# Patient Record
Sex: MALE | Race: WHITE | NOT HISPANIC OR LATINO | Employment: OTHER | URBAN - METROPOLITAN AREA
[De-identification: names, ages, dates, MRNs, and addresses within clinical notes are randomized per-mention and may not be internally consistent; named-entity substitution may affect disease eponyms.]

---

## 2019-12-17 ENCOUNTER — HISTORICAL (OUTPATIENT)
Dept: LAB | Facility: HOSPITAL | Age: 79
End: 2019-12-17

## 2019-12-17 LAB
FLUAV AG UPPER RESP QL IA.RAPID: NEGATIVE
FLUBV AG UPPER RESP QL IA.RAPID: NEGATIVE

## 2020-11-16 ENCOUNTER — HISTORICAL (OUTPATIENT)
Dept: ADMINISTRATIVE | Facility: HOSPITAL | Age: 80
End: 2020-11-16

## 2020-11-16 LAB
ABS NEUT (OLG): 2.53 X10(3)/MCL (ref 2.1–9.2)
ALBUMIN SERPL-MCNC: 3.7 GM/DL (ref 3.4–4.8)
ALBUMIN/GLOB SERPL: 1.2 RATIO (ref 1.1–2)
ALP SERPL-CCNC: 53 UNIT/L (ref 40–150)
ALT SERPL-CCNC: 28 UNIT/L (ref 0–55)
AST SERPL-CCNC: 30 UNIT/L (ref 5–34)
BASOPHILS # BLD AUTO: 0 X10(3)/MCL (ref 0–0.2)
BASOPHILS NFR BLD AUTO: 1 %
BILIRUB SERPL-MCNC: 0.8 MG/DL
BILIRUBIN DIRECT+TOT PNL SERPL-MCNC: 0.3 MG/DL (ref 0–0.5)
BILIRUBIN DIRECT+TOT PNL SERPL-MCNC: 0.5 MG/DL (ref 0–0.8)
BUN SERPL-MCNC: 26.9 MG/DL (ref 8.4–25.7)
CALCIUM SERPL-MCNC: 9.4 MG/DL (ref 8.8–10)
CHLORIDE SERPL-SCNC: 111 MMOL/L (ref 98–107)
CHOLEST SERPL-MCNC: 126 MG/DL
CHOLEST/HDLC SERPL: 2 {RATIO} (ref 0–5)
CO2 SERPL-SCNC: 19 MMOL/L (ref 23–31)
CREAT SERPL-MCNC: 1.05 MG/DL (ref 0.73–1.18)
EOSINOPHIL # BLD AUTO: 0.1 X10(3)/MCL (ref 0–0.9)
EOSINOPHIL NFR BLD AUTO: 2 %
ERYTHROCYTE [DISTWIDTH] IN BLOOD BY AUTOMATED COUNT: 13.3 % (ref 11.5–17)
GLOBULIN SER-MCNC: 3.1 GM/DL (ref 2.4–3.5)
GLUCOSE SERPL-MCNC: 98 MG/DL (ref 82–115)
HCT VFR BLD AUTO: 43.6 % (ref 42–52)
HDLC SERPL-MCNC: 56 MG/DL (ref 35–60)
HGB BLD-MCNC: 12.9 GM/DL (ref 14–18)
LDLC SERPL CALC-MCNC: 45 MG/DL (ref 50–140)
LYMPHOCYTES # BLD AUTO: 1.7 X10(3)/MCL (ref 0.6–4.6)
LYMPHOCYTES NFR BLD AUTO: 36 %
MCH RBC QN AUTO: 30 PG (ref 27–31)
MCHC RBC AUTO-ENTMCNC: 29.6 GM/DL (ref 33–36)
MCV RBC AUTO: 101.4 FL (ref 80–94)
MONOCYTES # BLD AUTO: 0.3 X10(3)/MCL (ref 0.1–1.3)
MONOCYTES NFR BLD AUTO: 7 %
NEUTROPHILS # BLD AUTO: 2.53 X10(3)/MCL (ref 2.1–9.2)
NEUTROPHILS NFR BLD AUTO: 54 %
PLATELET # BLD AUTO: 137 X10(3)/MCL (ref 130–400)
PLATELET # BLD EST: NORMAL 10*3/UL
PMV BLD AUTO: 10.4 FL (ref 7.4–10.4)
POTASSIUM SERPL-SCNC: 5.1 MMOL/L (ref 3.5–5.1)
PROT SERPL-MCNC: 6.8 GM/DL (ref 5.8–7.6)
PSA SERPL-MCNC: 2.5 NG/ML
RBC # BLD AUTO: 4.3 X10(6)/MCL (ref 4.7–6.1)
SODIUM SERPL-SCNC: 137 MMOL/L (ref 136–145)
TRIGL SERPL-MCNC: 124 MG/DL (ref 34–140)
VLDLC SERPL CALC-MCNC: 25 MG/DL
WBC # SPEC AUTO: 4.7 X10(3)/MCL (ref 4.5–11.5)

## 2021-05-17 ENCOUNTER — HISTORICAL (OUTPATIENT)
Dept: ADMINISTRATIVE | Facility: HOSPITAL | Age: 81
End: 2021-05-17

## 2021-05-17 LAB
ALBUMIN SERPL-MCNC: 4.3 GM/DL (ref 3.4–4.8)
ALBUMIN/GLOB SERPL: 1.6 RATIO (ref 1.1–2)
ALP SERPL-CCNC: 72 UNIT/L (ref 40–150)
ALT SERPL-CCNC: 32 UNIT/L (ref 0–55)
AST SERPL-CCNC: 29 UNIT/L (ref 5–34)
BILIRUB SERPL-MCNC: 0.6 MG/DL
BILIRUBIN DIRECT+TOT PNL SERPL-MCNC: 0.2 MG/DL (ref 0–0.5)
BILIRUBIN DIRECT+TOT PNL SERPL-MCNC: 0.4 MG/DL (ref 0–0.8)
BUN SERPL-MCNC: 30.3 MG/DL (ref 8.4–25.7)
CALCIUM SERPL-MCNC: 10.2 MG/DL (ref 8.8–10)
CHLORIDE SERPL-SCNC: 108 MMOL/L (ref 98–107)
CHOLEST SERPL-MCNC: 140 MG/DL
CHOLEST/HDLC SERPL: 3 {RATIO} (ref 0–5)
CO2 SERPL-SCNC: 23 MMOL/L (ref 23–31)
CREAT SERPL-MCNC: 0.95 MG/DL (ref 0.73–1.18)
GLOBULIN SER-MCNC: 2.7 GM/DL (ref 2.4–3.5)
GLUCOSE SERPL-MCNC: 107 MG/DL (ref 82–115)
HDLC SERPL-MCNC: 49 MG/DL (ref 35–60)
LDLC SERPL CALC-MCNC: 71 MG/DL (ref 50–140)
POTASSIUM SERPL-SCNC: 5.4 MMOL/L (ref 3.5–5.1)
PROT SERPL-MCNC: 7 GM/DL (ref 5.8–7.6)
SODIUM SERPL-SCNC: 140 MMOL/L (ref 136–145)
TRIGL SERPL-MCNC: 101 MG/DL (ref 34–140)
VLDLC SERPL CALC-MCNC: 20 MG/DL

## 2021-06-10 ENCOUNTER — HISTORICAL (OUTPATIENT)
Dept: ADMINISTRATIVE | Facility: HOSPITAL | Age: 81
End: 2021-06-10

## 2021-06-10 LAB
BUN SERPL-MCNC: 22.7 MG/DL (ref 8.4–25.7)
CALCIUM SERPL-MCNC: 10.1 MG/DL (ref 8.8–10)
CHLORIDE SERPL-SCNC: 104 MMOL/L (ref 98–107)
CO2 SERPL-SCNC: 27 MMOL/L (ref 23–31)
CREAT SERPL-MCNC: 0.96 MG/DL (ref 0.73–1.18)
CREAT/UREA NIT SERPL: 24
GLUCOSE SERPL-MCNC: 110 MG/DL (ref 82–115)
POTASSIUM SERPL-SCNC: 4.6 MMOL/L (ref 3.5–5.1)
SODIUM SERPL-SCNC: 138 MMOL/L (ref 136–145)

## 2021-11-22 ENCOUNTER — HISTORICAL (OUTPATIENT)
Dept: ADMINISTRATIVE | Facility: HOSPITAL | Age: 81
End: 2021-11-22

## 2021-11-22 LAB
ABS NEUT (OLG): 3.2 X10(3)/MCL (ref 2.1–9.2)
ALBUMIN SERPL-MCNC: 4 GM/DL (ref 3.4–4.8)
ALBUMIN/GLOB SERPL: 1.5 RATIO (ref 1.1–2)
ALP SERPL-CCNC: 65 UNIT/L (ref 40–150)
ALT SERPL-CCNC: 28 UNIT/L (ref 0–55)
APPEARANCE, UA: CLEAR
AST SERPL-CCNC: 26 UNIT/L (ref 5–34)
BACTERIA SPEC CULT: NORMAL /HPF
BASOPHILS # BLD AUTO: 0.1 X10(3)/MCL (ref 0–0.2)
BASOPHILS NFR BLD AUTO: 1 %
BILIRUB SERPL-MCNC: 1 MG/DL
BILIRUB UR QL STRIP: NEGATIVE
BILIRUBIN DIRECT+TOT PNL SERPL-MCNC: 0.4 MG/DL (ref 0–0.5)
BILIRUBIN DIRECT+TOT PNL SERPL-MCNC: 0.6 MG/DL (ref 0–0.8)
BUN SERPL-MCNC: 27.4 MG/DL (ref 8.4–25.7)
CALCIUM SERPL-MCNC: 10.3 MG/DL (ref 8.7–10.5)
CHLORIDE SERPL-SCNC: 108 MMOL/L (ref 98–107)
CHOLEST SERPL-MCNC: 137 MG/DL
CHOLEST/HDLC SERPL: 3 {RATIO} (ref 0–5)
CO2 SERPL-SCNC: 24 MMOL/L (ref 23–31)
COLOR UR: YELLOW
CREAT SERPL-MCNC: 0.94 MG/DL (ref 0.73–1.18)
EOSINOPHIL # BLD AUTO: 0.2 X10(3)/MCL (ref 0–0.9)
EOSINOPHIL NFR BLD AUTO: 3 %
ERYTHROCYTE [DISTWIDTH] IN BLOOD BY AUTOMATED COUNT: 14 % (ref 11.5–17)
GLOBULIN SER-MCNC: 2.7 GM/DL (ref 2.4–3.5)
GLUCOSE (UA): NEGATIVE
GLUCOSE SERPL-MCNC: 100 MG/DL (ref 82–115)
HCT VFR BLD AUTO: 41.3 % (ref 42–52)
HDLC SERPL-MCNC: 47 MG/DL (ref 35–60)
HGB BLD-MCNC: 13.4 GM/DL (ref 14–18)
HGB UR QL STRIP: NEGATIVE
KETONES UR QL STRIP: NEGATIVE
LDLC SERPL CALC-MCNC: 64 MG/DL (ref 50–140)
LEUKOCYTE ESTERASE UR QL STRIP: NEGATIVE
LYMPHOCYTES # BLD AUTO: 2.2 X10(3)/MCL (ref 0.6–4.6)
LYMPHOCYTES NFR BLD AUTO: 36 %
MCH RBC QN AUTO: 29.5 PG (ref 27–31)
MCHC RBC AUTO-ENTMCNC: 32.4 GM/DL (ref 33–36)
MCV RBC AUTO: 91 FL (ref 80–94)
MONOCYTES # BLD AUTO: 0.4 X10(3)/MCL (ref 0.1–1.3)
MONOCYTES NFR BLD AUTO: 7 %
NEUTROPHILS # BLD AUTO: 3.2 X10(3)/MCL (ref 2.1–9.2)
NEUTROPHILS NFR BLD AUTO: 53 %
NITRITE UR QL STRIP: NEGATIVE
PH UR STRIP: 7 [PH] (ref 5–9)
PLATELET # BLD AUTO: 158 X10(3)/MCL (ref 130–400)
PMV BLD AUTO: 10.4 FL (ref 9.4–12.4)
POTASSIUM SERPL-SCNC: 4.8 MMOL/L (ref 3.5–5.1)
PROT SERPL-MCNC: 6.7 GM/DL (ref 5.8–7.6)
PROT UR QL STRIP: NEGATIVE
PSA SERPL-MCNC: 2.15 NG/ML
RBC # BLD AUTO: 4.54 X10(6)/MCL (ref 4.7–6.1)
RBC #/AREA URNS HPF: NORMAL /[HPF]
SODIUM SERPL-SCNC: 141 MMOL/L (ref 136–145)
SP GR UR STRIP: 1.02 (ref 1–1.03)
SQUAMOUS EPITHELIAL, UA: NORMAL /HPF (ref 0–4)
TRIGL SERPL-MCNC: 128 MG/DL (ref 34–140)
UROBILINOGEN UR STRIP-ACNC: 0.2
VLDLC SERPL CALC-MCNC: 26 MG/DL
WBC # SPEC AUTO: 6.1 X10(3)/MCL (ref 4.5–11.5)
WBC #/AREA URNS HPF: NORMAL /HPF

## 2022-05-23 ENCOUNTER — LAB REQUISITION (OUTPATIENT)
Dept: LAB | Facility: HOSPITAL | Age: 82
End: 2022-05-23
Payer: MEDICARE

## 2022-05-23 DIAGNOSIS — E78.2 MIXED HYPERLIPIDEMIA: ICD-10-CM

## 2022-05-23 LAB
ALBUMIN SERPL-MCNC: 3.9 GM/DL (ref 3.4–4.8)
ALBUMIN/GLOB SERPL: 1.4 RATIO (ref 1.1–2)
ALP SERPL-CCNC: 75 UNIT/L (ref 40–150)
ALT SERPL-CCNC: 33 UNIT/L (ref 0–55)
AST SERPL-CCNC: 22 UNIT/L (ref 5–34)
BILIRUBIN DIRECT+TOT PNL SERPL-MCNC: 0.5 MG/DL
BUN SERPL-MCNC: 31.7 MG/DL (ref 8.4–25.7)
CALCIUM SERPL-MCNC: 10.2 MG/DL (ref 8.8–10)
CHLORIDE SERPL-SCNC: 106 MMOL/L (ref 98–107)
CHOLEST SERPL-MCNC: 133 MG/DL
CHOLEST/HDLC SERPL: 2 {RATIO} (ref 0–5)
CO2 SERPL-SCNC: 23 MMOL/L (ref 23–31)
CREAT SERPL-MCNC: 1.08 MG/DL (ref 0.73–1.18)
GLOBULIN SER-MCNC: 2.7 GM/DL (ref 2.4–3.5)
GLUCOSE SERPL-MCNC: 93 MG/DL (ref 82–115)
HDLC SERPL-MCNC: 59 MG/DL (ref 35–60)
LDLC SERPL CALC-MCNC: 55 MG/DL (ref 50–140)
POTASSIUM SERPL-SCNC: 5 MMOL/L (ref 3.5–5.1)
PROT SERPL-MCNC: 6.6 GM/DL (ref 5.8–7.6)
SODIUM SERPL-SCNC: 138 MMOL/L (ref 136–145)
TRIGL SERPL-MCNC: 96 MG/DL (ref 34–140)
VLDLC SERPL CALC-MCNC: 19 MG/DL

## 2022-05-23 PROCEDURE — 80053 COMPREHEN METABOLIC PANEL: CPT | Performed by: INTERNAL MEDICINE

## 2022-05-23 PROCEDURE — 80061 LIPID PANEL: CPT | Performed by: INTERNAL MEDICINE

## 2022-11-18 ENCOUNTER — LAB VISIT (OUTPATIENT)
Dept: LAB | Facility: HOSPITAL | Age: 82
End: 2022-11-18
Attending: INTERNAL MEDICINE
Payer: MEDICARE

## 2022-11-18 DIAGNOSIS — R19.5 ABNORMAL FECES: Primary | ICD-10-CM

## 2022-11-18 LAB
BASOPHILS # BLD AUTO: 0.04 X10(3)/MCL (ref 0–0.2)
BASOPHILS NFR BLD AUTO: 0.5 %
EOSINOPHIL # BLD AUTO: 0.11 X10(3)/MCL (ref 0–0.9)
EOSINOPHIL NFR BLD AUTO: 1.5 %
ERYTHROCYTE [DISTWIDTH] IN BLOOD BY AUTOMATED COUNT: 14 % (ref 11.5–17)
HCT VFR BLD AUTO: 34.4 % (ref 42–52)
HGB BLD-MCNC: 10.7 GM/DL (ref 14–18)
IMM GRANULOCYTES # BLD AUTO: 0.03 X10(3)/MCL (ref 0–0.04)
IMM GRANULOCYTES NFR BLD AUTO: 0.4 %
LYMPHOCYTES # BLD AUTO: 1.48 X10(3)/MCL (ref 0.6–4.6)
LYMPHOCYTES NFR BLD AUTO: 20 %
MCH RBC QN AUTO: 27.7 PG (ref 27–31)
MCHC RBC AUTO-ENTMCNC: 31.1 MG/DL (ref 33–36)
MCV RBC AUTO: 89.1 FL (ref 80–94)
MONOCYTES # BLD AUTO: 0.91 X10(3)/MCL (ref 0.1–1.3)
MONOCYTES NFR BLD AUTO: 12.3 %
NEUTROPHILS # BLD AUTO: 4.8 X10(3)/MCL (ref 2.1–9.2)
NEUTROPHILS NFR BLD AUTO: 65.3 %
NRBC BLD AUTO-RTO: 0 %
PLATELET # BLD AUTO: 245 X10(3)/MCL (ref 130–400)
PMV BLD AUTO: 9.3 FL (ref 7.4–10.4)
RBC # BLD AUTO: 3.86 X10(6)/MCL (ref 4.7–6.1)
WBC # SPEC AUTO: 7.4 X10(3)/MCL (ref 4.5–11.5)

## 2022-11-18 PROCEDURE — 85025 COMPLETE CBC W/AUTO DIFF WBC: CPT

## 2022-11-18 PROCEDURE — 36415 COLL VENOUS BLD VENIPUNCTURE: CPT

## 2022-11-21 ENCOUNTER — LAB REQUISITION (OUTPATIENT)
Dept: LAB | Facility: HOSPITAL | Age: 82
End: 2022-11-21
Payer: MEDICARE

## 2022-11-21 DIAGNOSIS — R19.5 OTHER FECAL ABNORMALITIES: ICD-10-CM

## 2022-11-21 LAB
COLOR STL: NORMAL
CONSISTENCY STL: NORMAL
HEMOCCULT SP1 STL QL: NEGATIVE

## 2022-11-21 PROCEDURE — 82270 OCCULT BLOOD FECES: CPT | Performed by: INTERNAL MEDICINE

## 2022-11-28 ENCOUNTER — LAB REQUISITION (OUTPATIENT)
Dept: LAB | Facility: HOSPITAL | Age: 82
End: 2022-11-28
Payer: MEDICARE

## 2022-11-28 DIAGNOSIS — D64.89 OTHER SPECIFIED ANEMIAS: ICD-10-CM

## 2022-11-28 DIAGNOSIS — I10 ESSENTIAL (PRIMARY) HYPERTENSION: ICD-10-CM

## 2022-11-28 DIAGNOSIS — E78.2 MIXED HYPERLIPIDEMIA: ICD-10-CM

## 2022-11-28 LAB
ALBUMIN SERPL-MCNC: 3 GM/DL (ref 3.4–4.8)
ALBUMIN/GLOB SERPL: 1.1 RATIO (ref 1.1–2)
ALP SERPL-CCNC: 802 UNIT/L (ref 40–150)
ALT SERPL-CCNC: 178 UNIT/L (ref 0–55)
AST SERPL-CCNC: 74 UNIT/L (ref 5–34)
BASOPHILS # BLD AUTO: 0.06 X10(3)/MCL (ref 0–0.2)
BASOPHILS NFR BLD AUTO: 0.6 %
BILIRUBIN DIRECT+TOT PNL SERPL-MCNC: 0.9 MG/DL
BUN SERPL-MCNC: 21.9 MG/DL (ref 8.4–25.7)
CALCIUM SERPL-MCNC: 9 MG/DL (ref 8.8–10)
CHLORIDE SERPL-SCNC: 104 MMOL/L (ref 98–107)
CHOLEST SERPL-MCNC: 84 MG/DL
CHOLEST/HDLC SERPL: 3 {RATIO} (ref 0–5)
CO2 SERPL-SCNC: 21 MMOL/L (ref 23–31)
CREAT SERPL-MCNC: 1.47 MG/DL (ref 0.73–1.18)
EOSINOPHIL # BLD AUTO: 0.52 X10(3)/MCL (ref 0–0.9)
EOSINOPHIL NFR BLD AUTO: 5.4 %
ERYTHROCYTE [DISTWIDTH] IN BLOOD BY AUTOMATED COUNT: 14.5 % (ref 11.5–17)
FOLATE SERPL-MCNC: 14.1 NG/ML (ref 7–31.4)
GFR SERPLBLD CREATININE-BSD FMLA CKD-EPI: 48 MLS/MIN/1.73/M2
GLOBULIN SER-MCNC: 2.8 GM/DL (ref 2.4–3.5)
GLUCOSE SERPL-MCNC: 123 MG/DL (ref 82–115)
HCT VFR BLD AUTO: 34.6 % (ref 42–52)
HDLC SERPL-MCNC: 30 MG/DL (ref 35–60)
HGB BLD-MCNC: 11.1 GM/DL (ref 14–18)
IMM GRANULOCYTES # BLD AUTO: 0.05 X10(3)/MCL (ref 0–0.04)
IMM GRANULOCYTES NFR BLD AUTO: 0.5 %
IRON SERPL-MCNC: 24 UG/DL (ref 65–175)
LDLC SERPL CALC-MCNC: 33 MG/DL (ref 50–140)
LYMPHOCYTES # BLD AUTO: 1.28 X10(3)/MCL (ref 0.6–4.6)
LYMPHOCYTES NFR BLD AUTO: 13.3 %
MCH RBC QN AUTO: 27.3 PG (ref 27–31)
MCHC RBC AUTO-ENTMCNC: 32.1 MG/DL (ref 33–36)
MCV RBC AUTO: 85.2 FL (ref 80–94)
MONOCYTES # BLD AUTO: 0.99 X10(3)/MCL (ref 0.1–1.3)
MONOCYTES NFR BLD AUTO: 10.3 %
NEUTROPHILS # BLD AUTO: 6.8 X10(3)/MCL (ref 2.1–9.2)
NEUTROPHILS NFR BLD AUTO: 69.9 %
NRBC BLD AUTO-RTO: 0 %
PLATELET # BLD AUTO: 300 X10(3)/MCL (ref 130–400)
PMV BLD AUTO: 10 FL (ref 7.4–10.4)
POTASSIUM SERPL-SCNC: 4.6 MMOL/L (ref 3.5–5.1)
PROT SERPL-MCNC: 5.8 GM/DL (ref 5.8–7.6)
RBC # BLD AUTO: 4.06 X10(6)/MCL (ref 4.7–6.1)
SODIUM SERPL-SCNC: 134 MMOL/L (ref 136–145)
TRIGL SERPL-MCNC: 104 MG/DL (ref 34–140)
VIT B12 SERPL-MCNC: 1283 PG/ML (ref 213–816)
VLDLC SERPL CALC-MCNC: 21 MG/DL
WBC # SPEC AUTO: 9.7 X10(3)/MCL (ref 4.5–11.5)

## 2022-11-28 PROCEDURE — 82746 ASSAY OF FOLIC ACID SERUM: CPT | Performed by: NURSE PRACTITIONER

## 2022-11-28 PROCEDURE — 85025 COMPLETE CBC W/AUTO DIFF WBC: CPT | Performed by: NURSE PRACTITIONER

## 2022-11-28 PROCEDURE — 82607 VITAMIN B-12: CPT | Performed by: NURSE PRACTITIONER

## 2022-11-28 PROCEDURE — 80061 LIPID PANEL: CPT | Performed by: NURSE PRACTITIONER

## 2022-11-28 PROCEDURE — 81001 URINALYSIS AUTO W/SCOPE: CPT | Performed by: NURSE PRACTITIONER

## 2022-11-28 PROCEDURE — 80053 COMPREHEN METABOLIC PANEL: CPT | Performed by: NURSE PRACTITIONER

## 2022-11-28 PROCEDURE — 83540 ASSAY OF IRON: CPT | Performed by: NURSE PRACTITIONER

## 2022-11-28 PROCEDURE — 81003 URINALYSIS AUTO W/O SCOPE: CPT | Performed by: NURSE PRACTITIONER

## 2022-11-28 PROCEDURE — 87077 CULTURE AEROBIC IDENTIFY: CPT | Performed by: NURSE PRACTITIONER

## 2022-11-29 LAB
APPEARANCE UR: CLEAR
BACTERIA #/AREA URNS AUTO: NORMAL /HPF
BILIRUB UR QL STRIP.AUTO: NEGATIVE MG/DL
COLOR UR AUTO: YELLOW
GLUCOSE UR QL STRIP.AUTO: NEGATIVE MG/DL
KETONES UR QL STRIP.AUTO: NEGATIVE MG/DL
LEUKOCYTE ESTERASE UR QL STRIP.AUTO: NEGATIVE UNIT/L
NITRITE UR QL STRIP.AUTO: NEGATIVE
PH UR STRIP.AUTO: 6.5 [PH]
PROT UR QL STRIP.AUTO: NEGATIVE MG/DL
RBC #/AREA URNS AUTO: <5 /HPF
RBC UR QL AUTO: NEGATIVE UNIT/L
SP GR UR STRIP.AUTO: 1.01 (ref 1–1.03)
SQUAMOUS #/AREA URNS AUTO: <5 /HPF
UROBILINOGEN UR STRIP-ACNC: 0.2 MG/DL
WBC #/AREA URNS AUTO: <5 /HPF

## 2022-11-30 LAB — BACTERIA UR CULT: ABNORMAL

## 2022-12-02 PROCEDURE — 83540 ASSAY OF IRON: CPT | Performed by: INTERNAL MEDICINE

## 2022-12-02 PROCEDURE — 82728 ASSAY OF FERRITIN: CPT | Performed by: INTERNAL MEDICINE

## 2022-12-02 PROCEDURE — 80074 ACUTE HEPATITIS PANEL: CPT | Performed by: INTERNAL MEDICINE

## 2022-12-05 ENCOUNTER — LAB VISIT (OUTPATIENT)
Dept: LAB | Facility: HOSPITAL | Age: 82
End: 2022-12-05
Attending: INTERNAL MEDICINE
Payer: MEDICARE

## 2022-12-05 DIAGNOSIS — R74.8 ACID PHOSPHATASE ELEVATED: Primary | ICD-10-CM

## 2022-12-05 LAB
FERRITIN SERPL-MCNC: 528.8 NG/ML (ref 21.81–274.66)
HAV IGM SERPL QL IA: NONREACTIVE
HBV CORE IGM SERPL QL IA: NONREACTIVE
HBV SURFACE AG SERPL QL IA: NONREACTIVE
HCV AB SERPL QL IA: NONREACTIVE
IRON SATN MFR SERPL: 22 % (ref 20–50)
IRON SERPL-MCNC: 40 UG/DL (ref 65–175)
RET# (OHS): 0.04 (ref 0.03–0.1)
RETICULOCYTE COUNT AUTOMATED (OLG): 1.14 % (ref 1.1–2.1)
TIBC SERPL-MCNC: 144 UG/DL (ref 69–240)
TIBC SERPL-MCNC: 184 UG/DL (ref 250–450)
TRANSFERRIN SERPL-MCNC: 156 MG/DL

## 2022-12-05 PROCEDURE — 80074 ACUTE HEPATITIS PANEL: CPT

## 2022-12-05 PROCEDURE — 82728 ASSAY OF FERRITIN: CPT

## 2022-12-05 PROCEDURE — 36415 COLL VENOUS BLD VENIPUNCTURE: CPT

## 2022-12-05 PROCEDURE — 83540 ASSAY OF IRON: CPT

## 2022-12-05 PROCEDURE — 85045 AUTOMATED RETICULOCYTE COUNT: CPT

## 2022-12-10 LAB — PATH REV: NORMAL

## 2022-12-13 ENCOUNTER — LAB REQUISITION (OUTPATIENT)
Dept: LAB | Facility: HOSPITAL | Age: 82
End: 2022-12-13
Payer: MEDICARE

## 2022-12-13 DIAGNOSIS — R74.8 ABNORMAL LEVELS OF OTHER SERUM ENZYMES: ICD-10-CM

## 2022-12-13 LAB
FERRITIN SERPL-MCNC: 499.66 NG/ML (ref 21.81–274.66)
IRON SERPL-MCNC: 68 UG/DL (ref 65–175)
RET# (OHS): 0.06 (ref 0.03–0.1)
RETICULOCYTE COUNT AUTOMATED (OLG): 1.57 % (ref 1.1–2.1)

## 2022-12-13 PROCEDURE — 85045 AUTOMATED RETICULOCYTE COUNT: CPT | Performed by: INTERNAL MEDICINE

## 2022-12-14 LAB
HAV IGM SERPL QL IA: NONREACTIVE
HBV CORE IGM SERPL QL IA: NONREACTIVE
HBV SURFACE AG SERPL QL IA: NONREACTIVE
HCV AB SERPL QL IA: NONREACTIVE

## 2022-12-15 LAB — PATH REV: NORMAL

## 2022-12-19 ENCOUNTER — LAB VISIT (OUTPATIENT)
Dept: LAB | Facility: HOSPITAL | Age: 82
End: 2022-12-19
Attending: INTERNAL MEDICINE
Payer: MEDICARE

## 2022-12-19 DIAGNOSIS — R17 JAUNDICE: Primary | ICD-10-CM

## 2022-12-19 DIAGNOSIS — E61.1 IRON DEFICIENCY: ICD-10-CM

## 2022-12-19 LAB
ALBUMIN SERPL-MCNC: 2.9 G/DL (ref 3.4–4.8)
ALBUMIN/GLOB SERPL: 0.9 RATIO (ref 1.1–2)
ALP SERPL-CCNC: 992 UNIT/L (ref 40–150)
ALT SERPL-CCNC: 265 UNIT/L (ref 0–55)
AST SERPL-CCNC: 161 UNIT/L (ref 5–34)
BASOPHILS # BLD AUTO: 0.07 X10(3)/MCL (ref 0–0.2)
BASOPHILS NFR BLD AUTO: 0.9 %
BILIRUBIN DIRECT+TOT PNL SERPL-MCNC: 8.1 MG/DL
BUN SERPL-MCNC: 19 MG/DL (ref 8.4–25.7)
CALCIUM SERPL-MCNC: 9.5 MG/DL (ref 8.8–10)
CHLORIDE SERPL-SCNC: 104 MMOL/L (ref 98–107)
CO2 SERPL-SCNC: 24 MMOL/L (ref 23–31)
CREAT SERPL-MCNC: 0.79 MG/DL (ref 0.73–1.18)
EOSINOPHIL # BLD AUTO: 0.6 X10(3)/MCL (ref 0–0.9)
EOSINOPHIL NFR BLD AUTO: 8 %
ERYTHROCYTE [DISTWIDTH] IN BLOOD BY AUTOMATED COUNT: 18 % (ref 11.6–14.4)
GFR SERPLBLD CREATININE-BSD FMLA CKD-EPI: >60 MLS/MIN/1.73/M2
GLOBULIN SER-MCNC: 3.3 GM/DL (ref 2.4–3.5)
GLUCOSE SERPL-MCNC: 94 MG/DL (ref 82–115)
HCT VFR BLD AUTO: 31.6 % (ref 42–52)
HGB BLD-MCNC: 10 GM/DL (ref 14–18)
IMM GRANULOCYTES # BLD AUTO: 0.04 X10(3)/MCL (ref 0–0.04)
IMM GRANULOCYTES NFR BLD AUTO: 0.5 %
LYMPHOCYTES # BLD AUTO: 1.83 X10(3)/MCL (ref 0.6–4.6)
LYMPHOCYTES NFR BLD AUTO: 24.5 %
MCH RBC QN AUTO: 27.4 PG
MCHC RBC AUTO-ENTMCNC: 31.6 MG/DL (ref 33–36)
MCV RBC AUTO: 86.6 FL (ref 80–94)
MONOCYTES # BLD AUTO: 0.72 X10(3)/MCL (ref 0.1–1.3)
MONOCYTES NFR BLD AUTO: 9.6 %
NEUTROPHILS # BLD AUTO: 4.21 X10(3)/MCL (ref 2.1–9.2)
NEUTROPHILS NFR BLD AUTO: 56.5 %
NRBC BLD AUTO-RTO: 0 % (ref 0–1)
PLATELET # BLD AUTO: 361 X10(3)/MCL (ref 140–371)
PMV BLD AUTO: 10.5 FL (ref 9.4–12.4)
POTASSIUM SERPL-SCNC: 4.8 MMOL/L (ref 3.5–5.1)
PROT SERPL-MCNC: 6.2 GM/DL (ref 5.8–7.6)
RBC # BLD AUTO: 3.65 X10(6)/MCL (ref 4.7–6.1)
SODIUM SERPL-SCNC: 135 MMOL/L (ref 136–145)
WBC # SPEC AUTO: 7.5 X10(3)/MCL (ref 4.5–11.5)

## 2022-12-19 PROCEDURE — 85025 COMPLETE CBC W/AUTO DIFF WBC: CPT

## 2022-12-19 PROCEDURE — 80053 COMPREHEN METABOLIC PANEL: CPT

## 2022-12-19 PROCEDURE — 36415 COLL VENOUS BLD VENIPUNCTURE: CPT

## 2022-12-21 ENCOUNTER — HOSPITAL ENCOUNTER (OUTPATIENT)
Facility: HOSPITAL | Age: 82
Discharge: HOME | End: 2022-12-21
Attending: INTERNAL MEDICINE | Admitting: INTERNAL MEDICINE
Payer: MEDICARE

## 2022-12-21 ENCOUNTER — ANESTHESIA (OUTPATIENT)
Dept: ENDOSCOPY | Facility: HOSPITAL | Age: 82
End: 2022-12-21
Payer: MEDICARE

## 2022-12-21 ENCOUNTER — ANESTHESIA EVENT (OUTPATIENT)
Dept: ENDOSCOPY | Facility: HOSPITAL | Age: 82
End: 2022-12-21
Payer: MEDICARE

## 2022-12-21 VITALS
RESPIRATION RATE: 19 BRPM | SYSTOLIC BLOOD PRESSURE: 167 MMHG | DIASTOLIC BLOOD PRESSURE: 88 MMHG | OXYGEN SATURATION: 99 % | HEART RATE: 68 BPM | TEMPERATURE: 97 F

## 2022-12-21 DIAGNOSIS — R17 JAUNDICE: ICD-10-CM

## 2022-12-21 PROCEDURE — 93010 ELECTROCARDIOGRAM REPORT: CPT | Mod: ,,, | Performed by: STUDENT IN AN ORGANIZED HEALTH CARE EDUCATION/TRAINING PROGRAM

## 2022-12-21 PROCEDURE — 93010 EKG 12-LEAD: ICD-10-PCS | Mod: ,,, | Performed by: STUDENT IN AN ORGANIZED HEALTH CARE EDUCATION/TRAINING PROGRAM

## 2022-12-21 PROCEDURE — 25000003 PHARM REV CODE 250: Performed by: INTERNAL MEDICINE

## 2022-12-21 PROCEDURE — 88305 TISSUE EXAM BY PATHOLOGIST: CPT | Performed by: INTERNAL MEDICINE

## 2022-12-21 PROCEDURE — 63600175 PHARM REV CODE 636 W HCPCS: Performed by: NURSE ANESTHETIST, CERTIFIED REGISTERED

## 2022-12-21 PROCEDURE — 27201423 OPTIME MED/SURG SUP & DEVICES STERILE SUPPLY: Performed by: INTERNAL MEDICINE

## 2022-12-21 PROCEDURE — 63600175 PHARM REV CODE 636 W HCPCS

## 2022-12-21 PROCEDURE — 37000008 HC ANESTHESIA 1ST 15 MINUTES: Performed by: INTERNAL MEDICINE

## 2022-12-21 PROCEDURE — 93005 ELECTROCARDIOGRAM TRACING: CPT | Mod: 59

## 2022-12-21 PROCEDURE — 37000009 HC ANESTHESIA EA ADD 15 MINS: Performed by: INTERNAL MEDICINE

## 2022-12-21 PROCEDURE — 43261 ENDO CHOLANGIOPANCREATOGRAPH: CPT | Performed by: INTERNAL MEDICINE

## 2022-12-21 PROCEDURE — 25000003 PHARM REV CODE 250: Performed by: NURSE ANESTHETIST, CERTIFIED REGISTERED

## 2022-12-21 RX ORDER — LIDOCAINE HYDROCHLORIDE 20 MG/ML
INJECTION, SOLUTION EPIDURAL; INFILTRATION; INTRACAUDAL; PERINEURAL
Status: DISCONTINUED | OUTPATIENT
Start: 2022-12-21 | End: 2022-12-21

## 2022-12-21 RX ORDER — MEPERIDINE HYDROCHLORIDE 25 MG/ML
12.5 INJECTION INTRAMUSCULAR; INTRAVENOUS; SUBCUTANEOUS EVERY 10 MIN PRN
Status: CANCELLED | OUTPATIENT
Start: 2022-12-21 | End: 2022-12-22

## 2022-12-21 RX ORDER — SODIUM CHLORIDE, SODIUM GLUCONATE, SODIUM ACETATE, POTASSIUM CHLORIDE AND MAGNESIUM CHLORIDE 30; 37; 368; 526; 502 MG/100ML; MG/100ML; MG/100ML; MG/100ML; MG/100ML
INJECTION, SOLUTION INTRAVENOUS CONTINUOUS
Status: CANCELLED | OUTPATIENT
Start: 2022-12-21 | End: 2023-01-20

## 2022-12-21 RX ORDER — PANTOPRAZOLE SODIUM 40 MG/1
40 TABLET, DELAYED RELEASE ORAL DAILY
Status: ON HOLD | COMMUNITY
End: 2023-03-10 | Stop reason: SDUPTHER

## 2022-12-21 RX ORDER — METRONIDAZOLE 7.5 MG/G
GEL TOPICAL 2 TIMES DAILY
Status: ON HOLD | COMMUNITY
End: 2023-01-25 | Stop reason: HOSPADM

## 2022-12-21 RX ORDER — GLYCOPYRROLATE 0.2 MG/ML
INJECTION INTRAMUSCULAR; INTRAVENOUS
Status: DISCONTINUED | OUTPATIENT
Start: 2022-12-21 | End: 2022-12-21

## 2022-12-21 RX ORDER — INDOMETHACIN 50 MG/1
100 SUPPOSITORY RECTAL ONCE
Status: COMPLETED | OUTPATIENT
Start: 2022-12-21 | End: 2022-12-21

## 2022-12-21 RX ORDER — IPRATROPIUM BROMIDE AND ALBUTEROL SULFATE 2.5; .5 MG/3ML; MG/3ML
3 SOLUTION RESPIRATORY (INHALATION)
Status: CANCELLED | OUTPATIENT
Start: 2022-12-21

## 2022-12-21 RX ORDER — LEVOFLOXACIN 5 MG/ML
INJECTION, SOLUTION INTRAVENOUS
Status: COMPLETED
Start: 2022-12-21 | End: 2022-12-21

## 2022-12-21 RX ORDER — MECLIZINE HCL 12.5 MG 12.5 MG/1
12.5 TABLET ORAL 3 TIMES DAILY PRN
Status: ON HOLD | COMMUNITY
End: 2023-03-17

## 2022-12-21 RX ORDER — PROPOFOL 10 MG/ML
VIAL (ML) INTRAVENOUS
Status: DISCONTINUED | OUTPATIENT
Start: 2022-12-21 | End: 2022-12-21

## 2022-12-21 RX ORDER — ONDANSETRON 2 MG/ML
4 INJECTION INTRAMUSCULAR; INTRAVENOUS DAILY PRN
Status: CANCELLED | OUTPATIENT
Start: 2022-12-21

## 2022-12-21 RX ORDER — LIDOCAINE HYDROCHLORIDE 10 MG/ML
1 INJECTION, SOLUTION EPIDURAL; INFILTRATION; INTRACAUDAL; PERINEURAL ONCE
Status: CANCELLED | OUTPATIENT
Start: 2022-12-21 | End: 2022-12-21

## 2022-12-21 RX ORDER — HYDRALAZINE HYDROCHLORIDE 20 MG/ML
10 INJECTION INTRAMUSCULAR; INTRAVENOUS ONCE
Status: DISCONTINUED | OUTPATIENT
Start: 2022-12-21 | End: 2022-12-21 | Stop reason: HOSPADM

## 2022-12-21 RX ORDER — ROCURONIUM BROMIDE 10 MG/ML
INJECTION, SOLUTION INTRAVENOUS
Status: DISCONTINUED | OUTPATIENT
Start: 2022-12-21 | End: 2022-12-21

## 2022-12-21 RX ORDER — HYDROMORPHONE HYDROCHLORIDE 2 MG/ML
0.2 INJECTION, SOLUTION INTRAMUSCULAR; INTRAVENOUS; SUBCUTANEOUS EVERY 5 MIN PRN
Status: CANCELLED | OUTPATIENT
Start: 2022-12-21

## 2022-12-21 RX ORDER — PHENYLEPHRINE HCL IN 0.9% NACL 1 MG/10 ML
SYRINGE (ML) INTRAVENOUS
Status: DISCONTINUED | OUTPATIENT
Start: 2022-12-21 | End: 2022-12-21

## 2022-12-21 RX ORDER — TAMSULOSIN HYDROCHLORIDE 0.4 MG/1
CAPSULE ORAL DAILY
Status: ON HOLD | COMMUNITY
End: 2023-04-28 | Stop reason: HOSPADM

## 2022-12-21 RX ORDER — ONDANSETRON HYDROCHLORIDE 2 MG/ML
INJECTION, SOLUTION INTRAMUSCULAR; INTRAVENOUS
Status: DISCONTINUED | OUTPATIENT
Start: 2022-12-21 | End: 2022-12-21

## 2022-12-21 RX ORDER — HYDROMORPHONE HYDROCHLORIDE 2 MG/ML
0.4 INJECTION, SOLUTION INTRAMUSCULAR; INTRAVENOUS; SUBCUTANEOUS EVERY 5 MIN PRN
Status: CANCELLED | OUTPATIENT
Start: 2022-12-21

## 2022-12-21 RX ORDER — DEXAMETHASONE SODIUM PHOSPHATE 4 MG/ML
INJECTION, SOLUTION INTRA-ARTICULAR; INTRALESIONAL; INTRAMUSCULAR; INTRAVENOUS; SOFT TISSUE
Status: DISCONTINUED | OUTPATIENT
Start: 2022-12-21 | End: 2022-12-21

## 2022-12-21 RX ORDER — HYDRALAZINE HYDROCHLORIDE 20 MG/ML
INJECTION INTRAMUSCULAR; INTRAVENOUS
Status: COMPLETED
Start: 2022-12-21 | End: 2022-12-21

## 2022-12-21 RX ORDER — INDOMETHACIN 50 MG/1
SUPPOSITORY RECTAL
Status: DISCONTINUED
Start: 2022-12-21 | End: 2022-12-21 | Stop reason: HOSPADM

## 2022-12-21 RX ORDER — LEVOFLOXACIN 5 MG/ML
INJECTION, SOLUTION INTRAVENOUS
Status: DISCONTINUED | OUTPATIENT
Start: 2022-12-21 | End: 2022-12-21

## 2022-12-21 RX ORDER — PROCHLORPERAZINE EDISYLATE 5 MG/ML
5 INJECTION INTRAMUSCULAR; INTRAVENOUS EVERY 30 MIN PRN
Status: CANCELLED | OUTPATIENT
Start: 2022-12-21

## 2022-12-21 RX ORDER — ONDANSETRON 4 MG/1
8 TABLET, ORALLY DISINTEGRATING ORAL EVERY 6 HOURS PRN
Status: CANCELLED | OUTPATIENT
Start: 2022-12-21

## 2022-12-21 RX ADMIN — LEVOFLOXACIN 500 MG: 500 INJECTION, SOLUTION INTRAVENOUS at 11:12

## 2022-12-21 RX ADMIN — INDOMETHACIN 100 MG: 50 SUPPOSITORY RECTAL at 12:12

## 2022-12-21 RX ADMIN — Medication 100 MCG: at 11:12

## 2022-12-21 RX ADMIN — ONDANSETRON 4 MG: 2 INJECTION INTRAMUSCULAR; INTRAVENOUS at 11:12

## 2022-12-21 RX ADMIN — SODIUM CHLORIDE, POTASSIUM CHLORIDE, SODIUM LACTATE AND CALCIUM CHLORIDE: 600; 310; 30; 20 INJECTION, SOLUTION INTRAVENOUS at 11:12

## 2022-12-21 RX ADMIN — LIDOCAINE HYDROCHLORIDE 80 MG: 20 INJECTION, SOLUTION EPIDURAL; INFILTRATION; INTRACAUDAL; PERINEURAL at 11:12

## 2022-12-21 RX ADMIN — HYDRALAZINE HYDROCHLORIDE 10 MG: 20 INJECTION INTRAMUSCULAR; INTRAVENOUS at 01:12

## 2022-12-21 RX ADMIN — DEXAMETHASONE SODIUM PHOSPHATE 4 MG: 4 INJECTION, SOLUTION INTRA-ARTICULAR; INTRALESIONAL; INTRAMUSCULAR; INTRAVENOUS; SOFT TISSUE at 11:12

## 2022-12-21 RX ADMIN — GLYCOPYRROLATE 0.2 MG: 0.2 INJECTION INTRAMUSCULAR; INTRAVENOUS at 11:12

## 2022-12-21 RX ADMIN — ROCURONIUM BROMIDE 50 MG: 50 INJECTION INTRAVENOUS at 11:12

## 2022-12-21 RX ADMIN — PROPOFOL 100 MG: 10 INJECTION, EMULSION INTRAVENOUS at 11:12

## 2022-12-21 NOTE — ANESTHESIA POSTPROCEDURE EVALUATION
Anesthesia Post Evaluation    Patient: Quincy Triplett    Procedure(s) Performed: Procedure(s) (LRB):  ERCP (N/A)  ERCP, WITH BIOPSY    Final Anesthesia Type: general      Patient location during evaluation: GI PACU  Patient participation: Yes- Able to Participate  Level of consciousness: awake and alert  Post-procedure vital signs: reviewed and stable  Pain management: adequate  Airway patency: patent    PONV status at discharge: No PONV  Anesthetic complications: no      Cardiovascular status: blood pressure returned to baseline  Respiratory status: spontaneous ventilation and room air  Hydration status: euvolemic  Follow-up not needed.          Vitals Value Taken Time   /88 12/21/22 1356   Temp 36 °C (96.8 °F) 12/21/22 1302   Pulse 68 12/21/22 1356   Resp 19 12/21/22 1356   SpO2 99 % 12/21/22 1356         Event Time   Out of Recovery 12:41:00         Pain/Mariusz Score: Mariusz Score: 10 (12/21/2022  1:56 PM)

## 2022-12-21 NOTE — OP NOTE
[facility]                       [address] [id number]    PATIENT NAME:      IVONNE PUGH  YOB: 1940  CSN:               [id number]  MRN:               [id number]  ADMIT DATE:        12/21/2022 09:37:00  PHYSICIAN:         Sushil Anderson MD                          OPERATIVE REPORT      DATE OF SURGERY:        SURGEON:  Sushil Anderson MD    PROCEDURE:  Endoscopic retrograde cholangiopancreatography with biopsy.    INDICATIONS:  Jaundice.  Abnormal CT scan.    PREOPERATIVE DIAGNOSES:    1. Possible choledocholithiasis.  2. 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    POSTOPERATIVE DIAGNOSES:    1. Failed attempt at pancreatogram or cholangiogram.  2. Friable mass in the second portion of the ampulla adjacent to the area of the   ampulla, but ampulla not visualized.  Firm mass with obstruction in this area   consistent with malignancy.  Multiple biopsies taken.    OPERATIVE FINDINGS AND REPORT:  After informed consent was obtained, patient was   intubated and sedated as per Anesthesia.  The adult side-viewing duodenoscope was   inserted in usual fashion, advanced through the esophagus and stomach, into the   duodenum.  Patient was noted to have a benign stricture in the area just past   the bulb.  The scope was then positioned in the second portion of the duodenum   in the area of what would usually be the ampullary orifice.  Patient was noted   to have a stricturing, firm, friable mass in this area.  A significant amount of   food debris obstructed visualization in this area.  Attempts to cannulate the   area thought to possibly be the ampulla were unsuccessful.  I then elected to   take multiple biopsies of this area which was firm and friable consistent with   malignancy.  The scope was withdrawn.    The patient tolerated the procedure well.    IMPRESSION:    1. Failed  attempt at pancreatogram or cholangiogram.  2. Friable mass in the second portion of the ampulla adjacent to the area of the   ampulla, but ampulla not visualized. Firm mass with obstruction in this area   consistent with malignancy.  Multiple biopsies taken.    DISCHARGE PLAN AND RECOMMENDATIONS:  Patient was allowed to recover and stable   at the time of finishing the procedure.  I have discussed our findings with the   patient and his family.  Will await our path results.  Patient will need   palliative surgery if this is a malignancy and Surgical Oncology will be   consulted.    Thank you for this kind referral of this unfortunate man.        ______________________________  MD LEANDRA Gambino/AQS  DD:  12/21/2022  Time:  12:33PM  DT:  12/21/2022  Time:  01:22PM  Job #:  [id number]    cc:   Reji Waters Jr, MD        OPERATIVE REPORT

## 2022-12-21 NOTE — ANESTHESIA PREPROCEDURE EVALUATION
12/21/2022  Quincy Triplett is a 82 y.o., male presents as an outpatient for ERCP (imaging abnormality and biliary obstructive pattern).  Patient attributes jaundice to eating bad oysters 2 months ago.  Patient notes recent outside cardiology evaluation with echocardiogram and stress test which he describes as uneventful requiring no further intervention (no records.    Last 3 sets of Vitals    Vitals - 1 value per visit 12/21/2022   SYSTOLIC 136   DIASTOLIC 75   Pulse 66   Temp 96.8   Resp 20   SPO2 99         Lab Results   Component Value Date    WBC 7.5 12/19/2022    HGB 10.0 (L) 12/19/2022    HCT 31.6 (L) 12/19/2022    MCV 86.6 12/19/2022     12/19/2022       CMP  Sodium Level   Date Value Ref Range Status   12/19/2022 135 (L) 136 - 145 mmol/L Final     Potassium Level   Date Value Ref Range Status   12/19/2022 4.8 3.5 - 5.1 mmol/L Final     Carbon Dioxide   Date Value Ref Range Status   12/19/2022 24 23 - 31 mmol/L Final     Blood Urea Nitrogen   Date Value Ref Range Status   12/19/2022 19.0 8.4 - 25.7 mg/dL Final     Creatinine   Date Value Ref Range Status   12/19/2022 0.79 0.73 - 1.18 mg/dL Final     Calcium Level Total   Date Value Ref Range Status   12/19/2022 9.5 8.8 - 10.0 mg/dL Final     Albumin Level   Date Value Ref Range Status   12/19/2022 2.9 (L) 3.4 - 4.8 g/dL Final     Bilirubin Total   Date Value Ref Range Status   12/19/2022 8.1 (H) <=1.5 mg/dL Final     Alkaline Phosphatase   Date Value Ref Range Status   12/19/2022 992 (H) 40 - 150 unit/L Final     Aspartate Aminotransferase   Date Value Ref Range Status   12/19/2022 161 (H) 5 - 34 unit/L Final     Alanine Aminotransferase   Date Value Ref Range Status   12/19/2022 265 (H) 0 - 55 unit/L Final     Estimated GFR-Non    Date Value Ref Range Status   05/23/2022 >60 mls/min/1.73/m2 Final    Pre-op Assessment    I  have reviewed the Patient Summary Reports.    I have reviewed the NPO Status.   I have reviewed the Medications.     Review of Systems  Anesthesia Hx:   Denies Personal Hx of Anesthesia complications.   Social:  Non-Smoker    Cardiovascular:   Hypertension  Functional Capacity good / => 4 METS  Coronary Artery Disease:  Carotoid Artery Disease, s/p percutaneous intervention        Physical Exam  General: Well nourished, Cooperative, Alert, Oriented and Jaundice    Airway:  Mallampati: II   Mouth Opening: Normal  TM Distance: Normal  Tongue: Normal  Neck ROM: Normal ROM    Dental:  Partial Dentures    Chest/Lungs:  Clear to auscultation, Normal Respiratory Rate    Heart:  Rate: Normal  Rhythm: Regular Rhythm        Anesthesia Plan  Type of Anesthesia, risks & benefits discussed:    Anesthesia Type: Gen ETT  Intra-op Monitoring Plan: Standard ASA Monitors  Post Op Pain Control Plan: multimodal analgesia and IV/PO Opioids PRN  Induction:  IV  Airway Plan: Direct  Informed Consent: Informed consent signed with the Patient and all parties understand the risks and agree with anesthesia plan.  All questions answered.   ASA Score: 3  Day of Surgery Review of History & Physical: H&P Update referred to the surgeon/provider.    Ready For Surgery From Anesthesia Perspective.     .

## 2022-12-21 NOTE — TRANSFER OF CARE
Anesthesia Transfer of Care Note    Patient: Quincy Triplett    Procedure(s) Performed: Procedure(s) (LRB):  ERCP (N/A)  ERCP, WITH BIOPSY    Patient location: GI    Anesthesia Type: general    Transport from OR: Transported from OR on room air with adequate spontaneous ventilation    Post pain: adequate analgesia    Post assessment: no apparent anesthetic complications and tolerated procedure well    Post vital signs: stable    Level of consciousness: awake and alert    Nausea/Vomiting: no nausea/vomiting    Complications: none    Transfer of care protocol was followed      Last vitals:   Visit Vitals  /75   Pulse 66   Temp 36 °C (96.8 °F)   Resp 20   SpO2 99%

## 2022-12-21 NOTE — H&P
[facility]                       [address] [id number]    PATIENT NAME:       IVONNE PUGH  YOB: 1940  CSN:                [id number]   MRN:                [id number]  ADMIT DATE:         12/21/2022 09:37:00  PHYSICIAN:          Sushil Anderson MD                        HISTORY AND PHYSICAL      HISTORY OF PRESENT ILLNESS:  An 82-year-old white man referred for evaluation of   jaundice and elevated liver function studies.  The patient reports that 2   months ago, he became sick after eating raw oysters.  He states he had nausea,   vomiting, and some diarrhea.  Since that time, he has lost 25 pounds.  He has   had no further symptoms.  He denies any abdominal pain.  He noticed he was   jaundiced and sought medical attention with his primary care physician, Dr. Waters.  He was found to have a total bilirubin of 8.1, alkaline phosphatase   992, AST of 161, ALT of 265, albumin of 2.9, hemoglobin of 10, hematocrit of   31.6.  The patient then underwent an ultrasound and a CT scan.  His ultrasound   did not reveal any evidence of gallstones.  He was found to have a dilated   common bile duct of 16 mm.  The CT scan performed yesterday revealed   intrahepatic and extrahepatic biliary dilatation, possible choledocholithiasis   with possible noncalcified stone involving the distal common bile duct.    Circumferential thickening involving the 2nd portion of the duodenum at the   ampulla with trace adjacent free fluid.  Inflammatory thickening versus   neoplastic thickening considered.  No lymphadenopathy.  The patient presents now   for ERCP evaluation.    ALLERGIES:   NKDA.     PAST MEDICAL HISTORY:  Coronary artery disease, hypertension, hyperlipidemia,   status post right arm amputation.    PAST SURGICAL HISTORY:  Tonsillectomy, right arm amputation.    SOCIAL HISTORY:  Quit smoking in 1992.  No  significant alcohol.    PHYSICAL EXAMINATION:  VITAL SIGNS:  Stable, afebrile.   GENERAL:  He is a large man, jaundiced, in no acute distress.   HEENT:  Sclerae icteric.  Conjunctivae pink.  No adenopathy or thyromegaly.   CARDIOVASCULAR:  Regular rate and rhythm.    LUNGS:  Clear.   ABDOMEN:  Soft, nontender.  Bowel sounds normal.  No masses or organomegaly   appreciated.   EXTREMITIES:  No clubbing, cyanosis, or edema.  Right arm amputation.   NEUROLOGIC:  Alert and oriented.  No focal deficits.    IMPRESSION:  Jaundice with evidence of biliary obstruction on CT scan, possibly   suggesting choledocholithiasis versus ampullary tumor.    RECOMMENDATIONS:  We will proceed with ERCP.  Benefits and risks discussed with   the patient and his family.     Thank you for this referral.        ______________________________  MD LEANDRA Gambino/AQS  DD:  12/21/2022  Time:  11:49AM  DT:  12/21/2022  Time:  01:25PM  Job #:  [id number]    cc:   Reji Waters Jr, MD        HISTORY AND PHYSICAL

## 2022-12-22 DIAGNOSIS — K31.89 DUODENAL MASS: Primary | ICD-10-CM

## 2022-12-22 LAB
DHEA SERPL-MCNC: NORMAL
ESTROGEN SERPL-MCNC: NORMAL PG/ML
INSULIN SERPL-ACNC: NORMAL U[IU]/ML
LAB AP CLINICAL INFORMATION: NORMAL
LAB AP GROSS DESCRIPTION: NORMAL
LAB AP REPORT FOOTNOTES: NORMAL
T3RU NFR SERPL: NORMAL %

## 2022-12-27 ENCOUNTER — OFFICE VISIT (OUTPATIENT)
Dept: SURGICAL ONCOLOGY | Facility: CLINIC | Age: 82
End: 2022-12-27
Payer: MEDICARE

## 2022-12-27 VITALS
WEIGHT: 176 LBS | HEART RATE: 76 BPM | BODY MASS INDEX: 26.07 KG/M2 | DIASTOLIC BLOOD PRESSURE: 76 MMHG | SYSTOLIC BLOOD PRESSURE: 143 MMHG | HEIGHT: 69 IN

## 2022-12-27 DIAGNOSIS — K31.89 DUODENAL MASS: ICD-10-CM

## 2022-12-27 DIAGNOSIS — C24.1 MALIGNANT NEOPLASM OF AMPULLA OF VATER: Primary | ICD-10-CM

## 2022-12-27 DIAGNOSIS — Z01.818 PRE-OP TESTING: ICD-10-CM

## 2022-12-27 DIAGNOSIS — C17.0 MALIGNANT NEOPLASM OF DUODENUM: ICD-10-CM

## 2022-12-27 PROCEDURE — 99205 OFFICE O/P NEW HI 60 MIN: CPT | Mod: S$GLB,,, | Performed by: SURGERY

## 2022-12-27 PROCEDURE — 1159F PR MEDICATION LIST DOCUMENTED IN MEDICAL RECORD: ICD-10-PCS | Mod: CPTII,S$GLB,, | Performed by: SURGERY

## 2022-12-27 PROCEDURE — 3077F SYST BP >= 140 MM HG: CPT | Mod: CPTII,S$GLB,, | Performed by: SURGERY

## 2022-12-27 PROCEDURE — 3077F PR MOST RECENT SYSTOLIC BLOOD PRESSURE >= 140 MM HG: ICD-10-PCS | Mod: CPTII,S$GLB,, | Performed by: SURGERY

## 2022-12-27 PROCEDURE — 3078F PR MOST RECENT DIASTOLIC BLOOD PRESSURE < 80 MM HG: ICD-10-PCS | Mod: CPTII,S$GLB,, | Performed by: SURGERY

## 2022-12-27 PROCEDURE — 1159F MED LIST DOCD IN RCRD: CPT | Mod: CPTII,S$GLB,, | Performed by: SURGERY

## 2022-12-27 PROCEDURE — 99999 PR PBB SHADOW E&M-EST. PATIENT-LVL III: ICD-10-PCS | Mod: PBBFAC,,, | Performed by: SURGERY

## 2022-12-27 PROCEDURE — 3078F DIAST BP <80 MM HG: CPT | Mod: CPTII,S$GLB,, | Performed by: SURGERY

## 2022-12-27 PROCEDURE — 99205 PR OFFICE/OUTPT VISIT, NEW, LEVL V, 60-74 MIN: ICD-10-PCS | Mod: S$GLB,,, | Performed by: SURGERY

## 2022-12-27 PROCEDURE — 99999 PR PBB SHADOW E&M-EST. PATIENT-LVL III: CPT | Mod: PBBFAC,,, | Performed by: SURGERY

## 2022-12-27 NOTE — PROGRESS NOTES
Chief complaint:  Duodenal adenocarcinoma     HPI:  82-year-old male presented with mild symptoms of jaundice, as well as significant weight loss.  Ultrasound exam suggested biliary dilation.  CT scan showed circumferential thickening of the 2nd portion of the duodenum and intra and extrahepatic biliary dilation.  There was also pancreatic ductal dilation.  There is no evidence of metastatic disease.  I personally reviewed and interpreted the images.  Patient underwent upper endoscopy and attempted ERCP with the finding of a malignant appearing mass at the site of the ampulla.  Biopsies proved poorly differentiated adenocarcinoma.  The bile duct could not be cannulated.  The patient denies fevers, chills or cholangitic symptoms.  He maintains an excellent functional status for his age, walks over 30 minutes a day, no history of alcohol or tobacco use.    Greater than 60 minutes was required for complete chart review, imaging review including interpretation of imaging, coordination with referring/other physicians, patient counseling regarding diagnosis/treatment plan, answering questions, medical decision making, and documentation.        Past Medical and Surgical History  Allergies :   Patient has no known allergies.    @[facility]@  Medical :   He has a past medical history of Atherosclerosis of native arteries of extremities with intermittent claudication, unspecified extremity, Coronary artery disease, Dyslipidemia, and Hypertension.    Surgical :   He has a past surgical history that includes Tonsillectomy; Amputation (Right); Left heart catheterization; Carotid stent; ERCP (N/A, 12/21/2022); and ercp, with biopsy (12/21/2022).     Family History  His family history is not on file.    Social History  He reports that he has never smoked. He has never used smokeless tobacco. He reports that he does not drink alcohol and does not use drugs.     Review of Systems   Constitutional:  Negative for activity change,  appetite change, chills, diaphoresis, fatigue and fever.   HENT:  Negative for congestion, ear pain, tinnitus and trouble swallowing.    Eyes:  Negative for photophobia and pain.   Respiratory:  Negative for apnea, cough, choking, chest tightness, shortness of breath and stridor.    Cardiovascular:  Negative for chest pain, palpitations and leg swelling.   Endocrine: Negative for cold intolerance and heat intolerance.   Genitourinary:  Negative for difficulty urinating, dysuria, enuresis, flank pain, frequency and hematuria.   Musculoskeletal:  Negative for arthralgias, back pain and gait problem.   Neurological:  Negative for dizziness, seizures, syncope, facial asymmetry, speech difficulty, weakness, light-headedness, numbness and headaches.   Psychiatric/Behavioral:  Negative for agitation, behavioral problems, confusion and decreased concentration.    All other systems reviewed and are negative.     Objective   Physical Exam  Constitutional:       General: He is not in acute distress.     Appearance: Normal appearance. He is not ill-appearing, toxic-appearing or diaphoretic.   HENT:      Head: Normocephalic and atraumatic.      Nose: No congestion or rhinorrhea.      Mouth/Throat:      Mouth: Mucous membranes are moist.      Pharynx: Oropharynx is clear.   Eyes:      General: No scleral icterus.     Extraocular Movements: Extraocular movements intact.      Pupils: Pupils are equal, round, and reactive to light.   Cardiovascular:      Rate and Rhythm: Normal rate and regular rhythm.      Pulses: Normal pulses.      Heart sounds: Normal heart sounds. No murmur heard.    No friction rub. No gallop.   Pulmonary:      Effort: Pulmonary effort is normal. No respiratory distress.      Breath sounds: Normal breath sounds. No stridor. No wheezing or rhonchi.   Chest:      Chest wall: No tenderness.   Abdominal:      General: Abdomen is flat. There is no distension.      Palpations: Abdomen is soft. There is no mass.       "Tenderness: There is no abdominal tenderness. There is no guarding or rebound.      Hernia: No hernia is present.   Musculoskeletal:         General: No swelling, tenderness, deformity or signs of injury.      Cervical back: Normal range of motion and neck supple. No rigidity or tenderness.      Right lower leg: No edema.      Left lower leg: No edema.   Skin:     General: Skin is warm.      Capillary Refill: Capillary refill takes less than 2 seconds.      Coloration: Skin is not jaundiced or pale.      Findings: No bruising, erythema, lesion or rash.   Neurological:      General: No focal deficit present.      Mental Status: He is alert and oriented to person, place, and time.   Psychiatric:         Mood and Affect: Mood normal.         Behavior: Behavior normal.         Thought Content: Thought content normal.         Judgment: Judgment normal.     VITAL SIGNS: 24 HR MIN & MAX LAST    @FLOWSTAT(6:24::1)@           @FLOWSTAT(5:24::1)@  (!) 143/76     @FLOWSTAT(8:24::1)@  76     @FLOWSTAT(9:24::1)@       @FLOWSTAT(10:24::1)@         HT: 5' 9" (175.3 cm)  WT: 79.8 kg (176 lb)  BMI: 26       Assessment & Plan     Adenocarcinoma of the ampulla Vater/duodenum.  No obvious evidence of metastatic disease on CT of the abdomen.    Diagnosis, staging, prognosis and treatment guidelines for ampullary cancer were discussed with the patient in detail, all questions were addressed.Using visual aids and drawings, I described the anatomy and potential surgical procedures.    CT scan of the chest to complete staging   CEA, CA 19 9 levels    The patient's functional status suggest he is a candidate for operative intervention.  We will obtain preoperative cardiac evaluation    Scheduled for pancreaticoduodenectomy/Whipple procedure  The risks and benefits of the procedure were explained in detail, questions were addressed, the patient gives consent to proceed    Abdirahman Brown MD  Surgical Oncology  Complex General, " Gastrointestinal and Hepatobiliary Surgery

## 2022-12-30 DIAGNOSIS — C17.0 MALIGNANT NEOPLASM OF DUODENUM: Primary | ICD-10-CM

## 2022-12-30 DIAGNOSIS — C24.1 MALIGNANT NEOPLASM OF AMPULLA OF VATER: ICD-10-CM

## 2022-12-30 DIAGNOSIS — K31.89 DUODENAL MASS: ICD-10-CM

## 2022-12-30 RX ORDER — HYDROCODONE BITARTRATE AND ACETAMINOPHEN 500; 5 MG/1; MG/1
TABLET ORAL
Status: CANCELLED | OUTPATIENT
Start: 2023-01-16

## 2022-12-30 RX ORDER — ENOXAPARIN SODIUM 100 MG/ML
30 INJECTION SUBCUTANEOUS EVERY 24 HOURS
Status: CANCELLED | OUTPATIENT
Start: 2022-12-30

## 2022-12-30 RX ORDER — ALVIMOPAN 12 MG/1
12 CAPSULE ORAL ONCE
Status: CANCELLED | OUTPATIENT
Start: 2023-01-16 | End: 2023-01-22

## 2022-12-31 ENCOUNTER — HOSPITAL ENCOUNTER (INPATIENT)
Facility: HOSPITAL | Age: 82
LOS: 7 days | Discharge: HOME HEALTH | DRG: 380 | End: 2023-01-07
Attending: STUDENT IN AN ORGANIZED HEALTH CARE EDUCATION/TRAINING PROGRAM | Admitting: INTERNAL MEDICINE
Payer: MEDICARE

## 2022-12-31 DIAGNOSIS — R53.1 WEAKNESS: Primary | ICD-10-CM

## 2022-12-31 DIAGNOSIS — R53.1 GENERALIZED WEAKNESS: ICD-10-CM

## 2022-12-31 DIAGNOSIS — C17.0 DUODENAL ADENOCARCINOMA: ICD-10-CM

## 2022-12-31 DIAGNOSIS — R11.2 NAUSEA AND VOMITING, UNSPECIFIED VOMITING TYPE: ICD-10-CM

## 2022-12-31 DIAGNOSIS — R17 JAUNDICE: ICD-10-CM

## 2022-12-31 LAB
ALBUMIN SERPL-MCNC: 2.8 G/DL (ref 3.4–4.8)
ALBUMIN/GLOB SERPL: 0.9 RATIO (ref 1.1–2)
ALP SERPL-CCNC: 526 UNIT/L (ref 40–150)
ALT SERPL-CCNC: 136 UNIT/L (ref 0–55)
AMORPH PHOS CRY URNS QL MICRO: ABNORMAL /HPF
APPEARANCE UR: ABNORMAL
AST SERPL-CCNC: 84 UNIT/L (ref 5–34)
BACTERIA #/AREA URNS AUTO: ABNORMAL /HPF
BASOPHILS # BLD AUTO: 0.04 X10(3)/MCL (ref 0–0.2)
BASOPHILS NFR BLD AUTO: 0.5 %
BILIRUB UR QL STRIP.AUTO: ABNORMAL MG/DL
BILIRUBIN DIRECT+TOT PNL SERPL-MCNC: 10.8 MG/DL (ref 0–0.5)
BILIRUBIN DIRECT+TOT PNL SERPL-MCNC: 16.2 MG/DL
BNP BLD-MCNC: 35 PG/ML
BUN SERPL-MCNC: 22 MG/DL (ref 8.4–25.7)
CALCIUM SERPL-MCNC: 9.9 MG/DL (ref 8.8–10)
CAOX CRY URNS QL MICRO: ABNORMAL /HPF
CHLORIDE SERPL-SCNC: 103 MMOL/L (ref 98–107)
CO2 SERPL-SCNC: 24 MMOL/L (ref 23–31)
COLOR UR AUTO: ABNORMAL
CREAT SERPL-MCNC: 1.16 MG/DL (ref 0.73–1.18)
EOSINOPHIL # BLD AUTO: 0.01 X10(3)/MCL (ref 0–0.9)
EOSINOPHIL NFR BLD AUTO: 0.1 %
ERYTHROCYTE [DISTWIDTH] IN BLOOD BY AUTOMATED COUNT: 21.8 % (ref 11.6–14.4)
FLUAV AG UPPER RESP QL IA.RAPID: NOT DETECTED
FLUBV AG UPPER RESP QL IA.RAPID: NOT DETECTED
GFR SERPLBLD CREATININE-BSD FMLA CKD-EPI: >60 MLS/MIN/1.73/M2
GLOBULIN SER-MCNC: 3 GM/DL (ref 2.4–3.5)
GLUCOSE SERPL-MCNC: 162 MG/DL (ref 82–115)
GLUCOSE UR QL STRIP.AUTO: NEGATIVE MG/DL
HCT VFR BLD AUTO: 32.6 % (ref 42–52)
HGB BLD-MCNC: 10.6 GM/DL (ref 14–18)
HYALINE CASTS URNS QL MICRO: ABNORMAL /LPF
IMM GRANULOCYTES # BLD AUTO: 0.05 X10(3)/MCL (ref 0–0.04)
IMM GRANULOCYTES NFR BLD AUTO: 0.6 %
KETONES UR QL STRIP.AUTO: NEGATIVE MG/DL
LACTATE SERPL-SCNC: 1.2 MMOL/L (ref 0.5–2.2)
LEUKOCYTE ESTERASE UR QL STRIP.AUTO: ABNORMAL UNIT/L
LYMPHOCYTES # BLD AUTO: 1.07 X10(3)/MCL (ref 0.6–4.6)
LYMPHOCYTES NFR BLD AUTO: 13.5 %
MAGNESIUM SERPL-MCNC: 2.3 MG/DL (ref 1.6–2.6)
MCH RBC QN AUTO: 28 PG
MCHC RBC AUTO-ENTMCNC: 32.5 MG/DL (ref 33–36)
MCV RBC AUTO: 86 FL (ref 80–94)
MONOCYTES # BLD AUTO: 0.56 X10(3)/MCL (ref 0.1–1.3)
MONOCYTES NFR BLD AUTO: 7 %
MUCOUS THREADS URNS QL MICRO: ABNORMAL /LPF
NEUTROPHILS # BLD AUTO: 6.22 X10(3)/MCL (ref 2.1–9.2)
NEUTROPHILS NFR BLD AUTO: 78.3 %
NITRITE UR QL STRIP.AUTO: POSITIVE
NRBC BLD AUTO-RTO: 0 % (ref 0–1)
PH UR STRIP.AUTO: 8 [PH]
PLATELET # BLD AUTO: 298 X10(3)/MCL (ref 140–371)
PMV BLD AUTO: 10.7 FL (ref 9.4–12.4)
POTASSIUM SERPL-SCNC: 4.2 MMOL/L (ref 3.5–5.1)
PROT SERPL-MCNC: 5.8 GM/DL (ref 5.8–7.6)
PROT UR QL STRIP.AUTO: ABNORMAL MG/DL
RBC # BLD AUTO: 3.79 X10(6)/MCL (ref 4.7–6.1)
RBC #/AREA URNS AUTO: <5 /HPF
RBC UR QL AUTO: NEGATIVE UNIT/L
SARS-COV-2 RNA RESP QL NAA+PROBE: NOT DETECTED
SODIUM SERPL-SCNC: 138 MMOL/L (ref 136–145)
SP GR UR STRIP.AUTO: 1.02 (ref 1–1.03)
SQUAMOUS #/AREA URNS AUTO: <5 /HPF
TROPONIN I SERPL-MCNC: <0.01 NG/ML (ref 0–0.04)
TSH SERPL-ACNC: 0.79 UIU/ML (ref 0.35–4.94)
UROBILINOGEN UR STRIP-ACNC: 1 MG/DL
WBC # SPEC AUTO: 8 X10(3)/MCL (ref 4.5–11.5)
WBC #/AREA URNS AUTO: <5 /HPF

## 2022-12-31 PROCEDURE — 85025 COMPLETE CBC W/AUTO DIFF WBC: CPT | Performed by: STUDENT IN AN ORGANIZED HEALTH CARE EDUCATION/TRAINING PROGRAM

## 2022-12-31 PROCEDURE — 84484 ASSAY OF TROPONIN QUANT: CPT | Performed by: STUDENT IN AN ORGANIZED HEALTH CARE EDUCATION/TRAINING PROGRAM

## 2022-12-31 PROCEDURE — 96361 HYDRATE IV INFUSION ADD-ON: CPT

## 2022-12-31 PROCEDURE — 83880 ASSAY OF NATRIURETIC PEPTIDE: CPT | Performed by: STUDENT IN AN ORGANIZED HEALTH CARE EDUCATION/TRAINING PROGRAM

## 2022-12-31 PROCEDURE — 11000001 HC ACUTE MED/SURG PRIVATE ROOM

## 2022-12-31 PROCEDURE — 25000003 PHARM REV CODE 250: Performed by: NURSE PRACTITIONER

## 2022-12-31 PROCEDURE — 83735 ASSAY OF MAGNESIUM: CPT | Performed by: STUDENT IN AN ORGANIZED HEALTH CARE EDUCATION/TRAINING PROGRAM

## 2022-12-31 PROCEDURE — 81001 URINALYSIS AUTO W/SCOPE: CPT | Performed by: STUDENT IN AN ORGANIZED HEALTH CARE EDUCATION/TRAINING PROGRAM

## 2022-12-31 PROCEDURE — 82248 BILIRUBIN DIRECT: CPT | Performed by: STUDENT IN AN ORGANIZED HEALTH CARE EDUCATION/TRAINING PROGRAM

## 2022-12-31 PROCEDURE — 80053 COMPREHEN METABOLIC PANEL: CPT | Performed by: STUDENT IN AN ORGANIZED HEALTH CARE EDUCATION/TRAINING PROGRAM

## 2022-12-31 PROCEDURE — 83605 ASSAY OF LACTIC ACID: CPT | Performed by: STUDENT IN AN ORGANIZED HEALTH CARE EDUCATION/TRAINING PROGRAM

## 2022-12-31 PROCEDURE — 84443 ASSAY THYROID STIM HORMONE: CPT | Performed by: STUDENT IN AN ORGANIZED HEALTH CARE EDUCATION/TRAINING PROGRAM

## 2022-12-31 PROCEDURE — 0240U COVID/FLU A&B PCR: CPT | Performed by: STUDENT IN AN ORGANIZED HEALTH CARE EDUCATION/TRAINING PROGRAM

## 2022-12-31 PROCEDURE — 63600175 PHARM REV CODE 636 W HCPCS: Performed by: STUDENT IN AN ORGANIZED HEALTH CARE EDUCATION/TRAINING PROGRAM

## 2022-12-31 PROCEDURE — 93010 EKG 12-LEAD: ICD-10-PCS | Mod: ,,, | Performed by: INTERNAL MEDICINE

## 2022-12-31 PROCEDURE — 93010 ELECTROCARDIOGRAM REPORT: CPT | Mod: ,,, | Performed by: INTERNAL MEDICINE

## 2022-12-31 PROCEDURE — 99285 EMERGENCY DEPT VISIT HI MDM: CPT | Mod: 25

## 2022-12-31 RX ORDER — SODIUM CHLORIDE 9 MG/ML
INJECTION, SOLUTION INTRAVENOUS CONTINUOUS
Status: DISCONTINUED | OUTPATIENT
Start: 2022-12-31 | End: 2023-01-02

## 2022-12-31 RX ORDER — ACETAMINOPHEN 325 MG/1
650 TABLET ORAL EVERY 8 HOURS PRN
Status: DISCONTINUED | OUTPATIENT
Start: 2022-12-31 | End: 2023-01-07 | Stop reason: HOSPADM

## 2022-12-31 RX ORDER — TAMSULOSIN HYDROCHLORIDE 0.4 MG/1
0.4 CAPSULE ORAL DAILY
Status: DISCONTINUED | OUTPATIENT
Start: 2023-01-01 | End: 2023-01-07 | Stop reason: HOSPADM

## 2022-12-31 RX ORDER — ONDANSETRON 2 MG/ML
4 INJECTION INTRAMUSCULAR; INTRAVENOUS EVERY 8 HOURS PRN
Status: DISCONTINUED | OUTPATIENT
Start: 2022-12-31 | End: 2023-01-02

## 2022-12-31 RX ORDER — ACETAMINOPHEN 325 MG/1
650 TABLET ORAL EVERY 4 HOURS PRN
Status: DISCONTINUED | OUTPATIENT
Start: 2022-12-31 | End: 2023-01-07 | Stop reason: HOSPADM

## 2022-12-31 RX ORDER — PANTOPRAZOLE SODIUM 40 MG/1
40 TABLET, DELAYED RELEASE ORAL DAILY
Status: DISCONTINUED | OUTPATIENT
Start: 2023-01-01 | End: 2023-01-02

## 2022-12-31 RX ADMIN — SODIUM CHLORIDE: 9 INJECTION, SOLUTION INTRAVENOUS at 09:12

## 2022-12-31 RX ADMIN — SODIUM CHLORIDE, POTASSIUM CHLORIDE, SODIUM LACTATE AND CALCIUM CHLORIDE 1000 ML: 600; 310; 30; 20 INJECTION, SOLUTION INTRAVENOUS at 06:12

## 2022-12-31 RX ADMIN — SODIUM CHLORIDE: 9 INJECTION, SOLUTION INTRAVENOUS at 12:12

## 2022-12-31 NOTE — H&P
[facility]  Hospital Medicine History & Physical Examination       Patient Name: Quincy Triplett  MRN: [id number]  Patient Class: Emergency   Admission Date: 12/31/2022   Admitting Service: Hospital Medicine   Length of Stay: 0  Attending Physician: Dr. Mcmanus  Primary Care Provider: Reji Waters Jr, MD  Face-to-Face encounter date: 12/31/2022  Code Status:  Full  Chief Complaint: Abdominal Pain (To er per aasi with c/o weakness, n/v, decreased appetite for months but worsening over last several days.  Has been delaying sx procedure for abd mass.  Pt jaundice)    Source of Information: Patient. Medical Records      HISTORY OF PRESENT ILLNESS:   Quincy Triplett is a 82 y.o. 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 He denied hematuria, hematochezia, hematemesis, abdominal pain.  Reports that can not tolerate oral intake due to nausea and vomiting.  He states he was diagnosed with duodenal adenocarcinoma x2 months ago and symptoms have been progressively worsening since then.  He is awaiting Whipple procedure by Dr. Abdirahman Brown.    Initial ED VS stable.  Labs notable for hemoglobin 10.6, hematocrit 32.6, glucose 162, alk-phos 526, albumin 2.8, total bili 16.2, direct bili 10.8, AST 84, .  BNP and troponin normal.  Lactic acid normal.  Flu and COVID negative.  CXR negative.  He was given IV fluids in the ED. Admitted to hospital medicine services for further workup and management of care.    REVIEW OF SYSTEMS:   Except as documented, all other systems reviewed and negative     PAST MEDICAL HISTORY:   PAD with intermittent claudication   CAD   Carotid artery disease   HTN   HLD   Duodenal adenocarcinoma    PAST SURGICAL  HISTORY:     Past Surgical History:   Procedure Laterality Date    AMPUTATION Right     Right arm    CAROTID STENT      ERCP N/A 12/21/2022    Procedure: ERCP;  Surgeon: Sushil Anderson MD;  Location: [facility] ENDOSCOPY;  Service: Gastroenterology;  Laterality: N/A;  food in abdomen    ERCP, WITH BIOPSY  12/21/2022    Procedure: ERCP, WITH BIOPSY;  Surgeon: Sushil Anderson MD;  Location: [facility] ENDOSCOPY;  Service: Gastroenterology;;    LEFT HEART CATHETERIZATION      TONSILLECTOMY         FAMILY HISTORY:   Reviewed and negative    SOCIAL HISTORY:   Denied alcohol, tobacco or illicit drug use    ALLERGIES:   Patient has no known allergies.    HOME MEDICATIONS:     Prior to Admission medications    Medication Sig Start Date End Date Taking? Authorizing Provider   meclizine (ANTIVERT) 12.5 mg tablet Take 12.5 mg by mouth 3 (three) times daily as needed.    Historical Provider   metroNIDAZOLE (METROGEL) 0.75 % gel Apply topically 2 (two) times daily.    Historical Provider   pantoprazole (PROTONIX) 40 MG tablet Take 40 mg by mouth once daily.    Historical Provider   tamsulosin (FLOMAX) 0.4 mg Cap Take by mouth once daily.    Historical Provider     ________________________________________________________________________  INPATIENT LIST OF MEDICATIONS   No current facility-administered medications for this encounter.    Current Outpatient Medications:     meclizine (ANTIVERT) 12.5 mg tablet, Take 12.5 mg by mouth 3 (three) times daily as needed., Disp: , Rfl:     metroNIDAZOLE (METROGEL) 0.75 % gel, Apply topically 2 (two) times daily., Disp: , Rfl:     pantoprazole (PROTONIX) 40 MG tablet, Take 40 mg by mouth once daily., Disp: , Rfl:     tamsulosin (FLOMAX) 0.4 mg Cap, Take by mouth once daily., Disp: , Rfl:     Scheduled Meds:  Continuous Infusions:  PRN Meds:.    PHYSICAL EXAM:     VITAL SIGNS: 24 HRS MIN & MAX LAST   Temp  Min: 98 °F (36.7 °C)  Max: 98 °F (36.7 °C) 98 °F (36.7 °C)   BP  Min: 102/73   Max: 154/79 138/87   Pulse  Min: 59  Max: 82  65   Resp  Min: 16  Max: 20 16   SpO2  Min: 94 %  Max: 96 % (!) 94 %         General appearance: Well-developed male in no apparent distress.  HENT: Atraumatic head. Moist mucous membranes of oral cavity.  Eyes: Normal extraocular movements.  Sclerae icterus  Neck: Supple.   Lungs: Clear to auscultation bilaterally.   Heart: Regular rate and rhythm. S1 and S2 present. No pedal edema.  Abdomen: Soft, non-distended, non-tender.  Extremities: No cyanosis, clubbing, or edema.  Skin:  Jaundiced  Neuro: Motor and sensory exams grossly intact.   Psych/mental status: Appropriate mood and affect. Responds appropriately to questions.     LABS AND IMAGING:     Recent Labs   Lab 12/31/22  0821   WBC 8.0   RBC 3.79*   HGB 10.6*   HCT 32.6*   MCV 86.0   MCH 28.0   MCHC 32.5*   RDW 21.8*      MPV 10.7       Recent Labs   Lab 12/31/22  0708      K 4.2   CO2 24   BUN 22.0   CREATININE 1.16   CALCIUM 9.9   MG 2.30   ALBUMIN 2.8*   ALKPHOS 526*   *   AST 84*   BILITOT 16.2*       Microbiology Results (last 7 days)       ** No results found for the last 168 hours. **             X-Ray Chest AP Portable  Narrative: EXAMINATION:  XR CHEST AP PORTABLE    CLINICAL HISTORY:  weakness;, .    COMPARISON:  September 19, 2016    FINDINGS:  No alveolar consolidation, effusion, or pneumothorax is seen.   The thoracic aorta is normal  cardiac silhouette, central pulmonary vessels and mediastinum are normal in size and are grossly unremarkable.   visualized osseous structures are grossly unremarkable.  Impression: No acute chest disease is identified.    Electronically signed by: Brandon Ny  Date:    12/31/2022  Time:    08:43        ASSESSMENT & PLAN:   Hyperbilirubinemia and Transaminitis   Jaundice  Duodenal Adenocarcinoma  Generalized Weakness  Hx of PAD with intermittent claudication, CAD, Carotid artery disease, HTN, HLD     Plan:  Surgical Oncology consulted, appreciate  recommendations  NS at 75 ml/hr  Resume appropriate home medications  Labs in AM      VTE Prophylaxis: SCDs    Discharge Planning and Disposition: TBD    I, Holli Torres, NP have reviewed and discussed the case with Dr. Mcmanus.  Please see the attending MD's addendum for further assessment and plan.    Holli Torres, AGACNP-BC  12/31/2022    _______________________________________________________________________________  MD Addendum:  I, Dr.Praneet Marietta MD performed the substantive portion of the visit, I reviewed the NP/PA documentation, treatment plan, and medical decision making.  I had face to face time with this patient     A. History:  Admitted for intractable nausea and vomiting, weakness.  Patient with recent diagnosis of adenocarcinoma of ampulla follows with Dr. Abdirahman Brown planning on Whipple's resection on Jan 16th,2023.  Labs remarkable for elevated bilirubin of 16.2(8.1 2 weeks ago)  with a direct component 10.8   B. Physical exam:  Patient is alert, no acute distress, scleral icterus ,bdominal examined revealed mild tenderness in the right upper quadrant, soft, positive bowel sounds, lungs clear to auscultate, heart rate regular.  C. 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  -surgical oncology team consultation requested.  General surgery covering the team for the weekend recommended to consult GI.    -GI was consulted.  Recommended to obtain ultrasound with supportive care   -home meds reviewed.   Renewed appropriate.  His PCP took him off statin, ARB  recently.  -chronic condition stable  Code status full code     All diagnosis and differential diagnosis have been reviewed; assessment and plan has been documented; I have personally reviewed the labs and test results that are presently available; I have reviewed the patients medication list; I have reviewed the consulting providers response and recommendations. I have reviewed or attempted to review medical records based upon their availability.    All of the patient and family questions have been addressed and answered. Patient's is agreeable to the above stated plan. I will continue to monitor closely and make adjustments to medical management as needed.      12/31/2022

## 2022-12-31 NOTE — ED PROVIDER NOTES
Encounter Date: 12/31/2022    SCRIBE #1 NOTE: I, Jose Luis Esparza, am scribing for, and in the presence of,  Rufino Urena MD. I have scribed the following portions of the note - the EKG reading. Other sections scribed: HPI, ROS, PE.     History     Chief Complaint   Patient presents with    Abdominal Pain     To er per aasi with c/o weakness, n/v, decreased appetite for months but worsening over last several days.  Has been delaying sx procedure for abd mass.  Pt jaundice     83 y/o male with a history of Duodenal adenocarcinoma, HTN, and CAD presents to the ED via EMS with worsening weakness over the past several days.  He has chronic weakness but has worsened over the past several days as he is had nausea vomiting multiple times daily Last bowel movement was yesterday. Denies hematuria, hematochezia, hematemesis, and any pain.  Patient has a Whipple procedure planned for January 16th with Abdirahman Brown MD.    Patient's PCP is Dr. Waters  Patient's cardiologist is Dr. Smith    The history is provided by the patient. No  was used.   Illness   The current episode started several days ago. The problem occurs continuously. The problem has been gradually worsening. Associated symptoms include nausea and vomiting. Pertinent negatives include no fever, no abdominal pain, no constipation, no diarrhea, no congestion, no ear discharge, no ear pain, no headaches, no rhinorrhea, no sore throat, no neck pain, no cough, no shortness of breath, no pain and no eye redness.   Review of patient's allergies indicates:  No Known Allergies  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, unspecified extremity     Coronary artery disease     Dyslipidemia     Hypertension      Past Surgical History:   Procedure Laterality Date    AMPUTATION Right     Right arm    CAROTID STENT      ERCP N/A 12/21/2022    Procedure: ERCP;  Surgeon: Sushil  MD Monica;  Location: [facility] ENDOSCOPY;  Service: Gastroenterology;  Laterality: N/A;  food in abdomen    ERCP, WITH BIOPSY  12/21/2022    Procedure: ERCP, WITH BIOPSY;  Surgeon: Sushil Anderson MD;  Location: [facility] ENDOSCOPY;  Service: Gastroenterology;;    LEFT HEART CATHETERIZATION      TONSILLECTOMY       History reviewed. No pertinent family history.  Social History     Tobacco Use    Smoking status: Never    Smokeless tobacco: Never   Substance Use Topics    Alcohol use: Never    Drug use: Never     Review of Systems   Constitutional:  Positive for fatigue. Negative for chills and fever.        Increased generalized weakness   HENT:  Negative for congestion, ear discharge, ear pain, nosebleeds, rhinorrhea and sore throat.    Eyes:  Negative for pain, redness and visual disturbance.   Respiratory:  Negative for cough, chest tightness and shortness of breath.    Cardiovascular:  Negative for chest pain and leg swelling.   Gastrointestinal:  Positive for nausea and vomiting. Negative for abdominal pain, blood in stool, constipation and diarrhea.   Genitourinary:  Negative for dysuria and hematuria.   Musculoskeletal:  Negative for arthralgias, joint swelling, myalgias and neck pain.   Skin:  Negative for color change, pallor and wound.   Neurological:  Negative for dizziness, weakness, light-headedness, numbness and headaches.     Physical Exam     Initial Vitals [12/31/22 0602]   BP Pulse Resp Temp SpO2   108/62 71 18 98 °F (36.7 °C) 96 %      MAP       --         Physical Exam    Nursing note and vitals reviewed.  Constitutional: He appears well-developed and well-nourished. He is not diaphoretic. No distress.   HENT:   Head: Normocephalic and atraumatic.   Right Ear: External ear normal.   Left Ear: External ear normal.   Nose: Nose normal.   Mouth/Throat: Oropharynx is clear and moist.   Eyes: Conjunctivae and EOM are normal. Pupils are equal, round, and reactive to light. Right eye exhibits  no discharge. Left eye exhibits no discharge.   Neck: Neck supple. No tracheal deviation present.   Normal range of motion.  Cardiovascular:  Normal rate, regular rhythm, normal heart sounds and intact distal pulses.     Exam reveals no gallop and no friction rub.       No murmur heard.  Pulmonary/Chest: Breath sounds normal. No stridor. No respiratory distress. He has no wheezes. He has no rhonchi. He has no rales. He exhibits no tenderness.   Abdominal: Abdomen is soft. Bowel sounds are normal. He exhibits no distension and no mass. There is no abdominal tenderness. There is no guarding.   Musculoskeletal:         General: No tenderness or edema. Normal range of motion.      Cervical back: Normal range of motion and neck supple.      Comments: Right amputation at the elbow     Neurological: He is alert and oriented to person, place, and time. No cranial nerve deficit or sensory deficit.   Skin: Skin is warm and dry. Capillary refill takes less than 2 seconds. No rash noted. No erythema. No pallor.   Jaundice       ED Course   Procedures  Labs Reviewed   COMPREHENSIVE METABOLIC PANEL - Abnormal; Notable for the following components:       Result Value    Glucose Level 162 (*)     Albumin Level 2.8 (*)     Albumin/Globulin Ratio 0.9 (*)     Bilirubin Total 16.2 (*)     Alkaline Phosphatase 526 (*)     Alanine Aminotransferase 136 (*)     Aspartate Aminotransferase 84 (*)     All other components within normal limits   CBC WITH DIFFERENTIAL - Abnormal; Notable for the following components:    RBC 3.79 (*)     Hgb 10.6 (*)     Hct 32.6 (*)     MCHC 32.5 (*)     RDW 21.8 (*)     IG# 0.05 (*)     All other components within normal limits   BILIRUBIN, DIRECT - Abnormal; Notable for the following components:    Bilirubin Direct 10.8 (*)     All other components within normal limits   B-TYPE NATRIURETIC PEPTIDE - Normal   COVID/FLU A&B PCR - Normal    Narrative:     The Xpert Xpress SARS-CoV-2/FLU/RSV plus is a rapid,  multiplexed real-time PCR test intended for the simultaneous qualitative detection and differentiation of SARS-CoV-2, Influenza A, Influenza B, and respiratory syncytial virus (RSV) viral RNA in either nasopharyngeal swab or nasal swab specimens.         LACTIC ACID, PLASMA - Normal   MAGNESIUM - Normal   TROPONIN I - Normal   TSH - Normal   CBC W/ AUTO DIFFERENTIAL    Narrative:     The following orders were created for panel order CBC auto differential.  Procedure                               Abnormality         Status                     ---------                               -----------         ------                     CBC with Differential[[id number]]        Abnormal            Final result                 Please view results for these tests on the individual orders.   URINALYSIS, REFLEX TO URINE CULTURE     EKG Readings: (Independently Interpreted)   Rhythm: Normal Sinus Rhythm. Heart Rate: 79. ST Segments: Normal ST Segments. Axis: Normal.   Time: 6:00    Good R wave progression     Imaging Results              X-Ray Chest AP Portable (Final result)  Result time 12/31/22 08:43:28      Final result by Brandon Ny MD (12/31/22 08:43:28)                   Impression:      No acute chest disease is identified.      Electronically signed by: Brandon Ny  Date:    12/31/2022  Time:    08:43               Narrative:    EXAMINATION:  XR CHEST AP PORTABLE    CLINICAL HISTORY:  weakness;, .    COMPARISON:  September 19, 2016    FINDINGS:  No alveolar consolidation, effusion, or pneumothorax is seen.   The thoracic aorta is normal  cardiac silhouette, central pulmonary vessels and mediastinum are normal in size and are grossly unremarkable.   visualized osseous structures are grossly unremarkable.                                       Medications   ondansetron injection 4 mg (has no administration in time range)   acetaminophen tablet 650 mg (has no administration in time range)   acetaminophen  tablet 650 mg (has no administration in time range)   0.9%  NaCl infusion (has no administration in time range)   lactated ringers bolus 1,000 mL (0 mLs Intravenous Stopped 12/31/22 0745)     Medical Decision Making:   Initial Assessment:   Patient presents with nausea vomiting weakness times several days  Differential Diagnosis:   Dehydration, electrolyte abnormality, gastroenteritis, gastritis nausea, vomiting, flu, COVID  Clinical Tests:   Lab Tests: Ordered and Reviewed  Radiological Study: Ordered and Reviewed  ED Management:  Patient is awake alert, very jaundiced.  Reports he has been had poor p.o. intake for several days secondary to nausea and vomiting.  Known history of duodenal adenocarcinoma.  His jaundice is not new.  Here in the emergency department his vitals are stable.  Other being jaundiced as physical exam is benign.  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  He reports he is uncomfortable going home given his nausea and vomiting related to be admitted so he can be evaluated further, he does report feeling better after proximally 500 fluids here in the ED. during patient's cancer, generalized illness, no be admitted for further evaluation and management.        Scribe Attestation:   Scribe #1: I performed the above scribed service and the documentation accurately describes the services I performed. I attest to the accuracy of the note.    Attending Attestation:           Physician Attestation for Scribe:  Physician Attestation Statement for Scribe #1: I, Rufino Urena MD, reviewed documentation, as scribed by Jose Luis Esparza in my presence, and it is both accurate and complete.                        Clinical Impression:   Final diagnoses:  [R17] Jaundice  [R11.2] Nausea and vomiting, unspecified vomiting type  [R53.1] Generalized weakness (Primary)  [C17.0] Duodenal adenocarcinoma        ED Disposition Condition    Admit                 Rufino HARPER  MD Ayanna  12/31/22 [id number]

## 2022-12-31 NOTE — Clinical Note
Diagnosis: Generalized weakness [754488]   Admitting Provider:: YOSELIN PENA [[id number]]   Future Attending Provider: YOSELIN PENA [[id number]]   Reason for IP Medical Treatment  (Clinical interventions that can only be accomplished in the IP setting? ) :: IVF   Estimated Length of Stay:: 2 midnights   I certify that Inpatient services for greater than or equal to 2 midnights are medically necessary:: Yes   Plans for Post-Acute care--if anticipated (pick the single best option):: C. Discharge home with home health services

## 2023-01-01 LAB
ALBUMIN SERPL-MCNC: 2.4 G/DL (ref 3.4–4.8)
ALBUMIN/GLOB SERPL: 0.9 RATIO (ref 1.1–2)
ALP SERPL-CCNC: 432 UNIT/L (ref 40–150)
ALT SERPL-CCNC: 108 UNIT/L (ref 0–55)
AST SERPL-CCNC: 67 UNIT/L (ref 5–34)
BASOPHILS # BLD AUTO: 0.08 X10(3)/MCL (ref 0–0.2)
BASOPHILS NFR BLD AUTO: 1.1 %
BILIRUBIN DIRECT+TOT PNL SERPL-MCNC: 14.8 MG/DL
BUN SERPL-MCNC: 24.3 MG/DL (ref 8.4–25.7)
CALCIUM SERPL-MCNC: 9.2 MG/DL (ref 8.8–10)
CHLORIDE SERPL-SCNC: 108 MMOL/L (ref 98–107)
CO2 SERPL-SCNC: 25 MMOL/L (ref 23–31)
CREAT SERPL-MCNC: 0.89 MG/DL (ref 0.73–1.18)
EOSINOPHIL # BLD AUTO: 0.27 X10(3)/MCL (ref 0–0.9)
EOSINOPHIL NFR BLD AUTO: 3.5 %
ERYTHROCYTE [DISTWIDTH] IN BLOOD BY AUTOMATED COUNT: 21.7 % (ref 11.6–14.4)
GFR SERPLBLD CREATININE-BSD FMLA CKD-EPI: >60 MLS/MIN/1.73/M2
GLOBULIN SER-MCNC: 2.6 GM/DL (ref 2.4–3.5)
GLUCOSE SERPL-MCNC: 102 MG/DL (ref 82–115)
HCT VFR BLD AUTO: 30.9 % (ref 42–52)
HGB BLD-MCNC: 9.9 GM/DL (ref 14–18)
IMM GRANULOCYTES # BLD AUTO: 0.03 X10(3)/MCL (ref 0–0.04)
IMM GRANULOCYTES NFR BLD AUTO: 0.4 %
LYMPHOCYTES # BLD AUTO: 1.63 X10(3)/MCL (ref 0.6–4.6)
LYMPHOCYTES NFR BLD AUTO: 21.4 %
MCH RBC QN AUTO: 27.8 PG
MCHC RBC AUTO-ENTMCNC: 32 MG/DL (ref 33–36)
MCV RBC AUTO: 86.8 FL (ref 80–94)
MONOCYTES # BLD AUTO: 0.61 X10(3)/MCL (ref 0.1–1.3)
MONOCYTES NFR BLD AUTO: 8 %
NEUTROPHILS # BLD AUTO: 4.99 X10(3)/MCL (ref 2.1–9.2)
NEUTROPHILS NFR BLD AUTO: 65.6 %
NRBC BLD AUTO-RTO: 0 % (ref 0–1)
PLATELET # BLD AUTO: 274 X10(3)/MCL (ref 140–371)
PMV BLD AUTO: 11.1 FL (ref 9.4–12.4)
POTASSIUM SERPL-SCNC: 4.2 MMOL/L (ref 3.5–5.1)
PROT SERPL-MCNC: 5 GM/DL (ref 5.8–7.6)
RBC # BLD AUTO: 3.56 X10(6)/MCL (ref 4.7–6.1)
SODIUM SERPL-SCNC: 141 MMOL/L (ref 136–145)
WBC # SPEC AUTO: 7.6 X10(3)/MCL (ref 4.5–11.5)

## 2023-01-01 PROCEDURE — 11000001 HC ACUTE MED/SURG PRIVATE ROOM

## 2023-01-01 PROCEDURE — 80053 COMPREHEN METABOLIC PANEL: CPT | Performed by: NURSE PRACTITIONER

## 2023-01-01 PROCEDURE — 25000003 PHARM REV CODE 250: Performed by: INTERNAL MEDICINE

## 2023-01-01 PROCEDURE — 85025 COMPLETE CBC W/AUTO DIFF WBC: CPT | Performed by: NURSE PRACTITIONER

## 2023-01-01 PROCEDURE — 25000003 PHARM REV CODE 250: Performed by: NURSE PRACTITIONER

## 2023-01-01 RX ADMIN — PANTOPRAZOLE SODIUM 40 MG: 40 TABLET, DELAYED RELEASE ORAL at 08:01

## 2023-01-01 RX ADMIN — LEUCINE, PHENYLALANINE, LYSINE, METHIONINE, ISOLEUCINE, VALINE, HISTIDINE, THREONINE, TRYPTOPHAN, ALANINE, GLYCINE, ARGININE, PROLINE, SERINE, TYROSINE, SODIUM ACETATE, DIBASIC POTASSIUM PHOSPHATE, MAGNESIUM CHLORIDE, SODIUM CHLORIDE, CALCIUM CHLORIDE, DEXTROSE
311; 238; 247; 170; 255; 247; 204; 179; 77; 880; 438; 489; 289; 213; 17; 297; 261; 51; 77; 33; 5 INJECTION INTRAVENOUS at 03:01

## 2023-01-01 RX ADMIN — TAMSULOSIN HYDROCHLORIDE 0.4 MG: 0.4 CAPSULE ORAL at 08:01

## 2023-01-01 NOTE — PROGRESS NOTES
[facility] Hospital Medicine Progress Note        Chief Complaint: Inpatient Follow-up for Nausea, vomiting and duodenal cancer     HPI:   Quincy Triplett is a 82 y.o. 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 on 12/31/2022 via EMS with c/o worsening generalized weakness, nausea and vomiting with decreased appetite for the past several days. He denied hematuria, hematochezia, hematemesis, abdominal pain.  Reports that can not tolerate oral intake due to nausea and vomiting.  He states he was diagnosed with duodenal adenocarcinoma x2 months ago and symptoms have been progressively worsening since then.  He is awaiting Whipple procedure by Dr. Abdirahman Brown.     Initial ED VS stable.  Labs notable for hemoglobin 10.6, hematocrit 32.6, glucose 162, alk-phos 526, albumin 2.8, total bili 16.2, direct bili 10.8, AST 84, .  BNP and troponin normal.  Lactic acid normal.  Flu and COVID negative.  CXR negative.  He was given IV fluids in the ED. Admitted to hospital medicine services for further workup and management of care.  Interval Hx:   Patient awake and comfortable. Seen earlier the the ER. Denies any abdominal pain. He is afraid to eat anything. States he starts to vomit and wants to ne NPO.     Objective/physical exam:  General: In no acute distress, + yellow tinge of the skin on face and trunk   Chest: Clear to auscultation bilaterally  Heart: RRR, +S1, S2, no appreciable murmur  Abdomen: Soft, nontender, BS +  MSK: Warm, no lower extremity edema, no clubbing or cyanosis  Neurologic: Alert and oriented x4, Cranial nerve II-XII intact, Strength 5/5 in all 4 extremities    VITAL SIGNS: 24 HRS MIN & MAX LAST   No data recorded 98 °F (36.7 °C)   BP  Min: 134/69  Max: 162/74 (!) 157/72     Pulse  Min: 45  Max: 70  64   Resp  Min: 17  Max: 29 17   SpO2  Min: 91 %   Max: 97 % 95 %       Recent Labs   Lab 12/31/22  0821 01/01/23  0407   WBC 8.0 7.6   RBC 3.79* 3.56*   HGB 10.6* 9.9*   HCT 32.6* 30.9*   MCV 86.0 86.8   MCH 28.0 27.8   MCHC 32.5* 32.0*   RDW 21.8* 21.7*    274   MPV 10.7 11.1       Recent Labs   Lab 12/31/22  0708 01/01/23  0407    141   K 4.2 4.2   CO2 24 25   BUN 22.0 24.3   CREATININE 1.16 0.89   CALCIUM 9.9 9.2   MG 2.30  --    ALBUMIN 2.8* 2.4*   ALKPHOS 526* 432*   * 108*   AST 84* 67*   BILITOT 16.2* 14.8*          Microbiology Results (last 7 days)       ** No results found for the last 168 hours. **             See below for Radiology    Scheduled Med:   pantoprazole  40 mg Oral Daily    tamsulosin  0.4 mg Oral Daily        Continuous Infusions:   sodium chloride 0.9% 75 mL/hr at 12/31/22 2134    Amino acid 4.25% - dextrose 5% (CLINIMIX-E) solution (1L provides 42.5 gm AA, 50 gm CHO (170 kcal/L dextrose), Na 35, K 30, Mg 5, Ca 4.5, Acetate 70, Cl 39, Phos 15) 50 mL/hr at 01/01/23 1531        PRN Meds:  acetaminophen, acetaminophen, ondansetron       Assessment/Plan:  Persistent vomiting from GI obstruction   Obstructive Jaundice  Duodenal Adenocarcinoma  Generalized Weakness  Hx of PAD with intermittent claudication, CAD, Carotid artery disease, HTN, HLD     Plan:  Patient looks comfortable. Wants to be NPO  Will start iv clinimix   F/U on GI recommendations  Consult surgical oncology team for definitive plan     VTE prophylaxis: Lovenox    Patient condition:  Fair    Anticipated discharge and Disposition:         All diagnosis and differential diagnosis have been reviewed; assessment and plan has been documented; I have personally reviewed the labs and test results that are presently available; I have reviewed the patients medication list; I have reviewed the consulting providers response and recommendations. I have reviewed or attempted to review medical records based upon their availability    All of the patient's questions have  been  addressed and answered. Patient's is agreeable to the above stated plan. I will continue to monitor closely and make adjustments to medical management as needed.  _____________________________________________________________________    Nutrition Status:    Radiology:  US Abdomen Complete  Narrative: EXAMINATION:  US ABDOMEN COMPLETE    CLINICAL HISTORY:  Hyperbilirubinemia;    TECHNIQUE:  Limited abdominal Ultrasound Images obtained in grayscale and color.    COMPARISON:  None    FINDINGS:  The LIVER is normal in size and contour at 13.6 cm (sagittal right lobe). Hepatic parenchyma has normal echogenicity.  No definite intrahepatic masses are noted. The portal vein is patent with hepatopetal flow.  Inter attic biliary ductal dilatation is noted    The BILIARY SYSTEM is normal in caliber; the common hepatic duct measures 11 mm.There are no  stones seen in the GALL BLADDER.  The gallbladder wall is prominent.    The PANCREATIC head and body are partially visualized but grossly normal in appearance. The pancreatic duct is not dilated.    The RIGHT KIDNEY is small in size at 9.8 x 5.6 x 4.6 cm and echogenicity/texture. No stones or hydronephrosis seen. No solid masses are noted.    The left KIDNEY is small in size at 9.9 x 4.7 x 4.5 cm and echogenicity/texture. No stones or hydronephrosis seen. No solid masses are noted.    The spleen is normal in size at 9.6 cm.    The AORTA and IVC are partially visualized and unremarkable.  Impression: Prominent gallbladder and intrahepatic biliary ductal dilatation with prominence of the common bile duct.  Common dial duct narrowing or occlusion is not excluded.  The gallbladder wall appears mildly prominent.  Cystitis is not excluded.    Electronically signed by: Kodi Ambrocio  Date:    12/31/2022  Time:    20:35  X-Ray Chest AP Portable  Narrative: EXAMINATION:  XR CHEST AP PORTABLE    CLINICAL HISTORY:  weakness;, .    COMPARISON:  September 19, 2016    FINDINGS:  No  alveolar consolidation, effusion, or pneumothorax is seen.   The thoracic aorta is normal  cardiac silhouette, central pulmonary vessels and mediastinum are normal in size and are grossly unremarkable.   visualized osseous structures are grossly unremarkable.  Impression: No acute chest disease is identified.    Electronically signed by: Brandon Ny  Date:    12/31/2022  Time:    08:43      Patrice Lang MD   01/01/2023

## 2023-01-01 NOTE — CONSULTS
Gastroenterology Consultation Note    Reason for Consult:  The primary encounter diagnosis was Generalized weakness. Diagnoses of Jaundice, Nausea and vomiting, unspecified vomiting type, Duodenal adenocarcinoma, and Weakness were also pertinent to this visit.    PCP:   Reji Waters Jr, MD    Referring MD:  Aaareferral Self  No address on file    Hospital Day: 1     Initial History of Present Illness (HPI):  This is a 82 y.o. male, pt of Dr. Sushil Anderson, with known duodenal adenocarcinoma, who is currently scheduled for a Whipple procedure with Abdirahman Newell, came to the ED with  worsening nausea and vomiting along with weakness.  Bilirubin significantly elevated.  Imaging study with predominantly intrahepatic ductal dilation.  ERCP was attempted per Dr. Sushil Anderson on 12/21/2022.  Ampulla could not be identified due to duodenal mass lesion. Pt has continued to lose weight at home, not able to tolerate much of any oral intake, and is having increased weakness. We are consulted for elevated bili.     ROS:  Constitutional:  Positive for fatigue. Negative for chills and fever.        Increased generalized weakness   HENT:  Negative for congestion, ear discharge, ear pain, nosebleeds, rhinorrhea and sore throat.    Eyes:  Negative for pain, redness and visual disturbance.   Respiratory:  Negative for cough, chest tightness and shortness of breath.    Cardiovascular:  Negative for chest pain and leg swelling.   Gastrointestinal:  Positive for nausea and vomiting. Negative for abdominal pain, blood in stool, constipation and diarrhea.   Genitourinary:  Negative for dysuria and hematuria.   Musculoskeletal:  Negative for arthralgias, joint swelling, myalgias and neck pain.   Skin:  Negative for color change, pallor and wound.   Neurological:  Negative for dizziness, weakness, light-headedness, numbness and headaches.    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, unspecified extremity     Coronary artery disease     Dyslipidemia     Hypertension        Surgical History:   Past Surgical History:   Procedure Laterality Date    AMPUTATION Right     Right arm    CAROTID STENT      ERCP N/A 12/21/2022    Procedure: ERCP;  Surgeon: Sushil Anderson MD;  Location: [facility] ENDOSCOPY;  Service: Gastroenterology;  Laterality: N/A;  food in abdomen    ERCP, WITH BIOPSY  12/21/2022    Procedure: ERCP, WITH BIOPSY;  Surgeon: Sushil Anderson MD;  Location: [facility] ENDOSCOPY;  Service: Gastroenterology;;    LEFT HEART CATHETERIZATION      TONSILLECTOMY         Family History:   History reviewed. No pertinent family history..     Social History:   Social History     Tobacco Use    Smoking status: Never    Smokeless tobacco: Never   Substance Use Topics    Alcohol use: Never       Allergies:  Review of patient's allergies indicates:  No Known Allergies    (Not in a hospital admission)        Objective Findings:    Vital Signs:  BP (!) 150/92   Pulse 70   Temp 98 °F (36.7 °C) (Oral)   Resp 18   Ht 5' 9" (1.753 m)   Wt 75.8 kg (167 lb)   SpO2 95%   BMI 24.66 kg/m²   Body mass index is 24.66 kg/m².    Physical Exam:     Nursing note and vitals reviewed.  Constitutional: He appears well-developed and well-nourished. He is not diaphoretic. No distress.   HENT:   Head: Normocephalic and atraumatic.   Right Ear: External ear normal.   Left Ear: External ear normal.   Nose: Nose normal.   Mouth/Throat: Oropharynx is clear and moist.   Eyes: Conjunctivae and EOM are normal. Pupils are equal, round, and reactive to light. Right eye exhibits no discharge. Left eye exhibits no discharge.   Neck: Neck supple. No tracheal deviation present.   Normal range of motion.  Cardiovascular:  Normal rate, regular rhythm, normal heart sounds and intact distal pulses.     Exam reveals no gallop and no friction rub.       No murmur " heard.  Pulmonary/Chest: Breath sounds normal. No stridor. No respiratory distress. He has no wheezes. He has no rhonchi. He has no rales. He exhibits no tenderness.   Abdominal: Abdomen is soft. Bowel sounds are normal. He exhibits no distension and no mass. There is no abdominal tenderness. There is no guarding.   Musculoskeletal:         General: No tenderness or edema. Normal range of motion.      Cervical back: Normal range of motion and neck supple.      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 No cranial nerve deficit or sensory deficit.   Skin: Skin is warm and dry. Capillary refill takes less than 2 seconds. No rash noted. No erythema. No pallor.   Jaundice        Labs:  Recent Results (from the past 48 hour(s))   COVID/FLU A&B PCR    Collection Time: 12/31/22  6:20 AM   Result Value Ref Range    Influenza A PCR Not Detected Not Detected    Influenza B PCR Not Detected Not Detected    SARS-CoV-2 PCR Not Detected Not Detected   Comprehensive metabolic panel    Collection Time: 12/31/22  7:08 AM   Result Value Ref Range    Sodium Level 138 136 - 145 mmol/L    Potassium Level 4.2 3.5 - 5.1 mmol/L    Chloride 103 98 - 107 mmol/L    Carbon Dioxide 24 23 - 31 mmol/L    Glucose Level 162 (H) 82 - 115 mg/dL    Blood Urea Nitrogen 22.0 8.4 - 25.7 mg/dL    Creatinine 1.16 0.73 - 1.18 mg/dL    Calcium Level Total 9.9 8.8 - 10.0 mg/dL    Protein Total 5.8 5.8 - 7.6 gm/dL    Albumin Level 2.8 (L) 3.4 - 4.8 g/dL    Globulin 3.0 2.4 - 3.5 gm/dL    Albumin/Globulin Ratio 0.9 (L) 1.1 - 2.0 ratio    Bilirubin Total 16.2 (H) <=1.5 mg/dL    Alkaline Phosphatase 526 (H) 40 - 150 unit/L    Alanine Aminotransferase 136 (H) 0 - 55 unit/L    Aspartate Aminotransferase 84 (H) 5 - 34 unit/L    eGFR >60 mls/min/1.73/m2   Lactic acid, plasma    Collection Time: 12/31/22  7:08 AM   Result Value Ref Range    Lactic Acid Level 1.2 0.5 - 2.2 mmol/L   Magnesium     Collection Time: 12/31/22  7:08 AM   Result Value Ref Range    Magnesium Level 2.30 1.60 - 2.60 mg/dL   Troponin I    Collection Time: 12/31/22  7:08 AM   Result Value Ref Range    Troponin-I <0.010 0.000 - 0.045 ng/mL   TSH    Collection Time: 12/31/22  7:08 AM   Result Value Ref Range    Thyroid Stimulating Hormone 0.788 0.350 - 4.940 uIU/mL   Bilirubin, Direct    Collection Time: 12/31/22  7:08 AM   Result Value Ref Range    Bilirubin Direct 10.8 (H) 0.0 - 0.5 mg/dL   Brain natriuretic peptide    Collection Time: 12/31/22  8:21 AM   Result Value Ref Range    Natriuretic Peptide 35.0 <=100.0 pg/mL   CBC with Differential    Collection Time: 12/31/22  8:21 AM   Result Value Ref Range    WBC 8.0 4.5 - 11.5 x10(3)/mcL    RBC 3.79 (L) 4.70 - 6.10 x10(6)/mcL    Hgb 10.6 (L) 14.0 - 18.0 gm/dL    Hct 32.6 (L) 42.0 - 52.0 %    MCV 86.0 80.0 - 94.0 fL    MCH 28.0 pg    MCHC 32.5 (L) 33.0 - 36.0 mg/dL    RDW 21.8 (H) 11.6 - 14.4 %    Platelet 298 140 - 371 x10(3)/mcL    MPV 10.7 9.4 - 12.4 fL    Neut % 78.3 %    Lymph % 13.5 %    Mono % 7.0 %    Eos % 0.1 %    Basophil % 0.5 %    Lymph # 1.07 0.6 - 4.6 x10(3)/mcL    Neut # 6.22 2.1 - 9.2 x10(3)/mcL    Mono # 0.56 0.1 - 1.3 x10(3)/mcL    Eos # 0.01 0 - 0.9 x10(3)/mcL    Baso # 0.04 0 - 0.2 x10(3)/mcL    IG# 0.05 (H) 0 - 0.04 x10(3)/mcL    IG% 0.6 %    NRBC% 0.0 0 - 1 %   Urinalysis, Reflex to Urine Culture Urine, Clean Catch    Collection Time: 12/31/22  2:48 PM    Specimen: Urine   Result Value Ref Range    Color, UA Dark Yellow Yellow, Light-Yellow, Dark Yellow, Anna, Straw    Appearance, UA Cloudy (A) Clear    Specific Gravity, UA 1.022 1.001 - 1.030    pH, UA 8.0 5.0 - 8.5    Protein, UA 1+ (A) Negative mg/dL    Glucose, UA Negative Negative, Normal mg/dL    Ketones, UA Negative Negative mg/dL    Blood, UA Negative Negative unit/L    Bilirubin, UA 3+ (A) Negative mg/dL    Urobilinogen, UA 1.0 0.2, 1.0, Normal mg/dL    Nitrites, UA Positive (A) Negative    Leukocyte  Esterase, UA 1+ (A) Negative unit/L   Urinalysis, Microscopic    Collection Time: 12/31/22  2:48 PM   Result Value Ref Range    RBC, UA <5 <=5 /HPF    WBC, UA <5 <=5 /HPF    Squamous Epithelial Cells, UA <5 <=5 /HPF    Bacteria, UA None Seen None Seen, Rare, Occasional /HPF    Hyaline Casts, UA Rare (A) None Seen /LPF    Mucous, UA Small (A) None Seen /LPF    Amorphous Phosphate Crystals, UA Few (A) None Seen /HPF    Calcium Oxalate Crystals, UA Few (A) None Seen /HPF   Comprehensive Metabolic Panel    Collection Time: 01/01/23  4:07 AM   Result Value Ref Range    Sodium Level 141 136 - 145 mmol/L    Potassium Level 4.2 3.5 - 5.1 mmol/L    Chloride 108 (H) 98 - 107 mmol/L    Carbon Dioxide 25 23 - 31 mmol/L    Glucose Level 102 82 - 115 mg/dL    Blood Urea Nitrogen 24.3 8.4 - 25.7 mg/dL    Creatinine 0.89 0.73 - 1.18 mg/dL    Calcium Level Total 9.2 8.8 - 10.0 mg/dL    Protein Total 5.0 (L) 5.8 - 7.6 gm/dL    Albumin Level 2.4 (L) 3.4 - 4.8 g/dL    Globulin 2.6 2.4 - 3.5 gm/dL    Albumin/Globulin Ratio 0.9 (L) 1.1 - 2.0 ratio    Bilirubin Total 14.8 (H) <=1.5 mg/dL    Alkaline Phosphatase 432 (H) 40 - 150 unit/L    Alanine Aminotransferase 108 (H) 0 - 55 unit/L    Aspartate Aminotransferase 67 (H) 5 - 34 unit/L    eGFR >60 mls/min/1.73/m2   CBC with Differential    Collection Time: 01/01/23  4:07 AM   Result Value Ref Range    WBC 7.6 4.5 - 11.5 x10(3)/mcL    RBC 3.56 (L) 4.70 - 6.10 x10(6)/mcL    Hgb 9.9 (L) 14.0 - 18.0 gm/dL    Hct 30.9 (L) 42.0 - 52.0 %    MCV 86.8 80.0 - 94.0 fL    MCH 27.8 pg    MCHC 32.0 (L) 33.0 - 36.0 mg/dL    RDW 21.7 (H) 11.6 - 14.4 %    Platelet 274 140 - 371 x10(3)/mcL    MPV 11.1 9.4 - 12.4 fL    Neut % 65.6 %    Lymph % 21.4 %    Mono % 8.0 %    Eos % 3.5 %    Basophil % 1.1 %    Lymph # 1.63 0.6 - 4.6 x10(3)/mcL    Neut # 4.99 2.1 - 9.2 x10(3)/mcL    Mono # 0.61 0.1 - 1.3 x10(3)/mcL    Eos # 0.27 0 - 0.9 x10(3)/mcL    Baso # 0.08 0 - 0.2 x10(3)/mcL    IG# 0.03 0 - 0.04 x10(3)/mcL     IG% 0.4 %    NRBC% 0.0 0 - 1 %       US Abdomen Complete   Final Result      Prominent gallbladder and intrahepatic biliary ductal dilatation with prominence of the common bile duct.  Common dial duct narrowing or occlusion is not excluded.  The gallbladder wall appears mildly prominent.  Cystitis is not excluded.         Electronically signed by: Kodi Ambrocio   Date:    12/31/2022   Time:    20:35      X-Ray Chest AP Portable   Final Result      No acute chest disease is identified.         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 Brown  Malnutrition  Unable to keep down oral intake    Will place pt on IV nutrition, ok for clears  Consult Dr. Abdirahman Brown for input on attempting drain vs moving up Whipple    Thank you for allowing us to participate in the care of Quincy ALMAS DegrootUziel.    Celina Montaño Np as scribe for Dr. Mary Jane Wesley    Chart reviewed, patient examined.  Agree with the above assessment.  ERCP attempt per Dr. Sushil Anderson was not successful because of duodenal mass lesion.  Ampulla could not be identified.  May need percutaneous biliary drainage preoperatively if necessary.  Await input from Surgical Oncology.  Patient is scheduled for Whipple procedure in the near future.

## 2023-01-01 NOTE — PROGRESS NOTES
Patient was discussed earlier with the nursing staff.  History of duodenal adenocarcinoma.  Scheduled for Whipple's procedure in the near future.  Reported to have worsening nausea and vomiting along with weakness.  Bilirubin significantly elevated.  Imaging study with predominantly intrahepatic ductal dilation.  ERCP was attempted per Dr. Sushil Anderson on 12/21/2022.  Ampulla could not be identified due to duodenal mass lesion.  If bilirubin remains elevated, may need percutaneous biliary drainage per Interventional Radiology.  MRCP can also be considered for better delineation of the ducts.  Full consult to follow tomorrow.

## 2023-01-02 PROCEDURE — 11000001 HC ACUTE MED/SURG PRIVATE ROOM

## 2023-01-02 PROCEDURE — 25000003 PHARM REV CODE 250: Performed by: INTERNAL MEDICINE

## 2023-01-02 RX ORDER — ONDANSETRON 2 MG/ML
4 INJECTION INTRAMUSCULAR; INTRAVENOUS EVERY 6 HOURS PRN
Status: DISCONTINUED | OUTPATIENT
Start: 2023-01-02 | End: 2023-01-07 | Stop reason: HOSPADM

## 2023-01-02 RX ORDER — PANTOPRAZOLE SODIUM 40 MG/10ML
40 INJECTION, POWDER, LYOPHILIZED, FOR SOLUTION INTRAVENOUS DAILY
Status: DISCONTINUED | OUTPATIENT
Start: 2023-01-02 | End: 2023-01-07 | Stop reason: HOSPADM

## 2023-01-02 RX ADMIN — TAMSULOSIN HYDROCHLORIDE 0.4 MG: 0.4 CAPSULE ORAL at 08:01

## 2023-01-02 RX ADMIN — PANTOPRAZOLE SODIUM 40 MG: 40 TABLET, DELAYED RELEASE ORAL at 08:01

## 2023-01-02 RX ADMIN — LEUCINE, PHENYLALANINE, LYSINE, METHIONINE, ISOLEUCINE, VALINE, HISTIDINE, THREONINE, TRYPTOPHAN, ALANINE, GLYCINE, ARGININE, PROLINE, SERINE, TYROSINE, SODIUM ACETATE, DIBASIC POTASSIUM PHOSPHATE, MAGNESIUM CHLORIDE, SODIUM CHLORIDE, CALCIUM CHLORIDE, DEXTROSE
311; 238; 247; 170; 255; 247; 204; 179; 77; 880; 438; 489; 289; 213; 17; 297; 261; 51; 77; 33; 5 INJECTION INTRAVENOUS at 06:01

## 2023-01-02 NOTE — PROGRESS NOTES
[facility] Hospital Medicine Progress Note        Chief Complaint: Inpatient Follow-up for Nausea, vomiting and duodenal cancer      HPI:   Quincy Triplett is a 82 y.o. 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 He denied hematuria, hematochezia, hematemesis, abdominal pain.  Reports that can not tolerate oral intake due to nausea and vomiting.  He states he was diagnosed with duodenal adenocarcinoma x2 months ago and symptoms have been progressively worsening since then.  He is awaiting Whipple procedure by Dr. Abdirahman Brown. Initial ED VS stable.  Labs notable for hemoglobin 10.6, hematocrit 32.6, glucose 162, alk-phos 526, albumin 2.8, total bili 16.2, direct bili 10.8, AST 84, .  BNP and troponin normal.  Lactic acid normal.  Flu and COVID negative.  CXR negative.  He was given IV fluids in the ED. Admitted to hospital medicine services for further workup and management of care.      Patient currently admitted for gastric outlet obstruction due to duodenal mass/cancer, obstructive jaundice due to duodenal cancer.  Patient has a Whipple procedure planned with surgical Oncology.      Interval Hx:   Patient awake and comfortable.  Denies any abdominal pain.  Patient remains NPO.  He says as long as he does not eat nausea vomiting will not occur and is happy with this.  We await Dr. Brown for definitive management of duodenal mass causing obstruction        Objective/physical exam:  General: In no acute distress, + yellow tinge of the skin on face and trunk   Chest: Clear to auscultation bilaterally  Heart: RRR, +S1, S2, no appreciable murmur  Abdomen: Soft, nontender, BS +  MSK: Warm, no lower extremity edema, no clubbing or  cyanosis  Neurologic: Alert and oriented x4, Cranial nerve II-XII intact, Strength 5/5 in all 4 extremities      Assessment/Plan:  Gastric outlet obstruction due to duodenal cancer/mass   Intractable vomiting d/t above  Obstructive jaundice due to duodenal mass   Duodenal Adenocarcinoma  Generalized Weakness  Hx of PAD with intermittent claudication, CAD, Carotid artery disease, HTN, HLD      Plan:  Patient remains NPO.    We await Dr. Brown for definitive management of duodenal mass causing obstruction  Change IV PPI , IV Zofran prn  start iv clinimix   F/U on GI recommendations     VTE prophylaxis: Lovenox     Patient condition:  Fair     Anticipated discharge and Disposition:        VITAL SIGNS: 24 HRS MIN & MAX LAST   Temp  Min: 97.5 °F (36.4 °C)  Max: 98.5 °F (36.9 °C) 98.5 °F (36.9 °C)   BP  Min: 132/84  Max: 168/79 (!) 146/89     Pulse  Min: 56  Max: 74  61   Resp  Min: 16  Max: 20 20   SpO2  Min: 95 %  Max: 100 % 100 %       Recent Labs   Lab 12/31/22  0821 01/01/23  0407   WBC 8.0 7.6   RBC 3.79* 3.56*   HGB 10.6* 9.9*   HCT 32.6* 30.9*   MCV 86.0 86.8   MCH 28.0 27.8   MCHC 32.5* 32.0*   RDW 21.8* 21.7*    274   MPV 10.7 11.1       Recent Labs   Lab 12/31/22  0708 01/01/23  0407    141   K 4.2 4.2   CO2 24 25   BUN 22.0 24.3   CREATININE 1.16 0.89   CALCIUM 9.9 9.2   MG 2.30  --    ALBUMIN 2.8* 2.4*   ALKPHOS 526* 432*   * 108*   AST 84* 67*   BILITOT 16.2* 14.8*          Microbiology Results (last 7 days)       ** No results found for the last 168 hours. **             See below for Radiology    Scheduled Med:   pantoprazole  40 mg Intravenous Daily    tamsulosin  0.4 mg Oral Daily        Continuous Infusions:   Amino acid 4.25% - dextrose 5% (CLINIMIX-E) solution (1L provides 42.5 gm AA, 50 gm CHO (170 kcal/L dextrose), Na 35, K 30, Mg 5, Ca 4.5, Acetate 70, Cl 39, Phos 15) 50 mL/hr at 01/01/23 1531        PRN Meds:  acetaminophen, acetaminophen, ondansetron       VTE prophylaxis:      Patient condition:  Stable/Fair/Guarded/ Serious/ Critical    Anticipated discharge and Disposition:         All diagnosis and differential diagnosis have been reviewed; assessment and plan has been documented; I have personally reviewed the labs and test results that are presently available; I have reviewed the patients medication list; I have reviewed the consulting providers response and recommendations. I have reviewed or attempted to review medical records based upon their availability    All of the patient's questions have been  addressed and answered. Patient's is agreeable to the above stated plan. I will continue to monitor closely and make adjustments to medical management as needed.  _____________________________________________________________________    Nutrition Status:    Radiology:  US Abdomen Complete  Narrative: EXAMINATION:  US ABDOMEN COMPLETE    CLINICAL HISTORY:  Hyperbilirubinemia;    TECHNIQUE:  Limited abdominal Ultrasound Images obtained in grayscale and color.    COMPARISON:  None    FINDINGS:  The LIVER is normal in size and contour at 13.6 cm (sagittal right lobe). Hepatic parenchyma has normal echogenicity.  No definite intrahepatic masses are noted. The portal vein is patent with hepatopetal flow.  Inter attic biliary ductal dilatation is noted    The BILIARY SYSTEM is normal in caliber; the common hepatic duct measures 11 mm.There are no  stones seen in the GALL BLADDER.  The gallbladder wall is prominent.    The PANCREATIC head and body are partially visualized but grossly normal in appearance. The pancreatic duct is not dilated.    The RIGHT KIDNEY is small in size at 9.8 x 5.6 x 4.6 cm and echogenicity/texture. No stones or hydronephrosis seen. No solid masses are noted.    The left KIDNEY is small in size at 9.9 x 4.7 x 4.5 cm and echogenicity/texture. No stones or hydronephrosis seen. No solid masses are noted.    The spleen is normal in size at 9.6 cm.    The AORTA and IVC  are partially visualized and unremarkable.  Impression: Prominent gallbladder and intrahepatic biliary ductal dilatation with prominence of the common bile duct.  Common dial duct narrowing or occlusion is not excluded.  The gallbladder wall appears mildly prominent.  Cystitis is not excluded.    Electronically signed by: Kodi Ambrocio  Date:    12/31/2022  Time:    20:35  X-Ray Chest AP Portable  Narrative: EXAMINATION:  XR CHEST AP PORTABLE    CLINICAL HISTORY:  weakness;, .    COMPARISON:  September 19, 2016    FINDINGS:  No alveolar consolidation, effusion, or pneumothorax is seen.   The thoracic aorta is normal  cardiac silhouette, central pulmonary vessels and mediastinum are normal in size and are grossly unremarkable.   visualized osseous structures are grossly unremarkable.  Impression: No acute chest disease is identified.    Electronically signed by: Brandon Ny  Date:    12/31/2022  Time:    08:43      John Ward MD   01/02/2023

## 2023-01-02 NOTE — PLAN OF CARE
Problem: Adult Inpatient Plan of Care  Goal: Plan of Care Review  Outcome: Ongoing, Progressing  Flowsheets (Taken 1/2/2023 1433)  Plan of Care Reviewed With: patient  Goal: Patient-Specific Goal (Individualized)  Outcome: Ongoing, Progressing  Goal: Absence of Hospital-Acquired Illness or Injury  Outcome: Ongoing, Progressing  Goal: Optimal Comfort and Wellbeing  Outcome: Ongoing, Progressing  Intervention: Monitor Pain and Promote Comfort  Flowsheets (Taken 1/2/2023 1433)  Pain Management Interventions:   quiet environment facilitated   pillow support provided   medication offered  Goal: Readiness for Transition of Care  Outcome: Ongoing, Progressing  Intervention: Mutually Develop Transition Plan  Flowsheets (Taken 1/2/2023 1433)  Communicated SHAUN with patient/caregiver: Yes  Do you expect to return to your current living situation?: Yes  Do you have help at home or someone to help you manage your care at home?: Yes  Readmission within 30 days?: No  Do you currently have service(s) that help you manage your care at home?: No

## 2023-01-02 NOTE — NURSING
Nurses Note -- 4 Eyes      1/1/2023   7:16 PM      Skin assessed during: Admit      [x] No Pressure Injuries Present    []Prevention Measures Documented        Wound Care Consulted? No    Attending Nurse:  Merrill Mccall RN     Second RN/Staff Member:  Annmarie Dasilva RN

## 2023-01-02 NOTE — CONSULTS
[facility] - 8th Floor Med Surg  Surgical Oncology  Consult Note    Patient Name: Quincy Triplett  MRN: [id number]  Code Status: Full Code  Admission Date: 12/31/2022  Hospital Length of Stay: 2 days  Attending Physician: Ashwini Mcmanus MD  Primary Care Provider: Reji Waters Jr, MD    Inpatient consult to General Surgery  Consult performed by: Ranjeet Montaño MD  Consult ordered by: NATY Ramirez      Subjective:     Chief Complaint/Reason for Admission: Duodenal adenocarcinoma with nausea, vomitting, elevated bili/jaundice    History of Present Illness:    82 yoM known to Dr. IVORY Brown, pt of Dr. Sushil Anderson, with known duodenal adenocarcinoma, who is currently scheduled for a Whipple procedure with Dr Brown on 1/16/23 per report. Came to the ER with  worsening nausea and vomiting along with weakness and worsening jaundice.  Bilirubin significantly elevated to >16.  Imaging study with predominantly intrahepatic ductal dilation.  ERCP was attempted per Dr. Sushil Anderson on 12/21/2022 per report, and ampulla could not be identified due to duodenal mass lesion. Pt has continued to lose weight at home, not able to tolerate much of any oral intake, and is having increased weakness and failure to thrive. Being admitted to med team with Gi and surgical consult.    No current facility-administered medications on file prior to encounter.     Current Outpatient Medications on File Prior to Encounter   Medication Sig    metroNIDAZOLE (METROGEL) 0.75 % gel Apply topically 2 (two) times daily.    pantoprazole (PROTONIX) 40 MG tablet Take 40 mg by mouth once daily.    tamsulosin (FLOMAX) 0.4 mg Cap Take by mouth once daily.    meclizine (ANTIVERT) 12.5 mg tablet Take 12.5 mg by mouth 3 (three) times daily as needed.       Review of patient's allergies indicates:  No Known Allergies    Past Medical History:   Diagnosis Date    Atherosclerosis of native arteries of extremities with  intermittent claudication, unspecified extremity     Coronary artery disease     Dyslipidemia     Hypertension      Past Surgical History:   Procedure Laterality Date    AMPUTATION Right     Right arm    CAROTID STENT      ERCP N/A 12/21/2022    Procedure: ERCP;  Surgeon: Sushil Anderson MD;  Location: [facility] ENDOSCOPY;  Service: Gastroenterology;  Laterality: N/A;  food in abdomen    ERCP, WITH BIOPSY  12/21/2022    Procedure: ERCP, WITH BIOPSY;  Surgeon: Sushil Anderson MD;  Location: [facility] ENDOSCOPY;  Service: Gastroenterology;;    LEFT HEART CATHETERIZATION      TONSILLECTOMY       Family History    None       Tobacco Use    Smoking status: Never    Smokeless tobacco: Never   Substance and Sexual Activity    Alcohol use: Never    Drug use: Never    Sexual activity: Not on file     Review of Systems   Constitutional:  Positive for appetite change, fatigue and unexpected weight change. Negative for activity change, chills, diaphoresis and fever.   HENT:  Negative for congestion, ear discharge, facial swelling, nosebleeds, sinus pain, sneezing and sore throat.    Eyes:  Negative for pain and discharge.   Respiratory:  Negative for choking, chest tightness, shortness of breath and wheezing.    Cardiovascular:  Negative for chest pain and leg swelling.   Gastrointestinal:  Positive for nausea and vomiting. Negative for abdominal distention, abdominal pain, blood in stool and diarrhea.   Endocrine: Negative for cold intolerance and heat intolerance.   Genitourinary:  Negative for difficulty urinating, dysuria, flank pain and hematuria.   Musculoskeletal:  Negative for arthralgias, back pain, joint swelling, neck pain and neck stiffness.   Skin:  Positive for color change. Negative for pallor, rash and wound.   Allergic/Immunologic: Negative for food allergies and immunocompromised state.   Neurological:  Negative for dizziness, tremors, seizures, weakness, numbness and headaches.   Hematological:   Negative for adenopathy. Does not bruise/bleed easily.   Psychiatric/Behavioral:  Negative for agitation, confusion, hallucinations and suicidal ideas. The patient is not nervous/anxious.    Objective:     Vital Signs (Most Recent):  Temp: 98.5 °F (36.9 °C) (01/02/23 1106)  Pulse: 61 (01/02/23 1106)  Resp: 20 (01/02/23 1106)  BP: (!) 146/89 (01/02/23 1106)  SpO2: 100 % (01/02/23 1106)   Vital Signs (24h Range):  Temp:  [97.5 °F (36.4 °C)-98.5 °F (36.9 °C)] 98.5 °F (36.9 °C)  Pulse:  [56-74] 61  Resp:  [16-20] 20  SpO2:  [95 %-100 %] 100 %  BP: (132-168)/(72-89) 146/89     Weight: 75.8 kg (167 lb)  Body mass index is 24.66 kg/m².    No intake or output data in the 24 hours ending 01/02/23 1304    Physical Exam  Constitutional:       Appearance: He is normal weight.   HENT:      Head: Normocephalic and atraumatic.      Right Ear: Ear canal and external ear normal.      Left Ear: Ear canal and external ear normal.      Nose: Nose normal. No congestion.      Mouth/Throat:      Mouth: Mucous membranes are dry.      Pharynx: Oropharynx is clear.   Eyes:      General: Scleral icterus (bilateral) present.      Extraocular Movements: Extraocular movements intact.      Pupils: Pupils are equal, round, and reactive to light.   Cardiovascular:      Rate and Rhythm: Normal rate and regular rhythm.      Pulses: Normal pulses.      Heart sounds: Normal heart sounds. No murmur heard.  Pulmonary:      Effort: Pulmonary effort is normal.      Breath sounds: Normal breath sounds. No wheezing.   Abdominal:      General: Abdomen is flat.      Palpations: Abdomen is soft.   Musculoskeletal:         General: No swelling, tenderness or deformity.      Cervical back: No rigidity or tenderness.      Right lower leg: No edema.      Left lower leg: No edema.   Lymphadenopathy:      Cervical: No cervical adenopathy.   Skin:     Coloration: Skin is jaundiced.      Findings: No bruising, erythema or rash.   Neurological:      General: No focal  deficit present.      Mental Status: He is alert and oriented to person, place, and time. Mental status is at baseline.      Motor: No weakness.   Psychiatric:         Mood and Affect: Mood normal.         Behavior: Behavior normal.         Thought Content: Thought content normal.         Judgment: Judgment normal.       Significant Labs:  BMP:   Recent Labs   Lab 01/01/23 0407      K 4.2   CO2 25   BUN 24.3   CREATININE 0.89   CALCIUM 9.2     CBC:   Recent Labs   Lab 01/01/23 0407   WBC 7.6   RBC 3.56*   HGB 9.9*   HCT 30.9*      MCV 86.8   MCH 27.8   MCHC 32.0*     CMP:   Recent Labs   Lab 01/01/23 0407   CALCIUM 9.2   ALBUMIN 2.4*      K 4.2   CO2 25   BUN 24.3   CREATININE 0.89   ALKPHOS 432*   *   AST 67*   BILITOT 14.8*     Coagulation: No results for input(s): PT, INR, APTT in the last 48 hours.  Lactic Acid: No results for input(s): LACTATE in the last 48 hours.  LFTs:   Recent Labs   Lab 01/01/23 0407   *   AST 67*   ALKPHOS 432*   BILITOT 14.8*   ALBUMIN 2.4*     Lipase: No results for input(s): LIPASE in the last 48 hours.  All pertinent labs from the last 24 hours have been reviewed.    Significant Diagnostics:  CT: I have reviewed all pertinent results/findings within the past 24 hours. none  CXR: I have reviewed all pertinent results/findings within the past 24 hours. none  I have reviewed all pertinent imaging results/findings within the past 24 hours.    Assessment/Plan:     No new Assessment & Plan notes have been filed under this hospital service since the last note was generated.  Service: General Surgery      Thank you for your consult. I will follow-up with patient. Please contact us if you have any additional questions.    Likely worsening biliary obstruction for known duodenal mass.  Unable to place stent previously.  Will need GI consult to see if any other endo options available.  Vs. Patient will likely need IR guided perc PTC tube in coming days.    Stephanie to return Tuesday for any further recs.    Ranjeet Montaño MD  Surgical Oncology  [facility] - 8th Floor Med Surg

## 2023-01-02 NOTE — PROGRESS NOTES
"Gastroenterology Progress Note    Subjective/Interval History:  1/2/23:  No abdominal distention. Not passing flatus or stool.  NPO with IV nutrition.  Awaiting input from Dr. Abdirahman Brown      Review of Systems   Gastrointestinal:  Positive for nausea. Negative for abdominal pain.   Neurological:  Positive for weakness.   All other systems reviewed and are negative.    Vital Signs:  /84   Pulse 60   Temp 97.9 °F (36.6 °C) (Oral)   Resp 20   Ht 5' 9" (1.753 m)   Wt 75.8 kg (167 lb)   SpO2 99%   BMI 24.66 kg/m²   Body mass index is 24.66 kg/m².    Physical Exam  Constitutional:       Appearance: Normal appearance.   HENT:      Head: Normocephalic and atraumatic.   Eyes:      General: Scleral icterus present.      Extraocular Movements: Extraocular movements intact.      Pupils: Pupils are equal, round, and reactive to light.   Cardiovascular:      Rate and Rhythm: Normal rate and regular rhythm.   Pulmonary:      Effort: Pulmonary effort is normal.      Breath sounds: Normal breath sounds.   Abdominal:      General: Abdomen is flat. Bowel sounds are normal.      Palpations: Abdomen is soft.   Musculoskeletal:         General: Normal range of motion.      Cervical back: Normal range of motion and neck supple.   Skin:     General: Skin is warm and dry.      Coloration: Skin is jaundiced.   Neurological:      General: No focal deficit present.      Mental Status: He is alert.   Psychiatric:         Mood and Affect: Mood normal.         Behavior: Behavior normal.         Thought Content: Thought content normal.         Judgment: Judgment normal.       Labs:  No results found for this or any previous visit (from the past 24 hour(s)).        Assessment/Plan:  Duodenal carcinoma with mass obstruction of duct  Unable to stent 2 weeks ago  Planned Whipple with Dr. Brown  Malnutrition  Unable to keep down oral intake  Continue IV nutrition  May need LTAC stay with IV nutrition if Whipple cannot be moved up on the " schedule     Will place pt on IV nutrition  Consult Dr. Abdirahman Brown for input on attempting drain vs moving up Whipple     Thank you for allowing us to participate in the care of Quincy Triplett.       Ragini Case, RN acting as scribe for Mary Jane Wesley MD  Gastroenterology  [facility]     Chart reviewed.  Patient was examined.  Agree with the above assessment and plan.  Awaiting input from Dr. Abdirahman Brown.  May need percutaneous biliary drainage as necessary.  Patient known to Dr. Sushil Anderson.

## 2023-01-03 PROCEDURE — C9113 INJ PANTOPRAZOLE SODIUM, VIA: HCPCS | Performed by: INTERNAL MEDICINE

## 2023-01-03 PROCEDURE — 25500020 PHARM REV CODE 255: Performed by: INTERNAL MEDICINE

## 2023-01-03 PROCEDURE — 11000001 HC ACUTE MED/SURG PRIVATE ROOM

## 2023-01-03 PROCEDURE — 99233 PR SUBSEQUENT HOSPITAL CARE,LEVL III: ICD-10-PCS | Mod: ,,, | Performed by: SURGERY

## 2023-01-03 PROCEDURE — 99233 SBSQ HOSP IP/OBS HIGH 50: CPT | Mod: ,,, | Performed by: SURGERY

## 2023-01-03 PROCEDURE — 63600175 PHARM REV CODE 636 W HCPCS: Performed by: INTERNAL MEDICINE

## 2023-01-03 PROCEDURE — 25000003 PHARM REV CODE 250: Performed by: INTERNAL MEDICINE

## 2023-01-03 RX ADMIN — IOPAMIDOL 100 ML: 755 INJECTION, SOLUTION INTRAVENOUS at 09:01

## 2023-01-03 RX ADMIN — PANTOPRAZOLE SODIUM 40 MG: 40 INJECTION, POWDER, FOR SOLUTION INTRAVENOUS at 11:01

## 2023-01-03 RX ADMIN — LEUCINE, PHENYLALANINE, LYSINE, METHIONINE, ISOLEUCINE, VALINE, HISTIDINE, THREONINE, TRYPTOPHAN, ALANINE, GLYCINE, ARGININE, PROLINE, SERINE, TYROSINE, SODIUM ACETATE, DIBASIC POTASSIUM PHOSPHATE, MAGNESIUM CHLORIDE, SODIUM CHLORIDE, CALCIUM CHLORIDE, DEXTROSE
311; 238; 247; 170; 255; 247; 204; 179; 77; 880; 438; 489; 289; 213; 17; 297; 261; 51; 77; 33; 5 INJECTION INTRAVENOUS at 11:01

## 2023-01-03 RX ADMIN — ONDANSETRON 4 MG: 2 INJECTION INTRAMUSCULAR; INTRAVENOUS at 08:01

## 2023-01-03 NOTE — PROGRESS NOTES
Inpatient Nutrition Assessment    Admit Date: 12/31/2022   Total duration of encounter: 3 days     Nutrition Recommendation/Prescription     - Advance diet as tolerated per MD. Goal Diet: Low Residue Diet.   - Medical management of nausea per MD.   - Consider increasing PPN to 100 mL/hr with IVPB IL 20% 250 mL twice per week to better meet needs until able to advance diet.   - Monitor wt, labs, and intake.      Communication of Recommendations: reviewed with nurse    Nutrition Assessment     Malnutrition Assessment/Nutrition-Focused Physical Exam    Malnutrition in the context of chronic illness  Degree of Malnutrition: unable to complete  Energy Intake: unable to obtain  Interpretation of Weight Loss: unable to obtain  Body Fat: unable to obtain  Area of Body Fat Loss: unable to obtain  Muscle Mass Loss: unable to obtain  Area of Muscle Mass Loss: unable to obtain  Fluid Accumulation: unable to obtain  Edema: unable to obtain  Reduced  Strength: unable to obtain  A minimum of two characteristics is recommended for diagnosis of either severe or non-severe malnutrition.    Chart Review    Reason Seen: continuous nutrition monitoring    Malnutrition Screening Tool Results   Have you recently lost weight without trying?: Yes: 2-13 lbs  Have you been eating poorly because of a decreased appetite?: Yes   MST Score: 2     Diagnosis:  Gastric outlet obstruction due to duodenal cancer/mass   Intractable vomiting d/t above  Obstructive jaundice due to duodenal mass   Duodenal Adenocarcinoma  Generalized Weakness    Relevant Medical History: PAD with intermittent claudication, CAD, Carotid artery disease, HTN, HLD     Nutrition-Related Medications: protonix, zofran PRN, Clinimix E 4.25/5  Calorie Containing IV Medications: no significant kcals from medications at this time and Clinimix    Nutrition-Related Labs:  1/1/2023: Glu 102, GFR>60    Diet/PN Order: Diet clear liquid  Amino acid 4.25% - dextrose 5% (CLINIMIX-E)  "solution (1L provides 42.5 gm AA, 50 gm CHO (170 kcal/L dextrose), Na 35, K 30, Mg 5, Ca 4.5, Acetate 70, Cl 39, Phos 15)  Oral Supplement Order: none  Tube Feeding Order: none  Appetite/Oral Intake: poor/0-25% of meals  Factors Affecting Nutritional Intake: clear liquid diet and nausea  Food/Pentecostal/Cultural Preferences: none reported  Food Allergies: no known food allergies       Wound(s):   none noted     Comments    1/3/2023: Pt asleep during rounds; PPN running as ordered. Pt has been on clear liquids. Pt reporting unable to eat 2/2 nausea per NP note; plans for whipple.     Anthropometrics    Height: 5' 9" (175.3 cm)    Last Weight: 75.8 kg (167 lb) (12/31/22 0602) Weight Method: Bed Scale  BMI (Calculated): 24.7  BMI Classification: normal (BMI 18.5-24.9)        Ideal Body Weight (IBW), Male: 160 lb     % Ideal Body Weight, Male (lb): 104.38 %                          Usual Weight Provided By: EMR weight history    Wt Readings from Last 5 Encounters:   12/31/22 75.8 kg (167 lb)   12/27/22 79.8 kg (176 lb)     Weight Change(s) Since Admission:  Admit Weight: 75.8 kg (167 lb) (12/31/22 0602)      Estimated Needs    Weight Used For Calorie Calculations: 75.8 kg (167 lb 1.7 oz)  Energy Calorie Requirements (kcal): 1889 kcal (MSJ x 1.3 SF)  Energy Need Method: [place] or  Weight Used For Protein Calculations: 75.8 kg (167 lb 1.7 oz)  Protein Requirements: 114 gm (1.5 g/kg)  Fluid Requirements (mL): 2274 mL (30 mL/kg)  Temp: 98 °F (36.7 °C)       Enteral Nutrition    Patient not receiving enteral nutrition at this time.    Parenteral Nutrition    Standard Formula: Clinimix E 4.25/5  Custom Formula: not applicable  Additives: none  Rate/Volume: 50 mL/hr  Lipids: none  Total Nutrition Provided by Parenteral Nutrition:  Calories Provided  408 kcal/d, 22% needs   Protein Provided  51 g/d, 45% needs   Dextrose Provided  60 g/d, GIR 0.55 mg CHO/kg/min   Fluid Provided  1200 ml/d, 53% needs       Evaluation of " Received Nutrient Intake    Calories: not meeting estimated needs  Protein: not meeting estimated needs    Patient Education    Not applicable.    Nutrition Diagnosis     PES: Inadequate energy intake related to current condition as evidenced by NPO/Clear Liquid since admit with PPN meeting <50% of est needs. (new)    Interventions/Goals     Intervention(s): general/healthful diet, modified composition of parenteral nutrition, modified rate of parenteral nutrition, and collaboration with other providers  Goal: Meet greater than 75% of nutritional needs by follow-up. (new)    Monitoring & Evaluation     Dietitian will monitor energy intake and weight.  Nutrition Risk/Follow-Up: high (follow-up in 1-4 days)   Please consult if re-assessment needed sooner.

## 2023-01-03 NOTE — PROGRESS NOTES
"Gastroenterology Progress Note  The documentation recorded by the scribe/NP accurately reflects the service I personally performed and the decisions made by me.   82-year-old white male patient of Dr. Sushil Anderson developed vomiting and then jaundice.  Patient has lost 35 lb.  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  He is scheduled for Whipple resection by Dr. Abdirahman Brown.  Patient feels better today after having received IV fluids.  On physical exam the patient is pleasant in no acute distress he is deeply jaundiced.  The heart has a regular rate and rhythm lungs are clear the abdomen is soft and nontender no masses palpable it is nondistended.  The bowel sounds are normal.  Extremities reveal no edema laboratory studies as below.  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  Awaiting surgery    Jacque F Noel Iii, MD [facility]   Subjective/Interval History:  "I feel good.  I just cannot eat."  He states that when he eats he has nausea and vomiting.      ROS:  Review of Systems   Constitutional:  Positive for weight loss. Negative for fever and malaise/fatigue.   Respiratory:  Negative for cough and shortness of breath.    Cardiovascular:  Negative for chest pain, palpitations and leg swelling.   Gastrointestinal:  Positive for nausea and vomiting. Negative for abdominal pain, blood in stool, diarrhea, heartburn and melena.   Musculoskeletal:  Negative for back pain and myalgias.   Skin:  Negative for rash.   Neurological:  Negative for speech change and focal weakness.   All other systems reviewed and are negative.    Vital Signs:  BP (!) 154/72   Pulse (!) 58   Temp 98.4 °F (36.9 °C) (Oral)   Resp 20   Ht 5' 9" (1.753 m)   Wt 75.8 kg (167 lb)   SpO2 98%   BMI 24.66 kg/m²   Body mass index is 24.66 kg/m².    Physical Exam:  Physical Exam  Constitutional:       General: He is not in " acute distress.     Appearance: He is not ill-appearing.   HENT:      Head: Normocephalic and atraumatic.   Eyes:      General: Scleral icterus present.      Extraocular Movements: Extraocular movements intact.   Cardiovascular:      Rate and Rhythm: Normal rate and regular rhythm.   Pulmonary:      Effort: Pulmonary effort is normal. No respiratory distress.   Abdominal:      General: Bowel sounds are normal. There is no distension.      Palpations: Abdomen is soft. There is no mass.      Tenderness: There is no abdominal tenderness. There is no guarding or rebound.   Musculoskeletal:      Right lower leg: No edema.      Left lower leg: No edema.   Skin:     General: Skin is warm and dry.      Coloration: Skin is jaundiced.   Neurological:      Mental Status: He is alert and oriented to person, place, and time.   Psychiatric:         Mood and Affect: Mood normal.         Behavior: Behavior normal.       Labs:  No results found for this or any previous visit (from the past 48 hour(s)).      Assessment/Plan:  81 yo CM known to Dr. Anderson with recent dx of adenocarcinoma of ampulla/duodenum on attempted ERCP for biliary obstruction, unable to stent.    Adenocarcinoma of ampulla/duodenum  Biliary obstruction - secondary to above  - Unable to stent via ERCP outpatient. Plan for IR PTC tomorrow.   - Whipple was planned with Dr. Abdirahman Brown already.  He is on board now.      GI available if needed.     Marcia Wilde PA-C

## 2023-01-03 NOTE — CLINICAL REVIEW
PA review by Jose AVILA MD       In Progress: Reviewed on 1/3/2023 by Jose AVILA MD       Created Using Review Status Review Entered   Epic In Primary 1/3/2023 1144       Created By   Jose AVILA MD       Criteria Set Name - Subset   PA review      Criteria Review   82-year-old male with duodenal adenocarcinoma, who presented with weakness, nausea, vomiting, anorexia.  The patient is slated to undergo Whipple procedure at the end of the month.  During this admission, he was found to have hyperbilirubinemia with a direct predominance, transaminitis.  Hemodynamically stable, afebrile.  No leukocytosis.  He was started on IV fluids.  Imaging demonstrated intrahepatic ductal dilatation.  GI recommended potential percutaneous biliary drainage.  Remains n.p.o. and was started on IV nutrition.  The patient is slated for IR PTC.  In the setting of a continued need for Clinimix, n.p.o., surgical intervention pending, the patient has exhausted the observation level of care timeframe.  He remains on minimal oral intake.  Yesterday, he was able to tolerate 60 mL oral intake.  He did receive IV anti-emetic therapy at 835 this morning in concert with the oral intake.  The patient remains appropriate for inpatient setting     Jose Gil MD  Utilization Management  Physician Advisor

## 2023-01-03 NOTE — PROGRESS NOTES
"SURG ONC    PT RESTING COMFORTABLY  TELLS ME "HE DOESN'T FEEL BAD"  STATES UNABLE TO EAT DUE TO NAUSEA    ABD SOFT, NONTENDER    VSS; NO FEVER    PLAN  SCHEDULED FOR IR PTC TODAY      "

## 2023-01-03 NOTE — PLAN OF CARE
01/03/23 1601   Discharge Assessment   Assessment Type Discharge Planning Assessment   Confirmed/corrected address, phone number and insurance Yes   Confirmed Demographics Correct on Facesheet   Source of Information patient;family   Reason For Admission nausea, vomiting, weakness, jaundice, duodenal adenocarcinoma   People in Home alone   Do you expect to return to your current living situation? Yes   Do you have help at home or someone to help you manage your care at home? No   Prior to hospitilization cognitive status: Alert/Oriented   Current cognitive status: Alert/Oriented;No Deficits   Home Layout Able to live on 1st floor   Equipment Currently Used at Home none   Do you currently have service(s) that help you manage your care at home? No   Who is going to help you get home at discharge? Ilene, daughter, [phone]   How do you get to doctors appointments? car, drives self   Are you on dialysis? No   Do you take coumadin? No   Discharge Plan A Home Health   Discharge Plan B Rehab   Discharge Plan discussed with: Adult children;Patient   Discharge Barriers Identified None     Pt states he lives alone in a single level home. He states he is independent with ADL's prior to admission. Pt does not have DME and is not current with a home health agency. Pt states he is concerned because he cannot keep any food down. He states he might need rehab before going home. Will follow for discharge needs.

## 2023-01-04 ENCOUNTER — ANESTHESIA EVENT (OUTPATIENT)
Dept: INTERVENTIONAL RADIOLOGY/VASCULAR | Facility: HOSPITAL | Age: 83
DRG: 380 | End: 2023-01-04
Payer: MEDICARE

## 2023-01-04 LAB
INR BLD: 1.04 (ref 0–1.3)
PROTHROMBIN TIME: 13.6 SECONDS (ref 12.5–14.5)

## 2023-01-04 PROCEDURE — 25000003 PHARM REV CODE 250: Performed by: RADIOLOGY

## 2023-01-04 PROCEDURE — C9113 INJ PANTOPRAZOLE SODIUM, VIA: HCPCS | Performed by: INTERNAL MEDICINE

## 2023-01-04 PROCEDURE — 11000001 HC ACUTE MED/SURG PRIVATE ROOM

## 2023-01-04 PROCEDURE — 25000003 PHARM REV CODE 250: Performed by: NURSE PRACTITIONER

## 2023-01-04 PROCEDURE — 63600175 PHARM REV CODE 636 W HCPCS: Performed by: NURSE ANESTHETIST, CERTIFIED REGISTERED

## 2023-01-04 PROCEDURE — 36415 COLL VENOUS BLD VENIPUNCTURE: CPT | Performed by: RADIOLOGY

## 2023-01-04 PROCEDURE — 63600175 PHARM REV CODE 636 W HCPCS: Performed by: ANESTHESIOLOGY

## 2023-01-04 PROCEDURE — 63600175 PHARM REV CODE 636 W HCPCS: Performed by: INTERNAL MEDICINE

## 2023-01-04 PROCEDURE — 85610 PROTHROMBIN TIME: CPT | Performed by: RADIOLOGY

## 2023-01-04 PROCEDURE — 25000003 PHARM REV CODE 250: Performed by: INTERNAL MEDICINE

## 2023-01-04 PROCEDURE — 94761 N-INVAS EAR/PLS OXIMETRY MLT: CPT

## 2023-01-04 PROCEDURE — 25000003 PHARM REV CODE 250: Performed by: NURSE ANESTHETIST, CERTIFIED REGISTERED

## 2023-01-04 RX ORDER — LIDOCAINE HYDROCHLORIDE 20 MG/ML
INJECTION, SOLUTION INFILTRATION; PERINEURAL
Status: COMPLETED | OUTPATIENT
Start: 2023-01-04 | End: 2023-01-04

## 2023-01-04 RX ORDER — HYDROMORPHONE HYDROCHLORIDE 2 MG/ML
INJECTION, SOLUTION INTRAMUSCULAR; INTRAVENOUS; SUBCUTANEOUS
Status: DISPENSED
Start: 2023-01-04 | End: 2023-01-04

## 2023-01-04 RX ORDER — SODIUM CHLORIDE 0.9 % (FLUSH) 0.9 %
10 SYRINGE (ML) INJECTION
Status: DISCONTINUED | OUTPATIENT
Start: 2023-01-04 | End: 2023-01-05

## 2023-01-04 RX ORDER — ROCURONIUM BROMIDE 10 MG/ML
INJECTION, SOLUTION INTRAVENOUS
Status: DISCONTINUED | OUTPATIENT
Start: 2023-01-04 | End: 2023-01-04

## 2023-01-04 RX ORDER — ONDANSETRON 2 MG/ML
INJECTION INTRAMUSCULAR; INTRAVENOUS
Status: DISCONTINUED | OUTPATIENT
Start: 2023-01-04 | End: 2023-01-04

## 2023-01-04 RX ORDER — FENTANYL CITRATE 50 UG/ML
INJECTION, SOLUTION INTRAMUSCULAR; INTRAVENOUS
Status: DISCONTINUED | OUTPATIENT
Start: 2023-01-04 | End: 2023-01-04

## 2023-01-04 RX ORDER — PROPOFOL 10 MG/ML
VIAL (ML) INTRAVENOUS
Status: DISCONTINUED | OUTPATIENT
Start: 2023-01-04 | End: 2023-01-04

## 2023-01-04 RX ORDER — PHENYLEPHRINE HYDROCHLORIDE 10 MG/ML
INJECTION INTRAVENOUS
Status: DISCONTINUED | OUTPATIENT
Start: 2023-01-04 | End: 2023-01-04

## 2023-01-04 RX ORDER — HYDROMORPHONE HYDROCHLORIDE 2 MG/ML
0.2 INJECTION, SOLUTION INTRAMUSCULAR; INTRAVENOUS; SUBCUTANEOUS EVERY 5 MIN PRN
Status: DISCONTINUED | OUTPATIENT
Start: 2023-01-04 | End: 2023-01-05

## 2023-01-04 RX ADMIN — FENTANYL CITRATE 50 MCG: 50 INJECTION, SOLUTION INTRAMUSCULAR; INTRAVENOUS at 10:01

## 2023-01-04 RX ADMIN — ROCURONIUM BROMIDE 50 MG: 50 INJECTION INTRAVENOUS at 09:01

## 2023-01-04 RX ADMIN — PROPOFOL 150 MG: 10 INJECTION, EMULSION INTRAVENOUS at 09:01

## 2023-01-04 RX ADMIN — PHENYLEPHRINE HYDROCHLORIDE 100 MCG: 10 INJECTION INTRAVENOUS at 09:01

## 2023-01-04 RX ADMIN — SUGAMMADEX 160 MG: 100 INJECTION, SOLUTION INTRAVENOUS at 09:01

## 2023-01-04 RX ADMIN — ONDANSETRON 4 MG: 2 INJECTION INTRAMUSCULAR; INTRAVENOUS at 09:01

## 2023-01-04 RX ADMIN — SODIUM CHLORIDE, SODIUM GLUCONATE, SODIUM ACETATE, POTASSIUM CHLORIDE AND MAGNESIUM CHLORIDE: 526; 502; 368; 37; 30 INJECTION, SOLUTION INTRAVENOUS at 09:01

## 2023-01-04 RX ADMIN — ACETAMINOPHEN 650 MG: 325 TABLET, FILM COATED ORAL at 11:01

## 2023-01-04 RX ADMIN — HYDROMORPHONE HYDROCHLORIDE 0.2 MG: 2 INJECTION, SOLUTION INTRAMUSCULAR; INTRAVENOUS; SUBCUTANEOUS at 10:01

## 2023-01-04 RX ADMIN — FENTANYL CITRATE 50 MCG: 50 INJECTION, SOLUTION INTRAMUSCULAR; INTRAVENOUS at 09:01

## 2023-01-04 RX ADMIN — PROPOFOL 50 MG: 10 INJECTION, EMULSION INTRAVENOUS at 10:01

## 2023-01-04 RX ADMIN — PANTOPRAZOLE SODIUM 40 MG: 40 INJECTION, POWDER, FOR SOLUTION INTRAVENOUS at 11:01

## 2023-01-04 RX ADMIN — LEUCINE, PHENYLALANINE, LYSINE, METHIONINE, ISOLEUCINE, VALINE, HISTIDINE, THREONINE, TRYPTOPHAN, ALANINE, GLYCINE, ARGININE, PROLINE, SERINE, TYROSINE, SODIUM ACETATE, DIBASIC POTASSIUM PHOSPHATE, MAGNESIUM CHLORIDE, SODIUM CHLORIDE, CALCIUM CHLORIDE, DEXTROSE
311; 238; 247; 170; 255; 247; 204; 179; 77; 880; 438; 489; 289; 213; 17; 297; 261; 51; 77; 33; 5 INJECTION INTRAVENOUS at 07:01

## 2023-01-04 RX ADMIN — ACETAMINOPHEN 650 MG: 325 TABLET, FILM COATED ORAL at 09:01

## 2023-01-04 RX ADMIN — LIDOCAINE HYDROCHLORIDE 5 ML: 20 INJECTION, SOLUTION INFILTRATION; PERINEURAL at 09:01

## 2023-01-04 RX ADMIN — ACETAMINOPHEN 650 MG: 325 TABLET, FILM COATED ORAL at 03:01

## 2023-01-04 NOTE — ANESTHESIA PROCEDURE NOTES
Intubation    Date/Time: 1/4/2023 9:22 AM  Performed by: Robert Mccartney CRNA  Authorized by: Robert Mccartney CRNA     Intubation:     Induction:  Intravenous    Intubated:  Postinduction    Mask Ventilation:  Easy mask    Attempts:  1    Attempted By:  CRNA    Method of Intubation:  Direct    Blade:  Leblanc 2    Laryngeal View Grade: Grade I - full view of cords      Difficult Airway Encountered?: No      Complications:  None    Airway Device:  Oral endotracheal tube    Airway Device Size:  7.0    Style/Cuff Inflation:  Cuffed (inflated to minimal occlusive pressure)    Inflation Amount (mL):  6    Tube secured:  22    Secured at:  The lips    Placement Verified By:  Capnometry    Complicating Factors:  None    Findings Post-Intubation:  BS equal bilateral

## 2023-01-04 NOTE — OP NOTE
Radiology Post-Procedure Note    Pre Op Diagnosis: CBD Obstruction    Post Op Diagnosis: External Drain in place    Procedure:  PTHD    Procedure performed by: Kodi Ambrocio M.D.    Written Informed Consent Obtained: Yes    Specimen Removed: NO    Estimated Blood Loss: Minimal    Findings:   Standard Biliary Drain Placement    Patient tolerated procedure well.    Kodi Ambrocio MD

## 2023-01-04 NOTE — ANESTHESIA PREPROCEDURE EVALUATION
"                                                                                                             01/04/2023  Quincy Triplett is a 82 y.o., male with ----------------------------  Atherosclerosis of native arteries of extremities with intermittent   claudication, unspecified extremity  Coronary artery disease  Dyslipidemia  Hypertension;    And ----------------------------  Amputation      Comment:  Right arm  Carotid stent  Ercp      Comment:  Procedure: ERCP;  Surgeon: Sushil Anderson MD;                 Location: [facility] ENDOSCOPY;  Service:                Gastroenterology;  Laterality: N/A;  food in abdomen  Ercp, with biopsy      Comment:  Procedure: ERCP, WITH BIOPSY;  Surgeon: Sushil Anderson MD;  Location: [facility] ENDOSCOPY;  Service:               Gastroenterology;;  Left heart catheterization  Tonsillectomy    For biliary drain    "Chief Complaint/Reason for Admission: Duodenal adenocarcinoma with nausea, vomitting, elevated bili/jaundice     History of Present Illness:    82 yoM known to Dr. IVORY Brown, pt of Dr. Sushil Anderson, with known duodenal adenocarcinoma, who is currently scheduled for a Whipple procedure with Dr Brown on 1/16/23 per report. Came to the ER with  worsening nausea and vomiting along with weakness and worsening jaundice.  Bilirubin significantly elevated to >16.  Imaging study with predominantly intrahepatic ductal dilation.  ERCP was attempted per Dr. Sushil Anderson on 12/21/2022 per report, and ampulla could not be identified due to duodenal mass lesion. Pt has continued to lose weight at home, not able to tolerate much of any oral intake, and is having increased weakness and failure to thrive. Being admitted to med team with Gi and surgical consult.:"   .   Latest Reference Range & Units 01/01/23 04:07   Sodium 136 - 145 mmol/L 141   Potassium 3.5 - 5.1 mmol/L 4.2   Chloride 98 - 107 mmol/L 108 (H)   CO2 23 - 31 mmol/L 25   BUN 8.4 - " 25.7 mg/dL 24.3   Creatinine 0.73 - 1.18 mg/dL 0.89   eGFR mls/min/1.73/m2 >60   Glucose 82 - 115 mg/dL 102   Calcium 8.8 - 10.0 mg/dL 9.2   Alkaline Phosphatase 40 - 150 unit/L 432 (H)   PROTEIN TOTAL 5.8 - 7.6 gm/dL 5.0 (L)   Albumin 3.4 - 4.8 g/dL 2.4 (L)   Albumin/Globulin Ratio 1.1 - 2.0 ratio 0.9 (L)   BILIRUBIN TOTAL <=1.5 mg/dL 14.8 (H)   AST 5 - 34 unit/L 67 (H)   ALT 0 - 55 unit/L 108 (H)   (H): Data is abnormally high  (L): Data is abnormally low    Pre-op Assessment    I have reviewed the NPO Status.      Review of Systems      Physical Exam  General: Cooperative, Alert and Oriented  Thin male  Airway:  Mallampati: II   Mouth Opening: Normal  TM Distance: Normal  Tongue: Normal  Neck ROM: Normal ROM    Chest/Lungs:  Clear to auscultation, Normal Respiratory Rate    Heart:  Rate: Normal  Rhythm: Regular Rhythm        Anesthesia Plan  Type of Anesthesia, risks & benefits discussed:    Anesthesia Type: Gen ETT  Intra-op Monitoring Plan: Standard ASA Monitors  Post Op Pain Control Plan: IV/PO Opioids PRN  Induction:  IV  Airway Plan: Direct, Post-Induction  Informed Consent: Informed consent signed with the Patient and all parties understand the risks and agree with anesthesia plan.  All questions answered. Patient consented to blood products? No  ASA Score: 3  Day of Surgery Review of History & Physical: H&P Update referred to the surgeon/provider.  Anesthesia Plan Notes: Premedication: low dose midazolam if needed, preferably none   IV induction with propfol/rocuronium (if succinylcholine chosen, use defasciculating dose of Rocuronium)      Ready For Surgery From Anesthesia Perspective.     .

## 2023-01-04 NOTE — PROGRESS NOTES
[facility] Hospital Medicine Progress Note        Chief Complaint: Inpatient Follow-up for Nausea, vomiting and duodenal cancer      HPI:   Quincy Triplett is a 82 y.o. male with a PMHx of duodenal adenocarcinoma awaiting with procedure on 01/16/2023, CAD, HTN, HLD, carotid artery disease status post stent who presented to [facility] on 12/31/20[facility] via EMS with c/o worsening generalized weakness, nausea and vomiting with decreased appetite for the past several days. He denied hematuria, hematochezia, hematemesis, abdominal pain.  Reports that can not tolerate oral intake due to nausea and vomiting.  He states he was diagnosed with duodenal adenocarcinoma x2 months ago and symptoms have been progressively worsening since then.  He is awaiting Whipple procedure by Dr. Abdirahman Brown. Initial ED VS stable.  Labs notable for hemoglobin 10.6, hematocrit 32.6, glucose 162, alk-phos 526, albumin 2.8, total bili 16.2, direct bili 10.8, AST 84, .  BNP and troponin normal.  Lactic acid normal.  Flu and COVID negative.  CXR negative.  He was given IV fluids in the ED. Admitted to hospital medicine services for further workup and management of care.        Patient currently admitted for gastric outlet obstruction due to duodenal mass/cancer, obstructive jaundice due to duodenal cancer.  Patient has a Whipple procedure planned with surgical Oncology.        Interval Hx:   Vitals remained stable   CT of the chest abdomen and pelvis reviewed shows biliary and pancreatic ductal dilatation.    Surgical Oncology on board  - for IR PTC today          Objective/physical exam:  General: In no acute distress, + yellow tinge of the skin on face and trunk   Chest: Clear to auscultation bilaterally  Heart: RRR, +S1, S2, no appreciable murmur  Abdomen: Soft, nontender, BS +  MSK: Warm, no lower extremity edema, no clubbing or cyanosis  Neurologic: Alert and oriented x4, Cranial nerve II-XII intact, Strength 5/5  in all 4 extremities        Assessment/Plan:  Gastric outlet obstruction due to duodenal cancer/mass   Intractable vomiting d/t above  Obstructive jaundice due to duodenal mass   Duodenal Adenocarcinoma  Generalized Weakness  Hx of PAD with intermittent claudication, CAD, Carotid artery disease, HTN, HLD      Plan:  CT of the chest abdomen and pelvis reviewed shows biliary and pancreatic ductal dilatation.     for IR PTC today   Patient remains NPO.    Follow-up with surgical Oncology and IR recs  IV PPI , IV Zofran prn   iv clinimix   F/U on GI recommendations     VTE prophylaxis: Lovenox     Patient condition:  Fair     Anticipated discharge and Disposition:             VITAL SIGNS: 24 HRS MIN & MAX LAST   Temp  Min: 97.2 °F (36.2 °C)  Max: 98.3 °F (36.8 °C) 97.2 °F (36.2 °C)   BP  Min: 124/65  Max: 154/82 (!) 142/67     Pulse  Min: 55  Max: 70  (!) 56     Resp  Min: 16  Max: 20 20   SpO2  Min: 95 %  Max: 100 % 100 %       Recent Labs   Lab 12/31/22  0821 01/01/23  0407   WBC 8.0 7.6   RBC 3.79* 3.56*   HGB 10.6* 9.9*   HCT 32.6* 30.9*   MCV 86.0 86.8   MCH 28.0 27.8   MCHC 32.5* 32.0*   RDW 21.8* 21.7*    274   MPV 10.7 11.1       Recent Labs   Lab 12/31/22  0708 01/01/23  0407    141   K 4.2 4.2   CO2 24 25   BUN 22.0 24.3   CREATININE 1.16 0.89   CALCIUM 9.9 9.2   MG 2.30  --    ALBUMIN 2.8* 2.4*   ALKPHOS 526* 432*   * 108*   AST 84* 67*   BILITOT 16.2* 14.8*          Microbiology Results (last 7 days)       ** No results found for the last 168 hours. **             See below for Radiology    Scheduled Med:   HYDROmorphone (PF)        pantoprazole  40 mg Intravenous Daily    tamsulosin  0.4 mg Oral Daily        Continuous Infusions:   Amino acid 4.25% - dextrose 5% (CLINIMIX-E) solution (1L provides 42.5 gm AA, 50 gm CHO (170 kcal/L dextrose), Na 35, K 30, Mg 5, Ca 4.5, Acetate 70, Cl 39, Phos 15) 50 mL/hr at 01/04/23 0741        PRN Meds:  acetaminophen, acetaminophen, HYDROmorphone,  ondansetron, sodium chloride 0.9%       VTE prophylaxis:     Patient condition:  Stable/Fair/Guarded/ Serious/ Critical    Anticipated discharge and Disposition:         All diagnosis and differential diagnosis have been reviewed; assessment and plan has been documented; I have personally reviewed the labs and test results that are presently available; I have reviewed the patients medication list; I have reviewed the consulting providers response and recommendations. I have reviewed or attempted to review medical records based upon their availability    All of the patient's questions have been  addressed and answered. Patient's is agreeable to the above stated plan. I will continue to monitor closely and make adjustments to medical management as needed.  _____________________________________________________________________    Nutrition Status:    Radiology:  CT Abdomen Pelvis With Contrast  Narrative: EXAMINATION:  CT ABDOMEN PELVIS WITH CONTRAST    CLINICAL HISTORY:  hyperbilirubinemia; Weakness    TECHNIQUE:  Helically acquired images with axial, sagittal and coronal reformations were obtained from the lung bases to the pubic symphysis after the IV administration of contrast.    Automated tube current modulation, weight-based exposure dosing, and/or iterative reconstruction technique utilized to reach lowest reasonably achievable exposure rate.    DLP: 491 mGy*cm    COMPARISON:  Abdominal sonogram 12/31/2022    FINDINGS:  HEART: There are coronary artery calcifications.    LUNG BASES: Well aerated.    LIVER: There are benign-appearing calcifications.    BILIARY: There is mild intrahepatic biliary ductal dilatation.  The common bile duct measures 16 mm at the head of the pancreas.  The gallbladder is mildly distended.    PANCREAS: Pancreatic duct measures 7 mm at the head of the pancreas.  No pancreatic inflammation.    SPLEEN: Normal in size    ADRENALS: No mass.    KIDNEYS/URETERS: Incidental simple appearing  right renal cyst. No follow-up imaging is recommended as incidental lesions are likely benign.    GI TRACT/MESENTERY: Positive enteric contrast was administered.  There is mild edema at the 2nd portion of the duodenum.   No evidence of bowel obstruction.  The appendix is normal.    PERITONEUM: No free fluid.No free air.    LYMPH NODES: No enlarged lymph nodes by size criteria.    VASCULATURE: Aortoiliac atherosclerosis.  Mild focal ectatic dilatation of the infrarenal aorta measuring up to 2.5 cm.    BLADDER: Trabeculated bladder.    REPRODUCTIVE ORGANS: Prostatomegaly.    SOFT TISSUES: Unremarkable.    BONES: L5 pars defects with grade 1 anterolisthesis.  Impression: 1. Biliary ductal dilatation and mild pancreatic ductal dilatation.  2.  Edema and stranding at the 2nd portion of the duodenum.  Patient reportedly with known duodenal adenocarcinoma.  3. Prostatomegaly with trabeculated bladder.    Electronically signed by: Nancy Redd  Date:    01/03/2023  Time:    11:55      John Ward MD   01/04/2023

## 2023-01-04 NOTE — DISCHARGE SUMMARY
[facility] Hospital Medicine Progress Note      Chief Complaint: Inpatient Follow-up for Nausea, vomiting and duodenal cancer      HPI:   Quincy Triplett is a 82 y.o. 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 on 12/31/2022 via EMS with c/o worsening generalized weakness, nausea and vomiting with decreased appetite for the past several days. He denied hematuria, hematochezia, hematemesis, abdominal pain.  Reports that can not tolerate oral intake due to nausea and vomiting.  He states he was diagnosed with duodenal adenocarcinoma x2 months ago and symptoms have been progressively worsening since then.  He is awaiting Whipple procedure by Dr. Abdirahman Brown. Initial ED VS stable.  Labs notable for hemoglobin 10.6, hematocrit 32.6, glucose 162, alk-phos 526, albumin 2.8, total bili 16.2, direct bili 10.8, AST 84, .  BNP and troponin normal.  Lactic acid normal.  Flu and COVID negative.  CXR negative.  He was given IV fluids in the ED. Admitted to hospital medicine services for further workup and management of care.        Patient currently admitted for gastric outlet obstruction due to duodenal mass/cancer, obstructive jaundice due to duodenal cancer.  Patient has a Whipple procedure planned with surgical Oncology.        Interval Hx:   Patient awake and comfortable.  Denies any abdominal pain.  Patient remains NPO.  He says as long as he does not eat nausea vomiting will not occur and is happy with this.    - for IR PTC today          Objective/physical exam:  General: In no acute distress, + yellow tinge of the skin on face and trunk   Chest: Clear to auscultation bilaterally  Heart: RRR, +S1, S2, no appreciable murmur  Abdomen: Soft, nontender, BS +  MSK: Warm, no lower extremity edema, no clubbing or cyanosis  Neurologic: Alert and oriented x4, Cranial nerve  error   II-XII intact, Strength 5/5 in all 4 extremities        Assessment/Plan:  Gastric outlet obstruction due to duodenal cancer/mass   Intractable vomiting d/t above  Obstructive jaundice due to duodenal mass   Duodenal Adenocarcinoma  Generalized Weakness  Hx of PAD with intermittent claudication, CAD, Carotid artery disease, HTN, HLD      Plan:   for IR PTC today   Patient remains NPO.    Follow-up with surgical Oncology and IR recs  IV PPI , IV Zofran prn   iv clinimix   F/U on GI recommendations     VTE prophylaxis: Lovenox     Patient condition:  Fair     Anticipated discharge and Disposition:         VITAL SIGNS: 24 HRS MIN & MAX LAST   Temp  Min: 97.6 °F (36.4 °C)  Max: 98.4 °F (36.9 °C) 98 °F (36.7 °C)   BP  Min: 142/76  Max: 165/67 (!) 152/67     Pulse  Min: 56  Max: 70  70   Resp  Min: 18  Max: 20 20   SpO2  Min: 96 %  Max: 100 % 98 %       Recent Labs   Lab 12/31/22  0821 01/01/23  0407   WBC 8.0 7.6   RBC 3.79* 3.56*   HGB 10.6* 9.9*   HCT 32.6* 30.9*   MCV 86.0 86.8   MCH 28.0 27.8   MCHC 32.5* 32.0*   RDW 21.8* 21.7*    274   MPV 10.7 11.1       Recent Labs   Lab 12/31/22  0708 01/01/23  0407    141   K 4.2 4.2   CO2 24 25   BUN 22.0 24.3   CREATININE 1.16 0.89   CALCIUM 9.9 9.2   MG 2.30  --    ALBUMIN 2.8* 2.4*   ALKPHOS 526* 432*   * 108*   AST 84* 67*   BILITOT 16.2* 14.8*          Microbiology Results (last 7 days)       ** No results found for the last 168 hours. **             See below for Radiology    Scheduled Med:   pantoprazole  40 mg Intravenous Daily    tamsulosin  0.4 mg Oral Daily        Continuous Infusions:       PRN Meds:  acetaminophen, acetaminophen, ondansetron         VTE prophylaxis:     Patient condition:  Stable/Fair/Guarded/ Serious/ Critical    Anticipated discharge and Disposition:         All diagnosis and differential diagnosis have been reviewed; assessment and plan has been documented; I have personally reviewed the labs and test results that are  presently available; I have reviewed the patients medication list; I have reviewed the consulting providers response and recommendations. I have reviewed or attempted to review medical records based upon their availability    All of the patient's questions have been  addressed and answered. Patient's is agreeable to the above stated plan. I will continue to monitor closely and make adjustments to medical management as needed.  _____________________________________________________________________    Nutrition Status:    Radiology:  CT Abdomen Pelvis With Contrast  Narrative: EXAMINATION:  CT ABDOMEN PELVIS WITH CONTRAST    CLINICAL HISTORY:  hyperbilirubinemia; Weakness    TECHNIQUE:  Helically acquired images with axial, sagittal and coronal reformations were obtained from the lung bases to the pubic symphysis after the IV administration of contrast.    Automated tube current modulation, weight-based exposure dosing, and/or iterative reconstruction technique utilized to reach lowest reasonably achievable exposure rate.    DLP: 491 mGy*cm    COMPARISON:  Abdominal sonogram 12/31/2022    FINDINGS:  HEART: There are coronary artery calcifications.    LUNG BASES: Well aerated.    LIVER: There are benign-appearing calcifications.    BILIARY: There is mild intrahepatic biliary ductal dilatation.  The common bile duct measures 16 mm at the head of the pancreas.  The gallbladder is mildly distended.    PANCREAS: Pancreatic duct measures 7 mm at the head of the pancreas.  No pancreatic inflammation.    SPLEEN: Normal in size    ADRENALS: No mass.    KIDNEYS/URETERS: Incidental simple appearing right renal cyst. No follow-up imaging is recommended as incidental lesions are likely benign.    GI TRACT/MESENTERY: Positive enteric contrast was administered.  There is mild edema at the 2nd portion of the duodenum.   No evidence of bowel obstruction.  The appendix is normal.    PERITONEUM: No free fluid.No free air.    LYMPH NODES:  No enlarged lymph nodes by size criteria.    VASCULATURE: Aortoiliac atherosclerosis.  Mild focal ectatic dilatation of the infrarenal aorta measuring up to 2.5 cm.    BLADDER: Trabeculated bladder.    REPRODUCTIVE ORGANS: Prostatomegaly.    SOFT TISSUES: Unremarkable.    BONES: L5 pars defects with grade 1 anterolisthesis.  Impression: 1. Biliary ductal dilatation and mild pancreatic ductal dilatation.  2.  Edema and stranding at the 2nd portion of the duodenum.  Patient reportedly with known duodenal adenocarcinoma.  3. Prostatomegaly with trabeculated bladder.    Electronically signed by: Nancy Redd  Date:    01/03/2023  Time:    11:55      John Ward MD   01/03/2023

## 2023-01-04 NOTE — TRANSFER OF CARE
"Anesthesia Transfer of Care Note    Patient: Quincy Triplett    Procedure(s) Performed: * No procedures listed *    Patient location: PACU    Anesthesia Type: general    Transport from OR: Transported from OR on room air with adequate spontaneous ventilation    Post pain: adequate analgesia    Post assessment: no apparent anesthetic complications    Post vital signs: stable    Level of consciousness: sedated and responds to stimulation    Nausea/Vomiting: no nausea/vomiting    Complications: none    Transfer of care protocol was followed      Last vitals:   Visit Vitals  /65   Pulse 65   Temp 36.2 °C (97.2 °F)   Resp 16   Ht 5' 9" (1.753 m)   Wt 75.8 kg (167 lb)   SpO2 98%   BMI 24.66 kg/m²     "

## 2023-01-05 PROCEDURE — 25000003 PHARM REV CODE 250: Performed by: INTERNAL MEDICINE

## 2023-01-05 PROCEDURE — 63600175 PHARM REV CODE 636 W HCPCS: Performed by: INTERNAL MEDICINE

## 2023-01-05 PROCEDURE — 25000003 PHARM REV CODE 250: Performed by: NURSE PRACTITIONER

## 2023-01-05 PROCEDURE — 11000001 HC ACUTE MED/SURG PRIVATE ROOM

## 2023-01-05 PROCEDURE — C9113 INJ PANTOPRAZOLE SODIUM, VIA: HCPCS | Performed by: INTERNAL MEDICINE

## 2023-01-05 RX ORDER — HYDROMORPHONE HYDROCHLORIDE 2 MG/ML
0.5 INJECTION, SOLUTION INTRAMUSCULAR; INTRAVENOUS; SUBCUTANEOUS EVERY 4 HOURS PRN
Status: DISCONTINUED | OUTPATIENT
Start: 2023-01-05 | End: 2023-01-07 | Stop reason: HOSPADM

## 2023-01-05 RX ADMIN — TAMSULOSIN HYDROCHLORIDE 0.4 MG: 0.4 CAPSULE ORAL at 09:01

## 2023-01-05 RX ADMIN — ACETAMINOPHEN 650 MG: 325 TABLET, FILM COATED ORAL at 04:01

## 2023-01-05 RX ADMIN — LEUCINE, PHENYLALANINE, LYSINE, METHIONINE, ISOLEUCINE, VALINE, HISTIDINE, THREONINE, TRYPTOPHAN, ALANINE, GLYCINE, ARGININE, PROLINE, SERINE, TYROSINE, SODIUM ACETATE, DIBASIC POTASSIUM PHOSPHATE, MAGNESIUM CHLORIDE, SODIUM CHLORIDE, CALCIUM CHLORIDE, DEXTROSE
311; 238; 247; 170; 255; 247; 204; 179; 77; 880; 438; 489; 289; 213; 17; 297; 261; 51; 77; 33; 5 INJECTION INTRAVENOUS at 06:01

## 2023-01-05 RX ADMIN — ACETAMINOPHEN 650 MG: 325 TABLET, FILM COATED ORAL at 07:01

## 2023-01-05 RX ADMIN — PANTOPRAZOLE SODIUM 40 MG: 40 INJECTION, POWDER, FOR SOLUTION INTRAVENOUS at 09:01

## 2023-01-05 NOTE — PROGRESS NOTES
SURG ONC    BILIARY DRAIN PLACED YESTERDAY  PT TELLS ME HE ALREADY FEELS BETTER  APPETITE MUCH IMPROVED  EAGER TO EAT GOOD    ABD SOFT  DRAIN WELL SECURED, BILIOUS    VSS; NO FEVER    PLAN  FROM SURGICAL STANDPOINT PT CAN BE DISCHARGED HOME IF TOLERATED DIET

## 2023-01-05 NOTE — PROGRESS NOTES
[facility] Hospital Medicine Progress Note        Chief Complaint: Inpatient Follow-up for Nausea, vomiting and duodenal cancer      HPI:   Quincy Triplett is a 82 y.o. 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 He denied hematuria, hematochezia, hematemesis, abdominal pain.  Reports that can not tolerate oral intake due to nausea and vomiting.  He states he was diagnosed with duodenal adenocarcinoma x2 months ago and symptoms have been progressively worsening since then.  He is awaiting Whipple procedure by Dr. Abdirahman Brown. Initial ED VS stable.  Labs notable for hemoglobin 10.6, hematocrit 32.6, glucose 162, alk-phos 526, albumin 2.8, total bili 16.2, direct bili 10.8, AST 84, .  BNP and troponin normal.  Lactic acid normal.  Flu and COVID negative.  CXR negative.  He was given IV fluids in the ED. Admitted to hospital medicine services for further workup and management of care.        Patient currently admitted for gastric outlet obstruction due to duodenal mass/cancer, obstructive jaundice due to duodenal cancer.  Patient has a Whipple procedure planned with surgical Oncology.CT of the chest abdomen and pelvis reviewed shows biliary and pancreatic ductal dilatation.          Interval Hx:   Patient seen and examined at bedside.    Patient concerned about how he is going to manage at home alone with no right upper extremity, the new drain and his recent state.  Provided reassurance that we would help him as much as we can till he can get his definitive surgery may need to be in the hospital till surgery  He is status post percutaneous transhepatic cholangiogram with placement of an  external drain, by IR.    IR planning for  further possible crossing at the CBD in the next 24 hours.    Currently on liquid diet and tolerating it is            Objective/physical exam:  General: In no acute distress, + yellow tinge of the skin on face and trunk   Chest: Clear to auscultation bilaterally  Heart: RRR, +S1, S2, no appreciable murmur  Abdomen: Soft, nontender, BS +  MSK: Warm, no lower extremity edema, no clubbing or cyanosis  Neurologic: Alert and oriented x4, Cranial nerve II-XII intact, Strength 5/5 in all 4 extremities        Assessment/Plan:  Common bile duct obstruction status post external drain placement by IR  [facility]  Gastric outlet obstruction due to duodenal cancer/mass   Intractable vomiting d/t above  Obstructive jaundice due to duodenal mass   Duodenal Adenocarcinoma  Generalized Weakness  Hx of PAD with intermittent claudication, CAD, Carotid artery disease, HTN, HLD      Plan:  status post percutaneous transhepatic cholangiogram with placement of an  external drain, by IR.    IR planning for further possible crossing at the CBD in the next 24 hours.    Currently on liquid diet and tolerating it   CT of the chest abdomen and pelvis reviewed shows biliary and pancreatic ductal dilatation.     Follow-up with surgical Oncology - gueroipples on 1/16  IV PPI , IV Zofran prn  D/c  iv clinimix        VTE prophylaxis: Lovenox     Patient condition:  Fair     Anticipated discharge and Disposition:        VITAL SIGNS: 24 HRS MIN & MAX LAST   Temp  Min: 97.5 °F (36.4 °C)  Max: 98.5 °F (36.9 °C) 97.8 °F (36.6 °C)   BP  Min: 125/67  Max: 152/71 126/68   Pulse  Min: 52  Max: 68  63   Resp  Min: 18  Max: 18 18   SpO2  Min: 96 %  Max: 98 % 98 %       Recent Labs   Lab 12/31/22  0821 01/01/23  0407   WBC 8.0 7.6   RBC 3.79* 3.56*   HGB 10.6* 9.9*   HCT 32.6* 30.9*   MCV 86.0 86.8   MCH 28.0 27.8   MCHC 32.5* 32.0*   RDW 21.8* 21.7*    274   MPV 10.7 11.1       Recent Labs   Lab 12/31/22  0708 01/01/23  0407    141   K 4.2 4.2   CO2  24 25   BUN 22.0 24.3   CREATININE 1.16 0.89   CALCIUM 9.9 9.2   MG 2.30  --    ALBUMIN 2.8* 2.4*   ALKPHOS 526* 432*   * 108*   AST 84* 67*   BILITOT 16.2* 14.8*          Microbiology Results (last 7 days)       ** No results found for the last 168 hours. **             See below for Radiology    Scheduled Med:   pantoprazole  40 mg Intravenous Daily    tamsulosin  0.4 mg Oral Daily        Continuous Infusions:       PRN Meds:  acetaminophen, acetaminophen, HYDROmorphone, ondansetron       VTE prophylaxis:     Patient condition:  Stable/Fair/Guarded/ Serious/ Critical    Anticipated discharge and Disposition:         All diagnosis and differential diagnosis have been reviewed; assessment and plan has been documented; I have personally reviewed the labs and test results that are presently available; I have reviewed the patients medication list; I have reviewed the consulting providers response and recommendations. I have reviewed or attempted to review medical records based upon their availability    All of the patient's questions have been  addressed and answered. Patient's is agreeable to the above stated plan. I will continue to monitor closely and make adjustments to medical management as needed.  _____________________________________________________________________    Nutrition Status:    Radiology:  US Guided Needle Placement  Narrative: EXAMINATION:  Ultrasound guidance    Fluoroscopic guidance    Percutaneous transhepatic cholangiogram    External biliary drain placement    Post placement cholangiogram    CLINICAL HISTORY:  Obstruction requiring biliary drainage.    Attending: Kodi Ambrocio    Anesthesia: General anesthesia was provided by the anesthesiology department.    TECHNIQUE:  After the risks, benefits and alternatives were discussed, consent was obtained. A time out was performed to verify the patient's identity and procedure.    The patient was placed supine on the angiographic table. The  "abdomen was cleaned and prepped in normal sterile fashion. A right access site was identified using fluoroscopy. Subcutaneous tissues were anesthetized with lidocaine solution. A dermatotomy was performed with an #11 blade scalpel.    Using a 21 gauge Chiba needle an mid axillary approach with dilute contrast was performed into the liver parenchyma. Realtime ultrasound was used for needle guidance and a static image was obtained.  Once access was obtained into an intrahepatic duct an 0.018" wire was advanced into the intrahepatic ducts.    Over the wire, an accustick system was advanced and the inner obturators were removed.    Contrast identified the common bile duct.  Multiple attempts were made for passage into the duodenum, however complete obstruction was noted.    The Glidewire was exchanged for an Amplatz wire through a hockey-stick catheter.  A ten [place] external drainage catheter was placed without complication.    Post placement cholangiogram demonstrated appropriate location.    Catheter was sutured to the skin using 0 monofilament. The patient tolerated the procedure well. There were no immediate complications.    All needle, wire and catheter manipulations were done under fluoroscopic guidance.    EBL: < 10mL    Fluoro Time (min): 5.3    Fluoro Dose (mGy): 45.1  Impression: Successful placement of biliary drainage catheter: 10 [place] external biliary drainage catheter.    Plan:    Patient should return in 24-48 hours for possible crossing of the common bile duct.    I, Kodi Ambrocio, was present for the entire procedure.    Electronically signed by: Kodi Ambrocio  Date:    01/04/2023  Time:    14:14  IR Biliary Drain Internal/External  Narrative: EXAMINATION:  Ultrasound guidance    Fluoroscopic guidance    Percutaneous transhepatic cholangiogram    External biliary drain placement    Post placement cholangiogram    CLINICAL HISTORY:  Obstruction requiring biliary drainage.    Attending: Kodi" "Lolly    Anesthesia: General anesthesia was provided by the anesthesiology department.    TECHNIQUE:  After the risks, benefits and alternatives were discussed, consent was obtained. A time out was performed to verify the patient's identity and procedure.    The patient was placed supine on the angiographic table. The abdomen was cleaned and prepped in normal sterile fashion. A right access site was identified using fluoroscopy. Subcutaneous tissues were anesthetized with lidocaine solution. A dermatotomy was performed with an #11 blade scalpel.    Using a 21 gauge Chiba needle an mid axillary approach with dilute contrast was performed into the liver parenchyma. Realtime ultrasound was used for needle guidance and a static image was obtained.  Once access was obtained into an intrahepatic duct an 0.018" wire was advanced into the intrahepatic ducts.    Over the wire, an accustick system was advanced and the inner obturators were removed.    Contrast identified the common bile duct.  Multiple attempts were made for passage into the duodenum, however complete obstruction was noted.    The Glidewire was exchanged for an Amplatz wire through a hockey-stick catheter.  A ten Q[place] external drainage catheter was placed without complication.    Post placement cholangiogram demonstrated appropriate location.    Catheter was sutured to the skin using 0 monofilament. The patient tolerated the procedure well. There were no immediate complications.    All needle, wire and catheter manipulations were done under fluoroscopic guidance.    EBL: < 10mL    Fluoro Time (min): 5.3    Fluoro Dose (mGy): 45.1  Impression: Successful placement of biliary drainage catheter: 10 Q[place] external biliary drainage catheter.    Plan:    Patient should return in 24-48 hours for possible crossing of the common bile duct.    I, Kodi Ambrocio, was present for the entire procedure.    Electronically signed by: Kodi" Lolly  Date:    01/04/2023  Time:    14:14      John Ward MD   01/05/2023

## 2023-01-05 NOTE — ANESTHESIA POSTPROCEDURE EVALUATION
Anesthesia Post Evaluation    Patient: Quincy Triplett    Procedure(s) Performed: * No procedures listed *    Final Anesthesia Type: general      Patient location during evaluation: PACU  Patient participation: Yes- Able to Participate  Level of consciousness: awake and alert  Post-procedure vital signs: reviewed and stable  Pain management: adequate  Airway patency: patent      Anesthetic complications: no      Cardiovascular status: hemodynamically stable  Respiratory status: unassisted  Hydration status: euvolemic  Follow-up not needed.          Vitals Value Taken Time   /68 01/05/23 1311   Temp 36.6 °C (97.8 °F) 01/05/23 1311   Pulse 63 01/05/23 1311   Resp 18 01/05/23 0748   SpO2 98 % 01/05/23 1311         Event Time   Out of Recovery 01/04/2023 10:52:00         Pain/Mariusz Score: Pain Rating Prior to Med Admin: 3 (1/5/2023  4:41 AM)  Pain Rating Post Med Admin: 1 (1/5/2023  5:32 AM)  Mariusz Score: 10 (1/4/2023 10:50 AM)

## 2023-01-06 LAB
ALBUMIN SERPL-MCNC: 2.5 G/DL (ref 3.4–4.8)
ALBUMIN/GLOB SERPL: 0.9 RATIO (ref 1.1–2)
ALP SERPL-CCNC: 331 UNIT/L (ref 40–150)
ALT SERPL-CCNC: 69 UNIT/L (ref 0–55)
AST SERPL-CCNC: 49 UNIT/L (ref 5–34)
BASOPHILS # BLD AUTO: 0.06 X10(3)/MCL (ref 0–0.2)
BASOPHILS NFR BLD AUTO: 1 %
BILIRUBIN DIRECT+TOT PNL SERPL-MCNC: 11.5 MG/DL
BUN SERPL-MCNC: 16.2 MG/DL (ref 8.4–25.7)
CALCIUM SERPL-MCNC: 9.4 MG/DL (ref 8.8–10)
CHLORIDE SERPL-SCNC: 110 MMOL/L (ref 98–107)
CO2 SERPL-SCNC: 22 MMOL/L (ref 23–31)
CREAT SERPL-MCNC: 0.83 MG/DL (ref 0.73–1.18)
EOSINOPHIL # BLD AUTO: 0.35 X10(3)/MCL (ref 0–0.9)
EOSINOPHIL NFR BLD AUTO: 5.6 %
ERYTHROCYTE [DISTWIDTH] IN BLOOD BY AUTOMATED COUNT: 21.8 % (ref 11.6–14.4)
GFR SERPLBLD CREATININE-BSD FMLA CKD-EPI: 88 MLS/MIN/1.73/M2
GLOBULIN SER-MCNC: 2.8 GM/DL (ref 2.4–3.5)
GLUCOSE SERPL-MCNC: 92 MG/DL (ref 82–115)
HCT VFR BLD AUTO: 31.8 % (ref 42–52)
HGB BLD-MCNC: 10.2 GM/DL (ref 14–18)
IMM GRANULOCYTES # BLD AUTO: 0.04 X10(3)/MCL (ref 0–0.04)
IMM GRANULOCYTES NFR BLD AUTO: 0.6 %
LYMPHOCYTES # BLD AUTO: 1.88 X10(3)/MCL (ref 0.6–4.6)
LYMPHOCYTES NFR BLD AUTO: 30.1 %
MAGNESIUM SERPL-MCNC: 2.1 MG/DL (ref 1.6–2.6)
MCH RBC QN AUTO: 28.2 PG
MCHC RBC AUTO-ENTMCNC: 32.1 MG/DL (ref 33–36)
MCV RBC AUTO: 87.8 FL (ref 80–94)
MONOCYTES # BLD AUTO: 0.73 X10(3)/MCL (ref 0.1–1.3)
MONOCYTES NFR BLD AUTO: 11.7 %
NEUTROPHILS # BLD AUTO: 3.18 X10(3)/MCL (ref 2.1–9.2)
NEUTROPHILS NFR BLD AUTO: 51 %
NRBC BLD AUTO-RTO: 0 % (ref 0–1)
PHOSPHATE SERPL-MCNC: 3.5 MG/DL (ref 2.3–4.7)
PLATELET # BLD AUTO: 247 X10(3)/MCL (ref 140–371)
PMV BLD AUTO: 10.9 FL (ref 9.4–12.4)
POTASSIUM SERPL-SCNC: 4.5 MMOL/L (ref 3.5–5.1)
PROT SERPL-MCNC: 5.3 GM/DL (ref 5.8–7.6)
RBC # BLD AUTO: 3.62 X10(6)/MCL (ref 4.7–6.1)
SODIUM SERPL-SCNC: 137 MMOL/L (ref 136–145)
WBC # SPEC AUTO: 6.2 X10(3)/MCL (ref 4.5–11.5)

## 2023-01-06 PROCEDURE — 63600175 PHARM REV CODE 636 W HCPCS: Performed by: INTERNAL MEDICINE

## 2023-01-06 PROCEDURE — C9113 INJ PANTOPRAZOLE SODIUM, VIA: HCPCS | Performed by: INTERNAL MEDICINE

## 2023-01-06 PROCEDURE — 85025 COMPLETE CBC W/AUTO DIFF WBC: CPT | Performed by: INTERNAL MEDICINE

## 2023-01-06 PROCEDURE — 25000003 PHARM REV CODE 250: Performed by: NURSE PRACTITIONER

## 2023-01-06 PROCEDURE — 80053 COMPREHEN METABOLIC PANEL: CPT | Performed by: INTERNAL MEDICINE

## 2023-01-06 PROCEDURE — 25000003 PHARM REV CODE 250: Performed by: INTERNAL MEDICINE

## 2023-01-06 PROCEDURE — 11000001 HC ACUTE MED/SURG PRIVATE ROOM

## 2023-01-06 PROCEDURE — 63600175 PHARM REV CODE 636 W HCPCS: Performed by: RADIOLOGY

## 2023-01-06 PROCEDURE — 36415 COLL VENOUS BLD VENIPUNCTURE: CPT | Performed by: INTERNAL MEDICINE

## 2023-01-06 PROCEDURE — 83735 ASSAY OF MAGNESIUM: CPT | Performed by: INTERNAL MEDICINE

## 2023-01-06 PROCEDURE — 25000003 PHARM REV CODE 250: Performed by: RADIOLOGY

## 2023-01-06 PROCEDURE — 25500020 PHARM REV CODE 255: Performed by: RADIOLOGY

## 2023-01-06 PROCEDURE — 84100 ASSAY OF PHOSPHORUS: CPT | Performed by: INTERNAL MEDICINE

## 2023-01-06 RX ORDER — HYDRALAZINE HYDROCHLORIDE 20 MG/ML
10 INJECTION INTRAMUSCULAR; INTRAVENOUS EVERY 4 HOURS PRN
Status: DISCONTINUED | OUTPATIENT
Start: 2023-01-06 | End: 2023-01-07 | Stop reason: HOSPADM

## 2023-01-06 RX ORDER — FENTANYL CITRATE 50 UG/ML
INJECTION, SOLUTION INTRAMUSCULAR; INTRAVENOUS
Status: COMPLETED | OUTPATIENT
Start: 2023-01-06 | End: 2023-01-06

## 2023-01-06 RX ORDER — LIDOCAINE HYDROCHLORIDE 20 MG/ML
INJECTION, SOLUTION INFILTRATION; PERINEURAL
Status: COMPLETED | OUTPATIENT
Start: 2023-01-06 | End: 2023-01-06

## 2023-01-06 RX ORDER — MIDAZOLAM HYDROCHLORIDE 1 MG/ML
INJECTION INTRAMUSCULAR; INTRAVENOUS
Status: COMPLETED | OUTPATIENT
Start: 2023-01-06 | End: 2023-01-06

## 2023-01-06 RX ADMIN — ACETAMINOPHEN 650 MG: 325 TABLET, FILM COATED ORAL at 06:01

## 2023-01-06 RX ADMIN — FENTANYL CITRATE 50 MCG: 50 INJECTION, SOLUTION INTRAMUSCULAR; INTRAVENOUS at 08:01

## 2023-01-06 RX ADMIN — PANTOPRAZOLE SODIUM 40 MG: 40 INJECTION, POWDER, FOR SOLUTION INTRAVENOUS at 09:01

## 2023-01-06 RX ADMIN — TAMSULOSIN HYDROCHLORIDE 0.4 MG: 0.4 CAPSULE ORAL at 09:01

## 2023-01-06 RX ADMIN — MIDAZOLAM HYDROCHLORIDE 1 MG: 1 INJECTION, SOLUTION INTRAMUSCULAR; INTRAVENOUS at 08:01

## 2023-01-06 RX ADMIN — LIDOCAINE HYDROCHLORIDE 5 ML: 20 INJECTION, SOLUTION INFILTRATION; PERINEURAL at 08:01

## 2023-01-06 RX ADMIN — IOPAMIDOL 20 ML: 755 INJECTION, SOLUTION INTRAVENOUS at 08:01

## 2023-01-06 RX ADMIN — ACETAMINOPHEN 650 MG: 325 TABLET, FILM COATED ORAL at 10:01

## 2023-01-06 NOTE — PROGRESS NOTES
Inpatient Nutrition Assessment    Admit Date: 12/31/2022   Total duration of encounter: 6 days     Nutrition Recommendation/Prescription     - Advance diet as tolerated per MD. Goal Diet: Low Residue Diet.   - Medical management of nausea per MD.   - Resume PPN if unable to advance diet today. PPN recs:  Clinimix E 4.25/5 @ a rate of 100 mL/hr with IVPB IL 20% 250 mL twice per week.  - Monitor wt and labs.     Communication of Recommendations: reviewed with nurse    Nutrition Assessment     Malnutrition Assessment/Nutrition-Focused Physical Exam    Malnutrition in the context of chronic illness  Degree of Malnutrition: unable to complete  Energy Intake: unable to obtain  Interpretation of Weight Loss: unable to obtain  Body Fat: unable to obtain  Area of Body Fat Loss: unable to obtain  Muscle Mass Loss: unable to obtain  Area of Muscle Mass Loss: unable to obtain  Fluid Accumulation: unable to obtain  Edema: unable to obtain  Reduced  Strength: unable to obtain  A minimum of two characteristics is recommended for diagnosis of either severe or non-severe malnutrition.    Chart Review    Reason Seen: continuous nutrition monitoring and follow-up    Malnutrition Screening Tool Results   Have you recently lost weight without trying?: Yes: 2-13 lbs  Have you been eating poorly because of a decreased appetite?: Yes   MST Score: 2     Diagnosis:  Gastric outlet obstruction due to duodenal cancer/mass   Intractable vomiting d/t above  Obstructive jaundice due to duodenal mass   Duodenal Adenocarcinoma  Generalized Weakness    Relevant Medical History: PAD with intermittent claudication, CAD, Carotid artery disease, HTN, HLD     Nutrition-Related Medications: protonix  Calorie Containing IV Medications: no significant kcals from medications at this time and Clinimix    Nutrition-Related Labs:  1/1/2023: Glu 102, GFR>60  1/6/2023: Glu 92, GFR 88    Diet/PN Order: Diet NPO  Oral Supplement Order: none  Tube Feeding Order:  "none  Appetite/Oral Intake: NPO/NPO  Factors Affecting Nutritional Intake: NPO  Food/Orthodoxy/Cultural Preferences: none reported  Food Allergies: no known food allergies    Skin Integrity: drain/device(s)  Wound(s):   none noted     Comments    1/3/2023: Pt asleep during rounds; PPN running as ordered. Pt has been on clear liquids. Pt reporting unable to eat 2/2 nausea per NP note; plans for whipple.     1/6/2023: Pt NPO; PPN stopped this morning per EMR. Pt with CBD obstruction.     Anthropometrics    Height: 5' 9" (175.3 cm)    Last Weight: 75.8 kg (167 lb) (12/31/22 0602) Weight Method: Bed Scale  BMI (Calculated): 24.7  BMI Classification: normal (BMI 18.5-24.9)        Ideal Body Weight (IBW), Male: 160 lb     % Ideal Body Weight, Male (lb): 104.38 %                          Usual Weight Provided By: EMR weight history    Wt Readings from Last 5 Encounters:   12/31/22 75.8 kg (167 lb)   12/27/22 79.8 kg (176 lb)     Weight Change(s) Since Admission:  Admit Weight: 75.8 kg (167 lb) (12/31/22 0602)  1/6/2023: no new wt noted     Estimated Needs    Weight Used For Calorie Calculations: 75.8 kg (167 lb 1.7 oz)  Energy Calorie Requirements (kcal): 1889 kcal (MSJ x 1.3 SF)  Energy Need Method: [place]  Weight Used For Protein Calculations: 75.8 kg (167 lb 1.7 oz)  Protein Requirements: 114 gm (1.5 g/kg)  Fluid Requirements (mL): 2274 mL (30 mL/kg)  Temp: 98.1 °F (36.7 °C)       Enteral Nutrition    Patient not receiving enteral nutrition at this time.    Parenteral Nutrition    Standard Formula: Clinimix E 4.25/5 (stopped this am)  Custom Formula: not applicable  Additives: none  Rate/Volume: 50 mL/hr  Lipids: none  Total Nutrition Provided by Parenteral Nutrition:  Calories Provided  408 kcal/d, 22% needs   Protein Provided  51 g/d, 45% needs   Dextrose Provided  60 g/d, GIR 0.55 mg CHO/kg/min   Fluid Provided  1200 ml/d, 53% needs       Evaluation of Received Nutrient Intake    Calories: not meeting " estimated needs  Protein: not meeting estimated needs    Patient Education    Not applicable.    Nutrition Diagnosis     PES: Inadequate energy intake related to current condition as evidenced by NPO/Clear Liquid since admit with PPN meeting <50% of est needs. (continues)    Interventions/Goals     Intervention(s): general/healthful diet, modified composition of parenteral nutrition, modified rate of parenteral nutrition, and collaboration with other providers  Goal: Meet greater than 75% of nutritional needs by follow-up. (goal not met)    Monitoring & Evaluation     Dietitian will monitor energy intake and weight.  Nutrition Risk/Follow-Up: high (follow-up in 1-4 days)   Please consult if re-assessment needed sooner.

## 2023-01-06 NOTE — PROGRESS NOTES
[facility]  Hospital Medicine Progress Note        Chief Complaint: Inpatient Follow-up for Nausea, vomiting and duodenal cancer      HPI:   Quincy Triplett is a 82 y.o. 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 He denied hematuria, hematochezia, hematemesis, abdominal pain.  Reports that can not tolerate oral intake due to nausea and vomiting.  He states he was diagnosed with duodenal adenocarcinoma x2 months ago and symptoms have been progressively worsening since then.  He is awaiting Whipple procedure by Dr. Abdirahman Brown. Initial ED VS stable.  Labs notable for hemoglobin 10.6, hematocrit 32.6, glucose 162, alk-phos 526, albumin 2.8, total bili 16.2, direct bili 10.8, AST 84, .  BNP and troponin normal.  Lactic acid normal.  Flu and COVID negative.  CXR negative.  He was given IV fluids in the ED. Admitted to hospital medicine services for further workup and management of care.        Patient currently admitted for gastric outlet obstruction due to duodenal mass/cancer, obstructive jaundice due to duodenal cancer.  Patient has a Whipple procedure planned with surgical Oncology.CT of the chest abdomen and pelvis reviewed shows biliary and pancreatic ductal dilatation.  He is status post percutaneous transhepatic cholangiogram with placement of an  external drain, by IR.         Interval Hx:   Patient seen and examined at bedside.    Patient underwent internal / external drain placement by IR today.    Patient currently in pain but refusing pain meds  Will trend labs in the morning   Blood pressure slightly elevated likely due to ongoing pain            Objective/physical exam:  General: In no acute distress, + yellow tinge of  the skin on face and trunk   Chest: Clear to auscultation bilaterally  Heart: RRR, +S1, S2, no appreciable murmur  Abdomen: Soft, nontender, BS +  MSK: Warm, no lower extremity edema, no clubbing or cyanosis  Neurologic: Alert and oriented x4, Cranial nerve II-XII intact, Strength 5/5 in all 4 extremities        Assessment/Plan:  Common bile duct obstruction status post external drain placement by IR  - s/p PTHC  Gastric outlet obstruction due to duodenal cancer/mass   Intractable vomiting d/t above  Obstructive jaundice due to duodenal mass   Duodenal Adenocarcinoma  Generalized Weakness  Hx of PAD with intermittent claudication, CAD, Carotid artery disease, HTN, HLD      Plan:  Patient underwent internal / external drain placement by IR today.    Will trend labs in the morning   Blood pressure slightly elevated likely due to ongoing pain   status post percutaneous transhepatic cholangiogram with placement of an  external drain, by IR.   Currently on liquid diet and tolerating it   CT of the chest abdomen and pelvis reviewed shows biliary and pancreatic ductal dilatation.     Follow-up with surgical Oncology - whipples on 1/16  IV PPI , IV Zofran prn  D/c  iv clinimix         VTE prophylaxis: Lovenox     Patient condition:  Fair     Anticipated discharge and Disposition:       VITAL SIGNS: 24 HRS MIN & MAX LAST   Temp  Min: 97.7 °F (36.5 °C)  Max: 98.3 °F (36.8 °C) 97.7 °F (36.5 °C)   BP  Min: 107/62  Max: 172/98 138/73   Pulse  Min: 56  Max: 65  60   Resp  Min: 15  Max: 25 19   SpO2  Min: 96 %  Max: 100 % 97 %       Recent Labs   Lab 12/31/22  0821 01/01/23  0407 01/06/23  0445   WBC 8.0 7.6 6.2   RBC 3.79* 3.56* 3.62*   HGB 10.6* 9.9* 10.2*   HCT 32.6* 30.9* 31.8*   MCV 86.0 86.8 87.8   MCH 28.0 27.8 28.2   MCHC 32.5* 32.0* 32.1*   RDW 21.8* 21.7* 21.8*    274 247   MPV 10.7 11.1 10.9       Recent Labs   Lab 12/31/22  0708 01/01/23  0407 01/06/23  0445    141 137   K 4.2 4.2 4.5   CO2 24 25 22*   BUN  22.0 24.3 16.2   CREATININE 1.16 0.89 0.83   CALCIUM 9.9 9.2 9.4   MG 2.30  --  2.10   ALBUMIN 2.8* 2.4* 2.5*   ALKPHOS 526* 432* 331*   * 108* 69*   AST 84* 67* 49*   BILITOT 16.2* 14.8* 11.5*          Microbiology Results (last 7 days)       ** No results found for the last 168 hours. **             See below for Radiology    Scheduled Med:   pantoprazole  40 mg Intravenous Daily    tamsulosin  0.4 mg Oral Daily        Continuous Infusions:       PRN Meds:  acetaminophen, acetaminophen, hydrALAZINE, HYDROmorphone, ondansetron     VTE prophylaxis:     Patient condition:  Stable/Fair/Guarded/ Serious/ Critical    Anticipated discharge and Disposition:         All diagnosis and differential diagnosis have been reviewed; assessment and plan has been documented; I have personally reviewed the labs and test results that are presently available; I have reviewed the patients medication list; I have reviewed the consulting providers response and recommendations. I have reviewed or attempted to review medical records based upon their availability    All of the patient's questions have been  addressed and answered. Patient's is agreeable to the above stated plan. I will continue to monitor closely and make adjustments to medical management as needed.  _____________________________________________________________________    Nutrition Status:    Radiology:  US Guided Needle Placement  Narrative: EXAMINATION:  Ultrasound guidance    Fluoroscopic guidance    Percutaneous transhepatic cholangiogram    External biliary drain placement    Post placement cholangiogram    CLINICAL HISTORY:  Obstruction requiring biliary drainage.    Attending: Kodi Ambrocio    Anesthesia: General anesthesia was provided by the anesthesiology department.    TECHNIQUE:  After the risks, benefits and alternatives were discussed, consent was obtained. A time out was performed to verify the patient's identity and procedure.    The patient was  "placed supine on the angiographic table. The abdomen was cleaned and prepped in normal sterile fashion. A right access site was identified using fluoroscopy. Subcutaneous tissues were anesthetized with lidocaine solution. A dermatotomy was performed with an #11 blade scalpel.    Using a 21 gauge Chiba needle an mid axillary approach with dilute contrast was performed into the liver parenchyma. Realtime ultrasound was used for needle guidance and a static image was obtained.  Once access was obtained into an intrahepatic duct an 0.018" wire was advanced into the intrahepatic ducts.    Over the wire, an accustick system was advanced and the inner obturators were removed.    Contrast identified the common bile duct.  Multiple attempts were made for passage into the duodenum, however complete obstruction was noted.    The Glidewire was exchanged for an Amplatz wire through a hockey-stick catheter.  A ten British external drainage catheter was placed without complication.    Post placement cholangiogram demonstrated appropriate location.    Catheter was sutured to the skin using 0 monofilament. The patient tolerated the procedure well. There were no immediate complications.    All needle, wire and catheter manipulations were done under fluoroscopic guidance.    EBL: < 10mL    Fluoro Time (min): 5.3    Fluoro Dose (mGy): 45.1  Impression: Successful placement of biliary drainage catheter: 10 British external biliary drainage catheter.    Plan:    Patient should return in 24-48 hours for possible crossing of the common bile duct.    I, Kodi Ambrocio, was present for the entire procedure.    Electronically signed by: Kodi Ambrocio  Date:    01/04/2023  Time:    14:14  IR Biliary Drain Internal/External  Narrative: EXAMINATION:  Ultrasound guidance    Fluoroscopic guidance    Percutaneous transhepatic cholangiogram    External biliary drain placement    Post placement cholangiogram    CLINICAL HISTORY:  Obstruction requiring " "biliary drainage.    Attending: Kodi Ambrocio    Anesthesia: General anesthesia was provided by the anesthesiology department.    TECHNIQUE:  After the risks, benefits and alternatives were discussed, consent was obtained. A time out was performed to verify the patient's identity and procedure.    The patient was placed supine on the angiographic table. The abdomen was cleaned and prepped in normal sterile fashion. A right access site was identified using fluoroscopy. Subcutaneous tissues were anesthetized with lidocaine solution. A dermatotomy was performed with an #11 blade scalpel.    Using a 21 gauge Chiba needle an mid axillary approach with dilute contrast was performed into the liver parenchyma. Realtime ultrasound was used for needle guidance and a static image was obtained.  Once access was obtained into an intrahepatic duct an 0.018" wire was advanced into the intrahepatic ducts.    Over the wire, an accustick system was advanced and the inner obturators were removed.    Contrast identified the common bile duct.  Multiple attempts were made for passage into the duodenum, however complete obstruction was noted.    The Glidewire was exchanged for an Amplatz wire through a hockey-stick catheter.  A ten Persian external drainage catheter was placed without complication.    Post placement cholangiogram demonstrated appropriate location.    Catheter was sutured to the skin using 0 monofilament. The patient tolerated the procedure well. There were no immediate complications.    All needle, wire and catheter manipulations were done under fluoroscopic guidance.    EBL: < 10mL    Fluoro Time (min): 5.3    Fluoro Dose (mGy): 45.1  Impression: Successful placement of biliary drainage catheter: 10 Persian external biliary drainage catheter.    Plan:    Patient should return in 24-48 hours for possible crossing of the common bile duct.    I, Kodi Ambrocio, was present for the entire procedure.    Electronically signed " by: Kodi Ambrocio  Date:    01/04/2023  Time:    14:14      John Ward MD   01/06/2023

## 2023-01-06 NOTE — OP NOTE
Radiology Post-Procedure Note    Pre Op Diagnosis: CBD Obstruction    Post Op Diagnosis:  As Above    Procedure:  Attempted Crossing    Procedure performed by: Kodi Ambrocio M.D.    Written Informed Consent Obtained: Yes    Specimen Removed: NO    Estimated Blood Loss: Minimal    Findings:   Highly stenosed or completely occluded. Unable to Cross to bowel.     Patient tolerated procedure well.    Kodi Ambrocio MD

## 2023-01-06 NOTE — PLAN OF CARE
Discharge planning discussed with patient. FOC obtained for [facility]. Referral sent via care port.

## 2023-01-07 VITALS
TEMPERATURE: 98 F | SYSTOLIC BLOOD PRESSURE: 127 MMHG | BODY MASS INDEX: 24.73 KG/M2 | DIASTOLIC BLOOD PRESSURE: 77 MMHG | WEIGHT: 167 LBS | RESPIRATION RATE: 20 BRPM | HEART RATE: 67 BPM | OXYGEN SATURATION: 98 % | HEIGHT: 69 IN

## 2023-01-07 PROBLEM — K31.1 GASTRIC OUTLET OBSTRUCTION: Status: ACTIVE | Noted: 2023-01-07

## 2023-01-07 LAB
ALBUMIN SERPL-MCNC: 2.4 G/DL (ref 3.4–4.8)
ALBUMIN/GLOB SERPL: 0.6 RATIO (ref 1.1–2)
ALP SERPL-CCNC: 323 UNIT/L (ref 40–150)
ALT SERPL-CCNC: 68 UNIT/L (ref 0–55)
AST SERPL-CCNC: 64 UNIT/L (ref 5–34)
BILIRUBIN DIRECT+TOT PNL SERPL-MCNC: 11.3 MG/DL
BUN SERPL-MCNC: 16.6 MG/DL (ref 8.4–25.7)
CALCIUM SERPL-MCNC: 9.3 MG/DL (ref 8.8–10)
CHLORIDE SERPL-SCNC: 109 MMOL/L (ref 98–107)
CO2 SERPL-SCNC: 19 MMOL/L (ref 23–31)
CREAT SERPL-MCNC: 0.77 MG/DL (ref 0.73–1.18)
GFR SERPLBLD CREATININE-BSD FMLA CKD-EPI: 90 MLS/MIN/1.73/M2
GLOBULIN SER-MCNC: 3.7 GM/DL (ref 2.4–3.5)
GLUCOSE SERPL-MCNC: 92 MG/DL (ref 82–115)
POTASSIUM SERPL-SCNC: 4.7 MMOL/L (ref 3.5–5.1)
PROT SERPL-MCNC: 6.1 GM/DL (ref 5.8–7.6)
SODIUM SERPL-SCNC: 135 MMOL/L (ref 136–145)

## 2023-01-07 PROCEDURE — 25000003 PHARM REV CODE 250: Performed by: INTERNAL MEDICINE

## 2023-01-07 PROCEDURE — 25000003 PHARM REV CODE 250: Performed by: NURSE PRACTITIONER

## 2023-01-07 PROCEDURE — 63600175 PHARM REV CODE 636 W HCPCS: Performed by: INTERNAL MEDICINE

## 2023-01-07 PROCEDURE — 36415 COLL VENOUS BLD VENIPUNCTURE: CPT | Performed by: INTERNAL MEDICINE

## 2023-01-07 PROCEDURE — C9113 INJ PANTOPRAZOLE SODIUM, VIA: HCPCS | Performed by: INTERNAL MEDICINE

## 2023-01-07 PROCEDURE — 80053 COMPREHEN METABOLIC PANEL: CPT | Performed by: INTERNAL MEDICINE

## 2023-01-07 RX ORDER — ONDANSETRON 4 MG/1
4 TABLET, FILM COATED ORAL EVERY 6 HOURS PRN
Qty: 30 TABLET | Refills: 0 | Status: ON HOLD | OUTPATIENT
Start: 2023-01-07 | End: 2023-01-25 | Stop reason: HOSPADM

## 2023-01-07 RX ADMIN — TAMSULOSIN HYDROCHLORIDE 0.4 MG: 0.4 CAPSULE ORAL at 08:01

## 2023-01-07 RX ADMIN — PANTOPRAZOLE SODIUM 40 MG: 40 INJECTION, POWDER, FOR SOLUTION INTRAVENOUS at 08:01

## 2023-01-07 RX ADMIN — ACETAMINOPHEN 650 MG: 325 TABLET, FILM COATED ORAL at 04:01

## 2023-01-07 NOTE — NURSING
DC note: Patient educated on biliary drain care at home, when to call MD, N/V. NAD noted. All questions answered.

## 2023-01-07 NOTE — PLAN OF CARE
Problem: Adult Inpatient Plan of Care  Goal: Plan of Care Review  Outcome: Ongoing, Not Progressing  Goal: Patient-Specific Goal (Individualized)  Outcome: Ongoing, Not Progressing  Goal: Absence of Hospital-Acquired Illness or Injury  Outcome: Ongoing, Not Progressing  Goal: Optimal Comfort and Wellbeing  Outcome: Ongoing, Not Progressing  Goal: Readiness for Transition of Care  Outcome: Ongoing, Not Progressing     Problem: Pain Acute  Goal: Acceptable Pain Control and Functional Ability  Outcome: Ongoing, Not Progressing     Problem: Infection  Goal: Absence of Infection Signs and Symptoms  Outcome: Ongoing, Not Progressing

## 2023-01-09 ENCOUNTER — PATIENT OUTREACH (OUTPATIENT)
Dept: ADMINISTRATIVE | Facility: CLINIC | Age: 83
End: 2023-01-09
Payer: MEDICARE

## 2023-01-09 NOTE — PROGRESS NOTES
C3 nurse spoke with Quincy Triplett for a TCC post hospital discharge follow up call. The patient does not have a scheduled HOSFU appointment with Reji Waters Jr, MD within 5-7 days post hospital discharge date 1/7/23. C3 nurse was unable to schedule HOSFU appointment in [facility].  Patient states he has a whipple procedure scheduled for 1/16/23 and will not be seeing a doctor before then. Patient provided with OOC number if needed.

## 2023-01-11 ENCOUNTER — HOSPITAL ENCOUNTER (INPATIENT)
Facility: HOSPITAL | Age: 83
LOS: 29 days | Discharge: HOME HEALTH | DRG: 682 | End: 2023-02-10
Attending: STUDENT IN AN ORGANIZED HEALTH CARE EDUCATION/TRAINING PROGRAM | Admitting: INTERNAL MEDICINE
Payer: MEDICARE

## 2023-01-11 DIAGNOSIS — R06.02 SOB (SHORTNESS OF BREATH): ICD-10-CM

## 2023-01-11 DIAGNOSIS — E86.0 DEHYDRATION: ICD-10-CM

## 2023-01-11 DIAGNOSIS — R53.1 GENERALIZED WEAKNESS: Primary | ICD-10-CM

## 2023-01-11 DIAGNOSIS — U07.1 COVID-19: ICD-10-CM

## 2023-01-11 DIAGNOSIS — D62 ACUTE BLOOD LOSS ANEMIA: ICD-10-CM

## 2023-01-11 DIAGNOSIS — R60.9 EDEMA: ICD-10-CM

## 2023-01-11 DIAGNOSIS — N17.9 AKI (ACUTE KIDNEY INJURY): ICD-10-CM

## 2023-01-11 DIAGNOSIS — K31.1 GASTRIC OUTLET OBSTRUCTION: ICD-10-CM

## 2023-01-11 DIAGNOSIS — E87.5 HYPERKALEMIA: ICD-10-CM

## 2023-01-11 PROCEDURE — 99291 CRITICAL CARE FIRST HOUR: CPT | Mod: 25

## 2023-01-11 PROCEDURE — 96374 THER/PROPH/DIAG INJ IV PUSH: CPT

## 2023-01-11 PROCEDURE — 96361 HYDRATE IV INFUSION ADD-ON: CPT

## 2023-01-12 LAB
ALBUMIN SERPL-MCNC: 2.6 G/DL (ref 3.4–4.8)
ALBUMIN SERPL-MCNC: 3.4 G/DL (ref 3.4–4.8)
ALBUMIN/GLOB SERPL: 0.8 RATIO (ref 1.1–2)
ALBUMIN/GLOB SERPL: 0.9 RATIO (ref 1.1–2)
ALP SERPL-CCNC: 282 UNIT/L (ref 40–150)
ALP SERPL-CCNC: 376 UNIT/L (ref 40–150)
ALT SERPL-CCNC: 101 UNIT/L (ref 0–55)
ALT SERPL-CCNC: 80 UNIT/L (ref 0–55)
ANION GAP SERPL CALC-SCNC: 8 MEQ/L
APPEARANCE UR: ABNORMAL
AST SERPL-CCNC: 57 UNIT/L (ref 5–34)
AST SERPL-CCNC: 76 UNIT/L (ref 5–34)
BACTERIA #/AREA URNS AUTO: ABNORMAL /HPF
BASOPHILS # BLD AUTO: 0.04 X10(3)/MCL (ref 0–0.2)
BASOPHILS # BLD AUTO: 0.06 X10(3)/MCL (ref 0–0.2)
BASOPHILS NFR BLD AUTO: 0.5 %
BASOPHILS NFR BLD AUTO: 0.6 %
BILIRUB UR QL STRIP.AUTO: ABNORMAL MG/DL
BILIRUBIN DIRECT+TOT PNL SERPL-MCNC: 13.7 MG/DL
BILIRUBIN DIRECT+TOT PNL SERPL-MCNC: 17.9 MG/DL
BUN SERPL-MCNC: 42 MG/DL (ref 8.4–25.7)
BUN SERPL-MCNC: 42.3 MG/DL (ref 8.4–25.7)
BUN SERPL-MCNC: 47.4 MG/DL (ref 8.4–25.7)
CALCIUM SERPL-MCNC: 10.9 MG/DL (ref 8.8–10)
CALCIUM SERPL-MCNC: 9.3 MG/DL (ref 8.8–10)
CALCIUM SERPL-MCNC: 9.5 MG/DL (ref 8.8–10)
CHLORIDE SERPL-SCNC: 104 MMOL/L (ref 98–107)
CHLORIDE SERPL-SCNC: 108 MMOL/L (ref 98–107)
CHLORIDE SERPL-SCNC: 114 MMOL/L (ref 98–107)
CO2 SERPL-SCNC: 11 MMOL/L (ref 23–31)
CO2 SERPL-SCNC: 11 MMOL/L (ref 23–31)
CO2 SERPL-SCNC: 9 MMOL/L (ref 23–31)
COLOR UR AUTO: ABNORMAL
CREAT SERPL-MCNC: 1.81 MG/DL (ref 0.73–1.18)
CREAT SERPL-MCNC: 2.16 MG/DL (ref 0.73–1.18)
CREAT SERPL-MCNC: 2.79 MG/DL (ref 0.73–1.18)
CREAT UR-MCNC: 122.9 MG/DL (ref 63–166)
CREAT/UREA NIT SERPL: 26
CRP SERPL-MCNC: 2.1 MG/L
D DIMER PPP IA.FEU-MCNC: 0.74 UG/ML FEU (ref 0–0.5)
EOSINOPHIL # BLD AUTO: 0.05 X10(3)/MCL (ref 0–0.9)
EOSINOPHIL # BLD AUTO: 0.06 X10(3)/MCL (ref 0–0.9)
EOSINOPHIL NFR BLD AUTO: 0.6 %
EOSINOPHIL NFR BLD AUTO: 0.6 %
ERYTHROCYTE [DISTWIDTH] IN BLOOD BY AUTOMATED COUNT: 19.8 % (ref 11.5–17)
ERYTHROCYTE [DISTWIDTH] IN BLOOD BY AUTOMATED COUNT: 20 % (ref 11.5–17)
ERYTHROCYTE [SEDIMENTATION RATE] IN BLOOD: 45 MM/HR (ref 0–15)
FERRITIN SERPL-MCNC: 432.97 NG/ML (ref 21.81–274.66)
GFR SERPLBLD CREATININE-BSD FMLA CKD-EPI: 22 MLS/MIN/1.73/M2
GFR SERPLBLD CREATININE-BSD FMLA CKD-EPI: 30 MLS/MIN/1.73/M2
GFR SERPLBLD CREATININE-BSD FMLA CKD-EPI: 37 MLS/MIN/1.73/M2
GLOBULIN SER-MCNC: 3 GM/DL (ref 2.4–3.5)
GLOBULIN SER-MCNC: 4.1 GM/DL (ref 2.4–3.5)
GLUCOSE SERPL-MCNC: 105 MG/DL (ref 82–115)
GLUCOSE SERPL-MCNC: 119 MG/DL (ref 82–115)
GLUCOSE SERPL-MCNC: 93 MG/DL (ref 82–115)
GLUCOSE UR QL STRIP.AUTO: NEGATIVE MG/DL
HCT VFR BLD AUTO: 35.6 % (ref 42–52)
HCT VFR BLD AUTO: 43.7 % (ref 42–52)
HGB BLD-MCNC: 11.6 GM/DL (ref 14–18)
HGB BLD-MCNC: 14.2 GM/DL (ref 14–18)
IMM GRANULOCYTES # BLD AUTO: 0.05 X10(3)/MCL (ref 0–0.04)
IMM GRANULOCYTES # BLD AUTO: 0.07 X10(3)/MCL (ref 0–0.04)
IMM GRANULOCYTES NFR BLD AUTO: 0.6 %
IMM GRANULOCYTES NFR BLD AUTO: 0.7 %
INR BLD: 1.07 (ref 0–1.3)
KETONES UR QL STRIP.AUTO: NEGATIVE MG/DL
LACTATE SERPL-SCNC: 0.9 MMOL/L (ref 0.5–2.2)
LDH SERPL-CCNC: 143 U/L (ref 125–220)
LEUKOCYTE ESTERASE UR QL STRIP.AUTO: ABNORMAL UNIT/L
LIPASE SERPL-CCNC: 85 U/L
LYMPHOCYTES # BLD AUTO: 1.42 X10(3)/MCL (ref 0.6–4.6)
LYMPHOCYTES # BLD AUTO: 1.68 X10(3)/MCL (ref 0.6–4.6)
LYMPHOCYTES NFR BLD AUTO: 17.6 %
LYMPHOCYTES NFR BLD AUTO: 17.9 %
MAGNESIUM SERPL-MCNC: 2.2 MG/DL (ref 1.6–2.6)
MAGNESIUM SERPL-MCNC: 2.5 MG/DL (ref 1.6–2.6)
MCH RBC QN AUTO: 29 PG
MCH RBC QN AUTO: 29 PG
MCHC RBC AUTO-ENTMCNC: 32.5 MG/DL (ref 33–36)
MCHC RBC AUTO-ENTMCNC: 32.6 MG/DL (ref 33–36)
MCV RBC AUTO: 89 FL (ref 80–94)
MCV RBC AUTO: 89.4 FL (ref 80–94)
MONOCYTES # BLD AUTO: 0.68 X10(3)/MCL (ref 0.1–1.3)
MONOCYTES # BLD AUTO: 0.73 X10(3)/MCL (ref 0.1–1.3)
MONOCYTES NFR BLD AUTO: 7.7 %
MONOCYTES NFR BLD AUTO: 8.6 %
NEUTROPHILS # BLD AUTO: 5.69 X10(3)/MCL (ref 2.1–9.2)
NEUTROPHILS # BLD AUTO: 6.93 X10(3)/MCL (ref 2.1–9.2)
NEUTROPHILS NFR BLD AUTO: 71.8 %
NEUTROPHILS NFR BLD AUTO: 72.8 %
NITRITE UR QL STRIP.AUTO: POSITIVE
NRBC BLD AUTO-RTO: 0 %
NRBC BLD AUTO-RTO: 0 %
OSMOLALITY UR: 532 MOSM/KG (ref 300–1300)
PH UR STRIP.AUTO: 5 [PH]
PHOSPHATE SERPL-MCNC: 5.2 MG/DL (ref 2.3–4.7)
PLATELET # BLD AUTO: 235 X10(3)/MCL (ref 130–400)
PLATELET # BLD AUTO: 316 X10(3)/MCL (ref 130–400)
PMV BLD AUTO: 10.4 FL (ref 7.4–10.4)
PMV BLD AUTO: 9.9 FL (ref 7.4–10.4)
POCT GLUCOSE: 107 MG/DL (ref 70–110)
POCT GLUCOSE: 112 MG/DL (ref 70–110)
POTASSIUM SERPL-SCNC: 4.8 MMOL/L (ref 3.5–5.1)
POTASSIUM SERPL-SCNC: 5.1 MMOL/L (ref 3.5–5.1)
POTASSIUM SERPL-SCNC: 5.6 MMOL/L (ref 3.5–5.1)
PROT SERPL-MCNC: 5.6 GM/DL (ref 5.8–7.6)
PROT SERPL-MCNC: 7.5 GM/DL (ref 5.8–7.6)
PROT UR QL STRIP.AUTO: ABNORMAL MG/DL
PROTHROMBIN TIME: 13.8 SECONDS (ref 12.5–14.5)
RBC # BLD AUTO: 4 X10(6)/MCL (ref 4.7–6.1)
RBC # BLD AUTO: 4.89 X10(6)/MCL (ref 4.7–6.1)
RBC #/AREA URNS AUTO: 7 /HPF
RBC UR QL AUTO: NEGATIVE UNIT/L
SODIUM SERPL-SCNC: 128 MMOL/L (ref 136–145)
SODIUM SERPL-SCNC: 130 MMOL/L (ref 136–145)
SODIUM SERPL-SCNC: 131 MMOL/L (ref 136–145)
SODIUM UR-SCNC: <20 MMOL/L
SP GR UR STRIP.AUTO: 1.02 (ref 1–1.03)
SQUAMOUS #/AREA URNS AUTO: <5 /HPF
TROPONIN I SERPL-MCNC: <0.01 NG/ML (ref 0–0.04)
UROBILINOGEN UR STRIP-ACNC: 0.2 MG/DL
UUN UR-MCNC: 816 MG/DL
WBC # SPEC AUTO: 7.9 X10(3)/MCL (ref 4.5–11.5)
WBC # SPEC AUTO: 9.5 X10(3)/MCL (ref 4.5–11.5)
WBC #/AREA URNS AUTO: <5 /HPF

## 2023-01-12 PROCEDURE — 86140 C-REACTIVE PROTEIN: CPT | Performed by: PHYSICIAN ASSISTANT

## 2023-01-12 PROCEDURE — 82570 ASSAY OF URINE CREATININE: CPT | Performed by: INTERNAL MEDICINE

## 2023-01-12 PROCEDURE — 83735 ASSAY OF MAGNESIUM: CPT | Performed by: NURSE PRACTITIONER

## 2023-01-12 PROCEDURE — 83615 LACTATE (LD) (LDH) ENZYME: CPT | Performed by: PHYSICIAN ASSISTANT

## 2023-01-12 PROCEDURE — 25000003 PHARM REV CODE 250: Performed by: INTERNAL MEDICINE

## 2023-01-12 PROCEDURE — 85610 PROTHROMBIN TIME: CPT | Performed by: STUDENT IN AN ORGANIZED HEALTH CARE EDUCATION/TRAINING PROGRAM

## 2023-01-12 PROCEDURE — 93005 ELECTROCARDIOGRAM TRACING: CPT

## 2023-01-12 PROCEDURE — 0240U COVID/FLU A&B PCR: CPT | Performed by: STUDENT IN AN ORGANIZED HEALTH CARE EDUCATION/TRAINING PROGRAM

## 2023-01-12 PROCEDURE — 82728 ASSAY OF FERRITIN: CPT | Performed by: PHYSICIAN ASSISTANT

## 2023-01-12 PROCEDURE — 36415 COLL VENOUS BLD VENIPUNCTURE: CPT | Performed by: INTERNAL MEDICINE

## 2023-01-12 PROCEDURE — 85025 COMPLETE CBC W/AUTO DIFF WBC: CPT | Performed by: STUDENT IN AN ORGANIZED HEALTH CARE EDUCATION/TRAINING PROGRAM

## 2023-01-12 PROCEDURE — 83690 ASSAY OF LIPASE: CPT | Performed by: STUDENT IN AN ORGANIZED HEALTH CARE EDUCATION/TRAINING PROGRAM

## 2023-01-12 PROCEDURE — 63600175 PHARM REV CODE 636 W HCPCS: Performed by: PHYSICIAN ASSISTANT

## 2023-01-12 PROCEDURE — 84100 ASSAY OF PHOSPHORUS: CPT | Performed by: NURSE PRACTITIONER

## 2023-01-12 PROCEDURE — 25000003 PHARM REV CODE 250: Performed by: PHYSICIAN ASSISTANT

## 2023-01-12 PROCEDURE — 81001 URINALYSIS AUTO W/SCOPE: CPT | Performed by: STUDENT IN AN ORGANIZED HEALTH CARE EDUCATION/TRAINING PROGRAM

## 2023-01-12 PROCEDURE — 83935 ASSAY OF URINE OSMOLALITY: CPT | Performed by: INTERNAL MEDICINE

## 2023-01-12 PROCEDURE — 36415 COLL VENOUS BLD VENIPUNCTURE: CPT | Performed by: NURSE PRACTITIONER

## 2023-01-12 PROCEDURE — 25000003 PHARM REV CODE 250: Performed by: NURSE PRACTITIONER

## 2023-01-12 PROCEDURE — 83735 ASSAY OF MAGNESIUM: CPT | Performed by: STUDENT IN AN ORGANIZED HEALTH CARE EDUCATION/TRAINING PROGRAM

## 2023-01-12 PROCEDURE — 83605 ASSAY OF LACTIC ACID: CPT | Performed by: NURSE PRACTITIONER

## 2023-01-12 PROCEDURE — 25000003 PHARM REV CODE 250: Performed by: STUDENT IN AN ORGANIZED HEALTH CARE EDUCATION/TRAINING PROGRAM

## 2023-01-12 PROCEDURE — 63600175 PHARM REV CODE 636 W HCPCS: Performed by: STUDENT IN AN ORGANIZED HEALTH CARE EDUCATION/TRAINING PROGRAM

## 2023-01-12 PROCEDURE — 85651 RBC SED RATE NONAUTOMATED: CPT | Performed by: PHYSICIAN ASSISTANT

## 2023-01-12 PROCEDURE — 80053 COMPREHEN METABOLIC PANEL: CPT | Performed by: NURSE PRACTITIONER

## 2023-01-12 PROCEDURE — 93010 ELECTROCARDIOGRAM REPORT: CPT | Mod: ,,, | Performed by: STUDENT IN AN ORGANIZED HEALTH CARE EDUCATION/TRAINING PROGRAM

## 2023-01-12 PROCEDURE — 84520 ASSAY OF UREA NITROGEN: CPT | Performed by: INTERNAL MEDICINE

## 2023-01-12 PROCEDURE — 80053 COMPREHEN METABOLIC PANEL: CPT | Performed by: STUDENT IN AN ORGANIZED HEALTH CARE EDUCATION/TRAINING PROGRAM

## 2023-01-12 PROCEDURE — 27000207 HC ISOLATION

## 2023-01-12 PROCEDURE — 85025 COMPLETE CBC W/AUTO DIFF WBC: CPT | Performed by: NURSE PRACTITIONER

## 2023-01-12 PROCEDURE — 84300 ASSAY OF URINE SODIUM: CPT | Performed by: INTERNAL MEDICINE

## 2023-01-12 PROCEDURE — 87077 CULTURE AEROBIC IDENTIFY: CPT | Performed by: PHYSICIAN ASSISTANT

## 2023-01-12 PROCEDURE — 21400001 HC TELEMETRY ROOM

## 2023-01-12 PROCEDURE — 93010 EKG 12-LEAD: ICD-10-PCS | Mod: ,,, | Performed by: STUDENT IN AN ORGANIZED HEALTH CARE EDUCATION/TRAINING PROGRAM

## 2023-01-12 PROCEDURE — 84484 ASSAY OF TROPONIN QUANT: CPT | Performed by: STUDENT IN AN ORGANIZED HEALTH CARE EDUCATION/TRAINING PROGRAM

## 2023-01-12 PROCEDURE — 85379 FIBRIN DEGRADATION QUANT: CPT | Performed by: PHYSICIAN ASSISTANT

## 2023-01-12 RX ORDER — PANTOPRAZOLE SODIUM 40 MG/1
40 TABLET, DELAYED RELEASE ORAL DAILY
Status: DISCONTINUED | OUTPATIENT
Start: 2023-01-13 | End: 2023-01-14

## 2023-01-12 RX ORDER — SODIUM CHLORIDE 9 MG/ML
INJECTION, SOLUTION INTRAVENOUS CONTINUOUS
Status: DISCONTINUED | OUTPATIENT
Start: 2023-01-12 | End: 2023-01-13

## 2023-01-12 RX ORDER — ZINC SULFATE 50(220)MG
220 CAPSULE ORAL DAILY
Status: DISCONTINUED | OUTPATIENT
Start: 2023-01-12 | End: 2023-02-06

## 2023-01-12 RX ORDER — INDOMETHACIN 25 MG/1
50 CAPSULE ORAL
Status: COMPLETED | OUTPATIENT
Start: 2023-01-12 | End: 2023-01-12

## 2023-01-12 RX ORDER — ALBUTEROL SULFATE 90 UG/1
2 AEROSOL, METERED RESPIRATORY (INHALATION) EVERY 6 HOURS PRN
Status: DISCONTINUED | OUTPATIENT
Start: 2023-01-12 | End: 2023-02-10 | Stop reason: HOSPADM

## 2023-01-12 RX ORDER — MECLIZINE HCL 12.5 MG 12.5 MG/1
12.5 TABLET ORAL 3 TIMES DAILY PRN
Status: DISCONTINUED | OUTPATIENT
Start: 2023-01-12 | End: 2023-02-10 | Stop reason: HOSPADM

## 2023-01-12 RX ORDER — ONDANSETRON 4 MG/1
4 TABLET, ORALLY DISINTEGRATING ORAL EVERY 8 HOURS PRN
Status: DISCONTINUED | OUTPATIENT
Start: 2023-01-12 | End: 2023-02-10 | Stop reason: HOSPADM

## 2023-01-12 RX ORDER — TAMSULOSIN HYDROCHLORIDE 0.4 MG/1
0.4 CAPSULE ORAL DAILY
Status: DISCONTINUED | OUTPATIENT
Start: 2023-01-13 | End: 2023-02-10 | Stop reason: HOSPADM

## 2023-01-12 RX ORDER — BISACODYL 10 MG
10 SUPPOSITORY, RECTAL RECTAL DAILY PRN
Status: DISCONTINUED | OUTPATIENT
Start: 2023-01-12 | End: 2023-02-10 | Stop reason: HOSPADM

## 2023-01-12 RX ORDER — ERGOCALCIFEROL 1.25 MG/1
50000 CAPSULE ORAL
Status: DISCONTINUED | OUTPATIENT
Start: 2023-01-12 | End: 2023-02-10 | Stop reason: HOSPADM

## 2023-01-12 RX ORDER — CALCIUM GLUCONATE 20 MG/ML
1 INJECTION, SOLUTION INTRAVENOUS
Status: COMPLETED | OUTPATIENT
Start: 2023-01-12 | End: 2023-01-12

## 2023-01-12 RX ORDER — MORPHINE SULFATE 4 MG/ML
2 INJECTION, SOLUTION INTRAMUSCULAR; INTRAVENOUS EVERY 4 HOURS PRN
Status: DISCONTINUED | OUTPATIENT
Start: 2023-01-12 | End: 2023-01-18

## 2023-01-12 RX ADMIN — INSULIN HUMAN 5 UNITS: 100 INJECTION, SOLUTION PARENTERAL at 02:01

## 2023-01-12 RX ADMIN — SODIUM BICARBONATE: 84 INJECTION, SOLUTION INTRAVENOUS at 10:01

## 2023-01-12 RX ADMIN — ERGOCALCIFEROL 50000 UNITS: 1.25 CAPSULE ORAL at 10:01

## 2023-01-12 RX ADMIN — CALCIUM GLUCONATE 1 G: 20 INJECTION, SOLUTION INTRAVENOUS at 02:01

## 2023-01-12 RX ADMIN — ONDANSETRON 4 MG: 4 TABLET, ORALLY DISINTEGRATING ORAL at 09:01

## 2023-01-12 RX ADMIN — SODIUM CHLORIDE 1000 ML: 9 INJECTION, SOLUTION INTRAVENOUS at 02:01

## 2023-01-12 RX ADMIN — CEFTRIAXONE SODIUM 1 G: 1 INJECTION, POWDER, FOR SOLUTION INTRAMUSCULAR; INTRAVENOUS at 11:01

## 2023-01-12 RX ADMIN — SODIUM CHLORIDE: 9 INJECTION, SOLUTION INTRAVENOUS at 04:01

## 2023-01-12 RX ADMIN — ZINC SULFATE 220 MG (50 MG) CAPSULE 220 MG: CAPSULE at 10:01

## 2023-01-12 RX ADMIN — SODIUM BICARBONATE 50 MEQ: 84 INJECTION, SOLUTION INTRAVENOUS at 02:01

## 2023-01-12 RX ADMIN — SODIUM ZIRCONIUM CYCLOSILICATE 10 G: 10 POWDER, FOR SUSPENSION ORAL at 02:01

## 2023-01-12 RX ADMIN — SODIUM CHLORIDE, POTASSIUM CHLORIDE, SODIUM LACTATE AND CALCIUM CHLORIDE 1000 ML: 600; 310; 30; 20 INJECTION, SOLUTION INTRAVENOUS at 12:01

## 2023-01-12 NOTE — ED PROVIDER NOTES
Encounter Date: 1/11/2023    SCRIBE #1 NOTE: I, Kadi Villarreal, am scribing for, and in the presence of,  Yaya Moe IV, MD. I have scribed the following portions of the note - Other sections scribed: HPI, ROS, PE, MDM.     History     Chief Complaint   Patient presents with    Weakness     Presents with c/o weakness. Onset yesterday.     82 Y.O. male with a history of CAD, HTN, HLD, obstructive jaundice, and duodenal CA presents to the ED for weakness onset yesterday. S/P biliary drain placement (12/21/22) d/c 3 days ago. Also complains of nausea, urine decrease, and falling 1x yesterday. Denies fever, diarrhea, and pain.    Pt's PCP is Reji Waters MD.    See [facility] for chart review.    The history is provided by the patient. No  was used.   Review of patient's allergies indicates:  No Known Allergies  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, unspecified extremity     Coronary artery disease     Dyslipidemia     Hypertension      Past Surgical History:   Procedure Laterality Date    AMPUTATION Right     Right arm    CAROTID STENT      ERCP N/A 12/21/2022    Procedure: ERCP;  Surgeon: Sushil Anderson MD;  Location: [facility] ENDOSCOPY;  Service: Gastroenterology;  Laterality: N/A;  food in abdomen    ERCP, WITH BIOPSY  12/21/2022    Procedure: ERCP, WITH BIOPSY;  Surgeon: Sushil Anderson MD;  Location: [facility] ENDOSCOPY;  Service: Gastroenterology;;    LEFT HEART CATHETERIZATION      TONSILLECTOMY       No family history on file.  Social History     Tobacco Use    Smoking status: Never    Smokeless tobacco: Never   Substance Use Topics    Alcohol use: Never    Drug use: Never     Review of Systems   Constitutional:  Negative for chills and fever.   HENT:  Negative for congestion, rhinorrhea and sore throat.    Eyes:  Negative for visual disturbance.   Respiratory:  Negative for cough and shortness  of breath.    Cardiovascular:  Negative for chest pain.   Gastrointestinal:  Positive for nausea. Negative for abdominal pain and vomiting.   Genitourinary:  Positive for decreased urine volume. Negative for dysuria and hematuria.   Musculoskeletal:  Negative for joint swelling.   Skin:  Negative for rash.   Neurological:  Positive for weakness.   Psychiatric/Behavioral:  Negative for confusion.    All other systems reviewed and are negative.    Physical Exam     Initial Vitals [01/11/23 2320]   BP Pulse Resp Temp SpO2   117/78 85 18 97.8 °F (36.6 °C) 98 %      MAP       --         Physical Exam    Nursing note and vitals reviewed.  Constitutional: He is not diaphoretic. No distress.   HENT:   Head: Normocephalic and atraumatic.   Mouth/Throat: Mucous membranes are dry.   Eyes: EOM are normal. Pupils are equal, round, and reactive to light.   Neck: Neck supple.   Normal range of motion.  Cardiovascular:  Normal rate and regular rhythm.           No murmur heard.  Pulmonary/Chest: Breath sounds normal. No respiratory distress. He has no wheezes. He has no rales.   Abdominal: Abdomen is soft. He exhibits no distension. There is no abdominal tenderness.   Biliary drain to the right upper abdomen w/ biliary drainage. Bandage is clean, dry, and intact.   Musculoskeletal:         General: No edema. Normal range of motion.      Cervical back: Normal range of motion and neck supple.     Neurological: He is alert and oriented to person, place, and time. He has normal strength. No cranial nerve deficit.   Skin: Skin is warm. No rash noted.   Jaundice.   Psychiatric: He has a normal mood and affect.       ED Course   Critical Care    Date/Time: 1/12/2023 3:29 AM  Performed by: Yaya Moe IV, MD  Authorized by: Yaya Moe IV, MD   Total critical care time (exclusive of procedural time) : 45 minutes  Critical care time was exclusive of separately billable procedures and treating other patients.  Critical care was  necessary to treat or prevent imminent or life-threatening deterioration of the following conditions: dehydration, metabolic crisis and renal failure.  Critical care was time spent personally by me on the following activities: blood draw for specimens, development of treatment plan with patient or surrogate, discussions with consultants, evaluation of patient's response to treatment, examination of patient, obtaining history from patient or surrogate, ordering and performing treatments and interventions, ordering and review of laboratory studies, ordering and review of radiographic studies, pulse oximetry, re-evaluation of patient's condition and review of old charts.      Labs Reviewed   COVID/FLU A&B PCR - Abnormal; Notable for the following components:       Result Value    SARS-CoV-2 PCR Detected (*)     All other components within normal limits    Narrative:     The Xpert Xpress SARS-CoV-2/FLU/RSV plus is a rapid, multiplexed real-time PCR test intended for the simultaneous qualitative detection and differentiation of SARS-CoV-2, Influenza A, Influenza B, and respiratory syncytial virus (RSV) viral RNA in either nasopharyngeal swab or nasal swab specimens.         COMPREHENSIVE METABOLIC PANEL - Abnormal; Notable for the following components:    Sodium Level 128 (*)     Potassium Level 5.6 (*)     Carbon Dioxide 11 (*)     Glucose Level 119 (*)     Blood Urea Nitrogen 42.3 (*)     Creatinine 2.79 (*)     Calcium Level Total 10.9 (*)     Globulin 4.1 (*)     Albumin/Globulin Ratio 0.8 (*)     Bilirubin Total 17.9 (*)     Alkaline Phosphatase 376 (*)     Alanine Aminotransferase 101 (*)     Aspartate Aminotransferase 76 (*)     All other components within normal limits   LIPASE - Abnormal; Notable for the following components:    Lipase Level 85 (*)     All other components within normal limits   URINALYSIS, REFLEX TO URINE CULTURE - Abnormal; Notable for the following components:    Appearance, UA Cloudy (*)      Protein, UA 1+ (*)     Bilirubin, UA 3+ (*)     Nitrites, UA Positive (*)     Leukocyte Esterase, UA 1+ (*)     All other components within normal limits   CBC WITH DIFFERENTIAL - Abnormal; Notable for the following components:    MCHC 32.5 (*)     RDW 20.0 (*)     IG# 0.07 (*)     All other components within normal limits   URINALYSIS, MICROSCOPIC - Abnormal; Notable for the following components:    RBC, UA 7 (*)     All other components within normal limits   MAGNESIUM - Normal   PROTIME-INR - Normal   TROPONIN I - Normal   CBC W/ AUTO DIFFERENTIAL    Narrative:     The following orders were created for panel order CBC auto differential.  Procedure                               Abnormality         Status                     ---------                               -----------         ------                     CBC with Differential[[id number]]        Abnormal            Final result                 Please view results for these tests on the individual orders.   CBC W/ AUTO DIFFERENTIAL    Narrative:     The following orders were created for panel order CBC Auto Differential.  Procedure                               Abnormality         Status                     ---------                               -----------         ------                     CBC with Differential[[id number]]                                                         Please view results for these tests on the individual orders.   COMPREHENSIVE METABOLIC PANEL   MAGNESIUM   PHOSPHORUS   CBC WITH DIFFERENTIAL   POCT GLUCOSE MONITORING CONTINUOUS          Imaging Results              X-Ray Chest AP Portable (In process)                      Medications   dextrose 10% bolus 125 mL 125 mL (has no administration in time range)   dextrose 10% bolus 250 mL 250 mL (has no administration in time range)   0.9%  NaCl infusion (has no administration in time range)   lactated ringers bolus 1,000 mL (0 mLs Intravenous Stopped 1/12/23 0154)   sodium chloride 0.9%  bolus 1,000 mL 1,000 mL (1,000 mLs Intravenous New Bag 1/12/23 0228)   calcium gluconate 1 g in NS IVPB (premixed) (0 g Intravenous Stopped 1/12/23 0320)   insulin regular injection 5 Units 0.05 mL (5 Units Intravenous Given 1/12/23 0248)   sodium bicarbonate solution 50 mEq (50 mEq Intravenous Given 1/12/23 0248)   sodium zirconium cyclosilicate packet 10 g (10 g Oral Given 1/12/23 0231)     Medical Decision Making:   History:   Old Medical Records: I decided to obtain old medical records.  Old Records Summarized: records from another hospital.       <> Summary of Records: Quincy Triplett is a 82 y.o. 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 He denied hematuria, hematochezia, hematemesis, abdominal pain.  Reports that can not tolerate oral intake due to nausea and vomiting.  He states he was diagnosed with duodenal adenocarcinoma x2 months ago and symptoms have been progressively worsening since then.  He is awaiting Whipple procedure by Dr. Abdirahman Brown.  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 intake to decreased urination since his discharge from the hospital several days ago.  Patient was dehydrated on exam biliary drain is in place and appears to be functioning well no signs of infection.  Patient has TERRENCE on labs some hyperkalemia no EKG changes treating with aggressive IV fluid resuscitation hyperkalemia shifting medications and admit to hospitalist  Differential Diagnosis:   Judging by the patient's chief complaint and pertinent history, the patient has the following possible  differential diagnoses, including but not limited to the following.  Some of these are deemed to be lower likelihood and some more likely based on my physical exam and history combined with possible lab work and/or imaging studies.   Please see the pertinent studies, and refer to the HPI.  Some of these diagnoses will take further evaluation to fully rule out, perhaps as an outpatient and the patient was encouraged to follow up when discharged for more comprehensive evaluation.    Generalized weakness: anemia, dehydration, electrolyte derangement, hypoglycemia, infection, intoxication, respiratory failure, toxic ingestion, ACS, thyroid disease, medications, psychiatric, autoimmune, rhabdomyolysis, myositis, peripheral neuropathy       Independently Interpreted Test(s):   I have ordered and independently interpreted X-rays - see prior notes.  I have ordered and independently interpreted EKG Reading(s) - see prior notes  Clinical Tests:   Lab Tests: Ordered and Reviewed  Radiological Study: Ordered and Reviewed  Medical Tests: Ordered and Reviewed  ED Management:  IVF  Hyperkalemia meds - IV calcium, insulin/glucose, bicarb, lokelma   Other:   I have discussed this case with another health care provider.       <> Summary of the Discussion: Hospitliast         Scribe Attestation:   Scribe #1: I performed the above scribed service and the documentation accurately describes the services I performed. I attest to the accuracy of the note.      Medical Decision Making  Problems Addressed:  TERRENCE (acute kidney injury): acute illness or injury that poses a threat to life or bodily functions  COVID-19: acute illness or injury  Dehydration: acute illness or injury that poses a threat to life or bodily functions  Generalized weakness: acute illness or injury that poses a threat to life or bodily functions  Hyperkalemia: acute illness or injury that poses a threat to life or bodily functions  SOB (shortness of breath): acute  illness or injury that poses a threat to life or bodily functions      Amount and/or Complexity of Data Reviewed  Independent Historian: none (see above for summary)  External Data Reviewed: notes from previous admissions (see above for summary)  Risk and benefits of testing: discussed   Labs: ordered and reviewed  Radiology: ordered and independent interpretation performed (see above)  ECG/medicine tests: ordered and independent interpretation performed (see above)  Discussion of management or test interpretation with external provider(s): discussed with hospitalist physician    Risk  Parenteral medications  Prescription management   Drug therapy requiring intense monitoring for toxicity   Decision regarding transfer     Critical Care  30-74 minutes     ED Course as of 01/12/23 0330   Thu Jan 12, 2023   0138 Hospitalist paged. [AS]   0219 Admitted to hospitalist. [AS]   0222 SARS-CoV2 (COVID-19) Qualitative PCR(!): Detected [AC]      ED Course User Index  [AC] Yaya Moe IV, MD  [AS] Bonnie Villarreal                   Clinical Impression:   Final diagnoses:  [R53.1] Generalized weakness  [R06.02] SOB (shortness of breath)  [E86.0] Dehydration (Primary)  [E87.5] Hyperkalemia  [N17.9] TERRENCE (acute kidney injury)  [U07.1] COVID-19        ED Disposition Condition    Admit         I, Yaya Moe MD personally performed the history, PE, MDM, and procedures as documented above and agree with the scribe's documentation.           Yaya Moe IV, MD  01/12/23 7857

## 2023-01-12 NOTE — CARE UPDATE
[id number] Pt has covid. Called pt's daughter, Ilene ([phone]) to obtain dc needs and had to leave a message

## 2023-01-12 NOTE — PLAN OF CARE
01/12/23 1532   Discharge Assessment   Assessment Type Discharge Planning Assessment   Confirmed/corrected address, phone number and insurance Yes   Confirmed Demographics Correct on Facesheet   Source of Information family  (pt's daughterIlene)   Communicated SHAUN with patient/caregiver Date not available/Unable to determine   Reason For Admission weakness, dehydration, duodenal CA, covid pos   People in Home alone  (Pt lives alone in a single story home with no steps to enter.)   Do you expect to return to your current living situation? No  (pt will need placement)   Do you have help at home or someone to help you manage your care at home? Yes   Who are your caregiver(s) and their phone number(s)? daughterIlene--[phone] and ex wife, Nhi   Prior to hospitilization cognitive status: Alert/Oriented   Current cognitive status: Unable to Assess  (pt has covid)   Walking or Climbing Stairs   (independent with mobility)   Home Layout Able to live on 1st floor   Equipment Currently Used at Home none   Patient currently being followed by outpatient case management? Yes  ([facility])   Do you currently have service(s) that help you manage your care at home? Yes   How Many hours does patient receive services 3   Name and Contact number of agency [facility]---[phone]   Is the pt/caregiver preference to resume services with current agency Yes   Who is going to help you get home at discharge? pt needs placement   How do you get to doctors appointments? family or friend will provide   Are you on dialysis? No   Discharge Plan A   (ltac vs snf vs swing bed)   Discharge Plan B   (ltac vs snf vs swing bed)   DME Needed Upon Discharge  other (see comments)  (TBD)   Discharge Plan discussed with: Adult children  (pt's daughterIlene)   Discharge Barriers Identified None   Housing Stability   In the last 12 months, was there a time when you were not able to pay the mortgage or rent on time? N   Transportation Needs   In  the past 12 months, has lack of transportation kept you from medical appointments or from getting medications? no   Food Insecurity   Within the past 12 months, you worried that your food would run out before you got the money to buy more. Never true     Pt's PCP is Dr Waters. His  is his daughter, Ilene ([phone]). Ilene reports pt was dx with duodenal CA on 12/27/22. She reports pt lives alone however pt's exwife is staying with pt to help assist with his needs. Ilene goes after work, after 3:00 to assist with pt's needs. Pt is current with [facility]. He uses [facility] for pharmacy. Ilene reports her father will need to be placed since he is unable to care for himself. She reported they would like [facility] if pt meets criteria. Follow for dc needs.

## 2023-01-12 NOTE — CONSULTS
Gastroenterology Consultation Note    Reason for Consult:  duodenal adenocarcinoma, transaminitis    PCP:   Reji Waters Jr, MD    Referring MD:  Lee Montaño MD    Hospital Day: 0     Initial History of Present Illness (HPI):  This is a 82 y.o. male known to Dr. Anderson with PMH of HTN, HLD, CAD, obstructive jaundice, duodenal adenocarcinoma. Patient presented to ED 01/11/23 for weakness, decreased appetite, decreased urine output, nausea x3 days. His liver labs include: plt 235, INR 1.07, ferritin 433, Alk phos 282, albumin 2.6, T bili 13.7, AST 57, ALT 80, lipase 85. These values are mostly stable compared to those for the past month. Patient tested positive for COVID. GI consulted for transaminitis, duodenal adenocarcinoma.    Patient was discharged from [facility] 01/07/23, he was admitted for GOO due to duodenal mass with obstructive jaundice.  ERCP by Dr. Anderson on 12/22/22 with unsuccessful pancreatogram/cholangiogram - friable mass in second portion of duodenum adjacent to the ampulla with no visualization of ampulla, biopsy revealed poorly differentiated adenocarcinoma, ulcerated  Patient had biliary drain placed by IR on 01/04/23.     Whipple procedure with Dr. Abdirahman Brown scheduled on 01/19/23.    Patient resting in bed. He is nauseated and does not want his meal tray. He denies abd pain. Last BM 2 days ago without red or black, he wants something for constipation. He states that he does not believe he will survive long enough to have surgery next week. He denies any COVID symptoms, aside from weakness. He states his urine is black.    ROS:  Review of Systems   Constitutional:  Positive for malaise/fatigue.   Respiratory:  Negative for cough and shortness of breath.    Cardiovascular:  Negative for chest pain.   Gastrointestinal:  Positive for constipation and nausea. Negative for abdominal pain, blood in stool, diarrhea, melena and vomiting.   Genitourinary:         Black urine per  "patient   Neurological:  Positive for weakness.     Medical History:   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, unspecified extremity     Coronary artery disease     Dyslipidemia     Hypertension        Surgical History:   Past Surgical History:   Procedure Laterality Date    AMPUTATION Right     Right arm    CAROTID STENT      ERCP N/A 12/21/2022    Procedure: ERCP;  Surgeon: Sushil Anderson MD;  Location: [facility] ENDOSCOPY;  Service: Gastroenterology;  Laterality: N/A;  food in abdomen    ERCP, WITH BIOPSY  12/21/2022    Procedure: ERCP, WITH BIOPSY;  Surgeon: Sushil Anderson MD;  Location: [facility] ENDOSCOPY;  Service: Gastroenterology;;    LEFT HEART CATHETERIZATION      TONSILLECTOMY         Family History:   No family history on file..     Social History:   Social History     Tobacco Use    Smoking status: Never    Smokeless tobacco: Never   Substance Use Topics    Alcohol use: Never       Allergies:  Review of patient's allergies indicates:  No Known Allergies    Medications Prior to Admission   Medication Sig Dispense Refill Last Dose    meclizine (ANTIVERT) 12.5 mg tablet Take 12.5 mg by mouth 3 (three) times daily as needed.       metroNIDAZOLE (METROGEL) 0.75 % gel Apply topically 2 (two) times daily.       ondansetron (ZOFRAN) 4 MG tablet Take 1 tablet (4 mg total) by mouth every 6 (six) hours as needed for Nausea. 30 tablet 0     pantoprazole (PROTONIX) 40 MG tablet Take 40 mg by mouth once daily.       tamsulosin (FLOMAX) 0.4 mg Cap Take by mouth once daily.            Objective Findings:    Vital Signs:  /67   Pulse 69   Temp 99 °F (37.2 °C) (Temporal)   Resp 17   Ht 5' 9" (1.753 m)   Wt 70.3 kg (155 lb)   SpO2 100%   BMI 22.89 kg/m²   Body mass index is 22.89 kg/m².    Physical Exam:  Physical Exam  Constitutional:       General: He is not in acute distress.     Appearance: He is normal weight. " He is not ill-appearing.   HENT:      Head: Normocephalic and atraumatic.      Right Ear: External ear normal.      Left Ear: External ear normal.      Nose: Nose normal.      Mouth/Throat:      Pharynx: Oropharynx is clear.   Eyes:      General: Scleral icterus present.   Pulmonary:      Effort: Pulmonary effort is normal. No respiratory distress.   Abdominal:      General: Abdomen is flat. Bowel sounds are normal. There is no distension.      Palpations: Abdomen is soft.      Tenderness: There is no abdominal tenderness. There is no guarding.   Skin:     General: Skin is warm and dry.      Coloration: Skin is jaundiced.   Neurological:      Mental Status: He is alert and oriented to person, place, and time. Mental status is at baseline.      Motor: No weakness.   Psychiatric:         Mood and Affect: Mood is depressed.         Behavior: Behavior normal.       Labs:  Recent Results (from the past 24 hour(s))   Comprehensive metabolic panel    Collection Time: 01/12/23 12:16 AM   Result Value Ref Range    Sodium Level 128 (L) 136 - 145 mmol/L    Potassium Level 5.6 (H) 3.5 - 5.1 mmol/L    Chloride 104 98 - 107 mmol/L    Carbon Dioxide 11 (L) 23 - 31 mmol/L    Glucose Level 119 (H) 82 - 115 mg/dL    Blood Urea Nitrogen 42.3 (H) 8.4 - 25.7 mg/dL    Creatinine 2.79 (H) 0.73 - 1.18 mg/dL    Calcium Level Total 10.9 (H) 8.8 - 10.0 mg/dL    Protein Total 7.5 5.8 - 7.6 gm/dL    Albumin Level 3.4 3.4 - 4.8 g/dL    Globulin 4.1 (H) 2.4 - 3.5 gm/dL    Albumin/Globulin Ratio 0.8 (L) 1.1 - 2.0 ratio    Bilirubin Total 17.9 (H) <=1.5 mg/dL    Alkaline Phosphatase 376 (H) 40 - 150 unit/L    Alanine Aminotransferase 101 (H) 0 - 55 unit/L    Aspartate Aminotransferase 76 (H) 5 - 34 unit/L    eGFR 22 mls/min/1.73/m2   Lipase    Collection Time: 01/12/23 12:16 AM   Result Value Ref Range    Lipase Level 85 (H) <=60 U/L   Magnesium    Collection Time: 01/12/23 12:16 AM   Result Value Ref Range    Magnesium Level 2.50 1.60 - 2.60 mg/dL    Protime-INR    Collection Time: 01/12/23 12:16 AM   Result Value Ref Range    PT 13.8 12.5 - 14.5 seconds    INR 1.07 0.00 - 1.30   Troponin I    Collection Time: 01/12/23 12:16 AM   Result Value Ref Range    Troponin-I <0.010 0.000 - 0.045 ng/mL   CBC with Differential    Collection Time: 01/12/23 12:16 AM   Result Value Ref Range    WBC 9.5 4.5 - 11.5 x10(3)/mcL    RBC 4.89 4.70 - 6.10 x10(6)/mcL    Hgb 14.2 14.0 - 18.0 gm/dL    Hct 43.7 42.0 - 52.0 %    MCV 89.4 80.0 - 94.0 fL    MCH 29.0 pg    MCHC 32.5 (L) 33.0 - 36.0 mg/dL    RDW 20.0 (H) 11.5 - 17.0 %    Platelet 316 130 - 400 x10(3)/mcL    MPV 10.4 7.4 - 10.4 fL    Neut % 72.8 %    Lymph % 17.6 %    Mono % 7.7 %    Eos % 0.6 %    Basophil % 0.6 %    Lymph # 1.68 0.6 - 4.6 x10(3)/mcL    Neut # 6.93 2.1 - 9.2 x10(3)/mcL    Mono # 0.73 0.1 - 1.3 x10(3)/mcL    Eos # 0.06 0 - 0.9 x10(3)/mcL    Baso # 0.06 0 - 0.2 x10(3)/mcL    IG# 0.07 (H) 0 - 0.04 x10(3)/mcL    IG% 0.7 %    NRBC% 0.0 %   COVID/FLU A&B PCR    Collection Time: 01/12/23 12:17 AM   Result Value Ref Range    Influenza A PCR Not Detected Not Detected    Influenza B PCR Not Detected Not Detected    SARS-CoV-2 PCR Detected (A) Not Detected, Negative, Invalid   Urinalysis, Reflex to Urine Culture Urine, Clean Catch    Collection Time: 01/12/23  2:36 AM    Specimen: Urine   Result Value Ref Range    Color, UA Dark Yellow Yellow, Light-Yellow, Dark Yellow, Anna, Straw    Appearance, UA Cloudy (A) Clear    Specific Gravity, UA 1.020 1.001 - 1.030    pH, UA 5.0 5.0 - 8.5    Protein, UA 1+ (A) Negative mg/dL    Glucose, UA Negative Negative, Normal mg/dL    Ketones, UA Negative Negative mg/dL    Blood, UA Negative Negative unit/L    Bilirubin, UA 3+ (A) Negative mg/dL    Urobilinogen, UA 0.2 0.2, 1.0, Normal mg/dL    Nitrites, UA Positive (A) Negative    Leukocyte Esterase, UA 1+ (A) Negative unit/L   Urinalysis, Microscopic    Collection Time: 01/12/23  2:36 AM   Result Value Ref Range    RBC, UA 7 (H)  <=5 /HPF    WBC, UA <5 <=5 /HPF    Squamous Epithelial Cells, UA <5 <=5 /HPF    Bacteria, UA None Seen None Seen, Rare, Occasional /HPF   POCT glucose    Collection Time: 01/12/23  2:39 AM   Result Value Ref Range    POCT Glucose 112 (H) 70 - 110 mg/dL   Comprehensive Metabolic Panel    Collection Time: 01/12/23  4:15 AM   Result Value Ref Range    Sodium Level 130 (L) 136 - 145 mmol/L    Potassium Level 4.8 3.5 - 5.1 mmol/L    Chloride 108 (H) 98 - 107 mmol/L    Carbon Dioxide 11 (L) 23 - 31 mmol/L    Glucose Level 93 82 - 115 mg/dL    Blood Urea Nitrogen 42.0 (H) 8.4 - 25.7 mg/dL    Creatinine 2.16 (H) 0.73 - 1.18 mg/dL    Calcium Level Total 9.5 8.8 - 10.0 mg/dL    Protein Total 5.6 (L) 5.8 - 7.6 gm/dL    Albumin Level 2.6 (L) 3.4 - 4.8 g/dL    Globulin 3.0 2.4 - 3.5 gm/dL    Albumin/Globulin Ratio 0.9 (L) 1.1 - 2.0 ratio    Bilirubin Total 13.7 (H) <=1.5 mg/dL    Alkaline Phosphatase 282 (H) 40 - 150 unit/L    Alanine Aminotransferase 80 (H) 0 - 55 unit/L    Aspartate Aminotransferase 57 (H) 5 - 34 unit/L    eGFR 30 mls/min/1.73/m2   Magnesium    Collection Time: 01/12/23  4:15 AM   Result Value Ref Range    Magnesium Level 2.20 1.60 - 2.60 mg/dL   Phosphorus    Collection Time: 01/12/23  4:15 AM   Result Value Ref Range    Phosphorus Level 5.2 (H) 2.3 - 4.7 mg/dL   CBC with Differential    Collection Time: 01/12/23  4:15 AM   Result Value Ref Range    WBC 7.9 4.5 - 11.5 x10(3)/mcL    RBC 4.00 (L) 4.70 - 6.10 x10(6)/mcL    Hgb 11.6 (L) 14.0 - 18.0 gm/dL    Hct 35.6 (L) 42.0 - 52.0 %    MCV 89.0 80.0 - 94.0 fL    MCH 29.0 pg    MCHC 32.6 (L) 33.0 - 36.0 mg/dL    RDW 19.8 (H) 11.5 - 17.0 %    Platelet 235 130 - 400 x10(3)/mcL    MPV 9.9 7.4 - 10.4 fL    Neut % 71.8 %    Lymph % 17.9 %    Mono % 8.6 %    Eos % 0.6 %    Basophil % 0.5 %    Lymph # 1.42 0.6 - 4.6 x10(3)/mcL    Neut # 5.69 2.1 - 9.2 x10(3)/mcL    Mono # 0.68 0.1 - 1.3 x10(3)/mcL    Eos # 0.05 0 - 0.9 x10(3)/mcL    Baso # 0.04 0 - 0.2 x10(3)/mcL    IG#  0.05 (H) 0 - 0.04 x10(3)/mcL    IG% 0.6 %    NRBC% 0.0 %   POCT glucose    Collection Time: 01/12/23  5:39 AM   Result Value Ref Range    POCT Glucose 107 70 - 110 mg/dL   C-Reactive Protein    Collection Time: 01/12/23 10:32 AM   Result Value Ref Range    C-Reactive Protein 2.10 <5.00 mg/L   D-Dimer, Quantitative    Collection Time: 01/12/23 10:32 AM   Result Value Ref Range    D-Dimer 0.74 (H) 0.00 - 0.50 ug/mL FEU   Ferritin    Collection Time: 01/12/23 10:32 AM   Result Value Ref Range    Ferritin Level 432.97 (H) 21.81 - 274.66 ng/mL   Lactate Dehydrogenase    Collection Time: 01/12/23 10:32 AM   Result Value Ref Range    Lactate Dehydrogenase 143 125 - 220 U/L   Sedimentation rate    Collection Time: 01/12/23 10:32 AM   Result Value Ref Range    Sed Rate 45 (H) 0 - 15 mm/hr       X-Ray Chest AP Portable   Final Result      No acute abnormality of the chest.         Electronically signed by: Zhanna Langley   Date:    01/12/2023   Time:    06:13          Assessment/Plan:  This is a 82 y.o. male known to Dr. Anderson with PMH of HTN, HLD, CAD, obstructive jaundice, duodenal adenocarcinoma with plans for Whipple procedure 01/19/23 with Dr. Abdirahman Brown. Patient presented to ED 01/11/23 for weakness, decreased appetite, decreased urine output, nausea x3 days.   His liver labs include: plt 235, INR 1.07, ferritin 433, Alk phos 282, albumin 2.6, T bili 13.7, AST 57, ALT 80, lipase 85.   GI consulted for transaminitis, duodenal adenocarcinoma.    Duodenal adenocarcinoma  Transaminitis - stable  Obstructive jaundice - biliary drain placed by IR  Constipation  COVID 19 positive  - given inability to intervene endoscopically, biliary drain placed by IR. We would refer biliary drain management to IR  - LFTs relatively stable for the past month  - patient with planned Whipple procedure with Dr. Abdirahman Brown on 01/19/23  - dulcolax suppository as needed  - diet as tolerated  - supportive care    Unfortunately, there is  nothing we can offer from a GI standpoint. GI will sign off. Please call with any questions.     Thank you for allowing us to participate in the care of Quincy Triplett.    Michelle Bruno PA-C  Gastroenterology  [facility]

## 2023-01-12 NOTE — H&P
[facility] Hospital Medicine History & Physical Examination       Patient Name: Quincy Triplett  MRN: [id number]  Patient Class: IP- Inpatient   Admission Date: 1/11/2023   Admitting Physician: Bri Marrero MD   Length of Stay: 0  Attending Physician: Bri Marrero MD  Primary Care Provider: Reji Waters Jr, MD  Face-to-Face encounter date: 01/12/2023  Code Status:Full code   Chief Complaint: Weakness (Presents with c/o weakness. Onset yesterday.)        Patient information was obtained from patient, patient's family, past medical records and ER records.     HISTORY OF PRESENT ILLNESS:   Quincy Triplett is a 82 y.o. male with a past medical history of essential hypertension, hyperlipidemia, CAD, obstructive jaundice, and duodenal cancer presented to [facility] on 1/11/2023 for weakness.  Patient reports weakness, decreased appetite, decreased urine output, and nausea began 3 days ago. Patient reports recent biliary drain placement on 12/21/2022 with Dr. Brown and was discharged 3 days ago.  Patient states he has whipple procedure scheduled soon by Dr. Brown. Patient denies fever, chills, cough, congestion, chest pain, shortness of breath, abdominal pain, vomiting, diarrhea, hematuria, and dysuria.   Initial vital signs in ED were /78, pulse 85, respirations 18, temperature 36.6° C, and SpO2 98%.  Labs revealed WBC 7.9, RBC 4, hemoglobin 11.6, hematocrit 35.6, sodium 130, chloride 108, CO2 11, BUN 42.3, creatinine 2.79, phosphorus 5.2, alkaline phosphatase 376, albumin 2.6, bilirubin total 13.7, AST 76, , lipase 85, and troponin undetectable.  Flu testing was negative.  COVID testing was positive.  UA revealed 1+ protein, cloudy appearance, positive nitrate, 3+ bilirubin, 1+ leukocytes, and 7 RBCs.  Patient was given 1 L NS, IV calcium gluconate 1 g, 5 units insulin IV, sodium bicarb 50 mEq, and Lokelma 10 g.  Patient was admitted to hospital medicine service for  further medical management.   PAST MEDICAL HISTORY:   Essential hypertension  Hyperlipidemia  CAD  Obstructive jaundice   Duodenal cancer    PAST SURGICAL HISTORY:     Past Surgical History:   Procedure Laterality Date    AMPUTATION Right     Right arm    CAROTID STENT      ERCP N/A 12/21/2022    Procedure: ERCP;  Surgeon: Sushil Anderson MD;  Location: [facility] ENDOSCOPY;  Service: Gastroenterology;  Laterality: N/A;  food in abdomen    ERCP, WITH BIOPSY  12/21/2022    Procedure: ERCP, WITH BIOPSY;  Surgeon: Sushil Anderson MD;  Location: [facility] ENDOSCOPY;  Service: Gastroenterology;;    LEFT HEART CATHETERIZATION      TONSILLECTOMY         ALLERGIES:   Patient has no known allergies.    FAMILY HISTORY:   Reviewed and negative    SOCIAL HISTORY:   Denies tobacco, drug, and alcohol use    HOME MEDICATIONS:     Prior to Admission medications    Medication Sig Start Date End Date Taking? Authorizing Provider   meclizine (ANTIVERT) 12.5 mg tablet Take 12.5 mg by mouth 3 (three) times daily as needed.   Yes Historical Provider   metroNIDAZOLE (METROGEL) 0.75 % gel Apply topically 2 (two) times daily.   Yes Historical Provider   ondansetron (ZOFRAN) 4 MG tablet Take 1 tablet (4 mg total) by mouth every 6 (six) hours as needed for Nausea. 1/7/23 1/17/23 Yes John Ward MD   pantoprazole (PROTONIX) 40 MG tablet Take 40 mg by mouth once daily.   Yes Historical Provider   tamsulosin (FLOMAX) 0.4 mg Cap Take by mouth once daily.   Yes Historical Provider       REVIEW OF SYSTEMS:   Except as documented, all other systems reviewed and negative     PHYSICAL EXAM:     VITAL SIGNS: 24 HRS MIN & MAX LAST   Temp  Min: 97.8 °F (36.6 °C)  Max: 97.8 °F (36.6 °C) 97.8 °F (36.6 °C)   BP  Min: 111/63  Max: 144/74 128/67   Pulse  Min: 65  Max: 85  69   Resp  Min: 14  Max: 18 17   SpO2  Min: 98 %  Max: 100 % 100 %       General appearance: Weak, Ill appearing male in no apparent distress.  HEENNT: Atraumatic head.   Lungs:  Clear to auscultation bilaterally. Heart: Regular rate and rhythm.    Abdomen: Soft, non-distended, non-tender. Bowel sounds are normal. Biliary drain to right upper abdomen-bandage clean, dry, and intact   Extremities: No cyanosis, clubbing, or edema. No deformities.   Skin: Jaundice   Neuro: Awake, alert, and oriented. Motor and sensory exams grossly intact.   Psych/mental status: Appropriate mood and affect. Responds appropriately to questions.     LABS AND IMAGING:     Recent Labs   Lab 01/06/23 0445 01/12/23 0016 01/12/23 0415   WBC 6.2 9.5 7.9   RBC 3.62* 4.89 4.00*   HGB 10.2* 14.2 11.6*   HCT 31.8* 43.7 35.6*   MCV 87.8 89.4 89.0   MCH 28.2 29.0 29.0   MCHC 32.1* 32.5* 32.6*   RDW 21.8* 20.0* 19.8*    316 235   MPV 10.9 10.4 9.9       Recent Labs   Lab 01/06/23 0445 01/07/23 0422 01/12/23 0016 01/12/23 0415    135* 128* 130*   K 4.5 4.7 5.6* 4.8   CO2 22* 19* 11* 11*   BUN 16.2 16.6 42.3* 42.0*   CREATININE 0.83 0.77 2.79* 2.16*   CALCIUM 9.4 9.3 10.9* 9.5   MG 2.10  --  2.50 2.20   ALBUMIN 2.5* 2.4* 3.4 2.6*   ALKPHOS 331* 323* 376* 282*   ALT 69* 68* 101* 80*   AST 49* 64* 76* 57*   BILITOT 11.5* 11.3* 17.9* 13.7*       Microbiology Results (last 7 days)       ** No results found for the last 168 hours. **             X-Ray Chest AP Portable  Narrative: EXAMINATION:  XR CHEST AP PORTABLE    CLINICAL HISTORY:  Shortness of breath    COMPARISON:  Chest x-ray dated 12/31/2022    FINDINGS:  The heart is normal in size.  There is no focal airspace consolidation.  There is no pleural effusion or visible pneumothorax.  Impression: No acute abnormality of the chest.    Electronically signed by: Zhanna Langley  Date:    01/12/2023  Time:    06:13        ASSESSMENT & PLAN:   Assessment:  Dehydration  Acute COVID-19 infection  Duodenal cancer s/p biliary stent placement on 12/21/2022  Transaminitis   Hyperbilirubinemia  TERRENCE   Hyperkalemia  Hyponatremia  History of essential hypertension,  hyperlipidemia, CAD, and obstructive jaundice,     Plan:  IVF   Surgical oncology and GI consulted, appreciate recommendations  Zinc sulfate 220 mg p.o. daily times 10 days   Ergocalciferol 50,000 IUs p.o. weekly  PRN antipyretics   Incentive spirometry  LDH, ferritin, D-dimer, CRP, and ESR pending   Blood cultures pending  PRN antiemetics  Continue appropriate home medications  Labs in a.m.    VTE Prophylaxis: will be placed on SCD for DVT prophylaxis and will be advised to be as mobile as possible and sit in a chair as tolerated      __________________________________________________________________________  INPATIENT LIST OF MEDICATIONS     Scheduled Meds:   cefTRIAXone (ROCEPHIN) IVPB  1 g Intravenous Q24H    ergocalciferol  50,000 Units Oral Q7 Days    zinc sulfate  220 mg Oral Daily     Continuous Infusions:   sodium chloride 0.9% 125 mL/hr at 01/12/23 0405     PRN Meds:.albuterol, dextrose 10%, dextrose 10%      I, Jesús Bansal PA-C, have reviewed and discussed the case with Dr. Lee Monatño MD  Please see the following addendum for further assessment and plan from there attending MD.    01/12/2023    ________________________________________________________________________________    MD Addendum:  I,  assumed care of this patient today at ---am/pm  For the patient encounter, I performed the substantive portion of the visit, I reviewed the PA documentation, treatment plan, and medical decision making.  I had face to face time with this patient     A. History:Quincy Triplett is a 82 y.o. male with a past medical history of essential hypertension, hyperlipidemia, CAD, obstructive jaundice, and duodenal cancer presented to [facility] on 1/11/2023 for weakness.  Patient reports weakness, decreased appetite, decreased urine output, and nausea began 3 days ago. Patient reports recent biliary drain placement on 12/21/2022 with Dr. Brown and was discharged 3 days ago.  Patient states he has whipple procedure  scheduled soon by Dr. Stephanie BEAN. Physical exam:  General: In no acute distress, afebrile  Chest: Clear to auscultation bilaterally  Heart: RRR, +S1, S2, no appreciable murmur  Abdomen: Soft, nontender, BS +  MSK: Warm, no lower extremity edema, no clubbing or cyanosis  Neurologic: Alert and oriented x4, Cranial nerve II-XII intact, Strength 5/5 in all 4 extremities     C. Medical decision making:  Assessment:  Dehydration, Poor Oral Intake  Duodenal cancer s/p biliary stent placement on 12/21/2022  Transaminitis   Hyperbilirubinemia  COVID-19 Infection  TERRENCE   Hyperkalemia  Hyponatremia  Anemia, Unspecified  History of essential hypertension, hyperlipidemia, CAD, and obstructive jaundice    Plan:  -Attribute symptoms secondary to poor oral intake, Encourage PO intake, cont IVF  -Consider Nutrition Consult  -Antiemetics prn  -GI recs appreciated,Surgical Intervention may be needed for definitive therapy, patient is scheduled for Whipple's on 1/19.  -Patient would like to be treated for his dehydration and then follow up with Dr. Brown's Team while he is in at the hospital.  -Patient does not have any COVID symptoms,F/u Inflammatory Markers. A-Pcaoc-yirs, f/u VQ Scan  -Avoid Nephrotoxins, f/u Urine Studies, ,Renal Ultrasound,Continue IVF, f/u BMP   -Avoid overcorrection of sodium while on fluids, Sodium bi  -Drop in Hb noted 14>11, hold lovenox,F/u FOB, Monitor H and H  -Home Meds to be continued as appropriate    Critical care Time Spent>35 min           Discharge Planning and Disposition: No mobility needs. Ambulating well. Good social support system.   Anticipated discharge    All diagnosis and differential diagnosis have been reviewed; assessment and plan has been documented; I have personally reviewed the labs and test results that are presently available; I have reviewed the patients medication list; I have reviewed the consulting providers response and recommendations. I have reviewed or attempted to review  medical records based upon their availability.    All of the patient and family questions have been addressed and answered. Patient's is agreeable to the above stated plan. I will continue to monitor closely and make adjustments to medical management as needed.      01/12/2023

## 2023-01-13 LAB
ALBUMIN SERPL-MCNC: 2.4 G/DL (ref 3.4–4.8)
ALBUMIN/GLOB SERPL: 0.8 RATIO (ref 1.1–2)
ALP SERPL-CCNC: 240 UNIT/L (ref 40–150)
ALT SERPL-CCNC: 71 UNIT/L (ref 0–55)
AST SERPL-CCNC: 54 UNIT/L (ref 5–34)
BASOPHILS # BLD AUTO: 0.03 X10(3)/MCL (ref 0–0.2)
BASOPHILS NFR BLD AUTO: 0.4 %
BILIRUBIN DIRECT+TOT PNL SERPL-MCNC: 12.5 MG/DL
BUN SERPL-MCNC: 51.8 MG/DL (ref 8.4–25.7)
CALCIUM SERPL-MCNC: 9.3 MG/DL (ref 8.8–10)
CHLORIDE SERPL-SCNC: 112 MMOL/L (ref 98–107)
CO2 SERPL-SCNC: 11 MMOL/L (ref 23–31)
CREAT SERPL-MCNC: 1.72 MG/DL (ref 0.73–1.18)
EOSINOPHIL # BLD AUTO: 0.04 X10(3)/MCL (ref 0–0.9)
EOSINOPHIL NFR BLD AUTO: 0.5 %
ERYTHROCYTE [DISTWIDTH] IN BLOOD BY AUTOMATED COUNT: 20 % (ref 11.5–17)
GFR SERPLBLD CREATININE-BSD FMLA CKD-EPI: 39 MLS/MIN/1.73/M2
GLOBULIN SER-MCNC: 2.9 GM/DL (ref 2.4–3.5)
GLUCOSE SERPL-MCNC: 143 MG/DL (ref 82–115)
HCT VFR BLD AUTO: 32 % (ref 42–52)
HGB BLD-MCNC: 10.3 GM/DL (ref 14–18)
IMM GRANULOCYTES # BLD AUTO: 0.06 X10(3)/MCL (ref 0–0.04)
IMM GRANULOCYTES NFR BLD AUTO: 0.7 %
LYMPHOCYTES # BLD AUTO: 1.33 X10(3)/MCL (ref 0.6–4.6)
LYMPHOCYTES NFR BLD AUTO: 15.9 %
MCH RBC QN AUTO: 29 PG
MCHC RBC AUTO-ENTMCNC: 32.2 MG/DL (ref 33–36)
MCV RBC AUTO: 90.1 FL (ref 80–94)
MONOCYTES # BLD AUTO: 0.48 X10(3)/MCL (ref 0.1–1.3)
MONOCYTES NFR BLD AUTO: 5.7 %
NEUTROPHILS # BLD AUTO: 6.44 X10(3)/MCL (ref 2.1–9.2)
NEUTROPHILS NFR BLD AUTO: 76.8 %
NRBC BLD AUTO-RTO: 0 %
PLATELET # BLD AUTO: 256 X10(3)/MCL (ref 130–400)
PMV BLD AUTO: 10.4 FL (ref 7.4–10.4)
POCT GLUCOSE: 134 MG/DL (ref 70–110)
POTASSIUM SERPL-SCNC: 4.9 MMOL/L (ref 3.5–5.1)
PROT SERPL-MCNC: 5.3 GM/DL (ref 5.8–7.6)
RBC # BLD AUTO: 3.55 X10(6)/MCL (ref 4.7–6.1)
SODIUM SERPL-SCNC: 129 MMOL/L (ref 136–145)
SODIUM SERPL-SCNC: 130 MMOL/L (ref 136–145)
SODIUM SERPL-SCNC: 131 MMOL/L (ref 136–145)
WBC # SPEC AUTO: 8.4 X10(3)/MCL (ref 4.5–11.5)

## 2023-01-13 PROCEDURE — 27000207 HC ISOLATION

## 2023-01-13 PROCEDURE — 25000003 PHARM REV CODE 250: Performed by: INTERNAL MEDICINE

## 2023-01-13 PROCEDURE — 25000003 PHARM REV CODE 250

## 2023-01-13 PROCEDURE — 84295 ASSAY OF SERUM SODIUM: CPT | Performed by: INTERNAL MEDICINE

## 2023-01-13 PROCEDURE — 63600175 PHARM REV CODE 636 W HCPCS: Performed by: NURSE PRACTITIONER

## 2023-01-13 PROCEDURE — 86140 C-REACTIVE PROTEIN: CPT | Performed by: INTERNAL MEDICINE

## 2023-01-13 PROCEDURE — 25000003 PHARM REV CODE 250: Performed by: NURSE PRACTITIONER

## 2023-01-13 PROCEDURE — 36415 COLL VENOUS BLD VENIPUNCTURE: CPT | Performed by: INTERNAL MEDICINE

## 2023-01-13 PROCEDURE — 80053 COMPREHEN METABOLIC PANEL: CPT | Performed by: INTERNAL MEDICINE

## 2023-01-13 PROCEDURE — 63600175 PHARM REV CODE 636 W HCPCS: Performed by: INTERNAL MEDICINE

## 2023-01-13 PROCEDURE — 21400001 HC TELEMETRY ROOM

## 2023-01-13 PROCEDURE — 85025 COMPLETE CBC W/AUTO DIFF WBC: CPT | Performed by: INTERNAL MEDICINE

## 2023-01-13 PROCEDURE — 25000003 PHARM REV CODE 250: Performed by: PHYSICIAN ASSISTANT

## 2023-01-13 RX ORDER — PROCHLORPERAZINE EDISYLATE 5 MG/ML
5 INJECTION INTRAMUSCULAR; INTRAVENOUS EVERY 6 HOURS PRN
Status: DISCONTINUED | OUTPATIENT
Start: 2023-01-13 | End: 2023-02-10 | Stop reason: HOSPADM

## 2023-01-13 RX ORDER — ONDANSETRON 2 MG/ML
4 INJECTION INTRAMUSCULAR; INTRAVENOUS EVERY 6 HOURS PRN
Status: DISCONTINUED | OUTPATIENT
Start: 2023-01-13 | End: 2023-02-10 | Stop reason: HOSPADM

## 2023-01-13 RX ORDER — MIRTAZAPINE 15 MG/1
15 TABLET, FILM COATED ORAL NIGHTLY
Status: DISCONTINUED | OUTPATIENT
Start: 2023-01-13 | End: 2023-02-10 | Stop reason: HOSPADM

## 2023-01-13 RX ORDER — ENOXAPARIN SODIUM 100 MG/ML
30 INJECTION SUBCUTANEOUS EVERY 24 HOURS
Status: DISCONTINUED | OUTPATIENT
Start: 2023-01-13 | End: 2023-01-16

## 2023-01-13 RX ADMIN — ONDANSETRON 4 MG: 4 TABLET, ORALLY DISINTEGRATING ORAL at 07:01

## 2023-01-13 RX ADMIN — PROCHLORPERAZINE EDISYLATE 5 MG: 5 INJECTION INTRAMUSCULAR; INTRAVENOUS at 04:01

## 2023-01-13 RX ADMIN — SODIUM BICARBONATE: 84 INJECTION, SOLUTION INTRAVENOUS at 10:01

## 2023-01-13 RX ADMIN — MIRTAZAPINE 15 MG: 15 TABLET, FILM COATED ORAL at 09:01

## 2023-01-13 RX ADMIN — PROCHLORPERAZINE EDISYLATE 5 MG: 5 INJECTION INTRAMUSCULAR; INTRAVENOUS at 09:01

## 2023-01-13 RX ADMIN — ZINC SULFATE 220 MG (50 MG) CAPSULE 220 MG: CAPSULE at 09:01

## 2023-01-13 RX ADMIN — ONDANSETRON 4 MG: 2 INJECTION INTRAMUSCULAR; INTRAVENOUS at 09:01

## 2023-01-13 RX ADMIN — PANTOPRAZOLE SODIUM 40 MG: 40 TABLET, DELAYED RELEASE ORAL at 09:01

## 2023-01-13 RX ADMIN — ENOXAPARIN SODIUM 30 MG: 30 INJECTION SUBCUTANEOUS at 09:01

## 2023-01-13 RX ADMIN — TAMSULOSIN HYDROCHLORIDE 0.4 MG: 0.4 CAPSULE ORAL at 09:01

## 2023-01-13 RX ADMIN — BISACODYL 10 MG: 10 SUPPOSITORY RECTAL at 02:01

## 2023-01-13 NOTE — NURSING
Nurses Note -- 4 Eyes      1/12/2023   8:36 PM      Skin assessed during: Admit      [x] No Pressure Injuries Present    [x]Prevention Measures Documented      [] Yes- Altered Skin Integrity Present or Discovered   [] LDA Added if Not in Epic (Describe Wound)   [] New Altered Skin Integrity was Present on Admit and Documented in LDA   [] Wound Image Taken    Wound Care Consulted? No    Attending Nurse:  Gennaro Tobar RN     Second RN/Staff Member:  YENI Huerta   Patient has a biliary drain to RUQ with dressing in place. Clean, dry, and intact.

## 2023-01-13 NOTE — PROGRESS NOTES
"Inpatient Nutrition Assessment    Admit Date: 1/11/2023   Total duration of encounter: 2 days     Nutrition Recommendation/Prescription     Consider regular diet (to promote intake).  Add Boost Plus (provides 360 kcal, 14 g protein per serving).  Encouraged intake.  Consider appetite stimulant medication.  May benefit from feeding tube for additional nutrition support (consider Impact Peptide 1.5 250 ml QID or continuous rate of 50 ml/hr x 20 hours).    Communication of Recommendations: reviewed with patient/caregiver and reviewed with nurse    Nutrition Assessment     Malnutrition Assessment/Nutrition-Focused Physical Exam    Malnutrition in the context of chronic illness  Degree of Malnutrition: severe malnutrition  Energy Intake: < 75% of estimated energy requirement for >/= 1 month  Interpretation of Weight Loss: >7.5% in 3 months  Body Fat: severe depletion  Area of Body Fat Loss: orbital region  and upper arm region - triceps / biceps  Muscle Mass Loss: severe depletion  Area of Muscle Mass Loss: temple region - temporalis muscle and clavicle bone region - pectoralis major, deltoid, trapezius muscles  Fluid Accumulation: does not meet criteria  Edema: unable to obtain  Reduced  Strength: unable to obtain  A minimum of two characteristics is recommended for diagnosis of either severe or non-severe malnutrition.    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  Transaminitis   Hyperbilirubinemia  COVID-19 infection  TERRENCE   Hyperkalemia  Hyponatremia  Anemia    Relevant Medical History:   Essential hypertension  Hyperlipidemia  CAD  Obstructive jaundice   Duodenal " "cancer    Nutrition-Related Medications: sodium bicarbonate, ergocalciferol, pantoprazole, zinc sulfate  Calorie Containing IV Medications: no significant kcals from medications at this time    Nutrition-Related Labs:  23 Na 130, Cl 112, GFR 39, Glu 143, , Alb 2.4    Diet/PN Order: Diet heart healthy  Oral Supplement Order: none  Tube Feeding Order: none  Appetite/Oral Intake: poor/0-25% of meals  Factors Affecting Nutritional Intake: decreased appetite  Food/Taoist/Cultural Preferences: none reported  Food Allergies: none reported    Skin Integrity: drain/device(s)  Wound(s):   biliary tube noted    Comments    23 Patient reports decreased/poor appetite currently and prior to admission, reports significant weight loss over the past few months, agreeable to chocolate Boost, reports awaiting Whipple procedure as outpatient.    Anthropometrics    Height: 5' 9" (175.3 cm) Height Method: Stated  Last Weight: 69.3 kg (152 lb 12.5 oz) (23 1230) Weight Method: Bed Scale  BMI (Calculated): 22.6  BMI Classification: normal (BMI 18.5-24.9)        Ideal Body Weight (IBW), Male: 160 lb     % Ideal Body Weight, Male (lb): 95.49 %                 Usual Body Weight (UBW), k kg  % Usual Body Weight: 80.75     Usual Weight Provided By: patient    Wt Readings from Last 5 Encounters:   23 69.3 kg (152 lb 12.5 oz)   22 75.8 kg (167 lb)   22 79.8 kg (176 lb)     Weight Change(s) Since Admission:  Admit Weight: 70.3 kg (155 lb) (23 2320)  69.3 kg (23)    Estimated Needs    Weight Used For Calorie Calculations: 69.3 kg (152 lb 12.5 oz)  Energy Calorie Requirements (kcal): 1943 kcal/d, 1.4 stress factor  Energy Need Method: [facility] [employer]  Weight Used For Protein Calculations: 69.3 kg (152 lb 12.5 oz)  Protein Requirements: 104 g/d, 1.5 g/kg  Fluid Requirements (mL): 1943 ml/d, 1 ml/kcal  Temp: 97.4 °F (36.3 °C)       Enteral Nutrition    Patient not receiving enteral nutrition at " this time.    Parenteral Nutrition    Patient not receiving parenteral nutrition support at this time.    Evaluation of Received Nutrient Intake    Calories: not meeting estimated needs  Protein: not meeting estimated needs    Patient Education    Not applicable.    Nutrition Diagnosis     PES: Malnutrition related to cancer as evidenced by <75% needs met >1 month, severe fat depletion, severe muscle depletion, and >7.5% weight loss in 3 months. (new)    Interventions/Goals     Intervention(s): general/healthful diet, commercial beverage, and collaboration with other providers  Goal: Meet greater than 75% of nutritional needs by follow-up. (new)    Monitoring & Evaluation     Dietitian will monitor food and beverage intake.  Nutrition Risk/Follow-Up: high (follow-up in 1-4 days)   Please consult if re-assessment needed sooner.

## 2023-01-13 NOTE — CONSULTS
Consults    Chief complaint: DUODENAL ADENOCARCINOMA WITH BILIARY OBSTRUCTION, S/P EXTERNAL BILIARY CATH PLACEMENT, RECENT COVID DIAGNOSIS    HPI: 82 YEAR OLD MALE WELL KNOWN TO US WHO WAS SCHEDULED FOR WHIPPLE PROCEDURE NEXT WEEK; HE HAS HAD SEVERAL ADMISSIONS DUE TO WEAKNESS, NAUSEA, DEHYDRATION, AND HYPERBILIRUBINEMIA WITH RECENT EXT DRAIN PLACEMENT; DESPITE EFFORTS AT HOME PT REPORTS HAS BEEN UNABLE TO EAT AND HOLD DOWN LIQUIDS    HE HAS SOME ACIDOSIS AND IS ON BICARB DRIP; HE BILIRUBIN IS 13, IT WAS 11 ON RECENT DISCHARGE; WBC IS NORMAL AT 8000; HH 10/32; BILIARY DRAIN APPEARS TO BE WORKING, OUTPUT 500CC/NIGHTS    WE HAVE BEEN ASKED TO SEE PT TO DISCUSS PLAN REGARDING SURGERY WITH RECENT EVENTS OF HOSPITALIZATION    Past Medical and Surgical History    Allergies :  Patient has no known allergies.  @[facility]@    Medical :  He has a past medical history of Atherosclerosis of native arteries of extremities with intermittent claudication, unspecified extremity, Coronary artery disease, Dyslipidemia, and Hypertension.    Surgical :  He has a past surgical history that includes Tonsillectomy; Amputation (Right); Left heart catheterization; Carotid stent; ERCP (N/A, 12/21/2022); and ercp, with biopsy (12/21/2022).    Family History  His family history is not on file.    Social History  He reports that he has never smoked. He has never used smokeless tobacco. He reports that he does not drink alcohol and does not use drugs.    Review of Systems   Constitutional:  Positive for fatigue.   HENT: Negative.     Eyes: Negative.    Respiratory: Negative.     Cardiovascular: Negative.    Gastrointestinal:  Positive for nausea and vomiting.   Endocrine: Negative.    Genitourinary: Negative.    Musculoskeletal: Negative.    Skin:  Positive for color change.   Allergic/Immunologic: Negative.    Neurological: Negative.    Hematological: Negative.    Psychiatric/Behavioral: Negative.         Objective     Physical  Exam  Constitutional:       Appearance: Normal appearance.   HENT:      Mouth/Throat:      Mouth: Mucous membranes are moist.   Cardiovascular:      Rate and Rhythm: Normal rate and regular rhythm.      Pulses: Normal pulses.      Heart sounds: Normal heart sounds.   Pulmonary:      Effort: Pulmonary effort is normal.      Breath sounds: Normal breath sounds.   Abdominal:      General: Abdomen is flat. There is no distension.      Palpations: Abdomen is soft.      Tenderness: There is no abdominal tenderness. There is no guarding.   Musculoskeletal:         General: Normal range of motion.      Cervical back: Normal range of motion and neck supple.   Skin:     Coloration: Skin is jaundiced.   Neurological:      General: No focal deficit present.      Mental Status: He is alert.   Psychiatric:         Mood and Affect: Mood normal.         Behavior: Behavior normal.         Thought Content: Thought content normal.         Judgment: Judgment normal.       @[facility]@    Assessment & Plan     There are no hospital problems to display for this patient.      IMPRESSION: DUODENAL ADENO WITH SEVERE DEHYDRATION, TERRENCE, AND RECENT COVID DIAGNOSIS S/P EXT BILIARY DRAIN    PLAN: IN THE FACE OF COVID DIAGNOSIS AS WELL AS SIGNIFICANT DEHYDRATION/ACIDOSIS PT WILL NOT HAVE WHIPPLE PROCEDURE NEXT WEEK AS HE NEEDS MEDICAL MANAGEMENT AND IMPROVEMENT PRIOR TO UNDERGOING A SURGERY OF THAT MAGNITUDE; ALL DISCUSSED WITH PT AT LENGTH AND QUESTIONS ANSWERED; WE WILL REVISIT WITH PT NEXT WEEK TO DISCUSS FURTHER AND HE IS AWARE THAT IT WILL BE WEEKS BEFORE SURGERY COULD BE RECONSIDERED    THANK YOU, CALL WITH ANY PROBLEMS

## 2023-01-13 NOTE — PLAN OF CARE
Problem: Adult Inpatient Plan of Care  Goal: Plan of Care Review  1/12/2023 2016 by Gennaro Tobar RN  Outcome: Ongoing, Progressing  1/12/2023 2015 by Gennaro Tobar RN  Flowsheets (Taken 1/12/2023 2015)  Plan of Care Reviewed With: patient  Goal: Patient-Specific Goal (Individualized)  Outcome: Ongoing, Progressing  Goal: Absence of Hospital-Acquired Illness or Injury  Outcome: Ongoing, Progressing  Goal: Optimal Comfort and Wellbeing  Outcome: Ongoing, Progressing  Goal: Readiness for Transition of Care  Outcome: Ongoing, Progressing

## 2023-01-13 NOTE — NURSING
Notified Dr. Marrero that attempts was made to consult Dr. Brown, however he is not on call at this time. She said that we will re evaluate tomorrow.

## 2023-01-14 LAB
ALBUMIN SERPL-MCNC: 2.2 G/DL (ref 3.4–4.8)
ALBUMIN/GLOB SERPL: 1 RATIO (ref 1.1–2)
ALP SERPL-CCNC: 185 UNIT/L (ref 40–150)
ALT SERPL-CCNC: 63 UNIT/L (ref 0–55)
AST SERPL-CCNC: 45 UNIT/L (ref 5–34)
BASOPHILS # BLD AUTO: 0.04 X10(3)/MCL (ref 0–0.2)
BASOPHILS NFR BLD AUTO: 0.3 %
BILIRUBIN DIRECT+TOT PNL SERPL-MCNC: 10.9 MG/DL
BUN SERPL-MCNC: 53.6 MG/DL (ref 8.4–25.7)
CALCIUM SERPL-MCNC: 8.7 MG/DL (ref 8.8–10)
CHLORIDE SERPL-SCNC: 103 MMOL/L (ref 98–107)
CO2 SERPL-SCNC: 19 MMOL/L (ref 23–31)
CREAT SERPL-MCNC: 1.81 MG/DL (ref 0.73–1.18)
CRP SERPL-MCNC: 3.7 MG/L
D DIMER PPP IA.FEU-MCNC: 0.53 UG/ML FEU (ref 0–0.5)
EOSINOPHIL # BLD AUTO: 0.01 X10(3)/MCL (ref 0–0.9)
EOSINOPHIL NFR BLD AUTO: 0.1 %
ERYTHROCYTE [DISTWIDTH] IN BLOOD BY AUTOMATED COUNT: 20 % (ref 11.5–17)
GFR SERPLBLD CREATININE-BSD FMLA CKD-EPI: 37 MLS/MIN/1.73/M2
GLOBULIN SER-MCNC: 2.3 GM/DL (ref 2.4–3.5)
GLUCOSE SERPL-MCNC: 147 MG/DL (ref 82–115)
HCT VFR BLD AUTO: 24.1 % (ref 42–52)
HGB BLD-MCNC: 8.2 GM/DL (ref 14–18)
IMM GRANULOCYTES # BLD AUTO: 0.09 X10(3)/MCL (ref 0–0.04)
IMM GRANULOCYTES NFR BLD AUTO: 0.7 %
LYMPHOCYTES # BLD AUTO: 2.1 X10(3)/MCL (ref 0.6–4.6)
LYMPHOCYTES NFR BLD AUTO: 17.2 %
MCH RBC QN AUTO: 29.2 PG
MCHC RBC AUTO-ENTMCNC: 34 MG/DL (ref 33–36)
MCV RBC AUTO: 85.8 FL (ref 80–94)
MONOCYTES # BLD AUTO: 1.08 X10(3)/MCL (ref 0.1–1.3)
MONOCYTES NFR BLD AUTO: 8.8 %
NEUTROPHILS # BLD AUTO: 8.89 X10(3)/MCL (ref 2.1–9.2)
NEUTROPHILS NFR BLD AUTO: 72.9 %
NRBC BLD AUTO-RTO: 0 %
PLATELET # BLD AUTO: 253 X10(3)/MCL (ref 130–400)
PMV BLD AUTO: 10.8 FL (ref 7.4–10.4)
POCT GLUCOSE: 133 MG/DL (ref 70–110)
POCT GLUCOSE: 140 MG/DL (ref 70–110)
POCT GLUCOSE: 152 MG/DL (ref 70–110)
POCT GLUCOSE: 166 MG/DL (ref 70–110)
POCT GLUCOSE: 179 MG/DL (ref 70–110)
POTASSIUM SERPL-SCNC: 4.1 MMOL/L (ref 3.5–5.1)
PROT SERPL-MCNC: 4.5 GM/DL (ref 5.8–7.6)
RBC # BLD AUTO: 2.81 X10(6)/MCL (ref 4.7–6.1)
SODIUM SERPL-SCNC: 132 MMOL/L (ref 136–145)
WBC # SPEC AUTO: 12.2 X10(3)/MCL (ref 4.5–11.5)

## 2023-01-14 PROCEDURE — 25000003 PHARM REV CODE 250: Performed by: INTERNAL MEDICINE

## 2023-01-14 PROCEDURE — C1751 CATH, INF, PER/CENT/MIDLINE: HCPCS

## 2023-01-14 PROCEDURE — 85025 COMPLETE CBC W/AUTO DIFF WBC: CPT | Performed by: INTERNAL MEDICINE

## 2023-01-14 PROCEDURE — 85379 FIBRIN DEGRADATION QUANT: CPT | Performed by: INTERNAL MEDICINE

## 2023-01-14 PROCEDURE — 36415 COLL VENOUS BLD VENIPUNCTURE: CPT | Performed by: INTERNAL MEDICINE

## 2023-01-14 PROCEDURE — 27000207 HC ISOLATION

## 2023-01-14 PROCEDURE — 36410 VNPNXR 3YR/> PHY/QHP DX/THER: CPT

## 2023-01-14 PROCEDURE — 63600175 PHARM REV CODE 636 W HCPCS: Performed by: INTERNAL MEDICINE

## 2023-01-14 PROCEDURE — 21400001 HC TELEMETRY ROOM

## 2023-01-14 PROCEDURE — C9113 INJ PANTOPRAZOLE SODIUM, VIA: HCPCS | Performed by: INTERNAL MEDICINE

## 2023-01-14 PROCEDURE — 87040 BLOOD CULTURE FOR BACTERIA: CPT | Performed by: INTERNAL MEDICINE

## 2023-01-14 PROCEDURE — 25000003 PHARM REV CODE 250: Performed by: NURSE PRACTITIONER

## 2023-01-14 PROCEDURE — 25000003 PHARM REV CODE 250: Performed by: PHYSICIAN ASSISTANT

## 2023-01-14 PROCEDURE — 80053 COMPREHEN METABOLIC PANEL: CPT | Performed by: INTERNAL MEDICINE

## 2023-01-14 RX ORDER — SODIUM CHLORIDE 0.9 % (FLUSH) 0.9 %
10 SYRINGE (ML) INJECTION
Status: DISCONTINUED | OUTPATIENT
Start: 2023-01-14 | End: 2023-02-10 | Stop reason: HOSPADM

## 2023-01-14 RX ORDER — PANTOPRAZOLE SODIUM 40 MG/10ML
40 INJECTION, POWDER, LYOPHILIZED, FOR SOLUTION INTRAVENOUS DAILY
Status: DISCONTINUED | OUTPATIENT
Start: 2023-01-14 | End: 2023-01-16

## 2023-01-14 RX ORDER — VANCOMYCIN HCL IN 5 % DEXTROSE 1G/250ML
1000 PLASTIC BAG, INJECTION (ML) INTRAVENOUS
Status: DISCONTINUED | OUTPATIENT
Start: 2023-01-14 | End: 2023-01-15

## 2023-01-14 RX ORDER — SODIUM CHLORIDE 0.9 % (FLUSH) 0.9 %
10 SYRINGE (ML) INJECTION EVERY 6 HOURS
Status: DISCONTINUED | OUTPATIENT
Start: 2023-01-15 | End: 2023-02-10 | Stop reason: HOSPADM

## 2023-01-14 RX ADMIN — PANTOPRAZOLE SODIUM 40 MG: 40 TABLET, DELAYED RELEASE ORAL at 09:01

## 2023-01-14 RX ADMIN — ZINC SULFATE 220 MG (50 MG) CAPSULE 220 MG: CAPSULE at 09:01

## 2023-01-14 RX ADMIN — TAMSULOSIN HYDROCHLORIDE 0.4 MG: 0.4 CAPSULE ORAL at 09:01

## 2023-01-14 RX ADMIN — ENOXAPARIN SODIUM 30 MG: 30 INJECTION SUBCUTANEOUS at 05:01

## 2023-01-14 RX ADMIN — SODIUM BICARBONATE: 84 INJECTION, SOLUTION INTRAVENOUS at 11:01

## 2023-01-14 RX ADMIN — ONDANSETRON 4 MG: 4 TABLET, ORALLY DISINTEGRATING ORAL at 05:01

## 2023-01-14 RX ADMIN — VANCOMYCIN HYDROCHLORIDE 1000 MG: 1 INJECTION, POWDER, LYOPHILIZED, FOR SOLUTION INTRAVENOUS at 09:01

## 2023-01-14 RX ADMIN — MIRTAZAPINE 15 MG: 15 TABLET, FILM COATED ORAL at 09:01

## 2023-01-14 RX ADMIN — MECLIZINE 12.5 MG: 12.5 TABLET ORAL at 09:01

## 2023-01-14 RX ADMIN — PANTOPRAZOLE SODIUM 40 MG: 40 INJECTION, POWDER, FOR SOLUTION INTRAVENOUS at 11:01

## 2023-01-14 NOTE — PLAN OF CARE
Problem: Adult Inpatient Plan of Care  Goal: Plan of Care Review  1/13/2023 2351 by Carlie Xavier RN  Outcome: Ongoing, Progressing  1/13/2023 2348 by Carlie Xavier RN  Outcome: Ongoing, Progressing  Goal: Optimal Comfort and Wellbeing  1/13/2023 2351 by Carlie Xavier RN  Outcome: Ongoing, Progressing  1/13/2023 2348 by Carlie Xavier RN  Outcome: Ongoing, Progressing     Problem: Fall Injury Risk  Goal: Absence of Fall and Fall-Related Injury  1/13/2023 2351 by Carlie Xavier RN  Outcome: Ongoing, Progressing  1/13/2023 2348 by Carlie Xavier RN  Outcome: Ongoing, Progressing

## 2023-01-14 NOTE — PROGRESS NOTES
[facility] Hospital Medicine Progress Note        Chief Complaint: Inpatient Follow-up for weakness, metabolic acidosis    HPI:   Quincy Triplett is a 82 y.o. male with a past medical history of essential hypertension, hyperlipidemia, CAD, obstructive jaundice, and duodenal cancer presented to [facility] on 1/11/2023 for weakness.  Patient reports weakness, decreased appetite, decreased urine output, and nausea began 3 days ago. Patient reports recent biliary drain placement on 12/21/2022 with Dr. Brown and was discharged 3 days ago.  Patient states he has whipple procedure scheduled soon by Dr. Brown. Patient denies fever, chills, cough, congestion, chest pain, shortness of breath, abdominal pain, vomiting, diarrhea, hematuria, and dysuria.   Initial vital signs in ED were /78, pulse 85, respirations 18, temperature 36.6° C, and SpO2 98%.  Labs revealed WBC 7.9, RBC 4, hemoglobin 11.6, hematocrit 35.6, sodium 130, chloride 108, CO2 11, BUN 42.3, creatinine 2.79, phosphorus 5.2, alkaline phosphatase 376, albumin 2.6, bilirubin total 13.7, AST 76, , lipase 85, and troponin undetectable.  Flu testing was negative.  COVID testing was positive.  UA revealed 1+ protein, cloudy appearance, positive nitrate, 3+ bilirubin, 1+ leukocytes, and 7 RBCs.  Patient was given 1 L NS, IV calcium gluconate 1 g, 5 units insulin IV, sodium bicarb 50 mEq, and Lokelma 10 g.  Patient was admitted to hospital medicine service for further medical management.     Interval Hx:   Patient was seen and examined at bedside this morning.  He reported severe generalized weakness with  poor appetite along with nausea.  Biliary drain output documented 1200 cc.  Urine output 900 cc.  Patient was started on bicarb drip last night.  Patient remained afebrile, hemodynamically stable.  Labs revealed stable hemoglobin, BUN, bicarb  worse than prior but creatinine improved.    Objective/physical exam:  General:   Ill-appearing, generalized yellowish skin discoloration   Chest:  Decreased breath sounds due to poor effort but otherwise clear    Heart: RRR  Abdomen: Soft, nontender, biliary drain noted    Neurologic: Alert and oriented no focal neuro deficit    VITAL SIGNS: 24 HRS MIN & MAX LAST   Temp  Min: 97.4 °F (36.3 °C)  Max: 97.8 °F (36.6 °C) 97.4 °F (36.3 °C)   BP  Min: 84/56  Max: 131/96 (!) 131/96     Pulse  Min: 68  Max: 183  (!) 183     Resp  Min: 16  Max: 18 16   SpO2  Min: 97 %  Max: 100 % 97 %       Recent Labs   Lab 01/12/23  0016 01/12/23  0415 01/13/23  0449   WBC 9.5 7.9 8.4   RBC 4.89 4.00* 3.55*   HGB 14.2 11.6* 10.3*   HCT 43.7 35.6* 32.0*   MCV 89.4 89.0 90.1   MCH 29.0 29.0 29.0   MCHC 32.5* 32.6* 32.2*   RDW 20.0* 19.8* 20.0*    235 256   MPV 10.4 9.9 10.4       Recent Labs   Lab 01/12/23  0016 01/12/23 0415 01/12/23 1958 01/13/23  0830 01/13/23  0946 01/13/23  1836   * 130* 131* 130* 131* 129*   K 5.6* 4.8 5.1 4.9  --   --    CO2 11* 11* 9* 11*  --   --    BUN 42.3* 42.0* 47.4* 51.8*  --   --    CREATININE 2.79* 2.16* 1.81* 1.72*  --   --    CALCIUM 10.9* 9.5 9.3 9.3  --   --    MG 2.50 2.20  --   --   --   --    ALBUMIN 3.4 2.6*  --  2.4*  --   --    ALKPHOS 376* 282*  --  240*  --   --    * 80*  --  71*  --   --    AST 76* 57*  --  54*  --   --    BILITOT 17.9* 13.7*  --  12.5*  --   --           Microbiology Results (last 7 days)       Procedure Component Value Units Date/Time    Blood Culture [[id number]]  (Normal) Collected: 01/12/23 1032    Order Status: Completed Specimen: Blood Updated: 01/13/23 1200     CULTURE, BLOOD ([facility]) No Growth At 24 Hours    Blood Culture [[id number]]  (Normal) Collected: 01/12/23 1032    Order Status: Completed Specimen: Blood Updated: 01/13/23 1200     CULTURE, BLOOD ([facility]) No Growth At 24 Hours             See below for Radiology    Scheduled Med:   ergocalciferol  50,000 Units Oral Q7 Days    pantoprazole  40 mg Oral Daily    tamsulosin  0.4 mg  Oral Daily    zinc sulfate  220 mg Oral Daily        Continuous Infusions:   sodium bicarbonate drip 100 mL/hr at 01/12/23 2238        PRN Meds:  albuterol, bisacodyL, dextrose 10%, dextrose 10%, meclizine, morphine, ondansetron, ondansetron, prochlorperazine       Assessment/Plan:  Failure to thrive  Acute COVID-19 Infection-NM scan low PE probability  TERRENCE -prerenal etiology urine sodium less than 20  Acute severe Metabolic acidosis due to renal failure  Hyperkalemia,Hyponatremia due to above  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 intake.  -metabolic acidosis seems to be worsening, BUN worsening doubt improvement in creatinine is error.  -continue IV bicarb drip   -monitor urine output, need accurate output  -we will consider Nephrology consultation if renal function worsens or acidosis worsens  -reviewed renal ultrasound.  No obstructive pathology.  -reviewed urine studies, urine sodium less than 20 prerenal etiology  -appreciate surgical oncology input.  Patient is not a surgical candidate at this time.  Planned Whipple's surgery will be postponed.  -appreciate Gastroenterology input.  conservative management   -biliary drain in place, output noted  -encourage p.o. intake if unable to tolerate might need feeding tube  -follow nutrition recommendations  -we will start the patient on mirtazapine  -obtain physical therapy eval   -patient relatively stable from the respiratory standpoint, nuclear scan with low probability for PE. , no oxygen requirements.  -FOBT pending, monitor hemoglobin we will start on Lovenox 30 given high-risk for clotting.  -labs in the  a.m.  -continue to monitor on tele      VTE prophylaxis:  Lovenox     Patient condition:  Fair    Anticipated discharge and Disposition:   Once clinically stable    Critical care time spent:  45 minutes   Critical care diagnosis:  Metabolic acidosis needing IV bicarb  All diagnosis and differential diagnosis have been reviewed; assessment and plan has been documented; I have personally reviewed the labs and test results that are presently available; I have reviewed the patients medication list; I have reviewed the consulting providers response and recommendations. I have reviewed or attempted to review medical records based upon their availability    All of the patient's questions have been  addressed and answered. Patient's is agreeable to the above stated plan. I will continue to monitor closely and make adjustments to medical management as needed.  _____________________________________________________________________    Nutrition Status:    Radiology:  NM Lung Scan Perfusion Particulate  Narrative: EXAMINATION:  NM LUNG SCAN PERFUSION    CLINICAL HISTORY:  Pulmonary embolism (PE) suspected, positive D-dimer;Positive D-Dimer, Covid +;    TECHNIQUE:  After intravenous administration of 6.1 millicuries of technetium 99m  MAA perfusion imaging was performed in multiple projections.  Ventilation imaging was not performed.    COMPARISON:  None    FINDINGS:  There are no perfusion defects noted.  There is symmetric bilateral perfusion seen.  No segmental or subsegmental defect is noted.  Impression: No perfusion defects noted consistent with low probability for pulmonary embolism    Electronically signed by: Clau Santacruz  Date:    01/13/2023  Time:    13:08  US Retroperitoneal Complete  Narrative: EXAMINATION:  US RETROPERITONEAL COMPLETE    CLINICAL HISTORY:  juno;    COMPARISON:  CT 3 January 2023    FINDINGS:  Grayscale and color Doppler sonographic evaluation of the kidneys and urinary bladder.    The right  kidney measures 10 cm.  There is a pelvic cyst of the right kidney which was also seen on CT.  No follow-up is needed.  No convincing hydronephrosis right kidney.    The left kidney measures 8 cm in length.  Grossly normal renal parenchymal echogenicity.  No hydronephrosis.    No significant abnormality of the urinary bladder.  Impression: No significant sonographic abnormality of the kidneys.    Electronically signed by: Quincy Roberts  Date:    01/13/2023  Time:    08:32      Ashwini Mcmanus MD   01/13/2023

## 2023-01-15 LAB
ALBUMIN SERPL-MCNC: 2.1 G/DL (ref 3.4–4.8)
ALBUMIN/GLOB SERPL: 0.9 RATIO (ref 1.1–2)
ALP SERPL-CCNC: 170 UNIT/L (ref 40–150)
ALT SERPL-CCNC: 60 UNIT/L (ref 0–55)
AST SERPL-CCNC: 45 UNIT/L (ref 5–34)
BACTERIA BLD CULT: ABNORMAL
BASOPHILS # BLD AUTO: 0.05 X10(3)/MCL (ref 0–0.2)
BASOPHILS NFR BLD AUTO: 0.6 %
BILIRUBIN DIRECT+TOT PNL SERPL-MCNC: 10.6 MG/DL
BUN SERPL-MCNC: 59.2 MG/DL (ref 8.4–25.7)
CALCIUM SERPL-MCNC: 8.5 MG/DL (ref 8.8–10)
CHLORIDE SERPL-SCNC: 99 MMOL/L (ref 98–107)
CO2 SERPL-SCNC: 20 MMOL/L (ref 23–31)
CREAT SERPL-MCNC: 1.76 MG/DL (ref 0.73–1.18)
EOSINOPHIL # BLD AUTO: 0.13 X10(3)/MCL (ref 0–0.9)
EOSINOPHIL NFR BLD AUTO: 1.6 %
ERYTHROCYTE [DISTWIDTH] IN BLOOD BY AUTOMATED COUNT: 19.8 % (ref 11.5–17)
FERRITIN SERPL-MCNC: 400.98 NG/ML (ref 21.81–274.66)
GFR SERPLBLD CREATININE-BSD FMLA CKD-EPI: 38 MLS/MIN/1.73/M2
GLOBULIN SER-MCNC: 2.3 GM/DL (ref 2.4–3.5)
GLUCOSE SERPL-MCNC: 121 MG/DL (ref 82–115)
GRAM STN SPEC: ABNORMAL
HCT VFR BLD AUTO: 22.1 % (ref 42–52)
HGB BLD-MCNC: 7.4 GM/DL (ref 14–18)
IMM GRANULOCYTES # BLD AUTO: 0.11 X10(3)/MCL (ref 0–0.04)
IMM GRANULOCYTES NFR BLD AUTO: 1.4 %
IRON SATN MFR SERPL: 27 % (ref 20–50)
IRON SERPL-MCNC: 47 UG/DL (ref 65–175)
LYMPHOCYTES # BLD AUTO: 2.12 X10(3)/MCL (ref 0.6–4.6)
LYMPHOCYTES NFR BLD AUTO: 26.2 %
MCH RBC QN AUTO: 28.9 PG
MCHC RBC AUTO-ENTMCNC: 33.5 MG/DL (ref 33–36)
MCV RBC AUTO: 86.3 FL (ref 80–94)
MONOCYTES # BLD AUTO: 0.69 X10(3)/MCL (ref 0.1–1.3)
MONOCYTES NFR BLD AUTO: 8.5 %
NEUTROPHILS # BLD AUTO: 4.99 X10(3)/MCL (ref 2.1–9.2)
NEUTROPHILS NFR BLD AUTO: 61.7 %
NRBC BLD AUTO-RTO: 0 %
PLATELET # BLD AUTO: 232 X10(3)/MCL (ref 130–400)
PMV BLD AUTO: 11.2 FL (ref 7.4–10.4)
POTASSIUM SERPL-SCNC: 3.9 MMOL/L (ref 3.5–5.1)
PROT SERPL-MCNC: 4.4 GM/DL (ref 5.8–7.6)
RBC # BLD AUTO: 2.56 X10(6)/MCL (ref 4.7–6.1)
SODIUM SERPL-SCNC: 130 MMOL/L (ref 136–145)
TIBC SERPL-MCNC: 130 UG/DL (ref 69–240)
TIBC SERPL-MCNC: 177 UG/DL (ref 250–450)
TRANSFERRIN SERPL-MCNC: 146 MG/DL
WBC # SPEC AUTO: 8.1 X10(3)/MCL (ref 4.5–11.5)

## 2023-01-15 PROCEDURE — 36415 COLL VENOUS BLD VENIPUNCTURE: CPT | Performed by: INTERNAL MEDICINE

## 2023-01-15 PROCEDURE — 85025 COMPLETE CBC W/AUTO DIFF WBC: CPT | Performed by: INTERNAL MEDICINE

## 2023-01-15 PROCEDURE — 25000003 PHARM REV CODE 250: Performed by: PHYSICIAN ASSISTANT

## 2023-01-15 PROCEDURE — 82728 ASSAY OF FERRITIN: CPT | Performed by: NURSE PRACTITIONER

## 2023-01-15 PROCEDURE — A4216 STERILE WATER/SALINE, 10 ML: HCPCS | Performed by: INTERNAL MEDICINE

## 2023-01-15 PROCEDURE — 25000003 PHARM REV CODE 250: Performed by: GENERAL PRACTICE

## 2023-01-15 PROCEDURE — 83550 IRON BINDING TEST: CPT | Performed by: NURSE PRACTITIONER

## 2023-01-15 PROCEDURE — 63600175 PHARM REV CODE 636 W HCPCS: Performed by: NURSE PRACTITIONER

## 2023-01-15 PROCEDURE — 25000003 PHARM REV CODE 250: Performed by: INTERNAL MEDICINE

## 2023-01-15 PROCEDURE — C9113 INJ PANTOPRAZOLE SODIUM, VIA: HCPCS | Performed by: INTERNAL MEDICINE

## 2023-01-15 PROCEDURE — 63600175 PHARM REV CODE 636 W HCPCS: Mod: TB | Performed by: GENERAL PRACTICE

## 2023-01-15 PROCEDURE — 21400001 HC TELEMETRY ROOM

## 2023-01-15 PROCEDURE — 80053 COMPREHEN METABOLIC PANEL: CPT | Performed by: INTERNAL MEDICINE

## 2023-01-15 PROCEDURE — 25000003 PHARM REV CODE 250: Performed by: NURSE PRACTITIONER

## 2023-01-15 PROCEDURE — 63600175 PHARM REV CODE 636 W HCPCS: Performed by: INTERNAL MEDICINE

## 2023-01-15 PROCEDURE — 27000207 HC ISOLATION

## 2023-01-15 RX ORDER — ACETAMINOPHEN 325 MG/1
650 TABLET ORAL EVERY 6 HOURS PRN
Status: DISCONTINUED | OUTPATIENT
Start: 2023-01-15 | End: 2023-02-10 | Stop reason: HOSPADM

## 2023-01-15 RX ADMIN — ENOXAPARIN SODIUM 30 MG: 30 INJECTION SUBCUTANEOUS at 04:01

## 2023-01-15 RX ADMIN — SODIUM CHLORIDE 1000 ML: 9 INJECTION, SOLUTION INTRAVENOUS at 02:01

## 2023-01-15 RX ADMIN — SODIUM CHLORIDE, PRESERVATIVE FREE 10 ML: 5 INJECTION INTRAVENOUS at 06:01

## 2023-01-15 RX ADMIN — ZINC SULFATE 220 MG (50 MG) CAPSULE 220 MG: CAPSULE at 08:01

## 2023-01-15 RX ADMIN — TAMSULOSIN HYDROCHLORIDE 0.4 MG: 0.4 CAPSULE ORAL at 08:01

## 2023-01-15 RX ADMIN — MIRTAZAPINE 15 MG: 15 TABLET, FILM COATED ORAL at 09:01

## 2023-01-15 RX ADMIN — PIPERACILLIN AND TAZOBACTAM 4.5 G: 4; .5 INJECTION, POWDER, FOR SOLUTION INTRAVENOUS; PARENTERAL at 11:01

## 2023-01-15 RX ADMIN — ONDANSETRON 4 MG: 2 INJECTION INTRAMUSCULAR; INTRAVENOUS at 08:01

## 2023-01-15 RX ADMIN — SODIUM BICARBONATE: 84 INJECTION, SOLUTION INTRAVENOUS at 07:01

## 2023-01-15 RX ADMIN — REMDESIVIR 200 MG: 100 INJECTION, POWDER, LYOPHILIZED, FOR SOLUTION INTRAVENOUS at 11:01

## 2023-01-15 RX ADMIN — ACETAMINOPHEN 650 MG: 325 TABLET, FILM COATED ORAL at 05:01

## 2023-01-15 RX ADMIN — PANTOPRAZOLE SODIUM 40 MG: 40 INJECTION, POWDER, FOR SOLUTION INTRAVENOUS at 08:01

## 2023-01-15 RX ADMIN — MECLIZINE 12.5 MG: 12.5 TABLET ORAL at 09:01

## 2023-01-15 RX ADMIN — PIPERACILLIN AND TAZOBACTAM 4.5 G: 4; .5 INJECTION, POWDER, FOR SOLUTION INTRAVENOUS; PARENTERAL at 09:01

## 2023-01-15 RX ADMIN — SODIUM BICARBONATE: 84 INJECTION, SOLUTION INTRAVENOUS at 08:01

## 2023-01-15 RX ADMIN — SODIUM CHLORIDE, PRESERVATIVE FREE 10 ML: 5 INJECTION INTRAVENOUS at 12:01

## 2023-01-15 RX ADMIN — ACETAMINOPHEN 650 MG: 325 TABLET, FILM COATED ORAL at 09:01

## 2023-01-15 NOTE — PROGRESS NOTES
Pharmacokinetic Initial Assessment: IV Vancomycin    Assessment/Plan:  Initiate intravenous vancomycin with a maintenance dose of 1000 mg IV every 24 hours.  Desired empiric serum trough concentration is 15 to 20 mcg/mL  Draw vancomycin trough level 60 min prior to third dose on 1/16 at approximately 2100  Pharmacy will continue to follow and monitor vancomycin.      Please contact pharmacy at extension [phone] with any questions regarding this assessment.     Thank you for the consult,   Kerry Flores, BobD       Patient brief summary:  Quincy Triplett is a 82 y.o. male initiated on antimicrobial therapy with IV Vancomycin for treatment of suspected bacteremia    Drug Allergies:   Review of patient's allergies indicates:  No Known Allergies    Actual Body Weight:   69.3 kg    Renal Function:   Estimated Creatinine Clearance: 31.4 mL/min (A) (based on SCr of 1.81 mg/dL (H)).,     Dialysis Method (if applicable):  N/A    CBC (last 72 hours):  Recent Labs   Lab Result Units 01/12/23  0016 01/12/23  0415 01/13/23  0449 01/14/23  0615   WBC x10(3)/mcL 9.5 7.9 8.4 12.2*   Hgb gm/dL 14.2 11.6* 10.3* 8.2*   Hct % 43.7 35.6* 32.0* 24.1*   Platelet x10(3)/mcL 316 235 256 253   Mono % % 7.7 8.6 5.7 8.8   Eos % % 0.6 0.6 0.5 0.1   Basophil % % 0.6 0.5 0.4 0.3       Metabolic Panel (last 72 hours):  Recent Labs   Lab Result Units 01/12/23  0016 01/12/23  0236 01/12/23  0415 01/12/23  1958 01/12/23  2104 01/13/23  0830 01/13/23  0946 01/13/23  1836 01/14/23  0615   Sodium Level mmol/L 128*  --  130* 131*  --  130* 131* 129* 132*   Urine Sodium mmol/L  --   --   --   --  <20.0  --   --   --   --    Potassium Level mmol/L 5.6*  --  4.8 5.1  --  4.9  --   --  4.1   Chloride mmol/L 104  --  108* 114*  --  112*  --   --  103   Carbon Dioxide mmol/L 11*  --  11* 9*  --  11*  --   --  19*   Glucose Level mg/dL 119*  --  93 105  --  143*  --   --  147*   Glucose, UA mg/dL  --  Negative  --   --   --   --   --   --   --    Blood Urea  Nitrogen mg/dL 42.3*  --  42.0* 47.4*  --  51.8*  --   --  53.6*   Creatinine mg/dL 2.79*  --  2.16* 1.81*  --  1.72*  --   --  1.81*   Urine Creatinine mg/dL  --   --   --   --  122.9  --   --   --   --    Albumin Level g/dL 3.4  --  2.6*  --   --  2.4*  --   --  2.2*   Bilirubin Total mg/dL 17.9*  --  13.7*  --   --  12.5*  --   --  10.9*   Alkaline Phosphatase unit/L 376*  --  282*  --   --  240*  --   --  185*   Aspartate Aminotransferase unit/L 76*  --  57*  --   --  54*  --   --  45*   Alanine Aminotransferase unit/L 101*  --  80*  --   --  71*  --   --  63*   Magnesium Level mg/dL 2.50  --  2.20  --   --   --   --   --   --    Phosphorus Level mg/dL  --   --  5.2*  --   --   --   --   --   --        Drug levels (last 3 results):  No results for input(s): VANCOMYCINRA, VANCORANDOM, VANCOMYCINPE, VANCOPEAK, VANCOMYCINTR, VANCOTROUGH in the last 72 hours.    Microbiologic Results:  Microbiology Results (last 7 days)       Procedure Component Value Units Date/Time    Blood Culture [[id number]]     Order Status: Sent Specimen: Blood from Arm, Left     Blood Culture [[id number]]     Order Status: Sent Specimen: Blood from Arm, Right     Blood Culture [[id number]]  (Abnormal) Collected: 01/12/23 1032    Order Status: Completed Specimen: Blood Updated: 01/14/23 1253     CULTURE, BLOOD ([facility]) Staphylococcus capitis     GRAM STAIN Gram Positive Cocci, probable Staphylococcus      Seen in gram stain of broth only      1 of 2 Anaerobic bottles positive    Blood Culture [[id number]]  (Normal) Collected: 01/12/23 1032    Order Status: Completed Specimen: Blood Updated: 01/14/23 1201     CULTURE, BLOOD ([facility]) No Growth At 48 Hours

## 2023-01-15 NOTE — PROGRESS NOTES
[facility] Hospital Medicine Progress Note        Chief Complaint: Inpatient Follow-up for weakness, metabolic acidosis    HPI:   Quincy Triplett is a 82 y.o. male with a past medical history of essential hypertension, hyperlipidemia, CAD, obstructive jaundice, and duodenal cancer presented to [facility] on 1/11/2023 for weakness.  Patient reports weakness, decreased appetite, decreased urine output, and nausea began 3 days ago. Patient reports recent biliary drain placement on 12/21/2022 with Dr. Brown and was discharged 3 days ago.  Patient states he has whipple procedure scheduled soon by Dr. Brown. Patient denies fever, chills, cough, congestion, chest pain, shortness of breath, abdominal pain, vomiting, diarrhea, hematuria, and dysuria.   Initial vital signs in ED were /78, pulse 85, respirations 18, temperature 36.6° C, and SpO2 98%.  Labs revealed WBC 7.9, RBC 4, hemoglobin 11.6, hematocrit 35.6, sodium 130, chloride 108, CO2 11, BUN 42.3, creatinine 2.79, phosphorus 5.2, alkaline phosphatase 376, albumin 2.6, bilirubin total 13.7, AST 76, , lipase 85, and troponin undetectable.  Flu testing was negative.  COVID testing was positive.  UA revealed 1+ protein, cloudy appearance, positive nitrate, 3+ bilirubin, 1+ leukocytes, and 7 RBCs.  Patient was given 1 L NS, IV calcium gluconate 1 g, 5 units insulin IV, sodium bicarb 50 mEq, and Lokelma 10 g.  Patient was admitted to hospital medicine service for further medical management.     Interval Hx:   Patient was seen and examined at bedside.  He reported improved nausea.  Tolerating diet better than yesterday.  But still with significant weakness and reported he has never been this sick.  Remained afebrile but blood pressure becoming soft, labs slowly drifting hemoglobin with leukocytosis, bicarb improved but renal indices still elevated.      Objective/physical exam:  General:  Ill-appearing, generalized yellowish skin  discoloration   Chest:  Decreased breath sounds due to poor effort but otherwise clear    Heart: RRR  Abdomen: Soft, nontender, biliary drain noted with output  Extremities right arm amputation noted, pedal edema   Neurologic: Alert and oriented no focal neuro deficit    VITAL SIGNS: 24 HRS MIN & MAX LAST   Temp  Min: 97.2 °F (36.2 °C)  Max: 98.9 °F (37.2 °C) 98.9 °F (37.2 °C)   BP  Min: 84/49  Max: 126/77 (!) 84/49     Pulse  Min: 88  Max: 94  88     Resp  Min: 18  Max: 18 18   SpO2  Min: 98 %  Max: 100 % 100 %       Recent Labs   Lab 01/12/23  0415 01/13/23  0449 01/14/23  0615   WBC 7.9 8.4 12.2*   RBC 4.00* 3.55* 2.81*   HGB 11.6* 10.3* 8.2*   HCT 35.6* 32.0* 24.1*   MCV 89.0 90.1 85.8   MCH 29.0 29.0 29.2   MCHC 32.6* 32.2* 34.0   RDW 19.8* 20.0* 20.0*    256 253   MPV 9.9 10.4 10.8*       Recent Labs   Lab 01/12/23  0016 01/12/23 0415 01/12/23 1958 01/13/23  0830 01/13/23  0946 01/13/23  1836 01/14/23  0615   * 130* 131* 130* 131* 129* 132*   K 5.6* 4.8 5.1 4.9  --   --  4.1   CO2 11* 11* 9* 11*  --   --  19*   BUN 42.3* 42.0* 47.4* 51.8*  --   --  53.6*   CREATININE 2.79* 2.16* 1.81* 1.72*  --   --  1.81*   CALCIUM 10.9* 9.5 9.3 9.3  --   --  8.7*   MG 2.50 2.20  --   --   --   --   --    ALBUMIN 3.4 2.6*  --  2.4*  --   --  2.2*   ALKPHOS 376* 282*  --  240*  --   --  185*   * 80*  --  71*  --   --  63*   AST 76* 57*  --  54*  --   --  45*   BILITOT 17.9* 13.7*  --  12.5*  --   --  10.9*          Microbiology Results (last 7 days)       Procedure Component Value Units Date/Time    Blood Culture [[id number]]  (Abnormal) Collected: 01/12/23 1032    Order Status: Completed Specimen: Blood Updated: 01/14/23 1253     CULTURE, BLOOD ([facility]) Staphylococcus capitis     GRAM STAIN Gram Positive Cocci, probable Staphylococcus      Seen in gram stain of broth only      1 of 2 Anaerobic bottles positive    Blood Culture [[id number]]  (Normal) Collected: 01/12/23 1032    Order Status: Completed  Specimen: Blood Updated: 01/14/23 1201     CULTURE, BLOOD ([facility]) No Growth At 48 Hours             See below for Radiology    Scheduled Med:   enoxaparin  30 mg Subcutaneous Daily    ergocalciferol  50,000 Units Oral Q7 Days    mirtazapine  15 mg Oral QHS    pantoprazole  40 mg Intravenous Daily    tamsulosin  0.4 mg Oral Daily    zinc sulfate  220 mg Oral Daily        Continuous Infusions:   sodium bicarbonate drip 100 mL/hr at 01/14/23 1133        PRN Meds:  albuterol, bisacodyL, dextrose 10%, dextrose 10%, meclizine, morphine, ondansetron, ondansetron, prochlorperazine       Assessment/Plan:  Failure to thrive  Staph capitis bacteremia  Sepsis  Acute COVID-19 Infection-NM scan low PE probability  TERRENCE -prerenal etiology urine sodium less than 20  Acute severe Metabolic acidosis due to renal failure  Hyperkalemia,Hyponatremia due to above  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    We will obtain ID consult    -metabolic acidosis improving with bicarb drip but renal indices still elevated, urine output still low documented 900 cc in the last 24 hours.  -continue IV bicarb drip, needs strict output  -reviewed renal ultrasound.  No obstructive pathology.  -reviewed urine studies, urine sodium less than 20 prerenal etiology  -FOBT pending, monitor hemoglobin   -labs in the a.m.  -continue to monitor on tele  -encourage p.o. intake   -follow nutrition recommendations  -continue mirtazapine  -appreciate surgical oncology input.  Patient is not a surgical candidate at this time.  Planned Whipple's surgery will be postponed.  -appreciate  Gastroenterology input.  conservative management   -biliary drain in place, output noted    VTE prophylaxis:  Lovenox     Patient condition:  Fair    Anticipated discharge and Disposition:   Once clinically stable    Critical care time spent:  45 minutes   Critical care diagnosis:  Sepsis iv abx  All diagnosis and differential diagnosis have been reviewed; assessment and plan has been documented; I have personally reviewed the labs and test results that are presently available; I have reviewed the patients medication list; I have reviewed the consulting providers response and recommendations. I have reviewed or attempted to review medical records based upon their availability    All of the patient's questions have been  addressed and answered. Patient's is agreeable to the above stated plan. I will continue to monitor closely and make adjustments to medical management as needed.  _____________________________________________________________________    Nutrition Status:    Radiology:  CT Chest Abdomen Pelvis Without Contrast (XPD)  Narrative: EXAMINATION:  CT CHEST ABDOMEN PELVIS WITHOUT CONTRAST(XPD)    CLINICAL HISTORY:  Hematemesis;Weight loss, unintended;    TECHNIQUE:  Low dose axial images, sagittal and coronal reformations were obtained from the thoracic inlet to the pubic symphysis following the IV and oral contrast administration.  Automatic exposure control is utilized to reduce patient radiation exposure.    COMPARISON:  01/03/2023    FINDINGS:  There is a left thyroid nodule.    There is mild bibasilar atelectasis.  The lungs are otherwise clear..  No mass is seen.  No nodule is seen.  No pleural thickening is seen.  No pleural effusion is seen.  No infiltrate is seen.    The thoracic aorta is normal in caliber..    No mediastinal adenopathy is seen.    The heart appears normal in size..    There has been interval placement of a percutaneous transhepatic biliary drain.  The intrahepatic and extrahepatic  biliary dilatation has resolved.  The liver is otherwise unremarkable.  The gallbladder appears normal.  No gallstones are seen.    There is some duodenal wall thickening seen 2nd portion duodenum.  Patient has a known history of adenocarcinoma.    The spleen appears normal.  No splenic mass or lesion is seen.    The pancreas appears grossly unremarkable.  No pancreatic mass or lesion is seen.  No inflammation is seen.    No adrenal abnormality is seen.  No adrenal nodule is seen.    The kidneys are normal in size..  There is a cyst in the lower pole the right kidney.  It appears to be a simple cyst.  No hydronephrosis is seen.  No hydroureter is seen.  No retroperitoneal abnormality is seen..    No diverticulitis is seen.  There has been interval development of some mild wall thickening and perirectal inflammatory change.  Findings may represent proctitis.  No abscess or fluid collection is seen..    No free air is seen.  No free fluid is seen.    Urinary bladder appears unremarkable.    No acute bony abnormality is seen.  Impression: Interval placement of a percutaneous transhepatic biliary drain with interval resolution of the intra and extrahepatic biliary dilatation    Some prominence of the 2nd portion of duodenum consistent with patient's history of adenocarcinoma in that region    Interval development of some wall thickening and inflammatory changes seen involving the perirectal region.  Proctitis should be excluded    Other incidental findings as outlined above    Electronically signed by: Clau Santacruz  Date:    01/14/2023  Time:    13:02      Ashwini Mcmanus MD   01/14/2023

## 2023-01-15 NOTE — CONSULTS
[facility]  Nephrology Consultation  Patient Name: Quincy Triplett  Age: 82 y.o.  : 1940  MRN: [id number]  Admission Date: 2023    Date of Consultation: 1/15/23    Consultation Requested By: Dr Mcmanus     Reason for Consultation: TERRENCE     Chief Complaint: Weakness (Presents with c/o weakness. Onset yesterday.)      History of Present Illness:  Mr Quincy Triplett is a 82 y.o. White male with past medical history of duodenal adenocarcinoma with biliary obstruction s/p external biliary catheter placement on 22. Since the drain was placed he had a difficulty time eating and drinking with nausea and intermittent vomiting. Prior to that he was a good water drinking. The biliary drain puts out at 500-1000mL per day routinely. On  he presented to the ED with weakness and was noted to have TERRENCE with profound metabolic acidosis showing serum bicarb of 9. Prior to December, he had seemingly normal renal function. Renal US was completed showing small left kidney without otherwise normal findings. He denies renal history, dysuria, and NSAID use. He has seen Dr Negron in the past for prostate monitoring. At present he is jaundices with drainage from biliary drain, clinically dry appearing making orange colored urine via external catheter. He is lying nearly supine comfortably watching football on room air. Daughter at bedside. 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     Patient has No Known Allergies.     Review of systems: 12 point review of systems conducted, negative except as stated in the HPI.     Past Medical History:  has a past medical history of Atherosclerosis of native arteries of extremities with intermittent claudication, unspecified extremity, Coronary artery disease, Dyslipidemia, and Hypertension.    Procedure History:  has a past surgical history that includes  "Tonsillectomy; Amputation (Right); Left heart catheterization; Carotid stent; ERCP (N/A, 12/21/2022); and ercp, with biopsy (12/21/2022).    Family History: family history is not on file.    Social History:  reports that he has never smoked. He has never used smokeless tobacco. He reports that he does not drink alcohol and does not use drugs.    Physical Exam:   /63   Pulse 89   Temp 97.9 °F (36.6 °C)   Resp 18   Ht 5' 9" (1.753 m)   Wt 69.3 kg (152 lb 12.5 oz)   SpO2 (!) 94%   BMI 22.56 kg/m²  Body mass index is 22.56 kg/m².    Physical Exam  Constitutional:       Appearance: He is ill-appearing.   HENT:      Head: Atraumatic.      Nose: Nose normal.      Mouth/Throat:      Mouth: Mucous membranes are moist.   Eyes:      General: Scleral icterus present.      Extraocular Movements: Extraocular movements intact.      Conjunctiva/sclera: Conjunctivae normal.   Cardiovascular:      Rate and Rhythm: Normal rate and regular rhythm.      Pulses: Normal pulses.      Heart sounds: Normal heart sounds.   Pulmonary:      Effort: Pulmonary effort is normal.      Breath sounds: Normal breath sounds.   Abdominal:      General: There is no distension.      Palpations: Abdomen is soft.      Comments: External biliary drain    Genitourinary:     Comments: Orange tinged urine   Musculoskeletal:         General: No swelling.      Cervical back: Normal range of motion and neck supple.      Comments: RUE atrophied with prior amputation of forearm amputation, LUE PICC   Skin:     General: Skin is warm.   Neurological:      Mental Status: He is alert.           Inpatient Medications:     Current Facility-Administered Medications:     albuterol inhaler 2 puff, 2 puff, Inhalation, Q6H PRN, Jesús Bansal PA-C    bisacodyL suppository 10 mg, 10 mg, Rectal, Daily PRN, BEN Freed, 10 mg at 01/13/23 1430    dextrose 10% bolus 125 mL 125 mL, 12.5 g, Intravenous, PRN, Yaya Moe IV, MD    dextrose 10% bolus 250 mL " 250 mL, 25 g, Intravenous, PRN, Yaya Moe IV, MD    enoxaparin injection 30 mg, 30 mg, Subcutaneous, Daily, Ashwini Mcmanus MD, 30 mg at 01/14/23 1729    ergocalciferol capsule 50,000 Units, 50,000 Units, Oral, Q7 Days, Jesús Bansal PA-C, 50,000 Units at 01/12/23 1000    meclizine tablet 12.5 mg, 12.5 mg, Oral, TID PRN, Bri Marrero MD, 12.5 mg at 01/14/23 2132    mirtazapine tablet 15 mg, 15 mg, Oral, QHS, Ashwini Mcmanus MD, 15 mg at 01/14/23 2132    morphine injection 2 mg, 2 mg, Intravenous, Q4H PRN, KELVIN Bird    ondansetron disintegrating tablet 4 mg, 4 mg, Oral, Q8H PRN, Bri Marrero MD, 4 mg at 01/14/23 1729    ondansetron injection 4 mg, 4 mg, Intravenous, Q6H PRN, KELVIN Bird, 4 mg at 01/15/23 0817    pantoprazole injection 40 mg, 40 mg, Intravenous, Daily, Ashwini Mcmanus MD, 40 mg at 01/15/23 0817    piperacillin-tazobactam (ZOSYN) 4.5 g in dextrose 5 % in water (D5W) 5 % 100 mL IVPB (MB+), 4.5 g, Intravenous, Q8H, Bebo Mccullough MD, Last Rate: 25 mL/hr at 01/15/23 1133, 4.5 g at 01/15/23 1133    prochlorperazine injection Soln 5 mg, 5 mg, Intravenous, Q6H PRN, KELVIN Bird, 5 mg at 01/13/23 2141    [COMPLETED] remdesivir 200 mg in sodium chloride 0.9% 250 mL infusion, 200 mg, Intravenous, Q24H, Stopped at 01/15/23 1204 **FOLLOWED BY** [START ON 1/16/2023] remdesivir 100 mg in sodium chloride 0.9% 250 mL infusion, 100 mg, Intravenous, Daily, Bebo cMcullough MD    sodium bicarbonate 150 mEq in dextrose 5 % 1,000 mL infusion, , Intravenous, Continuous, KELVIN Bird, Last Rate: 100 mL/hr at 01/14/23 1133, New Bag at 01/14/23 1133    sodium chloride 0.9% bolus 1,000 mL 1,000 mL, 1,000 mL, Intravenous, Once, UNA Khan    Flushing PICC Protocol, , , Until Discontinued **AND** sodium chloride 0.9% flush 10 mL, 10 mL, Intravenous, Q6H, 10 mL at 01/15/23 0626 **AND** sodium chloride 0.9% flush 10 mL, 10 mL, Intravenous, PRN, Ashwini Mcmanus MD    tamsulosin 24  hr capsule 0.4 mg, 0.4 mg, Oral, Daily, Bri Marrero MD, 0.4 mg at 01/15/23 0817    zinc sulfate capsule 220 mg, 220 mg, Oral, Daily, Jesús Bansal PA-C, 220 mg at 01/15/23 0817     Imaging:  CT Chest Abdomen Pelvis Without Contrast (XPD)   Final Result      Interval placement of a percutaneous transhepatic biliary drain with interval resolution of the intra and extrahepatic biliary dilatation      Some prominence of the 2nd portion of duodenum consistent with patient's history of adenocarcinoma in that region      Interval development of some wall thickening and inflammatory changes seen involving the perirectal region.  Proctitis should be excluded      Other incidental findings as outlined above         Electronically signed by: Clau Santacruz   Date:    01/14/2023   Time:    13:02      NM Lung Scan Perfusion Particulate   Final Result      No perfusion defects noted consistent with low probability for pulmonary embolism         Electronically signed by: Clau Santacruz   Date:    01/13/2023   Time:    13:08      US Retroperitoneal Complete   Final Result      No significant sonographic abnormality of the kidneys.         Electronically signed by: Quincy Roberts   Date:    01/13/2023   Time:    08:32      X-Ray Chest AP Portable   Final Result      No acute abnormality of the chest.         Electronically signed by: Zhanna Langley   Date:    01/12/2023   Time:    06:13          Laboratory Data:  Recent Labs   Lab 01/12/23  0415 01/12/23  1958 01/15/23  0320   *   < > 130*   K 4.8   < > 3.9   CO2 11*   < > 20*   BUN 42.0*   < > 59.2*   CREATININE 2.16*   < > 1.76*   GLUCOSE 93   < > 121*   CALCIUM 9.5   < > 8.5*   PHOS 5.2*  --   --     < > = values in this interval not displayed.     Recent Labs   Lab 01/15/23  0320   WBC 8.1   HGB 7.4*   HCT 22.1*            Impression:   TERRENCE secondary to dehydration with extrarenal losses via biliary drain and  intermittent vomiting prior to admission  with markedly decreased oral intake now as compared to prior   Duodenal adenocarcinoma with biliary obstruction s/p external biliary drain placedment on 12/12/23. Originally planned for whipple this coming week  Profound metabolic acidosis improving with replacement   Hyponatremia needs further workup likely secondary to hypovolemia     Plan:   -I will give patient NS 1L bolus now and continue NaBicarb drip as is.   -Renal US completed and reviewed. .  -I will check iron panel, serum ferritin and folic acid in regards to worsening anemia  Repeat lab tomorrow         Rosemarie Yang NP  Nephrology  1/15/2023 2:18 PM

## 2023-01-15 NOTE — PLAN OF CARE
82 year old male patient with history of duodenal cancer and obstructive jaundice, recent placement of hepatic drain and plans for Whipple procedure, presents with weakness, nausea, decreased urine output, found to be COVID-19 positive. Staph epi bacteremia, proctitis on CT scan. Staph epi likely contaminant. Patient at high risk of progression of COVID-19 to severe disease and evidence of proctitis. Stop Vanc, Start PipTao, Start Remdesivir.     Will evaluate patient fully in am, please call with questions or concerns in the meantime.

## 2023-01-15 NOTE — PROCEDURES
"Quincy Triplett is a 82 y.o. male patient.    Temp: 98.9 °F (37.2 °C) (01/14/23 1622)  Pulse: 88 (01/14/23 1622)  Resp: 18 (01/14/23 1622)  BP: (!) 84/49 (01/14/23 1622)  SpO2: 100 % (01/14/23 1622)  Weight: 69.3 kg (152 lb 12.5 oz) (01/12/23 1230)  Height: 5' 9" (175.3 cm) (01/12/23 1230)    PICC  Date/Time: 1/14/2023 7:20 PM  Consent Done: Yes  Time out: Immediately prior to procedure a time out was called to verify the correct patient, procedure, equipment, support staff and site/side marked as required  Indications: vascular access  Anesthesia: local infiltration  Local anesthetic: lidocaine 1% without epinephrine  Anesthetic Total (mL): 4  Preparation: skin prepped with ChloraPrep  Skin prep agent dried: skin prep agent completely dried prior to procedure  Sterile barriers: all five maximum sterile barriers used - cap, mask, sterile gown, sterile gloves, and large sterile sheet  Hand hygiene: hand hygiene performed prior to central venous catheter insertion  Location details: left basilic  Catheter type: single lumen  Catheter size: 4 Fr  Catheter Length: 15cm    Ultrasound guidance: yes  Vessel Caliber: medium and patent, compressibility normal  Needle advanced into vessel with real time Ultrasound guidance.  Guidewire confirmed in vessel.  Image recorded and saved.  Sterile sheath used.  Number of attempts: 1  Post-procedure: blood return through all ports, chlorhexidine patch and sterile dressing applied          Name Cora Rico  1/14/2023    "

## 2023-01-16 LAB
ANION GAP SERPL CALC-SCNC: 10 MEQ/L
BASOPHILS # BLD AUTO: 0.04 X10(3)/MCL (ref 0–0.2)
BASOPHILS NFR BLD AUTO: 0.5 %
BUN SERPL-MCNC: 43.1 MG/DL (ref 8.4–25.7)
CALCIUM SERPL-MCNC: 8 MG/DL (ref 8.8–10)
CHLORIDE SERPL-SCNC: 98 MMOL/L (ref 98–107)
CO2 SERPL-SCNC: 27 MMOL/L (ref 23–31)
COLOR STL: ABNORMAL
COLOR STL: ABNORMAL
CONSISTENCY STL: ABNORMAL
CONSISTENCY STL: ABNORMAL
CREAT SERPL-MCNC: 1.36 MG/DL (ref 0.73–1.18)
CREAT/UREA NIT SERPL: 32
EOSINOPHIL # BLD AUTO: 0.18 X10(3)/MCL (ref 0–0.9)
EOSINOPHIL NFR BLD AUTO: 2.3 %
ERYTHROCYTE [DISTWIDTH] IN BLOOD BY AUTOMATED COUNT: 20.3 % (ref 11.5–17)
FOLATE SERPL-MCNC: 11.6 NG/ML (ref 7–31.4)
GFR SERPLBLD CREATININE-BSD FMLA CKD-EPI: 52 MLS/MIN/1.73/M2
GLUCOSE SERPL-MCNC: 115 MG/DL (ref 82–115)
HCT VFR BLD AUTO: 20.2 % (ref 42–52)
HEMOCCULT SP1 STL QL: NEGATIVE
HEMOCCULT SP2 STL QL: POSITIVE
HEMOCCULT SP3 STL QL: POSITIVE
HGB BLD-MCNC: 6.9 GM/DL (ref 14–18)
IMM GRANULOCYTES # BLD AUTO: 0.06 X10(3)/MCL (ref 0–0.04)
IMM GRANULOCYTES NFR BLD AUTO: 0.8 %
LYMPHOCYTES # BLD AUTO: 1.5 X10(3)/MCL (ref 0.6–4.6)
LYMPHOCYTES NFR BLD AUTO: 19 %
MCH RBC QN AUTO: 29.7 PG
MCHC RBC AUTO-ENTMCNC: 34.2 MG/DL (ref 33–36)
MCV RBC AUTO: 87.1 FL (ref 80–94)
MONOCYTES # BLD AUTO: 0.84 X10(3)/MCL (ref 0.1–1.3)
MONOCYTES NFR BLD AUTO: 10.6 %
NEUTROPHILS # BLD AUTO: 5.28 X10(3)/MCL (ref 2.1–9.2)
NEUTROPHILS NFR BLD AUTO: 66.8 %
NRBC BLD AUTO-RTO: 0 %
PLATELET # BLD AUTO: 224 X10(3)/MCL (ref 130–400)
PMV BLD AUTO: 10.7 FL (ref 7.4–10.4)
POCT GLUCOSE: 139 MG/DL (ref 70–110)
POCT GLUCOSE: 176 MG/DL (ref 70–110)
POTASSIUM SERPL-SCNC: 3.5 MMOL/L (ref 3.5–5.1)
RBC # BLD AUTO: 2.32 X10(6)/MCL (ref 4.7–6.1)
SODIUM SERPL-SCNC: 135 MMOL/L (ref 136–145)
WBC # SPEC AUTO: 7.9 X10(3)/MCL (ref 4.5–11.5)

## 2023-01-16 PROCEDURE — 63600175 PHARM REV CODE 636 W HCPCS: Performed by: GENERAL PRACTICE

## 2023-01-16 PROCEDURE — 82272 OCCULT BLD FECES 1-3 TESTS: CPT | Performed by: INTERNAL MEDICINE

## 2023-01-16 PROCEDURE — 63600175 PHARM REV CODE 636 W HCPCS: Performed by: INTERNAL MEDICINE

## 2023-01-16 PROCEDURE — 99223 1ST HOSP IP/OBS HIGH 75: CPT | Mod: ,,, | Performed by: GENERAL PRACTICE

## 2023-01-16 PROCEDURE — 21400001 HC TELEMETRY ROOM

## 2023-01-16 PROCEDURE — 85025 COMPLETE CBC W/AUTO DIFF WBC: CPT | Performed by: INTERNAL MEDICINE

## 2023-01-16 PROCEDURE — 25000003 PHARM REV CODE 250: Performed by: GENERAL PRACTICE

## 2023-01-16 PROCEDURE — 27000207 HC ISOLATION

## 2023-01-16 PROCEDURE — 25000003 PHARM REV CODE 250: Performed by: INTERNAL MEDICINE

## 2023-01-16 PROCEDURE — 36415 COLL VENOUS BLD VENIPUNCTURE: CPT | Performed by: INTERNAL MEDICINE

## 2023-01-16 PROCEDURE — A4216 STERILE WATER/SALINE, 10 ML: HCPCS | Performed by: INTERNAL MEDICINE

## 2023-01-16 PROCEDURE — 25000003 PHARM REV CODE 250: Performed by: NURSE PRACTITIONER

## 2023-01-16 PROCEDURE — 80048 BASIC METABOLIC PNL TOTAL CA: CPT | Performed by: NURSE PRACTITIONER

## 2023-01-16 PROCEDURE — C9113 INJ PANTOPRAZOLE SODIUM, VIA: HCPCS | Performed by: INTERNAL MEDICINE

## 2023-01-16 PROCEDURE — 25000003 PHARM REV CODE 250: Performed by: PHYSICIAN ASSISTANT

## 2023-01-16 PROCEDURE — 82746 ASSAY OF FOLIC ACID SERUM: CPT | Performed by: NURSE PRACTITIONER

## 2023-01-16 PROCEDURE — 36415 COLL VENOUS BLD VENIPUNCTURE: CPT | Performed by: NURSE PRACTITIONER

## 2023-01-16 PROCEDURE — 99223 PR INITIAL HOSPITAL CARE,LEVL III: ICD-10-PCS | Mod: ,,, | Performed by: GENERAL PRACTICE

## 2023-01-16 RX ORDER — SODIUM CHLORIDE 9 MG/ML
INJECTION, SOLUTION INTRAVENOUS CONTINUOUS
Status: DISCONTINUED | OUTPATIENT
Start: 2023-01-16 | End: 2023-01-19

## 2023-01-16 RX ORDER — PANTOPRAZOLE SODIUM 40 MG/1
40 TABLET, DELAYED RELEASE ORAL DAILY
Status: DISCONTINUED | OUTPATIENT
Start: 2023-01-17 | End: 2023-01-17

## 2023-01-16 RX ADMIN — ENOXAPARIN SODIUM 30 MG: 30 INJECTION SUBCUTANEOUS at 04:01

## 2023-01-16 RX ADMIN — SODIUM BICARBONATE: 84 INJECTION, SOLUTION INTRAVENOUS at 05:01

## 2023-01-16 RX ADMIN — ACETAMINOPHEN 650 MG: 325 TABLET, FILM COATED ORAL at 10:01

## 2023-01-16 RX ADMIN — MIRTAZAPINE 15 MG: 15 TABLET, FILM COATED ORAL at 10:01

## 2023-01-16 RX ADMIN — SODIUM CHLORIDE: 9 INJECTION, SOLUTION INTRAVENOUS at 01:01

## 2023-01-16 RX ADMIN — PIPERACILLIN AND TAZOBACTAM 4.5 G: 4; .5 INJECTION, POWDER, FOR SOLUTION INTRAVENOUS; PARENTERAL at 10:01

## 2023-01-16 RX ADMIN — MECLIZINE 12.5 MG: 12.5 TABLET ORAL at 09:01

## 2023-01-16 RX ADMIN — REMDESIVIR 100 MG: 100 INJECTION, POWDER, LYOPHILIZED, FOR SOLUTION INTRAVENOUS at 09:01

## 2023-01-16 RX ADMIN — PIPERACILLIN AND TAZOBACTAM 4.5 G: 4; .5 INJECTION, POWDER, FOR SOLUTION INTRAVENOUS; PARENTERAL at 01:01

## 2023-01-16 RX ADMIN — TAMSULOSIN HYDROCHLORIDE 0.4 MG: 0.4 CAPSULE ORAL at 09:01

## 2023-01-16 RX ADMIN — ZINC SULFATE 220 MG (50 MG) CAPSULE 220 MG: CAPSULE at 09:01

## 2023-01-16 RX ADMIN — SODIUM CHLORIDE, PRESERVATIVE FREE 10 ML: 5 INJECTION INTRAVENOUS at 06:01

## 2023-01-16 RX ADMIN — PANTOPRAZOLE SODIUM 40 MG: 40 INJECTION, POWDER, FOR SOLUTION INTRAVENOUS at 09:01

## 2023-01-16 RX ADMIN — ACETAMINOPHEN 650 MG: 325 TABLET, FILM COATED ORAL at 05:01

## 2023-01-16 RX ADMIN — SODIUM CHLORIDE, PRESERVATIVE FREE 10 ML: 5 INJECTION INTRAVENOUS at 05:01

## 2023-01-16 RX ADMIN — SODIUM CHLORIDE, PRESERVATIVE FREE 10 ML: 5 INJECTION INTRAVENOUS at 01:01

## 2023-01-16 RX ADMIN — PIPERACILLIN AND TAZOBACTAM 4.5 G: 4; .5 INJECTION, POWDER, FOR SOLUTION INTRAVENOUS; PARENTERAL at 05:01

## 2023-01-16 RX ADMIN — ACETAMINOPHEN 650 MG: 325 TABLET, FILM COATED ORAL at 11:01

## 2023-01-16 RX ADMIN — SODIUM CHLORIDE, PRESERVATIVE FREE 10 ML: 5 INJECTION INTRAVENOUS at 12:01

## 2023-01-16 RX ADMIN — MECLIZINE 12.5 MG: 12.5 TABLET ORAL at 10:01

## 2023-01-16 RX ADMIN — ACETAMINOPHEN 650 MG: 325 TABLET, FILM COATED ORAL at 04:01

## 2023-01-16 NOTE — CONSULTS
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 intake.  Found to COVID-19, proctitis on CT scan, TERRENCE and stable transaminitis with hyperbilirubinemia, CoNS in pediatric blood culture bottle.  ID is consulted for assistance in management.    He reports significant improvement since admission.  Although he had loss of taste and smell, he feels like his appetite has recovered.  He is eating much better.  Feels much more energetic.  Denies any fevers, no chills.  Does have some pleuritic chest pain on the right side.    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, unspecified extremity     Coronary artery disease     Dyslipidemia     Hypertension         Past Surgical History:   Procedure Laterality Date    AMPUTATION Right     Right arm    CAROTID STENT      ERCP N/A 12/21/2022    Procedure: ERCP;  Surgeon: Sushil Anderson MD;  Location: [facility] ENDOSCOPY;  Service: Gastroenterology;  Laterality: N/A;  food in abdomen    ERCP, WITH BIOPSY  12/21/2022    Procedure: ERCP, WITH BIOPSY;  Surgeon: Sushil Anderson MD;  Location: [facility] ENDOSCOPY;  Service: Gastroenterology;;    LEFT HEART CATHETERIZATION      TONSILLECTOMY         Review of patient's allergies  indicates:  No Known Allergies     Family History    Reviewed and non contributory          Social History     Socioeconomic History    Marital status:    Tobacco Use    Smoking status: Never    Smokeless tobacco: Never   Substance and Sexual Activity    Alcohol use: Never    Drug use: Never     Social Determinants of Health     Food Insecurity: Unknown    Worried About Running Out of Food in the Last Year: Never true   Transportation Needs: Unknown    Lack of Transportation (Medical): No   Housing Stability: Unknown    Unable to Pay for Housing in the Last Year: No        Lines/Drains/Airways       Drain  Duration                  Biliary Tube 01/04/23 1002 Other (Comment) 10 Fr. RUQ 12 days              Peripheral Intravenous Line  Duration                  Midline Catheter Insertion/Assessment  - Single Lumen 01/14/23 1920 Left basilic vein (medial side of arm) 1 day                     Medication:  Medications Prior to Admission   Medication Sig    meclizine (ANTIVERT) 12.5 mg tablet Take 12.5 mg by mouth 3 (three) times daily as needed.    metroNIDAZOLE (METROGEL) 0.75 % gel Apply topically 2 (two) times daily.    ondansetron (ZOFRAN) 4 MG tablet Take 1 tablet (4 mg total) by mouth every 6 (six) hours as needed for Nausea.    pantoprazole (PROTONIX) 40 MG tablet Take 40 mg by mouth once daily.    tamsulosin (FLOMAX) 0.4 mg Cap Take by mouth once daily.          Antimicrobials:  Antibiotics (From admission, onward)      Start     Stop Route Frequency Ordered    01/15/23 1000  piperacillin-tazobactam (ZOSYN) 4.5 g in dextrose 5 % in water (D5W) 5 % 100 mL IVPB (MB+)         -- IV Every 8 hours (non-standard times) 01/15/23 0856             Antifungals (From admission, onward)      None            Antivirals (From admission, onward)          Stop Route Frequency     remdesivir 100 mg         01/18 0859 IV Daily               Review of Systems   Review of Systems   All other systems reviewed and are  negative.      Objective:     Vital Signs (Most Recent):  Temp: 97.9 °F (36.6 °C) (01/16/23 1236)  Pulse: 75 (01/16/23 1236)  Resp: 19 (01/16/23 1236)  BP: 113/60 (01/16/23 1236)  SpO2: 99 % (01/16/23 1236)  Vital Signs (24h Range):  Temp:  [97.5 °F (36.4 °C)-99.2 °F (37.3 °C)] 97.9 °F (36.6 °C)  Pulse:  [] 75  Resp:  [18-20] 19  SpO2:  [91 %-99 %] 99 %  BP: ()/(52-79) 113/60      Weight:   Wt Readings from Last 1 Encounters:   01/12/23 69.3 kg (152 lb 12.5 oz)      Body mass index is Body mass index is 22.56 kg/m².     Estimated Creatinine Clearance: Estimated Creatinine Clearance: 41.8 mL/min (A) (based on SCr of 1.36 mg/dL (H)).     Physical Exam  Physical Exam  Constitutional:       Appearance: Normal appearance.   HENT:      Head: Normocephalic and atraumatic.      Mouth/Throat:      Pharynx: No oropharyngeal exudate or posterior oropharyngeal erythema.   Eyes:      Extraocular Movements: Extraocular movements intact.      Pupils: Pupils are equal, round, and reactive to light.   Cardiovascular:      Rate and Rhythm: Normal rate and regular rhythm.      Heart sounds: No murmur heard.  Pulmonary:      Effort: No respiratory distress.      Breath sounds: No wheezing, rhonchi or rales.   Abdominal:      General: Bowel sounds are normal. There is no distension.      Palpations: Abdomen is soft.      Tenderness: There is abdominal tenderness (suprapubic tenderness).   Musculoskeletal:         General: No swelling or tenderness.      Cervical back: Neck supple. No rigidity or tenderness.   Lymphadenopathy:      Cervical: No cervical adenopathy.   Skin:     Findings: No lesion or rash.   Neurological:      General: No focal deficit present.      Mental Status: He is alert and oriented to person, place, and time. Mental status is at baseline.      Cranial Nerves: No cranial nerve deficit.      Motor: No weakness.   Psychiatric:         Mood and Affect: Mood normal.         Behavior: Behavior normal.          Significant Labs: CBC:   Recent Labs   Lab 01/15/23  0320   WBC 8.1   HGB 7.4*   HCT 22.1*        CMP:   Recent Labs   Lab 01/15/23  0320 01/16/23  0406   * 135*   K 3.9 3.5   CO2 20* 27   BUN 59.2* 43.1*   CREATININE 1.76* 1.36*   CALCIUM 8.5* 8.0*   ALBUMIN 2.1*  --    BILITOT 10.6*  --    ALKPHOS 170*  --    AST 45*  --    ALT 60*  --          Microbiology Results (last 7 days)       Procedure Component Value Units Date/Time    Blood Culture [[id number]]  (Normal) Collected: 01/12/23 1032    Order Status: Completed Specimen: Blood Updated: 01/16/23 1200     CULTURE, BLOOD ([facility]) No Growth At 96 Hours    Blood Culture [[id number]]  (Normal) Collected: 01/14/23 2117    Order Status: Completed Specimen: Blood from Arm, Left Updated: 01/16/23 0700     CULTURE, BLOOD ([facility]) No Growth At 24 Hours    Blood Culture [[id number]]  (Normal) Collected: 01/14/23 2117    Order Status: Completed Specimen: Blood from Arm, Right Updated: 01/16/23 0700     CULTURE, BLOOD ([facility]) No Growth At 24 Hours    Blood Culture [[id number]]  (Abnormal)  (Susceptibility) Collected: 01/12/23 1032    Order Status: Completed Specimen: Blood Updated: 01/15/23 0649     CULTURE, BLOOD ([facility]) Staphylococcus capitis     GRAM STAIN Gram Positive Cocci, probable Staphylococcus      Seen in gram stain of broth only      1 of 2 Anaerobic bottles positive             Significant Imaging: I have reviewed all pertinent imaging results/findings within the past 24 hours.    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 intake.  Found to COVID-19, proctitis on CT scan, TERRENCE and stable transaminitis with hyperbilirubinemia, CoNS  in pediatric blood culture bottle.  ID is consulted for assistance in management.    COVID-19  Proctitis  TERRENCE  Transaminitis  CoNS in blood culture 1/4 bottles  Duodenal cancer  Obstructive jaundice s/p biliary drain placement 01/04/2023 with failed attempt at internalization 01/06/2023    -patient at high risk of developing severe COVID-19 given underlying comorbidities and age, he has received 2 doses of mRNA vaccine, no boosters   -he is off supplemental O2  -proctitis noted on CT scan   -has not been eating and drinking well prior to presentation   -current presentation with fatigue and TERRENCE as well as transaminitis likely multifactorial with combination of poor p.o. intake, COVID-19 infection, proctitis, underlying duodenal cancer and obstructive jaundice     Plan:   -remdesivir for 3 days given high risk of progression to severe disease, very close monitoring of liver functions and kidney function given patient's underlying conditions   -continue piperacillin/tazobactam for concern of proctitis   -Stop Vancomycin, CoNS in blood likely a contaminant given clinical setting   -Discussed with patient, daughter at bedside and primary team physician    Thank you for your consult. ID will continue following. Please contact us with any questions or concerns.    Bebo Mccullough MD  Infectious Disease  [facility]

## 2023-01-16 NOTE — PROGRESS NOTES
[facility] Hospital Medicine Progress Note        Chief Complaint: Inpatient Follow-up for weakness, metabolic acidosis    HPI:   Quincy Triplett is a 82 y.o. male with a past medical history of essential hypertension, hyperlipidemia, CAD, obstructive jaundice, and duodenal cancer presented to [facility] on 1/11/2023 for weakness.  Patient reports weakness, decreased appetite, decreased urine output, and nausea began 3 days ago. Patient reports recent biliary drain placement on 12/21/2022 with Dr. Brown and was discharged 3 days ago.  Patient states he has whipple procedure scheduled soon by Dr. Brown. Patient denies fever, chills, cough, congestion, chest pain, shortness of breath, abdominal pain, vomiting, diarrhea, hematuria, and dysuria.   Initial vital signs in ED were /78, pulse 85, respirations 18, temperature 36.6° C, and SpO2 98%.  Labs revealed WBC 7.9, RBC 4, hemoglobin 11.6, hematocrit 35.6, sodium 130, chloride 108, CO2 11, BUN 42.3, creatinine 2.79, phosphorus 5.2, alkaline phosphatase 376, albumin 2.6, bilirubin total 13.7, AST 76, , lipase 85, and troponin undetectable.  Flu testing was negative.  COVID testing was positive.  UA revealed 1+ protein, cloudy appearance, positive nitrate, 3+ bilirubin, 1+ leukocytes, and 7 RBCs.  Patient was given 1 L NS, IV calcium gluconate 1 g, 5 units insulin IV, sodium bicarb 50 mEq, and Lokelma 10 g.  Patient was admitted to hospital medicine service for further medical management.     GI was consulted for elevated bilirubin, as patient had biliary drain with good output recommended conservative management.  Surgical oncology evaluated the patient recommended to postpone the surgery given overall decline in the functional status with COVID-19 infection and metabolic acidosis from renal failure.  Patient was initiated on bicarb drip for metabolic acidosis.  Nephrology was consulted for renal failure.    Interval Hx:    Patient was seen and examined at bedside.  He appears more alert and reported improvement in symptoms.  He reported he is motivating himself to have more p.o. intake.  Objective/physical exam:  General:  Ill-appearing, generalized yellowish skin discoloration   Chest:  Decreased breath sounds due to poor effort but otherwise clear    Heart: RRR  Abdomen: Soft, nontender, biliary drain noted with output  Extremities right arm amputation noted, no pedal edema   Neurologic: Alert and oriented no focal neuro deficit    VITAL SIGNS: 24 HRS MIN & MAX LAST   Temp  Min: 97.4 °F (36.3 °C)  Max: 98.2 °F (36.8 °C) 98.1 °F (36.7 °C)   BP  Min: 94/60  Max: 125/66 111/64     Pulse  Min: 73  Max: 104  104     Resp  Min: 16  Max: 18 18   SpO2  Min: 94 %  Max: 100 % 97 %       Recent Labs   Lab 01/13/23  0449 01/14/23  0615 01/15/23  0320   WBC 8.4 12.2* 8.1   RBC 3.55* 2.81* 2.56*   HGB 10.3* 8.2* 7.4*   HCT 32.0* 24.1* 22.1*   MCV 90.1 85.8 86.3   MCH 29.0 29.2 28.9   MCHC 32.2* 34.0 33.5   RDW 20.0* 20.0* 19.8*    253 232   MPV 10.4 10.8* 11.2*       Recent Labs   Lab 01/12/23  0016 01/12/23  0415 01/12/23  1958 01/13/23  0830 01/13/23  0946 01/13/23  1836 01/14/23  0615 01/15/23  0320   * 130*   < > 130*   < > 129* 132* 130*   K 5.6* 4.8   < > 4.9  --   --  4.1 3.9   CO2 11* 11*   < > 11*  --   --  19* 20*   BUN 42.3* 42.0*   < > 51.8*  --   --  53.6* 59.2*   CREATININE 2.79* 2.16*   < > 1.72*  --   --  1.81* 1.76*   CALCIUM 10.9* 9.5   < > 9.3  --   --  8.7* 8.5*   MG 2.50 2.20  --   --   --   --   --   --    ALBUMIN 3.4 2.6*  --  2.4*  --   --  2.2* 2.1*   ALKPHOS 376* 282*  --  240*  --   --  185* 170*   * 80*  --  71*  --   --  63* 60*   AST 76* 57*  --  54*  --   --  45* 45*   BILITOT 17.9* 13.7*  --  12.5*  --   --  10.9* 10.6*    < > = values in this interval not displayed.          Microbiology Results (last 7 days)       Procedure Component Value Units Date/Time    Blood Culture [[id number]]   (Normal) Collected: 01/12/23 1032    Order Status: Completed Specimen: Blood Updated: 01/15/23 1201     CULTURE, BLOOD ([facility]) No Growth At 72 Hours    Blood Culture [[id number]]  (Abnormal)  (Susceptibility) Collected: 01/12/23 1032    Order Status: Completed Specimen: Blood Updated: 01/15/23 0649     CULTURE, BLOOD ([facility]) Staphylococcus capitis     GRAM STAIN Gram Positive Cocci, probable Staphylococcus      Seen in gram stain of broth only      1 of 2 Anaerobic bottles positive    Blood Culture [[id number]] Collected: 01/14/23 2117    Order Status: Resulted Specimen: Blood from Arm, Right Updated: 01/14/23 2122    Blood Culture [[id number]] Collected: 01/14/23 2117    Order Status: Resulted Specimen: Blood from Arm, Left Updated: 01/14/23 2121             See below for Radiology    Scheduled Med:   enoxaparin  30 mg Subcutaneous Daily    ergocalciferol  50,000 Units Oral Q7 Days    mirtazapine  15 mg Oral QHS    pantoprazole  40 mg Intravenous Daily    piperacillin-tazobactam (ZOSYN) IVPB  4.5 g Intravenous Q8H    [START ON 1/16/2023] remdesivir infusion  100 mg Intravenous Daily    sodium chloride 0.9%  10 mL Intravenous Q6H    tamsulosin  0.4 mg Oral Daily    zinc sulfate  220 mg Oral Daily        Continuous Infusions:   sodium bicarbonate drip 100 mL/hr at 01/14/23 1133        PRN Meds:  acetaminophen, albuterol, bisacodyL, dextrose 10%, dextrose 10%, meclizine, morphine, ondansetron, ondansetron, prochlorperazine, Flushing PICC Protocol **AND** sodium chloride 0.9% **AND** sodium chloride 0.9%       Assessment/Plan:  Failure to thrive  Proctitis  Staph capitis bacteremia-mostly contaminant  Acute COVID-19 Infection-NM scan low PE probability  Sepsis due to above  TERRENCE -prerenal etiology urine sodium less than 20  Acute severe Metabolic acidosis due to renal failure  Hyperkalemia, due to above    hypovolemic Hyponatremia   Duodenal cancer s/p biliary stent placement on 12/21/2022  Transaminitis due to  above  Hyperbilirubinemia due to above  Chronic anemia   History of peripheral vascular disease,  amputation coronary artery disease    -appreciate ID recommendations.  Vanc discontinued, started on IV Zosyn, IV remdesivir  -metabolic acidosis improving with bicarb drip but renal indices still elevated.  -continue IV bicarb drip, needs strict output  -appreciate nephrology input.  Patient received 1 L normal saline bolus to compensate the losses from biliary drain  -urine studies obtained from 01/12/2023 showed possibility of hypovolemic hyponatremia.  Suspect improvement in sodium with normal saline    -patient had a BM yesterday, please send for FOBT with the next BM.  -labs in the a.m.  -continue to monitor on tele  -follow nutrition recommendations  -continue mirtazapine  -appreciate surgical oncology input.  Patient is not a surgical candidate at this time.  Planned Whipple's surgery will be postponed.  -appreciate Gastroenterology input.  conservative management   -biliary drain in place, output noted    VTE prophylaxis:  Lovenox     Patient condition:  Fair    Anticipated discharge and Disposition:   Once clinically stable    Critical care time spent:  35 minutes   Critical care diagnosis:  Metabolic acidosis from renal bicarb drip  All diagnosis and differential diagnosis have been reviewed; assessment and plan has been documented; I have personally reviewed the labs and test results that are presently available; I have reviewed the patients medication list; I have reviewed the consulting providers response and recommendations. I have reviewed or attempted to review medical records based upon their availability    All of the patient's questions have been  addressed and answered. Patient's is agreeable to the above stated plan. I will continue to monitor closely and make adjustments to medical management as needed.  _____________________________________________________________________    Nutrition  Status:    Radiology:  CT Chest Abdomen Pelvis Without Contrast (XPD)  Narrative: EXAMINATION:  CT CHEST ABDOMEN PELVIS WITHOUT CONTRAST(XPD)    CLINICAL HISTORY:  Hematemesis;Weight loss, unintended;    TECHNIQUE:  Low dose axial images, sagittal and coronal reformations were obtained from the thoracic inlet to the pubic symphysis following the IV and oral contrast administration.  Automatic exposure control is utilized to reduce patient radiation exposure.    COMPARISON:  01/03/2023    FINDINGS:  There is a left thyroid nodule.    There is mild bibasilar atelectasis.  The lungs are otherwise clear..  No mass is seen.  No nodule is seen.  No pleural thickening is seen.  No pleural effusion is seen.  No infiltrate is seen.    The thoracic aorta is normal in caliber..    No mediastinal adenopathy is seen.    The heart appears normal in size..    There has been interval placement of a percutaneous transhepatic biliary drain.  The intrahepatic and extrahepatic biliary dilatation has resolved.  The liver is otherwise unremarkable.  The gallbladder appears normal.  No gallstones are seen.    There is some duodenal wall thickening seen 2nd portion duodenum.  Patient has a known history of adenocarcinoma.    The spleen appears normal.  No splenic mass or lesion is seen.    The pancreas appears grossly unremarkable.  No pancreatic mass or lesion is seen.  No inflammation is seen.    No adrenal abnormality is seen.  No adrenal nodule is seen.    The kidneys are normal in size..  There is a cyst in the lower pole the right kidney.  It appears to be a simple cyst.  No hydronephrosis is seen.  No hydroureter is seen.  No retroperitoneal abnormality is seen..    No diverticulitis is seen.  There has been interval development of some mild wall thickening and perirectal inflammatory change.  Findings may represent proctitis.  No abscess or fluid collection is seen..    No free air is seen.  No free fluid is seen.    Urinary  bladder appears unremarkable.    No acute bony abnormality is seen.  Impression: Interval placement of a percutaneous transhepatic biliary drain with interval resolution of the intra and extrahepatic biliary dilatation    Some prominence of the 2nd portion of duodenum consistent with patient's history of adenocarcinoma in that region    Interval development of some wall thickening and inflammatory changes seen involving the perirectal region.  Proctitis should be excluded    Other incidental findings as outlined above    Electronically signed by: Clau Santacruz  Date:    01/14/2023  Time:    13:02      Ashwini Mcmanus MD   01/15/2023

## 2023-01-16 NOTE — PROGRESS NOTES
Pharmacist Intervention IV to PO Note    Quincy Triplett is a 82 y.o. male being treated with IV medication pantoprazole    Patient Data:    Vital Signs (Most Recent):  Temp: 97.9 °F (36.6 °C) (01/16/23 1236)  Pulse: 75 (01/16/23 1236)  Resp: 19 (01/16/23 1236)  BP: 113/60 (01/16/23 1236)  SpO2: 99 % (01/16/23 1236) Vital Signs (72h Range):  Temp:  [97.2 °F (36.2 °C)-99.2 °F (37.3 °C)]   Pulse:  []   Resp:  [16-20]   BP: ()/(49-79)   SpO2:  [91 %-100 %]      CBC:  Recent Labs   Lab 01/13/23  0449 01/14/23  0615 01/15/23  0320   WBC 8.4 12.2* 8.1   RBC 3.55* 2.81* 2.56*   HGB 10.3* 8.2* 7.4*   HCT 32.0* 24.1* 22.1*    253 232   MCV 90.1 85.8 86.3   MCH 29.0 29.2 28.9   MCHC 32.2* 34.0 33.5     CMP:     Recent Labs   Lab 01/13/23  0830 01/13/23  0946 01/14/23  0615 01/15/23  0320 01/16/23  0406   CALCIUM 9.3  --  8.7* 8.5* 8.0*   ALBUMIN 2.4*  --  2.2* 2.1*  --    *   < > 132* 130* 135*   K 4.9  --  4.1 3.9 3.5   CO2 11*  --  19* 20* 27   BUN 51.8*  --  53.6* 59.2* 43.1*   CREATININE 1.72*  --  1.81* 1.76* 1.36*   ALKPHOS 240*  --  185* 170*  --    ALT 71*  --  63* 60*  --    AST 54*  --  45* 45*  --    BILITOT 12.5*  --  10.9* 10.6*  --     < > = values in this interval not displayed.       Dietary Orders:  Diet Orders            Dietary nutrition supplements Boost Plus Chocolate; TID starting at 01/13 1539    Diet heart healthy: Heart Healthy starting at 01/12 0243            Based on the following criteria, this patient qualifies for intravenous to oral conversion:  [x] The patients gastrointestinal tract is functioning (tolerating medications via oral or enteral route for 24 hours and tolerating food or enteral feeds for 24 hours).  [x] The patient is hemodynamically stable for 24 hours (heart rate <100 beats per minute, systolic blood pressure >99 mm Hg, and respiratory rate <20 breaths per minute).  [x] The patient shows clinical improvement (afebrile for at least 24 hours and white  blood cell count downtrending or normalized). Additionally, the patient must be non-neutropenic (absolute neutrophil count >500 cells/mm3).  [x] For antimicrobials, the patient has received IV therapy for at least 24 hours.    IV medication 40mg will be changed to oral medication 40mg    Pharmacist's Name: Erin Villatoro  Pharmacist's Extension: [phone]

## 2023-01-16 NOTE — PROGRESS NOTES
"[facility]  Nephrology   Patient Name: Quincy Triplett  Age: 82 y.o.  : 1940  MRN: [id number]  Admission Date: 2023    Date of Consultation: 1/15/23     Consultation Requested By: Dr Mcmanus      Reason for Consultation: TERRENCE     Chief Complaint: Weakness (Presents with c/o weakness. Onset yesterday.)      History of Present Illness:  Mr Quincy Triplett is a 82 y.o. White male with past medical history of duodenal adenocarcinoma with biliary obstruction s/p external biliary catheter placement on 22. Since the drain was placed he had a difficulty time eating and drinking with nausea and intermittent vomiting. Prior to that he was a good water drinking. The biliary drain puts out at 500-1000mL per day routinely. On  he presented to the ED with weakness and was noted to have TERRENCE with profound metabolic acidosis showing serum bicarb of 9. Prior to December, he had seemingly normal renal function. Renal US was completed showing small left kidney without otherwise normal findings. He denies renal history, dysuria, and NSAID use. He has seen Dr Negron in the past for prostate monitoring. At present he is jaundices with drainage from biliary drain, clinically dry appearing making orange colored urine via external catheter. He is lying nearly supine comfortably watching football on room air. Daughter at bedside. 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     23: Renal indices improved with increased fluid. Appetite improved as well.       Physical Exam:   /79   Pulse 68   Temp 97.8 °F (36.6 °C)   Resp 20   Ht 5' 9" (1.753 m)   Wt 69.3 kg (152 lb 12.5 oz)   SpO2 98%   BMI 22.56 kg/m²  Body mass index is 22.56 kg/m².    Constitutional:       Appearance: He is ill-appearing.   HENT:      Head: Atraumatic.      Nose: Nose normal.      Mouth/Throat:      Mouth: " Mucous membranes are moist.   Eyes:      General: Scleral icterus present.      Extraocular Movements: Extraocular movements intact.      Conjunctiva/sclera: Conjunctivae normal.   Cardiovascular:      Rate and Rhythm: Normal rate and regular rhythm.      Pulses: Normal pulses.      Heart sounds: Normal heart sounds.   Pulmonary:      Effort: Pulmonary effort is normal.      Breath sounds: Normal breath sounds.   Abdominal:      General: There is no distension.      Palpations: Abdomen is soft.      Comments: External biliary drain    Genitourinary:     Comments: Orange tinged urine   Musculoskeletal:         General: No swelling.      Cervical back: Normal range of motion and neck supple.      Comments: RUE atrophied with prior amputation of forearm amputation, LUE PICC   Skin:     General: Skin is warm.   Neurological:      Mental Status: He is alert.       Inpatient Medications:     Current Facility-Administered Medications:     acetaminophen tablet 650 mg, 650 mg, Oral, Q6H PRN, Ashwini Mcmanus MD, 650 mg at 01/16/23 1110    albuterol inhaler 2 puff, 2 puff, Inhalation, Q6H PRN, Jesús Bansal PA-C    bisacodyL suppository 10 mg, 10 mg, Rectal, Daily PRN, BEN Freed, 10 mg at 01/13/23 1430    dextrose 10% bolus 125 mL 125 mL, 12.5 g, Intravenous, PRN, Yaya BEAN Curet MD CAMILLA    dextrose 10% bolus 250 mL 250 mL, 25 g, Intravenous, PRN, Yaya BEAN Curet IV, MD    enoxaparin injection 30 mg, 30 mg, Subcutaneous, Daily, Ashwini Mcmanus MD, 30 mg at 01/15/23 1622    ergocalciferol capsule 50,000 Units, 50,000 Units, Oral, Q7 Days, Jesús Bansal PA-C, 50,000 Units at 01/12/23 1000    meclizine tablet 12.5 mg, 12.5 mg, Oral, TID PRN, Bri Marrero MD, 12.5 mg at 01/16/23 0946    mirtazapine tablet 15 mg, 15 mg, Oral, QHS, Ashwini Mcmanus MD, 15 mg at 01/15/23 2146    morphine injection 2 mg, 2 mg, Intravenous, Q4H PRN, Stephanie D Nitin, FNP    ondansetron disintegrating tablet 4 mg, 4 mg, Oral, Q8H  PRN, Bri Marrero MD, 4 mg at 01/14/23 1729    ondansetron injection 4 mg, 4 mg, Intravenous, Q6H PRN, KELVIN Bird, 4 mg at 01/15/23 0817    pantoprazole injection 40 mg, 40 mg, Intravenous, Daily, Ashwini Mcmanus MD, 40 mg at 01/16/23 0946    piperacillin-tazobactam (ZOSYN) 4.5 g in dextrose 5 % in water (D5W) 5 % 100 mL IVPB (MB+), 4.5 g, Intravenous, Q8H, Bebo Mccullough MD, Stopped at 01/16/23 0921    prochlorperazine injection Soln 5 mg, 5 mg, Intravenous, Q6H PRN, KELVIN Bird, 5 mg at 01/13/23 2141    remdesivir 100 mg in sodium chloride 0.9% 100 mL infusion, 100 mg, Intravenous, Daily, Bebo Mccullough MD, Stopped at 01/16/23 1014    sodium bicarbonate 150 mEq in dextrose 5 % 1,000 mL infusion, , Intravenous, Continuous, KELVIN Bird, Last Rate: 100 mL/hr at 01/16/23 0521, New Bag at 01/16/23 0521    Flushing PICC Protocol, , , Until Discontinued **AND** sodium chloride 0.9% flush 10 mL, 10 mL, Intravenous, Q6H, 10 mL at 01/16/23 0521 **AND** sodium chloride 0.9% flush 10 mL, 10 mL, Intravenous, PRN, Ashwini Mcmanus MD    tamsulosin 24 hr capsule 0.4 mg, 0.4 mg, Oral, Daily, Bri Marrero MD, 0.4 mg at 01/16/23 0944    zinc sulfate capsule 220 mg, 220 mg, Oral, Daily, Jesús Bansal PA-C, 220 mg at 01/16/23 0944     Imaging:  CT Chest Abdomen Pelvis Without Contrast (XPD)   Final Result      Interval placement of a percutaneous transhepatic biliary drain with interval resolution of the intra and extrahepatic biliary dilatation      Some prominence of the 2nd portion of duodenum consistent with patient's history of adenocarcinoma in that region      Interval development of some wall thickening and inflammatory changes seen involving the perirectal region.  Proctitis should be excluded      Other incidental findings as outlined above         Electronically signed by: Clau Santacruz   Date:    01/14/2023   Time:    13:02      NM Lung Scan Perfusion Particulate   Final Result      No  perfusion defects noted consistent with low probability for pulmonary embolism         Electronically signed by: Clau Santacruz   Date:    01/13/2023   Time:    13:08      US Retroperitoneal Complete   Final Result      No significant sonographic abnormality of the kidneys.         Electronically signed by: Quincy Roberts   Date:    01/13/2023   Time:    08:32      X-Ray Chest AP Portable   Final Result      No acute abnormality of the chest.         Electronically signed by: Zhanna Langley   Date:    01/12/2023   Time:    06:13          Laboratory Data:  Recent Labs   Lab 01/12/23  0415 01/12/23  1958 01/16/23  0406   *   < > 135*   K 4.8   < > 3.5   CO2 11*   < > 27   BUN 42.0*   < > 43.1*   CREATININE 2.16*   < > 1.36*   GLUCOSE 93   < > 115   CALCIUM 9.5   < > 8.0*   PHOS 5.2*  --   --     < > = values in this interval not displayed.     Recent Labs   Lab 01/15/23  0320   WBC 8.1   HGB 7.4*   HCT 22.1*          Impression:   TERRENCE secondary to dehydration with extrarenal losses via biliary drain and  intermittent vomiting prior to admission with markedly decreased oral intake now as compared to prior   Duodenal adenocarcinoma with biliary obstruction s/p external biliary drain placedment on 12/12/23. Originally planned for whipple this coming week  Profound metabolic acidosis now resolved   Hyponatremia needs further workup likely secondary to hypovolemia      Plan:   Discontinue Na Bicarb fluids. Start NS at 125 mL/hr  Repeat lab tomorrow.     Rosemarie Yang NP  Nephrology  1/16/2023 11:43 AM

## 2023-01-17 LAB
ABO + RH BLD: NORMAL
ABORH RETYPE: NORMAL
ALBUMIN SERPL-MCNC: 1.7 G/DL (ref 3.4–4.8)
ALBUMIN/GLOB SERPL: 0.7 RATIO (ref 1.1–2)
ALP SERPL-CCNC: 132 UNIT/L (ref 40–150)
ALT SERPL-CCNC: 50 UNIT/L (ref 0–55)
AST SERPL-CCNC: 41 UNIT/L (ref 5–34)
BACTERIA BLD CULT: NORMAL
BASOPHILS # BLD AUTO: 0.06 X10(3)/MCL (ref 0–0.2)
BASOPHILS NFR BLD AUTO: 0.9 %
BILIRUBIN DIRECT+TOT PNL SERPL-MCNC: 7.1 MG/DL
BLD PROD TYP BPU: NORMAL
BLOOD UNIT EXPIRATION DATE: NORMAL
BLOOD UNIT TYPE CODE: 6200
BUN SERPL-MCNC: 33.6 MG/DL (ref 8.4–25.7)
CALCIUM SERPL-MCNC: 8.3 MG/DL (ref 8.8–10)
CHLORIDE SERPL-SCNC: 102 MMOL/L (ref 98–107)
CO2 SERPL-SCNC: 24 MMOL/L (ref 23–31)
CREAT SERPL-MCNC: 1.12 MG/DL (ref 0.73–1.18)
CROSSMATCH INTERPRETATION: NORMAL
DISPENSE STATUS: NORMAL
EOSINOPHIL # BLD AUTO: 0.25 X10(3)/MCL (ref 0–0.9)
EOSINOPHIL NFR BLD AUTO: 3.7 %
ERYTHROCYTE [DISTWIDTH] IN BLOOD BY AUTOMATED COUNT: 20.7 % (ref 11.5–17)
GFR SERPLBLD CREATININE-BSD FMLA CKD-EPI: >60 MLS/MIN/1.73/M2
GLOBULIN SER-MCNC: 2.6 GM/DL (ref 2.4–3.5)
GLUCOSE SERPL-MCNC: 87 MG/DL (ref 82–115)
GROUP & RH: NORMAL
HCT VFR BLD AUTO: 19.1 % (ref 42–52)
HGB BLD-MCNC: 6.5 GM/DL (ref 14–18)
IMM GRANULOCYTES # BLD AUTO: 0.06 X10(3)/MCL (ref 0–0.04)
IMM GRANULOCYTES NFR BLD AUTO: 0.9 %
INDIRECT COOMBS GEL: NORMAL
LYMPHOCYTES # BLD AUTO: 1.44 X10(3)/MCL (ref 0.6–4.6)
LYMPHOCYTES NFR BLD AUTO: 21.1 %
MCH RBC QN AUTO: 29.7 PG
MCHC RBC AUTO-ENTMCNC: 34 MG/DL (ref 33–36)
MCV RBC AUTO: 87.2 FL (ref 80–94)
MONOCYTES # BLD AUTO: 0.7 X10(3)/MCL (ref 0.1–1.3)
MONOCYTES NFR BLD AUTO: 10.3 %
NEUTROPHILS # BLD AUTO: 4.31 X10(3)/MCL (ref 2.1–9.2)
NEUTROPHILS NFR BLD AUTO: 63.1 %
NRBC BLD AUTO-RTO: 0 %
PLATELET # BLD AUTO: 221 X10(3)/MCL (ref 130–400)
PMV BLD AUTO: 10.3 FL (ref 7.4–10.4)
POCT GLUCOSE: 111 MG/DL (ref 70–110)
POCT GLUCOSE: 112 MG/DL (ref 70–110)
POCT GLUCOSE: 98 MG/DL (ref 70–110)
POTASSIUM SERPL-SCNC: 3.5 MMOL/L (ref 3.5–5.1)
PROT SERPL-MCNC: 4.3 GM/DL (ref 5.8–7.6)
RBC # BLD AUTO: 2.19 X10(6)/MCL (ref 4.7–6.1)
SODIUM SERPL-SCNC: 136 MMOL/L (ref 136–145)
UNIT NUMBER: NORMAL
WBC # SPEC AUTO: 6.8 X10(3)/MCL (ref 4.5–11.5)

## 2023-01-17 PROCEDURE — 86900 BLOOD TYPING SEROLOGIC ABO: CPT | Performed by: INTERNAL MEDICINE

## 2023-01-17 PROCEDURE — 21400001 HC TELEMETRY ROOM

## 2023-01-17 PROCEDURE — 85025 COMPLETE CBC W/AUTO DIFF WBC: CPT | Performed by: GENERAL PRACTICE

## 2023-01-17 PROCEDURE — 63600175 PHARM REV CODE 636 W HCPCS: Performed by: INTERNAL MEDICINE

## 2023-01-17 PROCEDURE — 51798 US URINE CAPACITY MEASURE: CPT

## 2023-01-17 PROCEDURE — 63600175 PHARM REV CODE 636 W HCPCS: Mod: TB | Performed by: GENERAL PRACTICE

## 2023-01-17 PROCEDURE — 86923 COMPATIBILITY TEST ELECTRIC: CPT | Performed by: INTERNAL MEDICINE

## 2023-01-17 PROCEDURE — C9113 INJ PANTOPRAZOLE SODIUM, VIA: HCPCS | Performed by: INTERNAL MEDICINE

## 2023-01-17 PROCEDURE — 25000003 PHARM REV CODE 250: Performed by: INTERNAL MEDICINE

## 2023-01-17 PROCEDURE — 63600175 PHARM REV CODE 636 W HCPCS: Performed by: NURSE PRACTITIONER

## 2023-01-17 PROCEDURE — 80053 COMPREHEN METABOLIC PANEL: CPT | Performed by: GENERAL PRACTICE

## 2023-01-17 PROCEDURE — 36415 COLL VENOUS BLD VENIPUNCTURE: CPT | Performed by: GENERAL PRACTICE

## 2023-01-17 PROCEDURE — 99233 PR SUBSEQUENT HOSPITAL CARE,LEVL III: ICD-10-PCS | Mod: FS,,, | Performed by: GENERAL PRACTICE

## 2023-01-17 PROCEDURE — 25000003 PHARM REV CODE 250: Performed by: PHYSICIAN ASSISTANT

## 2023-01-17 PROCEDURE — 36430 TRANSFUSION BLD/BLD COMPNT: CPT

## 2023-01-17 PROCEDURE — 99233 SBSQ HOSP IP/OBS HIGH 50: CPT | Mod: FS,,, | Performed by: GENERAL PRACTICE

## 2023-01-17 PROCEDURE — A4216 STERILE WATER/SALINE, 10 ML: HCPCS | Performed by: INTERNAL MEDICINE

## 2023-01-17 PROCEDURE — 27000207 HC ISOLATION

## 2023-01-17 PROCEDURE — 25000003 PHARM REV CODE 250: Performed by: GENERAL PRACTICE

## 2023-01-17 PROCEDURE — 36415 COLL VENOUS BLD VENIPUNCTURE: CPT | Performed by: INTERNAL MEDICINE

## 2023-01-17 PROCEDURE — 25000003 PHARM REV CODE 250: Performed by: NURSE PRACTITIONER

## 2023-01-17 PROCEDURE — 86923 COMPATIBILITY TEST ELECTRIC: CPT | Mod: 91 | Performed by: INTERNAL MEDICINE

## 2023-01-17 PROCEDURE — P9016 RBC LEUKOCYTES REDUCED: HCPCS | Performed by: INTERNAL MEDICINE

## 2023-01-17 RX ORDER — PANTOPRAZOLE SODIUM 40 MG/10ML
40 INJECTION, POWDER, LYOPHILIZED, FOR SOLUTION INTRAVENOUS 2 TIMES DAILY
Status: DISCONTINUED | OUTPATIENT
Start: 2023-01-17 | End: 2023-02-10 | Stop reason: HOSPADM

## 2023-01-17 RX ORDER — HYDROCODONE BITARTRATE AND ACETAMINOPHEN 500; 5 MG/1; MG/1
TABLET ORAL
Status: DISCONTINUED | OUTPATIENT
Start: 2023-01-17 | End: 2023-02-10 | Stop reason: HOSPADM

## 2023-01-17 RX ADMIN — PIPERACILLIN AND TAZOBACTAM 4.5 G: 4; .5 INJECTION, POWDER, FOR SOLUTION INTRAVENOUS; PARENTERAL at 06:01

## 2023-01-17 RX ADMIN — SODIUM CHLORIDE, PRESERVATIVE FREE 10 ML: 5 INJECTION INTRAVENOUS at 11:01

## 2023-01-17 RX ADMIN — REMDESIVIR 100 MG: 100 INJECTION, POWDER, LYOPHILIZED, FOR SOLUTION INTRAVENOUS at 08:01

## 2023-01-17 RX ADMIN — ACETAMINOPHEN 650 MG: 325 TABLET, FILM COATED ORAL at 06:01

## 2023-01-17 RX ADMIN — SODIUM CHLORIDE, PRESERVATIVE FREE 10 ML: 5 INJECTION INTRAVENOUS at 06:01

## 2023-01-17 RX ADMIN — PANTOPRAZOLE SODIUM 40 MG: 40 INJECTION, POWDER, FOR SOLUTION INTRAVENOUS at 10:01

## 2023-01-17 RX ADMIN — MECLIZINE 12.5 MG: 12.5 TABLET ORAL at 10:01

## 2023-01-17 RX ADMIN — SODIUM CHLORIDE, PRESERVATIVE FREE 10 ML: 5 INJECTION INTRAVENOUS at 02:01

## 2023-01-17 RX ADMIN — TAMSULOSIN HYDROCHLORIDE 0.4 MG: 0.4 CAPSULE ORAL at 08:01

## 2023-01-17 RX ADMIN — DOCUSATE SODIUM 50 MG: 50 CAPSULE, LIQUID FILLED ORAL at 10:01

## 2023-01-17 RX ADMIN — MIRTAZAPINE 15 MG: 15 TABLET, FILM COATED ORAL at 10:01

## 2023-01-17 RX ADMIN — PIPERACILLIN AND TAZOBACTAM 4.5 G: 4; .5 INJECTION, POWDER, FOR SOLUTION INTRAVENOUS; PARENTERAL at 10:01

## 2023-01-17 RX ADMIN — DOCUSATE SODIUM 50 MG: 50 CAPSULE, LIQUID FILLED ORAL at 09:01

## 2023-01-17 RX ADMIN — PANTOPRAZOLE SODIUM 40 MG: 40 TABLET, DELAYED RELEASE ORAL at 08:01

## 2023-01-17 RX ADMIN — SODIUM CHLORIDE: 9 INJECTION, SOLUTION INTRAVENOUS at 11:01

## 2023-01-17 RX ADMIN — ZINC SULFATE 220 MG (50 MG) CAPSULE 220 MG: CAPSULE at 08:01

## 2023-01-17 RX ADMIN — SODIUM CHLORIDE, PRESERVATIVE FREE 10 ML: 5 INJECTION INTRAVENOUS at 01:01

## 2023-01-17 RX ADMIN — SODIUM CHLORIDE: 9 INJECTION, SOLUTION INTRAVENOUS at 01:01

## 2023-01-17 RX ADMIN — ACETAMINOPHEN 650 MG: 325 TABLET, FILM COATED ORAL at 10:01

## 2023-01-17 RX ADMIN — PIPERACILLIN AND TAZOBACTAM 4.5 G: 4; .5 INJECTION, POWDER, FOR SOLUTION INTRAVENOUS; PARENTERAL at 02:01

## 2023-01-17 RX ADMIN — MORPHINE SULFATE 2 MG: 4 INJECTION INTRAVENOUS at 10:01

## 2023-01-17 NOTE — PROGRESS NOTES
"Inpatient Nutrition Assessment    Admit Date: 1/11/2023   Total duration of encounter: 6 days     Nutrition Recommendation/Prescription     Consider regular diet (to promote intake).  Discontinue Boost Plus (provides 360 kcal, 14 g protein per serving).  Encouraged intake.    Communication of Recommendations: reviewed with patient/caregiver and reviewed with nurse    Nutrition Assessment     Malnutrition Assessment/Nutrition-Focused Physical Exam    Malnutrition in the context of chronic illness  Degree of Malnutrition: severe malnutrition  Energy Intake: < 75% of estimated energy requirement for >/= 1 month  Interpretation of Weight Loss: >7.5% in 3 months  Body Fat: severe depletion  Area of Body Fat Loss: orbital region  and upper arm region - triceps / biceps  Muscle Mass Loss: severe depletion  Area of Muscle Mass Loss: temple region - temporalis muscle and clavicle bone region - pectoralis major, deltoid, trapezius muscles  Fluid Accumulation: does not meet criteria  Edema: unable to obtain  Reduced  Strength: unable to obtain  A minimum of two characteristics is recommended for diagnosis of either severe or non-severe malnutrition.    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  Transaminitis   Hyperbilirubinemia  COVID-19 infection  TERRENCE   Hyperkalemia  Hyponatremia  Anemia    Relevant Medical History:   Essential hypertension  Hyperlipidemia  CAD  Obstructive jaundice   Duodenal cancer    Nutrition-Related Medications: NS, ergocalciferol, mirtazapine, pantoprazole, zinc sulfate  Calorie Containing IV Medications: no significant kcals from medications at this " "time    Nutrition-Related Labs:  23 Phos 5.2  23 Na 130, Cl 112, GFR 39, Glu 143, , Alb 2.4  23 Ca 8.3, alb 1.7    Diet/PN Order: Diet heart healthy  Oral Supplement Order: Boost Plus  Tube Feeding Order: none  Appetite/Oral Intake: fair/50-75% of meals  Factors Affecting Nutritional Intake: decreased appetite and pain  Food/[employer]/Cultural Preferences: none reported  Food Allergies: none reported    Skin Integrity: drain/device(s)  Wound(s):   biliary tube noted    Comments    23 Patient reports decreased/poor appetite currently and prior to admission, reports significant weight loss over the past few months, agreeable to chocolate Boost, reports awaiting Whipple procedure as outpatient.    23 Patient reports appetite had improved and was good, now decreased/fair due to pain, does not want Boost.    Anthropometrics    Height: 5' 9" (175.3 cm) Height Method: Stated  Last Weight: 69.3 kg (152 lb 12.5 oz) (23 1230) Weight Method: Bed Scale  BMI (Calculated): 22.6  BMI Classification: normal (BMI 18.5-24.9)        Ideal Body Weight (IBW), Male: 160 lb     % Ideal Body Weight, Male (lb): 95.49 %                 Usual Body Weight (UBW), k kg  % Usual Body Weight: 80.75     Usual Weight Provided By: patient    Wt Readings from Last 5 Encounters:   23 69.3 kg (152 lb 12.5 oz)   22 75.8 kg (167 lb)   22 79.8 kg (176 lb)     Weight Change(s) Since Admission:  Admit Weight: 70.3 kg (155 lb) (23 2320)  69.3 kg (23)  No new weights (2023)    Estimated Needs    Weight Used For Calorie Calculations: 69.3 kg (152 lb 12.5 oz)  Energy Calorie Requirements (kcal): 1943 kcal/d, 1.4 stress factor  Energy Need Method: [place]-St Jeor  Weight Used For Protein Calculations: 69.3 kg (152 lb 12.5 oz)  Protein Requirements: 104 g/d, 1.5 g/kg  Fluid Requirements (mL): 1943 ml/d, 1 ml/kcal  Temp: 97.9 °F (36.6 °C)       Enteral Nutrition    Patient not receiving " enteral nutrition at this time.    Parenteral Nutrition    Patient not receiving parenteral nutrition support at this time.    Evaluation of Received Nutrient Intake    Calories: not meeting estimated needs  Protein: not meeting estimated needs    Patient Education    Not applicable.    Nutrition Diagnosis     PES: Malnutrition related to cancer as evidenced by <75% needs met >1 month, severe fat depletion, severe muscle depletion, and >7.5% weight loss in 3 months. (continues)    Interventions/Goals     Intervention(s): general/healthful diet, commercial beverage, and collaboration with other providers  Goal: Meet greater than 75% of nutritional needs by follow-up. (goal progressing)    Monitoring & Evaluation     Dietitian will monitor food and beverage intake.  Nutrition Risk/Follow-Up: high (follow-up in 1-4 days)   Please consult if re-assessment needed sooner.

## 2023-01-17 NOTE — PROGRESS NOTES
PROGRESS NOTE         SUBJECTIVE: AF, VSS.  Oxygenating well on RA.  No issues overnight.          MEDICATIONS:   Reviewed in EMR        OBJECTIVE:     Vitals:    01/17/23 1017   BP:    Pulse:    Resp: 18   Temp:           GENERAL: NAD; does not appear toxic  SKIN: no rash  HEENT: sclera non-icteric; PERRL  NECK: supple; no LAD  CHEST: CTA; nonlabored, equal expansion; no adventitious BS  CARDIOVASCULAR: RRR, S1S2; no murmur; strong, equal peripheral pulses; no edema  ABDOMEN:  active bowel sounds; abdomen soft, nondistended, nontender to palpation  GENITOURINARY: + suprapubic tenderness   EXTREMITIES: no cyanosis or clubbing  NEURO: AAO x4; CN II-XII grossly intact  PSYCH: Mentation and affect appropriate          LABORATORY DATA:  Reviewed          RADIOLOGICAL DATA: Reviewed          ASSESSMENT:   82-year-old male patient known to have past medical history significant for recently diagnosed duodenal cancer with obstructive jaundice s/p biliary drain placement 01/04/2023 with attempt at  internalization 01/06/2023 which were not successful, recently discharged from hospital, presented with significant worsening of weakness and decreased p.o. intake.  Found to COVID-19, proctitis on CT scan, TERRENCE and stable transaminitis with hyperbilirubinemia, CoNS in pediatric blood culture bottle.  ID is consulted for assistance in management.     COVID-19  Proctitis  TERRENCE, resolved  Transaminitis, improving  CoNS in blood culture 1/4 bottles - contaminant  Duodenal cancer  Obstructive jaundice s/p biliary drain placement 01/04/2023 with failed attempt at internalization 01/06/2023      PLAN:  Completed 3 days remdesivir.   Continue Zosyn for now.   Improving overall.   Monitor clinically.   Discussed with patient and nursing.

## 2023-01-17 NOTE — PROGRESS NOTES
"[facility]  Nephrology Progress Note  Patient Name: Quincy Triplett  Age: 82 y.o.  : 1940  MRN: [id number]  Admission Date: 2023    Date of Consultation: 1/15/23     Consultation Requested By: Dr Mcmanus      Reason for Consultation: TERRENCE     Chief Complaint: Weakness (Presents with c/o weakness. Onset yesterday.)      History of Present Illness:  Mr Quincy Triplett is a 82 y.o. White male with past medical history of duodenal adenocarcinoma with biliary obstruction s/p external biliary catheter placement on 22. Since the drain was placed he had a difficulty time eating and drinking with nausea and intermittent vomiting. Prior to that he was a good water drinking. The biliary drain puts out at 500-1000mL per day routinely. On  he presented to the ED with weakness and was noted to have TERRENCE with profound metabolic acidosis showing serum bicarb of 9. Prior to December, he had seemingly normal renal function. Renal US was completed showing small left kidney without otherwise normal findings. He denies renal history, dysuria, and NSAID use. He has seen Dr Negron in the past for prostate monitoring. At present he is jaundices with drainage from biliary drain, clinically dry appearing making orange colored urine via external catheter. He is lying nearly supine comfortably watching football on room air. Daughter at bedside. 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     Acute kidney injury resolved with increased hydration. Hgb has decreased further today with positive testing for blood in stool.  Otherwise no complaints.       Physical Exam:   BP (!) 102/57   Pulse 61   Temp 97.8 °F (36.6 °C) (Oral)   Resp 18   Ht 5' 9" (1.753 m)   Wt 69.3 kg (152 lb 12.5 oz)   SpO2 98%   BMI 22.56 kg/m²  Body mass index is 22.56 kg/m².    Constitutional:       Appearance: He is " ill-appearing.   HENT:      Head: Atraumatic.      Nose: Nose normal.      Mouth/Throat:      Mouth: Mucous membranes are moist.   Eyes:      General: Scleral icterus present.      Extraocular Movements: Extraocular movements intact.      Conjunctiva/sclera: Conjunctivae normal.   Cardiovascular:      Rate and Rhythm: Normal rate and regular rhythm.      Pulses: Normal pulses.      Heart sounds: Normal heart sounds.   Pulmonary:      Effort: Pulmonary effort is normal.      Breath sounds: Normal breath sounds.   Abdominal:      General: There is no distension.      Palpations: Abdomen is soft.      Comments: External biliary drain    Genitourinary:     Comments: Orange tinged urine   Musculoskeletal:         General: No swelling.      Cervical back: Normal range of motion and neck supple.      Comments: RUE atrophied with prior amputation of forearm amputation, LUE PICC   Skin:     General: Skin is warm.   Neurological:      Mental Status: He is alert.       Inpatient Medications:     Current Facility-Administered Medications:     0.9%  NaCl infusion (for blood administration), , Intravenous, Q24H PRN, Ashwini Mcmanus MD    0.9%  NaCl infusion, , Intravenous, Continuous, UNA Khan, Last Rate: 125 mL/hr at 01/17/23 0136, New Bag at 01/17/23 0136    acetaminophen tablet 650 mg, 650 mg, Oral, Q6H PRN, Ashwini Mcmanus MD, 650 mg at 01/17/23 0625    albuterol inhaler 2 puff, 2 puff, Inhalation, Q6H PRN, Jesús Bansal PA-C    bisacodyL suppository 10 mg, 10 mg, Rectal, Daily PRN, BEN Freed 10 mg at 01/13/23 1430    dextrose 10% bolus 125 mL 125 mL, 12.5 g, Intravenous, PRN, Yaya B Curet MD CAMILLA    dextrose 10% bolus 250 mL 250 mL, 25 g, Intravenous, PRN, Yaya B Curet IV, MD    docusate sodium capsule 50 mg, 50 mg, Oral, BID PRN, Ashwini Mcmanus MD    ergocalciferol capsule 50,000 Units, 50,000 Units, Oral, Q7 Days, Jesús Bansal PA-C, 50,000 Units at 01/12/23 1000    meclizine  tablet 12.5 mg, 12.5 mg, Oral, TID PRN, Bri Marrero MD, 12.5 mg at 01/16/23 2210    mirtazapine tablet 15 mg, 15 mg, Oral, QHS, Ashwini Mcmanus MD, 15 mg at 01/16/23 2210    morphine injection 2 mg, 2 mg, Intravenous, Q4H PRN, HAMILTON BirdP, 2 mg at 01/17/23 1017    ondansetron disintegrating tablet 4 mg, 4 mg, Oral, Q8H PRN, Bri Marrero MD, 4 mg at 01/14/23 1729    ondansetron injection 4 mg, 4 mg, Intravenous, Q6H PRN, KELVIN Bird, 4 mg at 01/15/23 0817    pantoprazole EC tablet 40 mg, 40 mg, Oral, Daily, Ashwini Mcmanus MD, 40 mg at 01/17/23 0858    piperacillin-tazobactam (ZOSYN) 4.5 g in dextrose 5 % in water (D5W) 5 % 100 mL IVPB (MB+), 4.5 g, Intravenous, Q8H, Bebo Mccullough MD, Last Rate: 25 mL/hr at 01/17/23 0626, 4.5 g at 01/17/23 0626    prochlorperazine injection Soln 5 mg, 5 mg, Intravenous, Q6H PRN, HAMILTON BirdP, 5 mg at 01/13/23 2141    Flushing PICC Protocol, , , Until Discontinued **AND** sodium chloride 0.9% flush 10 mL, 10 mL, Intravenous, Q6H, 10 mL at 01/17/23 0626 **AND** sodium chloride 0.9% flush 10 mL, 10 mL, Intravenous, PRN, Ashwini Mcmanus MD    tamsulosin 24 hr capsule 0.4 mg, 0.4 mg, Oral, Daily, Bri Marrero MD, 0.4 mg at 01/17/23 0857    zinc sulfate capsule 220 mg, 220 mg, Oral, Daily, Jesús Bansal PA-C, 220 mg at 01/17/23 0857         Laboratory Data:  Recent Labs   Lab 01/12/23  8903 01/12/23 1958 01/17/23  0359   *   < > 136   K 4.8   < > 3.5   CO2 11*   < > 24   BUN 42.0*   < > 33.6*   CREATININE 2.16*   < > 1.12   GLUCOSE 93   < > 87   CALCIUM 9.5   < > 8.3*   PHOS 5.2*  --   --     < > = values in this interval not displayed.       Recent Labs   Lab 01/17/23 0359   WBC 6.8   HGB 6.5*   HCT 19.1*            Impression:   TERRENCE secondary to dehydration with extrarenal losses via biliary drain and  intermittent vomiting prior to admission with markedly decreased oral intake now as compared to prior  - - -resolved   Duodenal  adenocarcinoma with biliary obstruction s/p external biliary drain placedment on 12/12/23. Originally planned for whipple this coming week  Profound metabolic acidosis now resolved   Hypovolemic hyponatremia resolved      Plan:   Continue IVF until oral intake is adequate  PRBC have been ordered per primary doctor. Agree with     Please re consult if needed.     Rosemarie Yang NP  Nephrology  1/17/2023 11:43 AM

## 2023-01-17 NOTE — PROGRESS NOTES
[facility] Hospital Medicine Progress Note        Chief Complaint: Inpatient Follow-up for weakness, metabolic acidosis    HPI:   Quincy Triplett is a 82 y.o. male with a past medical history of essential hypertension, hyperlipidemia, CAD, obstructive jaundice, and duodenal cancer presented to [facility] on 1/11/2023 for weakness.  Patient reports weakness, decreased appetite, decreased urine output, and nausea began 3 days ago. Patient reports recent biliary drain placement on 12/21/2022 with Dr. Brown and was discharged 3 days ago.  Patient states he has whipple procedure scheduled soon by Dr. Brown. Patient denies fever, chills, cough, congestion, chest pain, shortness of breath, abdominal pain, vomiting, diarrhea, hematuria, and dysuria.   Initial vital signs in ED were /78, pulse 85, respirations 18, temperature 36.6° C, and SpO2 98%.  Labs revealed WBC 7.9, RBC 4, hemoglobin 11.6, hematocrit 35.6, sodium 130, chloride 108, CO2 11, BUN 42.3, creatinine 2.79, phosphorus 5.2, alkaline phosphatase 376, albumin 2.6, bilirubin total 13.7, AST 76, , lipase 85, and troponin undetectable.  Flu testing was negative.  COVID testing was positive.  UA revealed 1+ protein, cloudy appearance, positive nitrate, 3+ bilirubin, 1+ leukocytes, and 7 RBCs.  Patient was given 1 L NS, IV calcium gluconate 1 g, 5 units insulin IV, sodium bicarb 50 mEq, and Lokelma 10 g.  Patient was admitted to hospital medicine service for further medical management.     GI was consulted for elevated bilirubin, as patient had biliary drain with good output recommended conservative management.  Surgical oncology evaluated the patient recommended to postpone the surgery given overall decline in the functional status with COVID-19 infection and metabolic acidosis from renal failure.  Patient was initiated on bicarb drip for metabolic acidosis.  Nephrology was consulted for renal failure.    Interval Hx:    Patient was seen and examined at bedside.  He appears more alert and reported improvement in symptoms.  He reported his appetite improved and tolerating p.o. diet.  Patient remained hemodynamically stable.  Renal function continues to improve.  Patient had 2 bowel movements no visible signs of bleeding.   Objective/physical exam:  General:  Ill-appearing, generalized yellowish skin discoloration   Chest:  Decreased breath sounds due to poor effort but otherwise clear    Heart: RRR  Abdomen: Soft, nontender, biliary drain noted with output  Extremities right arm amputation noted, no pedal edema   Neurologic: Alert and oriented no focal neuro deficit    VITAL SIGNS: 24 HRS MIN & MAX LAST   Temp  Min: 97.5 °F (36.4 °C)  Max: 98.5 °F (36.9 °C) 98 °F (36.7 °C)   BP  Min: 98/59  Max: 122/79 109/73     Pulse  Min: 68  Max: 83  83     Resp  Min: 18  Max: 20 18   SpO2  Min: 91 %  Max: 99 % 97 %       Recent Labs   Lab 01/13/23  0449 01/14/23  0615 01/15/23  0320   WBC 8.4 12.2* 8.1   RBC 3.55* 2.81* 2.56*   HGB 10.3* 8.2* 7.4*   HCT 32.0* 24.1* 22.1*   MCV 90.1 85.8 86.3   MCH 29.0 29.2 28.9   MCHC 32.2* 34.0 33.5   RDW 20.0* 20.0* 19.8*    253 232   MPV 10.4 10.8* 11.2*       Recent Labs   Lab 01/12/23  0016 01/12/23  0415 01/12/23  1958 01/13/23  0830 01/13/23  0946 01/14/23  0615 01/15/23  0320 01/16/23  0406   * 130*   < > 130*   < > 132* 130* 135*   K 5.6* 4.8   < > 4.9  --  4.1 3.9 3.5   CO2 11* 11*   < > 11*  --  19* 20* 27   BUN 42.3* 42.0*   < > 51.8*  --  53.6* 59.2* 43.1*   CREATININE 2.79* 2.16*   < > 1.72*  --  1.81* 1.76* 1.36*   CALCIUM 10.9* 9.5   < > 9.3  --  8.7* 8.5* 8.0*   MG 2.50 2.20  --   --   --   --   --   --    ALBUMIN 3.4 2.6*  --  2.4*  --  2.2* 2.1*  --    ALKPHOS 376* 282*  --  240*  --  185* 170*  --    * 80*  --  71*  --  63* 60*  --    AST 76* 57*  --  54*  --  45* 45*  --    BILITOT 17.9* 13.7*  --  12.5*  --  10.9* 10.6*  --     < > = values in this interval not  displayed.          Microbiology Results (last 7 days)       Procedure Component Value Units Date/Time    Blood Culture [[id number]]  (Normal) Collected: 01/12/23 1032    Order Status: Completed Specimen: Blood Updated: 01/16/23 1200     CULTURE, BLOOD ([facility]) No Growth At 96 Hours    Blood Culture [[id number]]  (Normal) Collected: 01/14/23 2117    Order Status: Completed Specimen: Blood from Arm, Left Updated: 01/16/23 0700     CULTURE, BLOOD ([facility]) No Growth At 24 Hours    Blood Culture [[id number]]  (Normal) Collected: 01/14/23 2117    Order Status: Completed Specimen: Blood from Arm, Right Updated: 01/16/23 0700     CULTURE, BLOOD ([facility]) No Growth At 24 Hours    Blood Culture [[id number]]  (Abnormal)  (Susceptibility) Collected: 01/12/23 1032    Order Status: Completed Specimen: Blood Updated: 01/15/23 0649     CULTURE, BLOOD ([facility]) Staphylococcus capitis     GRAM STAIN Gram Positive Cocci, probable Staphylococcus      Seen in gram stain of broth only      1 of 2 Anaerobic bottles positive             See below for Radiology    Scheduled Med:   enoxaparin  30 mg Subcutaneous Daily    ergocalciferol  50,000 Units Oral Q7 Days    mirtazapine  15 mg Oral QHS    [START ON 1/17/2023] pantoprazole  40 mg Oral Daily    piperacillin-tazobactam (ZOSYN) IVPB  4.5 g Intravenous Q8H    remdesivir infusion  100 mg Intravenous Daily    sodium chloride 0.9%  10 mL Intravenous Q6H    tamsulosin  0.4 mg Oral Daily    zinc sulfate  220 mg Oral Daily        Continuous Infusions:   sodium chloride 0.9% 125 mL/hr at 01/16/23 1326        PRN Meds:  acetaminophen, albuterol, bisacodyL, dextrose 10%, dextrose 10%, meclizine, morphine, ondansetron, ondansetron, prochlorperazine, Flushing PICC Protocol **AND** sodium chloride 0.9% **AND** sodium chloride 0.9%       Assessment/Plan:  Failure to thrive  Proctitis  Staph capitis bacteremia-mostly contaminant  Acute COVID-19 Infection-NM scan low PE probability  Sepsis due to above  TERRENCE -prerenal  etiology urine sodium less than 20  Acute severe Metabolic acidosis due to renal failure  Hyperkalemia, due to above    hypovolemic Hyponatremia   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  Vanc discontinued, started on IV Zosyn, IV remdesivir x3 days   -nephrology following.  Continued Normal saline.  Bicarb drip discontinued.   -continue to monitor urine output   -serum sodium improved to 135 today after 1 L of normal saline   -FOBT reviewed showed positive occult blood, hemoglobin slowly drifting down.  We will reach out to GI to see if patient needs/qualifies for any endoscopic evaluation.  Reconsult in the a.m. for possible endoscopic evaluation.   -pharmacy adjusted IV PPI to p.o. Protonix today   -biliary drain in place, output noted  -labs in the a.m.  -continue to monitor on tele  -follow nutrition recommendations  -continue mirtazapine  -appreciate surgical oncology input.  Patient is not a surgical candidate at this time.  Planned Whipple's surgery will be postponed.      VTE prophylaxis:  Scds,dcd lovenox    Patient condition:  Fair    Anticipated discharge and Disposition:   Once clinically stable     All diagnosis and differential diagnosis have been reviewed; assessment and plan has been documented; I have personally reviewed the labs and test results that are presently available; I have reviewed the patients medication list; I have reviewed the consulting providers response and recommendations. I have reviewed or attempted to review medical records based upon their availability    All of the patient's questions have been  addressed and answered. Patient's is agreeable to the above stated plan. I will continue to monitor closely and make adjustments to medical management as  needed.  _____________________________________________________________________    Nutrition Status:    Radiology:  CT Chest Abdomen Pelvis Without Contrast (XPD)  Narrative: EXAMINATION:  CT CHEST ABDOMEN PELVIS WITHOUT CONTRAST(XPD)    CLINICAL HISTORY:  Hematemesis;Weight loss, unintended;    TECHNIQUE:  Low dose axial images, sagittal and coronal reformations were obtained from the thoracic inlet to the pubic symphysis following the IV and oral contrast administration.  Automatic exposure control is utilized to reduce patient radiation exposure.    COMPARISON:  01/03/2023    FINDINGS:  There is a left thyroid nodule.    There is mild bibasilar atelectasis.  The lungs are otherwise clear..  No mass is seen.  No nodule is seen.  No pleural thickening is seen.  No pleural effusion is seen.  No infiltrate is seen.    The thoracic aorta is normal in caliber..    No mediastinal adenopathy is seen.    The heart appears normal in size..    There has been interval placement of a percutaneous transhepatic biliary drain.  The intrahepatic and extrahepatic biliary dilatation has resolved.  The liver is otherwise unremarkable.  The gallbladder appears normal.  No gallstones are seen.    There is some duodenal wall thickening seen 2nd portion duodenum.  Patient has a known history of adenocarcinoma.    The spleen appears normal.  No splenic mass or lesion is seen.    The pancreas appears grossly unremarkable.  No pancreatic mass or lesion is seen.  No inflammation is seen.    No adrenal abnormality is seen.  No adrenal nodule is seen.    The kidneys are normal in size..  There is a cyst in the lower pole the right kidney.  It appears to be a simple cyst.  No hydronephrosis is seen.  No hydroureter is seen.  No retroperitoneal abnormality is seen..    No diverticulitis is seen.  There has been interval development of some mild wall thickening and perirectal inflammatory change.  Findings may represent proctitis.  No abscess or  fluid collection is seen..    No free air is seen.  No free fluid is seen.    Urinary bladder appears unremarkable.    No acute bony abnormality is seen.  Impression: Interval placement of a percutaneous transhepatic biliary drain with interval resolution of the intra and extrahepatic biliary dilatation    Some prominence of the 2nd portion of duodenum consistent with patient's history of adenocarcinoma in that region    Interval development of some wall thickening and inflammatory changes seen involving the perirectal region.  Proctitis should be excluded    Other incidental findings as outlined above    Electronically signed by: Clau Santacruz  Date:    01/14/2023  Time:    13:02      Ashwini Mcmanus MD   01/16/2023

## 2023-01-17 NOTE — DISCHARGE SUMMARY
[facility] Hospital Medicine Discharge Summary    Admit Date: 12/31/2022  Discharge Date and Time: 1/7/23 10:20 AM  Admitting Physician:  Team  Discharging Physician: John Ward MD.  Primary Care Physician: Reji Waters Jr, MD  Consults: Gastroenterology, Hospital Medicine, Interventional Radiology, and surgical oncology    Discharge Diagnoses:  Common bile duct obstruction status post external and internal drain placement by IR  [facility]  Gastric outlet obstruction due to duodenal cancer/mass   Intractable vomiting d/t above  Obstructive jaundice due to duodenal mass   Duodenal Adenocarcinoma  Generalized Weakness  Hx of PAD with intermittent claudication, CAD, Carotid artery disease, HTN, HLD     Hospital Course:   Chief Complaint: Inpatient Follow-up for Nausea, vomiting and duodenal cancer      HPI:   Quincy Triplett is a 82 y.o. male with a PMHx of duodenal adenocarcinoma awaiting with procedure on 01/16/2023, CAD, HTN, HLD, carotid artery disease status post stent who presented to [facility] on 12/31/2022 via EMS with c/o worsening generalized weakness, nausea and vomiting with decreased appetite for the past several days. He denied hematuria, hematochezia, hematemesis, abdominal pain.  Reports that can not tolerate oral intake due to nausea and vomiting.  He states he was diagnosed with duodenal adenocarcinoma x2 months ago and symptoms have been progressively worsening since then.  He is awaiting Whipple procedure by Dr. Abdirahman Brown. Initial ED VS stable.  Labs notable for hemoglobin 10.6, hematocrit 32.6, glucose 162, alk-phos 526, albumin 2.8, total bili 16.2, direct bili 10.8, AST 84, .  BNP and troponin normal.  Lactic acid normal.  Flu and COVID negative.  CXR negative.  He was given IV fluids in the ED. Admitted to hospital medicine services for further workup and management of care.        Patient currently admitted for gastric outlet obstruction due to  duodenal mass/cancer, obstructive jaundice due to duodenal cancer.  Patient has a Whipple procedure planned with surgical Oncology.CT of the chest abdomen and pelvis reviewed shows biliary and pancreatic ductal dilatation.  He is status post percutaneous transhepatic cholangiogram with placement of an  external drain, by IR.    - liver enzymes improving slowly since drain placed   Patient underwent internal / external drain placement by IR today.    Surgical oncology planning for whipples later this month             Objective/physical exam:  General: In no acute distress, + yellow tinge of the skin on face and trunk   Chest: Clear to auscultation bilaterally  Heart: RRR, +S1, S2, no appreciable murmur  Abdomen: Soft, nontender, BS +  MSK: Warm, no lower extremity edema, no clubbing or cyanosis  Neurologic: Alert and oriented x4, Cranial nerve II-XII intact, Strength 5/5 in all 4 extremities        Pt was seen and examined on the day of discharge  Vitals:  VITAL SIGNS: 24 HRS MIN & MAX LAST   No data recorded 97.8 °F (36.6 °C)   No data recorded 127/77   No data recorded  67   No data recorded 20   No data recorded 98 %       Procedures Performed: No admission procedures for hospital encounter.     Significant Diagnostic Studies: See Full reports for all details    Recent Labs   Lab 01/15/23  0320 01/16/23  2136 01/17/23  0359   WBC 8.1 7.9 6.8   RBC 2.56* 2.32* 2.19*   HGB 7.4* 6.9* 6.5*   HCT 22.1* 20.2* 19.1*   MCV 86.3 87.1 87.2   MCH 28.9 29.7 29.7   MCHC 33.5 34.2 34.0   RDW 19.8* 20.3* 20.7*    224 221   MPV 11.2* 10.7* 10.3       Recent Labs   Lab 01/12/23  0016 01/12/23  0415 01/12/23  1958 01/14/23  0615 01/15/23  0320 01/16/23  0406 01/17/23  0359   * 130*   < > 132* 130* 135* 136   K 5.6* 4.8   < > 4.1 3.9 3.5 3.5   CO2 11* 11*   < > 19* 20* 27 24   BUN 42.3* 42.0*   < > 53.6* 59.2* 43.1* 33.6*   CREATININE 2.79* 2.16*   < > 1.81* 1.76* 1.36* 1.12   CALCIUM 10.9* 9.5   < > 8.7* 8.5* 8.0*  8.3*   MG 2.50 2.20  --   --   --   --   --    ALBUMIN 3.4 2.6*   < > 2.2* 2.1*  --  1.7*   ALKPHOS 376* 282*   < > 185* 170*  --  132   * 80*   < > 63* 60*  --  50   AST 76* 57*   < > 45* 45*  --  41*   BILITOT 17.9* 13.7*   < > 10.9* 10.6*  --  7.1*    < > = values in this interval not displayed.        Microbiology Results (last 7 days)       ** No results found for the last 168 hours. **             CT Chest Abdomen Pelvis Without Contrast (XPD)  Narrative: EXAMINATION:  CT CHEST ABDOMEN PELVIS WITHOUT CONTRAST(XPD)    CLINICAL HISTORY:  Hematemesis;Weight loss, unintended;    TECHNIQUE:  Low dose axial images, sagittal and coronal reformations were obtained from the thoracic inlet to the pubic symphysis following the IV and oral contrast administration.  Automatic exposure control is utilized to reduce patient radiation exposure.    COMPARISON:  01/03/2023    FINDINGS:  There is a left thyroid nodule.    There is mild bibasilar atelectasis.  The lungs are otherwise clear..  No mass is seen.  No nodule is seen.  No pleural thickening is seen.  No pleural effusion is seen.  No infiltrate is seen.    The thoracic aorta is normal in caliber..    No mediastinal adenopathy is seen.    The heart appears normal in size..    There has been interval placement of a percutaneous transhepatic biliary drain.  The intrahepatic and extrahepatic biliary dilatation has resolved.  The liver is otherwise unremarkable.  The gallbladder appears normal.  No gallstones are seen.    There is some duodenal wall thickening seen 2nd portion duodenum.  Patient has a known history of adenocarcinoma.    The spleen appears normal.  No splenic mass or lesion is seen.    The pancreas appears grossly unremarkable.  No pancreatic mass or lesion is seen.  No inflammation is seen.    No adrenal abnormality is seen.  No adrenal nodule is seen.    The kidneys are normal in size..  There is a cyst in the lower pole the right kidney.  It appears  to be a simple cyst.  No hydronephrosis is seen.  No hydroureter is seen.  No retroperitoneal abnormality is seen..    No diverticulitis is seen.  There has been interval development of some mild wall thickening and perirectal inflammatory change.  Findings may represent proctitis.  No abscess or fluid collection is seen..    No free air is seen.  No free fluid is seen.    Urinary bladder appears unremarkable.    No acute bony abnormality is seen.  Impression: Interval placement of a percutaneous transhepatic biliary drain with interval resolution of the intra and extrahepatic biliary dilatation    Some prominence of the 2nd portion of duodenum consistent with patient's history of adenocarcinoma in that region    Interval development of some wall thickening and inflammatory changes seen involving the perirectal region.  Proctitis should be excluded    Other incidental findings as outlined above    Electronically signed by: Clau Santacruz  Date:    01/14/2023  Time:    13:02         Medication List        START taking these medications      ondansetron 4 MG tablet  Commonly known as: ZOFRAN  Take 1 tablet (4 mg total) by mouth every 6 (six) hours as needed for Nausea.            CONTINUE taking these medications      meclizine 12.5 mg tablet  Commonly known as: ANTIVERT     metroNIDAZOLE 0.75 % gel  Commonly known as: METROGEL     pantoprazole 40 MG tablet  Commonly known as: PROTONIX     tamsulosin 0.4 mg Cap  Commonly known as: FLOMAX               Where to Get Your Medications        These medications were sent to [facility] - [place], [place] - [address] [place] [id number]      Phone: [phone]   ondansetron 4 MG tablet          Explained in detail to the patient about the discharge plan, medications, and follow-up visits. Pt understands and agrees with the treatment plan  Discharge Disposition: Home-Health Care [facility]   Discharged Condition: stable  Diet-    Medications Per [place]  med rec  Activities as tolerated   Contact information for follow-up providers       Reji Waters Jr, MD Follow up.    Specialty: Internal Medicine  Why: As needed  Contact information:  [address]  [address]  [place] [address]  [phone]                       Contact information for after-discharge care       Home Medical Care       [facility] Follow up.    Service: Home Health Services  Why: This is your home health agency  Contact information:  [address]  [place] [address]  [phone]                                 For further questions contact hospitalist office    Discharge time 33 minutes    For worsening symptoms, chest pain, shortness of breath, increased abdominal pain, high grade fever, stroke or stroke like symptoms, immediately go to the nearest Emergency Room or call 911 as soon as possible.      John Moody M.D on 1/7/23 . at 10:20 AM.

## 2023-01-18 LAB
ALBUMIN SERPL-MCNC: 1.7 G/DL (ref 3.4–4.8)
ALBUMIN/GLOB SERPL: 0.7 RATIO (ref 1.1–2)
ALP SERPL-CCNC: 135 UNIT/L (ref 40–150)
ALT SERPL-CCNC: 53 UNIT/L (ref 0–55)
AST SERPL-CCNC: 42 UNIT/L (ref 5–34)
BASOPHILS # BLD AUTO: 0.07 X10(3)/MCL (ref 0–0.2)
BASOPHILS NFR BLD AUTO: 0.9 %
BILIRUBIN DIRECT+TOT PNL SERPL-MCNC: 7.7 MG/DL
BUN SERPL-MCNC: 27 MG/DL (ref 8.4–25.7)
CALCIUM SERPL-MCNC: 8.2 MG/DL (ref 8.8–10)
CHLORIDE SERPL-SCNC: 107 MMOL/L (ref 98–107)
CO2 SERPL-SCNC: 19 MMOL/L (ref 23–31)
CREAT SERPL-MCNC: 1.1 MG/DL (ref 0.73–1.18)
EOSINOPHIL # BLD AUTO: 0.28 X10(3)/MCL (ref 0–0.9)
EOSINOPHIL NFR BLD AUTO: 3.7 %
ERYTHROCYTE [DISTWIDTH] IN BLOOD BY AUTOMATED COUNT: 20 % (ref 11.5–17)
GFR SERPLBLD CREATININE-BSD FMLA CKD-EPI: >60 MLS/MIN/1.73/M2
GLOBULIN SER-MCNC: 2.4 GM/DL (ref 2.4–3.5)
GLUCOSE SERPL-MCNC: 92 MG/DL (ref 82–115)
HCT VFR BLD AUTO: 24 % (ref 42–52)
HGB BLD-MCNC: 8 GM/DL (ref 14–18)
IMM GRANULOCYTES # BLD AUTO: 0.11 X10(3)/MCL (ref 0–0.04)
IMM GRANULOCYTES NFR BLD AUTO: 1.4 %
LYMPHOCYTES # BLD AUTO: 1.43 X10(3)/MCL (ref 0.6–4.6)
LYMPHOCYTES NFR BLD AUTO: 18.8 %
MCH RBC QN AUTO: 29 PG
MCHC RBC AUTO-ENTMCNC: 33.3 MG/DL (ref 33–36)
MCV RBC AUTO: 87 FL (ref 80–94)
MONOCYTES # BLD AUTO: 0.68 X10(3)/MCL (ref 0.1–1.3)
MONOCYTES NFR BLD AUTO: 8.9 %
NEUTROPHILS # BLD AUTO: 5.04 X10(3)/MCL (ref 2.1–9.2)
NEUTROPHILS NFR BLD AUTO: 66.3 %
NRBC BLD AUTO-RTO: 0 %
PLATELET # BLD AUTO: 249 X10(3)/MCL (ref 130–400)
PMV BLD AUTO: 10 FL (ref 7.4–10.4)
POCT GLUCOSE: 101 MG/DL (ref 70–110)
POCT GLUCOSE: 102 MG/DL (ref 70–110)
POTASSIUM SERPL-SCNC: 3.8 MMOL/L (ref 3.5–5.1)
PROT SERPL-MCNC: 4.1 GM/DL (ref 5.8–7.6)
RBC # BLD AUTO: 2.76 X10(6)/MCL (ref 4.7–6.1)
SODIUM SERPL-SCNC: 133 MMOL/L (ref 136–145)
WBC # SPEC AUTO: 7.6 X10(3)/MCL (ref 4.5–11.5)

## 2023-01-18 PROCEDURE — 99232 SBSQ HOSP IP/OBS MODERATE 35: CPT | Mod: FS,,, | Performed by: GENERAL PRACTICE

## 2023-01-18 PROCEDURE — 85025 COMPLETE CBC W/AUTO DIFF WBC: CPT | Performed by: INTERNAL MEDICINE

## 2023-01-18 PROCEDURE — C9113 INJ PANTOPRAZOLE SODIUM, VIA: HCPCS | Performed by: INTERNAL MEDICINE

## 2023-01-18 PROCEDURE — 99232 PR SUBSEQUENT HOSPITAL CARE,LEVL II: ICD-10-PCS | Mod: FS,,, | Performed by: GENERAL PRACTICE

## 2023-01-18 PROCEDURE — A4216 STERILE WATER/SALINE, 10 ML: HCPCS | Performed by: INTERNAL MEDICINE

## 2023-01-18 PROCEDURE — 63600175 PHARM REV CODE 636 W HCPCS: Performed by: INTERNAL MEDICINE

## 2023-01-18 PROCEDURE — 27000207 HC ISOLATION

## 2023-01-18 PROCEDURE — 25000003 PHARM REV CODE 250: Performed by: INTERNAL MEDICINE

## 2023-01-18 PROCEDURE — 25000003 PHARM REV CODE 250: Performed by: PHYSICIAN ASSISTANT

## 2023-01-18 PROCEDURE — 36415 COLL VENOUS BLD VENIPUNCTURE: CPT | Performed by: INTERNAL MEDICINE

## 2023-01-18 PROCEDURE — 63600175 PHARM REV CODE 636 W HCPCS: Performed by: NURSE PRACTITIONER

## 2023-01-18 PROCEDURE — 63600175 PHARM REV CODE 636 W HCPCS: Performed by: GENERAL PRACTICE

## 2023-01-18 PROCEDURE — 25000003 PHARM REV CODE 250: Performed by: GENERAL PRACTICE

## 2023-01-18 PROCEDURE — 25000003 PHARM REV CODE 250: Performed by: NURSE PRACTITIONER

## 2023-01-18 PROCEDURE — 80053 COMPREHEN METABOLIC PANEL: CPT | Performed by: INTERNAL MEDICINE

## 2023-01-18 PROCEDURE — 21400001 HC TELEMETRY ROOM

## 2023-01-18 RX ORDER — OXYCODONE HYDROCHLORIDE 5 MG/1
5 TABLET ORAL EVERY 4 HOURS PRN
Status: DISCONTINUED | OUTPATIENT
Start: 2023-01-18 | End: 2023-02-10 | Stop reason: HOSPADM

## 2023-01-18 RX ADMIN — ACETAMINOPHEN 650 MG: 325 TABLET, FILM COATED ORAL at 06:01

## 2023-01-18 RX ADMIN — PANTOPRAZOLE SODIUM 40 MG: 40 INJECTION, POWDER, FOR SOLUTION INTRAVENOUS at 09:01

## 2023-01-18 RX ADMIN — PIPERACILLIN AND TAZOBACTAM 4.5 G: 4; .5 INJECTION, POWDER, FOR SOLUTION INTRAVENOUS; PARENTERAL at 02:01

## 2023-01-18 RX ADMIN — MORPHINE SULFATE 2 MG: 4 INJECTION INTRAVENOUS at 01:01

## 2023-01-18 RX ADMIN — ZINC SULFATE 220 MG (50 MG) CAPSULE 220 MG: CAPSULE at 10:01

## 2023-01-18 RX ADMIN — SODIUM CHLORIDE: 9 INJECTION, SOLUTION INTRAVENOUS at 07:01

## 2023-01-18 RX ADMIN — PANTOPRAZOLE SODIUM 40 MG: 40 INJECTION, POWDER, FOR SOLUTION INTRAVENOUS at 10:01

## 2023-01-18 RX ADMIN — PIPERACILLIN AND TAZOBACTAM 4.5 G: 4; .5 INJECTION, POWDER, FOR SOLUTION INTRAVENOUS; PARENTERAL at 06:01

## 2023-01-18 RX ADMIN — SODIUM CHLORIDE, PRESERVATIVE FREE 10 ML: 5 INJECTION INTRAVENOUS at 06:01

## 2023-01-18 RX ADMIN — DOCUSATE SODIUM 50 MG: 50 CAPSULE, LIQUID FILLED ORAL at 06:01

## 2023-01-18 RX ADMIN — ONDANSETRON 4 MG: 2 INJECTION INTRAMUSCULAR; INTRAVENOUS at 12:01

## 2023-01-18 RX ADMIN — OXYCODONE HYDROCHLORIDE 5 MG: 5 TABLET ORAL at 09:01

## 2023-01-18 RX ADMIN — PIPERACILLIN AND TAZOBACTAM 4.5 G: 4; .5 INJECTION, POWDER, FOR SOLUTION INTRAVENOUS; PARENTERAL at 09:01

## 2023-01-18 RX ADMIN — ACETAMINOPHEN 650 MG: 325 TABLET, FILM COATED ORAL at 07:01

## 2023-01-18 RX ADMIN — MECLIZINE 12.5 MG: 12.5 TABLET ORAL at 09:01

## 2023-01-18 RX ADMIN — ACETAMINOPHEN 650 MG: 325 TABLET, FILM COATED ORAL at 01:01

## 2023-01-18 RX ADMIN — DOCUSATE SODIUM 50 MG: 50 CAPSULE, LIQUID FILLED ORAL at 09:01

## 2023-01-18 RX ADMIN — SODIUM CHLORIDE, PRESERVATIVE FREE 10 ML: 5 INJECTION INTRAVENOUS at 12:01

## 2023-01-18 RX ADMIN — MIRTAZAPINE 15 MG: 15 TABLET, FILM COATED ORAL at 09:01

## 2023-01-18 RX ADMIN — SODIUM CHLORIDE: 9 INJECTION, SOLUTION INTRAVENOUS at 06:01

## 2023-01-18 RX ADMIN — TAMSULOSIN HYDROCHLORIDE 0.4 MG: 0.4 CAPSULE ORAL at 10:01

## 2023-01-18 NOTE — PROGRESS NOTES
[facility] Hospital Medicine Progress Note        Chief Complaint: Inpatient Follow-up for weakness, metabolic acidosis    HPI:   Quincy Triplett is a 82 y.o. male with a past medical history of essential hypertension, hyperlipidemia, CAD, obstructive jaundice, and duodenal cancer presented to [facility] on 1/11/2023 for weakness.  Patient reports weakness, decreased appetite, decreased urine output, and nausea began 3 days ago. Patient reports recent biliary drain placement on 12/21/2022 with Dr. Brown and was discharged 3 days ago.  Patient states he has whipple procedure scheduled soon by Dr. Brown. Patient denies fever, chills, cough, congestion, chest pain, shortness of breath, abdominal pain, vomiting, diarrhea, hematuria, and dysuria.   Initial vital signs in ED were /78, pulse 85, respirations 18, temperature 36.6° C, and SpO2 98%.  Labs revealed WBC 7.9, RBC 4, hemoglobin 11.6, hematocrit 35.6, sodium 130, chloride 108, CO2 11, BUN 42.3, creatinine 2.79, phosphorus 5.2, alkaline phosphatase 376, albumin 2.6, bilirubin total 13.7, AST 76, , lipase 85, and troponin undetectable.  Flu testing was negative.  COVID testing was positive.  UA revealed 1+ protein, cloudy appearance, positive nitrate, 3+ bilirubin, 1+ leukocytes, and 7 RBCs.  Patient was given 1 L NS, IV calcium gluconate 1 g, 5 units insulin IV, sodium bicarb 50 mEq, and Lokelma 10 g.  Patient was admitted to hospital medicine service for further medical management.     GI was consulted for elevated bilirubin, as patient had biliary drain with good output recommended conservative management.  Surgical oncology evaluated the patient recommended to postpone the surgery given overall decline in the functional status with COVID-19 infection and metabolic acidosis from renal failure.  Patient was initiated on bicarb drip for metabolic acidosis.  Nephrology was consulted for renal failure.    Interval Hx:    Patient was seen and examined at bedside.  He reported his appetite improved but his chronic abdominal pain has been limiting his ability to eat.  Patient reported he had multiple bowel movements yesterday but no gross blood seen.  Patient is hemodynamically stable A, H&H 6.5, renal function improved, left is stable.       Objective/physical exam:  General:  Ill-appearing, generalized yellowish skin discoloration   Chest:  Decreased breath sounds due to poor effort but otherwise clear    Heart: RRR  Abdomen: Soft, right upper quadrant, epigastric tenderness noted, biliary drain noted with output  Extremities right arm amputation noted, no pedal edema   Neurologic: Alert and oriented no focal neuro deficit    VITAL SIGNS: 24 HRS MIN & MAX LAST   Temp  Min: 97.7 °F (36.5 °C)  Max: 98.1 °F (36.7 °C) 97.7 °F (36.5 °C)   BP  Min: 102/57  Max: 123/64 108/64     Pulse  Min: 61  Max: 92  88     Resp  Min: 18  Max: 18 18   SpO2  Min: 97 %  Max: 100 % 100 %       Recent Labs   Lab 01/15/23  0320 01/16/23  2136 01/17/23  0359   WBC 8.1 7.9 6.8   RBC 2.56* 2.32* 2.19*   HGB 7.4* 6.9* 6.5*   HCT 22.1* 20.2* 19.1*   MCV 86.3 87.1 87.2   MCH 28.9 29.7 29.7   MCHC 33.5 34.2 34.0   RDW 19.8* 20.3* 20.7*    224 221   MPV 11.2* 10.7* 10.3       Recent Labs   Lab 01/12/23  0016 01/12/23  0415 01/12/23 1958 01/14/23  0615 01/15/23  0320 01/16/23  0406 01/17/23  0359   * 130*   < > 132* 130* 135* 136   K 5.6* 4.8   < > 4.1 3.9 3.5 3.5   CO2 11* 11*   < > 19* 20* 27 24   BUN 42.3* 42.0*   < > 53.6* 59.2* 43.1* 33.6*   CREATININE 2.79* 2.16*   < > 1.81* 1.76* 1.36* 1.12   CALCIUM 10.9* 9.5   < > 8.7* 8.5* 8.0* 8.3*   MG 2.50 2.20  --   --   --   --   --    ALBUMIN 3.4 2.6*   < > 2.2* 2.1*  --  1.7*   ALKPHOS 376* 282*   < > 185* 170*  --  132   * 80*   < > 63* 60*  --  50   AST 76* 57*   < > 45* 45*  --  41*   BILITOT 17.9* 13.7*   < > 10.9* 10.6*  --  7.1*    < > = values in this interval not displayed.           Microbiology Results (last 7 days)       Procedure Component Value Units Date/Time    Blood Culture [[id number]]  (Normal) Collected: 01/12/23 1032    Order Status: Completed Specimen: Blood Updated: 01/17/23 1200     CULTURE, BLOOD ([facility]) No Growth at 5 days    Blood Culture [[id number]]  (Normal) Collected: 01/14/23 2117    Order Status: Completed Specimen: Blood from Arm, Left Updated: 01/17/23 0700     CULTURE, BLOOD ([facility]) No Growth At 48 Hours    Blood Culture [[id number]]  (Normal) Collected: 01/14/23 2117    Order Status: Completed Specimen: Blood from Arm, Right Updated: 01/17/23 0700     CULTURE, BLOOD ([facility]) No Growth At 48 Hours    Blood Culture [[id number]]  (Abnormal)  (Susceptibility) Collected: 01/12/23 1032    Order Status: Completed Specimen: Blood Updated: 01/15/23 0649     CULTURE, BLOOD ([facility]) Staphylococcus capitis     GRAM STAIN Gram Positive Cocci, probable Staphylococcus      Seen in gram stain of broth only      1 of 2 Anaerobic bottles positive             See below for Radiology    Scheduled Med:   ergocalciferol  50,000 Units Oral Q7 Days    mirtazapine  15 mg Oral QHS    pantoprazole  40 mg Oral Daily    piperacillin-tazobactam (ZOSYN) IVPB  4.5 g Intravenous Q8H    sodium chloride 0.9%  10 mL Intravenous Q6H    tamsulosin  0.4 mg Oral Daily    zinc sulfate  220 mg Oral Daily        Continuous Infusions:   sodium chloride 0.9% 125 mL/hr at 01/17/23 0136        PRN Meds:  sodium chloride, acetaminophen, albuterol, bisacodyL, dextrose 10%, dextrose 10%, docusate sodium, meclizine, morphine, ondansetron, ondansetron, prochlorperazine, Flushing PICC Protocol **AND** sodium chloride 0.9% **AND** sodium chloride 0.9%       Assessment/Plan:  Failure to thrive  Acute on chronic anemia  Blood loss anemia requiring PRBC transfusion  Proctitis  Staph capitis bacteremia-mostly contaminant  Acute COVID-19 Infection-NM scan low PE probability  Sepsis due to above  TERRENCE -prerenal etiology urine sodium less  than 20  Acute severe Metabolic acidosis due to renal failure  Hyperkalemia, due to above    hypovolemic Hyponatremia   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  Patient to receive 1 unit PRBC transfusion.  Possibly GI losses.    FOBT showed positive occult blood, hemoglobin slowly drifting down.  Consult GI  for possible endoscopic evaluation  -IV PPI b.i.d.  -biliary drain in place, output noted, LFTs within normal range  -completed 3 days of remdesivir.  Continue Zosyn.  Id following.  -because of biliary output losses>intake  -appreciate nephrology recommendations.  Continue IV fluids gently until PO intake is adequate.  -labs in the a.m.  -continue to monitor on tele  -encourage p.o. intake  -continue mirtazapine  -appreciate surgical oncology input.  Patient is not a surgical candidate at this time.  Planned Whipple's surgery will be postponed.      VTE prophylaxis:  Scds,dcd lovenox    Patient condition:  Fair    Anticipated discharge and Disposition:   Once clinically stable    Critical care time spent:  35 minutes   Critical care diagnosis:  Acute blood loss anemia requiring PRBC transfusion   All diagnosis and differential diagnosis have been reviewed; assessment and plan has been documented; I have personally reviewed the labs and test results that are presently available; I have reviewed the patients medication list; I have reviewed the consulting providers response and recommendations. I have reviewed or attempted to review medical records based upon their availability    All of the patient's questions have been  addressed and answered. Patient's is agreeable to the above stated plan. I will continue to monitor closely and make adjustments to medical management as  needed.  _____________________________________________________________________    Nutrition Status:    Radiology:  CT Chest Abdomen Pelvis Without Contrast (XPD)  Narrative: EXAMINATION:  CT CHEST ABDOMEN PELVIS WITHOUT CONTRAST(XPD)    CLINICAL HISTORY:  Hematemesis;Weight loss, unintended;    TECHNIQUE:  Low dose axial images, sagittal and coronal reformations were obtained from the thoracic inlet to the pubic symphysis following the IV and oral contrast administration.  Automatic exposure control is utilized to reduce patient radiation exposure.    COMPARISON:  01/03/2023    FINDINGS:  There is a left thyroid nodule.    There is mild bibasilar atelectasis.  The lungs are otherwise clear..  No mass is seen.  No nodule is seen.  No pleural thickening is seen.  No pleural effusion is seen.  No infiltrate is seen.    The thoracic aorta is normal in caliber..    No mediastinal adenopathy is seen.    The heart appears normal in size..    There has been interval placement of a percutaneous transhepatic biliary drain.  The intrahepatic and extrahepatic biliary dilatation has resolved.  The liver is otherwise unremarkable.  The gallbladder appears normal.  No gallstones are seen.    There is some duodenal wall thickening seen 2nd portion duodenum.  Patient has a known history of adenocarcinoma.    The spleen appears normal.  No splenic mass or lesion is seen.    The pancreas appears grossly unremarkable.  No pancreatic mass or lesion is seen.  No inflammation is seen.    No adrenal abnormality is seen.  No adrenal nodule is seen.    The kidneys are normal in size..  There is a cyst in the lower pole the right kidney.  It appears to be a simple cyst.  No hydronephrosis is seen.  No hydroureter is seen.  No retroperitoneal abnormality is seen..    No diverticulitis is seen.  There has been interval development of some mild wall thickening and perirectal inflammatory change.  Findings may represent proctitis.  No abscess or  fluid collection is seen..    No free air is seen.  No free fluid is seen.    Urinary bladder appears unremarkable.    No acute bony abnormality is seen.  Impression: Interval placement of a percutaneous transhepatic biliary drain with interval resolution of the intra and extrahepatic biliary dilatation    Some prominence of the 2nd portion of duodenum consistent with patient's history of adenocarcinoma in that region    Interval development of some wall thickening and inflammatory changes seen involving the perirectal region.  Proctitis should be excluded    Other incidental findings as outlined above    Electronically signed by: Clau Santacruz  Date:    01/14/2023  Time:    13:02      Ashwini Mcmanus MD   01/17/2023

## 2023-01-18 NOTE — PROGRESS NOTES
[facility] Hospital Medicine Progress Note        Chief Complaint: Inpatient Follow-up for weakness, metabolic acidosis    HPI:   Quincy Triplett is a 82 y.o. male with a past medical history of essential hypertension, hyperlipidemia, CAD, obstructive jaundice, and duodenal cancer presented to [facility] on 1/11/2023 for weakness.  Patient reports weakness, decreased appetite, decreased urine output, and nausea began 3 days ago. Patient reports recent biliary drain placement on 12/21/2022 with Dr. Brown and was discharged 3 days ago.  Patient states he has whipple procedure scheduled soon by Dr. Brown. Patient denies fever, chills, cough, congestion, chest pain, shortness of breath, abdominal pain, vomiting, diarrhea, hematuria, and dysuria.   Initial vital signs in ED were /78, pulse 85, respirations 18, temperature 36.6° C, and SpO2 98%.  Labs revealed WBC 7.9, RBC 4, hemoglobin 11.6, hematocrit 35.6, sodium 130, chloride 108, CO2 11, BUN 42.3, creatinine 2.79, phosphorus 5.2, alkaline phosphatase 376, albumin 2.6, bilirubin total 13.7, AST 76, , lipase 85, and troponin undetectable.  Flu testing was negative.  COVID testing was positive.  UA revealed 1+ protein, cloudy appearance, positive nitrate, 3+ bilirubin, 1+ leukocytes, and 7 RBCs.  Patient was given 1 L NS, IV calcium gluconate 1 g, 5 units insulin IV, sodium bicarb 50 mEq, and Lokelma 10 g.  Patient was admitted to hospital medicine service for further medical management.   Surgical oncology evaluated the patient recommended to postpone the surgery given overall decline in the functional status with COVID-19 infection and metabolic acidosis from renal failure.  Patient was initiated on bicarb drip for metabolic acidosis.  Nephrology was consulted for metabolic acidosis renal failure.  Recommended IV fluids, encourage p.o. intake to compensate losses.  GI was consulted for elevated bilirubin, as patient had  biliary drain with good output recommended conservative management.  GI had reconsulted given positive FOBT with slowly drifting hemoglobin.    Interval Hx:   No acute events overnight.  Patient's hemoglobin improved to 8 after 1 unit of PRBC transfusion from 6.5 yesterday.  Patient had 1 BM yesterday brown formed without evidence of melena.  He reported left lower quadrant pain today.  Patient is hemodynamically stable A,      Objective/physical exam:  General:  Ill-appearing, generalized yellowish skin discoloration   Chest:  Diminished breath sounds at bases otherwise clear.   Heart: RRR  Abdomen: Soft, left lower quadrant tenderness, biliary drain noted with output  Extremities right arm amputation noted, no pedal edema   Neurologic: Alert and oriented no focal neuro deficit    VITAL SIGNS: 24 HRS MIN & MAX LAST   Temp  Min: 97.7 °F (36.5 °C)  Max: 98.1 °F (36.7 °C) 97.8 °F (36.6 °C)   BP  Min: 108/64  Max: 149/85 (!) 149/85     Pulse  Min: 72  Max: 88  72     Resp  Min: 18  Max: 19 19   SpO2  Min: 97 %  Max: 100 % 99 %       Recent Labs   Lab 01/16/23  2136 01/17/23  0359 01/18/23  0554   WBC 7.9 6.8 7.6   RBC 2.32* 2.19* 2.76*   HGB 6.9* 6.5* 8.0*   HCT 20.2* 19.1* 24.0*   MCV 87.1 87.2 87.0   MCH 29.7 29.7 29.0   MCHC 34.2 34.0 33.3   RDW 20.3* 20.7* 20.0*    221 249   MPV 10.7* 10.3 10.0       Recent Labs   Lab 01/12/23  0016 01/12/23  0415 01/12/23  1958 01/15/23  0320 01/16/23  0406 01/17/23  0359 01/18/23  0554   * 130*   < > 130* 135* 136 133*   K 5.6* 4.8   < > 3.9 3.5 3.5 3.8   CO2 11* 11*   < > 20* 27 24 19*   BUN 42.3* 42.0*   < > 59.2* 43.1* 33.6* 27.0*   CREATININE 2.79* 2.16*   < > 1.76* 1.36* 1.12 1.10   CALCIUM 10.9* 9.5   < > 8.5* 8.0* 8.3* 8.2*   MG 2.50 2.20  --   --   --   --   --    ALBUMIN 3.4 2.6*   < > 2.1*  --  1.7* 1.7*   ALKPHOS 376* 282*   < > 170*  --  132 135   * 80*   < > 60*  --  50 53   AST 76* 57*   < > 45*  --  41* 42*   BILITOT 17.9* 13.7*   < > 10.6*  --   7.1* 7.7*    < > = values in this interval not displayed.          Microbiology Results (last 7 days)       Procedure Component Value Units Date/Time    Blood Culture [[id number]]  (Normal) Collected: 01/14/23 2117    Order Status: Completed Specimen: Blood from Arm, Left Updated: 01/18/23 0700     CULTURE, BLOOD ([facility]) No Growth At 72 Hours    Blood Culture [[id number]]  (Normal) Collected: 01/14/23 2117    Order Status: Completed Specimen: Blood from Arm, Right Updated: 01/18/23 0700     CULTURE, BLOOD ([facility]) No Growth At 72 Hours    Blood Culture [[id number]]  (Normal) Collected: 01/12/23 1032    Order Status: Completed Specimen: Blood Updated: 01/17/23 1200     CULTURE, BLOOD ([facility]) No Growth at 5 days    Blood Culture [[id number]]  (Abnormal)  (Susceptibility) Collected: 01/12/23 1032    Order Status: Completed Specimen: Blood Updated: 01/15/23 0649     CULTURE, BLOOD ([facility]) Staphylococcus capitis     GRAM STAIN Gram Positive Cocci, probable Staphylococcus      Seen in gram stain of broth only      1 of 2 Anaerobic bottles positive             See below for Radiology    Scheduled Med:   ergocalciferol  50,000 Units Oral Q7 Days    mirtazapine  15 mg Oral QHS    pantoprazole  40 mg Intravenous BID    piperacillin-tazobactam (ZOSYN) IVPB  4.5 g Intravenous Q8H    sodium chloride 0.9%  10 mL Intravenous Q6H    tamsulosin  0.4 mg Oral Daily    zinc sulfate  220 mg Oral Daily        Continuous Infusions:   sodium chloride 0.9% 125 mL/hr at 01/18/23 0611        PRN Meds:  sodium chloride, acetaminophen, albuterol, bisacodyL, dextrose 10%, dextrose 10%, docusate sodium, meclizine, ondansetron, ondansetron, oxyCODONE, prochlorperazine, Flushing PICC Protocol **AND** sodium chloride 0.9% **AND** sodium chloride 0.9%       Assessment/Plan:  Acute COVID-19 Infection  Failure to thrive in the setting of Duodenal cancer   Obstructive jaundice s/p biliary stent placement on 12/21/2022  TERRENCE -prerenal etiology from poor p.o. intake,  high biliary drain output   Acute severe Metabolic acidosis due to renal failure-improved  Hyperkalemia, due to above   Hypovolemic Hyponatremia   Acute on chronic anemia  Blood loss anemia requiring PRBC transfusion  Proctitis  History of peripheral vascular disease,  amputation coronary artery disease    -H&H improved.  Patient with no overt melanotic stools.  GI consultation obtained.  Appreciate recommendations.    -continue with IV PPI b.i.d.  -continue IV fluids to compensate biliary losses.  Encourage p.o. intake.  Nephrology signed off.  -biliary drain in place, output noted, LFTs within normal range  -completed 3 days of remdesivir, shortened due to liver failure, initiated remdesivir to prevent progression of COVID in the setting of immunocompromise state  - patient's left lower quadrant plain could be explained with proctitis.  Continue Zosyn for proctitis.  Id following.  Appreciate recommendation  -labs in the a.m.  -continue to monitor on tele  -encourage p.o. intake  -continue mirtazapine  -appreciate surgical oncology input.  Planned Whipple's surgery postponed due to her overall decline in functional status with COVID and metabolic acidosis from renal failure.      VTE prophylaxis:  Scds    Patient condition:  Fair    Anticipated discharge and Disposition:   Once clinically stable       All diagnosis and differential diagnosis have been reviewed; assessment and plan has been documented; I have personally reviewed the labs and test results that are presently available; I have reviewed the patients medication list; I have reviewed the consulting providers response and recommendations. I have reviewed or attempted to review medical records based upon their availability    All of the patient's questions have been  addressed and answered. Patient's is agreeable to the above stated plan. I will continue to monitor closely and make adjustments to medical management as  needed.  _____________________________________________________________________    Nutrition Status:    Radiology:  CT Chest Abdomen Pelvis Without Contrast (XPD)  Narrative: EXAMINATION:  CT CHEST ABDOMEN PELVIS WITHOUT CONTRAST(XPD)    CLINICAL HISTORY:  Hematemesis;Weight loss, unintended;    TECHNIQUE:  Low dose axial images, sagittal and coronal reformations were obtained from the thoracic inlet to the pubic symphysis following the IV and oral contrast administration.  Automatic exposure control is utilized to reduce patient radiation exposure.    COMPARISON:  01/03/2023    FINDINGS:  There is a left thyroid nodule.    There is mild bibasilar atelectasis.  The lungs are otherwise clear..  No mass is seen.  No nodule is seen.  No pleural thickening is seen.  No pleural effusion is seen.  No infiltrate is seen.    The thoracic aorta is normal in caliber..    No mediastinal adenopathy is seen.    The heart appears normal in size..    There has been interval placement of a percutaneous transhepatic biliary drain.  The intrahepatic and extrahepatic biliary dilatation has resolved.  The liver is otherwise unremarkable.  The gallbladder appears normal.  No gallstones are seen.    There is some duodenal wall thickening seen 2nd portion duodenum.  Patient has a known history of adenocarcinoma.    The spleen appears normal.  No splenic mass or lesion is seen.    The pancreas appears grossly unremarkable.  No pancreatic mass or lesion is seen.  No inflammation is seen.    No adrenal abnormality is seen.  No adrenal nodule is seen.    The kidneys are normal in size..  There is a cyst in the lower pole the right kidney.  It appears to be a simple cyst.  No hydronephrosis is seen.  No hydroureter is seen.  No retroperitoneal abnormality is seen..    No diverticulitis is seen.  There has been interval development of some mild wall thickening and perirectal inflammatory change.  Findings may represent proctitis.  No abscess or  fluid collection is seen..    No free air is seen.  No free fluid is seen.    Urinary bladder appears unremarkable.    No acute bony abnormality is seen.  Impression: Interval placement of a percutaneous transhepatic biliary drain with interval resolution of the intra and extrahepatic biliary dilatation    Some prominence of the 2nd portion of duodenum consistent with patient's history of adenocarcinoma in that region    Interval development of some wall thickening and inflammatory changes seen involving the perirectal region.  Proctitis should be excluded    Other incidental findings as outlined above    Electronically signed by: Clau Santacruz  Date:    01/14/2023  Time:    13:02      Ashwini Mcmanus MD   01/18/2023

## 2023-01-18 NOTE — CONSULTS
The documentation recorded by the scribe/NP/PA accurately reflects the service I personally performed and the decisions made by me. EGD tomorrow.       Ranjeet Perez MD  [facility]      Gastroenterology Consultation Note    Reason for Consult:  Anemia    PCP:   Reji Waters Jr, MD      History of Present Illness (HPI):  This is a 82 y.o. male known to Dr. Anderson with PMH of HTN, HLD, CAD, obstructive jaundice, duodenal adenocarcinoma who initially presented to ED on 01/11/23 for weakness, decreased appetite, decreased urine output, nausea x3 days.  GI is consulted for decrease in hemoglobin with positive occult blood 2/3.     H&H trend: 11.6/35.6-->10.3/32.0-->8.2/24.1-->7.4/22.2-->6.9/20.2-->6.5/19.1-->1u PRBC-->8.0/24.0   Other labs notable to sodium 133, BUN 27.0, calcium 8.2, Tbili 7.7, Ast/ALT 42.    Previous records reviewed.     ERCP by Dr. Anderson on 12/22/22 with unsuccessful pancreatogram/cholangiogram - friable mass in second portion of duodenum adjacent to the ampulla with no visualization of ampulla, biopsy revealed poorly differentiated adenocarcinoma, ulcerated. Unfortunately, given the location of the cancer we were unable to intervene endoscopically and required IR to place a biliary drain on 01/04.  Additionally, IR was unable to cross into the distal common bile duct for internalization, thus he is left with an external drain    GI was initially consulted on 01/12 for elevated bilirubin, but patient had biliary drain with good output so recommended conservative management at the time.     Most recent EGD 10/17/2017 for dark stools with gastritis. Pathology negative for H Pylori with reactive gastropathy   Most recent colonoscopy in 2016 for surveillance without abnormalities      On exam this morning, patient endorsing LLQ pain. His last BM was yesterday, brown and formed without evidence of melena or hematochezia. He is otherwise without complaints.       ROS:  Review of  Systems   Constitutional:  Positive for malaise/fatigue. Negative for chills and fever.   Respiratory:  Negative for shortness of breath, wheezing and stridor.    Gastrointestinal:  Positive for abdominal pain (LLQ). Negative for blood in stool, melena, nausea and vomiting.   Neurological:  Positive for weakness. Negative for seizures and loss of consciousness.   Psychiatric/Behavioral:  The patient is not nervous/anxious.      Medical History:   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, unspecified extremity     Coronary artery disease     Dyslipidemia     Hypertension        Surgical History:   Past Surgical History:   Procedure Laterality Date    AMPUTATION Right     Right arm    CAROTID STENT      ERCP N/A 2022    Procedure: ERCP;  Surgeon: Sushil Anderson MD;  Location: [facility] ENDOSCOPY;  Service: Gastroenterology;  Laterality: N/A;  food in abdomen    ERCP, WITH BIOPSY  2022    Procedure: ERCP, WITH BIOPSY;  Surgeon: Sushil Anderson MD;  Location: [facility] ENDOSCOPY;  Service: Gastroenterology;;    LEFT HEART CATHETERIZATION      TONSILLECTOMY         Family History:   No family history on file..     Social History:   Social History     Tobacco Use    Smoking status: Never    Smokeless tobacco: Never   Substance Use Topics    Alcohol use: Never       Allergies:  Review of patient's allergies indicates:  No Known Allergies    Medications Prior to Admission   Medication Sig Dispense Refill Last Dose    meclizine (ANTIVERT) 12.5 mg tablet Take 12.5 mg by mouth 3 (three) times daily as needed.       metroNIDAZOLE (METROGEL) 0.75 % gel Apply topically 2 (two) times daily.       [] ondansetron (ZOFRAN) 4 MG tablet Take 1 tablet (4 mg total) by mouth every 6 (six) hours as needed for Nausea. 30 tablet 0     pantoprazole (PROTONIX) 40 MG tablet Take 40 mg by mouth once daily.       tamsulosin (FLOMAX) 0.4  "mg Cap Take by mouth once daily.            Objective Findings:    Vital Signs:  /75   Pulse 82   Temp 97.8 °F (36.6 °C) (Oral)   Resp 19   Ht 5' 9" (1.753 m)   Wt 69.3 kg (152 lb 12.5 oz)   SpO2 98%   BMI 22.56 kg/m²   Body mass index is 22.56 kg/m².    Physical Exam:  Physical Exam  Constitutional:       General: He is not in acute distress.     Appearance: He is ill-appearing.   HENT:      Head: Normocephalic.      Nose: Nose normal.   Eyes:      Extraocular Movements: Extraocular movements intact.   Cardiovascular:      Rate and Rhythm: Normal rate.   Pulmonary:      Effort: No respiratory distress.      Breath sounds: No wheezing.   Abdominal:      General: Abdomen is flat. Bowel sounds are normal. There is no distension.      Palpations: Abdomen is soft.      Tenderness: There is abdominal tenderness (mild throughout).      Comments: Drain to RUQ with continued output, ~250cc bilious fluid    Skin:     Coloration: Skin is jaundiced.   Neurological:      General: No focal deficit present.      Mental Status: He is alert. Mental status is at baseline.   Psychiatric:         Mood and Affect: Mood normal.         Thought Content: Thought content normal.       Labs:  Recent Results (from the past 24 hour(s))   ABORH RETYPE    Collection Time: 01/17/23 12:55 PM   Result Value Ref Range    ABORH Retype A POS    POCT glucose    Collection Time: 01/17/23  4:00 PM   Result Value Ref Range    POCT Glucose 98 70 - 110 mg/dL   POCT glucose    Collection Time: 01/17/23  8:53 PM   Result Value Ref Range    POCT Glucose 111 (H) 70 - 110 mg/dL   Comprehensive Metabolic Panel    Collection Time: 01/18/23  5:54 AM   Result Value Ref Range    Sodium Level 133 (L) 136 - 145 mmol/L    Potassium Level 3.8 3.5 - 5.1 mmol/L    Chloride 107 98 - 107 mmol/L    Carbon Dioxide 19 (L) 23 - 31 mmol/L    Glucose Level 92 82 - 115 mg/dL    Blood Urea Nitrogen 27.0 (H) 8.4 - 25.7 mg/dL    Creatinine 1.10 0.73 - 1.18 mg/dL    " Calcium Level Total 8.2 (L) 8.8 - 10.0 mg/dL    Protein Total 4.1 (L) 5.8 - 7.6 gm/dL    Albumin Level 1.7 (L) 3.4 - 4.8 g/dL    Globulin 2.4 2.4 - 3.5 gm/dL    Albumin/Globulin Ratio 0.7 (L) 1.1 - 2.0 ratio    Bilirubin Total 7.7 (H) <=1.5 mg/dL    Alkaline Phosphatase 135 40 - 150 unit/L    Alanine Aminotransferase 53 0 - 55 unit/L    Aspartate Aminotransferase 42 (H) 5 - 34 unit/L    eGFR >60 mls/min/1.73/m2   CBC with Differential    Collection Time: 01/18/23  5:54 AM   Result Value Ref Range    WBC 7.6 4.5 - 11.5 x10(3)/mcL    RBC 2.76 (L) 4.70 - 6.10 x10(6)/mcL    Hgb 8.0 (L) 14.0 - 18.0 gm/dL    Hct 24.0 (L) 42.0 - 52.0 %    MCV 87.0 80.0 - 94.0 fL    MCH 29.0 pg    MCHC 33.3 33.0 - 36.0 mg/dL    RDW 20.0 (H) 11.5 - 17.0 %    Platelet 249 130 - 400 x10(3)/mcL    MPV 10.0 7.4 - 10.4 fL    Neut % 66.3 %    Lymph % 18.8 %    Mono % 8.9 %    Eos % 3.7 %    Basophil % 0.9 %    Lymph # 1.43 0.6 - 4.6 x10(3)/mcL    Neut # 5.04 2.1 - 9.2 x10(3)/mcL    Mono # 0.68 0.1 - 1.3 x10(3)/mcL    Eos # 0.28 0 - 0.9 x10(3)/mcL    Baso # 0.07 0 - 0.2 x10(3)/mcL    IG# 0.11 (H) 0 - 0.04 x10(3)/mcL    IG% 1.4 %    NRBC% 0.0 %       CT Chest Abdomen Pelvis Without Contrast (XPD)   Final Result      Interval placement of a percutaneous transhepatic biliary drain with interval resolution of the intra and extrahepatic biliary dilatation      Some prominence of the 2nd portion of duodenum consistent with patient's history of adenocarcinoma in that region      Interval development of some wall thickening and inflammatory changes seen involving the perirectal region.  Proctitis should be excluded      Other incidental findings as outlined above         Electronically signed by: Clau Santacruz   Date:    01/14/2023   Time:    13:02      NM Lung Scan Perfusion Particulate   Final Result      No perfusion defects noted consistent with low probability for pulmonary embolism         Electronically signed by: Clau Santacruz    Date:    01/13/2023   Time:    13:08      US Retroperitoneal Complete   Final Result      No significant sonographic abnormality of the kidneys.         Electronically signed by: Quincy Roberts   Date:    01/13/2023   Time:    08:32      X-Ray Chest AP Portable   Final Result      No acute abnormality of the chest.         Electronically signed by: Zhanna Langley   Date:    01/12/2023   Time:    06:13          Assessment/Plan:  1. Dehydration    2. Generalized weakness    3. SOB (shortness of breath)    4. Hyperkalemia    5. TERRENCE (acute kidney injury)    6. COVID-19       This is a 82 y.o. male known to Dr. Anderson with PMH of HTN, HLD, CAD, obstructive jaundice, duodenal adenocarcinoma who initially presented to ED on 01/11/23 for weakness, decreased appetite, decreased urine output, nausea x3 days.  GI is consulted for decrease in hemoglobin with positive occult blood 2/3.    Acute on chromic anemia w/ positive occult blood x2  - patient without any over signs of GI bleeding. Will further discuss need for endoscopy with MD considering overall clinical picture at this time   2. Proctitis   3. Duodenal adenocarcinoma  - s/p external drain placement with IR on 01/04 due to inability for us to intervene endoscopically due to location of tumor   - prior plans for Whipple on 01/19, however surgical oncology evaluated the patient and recommended to postpone the surgery given overall decline in the functional status with COVID-19 infection and metabolic acidosis from renal failure.   4. COVID  5. TERRENCE       Padmini Anderson PA-C  Gastroenterology  [facility]     Thank you for allowing us to participate in the care of Quincy Triplett.

## 2023-01-18 NOTE — PROGRESS NOTES
PROGRESS NOTE         SUBJECTIVE:  No events overnight.   AF, VSS.  Remains on RA, oxygenating well.         MEDICATIONS:   Reviewed in EMR        OBJECTIVE:     Vitals:    01/18/23 0817   BP: 136/75   Pulse: 82   Resp:    Temp: 97.8 °F (36.6 °C)          GENERAL: NAD; does not appear toxic  SKIN: no rash  HEENT: sclera non-icteric; PERRL  NECK: supple; no LAD  CHEST: CTA; nonlabored, equal expansion; no adventitious BS  CARDIOVASCULAR: RRR, S1S2; no murmur; strong, equal peripheral pulses; no edema  ABDOMEN:  active bowel sounds; abdomen soft, nondistended, nontender to palpation  EXTREMITIES: no cyanosis or clubbing  NEURO: AAO x4; CN II-XII grossly intact  PSYCH: Mentation and affect appropriate          LABORATORY DATA:  Reviewed          RADIOLOGICAL DATA: Reviewed          ASSESSMENT:   82-year-old male patient known to have past medical history significant for recently diagnosed duodenal cancer with obstructive jaundice s/p biliary drain placement 01/04/2023 with attempt at  internalization 01/06/2023 which were not successful, recently discharged from hospital, presented with significant worsening of weakness and decreased p.o. intake.  Found to COVID-19, proctitis on CT scan, TERRENCE and stable transaminitis with hyperbilirubinemia, CoNS in pediatric blood culture bottle.  ID is consulted for assistance in management.     COVID-19  Proctitis  TERRENCE, resolved  Transaminitis, improving  CoNS in blood culture 1/4 bottles - contaminant  Duodenal cancer  Obstructive jaundice s/p biliary drain placement 01/04/2023 with failed attempt at internalization 01/06/2023      PLAN:  F/u GI input re: need for endoscopy.   Continue Zosyn for now.   Monitor clinically.   Discussed with patient.

## 2023-01-19 ENCOUNTER — ANESTHESIA EVENT (OUTPATIENT)
Dept: SURGERY | Facility: HOSPITAL | Age: 83
DRG: 682 | End: 2023-01-19
Payer: MEDICARE

## 2023-01-19 ENCOUNTER — ANESTHESIA (OUTPATIENT)
Dept: SURGERY | Facility: HOSPITAL | Age: 83
DRG: 682 | End: 2023-01-19
Payer: MEDICARE

## 2023-01-19 LAB
ALBUMIN SERPL-MCNC: 1.9 G/DL (ref 3.4–4.8)
ALBUMIN/GLOB SERPL: 0.6 RATIO (ref 1.1–2)
ALP SERPL-CCNC: 139 UNIT/L (ref 40–150)
ALT SERPL-CCNC: 61 UNIT/L (ref 0–55)
AST SERPL-CCNC: 71 UNIT/L (ref 5–34)
BASOPHILS # BLD AUTO: 0.04 X10(3)/MCL (ref 0–0.2)
BASOPHILS NFR BLD AUTO: 0.4 %
BILIRUBIN DIRECT+TOT PNL SERPL-MCNC: 7.4 MG/DL
BUN SERPL-MCNC: 26.9 MG/DL (ref 8.4–25.7)
CALCIUM SERPL-MCNC: 8.3 MG/DL (ref 8.8–10)
CHLORIDE SERPL-SCNC: 111 MMOL/L (ref 98–107)
CO2 SERPL-SCNC: 14 MMOL/L (ref 23–31)
CREAT SERPL-MCNC: 1.27 MG/DL (ref 0.73–1.18)
EOSINOPHIL # BLD AUTO: 0.26 X10(3)/MCL (ref 0–0.9)
EOSINOPHIL NFR BLD AUTO: 2.5 %
ERYTHROCYTE [DISTWIDTH] IN BLOOD BY AUTOMATED COUNT: 19.9 % (ref 11.5–17)
GFR SERPLBLD CREATININE-BSD FMLA CKD-EPI: 57 MLS/MIN/1.73/M2
GLOBULIN SER-MCNC: 3.1 GM/DL (ref 2.4–3.5)
GLUCOSE SERPL-MCNC: 93 MG/DL (ref 82–115)
HCT VFR BLD AUTO: 27.6 % (ref 42–52)
HGB BLD-MCNC: 9 GM/DL (ref 14–18)
IMM GRANULOCYTES # BLD AUTO: 0.1 X10(3)/MCL (ref 0–0.04)
IMM GRANULOCYTES NFR BLD AUTO: 1 %
LYMPHOCYTES # BLD AUTO: 0.89 X10(3)/MCL (ref 0.6–4.6)
LYMPHOCYTES NFR BLD AUTO: 8.6 %
MCH RBC QN AUTO: 29.3 PG
MCHC RBC AUTO-ENTMCNC: 32.6 MG/DL (ref 33–36)
MCV RBC AUTO: 89.9 FL (ref 80–94)
MONOCYTES # BLD AUTO: 0.66 X10(3)/MCL (ref 0.1–1.3)
MONOCYTES NFR BLD AUTO: 6.4 %
NEUTROPHILS # BLD AUTO: 8.4 X10(3)/MCL (ref 2.1–9.2)
NEUTROPHILS NFR BLD AUTO: 81.1 %
NRBC BLD AUTO-RTO: 0 %
PLATELET # BLD AUTO: 271 X10(3)/MCL (ref 130–400)
PMV BLD AUTO: 9.6 FL (ref 7.4–10.4)
POCT GLUCOSE: 90 MG/DL (ref 70–110)
POTASSIUM SERPL-SCNC: 4.8 MMOL/L (ref 3.5–5.1)
PROT SERPL-MCNC: 5 GM/DL (ref 5.8–7.6)
RBC # BLD AUTO: 3.07 X10(6)/MCL (ref 4.7–6.1)
SODIUM SERPL-SCNC: 133 MMOL/L (ref 136–145)
WBC # SPEC AUTO: 10.4 X10(3)/MCL (ref 4.5–11.5)

## 2023-01-19 PROCEDURE — 37000009 HC ANESTHESIA EA ADD 15 MINS: Performed by: INTERNAL MEDICINE

## 2023-01-19 PROCEDURE — C1052 HEMOSTATIC AGENT, GI, TOPIC: HCPCS | Performed by: INTERNAL MEDICINE

## 2023-01-19 PROCEDURE — 25000003 PHARM REV CODE 250: Performed by: INTERNAL MEDICINE

## 2023-01-19 PROCEDURE — 25000003 PHARM REV CODE 250: Performed by: NURSE PRACTITIONER

## 2023-01-19 PROCEDURE — 43255 EGD CONTROL BLEEDING ANY: CPT | Performed by: INTERNAL MEDICINE

## 2023-01-19 PROCEDURE — A4216 STERILE WATER/SALINE, 10 ML: HCPCS | Performed by: INTERNAL MEDICINE

## 2023-01-19 PROCEDURE — 27000207 HC ISOLATION

## 2023-01-19 PROCEDURE — 63600175 PHARM REV CODE 636 W HCPCS: Performed by: GENERAL PRACTICE

## 2023-01-19 PROCEDURE — 21400001 HC TELEMETRY ROOM

## 2023-01-19 PROCEDURE — 85025 COMPLETE CBC W/AUTO DIFF WBC: CPT | Performed by: INTERNAL MEDICINE

## 2023-01-19 PROCEDURE — 37000008 HC ANESTHESIA 1ST 15 MINUTES: Performed by: INTERNAL MEDICINE

## 2023-01-19 PROCEDURE — 36415 COLL VENOUS BLD VENIPUNCTURE: CPT | Performed by: INTERNAL MEDICINE

## 2023-01-19 PROCEDURE — 63600175 PHARM REV CODE 636 W HCPCS: Performed by: NURSE ANESTHETIST, CERTIFIED REGISTERED

## 2023-01-19 PROCEDURE — 99233 PR SUBSEQUENT HOSPITAL CARE,LEVL III: ICD-10-PCS | Mod: FS,,, | Performed by: GENERAL PRACTICE

## 2023-01-19 PROCEDURE — 25000003 PHARM REV CODE 250: Performed by: PHYSICIAN ASSISTANT

## 2023-01-19 PROCEDURE — C9113 INJ PANTOPRAZOLE SODIUM, VIA: HCPCS | Performed by: INTERNAL MEDICINE

## 2023-01-19 PROCEDURE — 63600175 PHARM REV CODE 636 W HCPCS: Performed by: INTERNAL MEDICINE

## 2023-01-19 PROCEDURE — 25000003 PHARM REV CODE 250: Performed by: NURSE ANESTHETIST, CERTIFIED REGISTERED

## 2023-01-19 PROCEDURE — 25000003 PHARM REV CODE 250: Performed by: GENERAL PRACTICE

## 2023-01-19 PROCEDURE — 80053 COMPREHEN METABOLIC PANEL: CPT | Performed by: INTERNAL MEDICINE

## 2023-01-19 PROCEDURE — 99233 SBSQ HOSP IP/OBS HIGH 50: CPT | Mod: FS,,, | Performed by: GENERAL PRACTICE

## 2023-01-19 RX ORDER — LIDOCAINE HYDROCHLORIDE 20 MG/ML
INJECTION INTRAVENOUS
Status: DISCONTINUED | OUTPATIENT
Start: 2023-01-19 | End: 2023-01-19

## 2023-01-19 RX ORDER — LIDOCAINE HYDROCHLORIDE 20 MG/ML
INJECTION, SOLUTION EPIDURAL; INFILTRATION; INTRACAUDAL; PERINEURAL
Status: DISPENSED
Start: 2023-01-19 | End: 2023-01-20

## 2023-01-19 RX ORDER — PROPOFOL 10 MG/ML
VIAL (ML) INTRAVENOUS
Status: COMPLETED
Start: 2023-01-19 | End: 2023-01-19

## 2023-01-19 RX ORDER — ETOMIDATE 2 MG/ML
INJECTION INTRAVENOUS
Status: DISCONTINUED | OUTPATIENT
Start: 2023-01-19 | End: 2023-01-19

## 2023-01-19 RX ORDER — SODIUM CHLORIDE 9 MG/ML
INJECTION, SOLUTION INTRAVENOUS CONTINUOUS PRN
Status: DISCONTINUED | OUTPATIENT
Start: 2023-01-19 | End: 2023-01-19

## 2023-01-19 RX ORDER — PROPOFOL 10 MG/ML
VIAL (ML) INTRAVENOUS
Status: DISCONTINUED | OUTPATIENT
Start: 2023-01-19 | End: 2023-01-19

## 2023-01-19 RX ADMIN — OXYCODONE HYDROCHLORIDE 5 MG: 5 TABLET ORAL at 06:01

## 2023-01-19 RX ADMIN — SODIUM CHLORIDE, PRESERVATIVE FREE 10 ML: 5 INJECTION INTRAVENOUS at 06:01

## 2023-01-19 RX ADMIN — ETOMIDATE 2 MG: 2 INJECTION INTRAVENOUS at 03:01

## 2023-01-19 RX ADMIN — PIPERACILLIN AND TAZOBACTAM 4.5 G: 4; .5 INJECTION, POWDER, FOR SOLUTION INTRAVENOUS; PARENTERAL at 10:01

## 2023-01-19 RX ADMIN — TAMSULOSIN HYDROCHLORIDE 0.4 MG: 0.4 CAPSULE ORAL at 10:01

## 2023-01-19 RX ADMIN — PROPOFOL 20 MG: 10 INJECTION, EMULSION INTRAVENOUS at 03:01

## 2023-01-19 RX ADMIN — MIRTAZAPINE 15 MG: 15 TABLET, FILM COATED ORAL at 09:01

## 2023-01-19 RX ADMIN — SODIUM CHLORIDE: 9 INJECTION, SOLUTION INTRAVENOUS at 03:01

## 2023-01-19 RX ADMIN — SODIUM BICARBONATE: 84 INJECTION, SOLUTION INTRAVENOUS at 11:01

## 2023-01-19 RX ADMIN — ERGOCALCIFEROL 50000 UNITS: 1.25 CAPSULE ORAL at 10:01

## 2023-01-19 RX ADMIN — SODIUM CHLORIDE, PRESERVATIVE FREE 10 ML: 5 INJECTION INTRAVENOUS at 11:01

## 2023-01-19 RX ADMIN — PIPERACILLIN AND TAZOBACTAM 4.5 G: 4; .5 INJECTION, POWDER, FOR SOLUTION INTRAVENOUS; PARENTERAL at 06:01

## 2023-01-19 RX ADMIN — ZINC SULFATE 220 MG (50 MG) CAPSULE 220 MG: CAPSULE at 10:01

## 2023-01-19 RX ADMIN — DOCUSATE SODIUM 50 MG: 50 CAPSULE, LIQUID FILLED ORAL at 09:01

## 2023-01-19 RX ADMIN — SODIUM CHLORIDE, PRESERVATIVE FREE 10 ML: 5 INJECTION INTRAVENOUS at 12:01

## 2023-01-19 RX ADMIN — OXYCODONE HYDROCHLORIDE 5 MG: 5 TABLET ORAL at 09:01

## 2023-01-19 RX ADMIN — PANTOPRAZOLE SODIUM 40 MG: 40 INJECTION, POWDER, FOR SOLUTION INTRAVENOUS at 10:01

## 2023-01-19 RX ADMIN — PANTOPRAZOLE SODIUM 40 MG: 40 INJECTION, POWDER, FOR SOLUTION INTRAVENOUS at 09:01

## 2023-01-19 RX ADMIN — PIPERACILLIN AND TAZOBACTAM 4.5 G: 4; .5 INJECTION, POWDER, FOR SOLUTION INTRAVENOUS; PARENTERAL at 02:01

## 2023-01-19 RX ADMIN — MECLIZINE 12.5 MG: 12.5 TABLET ORAL at 09:01

## 2023-01-19 RX ADMIN — MECLIZINE 12.5 MG: 12.5 TABLET ORAL at 06:01

## 2023-01-19 RX ADMIN — PROPOFOL 30 MG: 10 INJECTION, EMULSION INTRAVENOUS at 03:01

## 2023-01-19 RX ADMIN — LIDOCAINE HYDROCHLORIDE 100 MG: 20 INJECTION INTRAVENOUS at 03:01

## 2023-01-19 RX ADMIN — SODIUM CHLORIDE: 9 INJECTION, SOLUTION INTRAVENOUS at 04:01

## 2023-01-19 NOTE — TRANSFER OF CARE
"Anesthesia Transfer of Care Note    Patient: Quincy Triplett    Procedure(s) Performed: Procedure(s) (LRB):  EGD (N/A)  EGD,WITH HEMORRHAGE CONTROL    Patient location: Telemetry/Step Down Unit    Anesthesia Type: general and MAC    Transport from OR: Transported from OR on room air with adequate spontaneous ventilation    Post pain: adequate analgesia    Post assessment: no apparent anesthetic complications and tolerated procedure well    Post vital signs: stable    Level of consciousness: awake, alert and oriented    Nausea/Vomiting: no nausea/vomiting    Complications: none    Transfer of care protocol was followed      Last vitals:   Visit Vitals  /85   Pulse 76   Temp 36.7 °C (98 °F) (Oral)   Resp 17   Ht 5' 9" (1.753 m)   Wt 69.3 kg (152 lb 12.5 oz)   SpO2 99%   BMI 22.56 kg/m²     "

## 2023-01-19 NOTE — PROGRESS NOTES
PROGRESS NOTE         SUBJECTIVE:  AF, VSS.  Going for EGD today.  No new complaints.  Still with some LLQ discomfort.  No diarrhea.          MEDICATIONS:   Reviewed in EMR        OBJECTIVE:     Vitals:    01/19/23 0802   BP: 101/73   Pulse: 92   Resp:    Temp: 98.5 °F (36.9 °C)          GENERAL: NAD; does not appear toxic  SKIN: no rash  HEENT: sclera non-icteric; PERRL  NECK: supple; no LAD  CHEST: CTA; nonlabored, equal expansion; no adventitious BS  CARDIOVASCULAR: RRR, S1S2; no murmur; strong, equal peripheral pulses; no edema  ABDOMEN:  active bowel sounds; abdomen soft, nondistended, some lower quadrant tenderness  EXTREMITIES: no cyanosis or clubbing  NEURO: AAO x4; CN II-XII grossly intact  PSYCH: Mentation and affect appropriate          LABORATORY DATA:  Reviewed          RADIOLOGICAL DATA: Reviewed          ASSESSMENT:   82-year-old male patient known to have past medical history significant for recently diagnosed duodenal cancer with obstructive jaundice s/p biliary drain placement 01/04/2023 with attempt at  internalization 01/06/2023 which were not successful, recently discharged from hospital, presented with significant worsening of weakness and decreased p.o. intake.  Found to COVID-19, proctitis on CT scan, TERRENCE and stable transaminitis with hyperbilirubinemia, CoNS in pediatric blood culture bottle.  ID is consulted for assistance in management.     COVID-19  Proctitis  TERRENCE, resolved  Transaminitis, improving  CoNS in blood culture 1/4 bottles - contaminant  Duodenal cancer  Obstructive jaundice s/p biliary drain placement 01/04/2023 with failed attempt at internalization 01/06/2023      PLAN:  EGD today - f/u findings.  Continue Zosyn for now.   Monitor clinically.   Discussed with patient.

## 2023-01-19 NOTE — PROGRESS NOTES
[facility] Hospital Medicine Progress Note        Chief Complaint: Inpatient Follow-up for FTT    HPI:   Quincy Triplett is a 82 y.o. male with a past medical history of essential hypertension, hyperlipidemia, CAD, obstructive jaundice, and duodenal cancer presented to [facility] on 1/11/2023 for weakness,  decreased appetite, decreased urine output, and nausea began 3 days ago. Patient reports recent biliary drain placement on 12/21/2022 with Dr. Brown and was discharged 3 days ago.  Patient states he has whipple procedure scheduled soon by Dr. Brown. Denied fever, chills, cough, congestion, chest pain, shortness of breath, abdominal pain, vomiting, diarrhea, hematuria, and dysuria.     Initial vital signs in ED were /78, pulse 85, respirations 18, temperature 36.6° C, and SpO2 98%.  Labs revealed WBC 7.9, RBC 4, hemoglobin 11.6, hematocrit 35.6, sodium 130, chloride 108, CO2 11, BUN 42.3, creatinine 2.79, phosphorus 5.2, alkaline phosphatase 376, albumin 2.6, bilirubin total 13.7, AST 76, , lipase 85, and troponin undetectable.  Flu testing was negative.  COVID testing was positive.  UA revealed 1+ protein, cloudy appearance, positive nitrate, 3+ bilirubin, 1+ leukocytes, and 7 RBCs.  Patient was given 1 L NS, IV calcium gluconate 1 g, 5 units insulin IV, sodium bicarb 50 mEq, and Lokelma 10 g. Patient was admitted to hospital medicine service for further medical management.     Surgical oncology evaluated the patient recommended to postpone the surgery given overall decline in the functional status with COVID-19 infection and metabolic acidosis from renal failure. Patient was initiated on bicarb drip for metabolic acidosis.  Nephrology was consulted for metabolic acidosis renal failure.  Recommended IV fluids, encourage p.o. intake to compensate losses.  Attempts to internalize biliary drain were unsuccessful on 01/06/2023.  GI was consulted for elevated bilirubin, as  patient had biliary drain with good output recommended conservative management.  GI had reconsulted given positive FOBT with slowly drifting hemoglobin.  GI suggested EGD.      Interval Hx:   No acute events overnight.  Comfortably resting in the bed.  Alert, oriented.  Denies any new complaints or concerns.  Scheduled for EGD today.  Denies any worsening of shortness of breath or cough.  Reports being vaccinated.  Hemoglobin stable.  Morning labs revealing worsening of acidosis, hyponatremia, mild TERRENCE, severe hypoalbuminemia with worsening LFTs.      Objective/physical exam:  Vitals:    01/18/23 2316 01/19/23 0320 01/19/23 0612 01/19/23 0802   BP: 135/71 117/69  101/73   Pulse: 75 82  92   Resp: 17 17 18    Temp:    98.5 °F (36.9 °C)   TempSrc:    Oral   SpO2: 98% 98%  99%   Weight:       Height:         General: In no acute distress, afebrile  Respiratory: Clear to auscultation bilaterally  Cardiovascular: S1, S2, no appreciable murmur  Abdomen: Soft, nontender, BS +  Extremities:  Right arm amputated  Neurologic: Alert and oriented x4, moving all extremities with good strength     Lab Results   Component Value Date     (L) 01/19/2023    K 4.8 01/19/2023    CO2 14 (L) 01/19/2023    BUN 26.9 (H) 01/19/2023    CREATININE 1.27 (H) 01/19/2023    CALCIUM 8.3 (L) 01/19/2023    EGFRNONAA >60 05/23/2022      Lab Results   Component Value Date    ALT 61 (H) 01/19/2023    AST 71 (H) 01/19/2023    ALKPHOS 139 01/19/2023    BILITOT 7.4 (H) 01/19/2023      Lab Results   Component Value Date    WBC 10.4 01/19/2023    HGB 9.0 (L) 01/19/2023    HCT 27.6 (L) 01/19/2023    MCV 89.9 01/19/2023     01/19/2023           Medications:   ergocalciferol  50,000 Units Oral Q7 Days    mirtazapine  15 mg Oral QHS    pantoprazole  40 mg Intravenous BID    piperacillin-tazobactam (ZOSYN) IVPB  4.5 g Intravenous Q8H    sodium chloride 0.9%  10 mL Intravenous Q6H    tamsulosin  0.4 mg Oral Daily    zinc sulfate  220 mg Oral Daily       sodium chloride, acetaminophen, albuterol, bisacodyL, dextrose 10%, dextrose 10%, docusate sodium, meclizine, ondansetron, ondansetron, oxyCODONE, prochlorperazine, Flushing PICC Protocol **AND** sodium chloride 0.9% **AND** sodium chloride 0.9%     Assessment/Plan:    COVID-19 infection-(vaccinated)  TERRENCE -prerenal etiology from poor p.o. intake, high biliary drain output   Acute severe Metabolic acidosis due to renal failure  Acute on chronic Blood loss anemia requiring PRBC transfusion  Hyperkalemia, due to above   Hypovolemic Hyponatremia   Obstructive jaundice s/p biliary stent placement 12/21/2022, failed internalization 01/06/2023  Proctitis  Failure to thrive     HX:  Duodenal cancer, PVD, right arm amputation, CAD    Plan:  -EGD today.  GI following.  Continue Protonix.  Monitor hemoglobin  -acidosis continues.  Add bicarb.  Monitor renal function.  Encourage p.o. intake after EGD   -GI following.  Monitor LFTs.  Biliary drain in place   -id following for proctitis.  Continue antibiotic therapy .  Switch to p.o. upon discharge  -stable from COVID-19 standpoint.  Completed remdesivir for 3 days.  -get PT involved   -other home medications were reviewed   -needs outpatient surgical oncology follow-up    Critical care diagnosis:  Metabolic acidosis  Critical care time: 50 minutes      Bridger Rivera MD

## 2023-01-19 NOTE — ANESTHESIA PREPROCEDURE EVALUATION
01/19/2023  Quincy Triplett is a 82 y.o., male.    Other Medical History   Coronary artery disease Hypertension   Atherosclerosis of native arteries of extremities with intermittent claudication, unspecified extremity Dyslipidemia     Surgical History  TONSILLECTOMY AMPUTATION   LEFT HEART CATHETERIZATION CAROTID STENT   ERCP ERCP, WITH BIOPSY     Hx of htn, hld, cad, obstructive jaundice, duodenal cancer with plans for whipple procedure.  Admitted for decreased uop and nausea, found to be covid + with metabolic acidosis 2/2 renal failure.  Pt also FOBT + with downtrending hemoglobin with need for EGD.  Bicarb gtt for acidosis.  On exam, patient comfortable, appears appropriate, non-toxic appearing.      Shaniqua ACOSTA     Pre-op Assessment    I have reviewed the Patient Summary Reports.     I have reviewed the Nursing Notes. I have reviewed the NPO Status.   I have reviewed the Medications.     Review of Systems  Anesthesia Hx:   Denies Personal Hx of Anesthesia complications.   Hematology/Oncology:         -- Anemia:   Cardiovascular:   Hypertension CAD      Pulmonary:  Pulmonary Normal    Renal/:   Chronic Renal Disease, ARF    Hepatic/GI:  Hepatic/GI Normal    Neurological:  Neurology Normal    Endocrine:  Endocrine Normal        Physical Exam  General: Well nourished, Cooperative and Alert    Airway:  Mallampati: II   Mouth Opening: Normal  TM Distance: Normal          Anesthesia Plan  Type of Anesthesia, risks & benefits discussed:    Anesthesia Type: Gen Natural Airway  Intra-op Monitoring Plan: Standard ASA Monitors  Post Op Pain Control Plan: multimodal analgesia  Induction:  IV  Informed Consent: Informed consent signed with the Patient and all parties understand the risks and agree with anesthesia plan.  All questions answered.   ASA Score: 3  Day of Surgery Review of History & Physical: H&P Update  referred to the surgeon/provider.    Ready For Surgery From Anesthesia Perspective.     .

## 2023-01-19 NOTE — PROVATION PATIENT INSTRUCTIONS
Discharge Summary/Instructions after an Endoscopic Procedure  Patient Name: Quincy Triplett  Patient MRN: [id number]  Patient YOB: 1940 Thursday, January 19, 2023  Ranjeet Perez MD  Dear patient,  As a result of recent federal legislation (The Federal Cures Act), you may   receive lab or pathology results from your procedure in your [facility]   account before your physician is able to contact you. Your physician or   their representative will relay the results to you with their   recommendations at their soonest availability.  Thank you,  RESTRICTIONS:  During your procedure today, you received medications for sedation.  These   medications may affect your judgment, balance and coordination.  Therefore,   for 24 hours, you have the following restrictions:   - DO NOT drive a car, operate machinery, make legal/financial decisions,   sign important papers or drink alcohol.    ACTIVITY:  Today: no heavy lifting, straining or running due to procedural   sedation/anesthesia.  The following day: return to full activity including work.  DIET:  Eat and drink normally unless instructed otherwise.     TREATMENT FOR COMMON SIDE EFFECTS:  - Mild abdominal pain, nausea, belching, bloating or excessive gas:  rest,   eat lightly and use a heating pad.  - Sore Throat: treat with throat lozenges and/or gargle with warm salt   water.  - Because air was used during the procedure, expelling large amounts of air   from your rectum or belching is normal.  - If a bowel prep was taken, you may not have a bowel movement for 1-3 days.    This is normal.  SYMPTOMS TO WATCH FOR AND REPORT TO YOUR PHYSICIAN:  1. Abdominal pain or bloating, other than gas cramps.  2. Chest pain.  3. Back pain.  4. Signs of infection such as: chills or fever occurring within 24 hours   after the procedure.  5. Rectal bleeding, which would show as bright red, maroon, or black stools.   (A tablespoon of blood from the rectum is not serious, especially  if   hemorrhoids are present.)  6. Vomiting.  7. Weakness or dizziness.  GO DIRECTLY TO THE NEAREST EMERGENCY ROOM IF YOU HAVE ANY OF THE FOLLOWING:      Difficulty breathing              Chills and/or fever over 101 F   Persistent vomiting and/or vomiting blood   Severe abdominal pain   Severe chest pain   Black, tarry stools   Bleeding- more than one tablespoon   Any other symptom or condition that you feel may need urgent attention  Your doctor recommends these additional instructions:  If any biopsies were taken, your doctors clinic will contact you in 1 to 2   weeks with any results.  - Return patient to [facility] for [facility] care.   - Observe patient's clinical course.   - The findings and recommendations were discussed with the patient.  For questions, problems or results please call your physician - Ranjeet Perez MD at Work:  [phone].  [facility], EMERGENCY ROOM PHONE NUMBER: [phone]  IF A COMPLICATION OR EMERGENCY SITUATION ARISES AND YOU ARE UNABLE TO REACH   YOUR PHYSICIAN - GO DIRECTLY TO THE EMERGENCY ROOM.  MD Ranjeet Winston MD  1/19/2023 3:30:27 PM  This report has been verified and signed electronically.  Dear patient,  As a result of recent federal legislation (The Federal Cures Act), you may   receive lab or pathology results from your procedure in your [facility]   account before your physician is able to contact you. Your physician or   their representative will relay the results to you with their   recommendations at their soonest availability.  Thank you,  PROVATION

## 2023-01-20 LAB
ABO + RH BLD: NORMAL
ALBUMIN SERPL-MCNC: 1.7 G/DL (ref 3.4–4.8)
ALBUMIN/GLOB SERPL: 0.7 RATIO (ref 1.1–2)
ALP SERPL-CCNC: 122 UNIT/L (ref 40–150)
ALT SERPL-CCNC: 55 UNIT/L (ref 0–55)
AST SERPL-CCNC: 44 UNIT/L (ref 5–34)
BACTERIA BLD CULT: NORMAL
BACTERIA BLD CULT: NORMAL
BASOPHILS # BLD AUTO: 0.04 X10(3)/MCL (ref 0–0.2)
BASOPHILS NFR BLD AUTO: 0.6 %
BILIRUBIN DIRECT+TOT PNL SERPL-MCNC: 6.6 MG/DL
BLD PROD TYP BPU: NORMAL
BLOOD UNIT EXPIRATION DATE: NORMAL
BLOOD UNIT TYPE CODE: 6200
BUN SERPL-MCNC: 33.2 MG/DL (ref 8.4–25.7)
CALCIUM SERPL-MCNC: 8.1 MG/DL (ref 8.8–10)
CHLORIDE SERPL-SCNC: 106 MMOL/L (ref 98–107)
CO2 SERPL-SCNC: 16 MMOL/L (ref 23–31)
CREAT SERPL-MCNC: 1.48 MG/DL (ref 0.73–1.18)
CROSSMATCH INTERPRETATION: NORMAL
DISPENSE STATUS: NORMAL
EOSINOPHIL # BLD AUTO: 0.29 X10(3)/MCL (ref 0–0.9)
EOSINOPHIL NFR BLD AUTO: 4.6 %
ERYTHROCYTE [DISTWIDTH] IN BLOOD BY AUTOMATED COUNT: 19.6 % (ref 11.5–17)
GFR SERPLBLD CREATININE-BSD FMLA CKD-EPI: 47 MLS/MIN/1.73/M2
GLOBULIN SER-MCNC: 2.3 GM/DL (ref 2.4–3.5)
GLUCOSE SERPL-MCNC: 86 MG/DL (ref 82–115)
HCT VFR BLD AUTO: 23 % (ref 42–52)
HGB BLD-MCNC: 7.8 GM/DL (ref 14–18)
IMM GRANULOCYTES # BLD AUTO: 0.1 X10(3)/MCL (ref 0–0.04)
IMM GRANULOCYTES NFR BLD AUTO: 1.6 %
LYMPHOCYTES # BLD AUTO: 1.47 X10(3)/MCL (ref 0.6–4.6)
LYMPHOCYTES NFR BLD AUTO: 23.2 %
MAGNESIUM SERPL-MCNC: 1.7 MG/DL (ref 1.6–2.6)
MCH RBC QN AUTO: 29.8 PG
MCHC RBC AUTO-ENTMCNC: 33.9 MG/DL (ref 33–36)
MCV RBC AUTO: 87.8 FL (ref 80–94)
MONOCYTES # BLD AUTO: 0.53 X10(3)/MCL (ref 0.1–1.3)
MONOCYTES NFR BLD AUTO: 8.4 %
NEUTROPHILS # BLD AUTO: 3.91 X10(3)/MCL (ref 2.1–9.2)
NEUTROPHILS NFR BLD AUTO: 61.6 %
NRBC BLD AUTO-RTO: 0 %
PLATELET # BLD AUTO: 222 X10(3)/MCL (ref 130–400)
PMV BLD AUTO: 9.6 FL (ref 7.4–10.4)
POCT GLUCOSE: 112 MG/DL (ref 70–110)
POCT GLUCOSE: 99 MG/DL (ref 70–110)
POTASSIUM SERPL-SCNC: 3.6 MMOL/L (ref 3.5–5.1)
PROT SERPL-MCNC: 4 GM/DL (ref 5.8–7.6)
RBC # BLD AUTO: 2.62 X10(6)/MCL (ref 4.7–6.1)
SODIUM SERPL-SCNC: 132 MMOL/L (ref 136–145)
UNIT NUMBER: NORMAL
WBC # SPEC AUTO: 6.3 X10(3)/MCL (ref 4.5–11.5)

## 2023-01-20 PROCEDURE — 25000003 PHARM REV CODE 250: Performed by: INTERNAL MEDICINE

## 2023-01-20 PROCEDURE — 80053 COMPREHEN METABOLIC PANEL: CPT | Performed by: INTERNAL MEDICINE

## 2023-01-20 PROCEDURE — 63600175 PHARM REV CODE 636 W HCPCS: Performed by: INTERNAL MEDICINE

## 2023-01-20 PROCEDURE — P9016 RBC LEUKOCYTES REDUCED: HCPCS | Performed by: INTERNAL MEDICINE

## 2023-01-20 PROCEDURE — 36430 TRANSFUSION BLD/BLD COMPNT: CPT

## 2023-01-20 PROCEDURE — 27000207 HC ISOLATION

## 2023-01-20 PROCEDURE — 36415 COLL VENOUS BLD VENIPUNCTURE: CPT | Performed by: INTERNAL MEDICINE

## 2023-01-20 PROCEDURE — 63600175 PHARM REV CODE 636 W HCPCS: Performed by: GENERAL PRACTICE

## 2023-01-20 PROCEDURE — 25000003 PHARM REV CODE 250: Performed by: PHYSICIAN ASSISTANT

## 2023-01-20 PROCEDURE — 25000003 PHARM REV CODE 250: Performed by: GENERAL PRACTICE

## 2023-01-20 PROCEDURE — 21400001 HC TELEMETRY ROOM

## 2023-01-20 PROCEDURE — 85025 COMPLETE CBC W/AUTO DIFF WBC: CPT | Performed by: INTERNAL MEDICINE

## 2023-01-20 PROCEDURE — 83735 ASSAY OF MAGNESIUM: CPT | Performed by: INTERNAL MEDICINE

## 2023-01-20 PROCEDURE — 97162 PT EVAL MOD COMPLEX 30 MIN: CPT

## 2023-01-20 PROCEDURE — C9113 INJ PANTOPRAZOLE SODIUM, VIA: HCPCS | Performed by: INTERNAL MEDICINE

## 2023-01-20 PROCEDURE — A4216 STERILE WATER/SALINE, 10 ML: HCPCS | Performed by: INTERNAL MEDICINE

## 2023-01-20 RX ORDER — HYDROCODONE BITARTRATE AND ACETAMINOPHEN 500; 5 MG/1; MG/1
TABLET ORAL
Status: DISCONTINUED | OUTPATIENT
Start: 2023-01-20 | End: 2023-02-10 | Stop reason: HOSPADM

## 2023-01-20 RX ADMIN — OXYCODONE HYDROCHLORIDE 5 MG: 5 TABLET ORAL at 05:01

## 2023-01-20 RX ADMIN — MIRTAZAPINE 15 MG: 15 TABLET, FILM COATED ORAL at 11:01

## 2023-01-20 RX ADMIN — PIPERACILLIN AND TAZOBACTAM 4.5 G: 4; .5 INJECTION, POWDER, FOR SOLUTION INTRAVENOUS; PARENTERAL at 05:01

## 2023-01-20 RX ADMIN — PIPERACILLIN AND TAZOBACTAM 4.5 G: 4; .5 INJECTION, POWDER, FOR SOLUTION INTRAVENOUS; PARENTERAL at 02:01

## 2023-01-20 RX ADMIN — SODIUM CHLORIDE, PRESERVATIVE FREE 10 ML: 5 INJECTION INTRAVENOUS at 12:01

## 2023-01-20 RX ADMIN — SODIUM CHLORIDE, PRESERVATIVE FREE 10 ML: 5 INJECTION INTRAVENOUS at 02:01

## 2023-01-20 RX ADMIN — PANTOPRAZOLE SODIUM 40 MG: 40 INJECTION, POWDER, FOR SOLUTION INTRAVENOUS at 11:01

## 2023-01-20 RX ADMIN — DOCUSATE SODIUM 50 MG: 50 CAPSULE, LIQUID FILLED ORAL at 09:01

## 2023-01-20 RX ADMIN — ZINC SULFATE 220 MG (50 MG) CAPSULE 220 MG: CAPSULE at 09:01

## 2023-01-20 RX ADMIN — SODIUM CHLORIDE, PRESERVATIVE FREE 10 ML: 5 INJECTION INTRAVENOUS at 05:01

## 2023-01-20 RX ADMIN — ACETAMINOPHEN 650 MG: 325 TABLET, FILM COATED ORAL at 01:01

## 2023-01-20 RX ADMIN — SODIUM BICARBONATE: 84 INJECTION, SOLUTION INTRAVENOUS at 09:01

## 2023-01-20 RX ADMIN — PANTOPRAZOLE SODIUM 40 MG: 40 INJECTION, POWDER, FOR SOLUTION INTRAVENOUS at 09:01

## 2023-01-20 RX ADMIN — MECLIZINE 12.5 MG: 12.5 TABLET ORAL at 05:01

## 2023-01-20 RX ADMIN — TAMSULOSIN HYDROCHLORIDE 0.4 MG: 0.4 CAPSULE ORAL at 09:01

## 2023-01-20 RX ADMIN — MECLIZINE 12.5 MG: 12.5 TABLET ORAL at 03:01

## 2023-01-20 NOTE — PLAN OF CARE
Problem: Physical Therapy  Goal: Physical Therapy Goal  Description: Goals to be met by: 23     Patient will increase functional independence with mobility by performin. Supine to sit with Howell  2. Sit to supine with Howell  3. Sit to stand transfer with Modified Howell  4. Gait  x 200 feet with Modified Howell using Quad Cane.     Outcome: Ongoing, Progressing
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
## 2023-01-20 NOTE — PROGRESS NOTES
"Inpatient Nutrition Assessment    Admit Date: 1/11/2023   Total duration of encounter: 9 days     Nutrition Recommendation/Prescription     Oral diet as tolerated    Boost Breeze (provides 250 kcal, 9 g protein per serving)    Consider Clinimix-E 4.25/5 @ 85 ml/hr to provide  694 kcal/d  87 g amino acids/d  102 g dextrose/d  2040 ml fluid/d    Communication of Recommendations: reviewed with nurse    Nutrition Assessment     Malnutrition Assessment/Nutrition-Focused Physical Exam    Malnutrition in the context of chronic illness  Degree of Malnutrition: severe malnutrition  Energy Intake: < 75% of estimated energy requirement for >/= 1 month  Interpretation of Weight Loss: >7.5% in 3 months  Body Fat: severe depletion  Area of Body Fat Loss: orbital region  and upper arm region - triceps / biceps  Muscle Mass Loss: severe depletion  Area of Muscle Mass Loss: temple region - temporalis muscle and clavicle bone region - pectoralis major, deltoid, trapezius muscles  Fluid Accumulation: does not meet criteria  Edema: unable to obtain  Reduced  Strength: unable to obtain  A minimum of two characteristics is recommended for diagnosis of either severe or non-severe malnutrition.    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  Transaminitis   Hyperbilirubinemia  COVID-19 infection  TERRENCE   Hyperkalemia  Hyponatremia  Anemia    Relevant Medical History:   Essential hypertension  Hyperlipidemia  CAD  Obstructive jaundice   Duodenal cancer    Nutrition-Related Medications: NS, ergocalciferol, mirtazapine, pantoprazole, zinc sulfate  Calorie Containing IV Medications: no significant kcals " "from medications at this time    Nutrition-Related Labs:  23 Phos 5.2  23 Na 130, Cl 112, GFR 39, Glu 143, , Alb 2.4  23 Ca 8.3, alb 1.7  23 Na 132, GFR 47, Ca 8.1, Alb 1.7    Diet/PN Order: clear liquid diet  Oral Supplement Order: none  Tube Feeding Order: none  Appetite/Oral Intake: fair/50-75% of meals  Factors Affecting Nutritional Intake: decreased appetite and pain  Food/Latter-day/Cultural Preferences: none reported  Food Allergies: none reported    Skin Integrity: drain/device(s)  Wound(s):   biliary tube noted    Comments    23 Patient reports decreased/poor appetite currently and prior to admission, reports significant weight loss over the past few months, agreeable to chocolate Boost, reports awaiting Whipple procedure as outpatient.    23 Patient reports appetite had improved and was good, now decreased/fair due to pain, does not want Boost.    23 Nurse reports patient had EGD yesterday and has requested to remain on clear liquid diet for now, will add Boost Breeze for additional kcal/protein, will also provide PPN recommendations.    Anthropometrics    Height: 5' 9" (175.3 cm) Height Method: Stated  Last Weight: 69.3 kg (152 lb 12.5 oz) (23 1230) Weight Method: Bed Scale  BMI (Calculated): 22.6  BMI Classification: normal (BMI 18.5-24.9)        Ideal Body Weight (IBW), Male: 160 lb     % Ideal Body Weight, Male (lb): 95.49 %                 Usual Body Weight (UBW), k kg  % Usual Body Weight: 80.75     Usual Weight Provided By: patient    Wt Readings from Last 5 Encounters:   23 69.3 kg (152 lb 12.5 oz)   22 75.8 kg (167 lb)   22 79.8 kg (176 lb)     Weight Change(s) Since Admission:  Admit Weight: 70.3 kg (155 lb) (23 2320)  69.3 kg (23)  No new weights (2023)    Estimated Needs    Weight Used For Calorie Calculations: 69.3 kg (152 lb 12.5 oz)  Energy Calorie Requirements (kcal): 1943 kcal/d, 1.4 stress factor  Energy " Need Method: [facility]anda  Weight Used For Protein Calculations: 69.3 kg (152 lb 12.5 oz)  Protein Requirements: 104 g/d, 1.5 g/kg  Fluid Requirements (mL): 1943 ml/d, 1 ml/kcal  Temp: 97.7 °F (36.5 °C)       Enteral Nutrition    Patient not receiving enteral nutrition at this time.    Parenteral Nutrition    Patient not receiving parenteral nutrition support at this time.    Evaluation of Received Nutrient Intake    Calories: not meeting estimated needs  Protein: not meeting estimated needs    Patient Education    Not applicable.    Nutrition Diagnosis     PES: Malnutrition related to cancer as evidenced by <75% needs met >1 month, severe fat depletion, severe muscle depletion, and >7.5% weight loss in 3 months. (continues)    Interventions/Goals     Intervention(s): general/healthful diet, commercial beverage, and collaboration with other providers  Goal: Meet greater than 75% of nutritional needs by follow-up. (goal not met)    Monitoring & Evaluation     Dietitian will monitor food and beverage intake.  Nutrition Risk/Follow-Up: high (follow-up in 1-4 days)   Please consult if re-assessment needed sooner.

## 2023-01-20 NOTE — PROGRESS NOTES
"The documentation recorded by the scribe/NP/PA accurately reflects the service I personally performed and the decisions made by me. Please call back if needed.       MD [facility]eli [facility]      Gastroenterology Progress Note    Subjective:  Patient doing well post EGD with no acute complaints at this time. He denies abdominal pain, N/V and has been tolerating PO without issues. No recent BM.     Objective:    ROS:    Review of Systems   Constitutional:  Negative for chills and fever.   HENT:  Negative for sore throat.    Respiratory:  Negative for shortness of breath, wheezing and stridor.    Gastrointestinal:  Negative for abdominal pain, nausea and vomiting.   Skin:  Negative for rash.   Neurological:  Negative for seizures, loss of consciousness and weakness.   Psychiatric/Behavioral:  The patient is not nervous/anxious.        Vital Signs:  BP (!) 161/78   Pulse 80   Temp 97.3 °F (36.3 °C) (Oral)   Resp 18   Ht 5' 9" (1.753 m)   Wt 69.3 kg (152 lb 12.5 oz)   SpO2 99%   BMI 22.56 kg/m²   Body mass index is 22.56 kg/m².    Physical Exam:    Physical Exam  Constitutional:       General: He is not in acute distress.     Appearance: He is not ill-appearing.   HENT:      Head: Normocephalic.      Nose: Nose normal.   Eyes:      General: No scleral icterus.     Extraocular Movements: Extraocular movements intact.   Pulmonary:      Effort: No respiratory distress.      Breath sounds: No stridor.   Abdominal:      General: Abdomen is flat. Bowel sounds are normal. There is no distension.      Palpations: Abdomen is soft.      Tenderness: There is no abdominal tenderness.      Comments: Drain in place to RUQ with continued output, nontender    Musculoskeletal:      Cervical back: Normal range of motion.   Neurological:      General: No focal deficit present.      Mental Status: He is alert. Mental status is at baseline.   Psychiatric:         Mood and Affect: Mood normal.         Thought " Content: Thought content normal.         Judgment: Judgment normal.       Labs:  Recent Results (from the past 24 hour(s))   Comprehensive Metabolic Panel    Collection Time: 01/20/23  4:26 AM   Result Value Ref Range    Sodium Level 132 (L) 136 - 145 mmol/L    Potassium Level 3.6 3.5 - 5.1 mmol/L    Chloride 106 98 - 107 mmol/L    Carbon Dioxide 16 (L) 23 - 31 mmol/L    Glucose Level 86 82 - 115 mg/dL    Blood Urea Nitrogen 33.2 (H) 8.4 - 25.7 mg/dL    Creatinine 1.48 (H) 0.73 - 1.18 mg/dL    Calcium Level Total 8.1 (L) 8.8 - 10.0 mg/dL    Protein Total 4.0 (L) 5.8 - 7.6 gm/dL    Albumin Level 1.7 (L) 3.4 - 4.8 g/dL    Globulin 2.3 (L) 2.4 - 3.5 gm/dL    Albumin/Globulin Ratio 0.7 (L) 1.1 - 2.0 ratio    Bilirubin Total 6.6 (H) <=1.5 mg/dL    Alkaline Phosphatase 122 40 - 150 unit/L    Alanine Aminotransferase 55 0 - 55 unit/L    Aspartate Aminotransferase 44 (H) 5 - 34 unit/L    eGFR 47 mls/min/1.73/m2   CBC with Differential    Collection Time: 01/20/23  4:26 AM   Result Value Ref Range    WBC 6.3 4.5 - 11.5 x10(3)/mcL    RBC 2.62 (L) 4.70 - 6.10 x10(6)/mcL    Hgb 7.8 (L) 14.0 - 18.0 gm/dL    Hct 23.0 (L) 42.0 - 52.0 %    MCV 87.8 80.0 - 94.0 fL    MCH 29.8 pg    MCHC 33.9 33.0 - 36.0 mg/dL    RDW 19.6 (H) 11.5 - 17.0 %    Platelet 222 130 - 400 x10(3)/mcL    MPV 9.6 7.4 - 10.4 fL    Neut % 61.6 %    Lymph % 23.2 %    Mono % 8.4 %    Eos % 4.6 %    Basophil % 0.6 %    Lymph # 1.47 0.6 - 4.6 x10(3)/mcL    Neut # 3.91 2.1 - 9.2 x10(3)/mcL    Mono # 0.53 0.1 - 1.3 x10(3)/mcL    Eos # 0.29 0 - 0.9 x10(3)/mcL    Baso # 0.04 0 - 0.2 x10(3)/mcL    IG# 0.10 (H) 0 - 0.04 x10(3)/mcL    IG% 1.6 %    NRBC% 0.0 %   Magnesium    Collection Time: 01/20/23  4:26 AM   Result Value Ref Range    Magnesium Level 1.70 1.60 - 2.60 mg/dL   POCT glucose    Collection Time: 01/20/23 11:36 AM   Result Value Ref Range    POCT Glucose 112 (H) 70 - 110 mg/dL       Assessment/Plan:  1. Dehydration    2. Generalized weakness    3. SOB  (shortness of breath)    4. Hyperkalemia    5. TERRENCE (acute kidney injury)    6. COVID-19    7. Acute blood loss anemia       This is a 82 y.o. male known to Dr. Anderson with PMH of HTN, HLD, CAD, obstructive jaundice, duodenal adenocarcinoma who initially presented to ED on 01/11/23 for weakness, decreased appetite, decreased urine output, nausea x3 days.  GI is consulted for decrease in hemoglobin with positive occult blood 2/3.     Acute on chromic anemia w/ positive occult blood x2  S/p EGD 1/19 with normal esophagus, normal stomach. Acquired duodenal stenosis and hemorrhagic duodenopathy, hemostatic spray deployed   - H&H 9.0/27.6---7.8/23.0   2. Proctitis   3. Duodenal adenocarcinoma  - s/p external drain placement with IR on 01/04 due to inability for us to intervene endoscopically due to location of tumor   - prior plans for Whipple on 01/19, however surgical oncology evaluated the patient and recommended to postpone the surgery given overall decline in the functional status with COVID-19 infection and metabolic acidosis from renal failure.   4. COVID  5. TERRENCE     No further GI recommendations at this time. Please call back with any questions

## 2023-01-20 NOTE — ANESTHESIA POSTPROCEDURE EVALUATION
Anesthesia Post Evaluation    Patient: Quincy Triplett    Procedure(s) Performed: Procedure(s) (LRB):  EGD (N/A)  EGD,WITH HEMORRHAGE CONTROL    Final Anesthesia Type: general      Patient location during evaluation: PACU  Patient participation: Yes- Able to Participate  Level of consciousness: awake and alert  Post-procedure vital signs: reviewed and stable  Pain management: adequate  Airway patency: patent  GUERO mitigation strategies: Multimodal analgesia  PONV status at discharge: No PONV  Anesthetic complications: no      Cardiovascular status: blood pressure returned to baseline and hemodynamically stable  Respiratory status: unassisted  Hydration status: euvolemic  Follow-up not needed.          Vitals Value Taken Time   /83 01/19/23 1720   Temp 37 °C (98.6 °F) 01/19/23 1606   Pulse 80 01/19/23 1720   Resp 18 01/19/23 1606   SpO2 95 % 01/19/23 1606         No case tracking events are documented in the log.      Pain/Mariusz Score: Pain Rating Prior to Med Admin: 7 (1/19/2023  6:12 AM)  Pain Rating Post Med Admin: 3 (1/19/2023  7:12 AM)

## 2023-01-20 NOTE — PROGRESS NOTES
[facility] Hospital Medicine Progress Note        Chief Complaint: Inpatient Follow-up for FTT    HPI:   Quincy Triplett is a 82 y.o. male with a past medical history of essential hypertension, hyperlipidemia, CAD, obstructive jaundice, and duodenal cancer presented to [facility] on 1/11/2023 for weakness,  decreased appetite, decreased urine output, and nausea began 3 days ago. Patient reports recent biliary drain placement on 12/21/2022 with Dr. Brown and was discharged 3 days ago.  Patient states he has whipple procedure scheduled soon by Dr. Brown. Denied fever, chills, cough, congestion, chest pain, shortness of breath, abdominal pain, vomiting, diarrhea, hematuria, and dysuria.     Initial vital signs in ED were /78, pulse 85, respirations 18, temperature 36.6° C, and SpO2 98%.  Labs revealed WBC 7.9, RBC 4, hemoglobin 11.6, hematocrit 35.6, sodium 130, chloride 108, CO2 11, BUN 42.3, creatinine 2.79, phosphorus 5.2, alkaline phosphatase 376, albumin 2.6, bilirubin total 13.7, AST 76, , lipase 85, and troponin undetectable.  Flu testing was negative.  COVID testing was positive.  UA revealed 1+ protein, cloudy appearance, positive nitrate, 3+ bilirubin, 1+ leukocytes, and 7 RBCs.  Patient was given 1 L NS, IV calcium gluconate 1 g, 5 units insulin IV, sodium bicarb 50 mEq, and Lokelma 10 g. Patient was admitted to hospital medicine service for further medical management.     Surgical oncology evaluated the patient recommended to postpone the surgery given overall decline in the functional status with COVID-19 infection and metabolic acidosis from renal failure. Patient was initiated on bicarb drip for metabolic acidosis.  Nephrology was consulted for metabolic acidosis renal failure.  Recommended IV fluids, encourage p.o. intake to compensate losses.  Attempts to internalize biliary drain were unsuccessful on 01/06/2023.  GI was consulted for elevated bilirubin, as  patient had biliary drain with good output recommended conservative management.  GI had reconsulted given positive FOBT with slowly drifting hemoglobin.  GI suggested EGD.      Interval Hx:     Hypertensive.  Comfortably resting in the bed.  Denies any new complaints or concerns.  Monitor labs revealing acute anemia 7.8, mild hyponatremia with improving acidosis and TERRENCE.  Bilirubinemia and transaminases improving.      EGD yesterday 01/19/2023 revealed hemorrhagic duodenopathy, acquired duodenal stenosis.  Hemostatic spray was deployed, no specimens were collected.  Stomach and esophagus was normal.    Objective/physical exam:  Vitals:    01/19/23 2325 01/20/23 0537 01/20/23 0737 01/20/23 0739   BP: (!) 161/72  (!) 177/101 (!) 177/65   Pulse: 80  70 60   Resp: 18 19 18 18   Temp: 97.7 °F (36.5 °C)  97.7 °F (36.5 °C) 97.8 °F (36.6 °C)   TempSrc: Oral  Oral Oral   SpO2: 99%  99% 99%   Weight:       Height:         General: In no acute distress, afebrile  Respiratory: Clear to auscultation bilaterally  Cardiovascular: S1, S2, no appreciable murmur  Abdomen: Soft, nontender, BS +  Extremities:  Right arm amputated  Neurologic: Alert and oriented x4, moving all extremities with good strength     Lab Results   Component Value Date     (L) 01/20/2023    K 3.6 01/20/2023    CO2 16 (L) 01/20/2023    BUN 33.2 (H) 01/20/2023    CREATININE 1.48 (H) 01/20/2023    CALCIUM 8.1 (L) 01/20/2023    EGFRNONAA >60 05/23/2022      Lab Results   Component Value Date    ALT 55 01/20/2023    AST 44 (H) 01/20/2023    ALKPHOS 122 01/20/2023    BILITOT 6.6 (H) 01/20/2023      Lab Results   Component Value Date    WBC 6.3 01/20/2023    HGB 7.8 (L) 01/20/2023    HCT 23.0 (L) 01/20/2023    MCV 87.8 01/20/2023     01/20/2023           Medications:   ergocalciferol  50,000 Units Oral Q7 Days    mirtazapine  15 mg Oral QHS    pantoprazole  40 mg Intravenous BID    piperacillin-tazobactam (ZOSYN) IVPB  4.5 g Intravenous Q8H    sodium  chloride 0.9%  10 mL Intravenous Q6H    tamsulosin  0.4 mg Oral Daily    zinc sulfate  220 mg Oral Daily      sodium chloride, sodium chloride, acetaminophen, albuterol, bisacodyL, dextrose 10%, dextrose 10%, docusate sodium, meclizine, ondansetron, ondansetron, oxyCODONE, prochlorperazine, Flushing PICC Protocol **AND** sodium chloride 0.9% **AND** sodium chloride 0.9%     Assessment/Plan:    COVID-19 infection-(vaccinated)  TERRENCE -prerenal etiology from poor p.o. intake, high biliary drain output   Acute severe Metabolic acidosis due to renal failure  Acute on chronic Blood loss anemia , hemorrhagic duodenopathy  Hypovolemic Hyponatremia   Obstructive jaundice s/p biliary stent placement 12/21/2022, failed internalization 01/06/2023  Proctitis  Failure to thrive     HX:  Duodenal cancer, PVD, right arm amputation, CAD    Plan:  -transfuse 1 unit of PRBC.  Monitor hemoglobin  -EGD reviewed.  Continue Protonix.  GI following  -LFTs improving.  Continue biliary drain   -acidosis improving Continue bicarb  -continue Zosyn.  Id on board.  Switch to p.o. when discharge  -completed remdesivir for 3 days.  Stable from COVID-19 standpoint   -continue therapy.  Other home medications were reviewed   -needs outpatient Surgical Oncology follow-up    Critical care diagnosis:  Metabolic acidosis  Critical care time: 50 minutes    Bridger Rivera MD

## 2023-01-21 LAB
ALBUMIN SERPL-MCNC: 1.8 G/DL (ref 3.4–4.8)
ALBUMIN/GLOB SERPL: 0.7 RATIO (ref 1.1–2)
ALP SERPL-CCNC: 131 UNIT/L (ref 40–150)
ALT SERPL-CCNC: 60 UNIT/L (ref 0–55)
AST SERPL-CCNC: 50 UNIT/L (ref 5–34)
BASOPHILS # BLD AUTO: 0.04 X10(3)/MCL (ref 0–0.2)
BASOPHILS NFR BLD AUTO: 0.6 %
BILIRUBIN DIRECT+TOT PNL SERPL-MCNC: 5.4 MG/DL (ref 0–0.5)
BILIRUBIN DIRECT+TOT PNL SERPL-MCNC: 7 MG/DL
BUN SERPL-MCNC: 32.6 MG/DL (ref 8.4–25.7)
CALCIUM SERPL-MCNC: 8.4 MG/DL (ref 8.8–10)
CHLORIDE SERPL-SCNC: 108 MMOL/L (ref 98–107)
CO2 SERPL-SCNC: 18 MMOL/L (ref 23–31)
CREAT SERPL-MCNC: 1.87 MG/DL (ref 0.73–1.18)
EOSINOPHIL # BLD AUTO: 0.28 X10(3)/MCL (ref 0–0.9)
EOSINOPHIL NFR BLD AUTO: 4 %
ERYTHROCYTE [DISTWIDTH] IN BLOOD BY AUTOMATED COUNT: 19.7 % (ref 11.5–17)
GFR SERPLBLD CREATININE-BSD FMLA CKD-EPI: 36 MLS/MIN/1.73/M2
GLOBULIN SER-MCNC: 2.6 GM/DL (ref 2.4–3.5)
GLUCOSE SERPL-MCNC: 92 MG/DL (ref 82–115)
HCT VFR BLD AUTO: 28.3 % (ref 42–52)
HGB BLD-MCNC: 9.6 GM/DL (ref 14–18)
IMM GRANULOCYTES # BLD AUTO: 0.13 X10(3)/MCL (ref 0–0.04)
IMM GRANULOCYTES NFR BLD AUTO: 1.8 %
LYMPHOCYTES # BLD AUTO: 1.27 X10(3)/MCL (ref 0.6–4.6)
LYMPHOCYTES NFR BLD AUTO: 18 %
MAGNESIUM SERPL-MCNC: 1.8 MG/DL (ref 1.6–2.6)
MCH RBC QN AUTO: 29.2 PG
MCHC RBC AUTO-ENTMCNC: 33.9 MG/DL (ref 33–36)
MCV RBC AUTO: 86 FL (ref 80–94)
MONOCYTES # BLD AUTO: 0.68 X10(3)/MCL (ref 0.1–1.3)
MONOCYTES NFR BLD AUTO: 9.6 %
NEUTROPHILS # BLD AUTO: 4.67 X10(3)/MCL (ref 2.1–9.2)
NEUTROPHILS NFR BLD AUTO: 66 %
NRBC BLD AUTO-RTO: 0 %
PLATELET # BLD AUTO: 235 X10(3)/MCL (ref 130–400)
PMV BLD AUTO: 9.8 FL (ref 7.4–10.4)
POCT GLUCOSE: 101 MG/DL (ref 70–110)
POCT GLUCOSE: 103 MG/DL (ref 70–110)
POCT GLUCOSE: 120 MG/DL (ref 70–110)
POTASSIUM SERPL-SCNC: 3.6 MMOL/L (ref 3.5–5.1)
PROT SERPL-MCNC: 4.4 GM/DL (ref 5.8–7.6)
RBC # BLD AUTO: 3.29 X10(6)/MCL (ref 4.7–6.1)
SODIUM SERPL-SCNC: 138 MMOL/L (ref 136–145)
WBC # SPEC AUTO: 7.1 X10(3)/MCL (ref 4.5–11.5)

## 2023-01-21 PROCEDURE — 25000003 PHARM REV CODE 250: Performed by: INTERNAL MEDICINE

## 2023-01-21 PROCEDURE — 27000207 HC ISOLATION

## 2023-01-21 PROCEDURE — C9113 INJ PANTOPRAZOLE SODIUM, VIA: HCPCS | Performed by: INTERNAL MEDICINE

## 2023-01-21 PROCEDURE — 36415 COLL VENOUS BLD VENIPUNCTURE: CPT | Performed by: INTERNAL MEDICINE

## 2023-01-21 PROCEDURE — 85025 COMPLETE CBC W/AUTO DIFF WBC: CPT | Performed by: INTERNAL MEDICINE

## 2023-01-21 PROCEDURE — 25000003 PHARM REV CODE 250: Performed by: PHYSICIAN ASSISTANT

## 2023-01-21 PROCEDURE — 82248 BILIRUBIN DIRECT: CPT | Performed by: INTERNAL MEDICINE

## 2023-01-21 PROCEDURE — A4216 STERILE WATER/SALINE, 10 ML: HCPCS | Performed by: INTERNAL MEDICINE

## 2023-01-21 PROCEDURE — 25000003 PHARM REV CODE 250: Performed by: GENERAL PRACTICE

## 2023-01-21 PROCEDURE — 83735 ASSAY OF MAGNESIUM: CPT | Performed by: INTERNAL MEDICINE

## 2023-01-21 PROCEDURE — 63600175 PHARM REV CODE 636 W HCPCS: Performed by: GENERAL PRACTICE

## 2023-01-21 PROCEDURE — 21400001 HC TELEMETRY ROOM

## 2023-01-21 PROCEDURE — 80053 COMPREHEN METABOLIC PANEL: CPT | Performed by: INTERNAL MEDICINE

## 2023-01-21 PROCEDURE — 63600175 PHARM REV CODE 636 W HCPCS: Performed by: INTERNAL MEDICINE

## 2023-01-21 RX ORDER — SODIUM BICARBONATE 650 MG/1
650 TABLET ORAL 2 TIMES DAILY
Status: DISCONTINUED | OUTPATIENT
Start: 2023-01-21 | End: 2023-01-22

## 2023-01-21 RX ADMIN — SODIUM BICARBONATE 650 MG TABLET 650 MG: at 09:01

## 2023-01-21 RX ADMIN — SODIUM BICARBONATE 650 MG TABLET 650 MG: at 11:01

## 2023-01-21 RX ADMIN — SODIUM BICARBONATE: 84 INJECTION, SOLUTION INTRAVENOUS at 04:01

## 2023-01-21 RX ADMIN — PIPERACILLIN AND TAZOBACTAM 4.5 G: 4; .5 INJECTION, POWDER, FOR SOLUTION INTRAVENOUS; PARENTERAL at 12:01

## 2023-01-21 RX ADMIN — SODIUM CHLORIDE, PRESERVATIVE FREE 10 ML: 5 INJECTION INTRAVENOUS at 06:01

## 2023-01-21 RX ADMIN — TAMSULOSIN HYDROCHLORIDE 0.4 MG: 0.4 CAPSULE ORAL at 09:01

## 2023-01-21 RX ADMIN — ZINC SULFATE 220 MG (50 MG) CAPSULE 220 MG: CAPSULE at 09:01

## 2023-01-21 RX ADMIN — SODIUM CHLORIDE, PRESERVATIVE FREE 10 ML: 5 INJECTION INTRAVENOUS at 05:01

## 2023-01-21 RX ADMIN — MIRTAZAPINE 15 MG: 15 TABLET, FILM COATED ORAL at 09:01

## 2023-01-21 RX ADMIN — PIPERACILLIN AND TAZOBACTAM 4.5 G: 4; .5 INJECTION, POWDER, FOR SOLUTION INTRAVENOUS; PARENTERAL at 06:01

## 2023-01-21 RX ADMIN — PIPERACILLIN AND TAZOBACTAM 4.5 G: 4; .5 INJECTION, POWDER, FOR SOLUTION INTRAVENOUS; PARENTERAL at 09:01

## 2023-01-21 RX ADMIN — OXYCODONE HYDROCHLORIDE 5 MG: 5 TABLET ORAL at 03:01

## 2023-01-21 RX ADMIN — SODIUM CHLORIDE, PRESERVATIVE FREE 10 ML: 5 INJECTION INTRAVENOUS at 01:01

## 2023-01-21 RX ADMIN — SODIUM CHLORIDE, PRESERVATIVE FREE 10 ML: 5 INJECTION INTRAVENOUS at 12:01

## 2023-01-21 RX ADMIN — SODIUM BICARBONATE: 84 INJECTION, SOLUTION INTRAVENOUS at 05:01

## 2023-01-21 RX ADMIN — PANTOPRAZOLE SODIUM 40 MG: 40 INJECTION, POWDER, FOR SOLUTION INTRAVENOUS at 09:01

## 2023-01-21 RX ADMIN — DOCUSATE SODIUM 50 MG: 50 CAPSULE, LIQUID FILLED ORAL at 03:01

## 2023-01-21 NOTE — PLAN OF CARE
Problem: Adult Inpatient Plan of Care  Goal: Plan of Care Review  Outcome: Ongoing, Progressing  Goal: Patient-Specific Goal (Individualized)  Outcome: Ongoing, Progressing  Goal: Absence of Hospital-Acquired Illness or Injury  Outcome: Ongoing, Progressing  Goal: Optimal Comfort and Wellbeing  Outcome: Ongoing, Progressing  Goal: Readiness for Transition of Care  Outcome: Ongoing, Progressing     Problem: Fall Injury Risk  Goal: Absence of Fall and Fall-Related Injury  Outcome: Ongoing, Progressing     Problem: Infection  Goal: Absence of Infection Signs and Symptoms  Outcome: Ongoing, Progressing     Problem: Skin Injury Risk Increased  Goal: Skin Health and Integrity  Outcome: Ongoing, Progressing      contact guard

## 2023-01-21 NOTE — PROGRESS NOTES
[facility] Hospital Medicine Progress Note        Chief Complaint: Inpatient Follow-up for FTT    HPI:   Quincy Triplett is a 82 y.o. male with a past medical history of essential hypertension, hyperlipidemia, CAD, obstructive jaundice, and duodenal cancer presented to [facility] on 1/11/2023 for weakness,  decreased appetite, decreased urine output, and nausea began 3 days ago. Patient reports recent biliary drain placement on 12/21/2022 with Dr. Brown and was discharged 3 days ago.  Patient states he has whipple procedure scheduled soon by Dr. Brown. Denied fever, chills, cough, congestion, chest pain, shortness of breath, abdominal pain, vomiting, diarrhea, hematuria, and dysuria.     Initial vital signs in ED were /78, pulse 85, respirations 18, temperature 36.6° C, and SpO2 98%.  Labs revealed WBC 7.9, RBC 4, hemoglobin 11.6, hematocrit 35.6, sodium 130, chloride 108, CO2 11, BUN 42.3, creatinine 2.79, phosphorus 5.2, alkaline phosphatase 376, albumin 2.6, bilirubin total 13.7, AST 76, , lipase 85, and troponin undetectable.  Flu testing was negative.  COVID testing was positive.  UA revealed 1+ protein, cloudy appearance, positive nitrate, 3+ bilirubin, 1+ leukocytes, and 7 RBCs.  Patient was given 1 L NS, IV calcium gluconate 1 g, 5 units insulin IV, sodium bicarb 50 mEq, and Lokelma 10 g. Patient was admitted to hospital medicine service for further medical management.     Surgical oncology evaluated the patient recommended to postpone the surgery given overall decline in the functional status with COVID-19 infection and metabolic acidosis from renal failure. Patient was initiated on bicarb drip for metabolic acidosis.  Nephrology was consulted for metabolic acidosis renal failure.  Recommended IV fluids, encourage p.o. intake to compensate losses.  Attempts to internalize biliary drain were unsuccessful on 01/06/2023.  GI was consulted for elevated bilirubin, as  patient had biliary drain with good output recommended conservative management.  GI had reconsulted given positive FOBT with slowly drifting hemoglobin.  EGD 01/19/2023 revealed hemorrhagic duodenopathy, acquired duodenal stenosis.  Hemostatic spray was deployed, no specimens were collected.  Stomach and esophagus was normal.      Interval Hx:     No acute events overnight.  Hemodynamically stable.  Doing well on room air.  Denies any shortness of breath or cough.      Labs revealing better hemoglobin after transfusion.  Improving acidosis.  Mild bump in serum creatinine seen.  Sodium normal.    Objective/physical exam:  Vitals:    01/20/23 2350 01/21/23 0346 01/21/23 0607 01/21/23 0732   BP: (!) 169/72 (!) 162/71 133/74 (!) 148/73   BP Location:       Patient Position:       Pulse: 78 77 66 70   Resp: 19 19 16 18   Temp: 97.6 °F (36.4 °C)   98.4 °F (36.9 °C)   TempSrc: Oral   Oral   SpO2: 99% 98% 98% 98%   Weight:       Height:         General: In no acute distress, afebrile  Respiratory: Clear to auscultation bilaterally  Cardiovascular: S1, S2, no appreciable murmur  Abdomen: Soft, nontender, BS +  Extremities:  Right arm amputated  Neurologic: Alert and oriented x4, moving all extremities with good strength     Lab Results   Component Value Date     (L) 01/20/2023    K 3.6 01/20/2023    CO2 16 (L) 01/20/2023    BUN 33.2 (H) 01/20/2023    CREATININE 1.48 (H) 01/20/2023    CALCIUM 8.1 (L) 01/20/2023    EGFRNONAA >60 05/23/2022      Lab Results   Component Value Date    ALT 55 01/20/2023    AST 44 (H) 01/20/2023    ALKPHOS 122 01/20/2023    BILITOT 6.6 (H) 01/20/2023      Lab Results   Component Value Date    WBC 6.3 01/20/2023    HGB 7.8 (L) 01/20/2023    HCT 23.0 (L) 01/20/2023    MCV 87.8 01/20/2023     01/20/2023           Medications:   ergocalciferol  50,000 Units Oral Q7 Days    mirtazapine  15 mg Oral QHS    pantoprazole  40 mg Intravenous BID    piperacillin-tazobactam (ZOSYN) IVPB  4.5 g  Intravenous Q8H    sodium chloride 0.9%  10 mL Intravenous Q6H    tamsulosin  0.4 mg Oral Daily    zinc sulfate  220 mg Oral Daily      sodium chloride, sodium chloride, acetaminophen, albuterol, bisacodyL, dextrose 10%, dextrose 10%, docusate sodium, meclizine, ondansetron, ondansetron, oxyCODONE, prochlorperazine, Flushing PICC Protocol **AND** sodium chloride 0.9% **AND** sodium chloride 0.9%     Assessment/Plan:    COVID-19 infection-(vaccinated)  TERRENCE -prerenal etiology from poor p.o. intake, high biliary drain output   Acute severe Metabolic acidosis due to renal failure  Acute on chronic Blood loss anemia , hemorrhagic duodenopathy  Hypovolemic Hyponatremia   Obstructive jaundice s/p biliary stent placement 12/21/2022, failed internalization 01/06/2023  Proctitis  Failure to thrive     HX:  Duodenal cancer, PVD, right arm amputation, CAD    Plan:  -HGB improved with transfusion.  Monitor.  Continue Protonix.  GI signed off.  -monitor LFTs.  Continue biliary drain.  Needs outpatient surgical oncology follow-up  -stable from COVID-19 standpoint.  Completed remdesivir for 3 days.  Continue supportive care  -id following.  Zosyn for proctitis.  -acidosis improving.  Continue bicarb drip for another day.  Add p.o. bicarb.  Encourage p.o. intake.  -continue therapy.  Other home medications were reviewed        Critical care diagnosis:  Metabolic acidosis  Critical care time: 50 minutes    Bridger Rivera MD

## 2023-01-22 LAB
ALBUMIN SERPL-MCNC: 1.7 G/DL (ref 3.4–4.8)
ALBUMIN/GLOB SERPL: 0.7 RATIO (ref 1.1–2)
ALP SERPL-CCNC: 125 UNIT/L (ref 40–150)
ALT SERPL-CCNC: 56 UNIT/L (ref 0–55)
AST SERPL-CCNC: 45 UNIT/L (ref 5–34)
BILIRUBIN DIRECT+TOT PNL SERPL-MCNC: 6.5 MG/DL
BUN SERPL-MCNC: 30.6 MG/DL (ref 8.4–25.7)
CALCIUM SERPL-MCNC: 8.1 MG/DL (ref 8.8–10)
CHLORIDE SERPL-SCNC: 106 MMOL/L (ref 98–107)
CO2 SERPL-SCNC: 20 MMOL/L (ref 23–31)
CREAT SERPL-MCNC: 1.91 MG/DL (ref 0.73–1.18)
GFR SERPLBLD CREATININE-BSD FMLA CKD-EPI: 35 MLS/MIN/1.73/M2
GLOBULIN SER-MCNC: 2.6 GM/DL (ref 2.4–3.5)
GLUCOSE SERPL-MCNC: 92 MG/DL (ref 82–115)
MAGNESIUM SERPL-MCNC: 1.9 MG/DL (ref 1.6–2.6)
PHOSPHATE SERPL-MCNC: 3.5 MG/DL (ref 2.3–4.7)
POCT GLUCOSE: 104 MG/DL (ref 70–110)
POCT GLUCOSE: 114 MG/DL (ref 70–110)
POTASSIUM SERPL-SCNC: 3.3 MMOL/L (ref 3.5–5.1)
PROT SERPL-MCNC: 4.3 GM/DL (ref 5.8–7.6)
SODIUM SERPL-SCNC: 136 MMOL/L (ref 136–145)

## 2023-01-22 PROCEDURE — 25000003 PHARM REV CODE 250: Performed by: INTERNAL MEDICINE

## 2023-01-22 PROCEDURE — 36415 COLL VENOUS BLD VENIPUNCTURE: CPT | Performed by: INTERNAL MEDICINE

## 2023-01-22 PROCEDURE — 63600175 PHARM REV CODE 636 W HCPCS: Performed by: GENERAL PRACTICE

## 2023-01-22 PROCEDURE — C9113 INJ PANTOPRAZOLE SODIUM, VIA: HCPCS | Performed by: INTERNAL MEDICINE

## 2023-01-22 PROCEDURE — 63600175 PHARM REV CODE 636 W HCPCS: Performed by: INTERNAL MEDICINE

## 2023-01-22 PROCEDURE — 27000207 HC ISOLATION

## 2023-01-22 PROCEDURE — A4216 STERILE WATER/SALINE, 10 ML: HCPCS | Performed by: INTERNAL MEDICINE

## 2023-01-22 PROCEDURE — 84100 ASSAY OF PHOSPHORUS: CPT | Performed by: INTERNAL MEDICINE

## 2023-01-22 PROCEDURE — 25000003 PHARM REV CODE 250: Performed by: PHYSICIAN ASSISTANT

## 2023-01-22 PROCEDURE — 83735 ASSAY OF MAGNESIUM: CPT | Performed by: INTERNAL MEDICINE

## 2023-01-22 PROCEDURE — 80053 COMPREHEN METABOLIC PANEL: CPT | Performed by: INTERNAL MEDICINE

## 2023-01-22 PROCEDURE — 25000003 PHARM REV CODE 250: Performed by: GENERAL PRACTICE

## 2023-01-22 PROCEDURE — 21400001 HC TELEMETRY ROOM

## 2023-01-22 RX ORDER — SODIUM BICARBONATE 650 MG/1
1300 TABLET ORAL 2 TIMES DAILY
Status: DISCONTINUED | OUTPATIENT
Start: 2023-01-22 | End: 2023-01-23

## 2023-01-22 RX ADMIN — MIRTAZAPINE 15 MG: 15 TABLET, FILM COATED ORAL at 09:01

## 2023-01-22 RX ADMIN — SODIUM BICARBONATE 650 MG TABLET 650 MG: at 08:01

## 2023-01-22 RX ADMIN — OXYCODONE HYDROCHLORIDE 5 MG: 5 TABLET ORAL at 06:01

## 2023-01-22 RX ADMIN — PIPERACILLIN AND TAZOBACTAM 4.5 G: 4; .5 INJECTION, POWDER, FOR SOLUTION INTRAVENOUS; PARENTERAL at 02:01

## 2023-01-22 RX ADMIN — Medication 10 ML: at 12:01

## 2023-01-22 RX ADMIN — DOCUSATE SODIUM 50 MG: 50 CAPSULE, LIQUID FILLED ORAL at 08:01

## 2023-01-22 RX ADMIN — SODIUM BICARBONATE 650 MG TABLET 1300 MG: at 09:01

## 2023-01-22 RX ADMIN — PIPERACILLIN AND TAZOBACTAM 4.5 G: 4; .5 INJECTION, POWDER, FOR SOLUTION INTRAVENOUS; PARENTERAL at 10:01

## 2023-01-22 RX ADMIN — PIPERACILLIN AND TAZOBACTAM 4.5 G: 4; .5 INJECTION, POWDER, FOR SOLUTION INTRAVENOUS; PARENTERAL at 06:01

## 2023-01-22 RX ADMIN — SODIUM CHLORIDE, PRESERVATIVE FREE 10 ML: 5 INJECTION INTRAVENOUS at 06:01

## 2023-01-22 RX ADMIN — SODIUM CHLORIDE, PRESERVATIVE FREE 10 ML: 5 INJECTION INTRAVENOUS at 02:01

## 2023-01-22 RX ADMIN — ZINC SULFATE 220 MG (50 MG) CAPSULE 220 MG: CAPSULE at 08:01

## 2023-01-22 RX ADMIN — PANTOPRAZOLE SODIUM 40 MG: 40 INJECTION, POWDER, FOR SOLUTION INTRAVENOUS at 09:01

## 2023-01-22 RX ADMIN — PANTOPRAZOLE SODIUM 40 MG: 40 INJECTION, POWDER, FOR SOLUTION INTRAVENOUS at 08:01

## 2023-01-22 RX ADMIN — SODIUM CHLORIDE, PRESERVATIVE FREE 10 ML: 5 INJECTION INTRAVENOUS at 11:01

## 2023-01-22 RX ADMIN — TAMSULOSIN HYDROCHLORIDE 0.4 MG: 0.4 CAPSULE ORAL at 08:01

## 2023-01-22 NOTE — PROGRESS NOTES
[facility] Hospital Medicine Progress Note        Chief Complaint: Inpatient Follow-up for FTT    HPI:   Quincy Triplett is a 82 y.o. male with a past medical history of essential hypertension, hyperlipidemia, CAD, obstructive jaundice, and duodenal cancer presented to [facility] on 1/11/2023 for weakness,  decreased appetite, decreased urine output, and nausea began 3 days ago. Patient reports recent biliary drain placement on 12/21/2022 with Dr. Brown and was discharged 3 days ago.  Patient states he has whipple procedure scheduled soon by Dr. Brown. Denied fever, chills, cough, congestion, chest pain, shortness of breath, abdominal pain, vomiting, diarrhea, hematuria, and dysuria.     Initial vital signs in ED were /78, pulse 85, respirations 18, temperature 36.6° C, and SpO2 98%.  Labs revealed WBC 7.9, RBC 4, hemoglobin 11.6, hematocrit 35.6, sodium 130, chloride 108, CO2 11, BUN 42.3, creatinine 2.79, phosphorus 5.2, alkaline phosphatase 376, albumin 2.6, bilirubin total 13.7, AST 76, , lipase 85, and troponin undetectable.  Flu testing was negative.  COVID testing was positive.  UA revealed 1+ protein, cloudy appearance, positive nitrate, 3+ bilirubin, 1+ leukocytes, and 7 RBCs.  Patient was given 1 L NS, IV calcium gluconate 1 g, 5 units insulin IV, sodium bicarb 50 mEq, and Lokelma 10 g. Patient was admitted to hospital medicine service for further medical management.     Surgical oncology evaluated the patient recommended to postpone the surgery given overall decline in the functional status with COVID-19 infection and metabolic acidosis from renal failure. Patient was initiated on bicarb drip for metabolic acidosis.  Nephrology was consulted for metabolic acidosis renal failure.  Recommended IV fluids, encourage p.o. intake to compensate losses.  Attempts to internalize biliary drain were unsuccessful on 01/06/2023.  GI was consulted for elevated bilirubin, as  patient had biliary drain with good output recommended conservative management.  GI had reconsulted given positive FOBT with slowly drifting hemoglobin.  EGD 01/19/2023 revealed hemorrhagic duodenopathy, acquired duodenal stenosis.  Hemostatic spray was deployed, no specimens were collected.  Stomach and esophagus was normal.  1 unit of PRBC was transfused due to anemia.      Interval Hx:     No acute events overnight.  Tolerating p.o..  Constipated.  Doing well on room air.  Afebrile.  Monitor labs revealing mild hypokalemia and stable renal function, improving acidosis      Objective/physical exam:  Vitals:    01/21/23 1523 01/21/23 1533 01/21/23 2038 01/22/23 0016   BP: (!) 143/69  (!) 144/64 136/87   BP Location: Left arm      Patient Position: Lying      Pulse: 83  73 77   Resp: 18 18 18 18   Temp: 98.8 °F (37.1 °C)  98.1 °F (36.7 °C) 97.5 °F (36.4 °C)   TempSrc: Oral  Oral Oral   SpO2: 98%  98% 97%   Weight:       Height:         General: In no acute distress, afebrile  Respiratory: Clear to auscultation bilaterally  Cardiovascular: S1, S2, no appreciable murmur  Abdomen: Soft, nontender, BS +  Extremities:  Right arm amputated  Neurologic: Alert and oriented x4, moving all extremities with good strength     Lab Results   Component Value Date     01/22/2023    K 3.3 (L) 01/22/2023    CO2 20 (L) 01/22/2023    BUN 30.6 (H) 01/22/2023    CREATININE 1.91 (H) 01/22/2023    CALCIUM 8.1 (L) 01/22/2023    EGFRNONAA >60 05/23/2022      Lab Results   Component Value Date    ALT 56 (H) 01/22/2023    AST 45 (H) 01/22/2023    ALKPHOS 125 01/22/2023    BILITOT 6.5 (H) 01/22/2023      Lab Results   Component Value Date    WBC 7.1 01/21/2023    HGB 9.6 (L) 01/21/2023    HCT 28.3 (L) 01/21/2023    MCV 86.0 01/21/2023     01/21/2023           Medications:   ergocalciferol  50,000 Units Oral Q7 Days    mirtazapine  15 mg Oral QHS    pantoprazole  40 mg Intravenous BID    piperacillin-tazobactam (ZOSYN) IVPB  4.5 g  Intravenous Q8H    sodium bicarbonate  650 mg Oral BID    sodium chloride 0.9%  10 mL Intravenous Q6H    tamsulosin  0.4 mg Oral Daily    zinc sulfate  220 mg Oral Daily      sodium chloride, sodium chloride, acetaminophen, albuterol, bisacodyL, dextrose 10%, dextrose 10%, docusate sodium, meclizine, ondansetron, ondansetron, oxyCODONE, prochlorperazine, Flushing PICC Protocol **AND** sodium chloride 0.9% **AND** sodium chloride 0.9%     Assessment/Plan:    COVID-19 infection-(vaccinated)  TERRENCE -prerenal etiology from poor p.o. intake, high biliary drain output   Acute severe Metabolic acidosis due to renal failure  Acute on chronic Blood loss anemia , hemorrhagic duodenopathy  Hypovolemic Hyponatremia   Obstructive jaundice s/p biliary stent placement 12/21/2022, failed internalization 01/06/2023  Proctitis  Failure to thrive     HX:  Duodenal cancer, PVD, right arm amputation, CAD    Plan:  -GI signed off.  Continue Protonix.  Monitor hemoglobin.  Improved after transfusion.    -monitor LFTs.  Continue biliary drain.  Needs outpatient surgical oncology follow-up  -stable from COVID-19 standpoint.  Completed remdesivir for 3 days.  Continue supportive care  -id following.  Zosyn for proctitis.  Switch to p.o. upon discharge  -stop bicarb drip.  Continue p.o. bicarb.  -continue therapy.  Other home medications were reviewed                Bridger Rivera MD

## 2023-01-23 LAB
ALBUMIN SERPL-MCNC: 1.7 G/DL (ref 3.4–4.8)
ALBUMIN/GLOB SERPL: 0.7 RATIO (ref 1.1–2)
ALP SERPL-CCNC: 156 UNIT/L (ref 40–150)
ALT SERPL-CCNC: 65 UNIT/L (ref 0–55)
AST SERPL-CCNC: 62 UNIT/L (ref 5–34)
BASOPHILS # BLD AUTO: 0.05 X10(3)/MCL (ref 0–0.2)
BASOPHILS NFR BLD AUTO: 0.7 %
BILIRUBIN DIRECT+TOT PNL SERPL-MCNC: 6.2 MG/DL (ref 0–0.5)
BILIRUBIN DIRECT+TOT PNL SERPL-MCNC: 7.8 MG/DL
BUN SERPL-MCNC: 28.5 MG/DL (ref 8.4–25.7)
CALCIUM SERPL-MCNC: 8.3 MG/DL (ref 8.8–10)
CHLORIDE SERPL-SCNC: 105 MMOL/L (ref 98–107)
CO2 SERPL-SCNC: 23 MMOL/L (ref 23–31)
CREAT SERPL-MCNC: 1.8 MG/DL (ref 0.73–1.18)
EOSINOPHIL # BLD AUTO: 0.37 X10(3)/MCL (ref 0–0.9)
EOSINOPHIL NFR BLD AUTO: 5.3 %
ERYTHROCYTE [DISTWIDTH] IN BLOOD BY AUTOMATED COUNT: 18.9 % (ref 11.5–17)
GFR SERPLBLD CREATININE-BSD FMLA CKD-EPI: 37 MLS/MIN/1.73/M2
GLOBULIN SER-MCNC: 2.5 GM/DL (ref 2.4–3.5)
GLUCOSE SERPL-MCNC: 94 MG/DL (ref 82–115)
HCT VFR BLD AUTO: 25.9 % (ref 42–52)
HGB BLD-MCNC: 8.8 GM/DL (ref 14–18)
IMM GRANULOCYTES # BLD AUTO: 0.08 X10(3)/MCL (ref 0–0.04)
IMM GRANULOCYTES NFR BLD AUTO: 1.2 %
LYMPHOCYTES # BLD AUTO: 1.34 X10(3)/MCL (ref 0.6–4.6)
LYMPHOCYTES NFR BLD AUTO: 19.3 %
MAGNESIUM SERPL-MCNC: 1.8 MG/DL (ref 1.6–2.6)
MCH RBC QN AUTO: 29.1 PG
MCHC RBC AUTO-ENTMCNC: 34 MG/DL (ref 33–36)
MCV RBC AUTO: 85.8 FL (ref 80–94)
MONOCYTES # BLD AUTO: 0.54 X10(3)/MCL (ref 0.1–1.3)
MONOCYTES NFR BLD AUTO: 7.8 %
NEUTROPHILS # BLD AUTO: 4.57 X10(3)/MCL (ref 2.1–9.2)
NEUTROPHILS NFR BLD AUTO: 65.7 %
NRBC BLD AUTO-RTO: 0 %
PLATELET # BLD AUTO: 234 X10(3)/MCL (ref 130–400)
PMV BLD AUTO: 9.2 FL (ref 7.4–10.4)
POCT GLUCOSE: 108 MG/DL (ref 70–110)
POTASSIUM SERPL-SCNC: 3.2 MMOL/L (ref 3.5–5.1)
PROT SERPL-MCNC: 4.2 GM/DL (ref 5.8–7.6)
RBC # BLD AUTO: 3.02 X10(6)/MCL (ref 4.7–6.1)
SODIUM SERPL-SCNC: 138 MMOL/L (ref 136–145)
WBC # SPEC AUTO: 7 X10(3)/MCL (ref 4.5–11.5)

## 2023-01-23 PROCEDURE — 83735 ASSAY OF MAGNESIUM: CPT | Performed by: INTERNAL MEDICINE

## 2023-01-23 PROCEDURE — 85025 COMPLETE CBC W/AUTO DIFF WBC: CPT | Performed by: INTERNAL MEDICINE

## 2023-01-23 PROCEDURE — 25000003 PHARM REV CODE 250: Performed by: PHYSICIAN ASSISTANT

## 2023-01-23 PROCEDURE — 36415 COLL VENOUS BLD VENIPUNCTURE: CPT | Performed by: INTERNAL MEDICINE

## 2023-01-23 PROCEDURE — 99232 PR SUBSEQUENT HOSPITAL CARE,LEVL II: ICD-10-PCS | Mod: FS,,, | Performed by: GENERAL PRACTICE

## 2023-01-23 PROCEDURE — 25000003 PHARM REV CODE 250: Performed by: INTERNAL MEDICINE

## 2023-01-23 PROCEDURE — 80053 COMPREHEN METABOLIC PANEL: CPT | Performed by: INTERNAL MEDICINE

## 2023-01-23 PROCEDURE — 97530 THERAPEUTIC ACTIVITIES: CPT

## 2023-01-23 PROCEDURE — 63600175 PHARM REV CODE 636 W HCPCS: Performed by: GENERAL PRACTICE

## 2023-01-23 PROCEDURE — 25000003 PHARM REV CODE 250

## 2023-01-23 PROCEDURE — 99232 SBSQ HOSP IP/OBS MODERATE 35: CPT | Mod: FS,,, | Performed by: GENERAL PRACTICE

## 2023-01-23 PROCEDURE — 21400001 HC TELEMETRY ROOM

## 2023-01-23 PROCEDURE — C9113 INJ PANTOPRAZOLE SODIUM, VIA: HCPCS | Performed by: INTERNAL MEDICINE

## 2023-01-23 PROCEDURE — 63600175 PHARM REV CODE 636 W HCPCS: Performed by: INTERNAL MEDICINE

## 2023-01-23 PROCEDURE — 97110 THERAPEUTIC EXERCISES: CPT

## 2023-01-23 PROCEDURE — 82248 BILIRUBIN DIRECT: CPT | Performed by: INTERNAL MEDICINE

## 2023-01-23 PROCEDURE — A4216 STERILE WATER/SALINE, 10 ML: HCPCS | Performed by: INTERNAL MEDICINE

## 2023-01-23 PROCEDURE — 25000003 PHARM REV CODE 250: Performed by: GENERAL PRACTICE

## 2023-01-23 RX ORDER — SODIUM BICARBONATE 650 MG/1
650 TABLET ORAL 2 TIMES DAILY
Status: DISCONTINUED | OUTPATIENT
Start: 2023-01-23 | End: 2023-02-06

## 2023-01-23 RX ORDER — POTASSIUM CHLORIDE 20 MEQ/1
20 TABLET, EXTENDED RELEASE ORAL ONCE
Status: COMPLETED | OUTPATIENT
Start: 2023-01-23 | End: 2023-01-23

## 2023-01-23 RX ADMIN — POTASSIUM CHLORIDE 20 MEQ: 1500 TABLET, EXTENDED RELEASE ORAL at 08:01

## 2023-01-23 RX ADMIN — PANTOPRAZOLE SODIUM 40 MG: 40 INJECTION, POWDER, FOR SOLUTION INTRAVENOUS at 08:01

## 2023-01-23 RX ADMIN — PIPERACILLIN AND TAZOBACTAM 4.5 G: 4; .5 INJECTION, POWDER, FOR SOLUTION INTRAVENOUS; PARENTERAL at 05:01

## 2023-01-23 RX ADMIN — SODIUM BICARBONATE 650 MG: 650 TABLET ORAL at 09:01

## 2023-01-23 RX ADMIN — OXYCODONE HYDROCHLORIDE 5 MG: 5 TABLET ORAL at 09:01

## 2023-01-23 RX ADMIN — SODIUM CHLORIDE, PRESERVATIVE FREE 10 ML: 5 INJECTION INTRAVENOUS at 02:01

## 2023-01-23 RX ADMIN — PIPERACILLIN AND TAZOBACTAM 4.5 G: 4; .5 INJECTION, POWDER, FOR SOLUTION INTRAVENOUS; PARENTERAL at 10:01

## 2023-01-23 RX ADMIN — BISACODYL 10 MG: 10 SUPPOSITORY RECTAL at 08:01

## 2023-01-23 RX ADMIN — SODIUM BICARBONATE 650 MG: 650 TABLET ORAL at 08:01

## 2023-01-23 RX ADMIN — TAMSULOSIN HYDROCHLORIDE 0.4 MG: 0.4 CAPSULE ORAL at 08:01

## 2023-01-23 RX ADMIN — ZINC SULFATE 220 MG (50 MG) CAPSULE 220 MG: CAPSULE at 08:01

## 2023-01-23 RX ADMIN — MECLIZINE 12.5 MG: 12.5 TABLET ORAL at 08:01

## 2023-01-23 RX ADMIN — PANTOPRAZOLE SODIUM 40 MG: 40 INJECTION, POWDER, FOR SOLUTION INTRAVENOUS at 09:01

## 2023-01-23 RX ADMIN — MIRTAZAPINE 15 MG: 15 TABLET, FILM COATED ORAL at 09:01

## 2023-01-23 NOTE — PROGRESS NOTES
PROGRESS NOTE         SUBJECTIVE:  AF, VSS.  Still with some LLQ tenderness, no other issues.           MEDICATIONS:   Reviewed in EMR        OBJECTIVE:     Vitals:    01/23/23 1141   BP: (!) 145/75   Pulse: 83   Resp: 18   Temp: 97.7 °F (36.5 °C)          GENERAL: NAD; does not appear toxic  SKIN: no rash  HEENT: sclera non-icteric; PERRL  NECK: supple; no LAD  CHEST: CTA; nonlabored, equal expansion; no adventitious BS  CARDIOVASCULAR: RRR, S1S2; no murmur; strong, equal peripheral pulses; no edema  ABDOMEN:  active bowel sounds; abdomen soft, nondistended, some lower quadrant tenderness  EXTREMITIES: no cyanosis or clubbing  NEURO: AAO x4; CN II-XII grossly intact  PSYCH: Mentation and affect appropriate          LABORATORY DATA:  Reviewed          RADIOLOGICAL DATA: Reviewed          ASSESSMENT:   82-year-old male patient known to have past medical history significant for recently diagnosed duodenal cancer with obstructive jaundice s/p biliary drain placement 01/04/2023 with attempt at  internalization 01/06/2023 which were not successful, recently discharged from hospital, presented with significant worsening of weakness and decreased p.o. intake.  Found to COVID-19, proctitis on CT scan, TERRENCE and stable transaminitis with hyperbilirubinemia, CoNS in pediatric blood culture bottle.  ID is consulted for assistance in management.     COVID-19  Proctitis  TERRENCE, resolved  Transaminitis, improving  CoNS in blood culture 1/4 bottles - contaminant  Duodenal cancer  Obstructive jaundice s/p biliary drain placement 01/04/2023 with failed attempt at internalization 01/06/2023      PLAN:  No plans for colonoscopy per GI.   Continue Zosyn - plan a 14 day total course, end date: 1/29.  Follow labs.  Discussed with patient and family.

## 2023-01-23 NOTE — PROGRESS NOTES
[facility] Hospital Medicine Progress Note        Chief Complaint: Inpatient Follow-up for FTT      HPI:   Quincy Triplett is a 82 y.o. male with a past medical history of essential hypertension, hyperlipidemia, CAD, obstructive jaundice, and duodenal cancer presented to [facility] on 1/11/2023 for weakness,  decreased appetite, decreased urine output, and nausea began 3 days ago. Patient reports recent biliary drain placement on 12/21/2022 with Dr. Brown and was discharged 3 days ago.  Patient states he has whipple procedure scheduled soon by Dr. Brown. Denied fever, chills, cough, congestion, chest pain, shortness of breath, abdominal pain, vomiting, diarrhea, hematuria, and dysuria.     Initial vital signs in ED were /78, pulse 85, respirations 18, temperature 36.6° C, and SpO2 98%.  Labs revealed WBC 7.9, RBC 4, hemoglobin 11.6, hematocrit 35.6, sodium 130, chloride 108, CO2 11, BUN 42.3, creatinine 2.79, phosphorus 5.2, alkaline phosphatase 376, albumin 2.6, bilirubin total 13.7, AST 76, , lipase 85, and troponin undetectable.  Flu testing was negative.  COVID testing was positive.  UA revealed 1+ protein, cloudy appearance, positive nitrate, 3+ bilirubin, 1+ leukocytes, and 7 RBCs.  Patient was given 1 L NS, IV calcium gluconate 1 g, 5 units insulin IV, sodium bicarb 50 mEq, and Lokelma 10 g. Patient was admitted to hospital medicine service for further medical management.     Surgical oncology evaluated the patient recommended to postpone the surgery given overall decline in the functional status with COVID-19 infection and metabolic acidosis from renal failure. Patient was initiated on bicarb drip for metabolic acidosis.  Nephrology was consulted for metabolic acidosis renal failure.  Recommended IV fluids, encourage p.o. intake to compensate losses.  Attempts to internalize biliary drain were unsuccessful on 01/06/2023.  GI was consulted for elevated bilirubin, as  patient had biliary drain with good output recommended conservative management.  GI had reconsulted given positive FOBT with slowly drifting hemoglobin.  EGD 01/19/2023 revealed hemorrhagic duodenopathy, acquired duodenal stenosis.  Hemostatic spray was deployed, no specimens were collected.  Stomach and esophagus was normal.  1 unit of PRBC was transfused due to anemia.      Interval Hx:     Did well overnight.  Denies any shortness of breath or cough.  Doing well on room air.  Family member was at bedside.  HB stable, mild hypokalemia seen.  Doppler negative for DVT    Objective/physical exam:  Vitals:    01/22/23 1614 01/22/23 1858 01/22/23 2204 01/23/23 0058   BP: (!) 140/83  (!) 149/83 (!) 146/78   Pulse: 78  66 74   Resp: 18 18     Temp: 98.2 °F (36.8 °C)  98 °F (36.7 °C) 97.4 °F (36.3 °C)   TempSrc: Oral  Oral Oral   SpO2: 99%  98% 97%   Weight:       Height:         General: In no acute distress, afebrile  Respiratory: Clear to auscultation bilaterally  Cardiovascular: S1, S2, no appreciable murmur  Abdomen: Soft, nontender, BS +  Extremities:  Right arm amputated, left lower extremity swelling  Neurologic: Alert and oriented x4, moving all extremities with good strength     Lab Results   Component Value Date     01/23/2023    K 3.2 (L) 01/23/2023    CO2 23 01/23/2023    BUN 28.5 (H) 01/23/2023    CREATININE 1.80 (H) 01/23/2023    CALCIUM 8.3 (L) 01/23/2023    EGFRNONAA >60 05/23/2022      Lab Results   Component Value Date    ALT 65 (H) 01/23/2023    AST 62 (H) 01/23/2023    ALKPHOS 156 (H) 01/23/2023    BILITOT 7.8 (H) 01/23/2023      Lab Results   Component Value Date    WBC 7.0 01/23/2023    HGB 8.8 (L) 01/23/2023    HCT 25.9 (L) 01/23/2023    MCV 85.8 01/23/2023     01/23/2023           Medications:   ergocalciferol  50,000 Units Oral Q7 Days    mirtazapine  15 mg Oral QHS    pantoprazole  40 mg Intravenous BID    piperacillin-tazobactam (ZOSYN) IVPB  4.5 g Intravenous Q8H    sodium  bicarbonate  1,300 mg Oral BID    sodium chloride 0.9%  10 mL Intravenous Q6H    tamsulosin  0.4 mg Oral Daily    zinc sulfate  220 mg Oral Daily      sodium chloride, sodium chloride, acetaminophen, albuterol, bisacodyL, dextrose 10%, dextrose 10%, docusate sodium, meclizine, ondansetron, ondansetron, oxyCODONE, prochlorperazine, Flushing PICC Protocol **AND** sodium chloride 0.9% **AND** sodium chloride 0.9%     Assessment/Plan:    COVID-19 infection-(vaccinated)  TERRENCE -prerenal etiology from poor p.o. intake, high biliary drain output   Acute severe Metabolic acidosis due to renal failure  Acute on chronic Blood loss anemia , hemorrhagic duodenopathy  Hypovolemic Hyponatremia   Obstructive jaundice s/p biliary stent placement 12/21/2022, failed internalization 01/06/2023  Proctitis  Failure to thrive     HX:  Duodenal cancer, PVD, right arm amputation, CAD    Plan:  -hb stable.  Continue Protonix.  GI signed off.  Monitor while inpatient  -LFT stable.  Continue biliary drain.  Will touch bases surgery once he is off isolation.  -stable from COVID-19 standpoint.  Completed remdesivir for 3 days.  Continue supportive care  -id following.  Zosyn for proctitis.  Switch to p.o. upon discharge  -left lower extremity Doppler ruled out DVT.  -stopped bicarb drip.  Continue p.o. bicarb.  -continue therapy.  Other home medications were reviewed   - therapy,may need placement               Bridger Rivera MD

## 2023-01-23 NOTE — NURSING
Patient showering with help from brother and cna felt weak and laid himself on floor. Placed into wheelchair and placed back in bed. No injuries noted.

## 2023-01-23 NOTE — PT/OT/SLP PROGRESS
Physical Therapy Treatment    Patient Name:  Quincy Triplett   MRN:  [id number]    Recommendations:     Discharge Recommendations: outpatient PT, home health PT  Discharge Equipment Recommendations: cane, quad  Barriers to discharge:  acuity of illness    Assessment:     Quincy Triplett is a 82 y.o. male admitted with a medical diagnosis of <principal problem not specified>.  He presents with the following impairments/functional limitations: weakness, impaired endurance, impaired self care skills, impaired functional mobility, gait instability, impaired balance, decreased upper extremity function, decreased safety awareness.    Rehab Prognosis: Good; patient would benefit from acute skilled PT services to address these deficits and reach maximum level of function.    Recent Surgery: Procedure(s) (LRB):  EGD (N/A)  EGD,WITH HEMORRHAGE CONTROL 4 Days Post-Op    Plan:     During this hospitalization, patient to be seen 6 x/week to address the identified rehab impairments via gait training, therapeutic activities, therapeutic exercises, neuromuscular re-education and progress toward the following goals:    Plan of Care Expires:  02/20/23    Subjective     Chief Complaint: none  Patient/Family Comments/goals: none  Pain/Comfort:  Pain Rating 1: 0/10      Objective:     Communicated with nurse prior to session.  Patient found supine with telemetry, peripheral IV, PureWick upon PT entry to room.     General Precautions: Standard, fall  Orthopedic Precautions: N/A  Braces: N/A  Respiratory Status: Room air     Functional Mobility:  Bed Mobility:     Supine to Sit: minimum assistance  Transfers:     Sit to Stand:  contact guard assistance with rolling walker. X 10 sit<>stands.  Gait: 38 ft with RW & CGA  Balance: fair      AM-PAC 6 CLICK MOBILITY  Turning over in bed (including adjusting bedclothes, sheets and blankets)?: 4  Sitting down on and standing up from a chair with arms (e.g., wheelchair, bedside commode, etc.):  3  Moving from lying on back to sitting on the side of the bed?: 3  Moving to and from a bed to a chair (including a wheelchair)?: 3  Need to walk in hospital room?: 3  Climbing 3-5 steps with a railing?: 2  Basic Mobility Total Score: 18       Therex:   Patient performed seated Marches, Heel Raises, LAQ, Glute Sets, Resisted Hip ABD/ADD. All performed 2 x 20 reps.      Patient left up in chair with all lines intact, call button in reach, and educated on calling for assistance when ready to return to bed ..    GOALS:   Multidisciplinary Problems       Physical Therapy Goals          Problem: Physical Therapy    Goal Priority Disciplines Outcome Goal Variances Interventions   Physical Therapy Goal     PT, PT/OT Ongoing, Progressing     Description: Goals to be met by: 23     Patient will increase functional independence with mobility by performin. Supine to sit with [place]  2. Sit to supine with [place]  3. Sit to stand transfer with Modified [place]  4. Gait  x 200 feet with Modified [place] using Quad Cane.                          Time Tracking:     PT Received On: 23  PT Start Time: 1130     PT Stop Time: 1155  PT Total Time (min): 25 min     Billable Minutes: Therapeutic Activity 15 minutes and Therapeutic Exercise 10 minutes    Treatment Type: Treatment  PT/PTA: PT     PTA Visit Number: 2023

## 2023-01-24 PROCEDURE — 63600175 PHARM REV CODE 636 W HCPCS: Performed by: INTERNAL MEDICINE

## 2023-01-24 PROCEDURE — C9113 INJ PANTOPRAZOLE SODIUM, VIA: HCPCS | Performed by: INTERNAL MEDICINE

## 2023-01-24 PROCEDURE — 97110 THERAPEUTIC EXERCISES: CPT

## 2023-01-24 PROCEDURE — A4216 STERILE WATER/SALINE, 10 ML: HCPCS | Performed by: INTERNAL MEDICINE

## 2023-01-24 PROCEDURE — 25000003 PHARM REV CODE 250: Performed by: INTERNAL MEDICINE

## 2023-01-24 PROCEDURE — 97530 THERAPEUTIC ACTIVITIES: CPT

## 2023-01-24 PROCEDURE — 21400001 HC TELEMETRY ROOM

## 2023-01-24 PROCEDURE — 63600175 PHARM REV CODE 636 W HCPCS: Performed by: GENERAL PRACTICE

## 2023-01-24 PROCEDURE — 25000003 PHARM REV CODE 250: Performed by: PHYSICIAN ASSISTANT

## 2023-01-24 PROCEDURE — 25000003 PHARM REV CODE 250: Performed by: GENERAL PRACTICE

## 2023-01-24 RX ADMIN — PANTOPRAZOLE SODIUM 40 MG: 40 INJECTION, POWDER, FOR SOLUTION INTRAVENOUS at 08:01

## 2023-01-24 RX ADMIN — MIRTAZAPINE 15 MG: 15 TABLET, FILM COATED ORAL at 08:01

## 2023-01-24 RX ADMIN — SODIUM CHLORIDE, PRESERVATIVE FREE 10 ML: 5 INJECTION INTRAVENOUS at 02:01

## 2023-01-24 RX ADMIN — OXYCODONE HYDROCHLORIDE 5 MG: 5 TABLET ORAL at 08:01

## 2023-01-24 RX ADMIN — PIPERACILLIN AND TAZOBACTAM 4.5 G: 4; .5 INJECTION, POWDER, FOR SOLUTION INTRAVENOUS; PARENTERAL at 10:01

## 2023-01-24 RX ADMIN — TAMSULOSIN HYDROCHLORIDE 0.4 MG: 0.4 CAPSULE ORAL at 08:01

## 2023-01-24 RX ADMIN — SODIUM CHLORIDE, PRESERVATIVE FREE 10 ML: 5 INJECTION INTRAVENOUS at 07:01

## 2023-01-24 RX ADMIN — SODIUM BICARBONATE 650 MG: 650 TABLET ORAL at 08:01

## 2023-01-24 RX ADMIN — ZINC SULFATE 220 MG (50 MG) CAPSULE 220 MG: CAPSULE at 08:01

## 2023-01-24 RX ADMIN — ONDANSETRON 4 MG: 4 TABLET, ORALLY DISINTEGRATING ORAL at 07:01

## 2023-01-24 RX ADMIN — PIPERACILLIN AND TAZOBACTAM 4.5 G: 4; .5 INJECTION, POWDER, FOR SOLUTION INTRAVENOUS; PARENTERAL at 02:01

## 2023-01-24 NOTE — PHYSICIAN QUERY
PT Name: Quincy Triplett  MR #: [id number]     DOCUMENTATION CLARIFICATION     CDS: Shayna Ling RN      Contact information:[email or web address]   This form is a permanent document in the medical record.     Query Date: January 24, 2023    By submitting this query, we are merely seeking further clarification of documentation.  Please utilize your independent clinical judgment when addressing the question(s) below.  The Medical Record contains the following:  Indicators Supporting Clinical Findings Location in Medical Record   x HR         RR          BP        Temp HR  85, 83, 59, 183, 94, 104, 92    RR  18, 18, 16, 18, 18, 18, 20    BP  112/80, 111/63, 84/56, 97/62, 84/49, 94/60    Oral Temp   99.0, 97.4, 98.0, 98.2, 98.9, 99.2   VS Flowsheet   x Lactic Acid          Procalcitonin  01/12/23 21:43   Lactate, Ovidio 0.9      Lab Values   x WBC           Bands          CRP     01/12/23 00:16 01/13/23 04:49 01/14/23 06:15 01/15/23 03:20 01/16/23 21:36 01/17/23 03:59   WBC 9.5 8.4 12.2 (H) 8.1 7.9 6.8        01/12/23 10:32 01/13/23 23:47   CRP 2.10 3.70      Lab Values    Culture(s)      AMS, Confusion, LOC, etc.     x Organ Dysfunction/Failure Transaminitis   Hyperbilirubinemia  TERRENCE    1/12 H&P   x Bacteremia or Sepsis / Septic Sepsis 1/14-/17 HM PNs   x Known or Suspected Source of Infection documented COVID-19 Infection      Found to be COVID-19 positive. Staph epi bacteremia, proctitis on CT scan. Staph epi likely contaminant. Patient at high risk of progression of COVID-19 to severe disease and evidence of proctitis. Stop Vanc, Start PipTao, Start Remdesivir.   1/12 H&P      1/15 ID Consult Note    (Failed) Outpatient Treatment     x Medication Rocephin 1g IV x 1  (1/12)  Vancomycin 1g IV x 1  (1/14)  Zosyn 4.5g IV q 8hrs  (1/15-current)  Remdesevir 200mg IV x 1  (1/15)  Remdesevir 100mg IV daily  (1/16-1/17)   MAR    Treatment      Other          Provider, please clarify/confirm diagnosis of Sepsis  associated with above clinical findings.    [   ] Sepsis Ruled Out     [   ] Sepsis Due to Covid Ruled In     [   ] Sepsis Ruled In, Due to Other (please specify): __________________     [   ] Other Infectious Disease (please specify): __________     [ x ] Clinically Undetermined         Please document in your progress notes daily for the duration of treatment until resolved and include in your discharge summary.

## 2023-01-24 NOTE — PHYSICIAN QUERY
PT Name: Quincy Triplett  MR #: [id number]    DOCUMENTATION CLARIFICATION     CDS: Shayna Ling RN      Contact information:[email or web address]     This form is a permanent document in the medical record.     Query Date: 2023    By submitting this query, we are merely seeking further clarification of documentation.. Please utilize your independent clinical judgment when addressing the question(s) below.    The medical record contains the following:   Indicators  Supporting Clinical Findings Location in Medical Record   x Energy Intake < 75% of estimated energy requirement for >/= 1 month  Nutrition Assessment   x Weight Loss  >7.5% in 3 months  Nutrition Assessment   x Fat Loss Body Fat: severe depletion    Area of Body Fat Loss: orbital region  and upper arm region - triceps / biceps    Nutrition Assessment   x Muscle Loss Muscle Mass Loss: severe depletion    Area of Muscle Mass Loss: temple region - temporalis muscle and clavicle bone region - pectoralis major, deltoid, trapezius muscles    Nutrition Assessment    Edema/Fluid Accumulation      Reduced  Strength (by dynamometer)     x Weight, BMI, Usual Body Weight Last Weight: 69.3 kg (152 lb 12.5 oz) (23 1230)   Weight Method: Bed Scale    BMI (Calculated): 22.6    Usual Body Weight (UBW), k kg    Nutrition Assessment    Delayed Wound Healing     x Registered Dietician Diagnosis Malnutrition in the context of chronic illness    Degree of Malnutrition: severe malnutrition    Nutrition Assessment   x Acute or Chronic Illness Dehydration, poor oral intake  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  Add Boost Plus (provides  360 kcal, 14 g protein per serving).  Encouraged intake.  Consider appetite stimulant medication.  May benefit from feeding tube for additional nutrition support (consider Impact Peptide 1.5 250 ml QID or continuous rate of 50 ml/hr x 20 hours).    1/13 RD Nutrition Assessment   x Other Failure to thrive in the setting of Duodenal cancer  1/18  PN       Academy of Nutrition and Dietetics (Academy) and the American Society for Parenteral and Enteral Nutrition (A.S.P.E.N.) Clinical Characteristics to support Malnutrition   Malnutrition in the Context of Acute Illness or Injury Malnutrition in the Context of Chronic Illness or Injury Malnutrition in the Context of Social or Environmental Circumstances   Malnutrition Level Moderate Severe Moderate Severe   Moderate   Severe   Energy Intake <75%                   >7 days <50%                 >5 days <75%           >1 month <75%                      >1 month   <75% for >3 months   <50% for >1 month   Weight Loss   1-2% in 1 week >2% in 1 week 5% in 1 month >5% in 1 month 5% in 1 month >5% in 1 month    5% in 1 month >5% in 1 month 7.5% in 3 months >7.5% in 3 months 7.5% in 3 months >7.5% in 3 months    7.5% in 3 months >7.5% in 3 months 10% in 6 months >10% in 6 months 10% in 6 months >10% in 6 months        20% in 1 year                    >20% in 1 year                                                                  20% in 1 year                            >20% in 1 year                                                  Subcutaneous Fat Loss Mild  Moderate  Mild  Severe    Mild   Severe   Muscle Loss Mild  Moderate  Mild  Severe    Mild   Severe   Edema/Fluid Accumulation Mild Moderate to severe  Mild  Severe   Mild   Severe   Reduced  Strength         (based on standards supplied by  of dynamometer) N/A Measurably reduced N/A Measurably reduced N/A Measurably reduced     Criteria for mild malnutrition is defined as 1 characteristic outlined above  within the established moderate or severe parameters.  A minimum of 2 out of the 6 characteristics noted above are recommended for a diagnosis of moderate or severe malnutrition.  Chronic illness/injury is a disease/condition lasting 3 months or longer.    The noted clinical guidelines are only system guidelines and do not replace the providers clinical judgment.    Provider, please specify diagnosis or diagnoses associated with above clinical findings.    [  ] Severe Malnutrition - a minimum of 2 of the 6 severe malnutrition characteristics noted above      [  ] Failure to Thrive Only     [  ] Other Nutritional Diagnosis (please specify): _______     [ x ] Clinically Undetermined     Please document in your progress notes daily for the duration of treatment until resolved and  include in your discharge summary.      References:    SHANNAN Segovia, & SOFIA Aguilar (2022, April). Assessment and management of anorexia and cachexia in palliative care. Retrieved May 23, 2022, from https://www.[employer]/contents/assessment-and-management-of-anorexia-and-cachexia-in-palliative-care?[email or web address]&source=see_link     THEODORA Johnson, PhD, RD, Mónica SELBY P., PhD, RN, OWEN Barry MD, PhD, Ilana DUMONT A., MS, RD, [facility], RAFIA Cartwright, MS, RD, The Academy Malnutrition Work Group, The A.S.P.E.N. Board of Directors. (2012). Consensus Statement: Academy of Nutrition and Dietetics and American Society for Parenteral and Enteral Nutrition: Characteristics Recommended for the Identification and Documentation of Adult Malnutrition (Undernutrition). Journal of Parenteral and Enteral Nutrition, 36(3), 275-283. doi:10.1177/3694659502765084     Form No. 87806

## 2023-01-24 NOTE — PROGRESS NOTES
[facility] Hospital Medicine Progress Note        Chief Complaint: Inpatient Follow-up for FTT      HPI:   Quincy Triplett is a 82 y.o. male with a past medical history of essential hypertension, hyperlipidemia, CAD, obstructive jaundice, and duodenal cancer presented to [facility] on 1/11/2023 for weakness,  decreased appetite, decreased urine output, and nausea began 3 days ago. Patient reports recent biliary drain placement on 12/21/2022 with Dr. Brown and was discharged 3 days ago.  Patient states he has whipple procedure scheduled soon by Dr. Brown. Denied fever, chills, cough, congestion, chest pain, shortness of breath, abdominal pain, vomiting, diarrhea, hematuria, and dysuria.     Initial vital signs in ED were /78, pulse 85, respirations 18, temperature 36.6° C, and SpO2 98%.  Labs revealed WBC 7.9, RBC 4, hemoglobin 11.6, hematocrit 35.6, sodium 130, chloride 108, CO2 11, BUN 42.3, creatinine 2.79, phosphorus 5.2, alkaline phosphatase 376, albumin 2.6, bilirubin total 13.7, AST 76, , lipase 85, and troponin undetectable.  Flu testing was negative.  COVID testing was positive.  UA revealed 1+ protein, cloudy appearance, positive nitrate, 3+ bilirubin, 1+ leukocytes, and 7 RBCs.  Patient was given 1 L NS, IV calcium gluconate 1 g, 5 units insulin IV, sodium bicarb 50 mEq, and Lokelma 10 g. Patient was admitted to hospital medicine service for further medical management.     Surgical oncology evaluated the patient recommended to postpone the surgery given overall decline in the functional status with COVID-19 infection and metabolic acidosis from renal failure. Patient was initiated on bicarb drip for metabolic acidosis.  Nephrology was consulted for metabolic acidosis renal failure.  Recommended IV fluids, encourage p.o. intake to compensate losses.  Attempts to internalize biliary drain were unsuccessful on 01/06/2023.  GI was consulted for elevated bilirubin, as  patient had biliary drain with good output recommended conservative management.  GI had reconsulted given positive FOBT with slowly drifting hemoglobin.  EGD 01/19/2023 revealed hemorrhagic duodenopathy, acquired duodenal stenosis.  Hemostatic spray was deployed, no specimens were collected.  Stomach and esophagus was normal.  1 unit of PRBC was transfused due to anemia.      Interval Hx:   No acute events overnight.  Comfortably resting, doing well on room air.  Denies any shortness with or cough.  Brother was at bedside.  Lower extremity Doppler ruled out DVT.  PT recommending outpatient versus home health PT    Objective/physical exam:  Vitals:    01/23/23 2311 01/24/23 0803 01/24/23 0859 01/24/23 0955   BP: 111/70 128/73  128/73   BP Location:       Patient Position:       Pulse: 72 75  75   Resp: 17  18 18   Temp: 97.6 °F (36.4 °C) 97.7 °F (36.5 °C)  97 °F (36.1 °C)   TempSrc: Oral Oral     SpO2: 97%   97%   Weight:       Height:         General: In no acute distress, afebrile  Respiratory: Clear to auscultation bilaterally  Cardiovascular: S1, S2, no appreciable murmur  Abdomen: Soft, nontender, BS +  Extremities:  Right arm amputated, left lower extremity swelling  Neurologic: Alert and oriented x4, moving all extremities with good strength     Lab Results   Component Value Date     01/23/2023    K 3.2 (L) 01/23/2023    CO2 23 01/23/2023    BUN 28.5 (H) 01/23/2023    CREATININE 1.80 (H) 01/23/2023    CALCIUM 8.3 (L) 01/23/2023    EGFRNONAA >60 05/23/2022      Lab Results   Component Value Date    ALT 65 (H) 01/23/2023    AST 62 (H) 01/23/2023    ALKPHOS 156 (H) 01/23/2023    BILITOT 7.8 (H) 01/23/2023      Lab Results   Component Value Date    WBC 7.0 01/23/2023    HGB 8.8 (L) 01/23/2023    HCT 25.9 (L) 01/23/2023    MCV 85.8 01/23/2023     01/23/2023           Medications:   ergocalciferol  50,000 Units Oral Q7 Days    mirtazapine  15 mg Oral QHS    pantoprazole  40 mg Intravenous BID     piperacillin-tazobactam (ZOSYN) IVPB  4.5 g Intravenous Q8H    sodium bicarbonate  650 mg Oral BID    sodium chloride 0.9%  10 mL Intravenous Q6H    tamsulosin  0.4 mg Oral Daily    zinc sulfate  220 mg Oral Daily      sodium chloride, sodium chloride, acetaminophen, albuterol, bisacodyL, dextrose 10%, dextrose 10%, docusate sodium, meclizine, ondansetron, ondansetron, oxyCODONE, prochlorperazine, Flushing PICC Protocol **AND** sodium chloride 0.9% **AND** sodium chloride 0.9%     Assessment/Plan:    COVID-19 infection-(vaccinated)  TERRENCE -prerenal etiology from poor p.o. intake, high biliary drain output   Acute severe Metabolic acidosis due to renal failure  Acute on chronic Blood loss anemia , hemorrhagic duodenopathy  Hypovolemic Hyponatremia   Obstructive jaundice s/p biliary stent placement 12/21/2022, failed internalization 01/06/2023  Proctitis  Failure to thrive     HX:  Duodenal cancer, PVD, right arm amputation, CAD    Plan:  -Continue biliary drain.  Will touch bases surgery regarding the timing for whipple as he is off isolation.  -hb stable.  Continue Protonix.  GI signed off.  Monitor while inpatient  -stable from COVID-19 standpoint.  Completed remdesivir for 3 days.  Continue supportive care  - Zosyn (end date 1/29/23) for proctitis.   Switch to p.o. Cipro and Flagyl if gets discharged  -left lower extremity Doppler ruled out DVT.  -stopped bicarb drip.  Continue p.o. bicarb.  -continue therapy.  Other home medications were reviewed   - therapy             Bridger Rivera MD

## 2023-01-24 NOTE — PT/OT/SLP PROGRESS
Physical Therapy Treatment    Patient Name:  Quincy Triplett   MRN:  [id number]    Recommendations:     Discharge Recommendations: home health PT, rehabilitation facility, outpatient PT  Discharge Equipment Recommendations: cane, quad  Barriers to discharge:  acuity of illness    Assessment:     Quincy Triplett is a 82 y.o. male admitted with a medical diagnosis of <principal problem not specified>.  He presents with the following impairments/functional limitations: weakness, impaired endurance, impaired self care skills, impaired functional mobility, gait instability, impaired balance, decreased upper extremity function, decreased safety awareness, decreased lower extremity function.    Rehab Prognosis: Good; patient would benefit from acute skilled PT services to address these deficits and reach maximum level of function.    Recent Surgery: Procedure(s) (LRB):  EGD (N/A)  EGD,WITH HEMORRHAGE CONTROL 5 Days Post-Op    Plan:     During this hospitalization, patient to be seen 6 x/week to address the identified rehab impairments via gait training, therapeutic activities, therapeutic exercises, neuromuscular re-education and progress toward the following goals:    Plan of Care Expires:  02/20/23    Subjective     Chief Complaint: none  Patient/Family Comments/goals: none  Pain/Comfort:  Pain Rating 1: 0/10      Objective:     Communicated with nurse prior to session.  Patient found supine with telemetry, peripheral IV, PureWick upon PT entry to room.     General Precautions: Standard, fall  Orthopedic Precautions: N/A  Braces: N/A  Respiratory Status: Room air     Functional Mobility:  Bed Mobility:     Supine to Sit: minimum assistance  Transfers:     Sit to Stand:  minimum assistance with rolling walker  Gait: 92 ft with QC & CGA. Rest breaks every ~15 ft.   Balance: poor/fair      AM-PAC 6 CLICK MOBILITY  Turning over in bed (including adjusting bedclothes, sheets and blankets)?: 4  Sitting down on and standing up  from a chair with arms (e.g., wheelchair, bedside commode, etc.): 3  Moving from lying on back to sitting on the side of the bed?: 3  Moving to and from a bed to a chair (including a wheelchair)?: 3  Need to walk in hospital room?: 3  Climbing 3-5 steps with a railing?: 2  Basic Mobility Total Score: 18    Patient left up in chair with all lines intact, call button in reach, friend present, and educated on calling for assistance when ready to return to bed ..    GOALS:   Multidisciplinary Problems       Physical Therapy Goals          Problem: Physical Therapy    Goal Priority Disciplines Outcome Goal Variances Interventions   Physical Therapy Goal     PT, PT/OT Ongoing, Progressing     Description: Goals to be met by: 23     Patient will increase functional independence with mobility by performin. Supine to sit with [place]  2. Sit to supine with [place]  3. Sit to stand transfer with Modified [place]  4. Gait  x 200 feet with Modified [place] using Quad Cane.                          Time Tracking:     PT Received On: 23  PT Start Time: 1020     PT Stop Time: 1050  PT Total Time (min): 30 min     Billable Minutes: Therapeutic Activity 21 minutes and Therapeutic Exercise 9 minutes    Treatment Type: Treatment  PT/PTA: PT     PTA Visit Number: 2     2023

## 2023-01-24 NOTE — PROGRESS NOTES
SURG ONC    CHART REVIEWED  APPEARS PT HAS PROGRESSED  NO LONGER ON ISOLATION  NO FURTHER GI BLEEDING  EATING    PT WILL NEED FOLLOW UP IN THE OFFICE TO REGROUP AND RESCHEDULE WHIPPLE PROCEDURE    HAPPY TO SEE HIM AFTER DISCHARGE FROM HOSPITAL    CALL WITH ANY PROBLEMS

## 2023-01-25 PROBLEM — U07.1 COVID-19: Status: ACTIVE | Noted: 2023-01-25

## 2023-01-25 PROCEDURE — 97530 THERAPEUTIC ACTIVITIES: CPT | Mod: CQ

## 2023-01-25 PROCEDURE — 21400001 HC TELEMETRY ROOM

## 2023-01-25 PROCEDURE — 25000003 PHARM REV CODE 250: Performed by: INTERNAL MEDICINE

## 2023-01-25 PROCEDURE — 97116 GAIT TRAINING THERAPY: CPT | Mod: CQ

## 2023-01-25 PROCEDURE — A4216 STERILE WATER/SALINE, 10 ML: HCPCS | Performed by: INTERNAL MEDICINE

## 2023-01-25 PROCEDURE — 63600175 PHARM REV CODE 636 W HCPCS: Performed by: INTERNAL MEDICINE

## 2023-01-25 PROCEDURE — 25000003 PHARM REV CODE 250: Performed by: PHYSICIAN ASSISTANT

## 2023-01-25 PROCEDURE — 25000003 PHARM REV CODE 250: Performed by: GENERAL PRACTICE

## 2023-01-25 PROCEDURE — C9113 INJ PANTOPRAZOLE SODIUM, VIA: HCPCS | Performed by: INTERNAL MEDICINE

## 2023-01-25 PROCEDURE — 63600175 PHARM REV CODE 636 W HCPCS: Performed by: GENERAL PRACTICE

## 2023-01-25 PROCEDURE — 63600175 PHARM REV CODE 636 W HCPCS: Performed by: NURSE PRACTITIONER

## 2023-01-25 RX ORDER — ERGOCALCIFEROL 1.25 MG/1
50000 CAPSULE ORAL
Qty: 4 CAPSULE | Refills: 2 | Status: ON HOLD | OUTPATIENT
Start: 2023-01-26 | End: 2023-04-28 | Stop reason: HOSPADM

## 2023-01-25 RX ORDER — MIRTAZAPINE 15 MG/1
15 TABLET, FILM COATED ORAL NIGHTLY
Qty: 30 TABLET | Refills: 1 | Status: SHIPPED | OUTPATIENT
Start: 2023-01-25 | End: 2023-02-10 | Stop reason: SDUPTHER

## 2023-01-25 RX ORDER — CIPROFLOXACIN 500 MG/1
500 TABLET ORAL 2 TIMES DAILY
Qty: 10 TABLET | Refills: 0 | Status: SHIPPED | OUTPATIENT
Start: 2023-01-25 | End: 2023-02-10 | Stop reason: HOSPADM

## 2023-01-25 RX ORDER — OXYCODONE HYDROCHLORIDE 5 MG/1
5 TABLET ORAL EVERY 4 HOURS PRN
Qty: 15 TABLET | Refills: 0 | Status: SHIPPED | OUTPATIENT
Start: 2023-01-25 | End: 2023-02-10 | Stop reason: SDUPTHER

## 2023-01-25 RX ORDER — SODIUM BICARBONATE 650 MG/1
650 TABLET ORAL 2 TIMES DAILY
Qty: 28 TABLET | Refills: 0 | Status: SHIPPED | OUTPATIENT
Start: 2023-01-25 | End: 2023-02-10 | Stop reason: HOSPADM

## 2023-01-25 RX ORDER — METRONIDAZOLE 500 MG/1
500 TABLET ORAL 3 TIMES DAILY
Qty: 15 TABLET | Refills: 0 | Status: SHIPPED | OUTPATIENT
Start: 2023-01-25 | End: 2023-02-10 | Stop reason: HOSPADM

## 2023-01-25 RX ADMIN — TAMSULOSIN HYDROCHLORIDE 0.4 MG: 0.4 CAPSULE ORAL at 10:01

## 2023-01-25 RX ADMIN — SODIUM CHLORIDE, PRESERVATIVE FREE 10 ML: 5 INJECTION INTRAVENOUS at 12:01

## 2023-01-25 RX ADMIN — ZINC SULFATE 220 MG (50 MG) CAPSULE 220 MG: CAPSULE at 10:01

## 2023-01-25 RX ADMIN — PANTOPRAZOLE SODIUM 40 MG: 40 INJECTION, POWDER, FOR SOLUTION INTRAVENOUS at 09:01

## 2023-01-25 RX ADMIN — PANTOPRAZOLE SODIUM 40 MG: 40 INJECTION, POWDER, FOR SOLUTION INTRAVENOUS at 10:01

## 2023-01-25 RX ADMIN — PIPERACILLIN AND TAZOBACTAM 4.5 G: 4; .5 INJECTION, POWDER, FOR SOLUTION INTRAVENOUS; PARENTERAL at 10:01

## 2023-01-25 RX ADMIN — SODIUM BICARBONATE 650 MG: 650 TABLET ORAL at 09:01

## 2023-01-25 RX ADMIN — SODIUM BICARBONATE 650 MG: 650 TABLET ORAL at 10:01

## 2023-01-25 RX ADMIN — ONDANSETRON 4 MG: 2 INJECTION INTRAMUSCULAR; INTRAVENOUS at 11:01

## 2023-01-25 RX ADMIN — OXYCODONE HYDROCHLORIDE 5 MG: 5 TABLET ORAL at 09:01

## 2023-01-25 RX ADMIN — PIPERACILLIN AND TAZOBACTAM 4.5 G: 4; .5 INJECTION, POWDER, FOR SOLUTION INTRAVENOUS; PARENTERAL at 02:01

## 2023-01-25 RX ADMIN — MIRTAZAPINE 15 MG: 15 TABLET, FILM COATED ORAL at 09:01

## 2023-01-25 RX ADMIN — PIPERACILLIN AND TAZOBACTAM 4.5 G: 4; .5 INJECTION, POWDER, FOR SOLUTION INTRAVENOUS; PARENTERAL at 06:01

## 2023-01-25 NOTE — PT/OT/SLP PROGRESS
Physical Therapy Treatment    Patient Name:  Quincy Triplett   MRN:  [id number]    Recommendations:     Discharge Recommendations: rehabilitation facility  Discharge Equipment Recommendations: cane, quad  Barriers to discharge: Decreased caregiver support    Assessment:     Quincy Triplett is a 82 y.o. male admitted with a medical diagnosis of COVID-19.  He presents with the following impairments/functional limitations: weakness, gait instability, impaired endurance, impaired balance, impaired cardiopulmonary response to activity, impaired functional mobility .    Rehab Prognosis: Good; patient would benefit from acute skilled PT services to address these deficits and reach maximum level of function.    Recent Surgery: Procedure(s) (LRB):  EGD (N/A)  EGD,WITH HEMORRHAGE CONTROL 6 Days Post-Op    Plan:     During this hospitalization, patient to be seen 6 x/week to address the identified rehab impairments via gait training, therapeutic activities and progress toward the following goals:    Plan of Care Expires:  02/20/23    Subjective     Chief Complaint: pt feels weak, but eager to mobilize  Patient/Family Comments/goals: concerned about patient going home alone  Pain/Comfort:         Objective:     Communicated with nurse prior to session.  Patient found HOB elevated with telemetry, pulse ox (continuous), PureWick, peripheral IV upon PT entry to room.     General Precautions: Standard, fall  Orthopedic Precautions: N/A  Braces: N/A  Respiratory Status: Room air     Functional Mobility:  Bed Mobility:     Supine to Sit: moderate assistance  Transfers:     Sit to Stand:  minimum assistance with straight cane x5 trials with cues for UE placement and safety awareness as well as correct technique  Gait: pt amb x47ft with SPC with [place] with some bouts of unsteadiness and 1 standing rest break required      Patient left up in chair with all lines intact and call button in reach..    GOALS:   Multidisciplinary Problems        Physical Therapy Goals          Problem: Physical Therapy    Goal Priority Disciplines Outcome Goal Variances Interventions   Physical Therapy Goal     PT, PT/OT Ongoing, Progressing     Description: Goals to be met by: 23     Patient will increase functional independence with mobility by performin. Supine to sit with [place]  2. Sit to supine with [place]  3. Sit to stand transfer with Modified [place]  4. Gait  x 200 feet with Modified [place] using Quad Cane.                          Time Tracking:     PT Received On: 23  PT Start Time: 957     PT Stop Time: 1030  PT Total Time (min): 33 min     Billable Minutes: Gait Training 15 and Therapeutic Activity 18    Treatment Type: Treatment  PT/PTA: PTA     PTA Visit Number: 3     2023

## 2023-01-25 NOTE — PROGRESS NOTES
01/25/23 1042   Discharge Reassessment   Assessment Type Discharge Planning Assessment   Did the patient's condition or plan change since previous assessment? No   Discharge Plan discussed with: Patient   Communicated SHAUN with patient/caregiver Date not available/Unable to determine   Discharge Plan A Rehab   Discharge Plan B Rehab   DME Needed Upon Discharge  none   Discharge Barriers Identified None   Why the patient remains in the hospital Requires continued medical care   Post-Acute Status   Post-Acute Authorization Placement   Post-Acute Placement Status Patient List Provided   Discharge Delays None known at this time     PT recommending Rehab at this time. Met with patient to discuss placement options. Patient would like to be referred to [facility], 2nd choice is [facility]. Awaiting OT notes and then referral can be sent.

## 2023-01-25 NOTE — PROGRESS NOTES
"Inpatient Nutrition Assessment    Admit Date: 1/11/2023   Total duration of encounter: 13 days     Nutrition Recommendation/Prescription     Oral diet as tolerated.  Boost Glucose Control (provides 250 kcal, 14 g protein per serving).    Communication of Recommendations: reviewed with nurse    Nutrition Assessment     Malnutrition Assessment/Nutrition-Focused Physical Exam    Malnutrition in the context of chronic illness  Degree of Malnutrition: severe malnutrition  Energy Intake: < 75% of estimated energy requirement for >/= 1 month  Interpretation of Weight Loss: >7.5% in 3 months  Body Fat: severe depletion  Area of Body Fat Loss: orbital region  and upper arm region - triceps / biceps  Muscle Mass Loss: severe depletion  Area of Muscle Mass Loss: temple region - temporalis muscle and clavicle bone region - pectoralis major, deltoid, trapezius muscles  Fluid Accumulation: does not meet criteria  Edema: unable to obtain  Reduced  Strength: unable to obtain  A minimum of two characteristics is recommended for diagnosis of either severe or non-severe malnutrition.    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  Transaminitis   Hyperbilirubinemia  COVID-19 infection  TERRENCE   Hyperkalemia  Hyponatremia  Anemia    Relevant Medical History:   Essential hypertension  Hyperlipidemia  CAD  Obstructive jaundice   Duodenal cancer    Nutrition-Related Medications:    ergocalciferol  50,000 Units Oral Q7 Days    mirtazapine  15 mg Oral QHS    pantoprazole  40 mg Intravenous BID    piperacillin-tazobactam (ZOSYN) IVPB  4.5 g Intravenous Q8H    sodium bicarbonate  650 mg Oral BID    " "sodium chloride 0.9%  10 mL Intravenous Q6H    tamsulosin  0.4 mg Oral Daily    zinc sulfate  220 mg Oral Daily   Calorie Containing IV Medications: no significant kcals from medications at this time    Nutrition-Related Labs:  Lab Results   Component Value Date/Time     01/23/2023 04:42 AM    K 3.2 (L) 01/23/2023 04:42 AM    CHLORIDE 105 01/23/2023 04:42 AM    BUN 28.5 (H) 01/23/2023 04:42 AM    CREATININE 1.80 (H) 01/23/2023 04:42 AM    EGFRNORACEVR 37 01/23/2023 04:42 AM    GLUCOSE 94 01/23/2023 04:42 AM    CALCIUM 8.3 (L) 01/23/2023 04:42 AM    PHOS 3.5 01/22/2023 05:41 AM    MG 1.80 01/23/2023 04:42 AM    ALKPHOS 156 (H) 01/23/2023 04:42 AM    AST 62 (H) 01/23/2023 04:42 AM    ALT 65 (H) 01/23/2023 04:42 AM    ALBUMIN 1.7 (L) 01/23/2023 04:42 AM    CRP 3.70 01/13/2023 11:47 PM     Diet/PN Order: Diet diabetic  Oral Supplement Order: none  Tube Feeding Order: none  Appetite/Oral Intake: fair/25-50% of meals  Factors Affecting Nutritional Intake: decreased appetite and pain  Food/Restorationist/Cultural Preferences: none reported  Food Allergies: none reported    Skin Integrity: drain/device(s)  Wound(s):   biliary tube noted    Comments    1/13/23 Patient reports decreased/poor appetite currently and prior to admission, reports significant weight loss over the past few months, agreeable to chocolate Boost, reports awaiting Whipple procedure as outpatient.    1/17/23 Patient reports appetite had improved and was good, now decreased/fair due to pain, does not want Boost.    1/20/23 Nurse reports patient had EGD yesterday and has requested to remain on clear liquid diet for now, will add Boost Breeze for additional kcal/protein, will also provide PPN recommendations.    1/24/23 Nurse reports patient consuming 25-50% of meals and 100% of Boost, will increase Boost frequency to three times daily.    Anthropometrics    Height: 5' 9" (175.3 cm) Height Method: Stated  Last Weight: 69.3 kg (152 lb 12.5 oz) (01/12/23 " 1230) Weight Method: Bed Scale  BMI (Calculated): 22.6  BMI Classification: normal (BMI 18.5-24.9)        Ideal Body Weight (IBW), Male: 160 lb     % Ideal Body Weight, Male (lb): 95.49 %                 Usual Body Weight (UBW), k kg  % Usual Body Weight: 80.75     Usual Weight Provided By: patient    Wt Readings from Last 5 Encounters:   23 69.3 kg (152 lb 12.5 oz)   22 75.8 kg (167 lb)   22 79.8 kg (176 lb)     Weight Change(s) Since Admission:  Admit Weight: 70.3 kg (155 lb) (23 2320)  69.3 kg (23)  No new weights (2023)    Estimated Needs    Weight Used For Calorie Calculations: 69.3 kg (152 lb 12.5 oz)  Energy Calorie Requirements (kcal): 1943 kcal/d, 1.4 stress factor  Energy Need Method: Plush-St Jeor  Weight Used For Protein Calculations: 69.3 kg (152 lb 12.5 oz)  Protein Requirements: 104 g/d, 1.5 g/kg  Fluid Requirements (mL): 1943 ml/d, 1 ml/kcal  Temp: 98.1 °F (36.7 °C)       Enteral Nutrition    Patient not receiving enteral nutrition at this time.    Parenteral Nutrition    Patient not receiving parenteral nutrition support at this time.    Evaluation of Received Nutrient Intake    Calories: not meeting estimated needs  Protein: not meeting estimated needs    Patient Education    Not applicable.    Nutrition Diagnosis     PES: Malnutrition related to cancer as evidenced by <75% needs met >1 month, severe fat depletion, severe muscle depletion, and >7.5% weight loss in 3 months. (continues)    Interventions/Goals     Intervention(s): general/healthful diet, commercial beverage, and collaboration with other providers  Goal: Meet greater than 75% of nutritional needs by follow-up. (goal progressing)    Monitoring & Evaluation     Dietitian will monitor food and beverage intake.  Nutrition Risk/Follow-Up: high (follow-up in 1-4 days)   Please consult if re-assessment needed sooner.

## 2023-01-25 NOTE — PROGRESS NOTES
[facility] Hospital Medicine Progress Note        Chief Complaint: Inpatient Follow-up for FTT      HPI:   Quincy Triplett is a 82 y.o. male with a past medical history of essential hypertension, hyperlipidemia, CAD, obstructive jaundice, and duodenal cancer presented to [facility] on 1/11/2023 for weakness,  decreased appetite, decreased urine output, and nausea began 3 days ago. Patient reports recent biliary drain placement on 12/21/2022 with Dr. Brown and was discharged 3 days ago.  Patient states he has whipple procedure scheduled soon by Dr. Brown. Denied fever, chills, cough, congestion, chest pain, shortness of breath, abdominal pain, vomiting, diarrhea, hematuria, and dysuria.     Initial vital signs in ED were /78, pulse 85, respirations 18, temperature 36.6° C, and SpO2 98%.  Labs revealed WBC 7.9, RBC 4, hemoglobin 11.6, hematocrit 35.6, sodium 130, chloride 108, CO2 11, BUN 42.3, creatinine 2.79, phosphorus 5.2, alkaline phosphatase 376, albumin 2.6, bilirubin total 13.7, AST 76, , lipase 85, and troponin undetectable.  Flu testing was negative.  COVID testing was positive.  UA revealed 1+ protein, cloudy appearance, positive nitrate, 3+ bilirubin, 1+ leukocytes, and 7 RBCs.  Patient was given 1 L NS, IV calcium gluconate 1 g, 5 units insulin IV, sodium bicarb 50 mEq, and Lokelma 10 g. Patient was admitted to hospital medicine service for further medical management.     Surgical oncology evaluated the patient recommended to postpone the surgery given overall decline in the functional status with COVID-19 infection and metabolic acidosis from renal failure. Patient was initiated on bicarb drip for metabolic acidosis.  Nephrology was consulted for metabolic acidosis renal failure.  Recommended IV fluids, encourage p.o. intake to compensate losses.  Attempts to internalize biliary drain were unsuccessful on 01/06/2023.  GI was consulted for elevated bilirubin, as  patient had biliary drain with good output recommended conservative management.  GI had reconsulted given positive FOBT with slowly drifting hemoglobin.  EGD 01/19/2023 revealed hemorrhagic duodenopathy, acquired duodenal stenosis.  Hemostatic spray was deployed, no specimens were collected.  Stomach and esophagus was normal.  1 unit of PRBC was transfused due to anemia.      Interval Hx:   No acute events overnight.  Comfortably resting, doing well on room air.  Denies any shortness with or cough.       Objective/physical exam:  Vitals:    01/24/23 2046 01/24/23 2325 01/25/23 0457 01/25/23 0731   BP:  113/69 131/69 124/71   Pulse:  82 81 80   Resp: 20 18 20 18   Temp:  98.3 °F (36.8 °C) 98.1 °F (36.7 °C) 99.4 °F (37.4 °C)   TempSrc:  Oral Oral Oral   SpO2:  97% 98% 100%   Weight:       Height:         General: In no acute distress, afebrile  Respiratory: Clear to auscultation bilaterally  Cardiovascular: S1, S2, no appreciable murmur  Abdomen: Soft, nontender, BS +  Extremities:  Right arm amputated, left lower extremity swelling  Neurologic: Alert and oriented x4, moving all extremities with good strength     Lab Results   Component Value Date     01/23/2023    K 3.2 (L) 01/23/2023    CO2 23 01/23/2023    BUN 28.5 (H) 01/23/2023    CREATININE 1.80 (H) 01/23/2023    CALCIUM 8.3 (L) 01/23/2023    EGFRNONAA >60 05/23/2022      Lab Results   Component Value Date    ALT 65 (H) 01/23/2023    AST 62 (H) 01/23/2023    ALKPHOS 156 (H) 01/23/2023    BILITOT 7.8 (H) 01/23/2023      Lab Results   Component Value Date    WBC 7.0 01/23/2023    HGB 8.8 (L) 01/23/2023    HCT 25.9 (L) 01/23/2023    MCV 85.8 01/23/2023     01/23/2023           Medications:   ergocalciferol  50,000 Units Oral Q7 Days    mirtazapine  15 mg Oral QHS    pantoprazole  40 mg Intravenous BID    piperacillin-tazobactam (ZOSYN) IVPB  4.5 g Intravenous Q8H    sodium bicarbonate  650 mg Oral BID    sodium chloride 0.9%  10 mL Intravenous Q6H     tamsulosin  0.4 mg Oral Daily    zinc sulfate  220 mg Oral Daily      sodium chloride, sodium chloride, acetaminophen, albuterol, bisacodyL, dextrose 10%, dextrose 10%, docusate sodium, meclizine, ondansetron, ondansetron, oxyCODONE, prochlorperazine, Flushing PICC Protocol **AND** sodium chloride 0.9% **AND** sodium chloride 0.9%     Assessment/Plan:    COVID-19 infection-(vaccinated)  TERRENCE -prerenal etiology from poor p.o. intake, high biliary drain output   Acute severe Metabolic acidosis due to renal failure  Acute on chronic Blood loss anemia , hemorrhagic duodenopathy  Hypovolemic Hyponatremia   Obstructive jaundice s/p biliary stent placement 12/21/2022, failed internalization 01/06/2023  Proctitis  Failure to thrive     HX:  Duodenal cancer, PVD, right arm amputation, CAD    Plan:  -Continue biliary drain.  Surgery recommended outpatient evaluation for Whipple's.  -Continue Protonix.  GI signed off.  Monitor while inpatient  -stable from COVID-19 standpoint.  Completed remdesivir for 3 days.  Continue supportive care  - Zosyn (end date 1/29/23) for proctitis.   Switch to Cipro and Flagyl if he gets discharged  -left lower extremity Doppler ruled out DVT.  -stopped bicarb drip.  Continue p.o. bicarb.  -continue therapy.  Other home medications were reviewed   - therapy , recommending rehab            Bridger Rivera MD

## 2023-01-26 LAB — POCT GLUCOSE: 154 MG/DL (ref 70–110)

## 2023-01-26 PROCEDURE — A4216 STERILE WATER/SALINE, 10 ML: HCPCS | Performed by: INTERNAL MEDICINE

## 2023-01-26 PROCEDURE — 25000003 PHARM REV CODE 250: Performed by: GENERAL PRACTICE

## 2023-01-26 PROCEDURE — 97530 THERAPEUTIC ACTIVITIES: CPT | Mod: CQ

## 2023-01-26 PROCEDURE — 63600175 PHARM REV CODE 636 W HCPCS: Performed by: GENERAL PRACTICE

## 2023-01-26 PROCEDURE — 25000003 PHARM REV CODE 250: Performed by: INTERNAL MEDICINE

## 2023-01-26 PROCEDURE — 63600175 PHARM REV CODE 636 W HCPCS: Performed by: INTERNAL MEDICINE

## 2023-01-26 PROCEDURE — C9113 INJ PANTOPRAZOLE SODIUM, VIA: HCPCS | Performed by: INTERNAL MEDICINE

## 2023-01-26 PROCEDURE — 21400001 HC TELEMETRY ROOM

## 2023-01-26 PROCEDURE — 97166 OT EVAL MOD COMPLEX 45 MIN: CPT

## 2023-01-26 PROCEDURE — 97116 GAIT TRAINING THERAPY: CPT | Mod: CQ

## 2023-01-26 PROCEDURE — 25000003 PHARM REV CODE 250: Performed by: PHYSICIAN ASSISTANT

## 2023-01-26 RX ADMIN — MIRTAZAPINE 15 MG: 15 TABLET, FILM COATED ORAL at 09:01

## 2023-01-26 RX ADMIN — SODIUM BICARBONATE 650 MG: 650 TABLET ORAL at 09:01

## 2023-01-26 RX ADMIN — SODIUM CHLORIDE, PRESERVATIVE FREE 10 ML: 5 INJECTION INTRAVENOUS at 09:01

## 2023-01-26 RX ADMIN — SODIUM CHLORIDE, PRESERVATIVE FREE 10 ML: 5 INJECTION INTRAVENOUS at 05:01

## 2023-01-26 RX ADMIN — PIPERACILLIN AND TAZOBACTAM 4.5 G: 4; .5 INJECTION, POWDER, FOR SOLUTION INTRAVENOUS; PARENTERAL at 01:01

## 2023-01-26 RX ADMIN — ZINC SULFATE 220 MG (50 MG) CAPSULE 220 MG: CAPSULE at 09:01

## 2023-01-26 RX ADMIN — OXYCODONE HYDROCHLORIDE 5 MG: 5 TABLET ORAL at 07:01

## 2023-01-26 RX ADMIN — SODIUM CHLORIDE, PRESERVATIVE FREE 10 ML: 5 INJECTION INTRAVENOUS at 12:01

## 2023-01-26 RX ADMIN — PANTOPRAZOLE SODIUM 40 MG: 40 INJECTION, POWDER, FOR SOLUTION INTRAVENOUS at 09:01

## 2023-01-26 RX ADMIN — ERGOCALCIFEROL 50000 UNITS: 1.25 CAPSULE ORAL at 09:01

## 2023-01-26 RX ADMIN — TAMSULOSIN HYDROCHLORIDE 0.4 MG: 0.4 CAPSULE ORAL at 09:01

## 2023-01-26 RX ADMIN — PIPERACILLIN AND TAZOBACTAM 4.5 G: 4; .5 INJECTION, POWDER, FOR SOLUTION INTRAVENOUS; PARENTERAL at 09:01

## 2023-01-26 RX ADMIN — PIPERACILLIN AND TAZOBACTAM 4.5 G: 4; .5 INJECTION, POWDER, FOR SOLUTION INTRAVENOUS; PARENTERAL at 06:01

## 2023-01-26 NOTE — PROGRESS NOTES
[facility] Hospital Medicine Progress Note        Chief Complaint: Inpatient Follow-up for FTT      HPI:   Quincy Triplett is a 82 y.o. male with a past medical history of essential hypertension, hyperlipidemia, CAD, obstructive jaundice, and duodenal cancer presented to [facility] on 1/11/2023 for weakness,  decreased appetite, decreased urine output, and nausea began 3 days ago. Patient reports recent biliary drain placement on 12/21/2022 with Dr. Brown and was discharged 3 days ago.  Patient states he has whipple procedure scheduled soon by Dr. Brown. Denied fever, chills, cough, congestion, chest pain, shortness of breath, abdominal pain, vomiting, diarrhea, hematuria, and dysuria.     Initial vital signs in ED were /78, pulse 85, respirations 18, temperature 36.6° C, and SpO2 98%.  Labs revealed WBC 7.9, RBC 4, hemoglobin 11.6, hematocrit 35.6, sodium 130, chloride 108, CO2 11, BUN 42.3, creatinine 2.79, phosphorus 5.2, alkaline phosphatase 376, albumin 2.6, bilirubin total 13.7, AST 76, , lipase 85, and troponin undetectable.  Flu testing was negative.  COVID testing was positive.  UA revealed 1+ protein, cloudy appearance, positive nitrate, 3+ bilirubin, 1+ leukocytes, and 7 RBCs.  Patient was given 1 L NS, IV calcium gluconate 1 g, 5 units insulin IV, sodium bicarb 50 mEq, and Lokelma 10 g. Patient was admitted to hospital medicine service for further medical management.     Surgical oncology evaluated the patient recommended to postpone the surgery given overall decline in the functional status with COVID-19 infection and metabolic acidosis from renal failure. Patient was initiated on bicarb drip for metabolic acidosis.  Nephrology was consulted for metabolic acidosis renal failure.  Recommended IV fluids, encourage p.o. intake to compensate losses.  Attempts to internalize biliary drain were unsuccessful on 01/06/2023.  GI was consulted for elevated bilirubin, as  patient had biliary drain with good output recommended conservative management.  GI had reconsulted given positive FOBT with slowly drifting hemoglobin.  EGD 01/19/2023 revealed hemorrhagic duodenopathy, acquired duodenal stenosis.  Hemostatic spray was deployed, no specimens were collected.  Stomach and esophagus was normal.  1 unit of PRBC was transfused due to anemia.      Interval Hx:   Alert, comfortably resting in the bed.  Tolerating p.o..  Mother at bedside.  Patient has no new complaints or concerns.  PT recommended rehab.  Doing well on room air.         Objective/physical exam:  Vitals:    01/25/23 1931 01/25/23 2145 01/25/23 2304 01/26/23 0329   BP: (!) 147/72  131/77 139/78   Pulse: 65  91 73   Resp:  20     Temp: 98.3 °F (36.8 °C)  98.2 °F (36.8 °C) 98.6 °F (37 °C)   TempSrc: Oral  Oral Oral   SpO2: 96%  97% 97%   Weight:       Height:         General: In no acute distress, afebrile  Respiratory: Clear to auscultation bilaterally  Cardiovascular: S1, S2, no appreciable murmur  Abdomen: Soft, nontender, BS +  Extremities:  Right arm amputated, left lower extremity swelling  Neurologic: Alert and oriented x4, moving all extremities with good strength     Lab Results   Component Value Date     01/23/2023    K 3.2 (L) 01/23/2023    CO2 23 01/23/2023    BUN 28.5 (H) 01/23/2023    CREATININE 1.80 (H) 01/23/2023    CALCIUM 8.3 (L) 01/23/2023    EGFRNONAA >60 05/23/2022      Lab Results   Component Value Date    ALT 65 (H) 01/23/2023    AST 62 (H) 01/23/2023    ALKPHOS 156 (H) 01/23/2023    BILITOT 7.8 (H) 01/23/2023      Lab Results   Component Value Date    WBC 7.0 01/23/2023    HGB 8.8 (L) 01/23/2023    HCT 25.9 (L) 01/23/2023    MCV 85.8 01/23/2023     01/23/2023           Medications:   ergocalciferol  50,000 Units Oral Q7 Days    mirtazapine  15 mg Oral QHS    pantoprazole  40 mg Intravenous BID    piperacillin-tazobactam (ZOSYN) IVPB  4.5 g Intravenous Q8H    sodium bicarbonate  650 mg Oral  BID    sodium chloride 0.9%  10 mL Intravenous Q6H    tamsulosin  0.4 mg Oral Daily    zinc sulfate  220 mg Oral Daily      sodium chloride, sodium chloride, acetaminophen, albuterol, bisacodyL, dextrose 10%, dextrose 10%, docusate sodium, meclizine, ondansetron, ondansetron, oxyCODONE, prochlorperazine, Flushing PICC Protocol **AND** sodium chloride 0.9% **AND** sodium chloride 0.9%     Assessment/Plan:    COVID-19 infection-(vaccinated)  TERRENCE -prerenal etiology from poor p.o. intake, high biliary drain output   Acute severe Metabolic acidosis due to renal failure  Acute on chronic Blood loss anemia , hemorrhagic duodenopathy  Hypovolemic Hyponatremia   Obstructive jaundice s/p biliary stent placement 12/21/2022, failed internalization 01/06/2023  Proctitis  Failure to thrive     HX:  Duodenal cancer, PVD, right arm amputation, CAD    Plan:  - Zosyn (end date 1/29/23) for proctitis.   Switch to Cipro and Flagyl if he gets discharged  -Continue biliary drain.  Surgery recommended outpatient evaluation for Whipple's.  -Continue Protonix.  GI signed off.  Monitor while inpatient  -stable from COVID-19 standpoint.  Completed remdesivir for 3 days.  Continue supportive care  -left lower extremity Doppler ruled out DVT.  -stopped bicarb drip.  Continue p.o. bicarb.  -continue therapy.  Other home medications were reviewed   - therapy , awaiting rehab            Bridger Rivera MD

## 2023-01-26 NOTE — PLAN OF CARE
[facility] has no beds and does anticipate having a bed until next week (Wed.). I did update patient and referral sent to [address] [place] [address] Physical Rehab ([facility]). We also discussed [facility] (TCU) in the event insurance denies. He is agreeable.

## 2023-01-26 NOTE — PLAN OF CARE
Problem: Occupational Therapy  Goal: Occupational Therapy Goal  Description: Goals to be met by:  2/9/2023    Patient will increase functional independence with ADLs by performing:    LE Dressing with Modified [place].  Grooming while standing with Modified [place].  Toileting from toilet with Modified [place] for hygiene and clothing management.   Toilet transfer to toilet with Modified [place].    Outcome: Ongoing, Progressing

## 2023-01-26 NOTE — PT/OT/SLP PROGRESS
Physical Therapy Treatment    Patient Name:  Quincy Triplett   MRN:  [id number]    Recommendations:     Discharge Recommendations: rehabilitation facility  Discharge Equipment Recommendations: cane, quad  Barriers to discharge: Decreased caregiver support    Assessment:     Quincy Triplett is a 82 y.o. male admitted with a medical diagnosis of COVID-19.  He presents with the following impairments/functional limitations: weakness, impaired endurance, gait instability, impaired balance, impaired functional mobility .    Rehab Prognosis: Good; patient would benefit from acute skilled PT services to address these deficits and reach maximum level of function.    Recent Surgery: Procedure(s) (LRB):  EGD (N/A)  EGD,WITH HEMORRHAGE CONTROL 7 Days Post-Op    Plan:     During this hospitalization, patient to be seen 6 x/week to address the identified rehab impairments via gait training, therapeutic activities and progress toward the following goals:    Plan of Care Expires:  02/20/23    Subjective     Chief Complaint:   Patient/Family Comments/goals:   Pain/Comfort:         Objective:     Communicated with nurse prior to session.  Patient found up in chair with telemetry, pulse ox (continuous), peripheral IV upon PT entry to room.     General Precautions: Standard, fall  Orthopedic Precautions: N/A  Braces: N/A  Respiratory Status: Room air     Functional Mobility:  Transfers:     Sit to Stand:  minimum assistance with straight cane  Gait: pt amb x50ft with SPC and Judd with 2 Lateral LOB with Judd to correct; noted unsteadiness and cues for increased hip flexion  Pt performed forward mini lunges with emphasis on eccentric control x10 reps for 2 trials to increase balance reactions.         Patient left up in chair with all lines intact and call button in reach..    GOALS:   Multidisciplinary Problems       Physical Therapy Goals          Problem: Physical Therapy    Goal Priority Disciplines Outcome Goal Variances  Interventions   Physical Therapy Goal     PT, PT/OT Ongoing, Progressing     Description: Goals to be met by: 23     Patient will increase functional independence with mobility by performin. Supine to sit with Vance  2. Sit to supine with Vance  3. Sit to stand transfer with Modified Vance  4. Gait  x 200 feet with Modified Vance using Quad Cane.                          Time Tracking:     PT Received On: 23  PT Start Time: 1045     PT Stop Time: 1116  PT Total Time (min): 31 min     Billable Minutes: Gait Training 10 and Therapeutic Activity 21    Treatment Type: Treatment  PT/PTA: PTA     PTA Visit Number: 4     2023

## 2023-01-26 NOTE — PT/OT/SLP EVAL
Occupational Therapy   Evaluation    Name: Quincy Triplett  MRN: [id number]  Admitting Diagnosis: COVID-19  Recent Surgery: Procedure(s) (LRB):  EGD (N/A)  EGD,WITH HEMORRHAGE CONTROL 7 Days Post-Op    Recommendations:     Discharge Recommendations: rehabilitation facility  Discharge Equipment Recommendations:  cane, straight, cane, quad (whichever PT recommends)  Barriers to discharge:       Assessment:     Quincy Triplett is a 82 y.o. male with a medical diagnosis of COVID-19 infection hx, TERRENCE.  He presents with the following performance deficits affecting function: weakness, impaired endurance, impaired self care skills, impaired functional mobility, gait instability, impaired balance, decreased upper extremity function.    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 Pt reports he was indep prior to getting covid. Today he ambulated to bathroom with no AD but feels like he would be safer with a cane. Limited by poor endurance which he reports is ever since his covid diagnosis. Recommend rehab prior to return home, alone, to address safety and endurance with ADLs and functional mobility.    Rehab Prognosis: Good; patient would benefit from acute skilled OT services to address these deficits and reach maximum level of function.       Plan:     Patient to be seen 5 x/week to address the above listed problems via self-care/home management, therapeutic activities, therapeutic exercises  Plan of Care Expires:    Plan of Care Reviewed with: patient    Subjective     Chief Complaint: -  Patient/Family Comments/goals: -    Occupational Profile:  Living Environment: lives alone   Previous level of function: indep but lately easily fatigues since covid diagnosis   Roles and Routines: retired   Equipment Used at Home:    Assistance upon Discharge: none     Pain/Comfort:  Pain Rating 1: 0/10    Patients cultural, spiritual, Adventist conflicts given the current situation:      Objective:      Communicated with: nrsg prior to session.  Patient found HOB elevated with   upon OT entry to room.    General Precautions: Standard,    Orthopedic Precautions:    Braces:    Respiratory Status: Room air    Occupational Performance:    Bed Mobility:    Patient completed Supine to Sit with contact guard assistance    Functional Mobility/Transfers:  Patient completed Toilet Transfer Step Transfer technique with minimum assistance with  hand-held assist  Functional Mobility: no LOB but easily fatigued     Activities of Daily Living:  Lower Body Dressing: stand by assistance for BLE slippers at EOB   Toileting: minimum assistance with no AD     Cognitive/Visual Perceptual:  Cognitive/Psychosocial Skills:     -       Follows Commands/attention:Follows multistep  commands    Physical Exam:  Upper Extremity Strength:    -       Left Upper Extremity: WNL    AMPAC 6 Click ADL:  AMPAC Total Score:      Treatment & Education:  -    Patient left up in chair with all lines intact, call button in reach, and family present    GOALS:   Multidisciplinary Problems       Occupational Therapy Goals          Problem: Occupational Therapy    Goal Priority Disciplines Outcome Interventions   Occupational Therapy Goal     OT, PT/OT Ongoing, Progressing    Description: Goals to be met by:  2/9/2023    Patient will increase functional independence with ADLs by performing:    LE Dressing with Modified [place].  Grooming while standing with Modified [place].  Toileting from toilet with Modified [place] for hygiene and clothing management.   Toilet transfer to toilet with Modified [place].                         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, unspecified extremity     Coronary artery disease     Dyslipidemia     Hypertension          Past Surgical History:   Procedure Laterality Date    AMPUTATION Right     Right  arm    CAROTID STENT      ERCP N/A 12/21/2022    Procedure: ERCP;  Surgeon: Sushil Anderson MD;  Location: [facility] ENDOSCOPY;  Service: Gastroenterology;  Laterality: N/A;  food in abdomen    ERCP, WITH BIOPSY  12/21/2022    Procedure: ERCP, WITH BIOPSY;  Surgeon: Sushil Anderson MD;  Location: [facility] ENDOSCOPY;  Service: Gastroenterology;;    LEFT HEART CATHETERIZATION      TONSILLECTOMY         Time Tracking:     OT Date of Treatment:    OT Start Time: 0907  OT Stop Time: 0925  OT Total Time (min): 18 min    Billable Minutes:Evaluation Moderate Complexity     1/26/2023

## 2023-01-27 PROCEDURE — 97164 PT RE-EVAL EST PLAN CARE: CPT

## 2023-01-27 PROCEDURE — 25000003 PHARM REV CODE 250: Performed by: INTERNAL MEDICINE

## 2023-01-27 PROCEDURE — 21400001 HC TELEMETRY ROOM

## 2023-01-27 PROCEDURE — 63600175 PHARM REV CODE 636 W HCPCS: Performed by: GENERAL PRACTICE

## 2023-01-27 PROCEDURE — A4216 STERILE WATER/SALINE, 10 ML: HCPCS | Performed by: INTERNAL MEDICINE

## 2023-01-27 PROCEDURE — C9113 INJ PANTOPRAZOLE SODIUM, VIA: HCPCS | Performed by: INTERNAL MEDICINE

## 2023-01-27 PROCEDURE — 25000003 PHARM REV CODE 250: Performed by: PHYSICIAN ASSISTANT

## 2023-01-27 PROCEDURE — 63600175 PHARM REV CODE 636 W HCPCS: Performed by: INTERNAL MEDICINE

## 2023-01-27 PROCEDURE — 25000003 PHARM REV CODE 250: Performed by: GENERAL PRACTICE

## 2023-01-27 PROCEDURE — 97535 SELF CARE MNGMENT TRAINING: CPT | Mod: CO

## 2023-01-27 PROCEDURE — 97530 THERAPEUTIC ACTIVITIES: CPT | Mod: CO

## 2023-01-27 RX ADMIN — SODIUM BICARBONATE 650 MG: 650 TABLET ORAL at 08:01

## 2023-01-27 RX ADMIN — PIPERACILLIN AND TAZOBACTAM 4.5 G: 4; .5 INJECTION, POWDER, FOR SOLUTION INTRAVENOUS; PARENTERAL at 02:01

## 2023-01-27 RX ADMIN — MIRTAZAPINE 15 MG: 15 TABLET, FILM COATED ORAL at 08:01

## 2023-01-27 RX ADMIN — SODIUM CHLORIDE, PRESERVATIVE FREE 10 ML: 5 INJECTION INTRAVENOUS at 12:01

## 2023-01-27 RX ADMIN — PIPERACILLIN AND TAZOBACTAM 4.5 G: 4; .5 INJECTION, POWDER, FOR SOLUTION INTRAVENOUS; PARENTERAL at 06:01

## 2023-01-27 RX ADMIN — ZINC SULFATE 220 MG (50 MG) CAPSULE 220 MG: CAPSULE at 11:01

## 2023-01-27 RX ADMIN — OXYCODONE HYDROCHLORIDE 5 MG: 5 TABLET ORAL at 06:01

## 2023-01-27 RX ADMIN — TAMSULOSIN HYDROCHLORIDE 0.4 MG: 0.4 CAPSULE ORAL at 11:01

## 2023-01-27 RX ADMIN — PIPERACILLIN AND TAZOBACTAM 4.5 G: 4; .5 INJECTION, POWDER, FOR SOLUTION INTRAVENOUS; PARENTERAL at 11:01

## 2023-01-27 RX ADMIN — SODIUM CHLORIDE, PRESERVATIVE FREE 10 ML: 5 INJECTION INTRAVENOUS at 06:01

## 2023-01-27 RX ADMIN — SODIUM BICARBONATE 650 MG: 650 TABLET ORAL at 11:01

## 2023-01-27 RX ADMIN — PANTOPRAZOLE SODIUM 40 MG: 40 INJECTION, POWDER, FOR SOLUTION INTRAVENOUS at 08:01

## 2023-01-27 RX ADMIN — PANTOPRAZOLE SODIUM 40 MG: 40 INJECTION, POWDER, FOR SOLUTION INTRAVENOUS at 11:01

## 2023-01-27 NOTE — PROGRESS NOTES
"Inpatient Nutrition Assessment    Admit Date: 1/11/2023   Total duration of encounter: 16 days     Nutrition Recommendation/Prescription     Oral diet as tolerated.  Boost Glucose Control (provides 250 kcal, 14 g protein per serving).    Communication of Recommendations: reviewed with nurse    Nutrition Assessment     Malnutrition Assessment/Nutrition-Focused Physical Exam    Malnutrition in the context of chronic illness  Degree of Malnutrition: severe malnutrition  Energy Intake: < 75% of estimated energy requirement for >/= 1 month  Interpretation of Weight Loss: >7.5% in 3 months  Body Fat: severe depletion  Area of Body Fat Loss: orbital region  and upper arm region - triceps / biceps  Muscle Mass Loss: severe depletion  Area of Muscle Mass Loss: temple region - temporalis muscle and clavicle bone region - pectoralis major, deltoid, trapezius muscles  Fluid Accumulation: does not meet criteria  Edema: unable to obtain  Reduced  Strength: unable to obtain  A minimum of two characteristics is recommended for diagnosis of either severe or non-severe malnutrition.    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  Transaminitis   Hyperbilirubinemia  COVID-19 infection  TERRENCE   Hyperkalemia  Hyponatremia  Anemia    Relevant Medical History:   Essential hypertension  Hyperlipidemia  CAD  Obstructive jaundice   Duodenal cancer    Scheduled Meds:   ergocalciferol  50,000 Units Oral Q7 Days    mirtazapine  15 mg Oral QHS    pantoprazole  40 mg Intravenous BID    piperacillin-tazobactam (ZOSYN) IVPB  4.5 g Intravenous Q8H    sodium bicarbonate  650 mg Oral BID    sodium chloride 0.9%  " "10 mL Intravenous Q6H    tamsulosin  0.4 mg Oral Daily    zinc sulfate  220 mg Oral Daily   Calorie Containing IV Medications: no significant kcals from medications at this time    Lab Results   Component Value Date/Time     01/23/2023 04:42 AM    K 3.2 (L) 01/23/2023 04:42 AM    CHLORIDE 105 01/23/2023 04:42 AM    BUN 28.5 (H) 01/23/2023 04:42 AM    CREATININE 1.80 (H) 01/23/2023 04:42 AM    EGFRNORACEVR 37 01/23/2023 04:42 AM    GLUCOSE 94 01/23/2023 04:42 AM    CALCIUM 8.3 (L) 01/23/2023 04:42 AM    PHOS 3.5 01/22/2023 05:41 AM    MG 1.80 01/23/2023 04:42 AM    ALKPHOS 156 (H) 01/23/2023 04:42 AM    AST 62 (H) 01/23/2023 04:42 AM    ALT 65 (H) 01/23/2023 04:42 AM    ALBUMIN 1.7 (L) 01/23/2023 04:42 AM    CRP 3.70 01/13/2023 11:47 PM     Diet/PN Order: Diet diabetic  Oral Supplement Order: Boost Glucose Control  Tube Feeding Order: none  Appetite/Oral Intake: fair/50-75% of meals  Factors Affecting Nutritional Intake: decreased appetite and pain  Food/Mu-ism/Cultural Preferences: none reported  Food Allergies: none reported    Skin Integrity: drain/device(s)  Wound(s):   biliary tube noted    Comments    1/13/23 Patient reports decreased/poor appetite currently and prior to admission, reports significant weight loss over the past few months, agreeable to chocolate Boost, reports awaiting Whipple procedure as outpatient.    1/17/23 Patient reports appetite had improved and was good, now decreased/fair due to pain, does not want Boost.    1/20/23 Nurse reports patient had EGD yesterday and has requested to remain on clear liquid diet for now, will add Boost Breeze for additional kcal/protein, will also provide PPN recommendations.    1/24/23 Nurse reports patient consuming 25-50% of meals and 100% of Boost, will increase Boost frequency to three times daily.    1/27/23 Fair appetite/intake.    Anthropometrics    Height: 5' 9" (175.3 cm) Height Method: Stated  Last Weight: 69.3 kg (152 lb 12.5 oz) (01/12/23 " 1230) Weight Method: Bed Scale  BMI (Calculated): 22.6  BMI Classification: normal (BMI 18.5-24.9)        Ideal Body Weight (IBW), Male: 160 lb     % Ideal Body Weight, Male (lb): 95.49 %                 Usual Body Weight (UBW), k kg  % Usual Body Weight: 80.75     Usual Weight Provided By: patient    Wt Readings from Last 5 Encounters:   23 69.3 kg (152 lb 12.5 oz)   22 75.8 kg (167 lb)   22 79.8 kg (176 lb)     Weight Change(s) Since Admission:  Admit Weight: 70.3 kg (155 lb) (23 2320)  69.3 kg (23)  No new weights (2023)    Estimated Needs    Weight Used For Calorie Calculations: 69.3 kg (152 lb 12.5 oz)  Energy Calorie Requirements (kcal): 1943 kcal/d, 1.4 stress factor  Energy Need Method: [place]-St Jeor  Weight Used For Protein Calculations: 69.3 kg (152 lb 12.5 oz)  Protein Requirements: 104 g/d, 1.5 g/kg  Fluid Requirements (mL): 1943 ml/d, 1 ml/kcal  Temp: 97.9 °F (36.6 °C)       Enteral Nutrition    Patient not receiving enteral nutrition at this time.    Parenteral Nutrition    Patient not receiving parenteral nutrition support at this time.    Evaluation of Received Nutrient Intake    Calories: meeting estimated needs  Protein: meeting estimated needs    Patient Education    Not applicable.    Nutrition Diagnosis     PES: Malnutrition related to cancer as evidenced by <75% needs met >1 month, severe fat depletion, severe muscle depletion, and >7.5% weight loss in 3 months. (continues)    Interventions/Goals     Intervention(s): general/healthful diet, commercial beverage, and collaboration with other providers  Goal: Meet greater than 75% of nutritional needs by follow-up. (goal met)    Monitoring & Evaluation     Dietitian will monitor food and beverage intake.  Nutrition Risk/Follow-Up: high (follow-up in 1-4 days)   Please consult if re-assessment needed sooner.

## 2023-01-27 NOTE — PROGRESS NOTES
[facility] Hospital Medicine Progress Note        Chief Complaint: Inpatient Follow-up for FTT      HPI:   Quincy Triplett is a 82 y.o. male with a past medical history of essential hypertension, hyperlipidemia, CAD, obstructive jaundice, and duodenal cancer presented to [facility] on 1/11/2023 for weakness,  decreased appetite, decreased urine output, and nausea began 3 days ago. Patient reports recent biliary drain placement on 12/21/2022 with Dr. Brown and was discharged 3 days ago.  Patient states he has whipple procedure scheduled soon by Dr. Brown. Denied fever, chills, cough, congestion, chest pain, shortness of breath, abdominal pain, vomiting, diarrhea, hematuria, and dysuria.     Initial vital signs in ED were /78, pulse 85, respirations 18, temperature 36.6° C, and SpO2 98%.  Labs revealed WBC 7.9, RBC 4, hemoglobin 11.6, hematocrit 35.6, sodium 130, chloride 108, CO2 11, BUN 42.3, creatinine 2.79, phosphorus 5.2, alkaline phosphatase 376, albumin 2.6, bilirubin total 13.7, AST 76, , lipase 85, and troponin undetectable.  Flu testing was negative.  COVID testing was positive.  UA revealed 1+ protein, cloudy appearance, positive nitrate, 3+ bilirubin, 1+ leukocytes, and 7 RBCs.  Patient was given 1 L NS, IV calcium gluconate 1 g, 5 units insulin IV, sodium bicarb 50 mEq, and Lokelma 10 g. Patient was admitted to hospital medicine service for further medical management.     Surgical oncology evaluated the patient recommended to postpone the surgery given overall decline in the functional status with COVID-19 infection and metabolic acidosis from renal failure. Patient was initiated on bicarb drip for metabolic acidosis.  Nephrology was consulted for metabolic acidosis renal failure.  Recommended IV fluids, encourage p.o. intake to compensate losses.  Attempts to internalize biliary drain were unsuccessful on 01/06/2023.  GI was consulted for elevated bilirubin, as  patient had biliary drain with good output recommended conservative management.  GI had reconsulted given positive FOBT with slowly drifting hemoglobin.  EGD 01/19/2023 revealed hemorrhagic duodenopathy, acquired duodenal stenosis.  Hemostatic spray was deployed, no specimens were collected.  Stomach and esophagus was normal.  1 unit of PRBC was transfused due to anemia.      Interval Hx:    seen and examined at bedside.  Comfortably resting.  Tolerating diet.  Brother was at bedside.  Patient has no new complaints or concerns.      Objective/physical exam:  Vitals:    01/26/23 1927 01/26/23 1942 01/27/23 0315 01/27/23 0755   BP: 122/71  (!) 147/82 126/80   BP Location:       Patient Position:       Pulse: 83  64 80   Resp:  18  18   Temp: 97.9 °F (36.6 °C)  97.9 °F (36.6 °C) 98.2 °F (36.8 °C)   TempSrc: Oral  Oral Oral   SpO2: 97%  97% 98%   Weight:       Height:         General: In no acute distress, afebrile  Respiratory: Clear to auscultation bilaterally  Cardiovascular: S1, S2, no appreciable murmur  Abdomen: Soft, nontender, BS +  Extremities:  Right arm amputated, left lower extremity swelling  Neurologic: Alert and oriented x4, moving all extremities with good strength     Lab Results   Component Value Date     01/23/2023    K 3.2 (L) 01/23/2023    CO2 23 01/23/2023    BUN 28.5 (H) 01/23/2023    CREATININE 1.80 (H) 01/23/2023    CALCIUM 8.3 (L) 01/23/2023    EGFRNONAA >60 05/23/2022      Lab Results   Component Value Date    ALT 65 (H) 01/23/2023    AST 62 (H) 01/23/2023    ALKPHOS 156 (H) 01/23/2023    BILITOT 7.8 (H) 01/23/2023      Lab Results   Component Value Date    WBC 7.0 01/23/2023    HGB 8.8 (L) 01/23/2023    HCT 25.9 (L) 01/23/2023    MCV 85.8 01/23/2023     01/23/2023           Medications:   ergocalciferol  50,000 Units Oral Q7 Days    mirtazapine  15 mg Oral QHS    pantoprazole  40 mg Intravenous BID    piperacillin-tazobactam (ZOSYN) IVPB  4.5 g Intravenous Q8H    sodium  bicarbonate  650 mg Oral BID    sodium chloride 0.9%  10 mL Intravenous Q6H    tamsulosin  0.4 mg Oral Daily    zinc sulfate  220 mg Oral Daily      sodium chloride, sodium chloride, acetaminophen, albuterol, bisacodyL, dextrose 10%, dextrose 10%, docusate sodium, meclizine, ondansetron, ondansetron, oxyCODONE, prochlorperazine, Flushing PICC Protocol **AND** sodium chloride 0.9% **AND** sodium chloride 0.9%     Assessment/Plan:    COVID-19 infection-(vaccinated)  TERRENCE -prerenal etiology from poor p.o. intake, high biliary drain output   Acute severe Metabolic acidosis due to renal failure  Acute on chronic Blood loss anemia , hemorrhagic duodenopathy  Hypovolemic Hyponatremia   Obstructive jaundice s/p biliary stent placement 12/21/2022, failed internalization 01/06/2023  Proctitis  Failure to thrive     HX:  Duodenal cancer, PVD, right arm amputation, CAD    Plan:  - Zosyn (end date 1/29/23) for proctitis.   Switch to Cipro and Flagyl if he gets discharged  -Continue biliary drain.  Surgery recommended outpatient evaluation for Whipple's.  -Continue Protonix.  GI signed off.  Monitor while inpatient  -stable from COVID-19 standpoint.  Completed remdesivir for 3 days.  Continue supportive care  -left lower extremity Doppler ruled out DVT.  -stopped bicarb drip.  Continue p.o. bicarb.  -continue therapy.  Other home medications were reviewed   - therapy , awaiting rehab.  Stable for placement            Bridger Rivera MD

## 2023-01-27 NOTE — PT/OT/SLP RE-EVAL
Physical Therapy Re-evaluation    Patient Name:  Quincy Triplett   MRN:  [id number]    Recommendations:     Discharge Recommendations: rehabilitation facility  Discharge Equipment Recommendations: cane, quad   Barriers to discharge:  safety awareness    Assessment:     Quincy Triplett is a 82 y.o. male admitted with a medical diagnosis of COVID-19.  He presents with the following impairments/functional limitations: weakness, impaired endurance, impaired self care skills, impaired functional mobility, gait instability, impaired balance, decreased safety awareness. Patient making good progress with PT. Requiring mostly MIN A for all mobility. He needs assistance while ambulating as his balance is poor at times. Endurance is also poor. I feel that patient would greatly benefit from rehab placement as he currently lives alone and will not have any assistance at home. He is motivated to return to his PLOF. Progress patient as tolerated.     Rehab Prognosis:  good; patient would benefit from acute skilled PT services to address these deficits and reach maximum level of function.      Recent Surgery: Procedure(s) (LRB):  EGD (N/A)  EGD,WITH HEMORRHAGE CONTROL 8 Days Post-Op    Plan:     During this hospitalization, patient to be seen 6 x/week to address the above listed problems via gait training, therapeutic activities, therapeutic exercises, neuromuscular re-education  Plan of Care Expires:  02/20/23  Plan of Care Reviewed with: patient, family    Subjective     Communicated with nurse prior to session.  Patient found up in chair with telemetry, peripheral IV upon PT entry to room, agreeable to evaluation.      Chief Complaint: none  Patient comments/goals: none  Pain/Comfort:  Pain Rating 1: 0/10    Patients cultural, spiritual, Christian conflicts given the current situation: no      Objective:     Patient found with: telemetry, peripheral IV     General Precautions: Standard, fall  Orthopedic Precautions:  N/A  Braces: N/A  Respiratory Status: Room air    Exams:  Cognitive Exam:  Patient is oriented to Person, Place, Time, and Situation  Sensation:    -       Intact  RLE ROM: WFL  RLE Strength: -4/5 grossly  LLE ROM: WFL  LLE Strength: -4/5 grossly    Functional Mobility:  Transfers:     Sit to Stand:  contact guard assistance with quad cane  Gait: 83 ft with QC & CGA.   Balance: fair    AM-PAC 6 CLICK MOBILITY  Total Score:18    Patient left up in chair with all lines intact, call button in reach, and friend present.    GOALS:   Multidisciplinary Problems       Physical Therapy Goals          Problem: Physical Therapy    Goal Priority Disciplines Outcome Goal Variances Interventions   Physical Therapy Goal     PT, PT/OT Ongoing, Progressing     Description: Goals to be met by: 23     Patient will increase functional independence with mobility by performin. Supine to sit with [place]  2. Sit to supine with [place]  3. Sit to stand transfer with Modified [place]  4. Gait  x 200 feet with Modified [place] using Quad Cane.                          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, unspecified extremity     Coronary artery disease     Dyslipidemia     Hypertension        Past Surgical History:   Procedure Laterality Date    AMPUTATION Right     Right arm    CAROTID STENT      ERCP N/A 2022    Procedure: ERCP;  Surgeon: Sushil Anderson MD;  Location: [facility] ENDOSCOPY;  Service: Gastroenterology;  Laterality: N/A;  food in abdomen    ERCP, WITH BIOPSY  2022    Procedure: ERCP, WITH BIOPSY;  Surgeon: Sushil Anderson MD;  Location: [facility] ENDOSCOPY;  Service: Gastroenterology;;    LEFT HEART CATHETERIZATION      TONSILLECTOMY         Time Tracking:     PT Received On: 23  PT Start Time: 1030     PT Stop Time: 1045  PT Total Time (min): 15 min     Billable  Minutes: Re-eval 15 minutes      01/27/2023

## 2023-01-27 NOTE — PLAN OF CARE
Spoke to Rose at [facility]. They are willing to accept pending insurance approval. She will submit.

## 2023-01-27 NOTE — PT/OT/SLP PROGRESS
Occupational Therapy  Treatment    Quincy Triplett   MRN: [id number]   Admitting Diagnosis: COVID-19    OT Date of Treatment: 01/27/23   OT Start Time: 1018  OT Stop Time: 1045  OT Total Time (min): 27 min     Billable Minutes:  Self Care/Home Management 12 and Therapeutic Activity 15  Total Minutes: 27     OT/IZAIAH: IZAIAH     IZAIAH Visit Number: 1    General Precautions: Standard, fall  Orthopedic Precautions: N/A  Braces: N/A    Spiritual, Cultural Beliefs, Taoism Practices, Values that Affect Care: no    Subjective:  Communicated with nurse prior to session.    Objective:  Patient found with: telemetry, peripheral IV    Functional Mobility:    Transfer Training:   Functional mobility with straight cane to bathroom with CGA, patient fatigues easily.  Transfer to commode with Min A for sit to stand from commode.     Additional Treatment:  Educated on proper and safe supine to sit with rolling and side lying with Min A.  Sit to stand with proper technique 2 sets of 5 reps with Min A/CGA.  Issued green theraband for UE and LE strengthening.    Patient left up in chair with all lines intact, call button in reach, and PT present    ASSESSMENT:  Quincy Triplett is a 82 y.o. male with a medical diagnosis of COVID-19 and presents with decreased AX tolerance, decreased strength, decreased mobility and decreased ADL skills.    Rehab potential is good    Activity tolerance: Good    Discharge recommendations: rehabilitation facility     Equipment recommendations: cane, quad     GOALS:   Multidisciplinary Problems       Occupational Therapy Goals          Problem: Occupational Therapy    Goal Priority Disciplines Outcome Interventions   Occupational Therapy Goal     OT, PT/OT Ongoing, Progressing    Description: Goals to be met by:  2/9/2023    Patient will increase functional independence with ADLs by performing:    LE Dressing with Modified [place].  Grooming while standing with Modified [place].  Toileting from  toilet with Modified [place] for hygiene and clothing management.   Toilet transfer to toilet with Modified [place].                         Plan:  Patient to be seen 5 x/week to address the above listed problems via self-care/home management, therapeutic activities, therapeutic exercises  Plan of Care expires:    Plan of Care reviewed with: patient, family    01/27/2023

## 2023-01-27 NOTE — PLAN OF CARE
Problem: Physical Therapy  Goal: Physical Therapy Goal  Description: Goals to be met by: 23     Patient will increase functional independence with mobility by performin. Supine to sit with [place]  2. Sit to supine with [place]  3. Sit to stand transfer with Modified [place]  4. Gait  x 200 feet with Modified [place] using Quad Cane.     Outcome: Ongoing, Progressing

## 2023-01-28 LAB — POCT GLUCOSE: 99 MG/DL (ref 70–110)

## 2023-01-28 PROCEDURE — C9113 INJ PANTOPRAZOLE SODIUM, VIA: HCPCS | Performed by: INTERNAL MEDICINE

## 2023-01-28 PROCEDURE — 63600175 PHARM REV CODE 636 W HCPCS: Performed by: GENERAL PRACTICE

## 2023-01-28 PROCEDURE — 25000003 PHARM REV CODE 250: Performed by: PHYSICIAN ASSISTANT

## 2023-01-28 PROCEDURE — 25000003 PHARM REV CODE 250: Performed by: INTERNAL MEDICINE

## 2023-01-28 PROCEDURE — 21400001 HC TELEMETRY ROOM

## 2023-01-28 PROCEDURE — 25000003 PHARM REV CODE 250: Performed by: GENERAL PRACTICE

## 2023-01-28 PROCEDURE — A4216 STERILE WATER/SALINE, 10 ML: HCPCS | Performed by: INTERNAL MEDICINE

## 2023-01-28 PROCEDURE — 97116 GAIT TRAINING THERAPY: CPT | Mod: CQ

## 2023-01-28 PROCEDURE — 63600175 PHARM REV CODE 636 W HCPCS: Performed by: INTERNAL MEDICINE

## 2023-01-28 RX ADMIN — MIRTAZAPINE 15 MG: 15 TABLET, FILM COATED ORAL at 08:01

## 2023-01-28 RX ADMIN — PANTOPRAZOLE SODIUM 40 MG: 40 INJECTION, POWDER, FOR SOLUTION INTRAVENOUS at 09:01

## 2023-01-28 RX ADMIN — PIPERACILLIN AND TAZOBACTAM 4.5 G: 4; .5 INJECTION, POWDER, FOR SOLUTION INTRAVENOUS; PARENTERAL at 09:01

## 2023-01-28 RX ADMIN — ZINC SULFATE 220 MG (50 MG) CAPSULE 220 MG: CAPSULE at 09:01

## 2023-01-28 RX ADMIN — SODIUM CHLORIDE, PRESERVATIVE FREE 10 ML: 5 INJECTION INTRAVENOUS at 08:01

## 2023-01-28 RX ADMIN — PIPERACILLIN AND TAZOBACTAM 4.5 G: 4; .5 INJECTION, POWDER, FOR SOLUTION INTRAVENOUS; PARENTERAL at 05:01

## 2023-01-28 RX ADMIN — SODIUM BICARBONATE 650 MG: 650 TABLET ORAL at 09:01

## 2023-01-28 RX ADMIN — TAMSULOSIN HYDROCHLORIDE 0.4 MG: 0.4 CAPSULE ORAL at 09:01

## 2023-01-28 RX ADMIN — SODIUM BICARBONATE 650 MG: 650 TABLET ORAL at 08:01

## 2023-01-28 RX ADMIN — PANTOPRAZOLE SODIUM 40 MG: 40 INJECTION, POWDER, FOR SOLUTION INTRAVENOUS at 08:01

## 2023-01-28 RX ADMIN — OXYCODONE HYDROCHLORIDE 5 MG: 5 TABLET ORAL at 06:01

## 2023-01-28 RX ADMIN — Medication 10 ML: at 09:01

## 2023-01-28 NOTE — PROGRESS NOTES
[facility] Hospital Medicine Progress Note        Chief Complaint: Inpatient Follow-up for FTT      HPI:   Quincy Triplett is a 82 y.o. male with a past medical history of essential hypertension, hyperlipidemia, CAD, obstructive jaundice, and duodenal cancer presented to [facility] on 1/11/2023 for weakness,  decreased appetite, decreased urine output, and nausea began 3 days ago. Patient reports recent biliary drain placement on 12/21/2022 with Dr. Brown and was discharged 3 days ago.  Patient states he has whipple procedure scheduled soon by Dr. Brown. Denied fever, chills, cough, congestion, chest pain, shortness of breath, abdominal pain, vomiting, diarrhea, hematuria, and dysuria.     Initial vital signs in ED were /78, pulse 85, respirations 18, temperature 36.6° C, and SpO2 98%.  Labs revealed WBC 7.9, RBC 4, hemoglobin 11.6, hematocrit 35.6, sodium 130, chloride 108, CO2 11, BUN 42.3, creatinine 2.79, phosphorus 5.2, alkaline phosphatase 376, albumin 2.6, bilirubin total 13.7, AST 76, , lipase 85, and troponin undetectable.  Flu testing was negative.  COVID testing was positive.  UA revealed 1+ protein, cloudy appearance, positive nitrate, 3+ bilirubin, 1+ leukocytes, and 7 RBCs.  Patient was given 1 L NS, IV calcium gluconate 1 g, 5 units insulin IV, sodium bicarb 50 mEq, and Lokelma 10 g. Patient was admitted to hospital medicine service for further medical management.     Surgical oncology evaluated the patient recommended to postpone the surgery given overall decline in the functional status with COVID-19 infection and metabolic acidosis from renal failure. Patient was initiated on bicarb drip for metabolic acidosis.  Nephrology was consulted for metabolic acidosis renal failure.  Recommended IV fluids, encourage p.o. intake to compensate losses.  Attempts to internalize biliary drain were unsuccessful on 01/06/2023.  GI was consulted for elevated bilirubin, as  patient had biliary drain with good output recommended conservative management.  GI had reconsulted given positive FOBT with slowly drifting hemoglobin.  EGD 01/19/2023 revealed hemorrhagic duodenopathy, acquired duodenal stenosis.  Hemostatic spray was deployed, no specimens were collected.  Stomach and esophagus was normal.  1 unit of PRBC was transfused due to anemia.  Surgical oncology re-evaluated and recommended outpatient follow up for Whipple surgery.  Initial plan was to discharge to home with home health but patient reported generalized weakness and PT recommended rehab.  Discharge was held and currently awaiting placement.  Zosyn was continued while inpatient with plan to stop antibiotics on 01/29/2023.      Interval Hx:     No acute events overnight.  He was alert, comfortably resting.  Hemodynamically stable.  Tolerating diet, moving bowels.  Brother was at bedside.  Reports feeling stronger with therapy.  Objective/physical exam:  Vitals:    01/27/23 1527 01/27/23 1833 01/27/23 1933 01/27/23 2314   BP: 122/76  109/66 118/66   BP Location:       Patient Position: Lying      Pulse: 75  84 74   Resp: 18 18 18 17   Temp: 97.9 °F (36.6 °C)  97.9 °F (36.6 °C) 97.7 °F (36.5 °C)   TempSrc: Oral  Oral Oral   SpO2: 98%  99% 98%   Weight:       Height:         General: In no acute distress, afebrile  Respiratory: Clear to auscultation bilaterally  Cardiovascular: S1, S2, no appreciable murmur  Abdomen: Soft, nontender, BS +  Extremities:  Right arm amputated, left lower extremity swelling  Neurologic: Alert and oriented x4, moving all extremities with good strength     Lab Results   Component Value Date     01/23/2023    K 3.2 (L) 01/23/2023    CO2 23 01/23/2023    BUN 28.5 (H) 01/23/2023    CREATININE 1.80 (H) 01/23/2023    CALCIUM 8.3 (L) 01/23/2023    EGFRNONAA >60 05/23/2022      Lab Results   Component Value Date    ALT 65 (H) 01/23/2023    AST 62 (H) 01/23/2023    ALKPHOS 156 (H) 01/23/2023    BILITOT  7.8 (H) 01/23/2023      Lab Results   Component Value Date    WBC 7.0 01/23/2023    HGB 8.8 (L) 01/23/2023    HCT 25.9 (L) 01/23/2023    MCV 85.8 01/23/2023     01/23/2023           Medications:   ergocalciferol  50,000 Units Oral Q7 Days    mirtazapine  15 mg Oral QHS    pantoprazole  40 mg Intravenous BID    piperacillin-tazobactam (ZOSYN) IVPB  4.5 g Intravenous Q8H    sodium bicarbonate  650 mg Oral BID    sodium chloride 0.9%  10 mL Intravenous Q6H    tamsulosin  0.4 mg Oral Daily    zinc sulfate  220 mg Oral Daily      sodium chloride, sodium chloride, acetaminophen, albuterol, bisacodyL, dextrose 10%, dextrose 10%, docusate sodium, meclizine, ondansetron, ondansetron, oxyCODONE, prochlorperazine, Flushing PICC Protocol **AND** sodium chloride 0.9% **AND** sodium chloride 0.9%     Assessment/Plan:    COVID-19 infection-(vaccinated)  TERRENCE -prerenal etiology from poor p.o. intake, high biliary drain output   Acute severe Metabolic acidosis due to renal failure  Acute on chronic Blood loss anemia , hemorrhagic duodenopathy  Hypovolemic Hyponatremia   Obstructive jaundice s/p biliary stent placement 12/21/2022, failed internalization 01/06/2023  Proctitis  Failure to thrive     HX:  Duodenal cancer, PVD, right arm amputation, CAD    Plan:  - Zosyn (end date 1/29/23) for proctitis.     -Continue biliary drain.  Surgery recommended outpatient evaluation for Whipple's.  -Continue Protonix.  GI signed off.  Monitor while inpatient  -stable from COVID-19 standpoint.  Completed remdesivir for 3 days.  Continue supportive care  -left lower extremity Doppler ruled out DVT.  - Continue p.o. bicarb.  -continue therapy.  Other home medications were reviewed   - awaiting insurance approval for rehab          Bridger Rivera MD

## 2023-01-28 NOTE — PT/OT/SLP PROGRESS
Physical Therapy Treatment    Patient Name:  Quincy Triplett   MRN:  [id number]    Recommendations:     Discharge Recommendations:  rehabilitation facility   Discharge Equipment Recommendations: cane, quad     Subjective     Patient awake and alert.     Objective:     General Precautions: Standard, fall   Orthopedic Precautions:N/A   Braces:    Respiratory Status: Room air     Communicated with nurse prior to treatment.     Functional Mobility:    Rolling:Stand-by Assistance  Supine to sit:Stand-by Assistance  Sit to stand transfer: Stand-by Assistance  Bed to chair transfer:Stand-by Assistance  Gait 100 ft with SC min assist.       AM-PAC 6 CLICK MOBILITY        Patient left up in chair with all lines intact and call button in reach.    GOALS:   Multidisciplinary Problems       Physical Therapy Goals          Problem: Physical Therapy    Goal Priority Disciplines Outcome Goal Variances Interventions   Physical Therapy Goal     PT, PT/OT Ongoing, Progressing     Description: Goals to be met by: 23     Patient will increase functional independence with mobility by performin. Supine to sit with [place]  2. Sit to supine with [place]  3. Sit to stand transfer with Modified [place]  4. Gait  x 200 feet with Modified [place] using Quad Cane.                          Assessment:     Quincy Triplett is a 82 y.o. male admitted with a medical diagnosis of COVID-19.     Patient Active Problem List   Diagnosis    Jaundice    Gastric outlet obstruction    COVID-19        Rehab Prognosis: Good; patient would benefit from acute skilled PT services to address these deficits and reach maximum level of function.    Recent Surgery: Procedure(s) (LRB):  EGD (N/A)  EGD,WITH HEMORRHAGE CONTROL 9 Days Post-Op    Plan:     During this hospitalization, patient to be seen 6 x/week to address the identified rehab impairments via gait training, therapeutic activities, therapeutic exercises, neuromuscular  re-education and progress toward the following goals:    Plan of Care Expires:  02/20/23    Billable Minutes     Billable Minutes: Gait Training 15    Treatment Type: Treatment  PT/PTA: PTA     PTA Visit Number: 1     01/28/2023

## 2023-01-29 LAB — POCT GLUCOSE: 85 MG/DL (ref 70–110)

## 2023-01-29 PROCEDURE — 25000003 PHARM REV CODE 250: Performed by: PHYSICIAN ASSISTANT

## 2023-01-29 PROCEDURE — 25000003 PHARM REV CODE 250: Performed by: INTERNAL MEDICINE

## 2023-01-29 PROCEDURE — 63600175 PHARM REV CODE 636 W HCPCS: Performed by: GENERAL PRACTICE

## 2023-01-29 PROCEDURE — 25000003 PHARM REV CODE 250: Performed by: GENERAL PRACTICE

## 2023-01-29 PROCEDURE — C9113 INJ PANTOPRAZOLE SODIUM, VIA: HCPCS | Performed by: INTERNAL MEDICINE

## 2023-01-29 PROCEDURE — 21400001 HC TELEMETRY ROOM

## 2023-01-29 PROCEDURE — 63600175 PHARM REV CODE 636 W HCPCS: Performed by: INTERNAL MEDICINE

## 2023-01-29 RX ADMIN — MIRTAZAPINE 15 MG: 15 TABLET, FILM COATED ORAL at 08:01

## 2023-01-29 RX ADMIN — SODIUM BICARBONATE 650 MG: 650 TABLET ORAL at 08:01

## 2023-01-29 RX ADMIN — PIPERACILLIN AND TAZOBACTAM 4.5 G: 4; .5 INJECTION, POWDER, FOR SOLUTION INTRAVENOUS; PARENTERAL at 05:01

## 2023-01-29 RX ADMIN — PIPERACILLIN AND TAZOBACTAM 4.5 G: 4; .5 INJECTION, POWDER, FOR SOLUTION INTRAVENOUS; PARENTERAL at 12:01

## 2023-01-29 RX ADMIN — TAMSULOSIN HYDROCHLORIDE 0.4 MG: 0.4 CAPSULE ORAL at 09:01

## 2023-01-29 RX ADMIN — PANTOPRAZOLE SODIUM 40 MG: 40 INJECTION, POWDER, FOR SOLUTION INTRAVENOUS at 08:01

## 2023-01-29 RX ADMIN — ZINC SULFATE 220 MG (50 MG) CAPSULE 220 MG: CAPSULE at 09:01

## 2023-01-29 RX ADMIN — SODIUM BICARBONATE 650 MG: 650 TABLET ORAL at 09:01

## 2023-01-29 RX ADMIN — OXYCODONE HYDROCHLORIDE 5 MG: 5 TABLET ORAL at 05:01

## 2023-01-29 RX ADMIN — PIPERACILLIN AND TAZOBACTAM 4.5 G: 4; .5 INJECTION, POWDER, FOR SOLUTION INTRAVENOUS; PARENTERAL at 08:01

## 2023-01-29 RX ADMIN — PANTOPRAZOLE SODIUM 40 MG: 40 INJECTION, POWDER, FOR SOLUTION INTRAVENOUS at 09:01

## 2023-01-29 NOTE — PROGRESS NOTES
[facility] Hospital Medicine Progress Note        Chief Complaint: Inpatient Follow-up for FTT      HPI:  Quincy Triplett is a 82 y.o. male with a past medical history of essential hypertension, hyperlipidemia, CAD, obstructive jaundice, and duodenal cancer presented to [facility] on 1/11/2023 for weakness,  decreased appetite, decreased urine output, and nausea began 3 days ago. Patient reports recent biliary drain placement on 12/21/2022 with Dr. Brown and was discharged 3 days ago.  Patient states he has whipple procedure scheduled soon by Dr. Brown. Denied fever, chills, cough, congestion, chest pain, shortness of breath, abdominal pain, vomiting, diarrhea, hematuria, and dysuria.   Initial vital signs in ED were /78, pulse 85, respirations 18, temperature 36.6° C, and SpO2 98%.  Labs revealed WBC 7.9, RBC 4, hemoglobin 11.6, hematocrit 35.6, sodium 130, chloride 108, CO2 11, BUN 42.3, creatinine 2.79, phosphorus 5.2, alkaline phosphatase 376, albumin 2.6, bilirubin total 13.7, AST 76, , lipase 85, and troponin undetectable.  Flu testing was negative.  COVID testing was positive.  UA revealed 1+ protein, cloudy appearance, positive nitrate, 3+ bilirubin, 1+ leukocytes, and 7 RBCs.  Patient was given 1 L NS, IV calcium gluconate 1 g, 5 units insulin IV, sodium bicarb 50 mEq, and Lokelma 10 g. Patient was admitted to hospital medicine service for further medical management.   Surgical oncology evaluated the patient recommended to postpone the surgery given overall decline in the functional status with COVID-19 infection and metabolic acidosis from renal failure. Patient was initiated on bicarb drip for metabolic acidosis.  Nephrology was consulted for metabolic acidosis renal failure.  Recommended IV fluids, encourage p.o. intake to compensate losses.  Attempts to internalize biliary drain were unsuccessful on 01/06/2023.  GI was consulted for elevated bilirubin, as  patient had biliary drain with good output recommended conservative management.  GI had reconsulted given positive FOBT with slowly drifting hemoglobin.  EGD 01/19/2023 revealed hemorrhagic duodenopathy, acquired duodenal stenosis.  Hemostatic spray was deployed, no specimens were collected.  Stomach and esophagus was normal.  1 unit of PRBC was transfused due to anemia.  Surgical oncology re-evaluated and recommended outpatient follow up for Whipple surgery.  Initial plan was to discharge to home with home health but patient reported generalized weakness and PT recommended rehab.  Discharge was held and currently awaiting placement.  Zosyn was continued while inpatient with plan to stop antibiotics on 01/29/2023.      Interval Hx:   Sitting at the side of the bed.  No complain.  Denied any pain, nausea, constipation or diarrhea.  Reported if rehab will not accept him by tomorrow, he is going home  Family is at bedside  Case was discussed with patient nurse on the floor    Objective/physical exam:  Vitals:    01/28/23 1955 01/28/23 2322 01/29/23 0430 01/29/23 0706   BP: 126/71 126/72 (!) 142/66 137/68   Pulse: 74 76 79 75   Resp: 18 18 18 18   Temp: 98 °F (36.7 °C) 97.5 °F (36.4 °C) 97.3 °F (36.3 °C) 97.6 °F (36.4 °C)   TempSrc: Oral Oral Oral Oral   SpO2: 98% 97% 98% 97%   Weight:       Height:         General: In no acute distress, afebrile, jaundiced with scleral icterus  Respiratory: Clear to auscultation bilaterally to the bases, on room air  Cardiovascular: S1, S2, no appreciable murmur  Abdomen: Soft, nontender, BS +  Extremities:  Right arm amputated, left lower extremity swelling  Neurologic: Alert and oriented x4    Lab Results   Component Value Date     01/23/2023    K 3.2 (L) 01/23/2023    CO2 23 01/23/2023    BUN 28.5 (H) 01/23/2023    CREATININE 1.80 (H) 01/23/2023    CALCIUM 8.3 (L) 01/23/2023    EGFRNONAA >60 05/23/2022      Lab Results   Component Value Date    ALT 65 (H) 01/23/2023    AST 62  (H) 01/23/2023    ALKPHOS 156 (H) 01/23/2023    BILITOT 7.8 (H) 01/23/2023      Lab Results   Component Value Date    WBC 7.0 01/23/2023    HGB 8.8 (L) 01/23/2023    HCT 25.9 (L) 01/23/2023    MCV 85.8 01/23/2023     01/23/2023           Medications:   ergocalciferol  50,000 Units Oral Q7 Days    mirtazapine  15 mg Oral QHS    pantoprazole  40 mg Intravenous BID    piperacillin-tazobactam (ZOSYN) IVPB  4.5 g Intravenous Q8H    sodium bicarbonate  650 mg Oral BID    sodium chloride 0.9%  10 mL Intravenous Q6H    tamsulosin  0.4 mg Oral Daily    zinc sulfate  220 mg Oral Daily      sodium chloride, sodium chloride, acetaminophen, albuterol, bisacodyL, dextrose 10%, dextrose 10%, docusate sodium, meclizine, ondansetron, ondansetron, oxyCODONE, prochlorperazine, Flushing PICC Protocol **AND** sodium chloride 0.9% **AND** sodium chloride 0.9%       Assessment/Plan:  COVID-19 infection-(vaccinated)- off of isolation   TERRENCE -prerenal etiology from poor p.o. intake, high biliary drain output   Acute severe Metabolic acidosis due to renal failure- resolved   Acute on chronic blood loss anemia, hemorrhagic duodenopathy  Hypovolemic Hyponatremia - resolved   Hypokalemia- replaced   Obstructive jaundice s/p biliary stent placement 12/21/2022, failed internalization 01/06/2023  Proctitis  Failure to thrive   Severe malnutrition  HX:  Duodenal cancer, PVD, right arm amputation, CAD      Admitted as inpatient on 01/12/2023  ID evaluated the pt, Continue Zosyn (end date 1/29/23) for proctitis  Continue biliary drain.  Surgical oncologist recommended outpatient evaluation for Whipple's  Continue Protonix.  GI signed off.  Monitor Hb while inpatient  Stable from COVID-19 standpoint.  Completed remdesivir for 3 days.  Continue supportive care, now off of isolation and on RA  1/23- left lower extremity Doppler was negative for DVT  Continue p.o. bicarb 650mg BID   Continue therapy.    Home medications were reviewed  and  appropriate meds resumed on admission  Awaiting insurance approval for rehab   Morning CBC, CMP ordered      VTE prophylaxis:  SCD    Patient condition:  Fair     Anticipated discharge and Disposition:   Rehab, awaiting auth    All diagnosis and differential diagnosis have been reviewed; assessment and plan has been documented; I have personally reviewed the labs and test results that are presently available; I have reviewed the patients medication list; I have reviewed the consulting providers response and recommendations. I have reviewed or attempted to review medical records based upon their availability    All of the patient's questions have been  addressed and answered. Patient's is agreeable to the above stated plan. I will continue to monitor closely and make adjustments to medical management as needed.  _______________________________________________________________________    CV Ultrasound doppler venous DVT leg left  There was no evidence of deep or superficial vein thrombosis in the left   lower extremity.       José Manuel Kirk MD  Department of Hospital Medicine   [facility]   01/29/2023

## 2023-01-30 LAB
ALBUMIN SERPL-MCNC: 1.7 G/DL (ref 3.4–4.8)
ALBUMIN/GLOB SERPL: 0.6 RATIO (ref 1.1–2)
ALP SERPL-CCNC: 141 UNIT/L (ref 40–150)
ALT SERPL-CCNC: 68 UNIT/L (ref 0–55)
AST SERPL-CCNC: 55 UNIT/L (ref 5–34)
BILIRUBIN DIRECT+TOT PNL SERPL-MCNC: 4.3 MG/DL
BUN SERPL-MCNC: 23.1 MG/DL (ref 8.4–25.7)
CALCIUM SERPL-MCNC: 8.2 MG/DL (ref 8.8–10)
CHLORIDE SERPL-SCNC: 107 MMOL/L (ref 98–107)
CO2 SERPL-SCNC: 19 MMOL/L (ref 23–31)
CREAT SERPL-MCNC: 1.43 MG/DL (ref 0.73–1.18)
ERYTHROCYTE [DISTWIDTH] IN BLOOD BY AUTOMATED COUNT: 17.2 % (ref 11.5–17)
GFR SERPLBLD CREATININE-BSD FMLA CKD-EPI: 49 MLS/MIN/1.73/M2
GLOBULIN SER-MCNC: 2.9 GM/DL (ref 2.4–3.5)
GLUCOSE SERPL-MCNC: 88 MG/DL (ref 82–115)
HCT VFR BLD AUTO: 26.2 % (ref 42–52)
HGB BLD-MCNC: 8.6 GM/DL (ref 14–18)
MCH RBC QN AUTO: 29.6 PG
MCHC RBC AUTO-ENTMCNC: 32.8 MG/DL (ref 33–36)
MCV RBC AUTO: 90 FL (ref 80–94)
NRBC BLD AUTO-RTO: 0 %
PLATELET # BLD AUTO: 300 X10(3)/MCL (ref 130–400)
PMV BLD AUTO: 9.2 FL (ref 7.4–10.4)
POCT GLUCOSE: 100 MG/DL (ref 70–110)
POCT GLUCOSE: 106 MG/DL (ref 70–110)
POTASSIUM SERPL-SCNC: 3 MMOL/L (ref 3.5–5.1)
PROT SERPL-MCNC: 4.6 GM/DL (ref 5.8–7.6)
RBC # BLD AUTO: 2.91 X10(6)/MCL (ref 4.7–6.1)
SODIUM SERPL-SCNC: 141 MMOL/L (ref 136–145)
WBC # SPEC AUTO: 6.2 X10(3)/MCL (ref 4.5–11.5)

## 2023-01-30 PROCEDURE — A4216 STERILE WATER/SALINE, 10 ML: HCPCS | Performed by: INTERNAL MEDICINE

## 2023-01-30 PROCEDURE — 25000003 PHARM REV CODE 250: Performed by: INTERNAL MEDICINE

## 2023-01-30 PROCEDURE — 97530 THERAPEUTIC ACTIVITIES: CPT | Mod: CO

## 2023-01-30 PROCEDURE — 21400001 HC TELEMETRY ROOM

## 2023-01-30 PROCEDURE — 97110 THERAPEUTIC EXERCISES: CPT

## 2023-01-30 PROCEDURE — 97530 THERAPEUTIC ACTIVITIES: CPT

## 2023-01-30 PROCEDURE — 80053 COMPREHEN METABOLIC PANEL: CPT | Performed by: INTERNAL MEDICINE

## 2023-01-30 PROCEDURE — 25000003 PHARM REV CODE 250: Performed by: PHYSICIAN ASSISTANT

## 2023-01-30 PROCEDURE — 25000003 PHARM REV CODE 250: Performed by: GENERAL PRACTICE

## 2023-01-30 PROCEDURE — C9113 INJ PANTOPRAZOLE SODIUM, VIA: HCPCS | Performed by: INTERNAL MEDICINE

## 2023-01-30 PROCEDURE — 85027 COMPLETE CBC AUTOMATED: CPT | Performed by: INTERNAL MEDICINE

## 2023-01-30 PROCEDURE — 63600175 PHARM REV CODE 636 W HCPCS: Performed by: GENERAL PRACTICE

## 2023-01-30 PROCEDURE — 63600175 PHARM REV CODE 636 W HCPCS: Performed by: INTERNAL MEDICINE

## 2023-01-30 PROCEDURE — 97110 THERAPEUTIC EXERCISES: CPT | Mod: CO

## 2023-01-30 RX ORDER — POTASSIUM CHLORIDE 20 MEQ/1
40 TABLET, EXTENDED RELEASE ORAL EVERY 6 HOURS
Status: COMPLETED | OUTPATIENT
Start: 2023-01-30 | End: 2023-01-30

## 2023-01-30 RX ADMIN — PANTOPRAZOLE SODIUM 40 MG: 40 INJECTION, POWDER, FOR SOLUTION INTRAVENOUS at 09:01

## 2023-01-30 RX ADMIN — PANTOPRAZOLE SODIUM 40 MG: 40 INJECTION, POWDER, FOR SOLUTION INTRAVENOUS at 11:01

## 2023-01-30 RX ADMIN — SODIUM CHLORIDE, PRESERVATIVE FREE 10 ML: 5 INJECTION INTRAVENOUS at 12:01

## 2023-01-30 RX ADMIN — PIPERACILLIN AND TAZOBACTAM 4.5 G: 4; .5 INJECTION, POWDER, FOR SOLUTION INTRAVENOUS; PARENTERAL at 04:01

## 2023-01-30 RX ADMIN — SODIUM CHLORIDE, PRESERVATIVE FREE 10 ML: 5 INJECTION INTRAVENOUS at 06:01

## 2023-01-30 RX ADMIN — MIRTAZAPINE 15 MG: 15 TABLET, FILM COATED ORAL at 11:01

## 2023-01-30 RX ADMIN — SODIUM BICARBONATE 650 MG: 650 TABLET ORAL at 09:01

## 2023-01-30 RX ADMIN — ZINC SULFATE 220 MG (50 MG) CAPSULE 220 MG: CAPSULE at 09:01

## 2023-01-30 RX ADMIN — POTASSIUM CHLORIDE 40 MEQ: 1500 TABLET, EXTENDED RELEASE ORAL at 11:01

## 2023-01-30 RX ADMIN — POTASSIUM CHLORIDE 40 MEQ: 1500 TABLET, EXTENDED RELEASE ORAL at 06:01

## 2023-01-30 RX ADMIN — SODIUM BICARBONATE 650 MG: 650 TABLET ORAL at 11:01

## 2023-01-30 RX ADMIN — PIPERACILLIN AND TAZOBACTAM 4.5 G: 4; .5 INJECTION, POWDER, FOR SOLUTION INTRAVENOUS; PARENTERAL at 12:01

## 2023-01-30 RX ADMIN — TAMSULOSIN HYDROCHLORIDE 0.4 MG: 0.4 CAPSULE ORAL at 09:01

## 2023-01-30 RX ADMIN — OXYCODONE HYDROCHLORIDE 5 MG: 5 TABLET ORAL at 06:01

## 2023-01-30 NOTE — PROGRESS NOTES
[facility] Hospital Medicine Progress Note        Chief Complaint: Inpatient Follow-up for FTT      HPI:  Quincy Triplett is a 82 y.o. male with a past medical history of essential hypertension, hyperlipidemia, CAD, obstructive jaundice, and duodenal cancer presented to [facility] on 1/11/2023 for weakness,  decreased appetite, decreased urine output, and nausea began 3 days ago. Patient reports recent biliary drain placement on 12/21/2022 with Dr. Brown and was discharged 3 days ago.  Patient states he has whipple procedure scheduled soon by Dr. Brown. Denied fever, chills, cough, congestion, chest pain, shortness of breath, abdominal pain, vomiting, diarrhea, hematuria, and dysuria.   Initial vital signs in ED were /78, pulse 85, respirations 18, temperature 36.6° C, and SpO2 98%.  Labs revealed WBC 7.9, RBC 4, hemoglobin 11.6, hematocrit 35.6, sodium 130, chloride 108, CO2 11, BUN 42.3, creatinine 2.79, phosphorus 5.2, alkaline phosphatase 376, albumin 2.6, bilirubin total 13.7, AST 76, , lipase 85, and troponin undetectable.  Flu testing was negative.  COVID testing was positive.  UA revealed 1+ protein, cloudy appearance, positive nitrate, 3+ bilirubin, 1+ leukocytes, and 7 RBCs.  Patient was given 1 L NS, IV calcium gluconate 1 g, 5 units insulin IV, sodium bicarb 50 mEq, and Lokelma 10 g. Patient was admitted to hospital medicine service for further medical management.   Surgical oncology evaluated the patient recommended to postpone the surgery given overall decline in the functional status with COVID-19 infection and metabolic acidosis from renal failure. Patient was initiated on bicarb drip for metabolic acidosis.  Nephrology was consulted for metabolic acidosis renal failure.  Recommended IV fluids, encourage p.o. intake to compensate losses.  Attempts to internalize biliary drain were unsuccessful on 01/06/2023.  GI was consulted for elevated bilirubin, as  patient had biliary drain with good output recommended conservative management.  GI had reconsulted given positive FOBT with slowly drifting hemoglobin.  EGD 01/19/2023 revealed hemorrhagic duodenopathy, acquired duodenal stenosis.  Hemostatic spray was deployed, no specimens were collected.  Stomach and esophagus was normal.  1 unit of PRBC was transfused due to anemia.  Surgical oncology re-evaluated and recommended outpatient follow up for Whipple surgery.  Initial plan was to discharge to home with home health but patient reported generalized weakness and PT recommended rehab.  Discharge was held and currently awaiting placement.  Zosyn was continued while inpatient with plan to stop antibiotics on 01/29/2023.      Interval Hx:   Sitting at the side of the bed.  No complain.  Discussed hsi labs findings as he wanted to know   Family is at bedside, informed they just spoke with the rehab and they are awaiting response form his insurance   I answered all their questions to the best of my knowledge in their satisfaction  Case was discussed with patient nurse and  on the floor    Objective/physical exam:  Vitals:    01/29/23 1702 01/29/23 2008 01/29/23 2317 01/30/23 0410   BP:  122/72 132/69 (!) 142/72   Pulse:  (!) 58 73 64   Resp: 18 20 20 18   Temp:  98 °F (36.7 °C) 97.8 °F (36.6 °C) 98.4 °F (36.9 °C)   TempSrc:  Oral Oral Oral   SpO2:  98% 98% 99%   Weight:       Height:         General: In no acute distress, afebrile, jaundiced with scleral icterus, elderly male  Respiratory: Clear to auscultation bilaterally to the bases, on room air  Cardiovascular: S1, S2, no appreciable murmur  Abdomen: Soft, nontender, BS +, biliary drain present  Extremities:  Right arm amputated, left lower extremity swelling  Neurologic: Alert and oriented x4, cooperative    Lab Results   Component Value Date     01/23/2023    K 3.2 (L) 01/23/2023    CO2 23 01/23/2023    BUN 28.5 (H) 01/23/2023    CREATININE 1.80 (H)  01/23/2023    CALCIUM 8.3 (L) 01/23/2023    EGFRNONAA >60 05/23/2022      Lab Results   Component Value Date    ALT 65 (H) 01/23/2023    AST 62 (H) 01/23/2023    ALKPHOS 156 (H) 01/23/2023    BILITOT 7.8 (H) 01/23/2023      Lab Results   Component Value Date    WBC 6.2 01/30/2023    HGB 8.6 (L) 01/30/2023    HCT 26.2 (L) 01/30/2023    MCV 90.0 01/30/2023     01/30/2023           Medications:   ergocalciferol  50,000 Units Oral Q7 Days    mirtazapine  15 mg Oral QHS    pantoprazole  40 mg Intravenous BID    piperacillin-tazobactam (ZOSYN) IVPB  4.5 g Intravenous Q8H    sodium bicarbonate  650 mg Oral BID    sodium chloride 0.9%  10 mL Intravenous Q6H    tamsulosin  0.4 mg Oral Daily    zinc sulfate  220 mg Oral Daily      sodium chloride, sodium chloride, acetaminophen, albuterol, bisacodyL, dextrose 10%, dextrose 10%, docusate sodium, meclizine, ondansetron, ondansetron, oxyCODONE, prochlorperazine, Flushing PICC Protocol **AND** sodium chloride 0.9% **AND** sodium chloride 0.9%       Assessment/Plan:  COVID-19 infection-(vaccinated)- off of isolation now  TERRENCE -prerenal etiology from poor p.o. intake, high biliary drain output - improving   Acute severe Metabolic acidosis due to renal failure- improving   Acute on chronic blood loss anemia, hemorrhagic duodenopathy- Hb stable   Hypovolemic Hyponatremia - resolved   Hypokalemia  Obstructive jaundice s/p biliary stent placement 12/21/2022, failed internalization 01/06/2023, Elevated liver enzymes   Proctitis- being treated   Failure to thrive   Severe malnutrition  HX:  Duodenal cancer, PVD, right arm amputation, CAD      Admitted as inpatient on 01/12/2023  ID evaluated the pt, Completed 14 days course of Zosyn on 1/29/23 for proctitis per ID recs   Continue biliary drain.  Surgical oncologist recommended outpatient evaluation for Whipple's  Continue Protonix.  GI signed off.  Monitor Hb while inpatient- so far stable   Stable from COVID-19 standpoint.   Completed remdesivir for 3 days.  Continue supportive care, now off of isolation and on RA  1/23- left lower extremity Doppler was negative for DVT  Continue p.o. bicarb 650mg BID   K low at 3.0, ordered KCL 40 meq po q 6 x 2 doses   Cr improving   Continue therapy while in house     Home medications were reviewed  and appropriate meds resumed on admission  Awaiting insurance approval for rehab   Morning BMP ordered      VTE prophylaxis:  SCD    Patient condition:  Fair     Anticipated discharge and Disposition:   Rehab, awaiting auth    All diagnosis and differential diagnosis have been reviewed; assessment and plan has been documented; I have personally reviewed the labs and test results that are presently available; I have reviewed the patients medication list; I have reviewed the consulting providers response and recommendations. I have reviewed or attempted to review medical records based upon their availability    All of the patient's questions have been  addressed and answered. Patient's is agreeable to the above stated plan. I will continue to monitor closely and make adjustments to medical management as needed.  _______________________________________________________________________    CV Ultrasound doppler venous DVT leg left  There was no evidence of deep or superficial vein thrombosis in the left   lower extremity.       José Manuel Kirk MD  Department of Hospital Medicine   [facility]   01/30/2023

## 2023-01-30 NOTE — PT/OT/SLP PROGRESS
Occupational Therapy  Treatment    Quincy Triplett   MRN: [id number]   Admitting Diagnosis: COVID-19    OT Date of Treatment: 01/30/23   OT Start Time: 1500  OT Stop Time: 1530  OT Total Time (min): 30 min     Billable Minutes:  Therapeutic Activity 15 and Therapeutic Exercise 15  Total Minutes: 30     OT/IZAIAH: IZAIAH     IZAIAH Visit Number: 2    General Precautions: Standard, fall  Orthopedic Precautions: N/A  Braces: N/A    Spiritual, Cultural Beliefs, Episcopalian Practices, Values that Affect Care: no    Subjective:  Communicated with nurse prior to session.    Objective:  Patient found with: telemetry    Functional Mobility:  Bed Mobility:   Supine to sit: Standby Assistance   Sit to supine: Activity did not occur   Rolling: Activity did not occur   Scooting: Standby Assistance    Transfer Training:   Transfer bed to chair, chair to mat table and mat table to chair with CGA    Additional Treatment:  BUE there ex with 2 lbs 3 x 10 reps 3 exercises    Patient left up in chair with all lines intact and call button in reach    ASSESSMENT:  Quincy Triplett is a 82 y.o. male with a medical diagnosis of COVID-19 and presents with decreased strength, decreased AX tolerance and decreased mobility.    Rehab potential is good    Activity tolerance: Good    Discharge recommendations: rehabilitation facility     Equipment recommendations: cane, quad     GOALS:   Multidisciplinary Problems       Occupational Therapy Goals          Problem: Occupational Therapy    Goal Priority Disciplines Outcome Interventions   Occupational Therapy Goal     OT, PT/OT Ongoing, Progressing    Description: Goals to be met by:  2/9/2023    Patient will increase functional independence with ADLs by performing:    LE Dressing with Modified [place].  Grooming while standing with Modified [place].  Toileting from toilet with Modified [place] for hygiene and clothing management.   Toilet transfer to toilet with Modified [place].                          Plan:  Patient to be seen 5 x/week to address the above listed problems via self-care/home management, therapeutic activities, therapeutic exercises  Plan of Care expires:    Plan of Care reviewed with: patient         01/30/2023

## 2023-01-30 NOTE — PT/OT/SLP PROGRESS
Physical Therapy Treatment    Patient Name:  Quincy Triplett   MRN:  [id number]    Recommendations:     Discharge Recommendations: rehabilitation facility  Discharge Equipment Recommendations: cane, quad  Barriers to discharge:  safety awareness    Assessment:     Quincy Triplett is a 82 y.o. male admitted with a medical diagnosis of COVID-19.  He presents with the following impairments/functional limitations: weakness, impaired endurance, impaired self care skills, impaired functional mobility, gait instability, impaired balance, decreased safety awareness .    Rehab Prognosis: Good; patient would benefit from acute skilled PT services to address these deficits and reach maximum level of function.    Recent Surgery: Procedure(s) (LRB):  EGD (N/A)  EGD,WITH HEMORRHAGE CONTROL 11 Days Post-Op    Plan:     During this hospitalization, patient to be seen 6 x/week to address the identified rehab impairments via gait training, therapeutic activities, therapeutic exercises, neuromuscular re-education and progress toward the following goals:    Plan of Care Expires:  02/20/23    Subjective     Chief Complaint: none  Patient/Family Comments/goals: none  Pain/Comfort:  Pain Rating 1: 0/10      Objective:     Communicated with nurse prior to session.  Patient found up in chair with telemetry upon PT entry to room.     General Precautions: Standard, fall  Orthopedic Precautions: N/A  Braces: N/A  Respiratory Status: Room air     Functional Mobility:  Transfers:     Sit to Stand:  minimum assistance with quad cane  Gait: 63 ft + 62 ft with QC & CGA. Multiple standing rest breaks. 1 minor LOB  Balance: fair/poor      AM-PAC 6 CLICK MOBILITY  Turning over in bed (including adjusting bedclothes, sheets and blankets)?: 4  Sitting down on and standing up from a chair with arms (e.g., wheelchair, bedside commode, etc.): 3  Moving from lying on back to sitting on the side of the bed?: 3  Moving to and from a bed to a chair (including  a wheelchair)?: 3  Need to walk in hospital room?: 3  Climbing 3-5 steps with a railing?: 2  Basic Mobility Total Score: 18       Therex:   -Patient performed seated Marches, Heel Raises, LAQ with 3# weights on BLE. HS curls & hip ABD with green theraband. All performed 2 x 20 reps.       Patient left up in chair with all lines intact and call button in reach..    GOALS:   Multidisciplinary Problems       Physical Therapy Goals          Problem: Physical Therapy    Goal Priority Disciplines Outcome Goal Variances Interventions   Physical Therapy Goal     PT, PT/OT Ongoing, Progressing     Description: Goals to be met by: 23     Patient will increase functional independence with mobility by performin. Supine to sit with [place]  2. Sit to supine with [place]  3. Sit to stand transfer with Modified [place]  4. Gait  x 200 feet with Modified [place] using Quad Cane.                          Time Tracking:     PT Received On: 23  PT Start Time: 1032     PT Stop Time: 1100  PT Total Time (min): 28 min     Billable Minutes: Therapeutic Activity 14 minutes and Therapeutic Exercise 14 minutes    Treatment Type: Treatment  PT/PTA: PT     PTA Visit Number: 2     2023

## 2023-01-31 LAB
ANION GAP SERPL CALC-SCNC: 11 MEQ/L
BUN SERPL-MCNC: 18.5 MG/DL (ref 8.4–25.7)
CALCIUM SERPL-MCNC: 8.5 MG/DL (ref 8.8–10)
CHLORIDE SERPL-SCNC: 110 MMOL/L (ref 98–107)
CO2 SERPL-SCNC: 20 MMOL/L (ref 23–31)
CREAT SERPL-MCNC: 1.11 MG/DL (ref 0.73–1.18)
CREAT/UREA NIT SERPL: 17
GFR SERPLBLD CREATININE-BSD FMLA CKD-EPI: >60 MLS/MIN/1.73/M2
GLUCOSE SERPL-MCNC: 83 MG/DL (ref 82–115)
POCT GLUCOSE: 85 MG/DL (ref 70–110)
POTASSIUM SERPL-SCNC: 3.4 MMOL/L (ref 3.5–5.1)
SODIUM SERPL-SCNC: 141 MMOL/L (ref 136–145)

## 2023-01-31 PROCEDURE — 25000003 PHARM REV CODE 250: Performed by: INTERNAL MEDICINE

## 2023-01-31 PROCEDURE — A4216 STERILE WATER/SALINE, 10 ML: HCPCS | Performed by: INTERNAL MEDICINE

## 2023-01-31 PROCEDURE — C9113 INJ PANTOPRAZOLE SODIUM, VIA: HCPCS | Performed by: INTERNAL MEDICINE

## 2023-01-31 PROCEDURE — 80048 BASIC METABOLIC PNL TOTAL CA: CPT | Performed by: INTERNAL MEDICINE

## 2023-01-31 PROCEDURE — 97530 THERAPEUTIC ACTIVITIES: CPT

## 2023-01-31 PROCEDURE — 63600175 PHARM REV CODE 636 W HCPCS: Performed by: INTERNAL MEDICINE

## 2023-01-31 PROCEDURE — 97530 THERAPEUTIC ACTIVITIES: CPT | Mod: CO

## 2023-01-31 PROCEDURE — 21400001 HC TELEMETRY ROOM

## 2023-01-31 PROCEDURE — 25000003 PHARM REV CODE 250: Performed by: PHYSICIAN ASSISTANT

## 2023-01-31 PROCEDURE — 97535 SELF CARE MNGMENT TRAINING: CPT | Mod: CO

## 2023-01-31 RX ORDER — POTASSIUM CHLORIDE 20 MEQ/1
40 TABLET, EXTENDED RELEASE ORAL ONCE
Status: COMPLETED | OUTPATIENT
Start: 2023-01-31 | End: 2023-01-31

## 2023-01-31 RX ADMIN — TAMSULOSIN HYDROCHLORIDE 0.4 MG: 0.4 CAPSULE ORAL at 09:01

## 2023-01-31 RX ADMIN — SODIUM CHLORIDE, PRESERVATIVE FREE 10 ML: 5 INJECTION INTRAVENOUS at 06:01

## 2023-01-31 RX ADMIN — Medication 10 ML: at 08:01

## 2023-01-31 RX ADMIN — POTASSIUM CHLORIDE 40 MEQ: 1500 TABLET, EXTENDED RELEASE ORAL at 11:01

## 2023-01-31 RX ADMIN — MIRTAZAPINE 15 MG: 15 TABLET, FILM COATED ORAL at 08:01

## 2023-01-31 RX ADMIN — SODIUM BICARBONATE 650 MG: 650 TABLET ORAL at 09:01

## 2023-01-31 RX ADMIN — PANTOPRAZOLE SODIUM 40 MG: 40 INJECTION, POWDER, FOR SOLUTION INTRAVENOUS at 08:01

## 2023-01-31 RX ADMIN — SODIUM CHLORIDE, PRESERVATIVE FREE 10 ML: 5 INJECTION INTRAVENOUS at 03:01

## 2023-01-31 RX ADMIN — ZINC SULFATE 220 MG (50 MG) CAPSULE 220 MG: CAPSULE at 09:01

## 2023-01-31 RX ADMIN — PANTOPRAZOLE SODIUM 40 MG: 40 INJECTION, POWDER, FOR SOLUTION INTRAVENOUS at 09:01

## 2023-01-31 RX ADMIN — SODIUM BICARBONATE 650 MG: 650 TABLET ORAL at 08:01

## 2023-01-31 RX ADMIN — OXYCODONE HYDROCHLORIDE 5 MG: 5 TABLET ORAL at 08:01

## 2023-01-31 NOTE — PLAN OF CARE
Clinical updates sent to [facility] and spoke to Rose. Insurance authorization pending.

## 2023-01-31 NOTE — PT/OT/SLP PROGRESS
Occupational Therapy  Treatment    Quincy Triplett   MRN: [id number]   Admitting Diagnosis: COVID-19    OT Date of Treatment: 01/31/23   OT Start Time: 1414  OT Stop Time: 1458  OT Total Time (min): 44 min     Billable Minutes:  Self Care/Home Management 30 and Therapeutic Activity 14  Total Minutes: 44     OT/IZAIAH: IZAIAH     IZAIAH Visit Number: 3    General Precautions: Standard, fall  Orthopedic Precautions: N/A  Braces: N/A    Spiritual, Cultural Beliefs, Pentecostal Practices, Values that Affect Care: no    Subjective:  Communicated with nurse prior to session.    Objective:  Patient found with: telemetry    Functional Mobility:  Bed Mobility:   Supine to sit: Supervision or Set-up Assistance   Sit to supine: Supervision or Set-up Assistance   Rolling: Supervision or Set-up Assistance   Scooting: Supervision or Set-up Assistance    Transfer Training:   Functional mobility in room with CGA, transfer to and from toilet and to and from shower bench with Min A    Bathing:  Bathing with Mod A secondary to fatigue, verbal and tactile cues for safety    UE Dressing:  Min A to don hospital gown    LE Dressing:  Doff slippers with SBA, kim slippers following shower with Min A secondary to fatigue    Toileting:  Clothing manipulation with Min A    Additional Treatment:  Patient very motivated, participated well.    Patient left HOB elevated with all lines intact and call button in reach    ASSESSMENT:  Quincy Triplett is a 82 y.o. male with a medical diagnosis of COVID-19 and presents with decreased AX tolerance, decreased strength, decreased mobility and decreased ADL skills.    Rehab potential is good    Activity tolerance: Good    Discharge recommendations: rehabilitation facility     Equipment recommendations: cane, quad     GOALS:   Multidisciplinary Problems       Occupational Therapy Goals          Problem: Occupational Therapy    Goal Priority Disciplines Outcome Interventions   Occupational Therapy Goal     OT, PT/OT  Ongoing, Progressing    Description: Goals to be met by:  2/9/2023    Patient will increase functional independence with ADLs by performing:    LE Dressing with Modified [place].  Grooming while standing with Modified [place].  Toileting from toilet with Modified [place] for hygiene and clothing management.   Toilet transfer to toilet with Modified [place].                         Plan:  Patient to be seen 5 x/week to address the above listed problems via self-care/home management, therapeutic activities, therapeutic exercises  Plan of Care expires:    Plan of Care reviewed with: patient         01/31/2023

## 2023-01-31 NOTE — PT/OT/SLP PROGRESS
Physical Therapy Treatment    Patient Name:  Quincy Triplett   MRN:  [id number]    Recommendations:     Discharge Recommendations: rehabilitation facility  Discharge Equipment Recommendations: cane, quad  Barriers to discharge:  safety awareness    Assessment:     Quincy Triplett is a 82 y.o. male admitted with a medical diagnosis of COVID-19.  He presents with the following impairments/functional limitations: weakness, impaired endurance, impaired self care skills, impaired functional mobility, gait instability, impaired balance, decreased safety awareness, pain.     Rehab Prognosis: Good; patient would benefit from acute skilled PT services to address these deficits and reach maximum level of function.    Recent Surgery: Procedure(s) (LRB):  EGD (N/A)  EGD,WITH HEMORRHAGE CONTROL 12 Days Post-Op    Plan:     During this hospitalization, patient to be seen 6 x/week to address the identified rehab impairments via gait training, therapeutic activities, therapeutic exercises, neuromuscular re-education and progress toward the following goals:    Plan of Care Expires:  02/20/23    Subjective     Chief Complaint: none  Patient/Family Comments/goals: none  Pain/Comfort:  Pain Rating 1: 5/10  Location 1: abdomen  Pain Addressed 1: Distraction, Cessation of Activity  Pain Rating Post-Intervention 1: 5/10      Objective:     Communicated with nurse prior to session.  Patient found up in chair with telemetry upon PT entry to room.     General Precautions: Standard, fall  Orthopedic Precautions: N/A  Braces: N/A  Respiratory Status: Room air     Functional Mobility:  Transfers:     Sit to Stand:  stand by assistance and contact guard assistance with quad cane  Gait: 112 ft with QC & CGA  Balance: fair      AM-PAC 6 CLICK MOBILITY  Turning over in bed (including adjusting bedclothes, sheets and blankets)?: 4  Sitting down on and standing up from a chair with arms (e.g., wheelchair, bedside commode, etc.): 3  Moving from lying  on back to sitting on the side of the bed?: 3  Moving to and from a bed to a chair (including a wheelchair)?: 3  Need to walk in hospital room?: 3  Climbing 3-5 steps with a railing?: 2  Basic Mobility Total Score: 18     Theract:   -10 x 2 mini-squats. Rest between sets  -Lateral & backwards walking with CGA. Focus on upright posture.   -Ball toss with no AD. Focus on reaching outside [place].    Patient left up in chair with all lines intact, call button in reach, and friend present..    GOALS:   Multidisciplinary Problems       Physical Therapy Goals          Problem: Physical Therapy    Goal Priority Disciplines Outcome Goal Variances Interventions   Physical Therapy Goal     PT, PT/OT Ongoing, Progressing     Description: Goals to be met by: 23     Patient will increase functional independence with mobility by performin. Supine to sit with [place]  2. Sit to supine with [place]  3. Sit to stand transfer with Modified [place]  4. Gait  x 200 feet with Modified [place] using Quad Cane.                          Time Tracking:     PT Received On: 23  PT Start Time: 903     PT Stop Time: 935  PT Total Time (min): 32 min     Billable Minutes: Therapeutic Activity 32 minutes    Treatment Type: Treatment  PT/PTA: PT     PTA Visit Number: 3     2023

## 2023-01-31 NOTE — PROGRESS NOTES
"Inpatient Nutrition Assessment    Admit Date: 1/11/2023   Total duration of encounter: 20 days     Nutrition Recommendation/Prescription     Oral diet as tolerated.  Boost Glucose Control (provides 250 kcal, 14 g protein per serving).    Communication of Recommendations: reviewed with patient/caregiver    Nutrition Assessment     Malnutrition Assessment/Nutrition-Focused Physical Exam    Malnutrition in the context of chronic illness  Degree of Malnutrition: severe malnutrition  Energy Intake: < 75% of estimated energy requirement for >/= 1 month  Interpretation of Weight Loss: >7.5% in 3 months  Body Fat: severe depletion  Area of Body Fat Loss: orbital region  and upper arm region - triceps / biceps  Muscle Mass Loss: severe depletion  Area of Muscle Mass Loss: temple region - temporalis muscle and clavicle bone region - pectoralis major, deltoid, trapezius muscles  Fluid Accumulation: does not meet criteria  Edema: unable to obtain  Reduced  Strength: unable to obtain  A minimum of two characteristics is recommended for diagnosis of either severe or non-severe malnutrition.    Chart Review    Reason Seen: continuous nutrition monitoring, physician consult for "dehydration", and follow-up    Malnutrition Screening Tool Results   Have you recently lost weight without trying?: No  Have you been eating poorly because of a decreased appetite?: No   MST Score: 0     Diagnosis:  COVID-19 infection-(vaccinated)- off of isolation now  TERRENCE -prerenal etiology from poor p.o. intake, high biliary drain output - improving   Acute severe Metabolic acidosis due to renal failure- improving   Acute on chronic blood loss anemia, hemorrhagic duodenopathy- Hb stable   Hypovolemic Hyponatremia - resolved   Hypokalemia  Obstructive jaundice s/p biliary stent placement 12/21/2022, failed internalization 01/06/2023, Elevated liver enzymes   Proctitis- being treated   Failure to thrive   Severe malnutrition    Relevant Medical " History:   Essential hypertension  Hyperlipidemia  CAD  Obstructive jaundice   Duodenal cancer    Scheduled Meds:   ergocalciferol  50,000 Units Oral Q7 Days    mirtazapine  15 mg Oral QHS    pantoprazole  40 mg Intravenous BID    sodium bicarbonate  650 mg Oral BID    sodium chloride 0.9%  10 mL Intravenous Q6H    tamsulosin  0.4 mg Oral Daily    zinc sulfate  220 mg Oral Daily   Calorie Containing IV Medications: no significant kcals from medications at this time    Lab Results   Component Value Date/Time     01/31/2023 04:47 AM    K 3.4 (L) 01/31/2023 04:47 AM    CHLORIDE 110 (H) 01/31/2023 04:47 AM    BUN 18.5 01/31/2023 04:47 AM    CREATININE 1.11 01/31/2023 04:47 AM    EGFRNORACEVR >60 01/31/2023 04:47 AM    GLUCOSE 83 01/31/2023 04:47 AM    CALCIUM 8.5 (L) 01/31/2023 04:47 AM    PHOS 3.5 01/22/2023 05:41 AM    MG 1.80 01/23/2023 04:42 AM    ALKPHOS 141 01/30/2023 04:20 AM    AST 55 (H) 01/30/2023 04:20 AM    ALT 68 (H) 01/30/2023 04:20 AM    ALBUMIN 1.7 (L) 01/30/2023 04:20 AM    CRP 3.70 01/13/2023 11:47 PM     Diet/PN Order: Diet diabetic  Oral Supplement Order: Boost Glucose Control  Tube Feeding Order: none  Appetite/Oral Intake: fair/50-75% of meals  Factors Affecting Nutritional Intake: decreased appetite and pain  Food/Orthodoxy/Cultural Preferences: none reported  Food Allergies: none reported    Skin Integrity: drain/device(s)  Wound(s):   biliary tube noted    Comments    1/13/23 Patient reports decreased/poor appetite currently and prior to admission, reports significant weight loss over the past few months, agreeable to chocolate Boost, reports awaiting Whipple procedure as outpatient.    1/17/23 Patient reports appetite had improved and was good, now decreased/fair due to pain, does not want Boost.    1/20/23 Nurse reports patient had EGD yesterday and has requested to remain on clear liquid diet for now, will add Boost Breeze for additional kcal/protein, will also provide PPN  "recommendations.    23 Nurse reports patient consuming 25-50% of meals and 100% of Boost, will increase Boost frequency to three times daily.    23 Fair appetite/intake.    23 Patient reports good appetite, good intake of meals, consuming some of the Boost.    Anthropometrics    Height: 5' 9" (175.3 cm) Height Method: Stated  Last Weight: 69.3 kg (152 lb 12.5 oz) (23 1230) Weight Method: Bed Scale  BMI (Calculated): 22.6  BMI Classification: normal (BMI 18.5-24.9)        Ideal Body Weight (IBW), Male: 160 lb     % Ideal Body Weight, Male (lb): 95.49 %                 Usual Body Weight (UBW), k kg  % Usual Body Weight: 80.75     Usual Weight Provided By: patient    Wt Readings from Last 5 Encounters:   23 69.3 kg (152 lb 12.5 oz)   22 75.8 kg (167 lb)   22 79.8 kg (176 lb)     Weight Change(s) Since Admission:  Admit Weight: 70.3 kg (155 lb) (23 2320)  69.3 kg (23)  No new weights (2023)    Estimated Needs    Weight Used For Calorie Calculations: 69.3 kg (152 lb 12.5 oz)  Energy Calorie Requirements (kcal): 1943 kcal/d, 1.4 stress factor  Energy Need Method: [place]-St Jeor  Weight Used For Protein Calculations: 69.3 kg (152 lb 12.5 oz)  Protein Requirements: 104 g/d, 1.5 g/kg  Fluid Requirements (mL): 1943 ml/d, 1 ml/kcal  Temp: 97.9 °F (36.6 °C)       Enteral Nutrition    Patient not receiving enteral nutrition at this time.    Parenteral Nutrition    Patient not receiving parenteral nutrition support at this time.    Evaluation of Received Nutrient Intake    Calories: meeting estimated needs  Protein: meeting estimated needs    Patient Education    Not applicable.    Nutrition Diagnosis     PES: Malnutrition related to cancer as evidenced by <75% needs met >1 month, severe fat depletion, severe muscle depletion, and >7.5% weight loss in 3 months. (continues)    Interventions/Goals     Intervention(s): general/healthful diet, commercial beverage, and " collaboration with other providers  Goal: Meet greater than 75% of nutritional needs by follow-up. (goal met)    Monitoring & Evaluation     Dietitian will monitor food and beverage intake.  Nutrition Risk/Follow-Up: high (follow-up in 1-4 days)   Please consult if re-assessment needed sooner.

## 2023-01-31 NOTE — PROGRESS NOTES
[facility] Hospital Medicine Progress Note        Chief Complaint: Inpatient Follow-up for FTT      HPI:  Quincy Triplett is a 82 y.o. male with a past medical history of essential hypertension, hyperlipidemia, CAD, obstructive jaundice, and duodenal cancer presented to [facility] on 1/11/2023 for weakness,  decreased appetite, decreased urine output, and nausea began 3 days ago. Patient reports recent biliary drain placement on 12/21/2022 with Dr. Brown and was discharged 3 days ago.  Patient states he has whipple procedure scheduled soon by Dr. Brown. Denied fever, chills, cough, congestion, chest pain, shortness of breath, abdominal pain, vomiting, diarrhea, hematuria, and dysuria.   Initial vital signs in ED were /78, pulse 85, respirations 18, temperature 36.6° C, and SpO2 98%.  Labs revealed WBC 7.9, RBC 4, hemoglobin 11.6, hematocrit 35.6, sodium 130, chloride 108, CO2 11, BUN 42.3, creatinine 2.79, phosphorus 5.2, alkaline phosphatase 376, albumin 2.6, bilirubin total 13.7, AST 76, , lipase 85, and troponin undetectable.  Flu testing was negative.  COVID testing was positive.  UA revealed 1+ protein, cloudy appearance, positive nitrate, 3+ bilirubin, 1+ leukocytes, and 7 RBCs.  Patient was given 1 L NS, IV calcium gluconate 1 g, 5 units insulin IV, sodium bicarb 50 mEq, and Lokelma 10 g. Patient was admitted to hospital medicine service for further medical management.   Surgical oncology evaluated the patient recommended to postpone the surgery given overall decline in the functional status with COVID-19 infection and metabolic acidosis from renal failure. Patient was initiated on bicarb drip for metabolic acidosis.  Nephrology was consulted for metabolic acidosis renal failure.  Recommended IV fluids, encourage p.o. intake to compensate losses.  Attempts to internalize biliary drain were unsuccessful on 01/06/2023.  GI was consulted for elevated bilirubin, as  patient had biliary drain with good output recommended conservative management.  GI had reconsulted given positive FOBT with slowly drifting hemoglobin.  EGD 01/19/2023 revealed hemorrhagic duodenopathy, acquired duodenal stenosis.  Hemostatic spray was deployed, no specimens were collected.  Stomach and esophagus was normal.  1 unit of PRBC was transfused due to anemia.  Surgical oncology re-evaluated and recommended outpatient follow up for Whipple surgery.  Initial plan was to discharge to home with home health but patient reported generalized weakness and PT recommended rehab.  Discharge was held and currently awaiting placement.  Zosyn was continued while inpatient with plan to stop antibiotics on 01/29/2023.      Interval Hx:   Sitting in chair, no complaints other than he noted caitlin colored stools.Discussed because he is getting all his bile in the bag instead of in his gut    Family is at bedside, informed they are awaiting on rehab placement pending response from his insurance   Case was discussed with patient nurse and  on the floor    Objective/physical exam:  Vitals:    01/30/23 1931 01/31/23 0028 01/31/23 0615 01/31/23 0617   BP: 122/73 (!) 147/85 (!) 144/85 (!) 160/69   Pulse: 83 72 76 78   Resp: 18      Temp: 98.1 °F (36.7 °C) 97.6 °F (36.4 °C) 97.5 °F (36.4 °C)    TempSrc: Oral Oral Oral    SpO2: 98% 98% 97%    Weight:       Height:         General: In no acute distress, afebrile, jaundiced with scleral icterus, elderly male  Respiratory: Clear to auscultation bilaterally to the bases, on room air  Cardiovascular: S1, S2, no appreciable murmur  Abdomen: Soft, nontender, BS +, biliary drain present  Extremities:  Right arm amputated, left lower extremity swelling  Neurologic: Alert and oriented x4, cooperative    Lab Results   Component Value Date     01/31/2023    K 3.4 (L) 01/31/2023    CO2 20 (L) 01/31/2023    BUN 18.5 01/31/2023    CREATININE 1.11 01/31/2023    CALCIUM 8.5 (L)  01/31/2023    EGFRNONAA >60 05/23/2022      Lab Results   Component Value Date    ALT 68 (H) 01/30/2023    AST 55 (H) 01/30/2023    ALKPHOS 141 01/30/2023    BILITOT 4.3 (H) 01/30/2023      Lab Results   Component Value Date    WBC 6.2 01/30/2023    HGB 8.6 (L) 01/30/2023    HCT 26.2 (L) 01/30/2023    MCV 90.0 01/30/2023     01/30/2023           Medications:   ergocalciferol  50,000 Units Oral Q7 Days    mirtazapine  15 mg Oral QHS    pantoprazole  40 mg Intravenous BID    sodium bicarbonate  650 mg Oral BID    sodium chloride 0.9%  10 mL Intravenous Q6H    tamsulosin  0.4 mg Oral Daily    zinc sulfate  220 mg Oral Daily      sodium chloride, sodium chloride, acetaminophen, albuterol, bisacodyL, dextrose 10%, dextrose 10%, docusate sodium, meclizine, ondansetron, ondansetron, oxyCODONE, prochlorperazine, Flushing PICC Protocol **AND** sodium chloride 0.9% **AND** sodium chloride 0.9%       Assessment/Plan:  COVID-19 infection-(vaccinated)- off of isolation now  TERRENCE -prerenal etiology from poor p.o. intake, high biliary drain output - resolved    Acute severe Metabolic acidosis due to renal failure- improving   Acute on chronic blood loss anemia, hemorrhagic duodenopathy- Hb stable   Hypovolemic Hyponatremia - resolved   Hypokalemia  Obstructive jaundice s/p biliary stent placement 12/21/2022, failed internalization 01/06/2023, Elevated liver enzymes   Proctitis- being treated   Failure to thrive   Severe malnutrition  HX:  Duodenal cancer, PVD, right arm amputation, CAD      Admitted as inpatient on 01/12/2023  Appreciate ID input  Completed 14 days course of Zosyn on 1/29/23 for proctitis per ID recs   Continue biliary drain.  Surgical oncologist recommended outpatient evaluation for Whipple's  Continue Protonix.  GI signed off.  Monitor Hb while inpatient- so far stable   Stable from COVID-19 standpoint.  Completed remdesivir for 3 days.  Continue supportive care, now off of isolation and on RA  1/23- left  lower extremity Doppler was negative for DVT  Continue p.o. bicarb 650mg BID   K low at 3.4, ordered KCL 40 meq po x1 today again  Cr normalized   Continue therapy while in house     Home medications were reviewed  and appropriate meds resumed on admission  Awaiting insurance approval for rehab   Morning BMP stable, repeat CMP Thursday      VTE prophylaxis:  SCD    Patient condition:  Fair     Anticipated discharge and Disposition:   Rehab, awaiting auth    All diagnosis and differential diagnosis have been reviewed; assessment and plan has been documented; I have personally reviewed the labs and test results that are presently available; I have reviewed the patients medication list; I have reviewed the consulting providers response and recommendations. I have reviewed or attempted to review medical records based upon their availability    All of the patient's questions have been  addressed and answered. Patient's is agreeable to the above stated plan. I will continue to monitor closely and make adjustments to medical management as needed.  _______________________________________________________________________    CV Ultrasound doppler venous DVT leg left  There was no evidence of deep or superficial vein thrombosis in the left   lower extremity.       José Manuel Kirk MD  Department of Hospital Medicine   [facility]   01/31/2023

## 2023-02-01 LAB — POCT GLUCOSE: 86 MG/DL (ref 70–110)

## 2023-02-01 PROCEDURE — 97530 THERAPEUTIC ACTIVITIES: CPT

## 2023-02-01 PROCEDURE — 97535 SELF CARE MNGMENT TRAINING: CPT | Mod: CO

## 2023-02-01 PROCEDURE — 21400001 HC TELEMETRY ROOM

## 2023-02-01 PROCEDURE — 63600175 PHARM REV CODE 636 W HCPCS: Performed by: INTERNAL MEDICINE

## 2023-02-01 PROCEDURE — A4216 STERILE WATER/SALINE, 10 ML: HCPCS | Performed by: INTERNAL MEDICINE

## 2023-02-01 PROCEDURE — 25000003 PHARM REV CODE 250: Performed by: INTERNAL MEDICINE

## 2023-02-01 PROCEDURE — 97530 THERAPEUTIC ACTIVITIES: CPT | Mod: CO

## 2023-02-01 PROCEDURE — 97110 THERAPEUTIC EXERCISES: CPT

## 2023-02-01 PROCEDURE — C9113 INJ PANTOPRAZOLE SODIUM, VIA: HCPCS | Performed by: INTERNAL MEDICINE

## 2023-02-01 PROCEDURE — 25000003 PHARM REV CODE 250: Performed by: PHYSICIAN ASSISTANT

## 2023-02-01 RX ADMIN — SODIUM BICARBONATE 650 MG: 650 TABLET ORAL at 09:02

## 2023-02-01 RX ADMIN — SODIUM CHLORIDE, PRESERVATIVE FREE 10 ML: 5 INJECTION INTRAVENOUS at 12:02

## 2023-02-01 RX ADMIN — ZINC SULFATE 220 MG (50 MG) CAPSULE 220 MG: CAPSULE at 09:02

## 2023-02-01 RX ADMIN — TAMSULOSIN HYDROCHLORIDE 0.4 MG: 0.4 CAPSULE ORAL at 09:02

## 2023-02-01 RX ADMIN — OXYCODONE HYDROCHLORIDE 5 MG: 5 TABLET ORAL at 06:02

## 2023-02-01 RX ADMIN — PANTOPRAZOLE SODIUM 40 MG: 40 INJECTION, POWDER, FOR SOLUTION INTRAVENOUS at 09:02

## 2023-02-01 RX ADMIN — SODIUM CHLORIDE, PRESERVATIVE FREE 10 ML: 5 INJECTION INTRAVENOUS at 06:02

## 2023-02-01 RX ADMIN — ACETAMINOPHEN 650 MG: 325 TABLET, FILM COATED ORAL at 12:02

## 2023-02-01 RX ADMIN — SODIUM CHLORIDE, PRESERVATIVE FREE 10 ML: 5 INJECTION INTRAVENOUS at 05:02

## 2023-02-01 RX ADMIN — MIRTAZAPINE 15 MG: 15 TABLET, FILM COATED ORAL at 09:02

## 2023-02-01 NOTE — PLAN OF CARE
Insurance has denied rehab. I did update patient and we discussed skilled nursing facility (SNF) and [facility] (TCU). He plans to call his insurance company in the morning but is agreeable to TCU if no resolve after he makes the call.

## 2023-02-01 NOTE — PT/OT/SLP PROGRESS
Physical Therapy Treatment    Patient Name:  Quincy Triplett   MRN:  [id number]    Recommendations:     Discharge Recommendations: rehabilitation facility  Discharge Equipment Recommendations: cane, quad  Barriers to discharge:  increased need of assistance    Assessment:     Quincy Triplett is a 82 y.o. male admitted with a medical diagnosis of COVID-19.  He presents with the following impairments/functional limitations: weakness, impaired endurance, impaired self care skills, impaired functional mobility, gait instability, impaired balance, decreased safety awareness.    Rehab Prognosis: Good; patient would benefit from acute skilled PT services to address these deficits and reach maximum level of function.    Recent Surgery: Procedure(s) (LRB):  EGD (N/A)  EGD,WITH HEMORRHAGE CONTROL 13 Days Post-Op    Plan:     During this hospitalization, patient to be seen 6 x/week to address the identified rehab impairments via gait training, therapeutic activities, therapeutic exercises, neuromuscular re-education and progress toward the following goals:    Plan of Care Expires:  02/20/23    Subjective     Chief Complaint: none  Patient/Family Comments/goals: none  Pain/Comfort:  Pain Rating 1: 0/10      Objective:     Communicated with nurse prior to session.  Patient found up in chair with telemetry upon PT entry to room.     General Precautions: Standard, fall  Orthopedic Precautions: N/A  Braces: N/A  Respiratory Status: Room air     Functional Mobility:  Bed Mobility:     Supine to Sit: independence  Transfers:     Sit to Stand:  stand by assistance with no AD  Gait: 85 ft + 65 ft with CGA. Slowed pace. Cueing for safety  Balance: fair/poor  Stairs:  Pt ascended/descended 10 stair(s) with No Assistive Device with left handrail with Contact Guard Assistance.       AM-PAC 6 CLICK MOBILITY  Turning over in bed (including adjusting bedclothes, sheets and blankets)?: 4  Sitting down on and standing up from a chair with arms  (e.g., wheelchair, bedside commode, etc.): 4  Moving from lying on back to sitting on the side of the bed?: 4  Moving to and from a bed to a chair (including a wheelchair)?: 3  Need to walk in hospital room?: 3  Climbing 3-5 steps with a railing?: 3  Basic Mobility Total Score: 21       Therx:   -Patient performed seated Marches, Heel Raises, LAQ, Glute Sets. 2 Ibs weights on each left. HS curls & Hip ABD/ADD with green theraband. All performed 2 x 20 reps. Rest breaks needed.     Patient left up in chair with all lines intact, call button in reach, and family present..    GOALS:   Multidisciplinary Problems       Physical Therapy Goals          Problem: Physical Therapy    Goal Priority Disciplines Outcome Goal Variances Interventions   Physical Therapy Goal     PT, PT/OT Ongoing, Progressing     Description: Goals to be met by: 23     Patient will increase functional independence with mobility by performin. Supine to sit with [place]  2. Sit to supine with [place]  3. Sit to stand transfer with Modified [place]  4. Gait  x 200 feet with Modified [place] using Quad Cane.                          Time Tracking:     PT Received On: 23  PT Start Time: 914     PT Stop Time: 1000  PT Total Time (min): 46 min     Billable Minutes: Therapeutic Activity 28 minutes and Therapeutic Exercise 17 minutes    Treatment Type: Treatment  PT/PTA: PT     PTA Visit Number: 4     2023

## 2023-02-01 NOTE — PT/OT/SLP PROGRESS
Occupational Therapy  Treatment    Quincy Triplett   MRN: [id number]   Admitting Diagnosis: COVID-19    OT Date of Treatment: 02/01/23   OT Start Time: 1422  OT Stop Time: 1505  OT Total Time (min): 43 min     Billable Minutes:  Self Care/Home Management 25 and Therapeutic Activity 18  Total Minutes: 43     OT/IZAIAH: IZAIAH     IZAIAH Visit Number: 4    General Precautions: Standard, fall  Orthopedic Precautions: N/A  Braces: N/A    Spiritual, Cultural Beliefs, Mormonism Practices, Values that Affect Care: no    Subjective:  Communicated with nurse prior to session.    Objective:  Patient found with: telemetry    Functional Mobility:  Bed Mobility:   Supine to sit: Supervision or Set-up Assistance   Sit to supine: Activity did not occur   Rolling: Supervision or Set-up Assistance   Scooting: Supervision or Set-up Assistance    Transfer Training:   Transfer bed to chair with CGA, functional mobility in hallway with Min A secondary to fatigue, transfer to tub transfer bench with Min A with instruction. Family training with daughter. Functional mobility while manipulating obstacles with CGA/Min A    UE Dressing:  Don robe with Min A    LE Dressing:  Don underwear sitting EOB with Min A to thread over feet secondary to fatigue    Additional Treatment:  Patient tolerating Tx well, very motivated but fatigues quickley    Patient left up in chair with all lines intact, call button in reach, and daughter present    ASSESSMENT:  Quincy Triplett is a 82 y.o. male with a medical diagnosis of COVID-19 and presents with decreased AX tolerance, decreased strength and decreased ADL skills.    Rehab potential is good    Activity tolerance: Fair    Discharge recommendations: rehabilitation facility     Equipment recommendations: cane, quad     GOALS:   Multidisciplinary Problems       Occupational Therapy Goals          Problem: Occupational Therapy    Goal Priority Disciplines Outcome Interventions   Occupational Therapy Goal     OT, PT/OT  Ongoing, Progressing    Description: Goals to be met by:  2/9/2023    Patient will increase functional independence with ADLs by performing:    LE Dressing with Modified [place].  Grooming while standing with Modified [place].  Toileting from toilet with Modified [place] for hygiene and clothing management.   Toilet transfer to toilet with Modified [place].                         Plan:  Patient to be seen 5 x/week to address the above listed problems via self-care/home management, therapeutic activities, therapeutic exercises  Plan of Care expires:    Plan of Care reviewed with: patient, daughter         02/01/2023

## 2023-02-01 NOTE — PROGRESS NOTES
[facility] Hospital Medicine Progress Note        Chief Complaint: Inpatient Follow-up for FTT      HPI:  Quincy Triplett is a 82 y.o. male with a past medical history of essential hypertension, hyperlipidemia, CAD, obstructive jaundice, and duodenal cancer presented to [facility] on 1/11/2023 for weakness,  decreased appetite, decreased urine output, and nausea began 3 days ago. Patient reports recent biliary drain placement on 12/21/2022 with Dr. Brown and was discharged 3 days ago.  Patient states he has whipple procedure scheduled soon by Dr. Brown. Denied fever, chills, cough, congestion, chest pain, shortness of breath, abdominal pain, vomiting, diarrhea, hematuria, and dysuria.   Initial vital signs in ED were /78, pulse 85, respirations 18, temperature 36.6° C, and SpO2 98%.  Labs revealed WBC 7.9, RBC 4, hemoglobin 11.6, hematocrit 35.6, sodium 130, chloride 108, CO2 11, BUN 42.3, creatinine 2.79, phosphorus 5.2, alkaline phosphatase 376, albumin 2.6, bilirubin total 13.7, AST 76, , lipase 85, and troponin undetectable.  Flu testing was negative.  COVID testing was positive.  UA revealed 1+ protein, cloudy appearance, positive nitrate, 3+ bilirubin, 1+ leukocytes, and 7 RBCs.  Patient was given 1 L NS, IV calcium gluconate 1 g, 5 units insulin IV, sodium bicarb 50 mEq, and Lokelma 10 g. Patient was admitted to hospital medicine service for further medical management.   Surgical oncology evaluated the patient recommended to postpone the surgery given overall decline in the functional status with COVID-19 infection and metabolic acidosis from renal failure. Patient was initiated on bicarb drip for metabolic acidosis.  Nephrology was consulted for metabolic acidosis renal failure.  Recommended IV fluids, encourage p.o. intake to compensate losses.  Attempts to internalize biliary drain were unsuccessful on 01/06/2023.  GI was consulted for elevated bilirubin, as  patient had biliary drain with good output recommended conservative management.  GI had reconsulted given positive FOBT with slowly drifting hemoglobin.  EGD 01/19/2023 revealed hemorrhagic duodenopathy, acquired duodenal stenosis.  Hemostatic spray was deployed, no specimens were collected.  Stomach and esophagus was normal.  1 unit of PRBC was transfused due to anemia.  Surgical oncology re-evaluated and recommended outpatient follow up for Whipple surgery.  Initial plan was to discharge to home with home health but patient reported generalized weakness and PT recommended rehab.  Discharge was held and currently awaiting placement.  Zosyn was continued while inpatient with plan to stop antibiotics on 01/29/2023.      Interval Hx:   Working with physical therapy, going to gym upstairs be no complaints.  Case was discussed with patient nurse and  on the floor,  informed me that his insurance denied rehab.  She will talk to the patient regarding his next choice    Objective/physical exam:  Vitals:    01/31/23 1947 01/31/23 2035 01/31/23 2314 02/01/23 0401   BP: 128/70  (!) 157/79 128/70   Patient Position: Lying   Lying   Pulse: 77  75 78   Resp: 18 18 18 (!) 24   Temp: 98.5 °F (36.9 °C)  98 °F (36.7 °C) 98.6 °F (37 °C)   TempSrc: Oral  Oral Oral   SpO2: 97%  98% 97%   Weight:       Height:         General: In no acute distress, afebrile, jaundiced with scleral icterus, elderly male  Respiratory: Clear to auscultation bilaterally to the bases, on room air  Cardiovascular: S1, S2, no appreciable murmur  Abdomen: Soft, nontender, BS +, biliary drain present  Extremities:  Right arm amputated, left lower extremity swelling  Neurologic: Alert and oriented x4, cooperative    Lab Results   Component Value Date     01/31/2023    K 3.4 (L) 01/31/2023    CO2 20 (L) 01/31/2023    BUN 18.5 01/31/2023    CREATININE 1.11 01/31/2023    CALCIUM 8.5 (L) 01/31/2023    EGFRNONAA >60 05/23/2022      Lab  Results   Component Value Date    ALT 68 (H) 01/30/2023    AST 55 (H) 01/30/2023    ALKPHOS 141 01/30/2023    BILITOT 4.3 (H) 01/30/2023      Lab Results   Component Value Date    WBC 6.2 01/30/2023    HGB 8.6 (L) 01/30/2023    HCT 26.2 (L) 01/30/2023    MCV 90.0 01/30/2023     01/30/2023           Medications:   ergocalciferol  50,000 Units Oral Q7 Days    mirtazapine  15 mg Oral QHS    pantoprazole  40 mg Intravenous BID    sodium bicarbonate  650 mg Oral BID    sodium chloride 0.9%  10 mL Intravenous Q6H    tamsulosin  0.4 mg Oral Daily    zinc sulfate  220 mg Oral Daily      sodium chloride, sodium chloride, acetaminophen, albuterol, bisacodyL, dextrose 10%, dextrose 10%, docusate sodium, meclizine, ondansetron, ondansetron, oxyCODONE, prochlorperazine, Flushing PICC Protocol **AND** sodium chloride 0.9% **AND** sodium chloride 0.9%       Assessment/Plan:  COVID-19 infection-(vaccinated)- off of isolation now  TERRENCE -prerenal etiology from poor p.o. intake, high biliary drain output - resolved    Acute severe Metabolic acidosis due to renal failure- improving   Acute on chronic blood loss anemia, hemorrhagic duodenopathy- Hb stable   Hypovolemic Hyponatremia - resolved   Hypokalemia  Obstructive jaundice s/p biliary stent placement 12/21/2022, failed internalization 01/06/2023, Elevated liver enzymes   Proctitis- being treated   Failure to thrive   Severe malnutrition  HX:  Duodenal cancer, PVD, right arm amputation, CAD      Admitted as inpatient on 01/12/2023  Appreciate ID input  Completed 14 days course of Zosyn on 1/29/23 for proctitis per ID recs   Continue biliary drain.  Surgical oncologist recommended outpatient evaluation for Whipple's  Continue Protonix.  GI signed off.  Monitor Hb while inpatient- so far stable   Stable from COVID-19 standpoint.  Completed remdesivir for 3 days.  Continue supportive care, now off of isolation and on RA  1/23- left lower extremity Doppler was negative for  DVT  Continue p.o. bicarb 650mg BID   K low at 3.4, ordered KCL 40 meq po x1 today again  Cr normalized   Continue therapy while in house     Home medications were reviewed  and appropriate meds resumed on admission  CM informed me Insurance denied his rehab, CM to talk to pt regarding his other choices like SNF   Morning CBC CMP ordered      VTE prophylaxis:  SCD    Patient condition:  Fair     Anticipated discharge and Disposition:   Rehab vs SNF    All diagnosis and differential diagnosis have been reviewed; assessment and plan has been documented; I have personally reviewed the labs and test results that are presently available; I have reviewed the patients medication list; I have reviewed the consulting providers response and recommendations. I have reviewed or attempted to review medical records based upon their availability    All of the patient's questions have been  addressed and answered. Patient's is agreeable to the above stated plan. I will continue to monitor closely and make adjustments to medical management as needed.  _______________________________________________________________________    CV Ultrasound doppler venous DVT leg left  There was no evidence of deep or superficial vein thrombosis in the left   lower extremity.       José Manuel Kirk MD  Department of Hospital Medicine   [facility]   02/01/2023

## 2023-02-02 LAB
ALBUMIN SERPL-MCNC: 1.7 G/DL (ref 3.4–4.8)
ALBUMIN/GLOB SERPL: 0.5 RATIO (ref 1.1–2)
ALP SERPL-CCNC: 148 UNIT/L (ref 40–150)
ALT SERPL-CCNC: 64 UNIT/L (ref 0–55)
AST SERPL-CCNC: 46 UNIT/L (ref 5–34)
BILIRUBIN DIRECT+TOT PNL SERPL-MCNC: 4.7 MG/DL
BUN SERPL-MCNC: 19.7 MG/DL (ref 8.4–25.7)
CALCIUM SERPL-MCNC: 8.4 MG/DL (ref 8.8–10)
CHLORIDE SERPL-SCNC: 105 MMOL/L (ref 98–107)
CO2 SERPL-SCNC: 19 MMOL/L (ref 23–31)
CREAT SERPL-MCNC: 0.9 MG/DL (ref 0.73–1.18)
ERYTHROCYTE [DISTWIDTH] IN BLOOD BY AUTOMATED COUNT: 16.6 % (ref 11.5–17)
GFR SERPLBLD CREATININE-BSD FMLA CKD-EPI: >60 MLS/MIN/1.73/M2
GLOBULIN SER-MCNC: 3.5 GM/DL (ref 2.4–3.5)
GLUCOSE SERPL-MCNC: 113 MG/DL (ref 82–115)
HCT VFR BLD AUTO: 27.4 % (ref 42–52)
HGB BLD-MCNC: 8.9 GM/DL (ref 14–18)
MCH RBC QN AUTO: 29.8 PG
MCHC RBC AUTO-ENTMCNC: 32.5 MG/DL (ref 33–36)
MCV RBC AUTO: 91.6 FL (ref 80–94)
NRBC BLD AUTO-RTO: 0 %
PLATELET # BLD AUTO: 288 X10(3)/MCL (ref 130–400)
PMV BLD AUTO: 8.7 FL (ref 7.4–10.4)
POCT GLUCOSE: 107 MG/DL (ref 70–110)
POCT GLUCOSE: 98 MG/DL (ref 70–110)
POTASSIUM SERPL-SCNC: 3.5 MMOL/L (ref 3.5–5.1)
PROT SERPL-MCNC: 5.2 GM/DL (ref 5.8–7.6)
RBC # BLD AUTO: 2.99 X10(6)/MCL (ref 4.7–6.1)
SODIUM SERPL-SCNC: 133 MMOL/L (ref 136–145)
WBC # SPEC AUTO: 7.9 X10(3)/MCL (ref 4.5–11.5)

## 2023-02-02 PROCEDURE — 63600175 PHARM REV CODE 636 W HCPCS: Performed by: INTERNAL MEDICINE

## 2023-02-02 PROCEDURE — 87040 BLOOD CULTURE FOR BACTERIA: CPT | Performed by: INTERNAL MEDICINE

## 2023-02-02 PROCEDURE — 97116 GAIT TRAINING THERAPY: CPT | Mod: CQ

## 2023-02-02 PROCEDURE — C9113 INJ PANTOPRAZOLE SODIUM, VIA: HCPCS | Performed by: INTERNAL MEDICINE

## 2023-02-02 PROCEDURE — 25000003 PHARM REV CODE 250: Performed by: INTERNAL MEDICINE

## 2023-02-02 PROCEDURE — 21400001 HC TELEMETRY ROOM

## 2023-02-02 PROCEDURE — 97530 THERAPEUTIC ACTIVITIES: CPT | Mod: CQ

## 2023-02-02 PROCEDURE — 85027 COMPLETE CBC AUTOMATED: CPT | Performed by: INTERNAL MEDICINE

## 2023-02-02 PROCEDURE — A4216 STERILE WATER/SALINE, 10 ML: HCPCS | Performed by: INTERNAL MEDICINE

## 2023-02-02 PROCEDURE — 80053 COMPREHEN METABOLIC PANEL: CPT | Performed by: INTERNAL MEDICINE

## 2023-02-02 PROCEDURE — 25000003 PHARM REV CODE 250: Performed by: PHYSICIAN ASSISTANT

## 2023-02-02 RX ORDER — POTASSIUM CHLORIDE 20 MEQ/1
40 TABLET, EXTENDED RELEASE ORAL ONCE
Status: COMPLETED | OUTPATIENT
Start: 2023-02-02 | End: 2023-02-02

## 2023-02-02 RX ADMIN — SODIUM CHLORIDE, PRESERVATIVE FREE 10 ML: 5 INJECTION INTRAVENOUS at 12:02

## 2023-02-02 RX ADMIN — SODIUM CHLORIDE, PRESERVATIVE FREE 10 ML: 5 INJECTION INTRAVENOUS at 06:02

## 2023-02-02 RX ADMIN — SODIUM BICARBONATE 650 MG: 650 TABLET ORAL at 08:02

## 2023-02-02 RX ADMIN — TAMSULOSIN HYDROCHLORIDE 0.4 MG: 0.4 CAPSULE ORAL at 08:02

## 2023-02-02 RX ADMIN — MIRTAZAPINE 15 MG: 15 TABLET, FILM COATED ORAL at 08:02

## 2023-02-02 RX ADMIN — ACETAMINOPHEN 650 MG: 325 TABLET, FILM COATED ORAL at 08:02

## 2023-02-02 RX ADMIN — POTASSIUM CHLORIDE 40 MEQ: 1500 TABLET, EXTENDED RELEASE ORAL at 04:02

## 2023-02-02 RX ADMIN — ZINC SULFATE 220 MG (50 MG) CAPSULE 220 MG: CAPSULE at 08:02

## 2023-02-02 RX ADMIN — PANTOPRAZOLE SODIUM 40 MG: 40 INJECTION, POWDER, FOR SOLUTION INTRAVENOUS at 08:02

## 2023-02-02 RX ADMIN — OXYCODONE HYDROCHLORIDE 5 MG: 5 TABLET ORAL at 06:02

## 2023-02-02 RX ADMIN — SODIUM CHLORIDE, PRESERVATIVE FREE 10 ML: 5 INJECTION INTRAVENOUS at 03:02

## 2023-02-02 RX ADMIN — ERGOCALCIFEROL 50000 UNITS: 1.25 CAPSULE ORAL at 08:02

## 2023-02-02 NOTE — PROGRESS NOTES
"Inpatient Nutrition Assessment    Admit Date: 1/11/2023   Total duration of encounter: 22 days     Nutrition Recommendation/Prescription     Oral diet as tolerated.  Continue Boost Glucose Control (provides 250 kcal, 14 g protein per serving).    Communication of Recommendations: reviewed with patient/caregiver    Nutrition Assessment     Malnutrition Assessment/Nutrition-Focused Physical Exam    Malnutrition in the context of chronic illness  Degree of Malnutrition: severe malnutrition  Energy Intake: < 75% of estimated energy requirement for >/= 1 month  Interpretation of Weight Loss: >7.5% in 3 months  Body Fat: severe depletion  Area of Body Fat Loss: orbital region  and upper arm region - triceps / biceps  Muscle Mass Loss: severe depletion  Area of Muscle Mass Loss: temple region - temporalis muscle and clavicle bone region - pectoralis major, deltoid, trapezius muscles  Fluid Accumulation: does not meet criteria  Edema: unable to obtain  Reduced  Strength: unable to obtain  A minimum of two characteristics is recommended for diagnosis of either severe or non-severe malnutrition.    Chart Review    Reason Seen: continuous nutrition monitoring and follow-up (previous consult for "dehydration")    Malnutrition Screening Tool Results   Have you recently lost weight without trying?: No  Have you been eating poorly because of a decreased appetite?: No   MST Score: 0     Diagnosis:  COVID-19 infection-(vaccinated)- off of isolation now  TERRENCE -prerenal etiology from poor p.o. intake, high biliary drain output - improving   Acute severe Metabolic acidosis due to renal failure- improving   Acute on chronic blood loss anemia, hemorrhagic duodenopathy- Hb stable   Hypovolemic Hyponatremia - resolved   Hypokalemia  Obstructive jaundice s/p biliary stent placement 12/21/2022, failed internalization 01/06/2023, Elevated liver enzymes   Proctitis- being treated   Failure to thrive   Severe malnutrition    Relevant Medical " History:   Essential hypertension  Hyperlipidemia  CAD  Obstructive jaundice   Duodenal cancer    Scheduled Meds:   ergocalciferol  50,000 Units Oral Q7 Days    mirtazapine  15 mg Oral QHS    pantoprazole  40 mg Intravenous BID    sodium bicarbonate  650 mg Oral BID    sodium chloride 0.9%  10 mL Intravenous Q6H    tamsulosin  0.4 mg Oral Daily    zinc sulfate  220 mg Oral Daily   Calorie Containing IV Medications: no significant kcals from medications at this time    Lab Results   Component Value Date/Time     (L) 02/02/2023 09:15 AM    K 3.5 02/02/2023 09:15 AM    CHLORIDE 105 02/02/2023 09:15 AM    BUN 19.7 02/02/2023 09:15 AM    CREATININE 0.90 02/02/2023 09:15 AM    EGFRNORACEVR >60 02/02/2023 09:15 AM    GLUCOSE 113 02/02/2023 09:15 AM    CALCIUM 8.4 (L) 02/02/2023 09:15 AM    PHOS 3.5 01/22/2023 05:41 AM    MG 1.80 01/23/2023 04:42 AM    ALKPHOS 148 02/02/2023 09:15 AM    AST 46 (H) 02/02/2023 09:15 AM    ALT 64 (H) 02/02/2023 09:15 AM    ALBUMIN 1.7 (L) 02/02/2023 09:15 AM    CRP 3.70 01/13/2023 11:47 PM     Diet/PN Order: Diet diabetic  Oral Supplement Order: Boost Glucose Control  Tube Feeding Order: none  Appetite/Oral Intake: fair/50-75% of meals (this includes Boost Glucose Control TID).  Factors Affecting Nutritional Intake: decreased appetite and pain  Food/Scientologist/Cultural Preferences: none reported  Food Allergies: none reported    Skin Integrity: drain/device(s)  Wound(s):   biliary tube noted    Comments    1/13/23 Patient reports decreased/poor appetite currently and prior to admission, reports significant weight loss over the past few months, agreeable to chocolate Boost, reports awaiting Whipple procedure as outpatient.    1/17/23 Patient reports appetite had improved and was good, now decreased/fair due to pain, does not want Boost.    1/20/23 Nurse reports patient had EGD yesterday and has requested to remain on clear liquid diet for now, will add Boost Breeze for additional  "kcal/protein, will also provide PPN recommendations.    23 Nurse reports patient consuming 25-50% of meals and 100% of Boost, will increase Boost frequency to three times daily.    23 Fair appetite/intake.    23 Patient reports good appetite, good intake of meals, consuming some of the Boost.    23 The nurse reports patient is doing well. Fair appetite and PO intake. The nurse reports patient drinking Boost Glucose Control TID. No tolerance issues reported.    Anthropometrics    Height: 5' 9" (175.3 cm) Height Method: Stated  Last Weight: 69.3 kg (152 lb 12.5 oz) (23 1230) Weight Method: Bed Scale  BMI (Calculated): 22.6  BMI Classification: normal (BMI 18.5-24.9)        Ideal Body Weight (IBW), Male: 160 lb     % Ideal Body Weight, Male (lb): 95.49 %                 Usual Body Weight (UBW), k kg  % Usual Body Weight: 80.75     Usual Weight Provided By: patient    Wt Readings from Last 5 Encounters:   23 69.3 kg (152 lb 12.5 oz)   22 75.8 kg (167 lb)   22 79.8 kg (176 lb)     Weight Change(s) Since Admission:  Admit Weight: 70.3 kg (155 lb) (23 2320)  69.3 kg (23)  No new weights (2023)    Estimated Needs    Weight Used For Calorie Calculations: 69.3 kg (152 lb 12.5 oz)  Energy Calorie Requirements (kcal): 1943 kcal/d, 1.4 stress factor  Energy Need Method: Cavalier-St Jeor  Weight Used For Protein Calculations: 69.3 kg (152 lb 12.5 oz)  Protein Requirements: 104 g/d, 1.5 g/kg  Fluid Requirements (mL): 1943 ml/d, 1 ml/kcal  Temp: 98.6 °F (37 °C)       Enteral Nutrition    Patient not receiving enteral nutrition at this time.    Parenteral Nutrition    Patient not receiving parenteral nutrition support at this time.    Evaluation of Received Nutrient Intake    Calories: meeting estimated needs  Protein: meeting estimated needs    Patient Education    Not applicable.    Nutrition Diagnosis     PES: Malnutrition related to cancer as evidenced by <75% needs " met >1 month, severe fat depletion, severe muscle depletion, and >7.5% weight loss in 3 months. (continues)    Interventions/Goals     Intervention(s): general/healthful diet, commercial beverage, and collaboration with other providers  Goal: Meet greater than 75% of nutritional needs by follow-up. (goal met)    Monitoring & Evaluation     Dietitian will monitor food and beverage intake.  Nutrition Risk/Follow-Up: moderate (follow-up in 3-5 days)   Please consult if re-assessment needed sooner.

## 2023-02-02 NOTE — PROGRESS NOTES
[facility] Hospital Medicine Progress Note        Chief Complaint: Inpatient Follow-up for FTT      HPI:  Quincy Triplett is a 82 y.o. male with a past medical history of essential hypertension, hyperlipidemia, CAD, obstructive jaundice, and duodenal cancer presented to [facility] on 1/11/2023 for weakness,  decreased appetite, decreased urine output, and nausea began 3 days ago. Patient reports recent biliary drain placement on 12/21/2022 with Dr. Brown and was discharged 3 days ago.  Patient states he has whipple procedure scheduled soon by Dr. Brown. Denied fever, chills, cough, congestion, chest pain, shortness of breath, abdominal pain, vomiting, diarrhea, hematuria, and dysuria.   Initial vital signs in ED were /78, pulse 85, respirations 18, temperature 36.6° C, and SpO2 98%.  Labs revealed WBC 7.9, RBC 4, hemoglobin 11.6, hematocrit 35.6, sodium 130, chloride 108, CO2 11, BUN 42.3, creatinine 2.79, phosphorus 5.2, alkaline phosphatase 376, albumin 2.6, bilirubin total 13.7, AST 76, , lipase 85, and troponin undetectable.  Flu testing was negative.  COVID testing was positive.  UA revealed 1+ protein, cloudy appearance, positive nitrate, 3+ bilirubin, 1+ leukocytes, and 7 RBCs.  Patient was given 1 L NS, IV calcium gluconate 1 g, 5 units insulin IV, sodium bicarb 50 mEq, and Lokelma 10 g. Patient was admitted to hospital medicine service for further medical management.   Surgical oncology evaluated the patient recommended to postpone the surgery given overall decline in the functional status with COVID-19 infection and metabolic acidosis from renal failure. Patient was initiated on bicarb drip for metabolic acidosis.  Nephrology was consulted for metabolic acidosis renal failure.  Recommended IV fluids, encourage p.o. intake to compensate losses.  Attempts to internalize biliary drain were unsuccessful on 01/06/2023.  GI was consulted for elevated bilirubin, as  patient had biliary drain with good output recommended conservative management.  GI had reconsulted given positive FOBT with slowly drifting hemoglobin.  EGD 01/19/2023 revealed hemorrhagic duodenopathy, acquired duodenal stenosis.  Hemostatic spray was deployed, no specimens were collected.  Stomach and esophagus was normal.  1 unit of PRBC was transfused due to anemia.  Surgical oncology re-evaluated and recommended outpatient follow up for Whipple surgery.  Initial plan was to discharge to home with home health but patient reported generalized weakness and PT recommended rehab.  Discharge was held and currently awaiting placement.  Zosyn was continued while inpatient with plan to stop antibiotics on 01/29/2023.      Interval Hx:   Laying in bed, upset that his insurance has refused rehab on him.  Reports he lives alone, has 1 arm, is weak and is very difficult for him to manage himself is not strong enough.  Patient reports that if he is not strong enough, he can not get the Whipple procedure and if he does not get the Whipple procedure he will die.  Stated he is going to speak with his insurance this morning  I informed patient that we can do tcu which will also help him regain his strength.   Noted speech spike of 101.8 this morning which is odd as he has been afebrile all this time and has no sign symptoms of infection  No family is at bedside  Case discussed with patient nurse and  on the floor    Objective/physical exam:  Vitals:    02/01/23 1809 02/01/23 1947 02/01/23 2336 02/02/23 0310   BP:  (!) 141/99 123/66 119/68   Patient Position:  Lying Lying Lying   Pulse:  103 94 81   Resp: 18 20 18 18   Temp:  98.7 °F (37.1 °C) 99.3 °F (37.4 °C) 97.4 °F (36.3 °C)   TempSrc:  Oral Oral Oral   SpO2:  98% 98%    Weight:       Height:         General: In no acute distress, afebrile, jaundiced with scleral icterus, elderly male  Respiratory: Clear to auscultation bilaterally to the bases, on room  air  Cardiovascular: S1, S2, no appreciable murmur  Abdomen: Soft, nontender, BS +, biliary drain present  Extremities:  Right arm amputated, left lower extremity swelling  Neurologic: Alert and oriented x4, cooperative    Lab Results   Component Value Date     01/31/2023    K 3.4 (L) 01/31/2023    CO2 20 (L) 01/31/2023    BUN 18.5 01/31/2023    CREATININE 1.11 01/31/2023    CALCIUM 8.5 (L) 01/31/2023    EGFRNONAA >60 05/23/2022      Lab Results   Component Value Date    ALT 68 (H) 01/30/2023    AST 55 (H) 01/30/2023    ALKPHOS 141 01/30/2023    BILITOT 4.3 (H) 01/30/2023      Lab Results   Component Value Date    WBC 6.2 01/30/2023    HGB 8.6 (L) 01/30/2023    HCT 26.2 (L) 01/30/2023    MCV 90.0 01/30/2023     01/30/2023           Medications:   ergocalciferol  50,000 Units Oral Q7 Days    mirtazapine  15 mg Oral QHS    pantoprazole  40 mg Intravenous BID    sodium bicarbonate  650 mg Oral BID    sodium chloride 0.9%  10 mL Intravenous Q6H    tamsulosin  0.4 mg Oral Daily    zinc sulfate  220 mg Oral Daily      sodium chloride, sodium chloride, acetaminophen, albuterol, bisacodyL, dextrose 10%, dextrose 10%, docusate sodium, meclizine, ondansetron, ondansetron, oxyCODONE, prochlorperazine, Flushing PICC Protocol **AND** sodium chloride 0.9% **AND** sodium chloride 0.9%       Assessment/Plan:  COVID-19 infection-(vaccinated)- off of isolation now  TERRENCE -prerenal etiology from poor p.o. intake, high biliary drain output - resolved    Acute severe Metabolic acidosis due to renal failure- improving   Acute on chronic blood loss anemia, hemorrhagic duodenopathy- Hb stable   Hypovolemic Hyponatremia - resolved   Hypokalemia  Obstructive jaundice s/p biliary stent placement 12/21/2022, failed internalization 01/06/2023, Elevated liver enzymes   Proctitis- being treated   Failure to thrive   Severe malnutrition  HX:  Duodenal cancer, PVD, right arm amputation, CAD      Admitted as inpatient on  01/12/2023  Appreciate ID input  Completed 14 days course of Zosyn on 1/29/23 for proctitis per ID recs   2/2- patient spike a fever of 101.8.  Blood cultures x2 ordered    CBC does not show any sign of infection  Continue biliary drain.  Surgical oncologist recommended outpatient evaluation for Whipple's once patient is strong enough  Continue Protonix.  GI signed off.  Monitor Hb while inpatient- so far stable   Stable from COVID-19 standpoint.  Completed remdesivir for 3 days.  Continue supportive care, now off of isolation and on RA  1/23- left lower extremity Doppler was negative for DVT  Continue p.o. bicarb 650mg BID   K 3.5, ordered KCL 40 meq po x1 today again  Cr normalized   Continue therapy while in house     Home medications were reviewed  and appropriate meds resumed on admission  CM informed me Insurance denied his rehab, CM to talk to pt regarding his other choices like SNF / TCU  Morning labs reviewed and stable     VTE prophylaxis:  SCD    Patient condition:  Fair     Anticipated discharge and Disposition:   SNF/TCU when medically cleared    All diagnosis and differential diagnosis have been reviewed; assessment and plan has been documented; I have personally reviewed the labs and test results that are presently available; I have reviewed the patients medication list; I have reviewed the consulting providers response and recommendations. I have reviewed or attempted to review medical records based upon their availability    All of the patient's questions have been  addressed and answered. Patient's is agreeable to the above stated plan. I will continue to monitor closely and make adjustments to medical management as needed.  _______________________________________________________________________    CV Ultrasound doppler venous DVT leg left  There was no evidence of deep or superficial vein thrombosis in the left   lower extremity.       José Manuel Kirk MD  Department of Hospital Medicine   [facility]  [place]   02/02/2023

## 2023-02-02 NOTE — PT/OT/SLP PROGRESS
Physical Therapy         Treatment        Quincy Triplett   MRN: [id number]     PT Received On: 02/02/23  PT Start Time: 0932     PT Stop Time: 0958    PT Total Time (min): 26 min       Billable Minutes:  Gait [id number] and Therapeutic Activity 16  Total Minutes: 26    Treatment Type: Treatment  PT/PTA: PTA     PTA Visit Number: 5       General Precautions: Standard, fall  Orthopedic Precautions: Orthopedic Precautions : N/A   Braces:      Spiritual, Cultural Beliefs, Orthodox Practices, Values that Affect Care: no    Objective:  Patient found in bed upon entry and is very motivated to work with therapy.     Functional Mobility:  Bed Mobility:   Supine to sit: Supervision or Set-up Assistance   Sit to supine: Activity did not occur   Rolling: Activity did not occur   Scooting: Activity did not occur    Balance:     Transitional Sit-to-stand: Min A with SC      Gait Training: CGA to Min A with SC   Pt amb 64ft X 2 with step through gait pattern. Pt presents with unsteady tarik and slight LOB.     Additional Treatment:    Strengthening/Endurance   Pt performed sit-to-stand 2 X 10 reps from EOB. Pt required 1.5 min rest break between sets     Balance  Pt stood with feet together and was given perturbation from all direction. Pt was able to utilize ankle strategy and hip strategy to maintain balance with 2 LOB's that required assistance.     Pt stood with feet together and would reach out of ANNALISE for PTAs hand with no LOB.     Activity Tolerance:  Patient tolerated treatment well    Patient left up in chair with call button in reach.    Assessment:  Quincy Triplett is a 82 y.o. male with a medical diagnosis of COVID-19. He presents with. Pt presents with strength, balance and endurance deficits. Pt reports that he feel weaker today. Pt was able to amb twice with CGA to min A due to LOB's with self correction. Pt also showed decreased endurance with heavy breathing and extended rest breaks. Pt would benefit from short  stay at a rehabilitation facility to address current deficits before DC home safely.     Rehab potential is excellent.    Activity tolerance: Excellent    Discharge recommendations: Discharge Facility/Level of Care Needs: rehabilitation facility     Equipment recommendations:       GOALS:   Multidisciplinary Problems       Physical Therapy Goals          Problem: Physical Therapy    Goal Priority Disciplines Outcome Goal Variances Interventions   Physical Therapy Goal     PT, PT/OT Ongoing, Progressing     Description: Goals to be met by: 23     Patient will increase functional independence with mobility by performin. Supine to sit with [place]  2. Sit to supine with [place]  3. Sit to stand transfer with Modified [place]  4. Gait  x 200 feet with Modified [place] using Quad Cane.                          PLAN:    Patient to be seen 6 x/week  to address the above listed problems via gait training, therapeutic activities, therapeutic exercises  Plan of Care expires: 23  Plan of Care reviewed with: patient         2023

## 2023-02-02 NOTE — PT/OT/SLP PROGRESS
Physical Therapy      Patient Name:  Quincy Triplett   MRN:  [id number]    Pt refused tx this AM stating that he needed to save his energy to speak to CM and insurance company. Pt request that therapy return at a later time. Will attempt to see pt in PM if schedule permits.

## 2023-02-03 LAB
POCT GLUCOSE: 106 MG/DL (ref 70–110)
POCT GLUCOSE: 126 MG/DL (ref 70–110)
POCT GLUCOSE: 91 MG/DL (ref 70–110)
POCT GLUCOSE: 96 MG/DL (ref 70–110)

## 2023-02-03 PROCEDURE — 25000003 PHARM REV CODE 250: Performed by: INTERNAL MEDICINE

## 2023-02-03 PROCEDURE — 63600175 PHARM REV CODE 636 W HCPCS: Performed by: INTERNAL MEDICINE

## 2023-02-03 PROCEDURE — 21400001 HC TELEMETRY ROOM

## 2023-02-03 PROCEDURE — 25000003 PHARM REV CODE 250: Performed by: PHYSICIAN ASSISTANT

## 2023-02-03 PROCEDURE — A4216 STERILE WATER/SALINE, 10 ML: HCPCS | Performed by: INTERNAL MEDICINE

## 2023-02-03 PROCEDURE — 97164 PT RE-EVAL EST PLAN CARE: CPT

## 2023-02-03 PROCEDURE — C9113 INJ PANTOPRAZOLE SODIUM, VIA: HCPCS | Performed by: INTERNAL MEDICINE

## 2023-02-03 RX ADMIN — PANTOPRAZOLE SODIUM 40 MG: 40 INJECTION, POWDER, FOR SOLUTION INTRAVENOUS at 08:02

## 2023-02-03 RX ADMIN — SODIUM BICARBONATE 650 MG: 650 TABLET ORAL at 08:02

## 2023-02-03 RX ADMIN — SODIUM CHLORIDE, PRESERVATIVE FREE 10 ML: 5 INJECTION INTRAVENOUS at 06:02

## 2023-02-03 RX ADMIN — PANTOPRAZOLE SODIUM 40 MG: 40 INJECTION, POWDER, FOR SOLUTION INTRAVENOUS at 09:02

## 2023-02-03 RX ADMIN — TAMSULOSIN HYDROCHLORIDE 0.4 MG: 0.4 CAPSULE ORAL at 08:02

## 2023-02-03 RX ADMIN — OXYCODONE HYDROCHLORIDE 5 MG: 5 TABLET ORAL at 08:02

## 2023-02-03 RX ADMIN — ZINC SULFATE 220 MG (50 MG) CAPSULE 220 MG: CAPSULE at 08:02

## 2023-02-03 RX ADMIN — SODIUM CHLORIDE, PRESERVATIVE FREE 10 ML: 5 INJECTION INTRAVENOUS at 01:02

## 2023-02-03 RX ADMIN — SODIUM CHLORIDE, PRESERVATIVE FREE 10 ML: 5 INJECTION INTRAVENOUS at 12:02

## 2023-02-03 RX ADMIN — MIRTAZAPINE 15 MG: 15 TABLET, FILM COATED ORAL at 08:02

## 2023-02-03 NOTE — PROGRESS NOTES
[facility] Hospital Medicine Progress Note        Chief Complaint: Inpatient Follow-up for FTT      HPI:  Quincy Triplett is a 82 y.o. male with a past medical history of essential hypertension, hyperlipidemia, CAD, obstructive jaundice, and duodenal cancer presented to [facility] on 1/11/2023 for weakness,  decreased appetite, decreased urine output, and nausea began 3 days ago. Patient reports recent biliary drain placement on 12/21/2022 with Dr. Brown and was discharged 3 days ago.  Patient states he has whipple procedure scheduled soon by Dr. Brown. Denied fever, chills, cough, congestion, chest pain, shortness of breath, abdominal pain, vomiting, diarrhea, hematuria, and dysuria.   Initial vital signs in ED were /78, pulse 85, respirations 18, temperature 36.6° C, and SpO2 98%.  Labs revealed WBC 7.9, RBC 4, hemoglobin 11.6, hematocrit 35.6, sodium 130, chloride 108, CO2 11, BUN 42.3, creatinine 2.79, phosphorus 5.2, alkaline phosphatase 376, albumin 2.6, bilirubin total 13.7, AST 76, , lipase 85, and troponin undetectable.  Flu testing was negative.  COVID testing was positive.  UA revealed 1+ protein, cloudy appearance, positive nitrate, 3+ bilirubin, 1+ leukocytes, and 7 RBCs.  Patient was given 1 L NS, IV calcium gluconate 1 g, 5 units insulin IV, sodium bicarb 50 mEq, and Lokelma 10 g. Patient was admitted to hospital medicine service for further medical management.   Surgical oncology evaluated the patient recommended to postpone the surgery given overall decline in the functional status with COVID-19 infection and metabolic acidosis from renal failure. Patient was initiated on bicarb drip for metabolic acidosis.  Nephrology was consulted for metabolic acidosis renal failure.  Recommended IV fluids, encourage p.o. intake to compensate losses.  Attempts to internalize biliary drain were unsuccessful on 01/06/2023.  GI was consulted for elevated bilirubin, as  patient had biliary drain with good output recommended conservative management.  GI had reconsulted given positive FOBT with slowly drifting hemoglobin.  EGD 01/19/2023 revealed hemorrhagic duodenopathy, acquired duodenal stenosis.  Hemostatic spray was deployed, no specimens were collected.  Stomach and esophagus was normal.  1 unit of PRBC was transfused due to anemia.  Surgical oncology re-evaluated and recommended outpatient follow up for Whipple surgery.  Initial plan was to discharge to home with home health but patient reported generalized weakness and PT recommended rehab.  Discharge was held and currently awaiting placement.  Zosyn was continued while inpatient with plan to stop antibiotics on 01/29/2023.      Interval Hx:   Sitting in chair, discussed the reason for rehab denial given he does not have those diagnosis.  Patient verbalized understanding and reported he is willing to go to TCU.    Discussed yesterday fever was 1 time event, unsure if the temperature was checked while he was drinking coffee or soon after given there is no leukocytosis, blood cultures are so far negative and no sign of infection.  Patient did not had any other fever spikes.  Patient reported he never felt he had fever of 101°  No family is at bedside  Case discussed with patient nurse and  on the floor    Objective/physical exam:  Vitals:    02/02/23 1851 02/02/23 1942 02/02/23 2333 02/03/23 0330   BP:  (!) 107/55 133/72 (!) 143/77   Patient Position:  Lying Lying Lying   Pulse:  92 80 82   Resp: 18 20 19 18   Temp:  100 °F (37.8 °C) 99.1 °F (37.3 °C) 98.2 °F (36.8 °C)   TempSrc:  Oral Oral Oral   SpO2:  98% 99% 99%   Weight:       Height:         General: In no acute distress, afebrile, jaundiced with scleral icterus, elderly male  Respiratory: Clear to auscultation bilaterally to the bases, on room air  Cardiovascular: S1, S2, no appreciable murmur  Abdomen: Soft, nontender, BS +, biliary drain  present  Extremities:  Right forearm amputated, bilateral lower extremities swelling  Neurologic: Alert and oriented x4, cooperative    Lab Results   Component Value Date     (L) 02/02/2023    K 3.5 02/02/2023    CO2 19 (L) 02/02/2023    BUN 19.7 02/02/2023    CREATININE 0.90 02/02/2023    CALCIUM 8.4 (L) 02/02/2023    EGFRNONAA >60 05/23/2022      Lab Results   Component Value Date    ALT 64 (H) 02/02/2023    AST 46 (H) 02/02/2023    ALKPHOS 148 02/02/2023    BILITOT 4.7 (H) 02/02/2023      Lab Results   Component Value Date    WBC 7.9 02/02/2023    HGB 8.9 (L) 02/02/2023    HCT 27.4 (L) 02/02/2023    MCV 91.6 02/02/2023     02/02/2023           Medications:   ergocalciferol  50,000 Units Oral Q7 Days    mirtazapine  15 mg Oral QHS    pantoprazole  40 mg Intravenous BID    sodium bicarbonate  650 mg Oral BID    sodium chloride 0.9%  10 mL Intravenous Q6H    tamsulosin  0.4 mg Oral Daily    zinc sulfate  220 mg Oral Daily      sodium chloride, sodium chloride, acetaminophen, albuterol, bisacodyL, dextrose 10%, dextrose 10%, docusate sodium, meclizine, ondansetron, ondansetron, oxyCODONE, prochlorperazine, Flushing PICC Protocol **AND** sodium chloride 0.9% **AND** sodium chloride 0.9%       Assessment/Plan:  COVID-19 infection-(vaccinated)- off of isolation now  TERRENCE -prerenal etiology from poor p.o. intake, high biliary drain output - resolved    Acute severe Metabolic acidosis due to renal failure- improving   Acute on chronic blood loss anemia, hemorrhagic duodenopathy- Hb stable   Hypovolemic Hyponatremia - resolved   Hypokalemia- resolved  Obstructive jaundice s/p biliary stent placement 12/21/2022, failed internalization 01/06/2023, Elevated liver enzymes   Proctitis- being treated   Severe physical deconditioning  Severe malnutrition  HX:  Duodenal cancer, PVD, right arm amputation, CAD      Admitted as inpatient on 01/12/2023  Appreciate ID input  Completed 14 days course of Zosyn on 1/29/23 for  proctitis per ID recs   2/2- patient spike a fever of 101.8, probably erroneous/after hot coffee or breakfast  Blood cultures x2- negative 24 hours   CBC does not show any sign of infection  Continue biliary drain.  Surgical oncologist recommended outpatient evaluation for Whipple's once patient is strong enough  Continue Protonix.  GI signed off.  Monitor Hb while inpatient- so far stable   Stable from COVID-19 standpoint.  Completed remdesivir for 3 days.  Continue supportive care, now off of isolation and on RA  1/23- left lower extremity Doppler was negative for DVT  Continue p.o. bicarb 650mg BID   K 3.5, ordered KCL 40 meq po x1 today again  Cr normalized   Continue therapy while in house     Home medications were reviewed  and appropriate meds resumed on admission  CM informed me Insurance denied his rehab given he does not have the rehab diagnosis, will check for TCU  Morning cbc, bmp, mag ordered     VTE prophylaxis:  SCD    Patient condition:  Fair     Anticipated discharge and Disposition:   SNF/TCU when medically cleared    All diagnosis and differential diagnosis have been reviewed; assessment and plan has been documented; I have personally reviewed the labs and test results that are presently available; I have reviewed the patients medication list; I have reviewed the consulting providers response and recommendations. I have reviewed or attempted to review medical records based upon their availability    All of the patient's questions have been  addressed and answered. Patient's is agreeable to the above stated plan. I will continue to monitor closely and make adjustments to medical management as needed.  _______________________________________________________________________    CV Ultrasound doppler venous DVT leg left  There was no evidence of deep or superficial vein thrombosis in the left   lower extremity.       José Manuel Kirk MD  Department of Hospital Medicine   [facility]  [place]   02/03/2023

## 2023-02-03 NOTE — PLAN OF CARE
Problem: Physical Therapy  Goal: Physical Therapy Goal  Description: Goals to be met by: 23     Patient will increase functional independence with mobility by performin. Supine to sit with [place]  2. Sit to supine with [place]  3. Sit to stand transfer with Modified [place]  4. Gait  x 200 feet with Modified [place] using Quad Cane.     Outcome: Ongoing, Progressing

## 2023-02-03 NOTE — PT/OT/SLP RE-EVAL
Physical Therapy Re-evaluation    Patient Name:  Quincy Triplett   MRN:  [id number]    Recommendations:     Discharge Recommendations: nursing facility, skilled, rehabilitation facility  Discharge Equipment Recommendations: cane, quad   Barriers to discharge:  safety awareness    Assessment:     Quincy Triplett is a 82 y.o. male admitted with a medical diagnosis of COVID-19.  He presents with the following impairments/functional limitations: weakness, impaired endurance, impaired functional mobility, impaired self care skills, gait instability, impaired balance, decreased safety awareness, decreased upper extremity function. Patient has been making good progress with PT. Requiring mostly MIN A - CGA for all mobility. Can ambulate ~60 ft before requiring rest breaks. His instability also increases as ambulation distance increases. Patient lives alone and has one of his UE's amputated. I do not feel he is ready to return home at this time and I highly recommend rehab vs SNF placement to address his deficits. Patient in agreement. Continue to progress patient as tolerated.     Rehab Prognosis:  good; patient would benefit from acute skilled PT services to address these deficits and reach maximum level of function.      Recent Surgery: Procedure(s) (LRB):  EGD (N/A)  EGD,WITH HEMORRHAGE CONTROL 15 Days Post-Op    Plan:     During this hospitalization, patient to be seen 6 x/week to address the above listed problems via gait training, therapeutic activities, therapeutic exercises, neuromuscular re-education  Plan of Care Expires:  02/20/23  Plan of Care Reviewed with: patient    Subjective     Communicated with nurse prior to session.  Patient found  on toliet  with telemetry upon PT entry to room, agreeable to evaluation.      Chief Complaint: none  Patient comments/goals: none  Pain/Comfort:  Pain Rating 1: 0/10    Patients cultural, spiritual, Advent conflicts given the current situation: no      Objective:      Patient found with: telemetry     General Precautions: Standard, fall  Orthopedic Precautions: N/A  Braces: N/A  Respiratory Status: Room air    Exams:  Cognitive Exam:  Patient is oriented to Person, Place, Time, and Situation  Sensation:    -       Intact  RLE ROM: WFL  RLE Strength: 4/5 grossly  LLE ROM: WFL  LLE Strength: 4/5 grossly    Functional Mobility:  Transfers:     Sit to Stand:  stand by assistance with straight cane  Gait: 60 ft + 60 ft + 30 ft with straight cane & CGA. Rest breaks. Instability increases as ambulation distance increases    Balance: poor    AM-PAC 6 CLICK MOBILITY  Total Score:20     Patient left up in chair with all lines intact and call button in reach.    GOALS:   Multidisciplinary Problems       Physical Therapy Goals          Problem: Physical Therapy    Goal Priority Disciplines Outcome Goal Variances Interventions   Physical Therapy Goal     PT, PT/OT Ongoing, Progressing     Description: Goals to be met by: 23     Patient will increase functional independence with mobility by performin. Supine to sit with [place]  2. Sit to supine with [place]  3. Sit to stand transfer with Modified [place]  4. Gait  x 200 feet with Modified [place] using Quad Cane.                          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, unspecified extremity     Coronary artery disease     Dyslipidemia     Hypertension        Past Surgical History:   Procedure Laterality Date    AMPUTATION Right     Right arm    CAROTID STENT      ERCP N/A 2022    Procedure: ERCP;  Surgeon: Sushil Anderson MD;  Location: [facility] ENDOSCOPY;  Service: Gastroenterology;  Laterality: N/A;  food in abdomen    ERCP, WITH BIOPSY  2022    Procedure: ERCP, WITH BIOPSY;  Surgeon: Sushil Anderson MD;  Location: [facility] ENDOSCOPY;  Service: Gastroenterology;;    LEFT HEART  CATHETERIZATION      TONSILLECTOMY         Time Tracking:     PT Received On: 02/03/23  PT Start Time: 0748     PT Stop Time: 0803  PT Total Time (min): 15 min     Billable Minutes: Re-eval 15 minutes      02/03/2023

## 2023-02-03 NOTE — PLAN OF CARE
02/03/23 0911   Discharge Reassessment   Assessment Type Discharge Planning Reassessment   Did the patient's condition or plan change since previous assessment? Yes   Discharge Plan discussed with: Patient;Adult children   Discharge Plan A Skilled Nursing Facility   Discharge Plan B Other  (Swing bed)   Why the patient remains in the hospital Requires continued medical care   Post-Acute Status   Discharge Delays None known at this time

## 2023-02-04 LAB
ANION GAP SERPL CALC-SCNC: 11 MEQ/L
BUN SERPL-MCNC: 17.5 MG/DL (ref 8.4–25.7)
CALCIUM SERPL-MCNC: 8.5 MG/DL (ref 8.8–10)
CHLORIDE SERPL-SCNC: 110 MMOL/L (ref 98–107)
CO2 SERPL-SCNC: 17 MMOL/L (ref 23–31)
CREAT SERPL-MCNC: 0.78 MG/DL (ref 0.73–1.18)
CREAT/UREA NIT SERPL: 22
ERYTHROCYTE [DISTWIDTH] IN BLOOD BY AUTOMATED COUNT: 16.5 % (ref 11.5–17)
GFR SERPLBLD CREATININE-BSD FMLA CKD-EPI: >60 MLS/MIN/1.73/M2
GLUCOSE SERPL-MCNC: 87 MG/DL (ref 82–115)
HCT VFR BLD AUTO: 31.1 % (ref 42–52)
HGB BLD-MCNC: 10.2 GM/DL (ref 14–18)
MAGNESIUM SERPL-MCNC: 1.7 MG/DL (ref 1.6–2.6)
MCH RBC QN AUTO: 29.6 PG
MCHC RBC AUTO-ENTMCNC: 32.8 MG/DL (ref 33–36)
MCV RBC AUTO: 90.1 FL (ref 80–94)
NRBC BLD AUTO-RTO: 0 %
PLATELET # BLD AUTO: 217 X10(3)/MCL (ref 130–400)
PMV BLD AUTO: 10.5 FL (ref 7.4–10.4)
POCT GLUCOSE: 104 MG/DL (ref 70–110)
POTASSIUM SERPL-SCNC: 4 MMOL/L (ref 3.5–5.1)
RBC # BLD AUTO: 3.45 X10(6)/MCL (ref 4.7–6.1)
SODIUM SERPL-SCNC: 138 MMOL/L (ref 136–145)
WBC # SPEC AUTO: 6.1 X10(3)/MCL (ref 4.5–11.5)

## 2023-02-04 PROCEDURE — 83735 ASSAY OF MAGNESIUM: CPT | Performed by: INTERNAL MEDICINE

## 2023-02-04 PROCEDURE — C9113 INJ PANTOPRAZOLE SODIUM, VIA: HCPCS | Performed by: INTERNAL MEDICINE

## 2023-02-04 PROCEDURE — 21400001 HC TELEMETRY ROOM

## 2023-02-04 PROCEDURE — A4216 STERILE WATER/SALINE, 10 ML: HCPCS | Performed by: INTERNAL MEDICINE

## 2023-02-04 PROCEDURE — 80048 BASIC METABOLIC PNL TOTAL CA: CPT | Performed by: INTERNAL MEDICINE

## 2023-02-04 PROCEDURE — 85027 COMPLETE CBC AUTOMATED: CPT | Performed by: INTERNAL MEDICINE

## 2023-02-04 PROCEDURE — 36406 VNPNXR<3YRS PHY/QHP OTHER VN: CPT

## 2023-02-04 PROCEDURE — 63600175 PHARM REV CODE 636 W HCPCS: Performed by: INTERNAL MEDICINE

## 2023-02-04 PROCEDURE — 25000003 PHARM REV CODE 250: Performed by: PHYSICIAN ASSISTANT

## 2023-02-04 PROCEDURE — 25000003 PHARM REV CODE 250: Performed by: INTERNAL MEDICINE

## 2023-02-04 RX ORDER — HYDROXYZINE PAMOATE 25 MG/1
25 CAPSULE ORAL EVERY 8 HOURS PRN
Status: DISCONTINUED | OUTPATIENT
Start: 2023-02-05 | End: 2023-02-10 | Stop reason: HOSPADM

## 2023-02-04 RX ADMIN — OXYCODONE HYDROCHLORIDE 5 MG: 5 TABLET ORAL at 06:02

## 2023-02-04 RX ADMIN — SODIUM BICARBONATE 650 MG: 650 TABLET ORAL at 09:02

## 2023-02-04 RX ADMIN — SODIUM BICARBONATE 650 MG: 650 TABLET ORAL at 08:02

## 2023-02-04 RX ADMIN — SODIUM CHLORIDE, PRESERVATIVE FREE 10 ML: 5 INJECTION INTRAVENOUS at 12:02

## 2023-02-04 RX ADMIN — SODIUM CHLORIDE, PRESERVATIVE FREE 10 ML: 5 INJECTION INTRAVENOUS at 06:02

## 2023-02-04 RX ADMIN — SODIUM CHLORIDE, PRESERVATIVE FREE 10 ML: 5 INJECTION INTRAVENOUS at 07:02

## 2023-02-04 RX ADMIN — PANTOPRAZOLE SODIUM 40 MG: 40 INJECTION, POWDER, FOR SOLUTION INTRAVENOUS at 09:02

## 2023-02-04 RX ADMIN — TAMSULOSIN HYDROCHLORIDE 0.4 MG: 0.4 CAPSULE ORAL at 08:02

## 2023-02-04 RX ADMIN — PANTOPRAZOLE SODIUM 40 MG: 40 INJECTION, POWDER, FOR SOLUTION INTRAVENOUS at 08:02

## 2023-02-04 RX ADMIN — ZINC SULFATE 220 MG (50 MG) CAPSULE 220 MG: CAPSULE at 08:02

## 2023-02-04 RX ADMIN — MIRTAZAPINE 15 MG: 15 TABLET, FILM COATED ORAL at 09:02

## 2023-02-04 NOTE — PROGRESS NOTES
[facility] Hospital Medicine Progress Note        Chief Complaint: Inpatient Follow-up for FTT      HPI:  Quincy Triplett is a 82 y.o. male with a past medical history of essential hypertension, hyperlipidemia, CAD, obstructive jaundice, and duodenal cancer presented to [facility] on 1/11/2023 for weakness,  decreased appetite, decreased urine output, and nausea began 3 days ago. Patient reports recent biliary drain placement on 12/21/2022 with Dr. Brown and was discharged 3 days ago.  Patient states he has whipple procedure scheduled soon by Dr. Brown. Denied fever, chills, cough, congestion, chest pain, shortness of breath, abdominal pain, vomiting, diarrhea, hematuria, and dysuria.   Initial vital signs in ED were /78, pulse 85, respirations 18, temperature 36.6° C, and SpO2 98%.  Labs revealed WBC 7.9, RBC 4, hemoglobin 11.6, hematocrit 35.6, sodium 130, chloride 108, CO2 11, BUN 42.3, creatinine 2.79, phosphorus 5.2, alkaline phosphatase 376, albumin 2.6, bilirubin total 13.7, AST 76, , lipase 85, and troponin undetectable.  Flu testing was negative.  COVID testing was positive.  UA revealed 1+ protein, cloudy appearance, positive nitrate, 3+ bilirubin, 1+ leukocytes, and 7 RBCs.  Patient was given 1 L NS, IV calcium gluconate 1 g, 5 units insulin IV, sodium bicarb 50 mEq, and Lokelma 10 g. Patient was admitted to hospital medicine service for further medical management.   Surgical oncology evaluated the patient recommended to postpone the surgery given overall decline in the functional status with COVID-19 infection and metabolic acidosis from renal failure. Patient was initiated on bicarb drip for metabolic acidosis.  Nephrology was consulted for metabolic acidosis renal failure.  Recommended IV fluids, encourage p.o. intake to compensate losses.  Attempts to internalize biliary drain were unsuccessful on 01/06/2023.  GI was consulted for elevated bilirubin, as  patient had biliary drain with good output recommended conservative management.  GI had reconsulted given positive FOBT with slowly drifting hemoglobin.  EGD 01/19/2023 revealed hemorrhagic duodenopathy, acquired duodenal stenosis.  Hemostatic spray was deployed, no specimens were collected.  Stomach and esophagus was normal.  1 unit of PRBC was transfused due to anemia.  Surgical oncology re-evaluated and recommended outpatient follow up for Whipple surgery.  Initial plan was to discharge to home with home health but patient reported generalized weakness and PT recommended rehab.  Discharge was held and currently awaiting placement.  Zosyn was continued while inpatient with plan to stop antibiotics on 01/29/2023.      Interval Hx:   Sitting in chair, discuss blood cultures are negative x48 hrs.  Patient has no complaints.  He feels good.  Case discussed with patient nurse on the floor    Objective/physical exam:  Vitals:    02/03/23 1931 02/03/23 2027 02/03/23 2209 02/04/23 0306   BP: (!) 158/84  (!) 155/78 (!) 148/81   Patient Position:       Pulse: 83  70 79   Resp:  18     Temp: 97.6 °F (36.4 °C)  97.5 °F (36.4 °C) 97.9 °F (36.6 °C)   TempSrc: Oral  Oral Oral   SpO2: 98%  100% 100%   Weight:       Height:         General: In no acute distress, afebrile, jaundiced with scleral icterus, elderly male  Respiratory: Clear to auscultation bilaterally to the bases, on room air  Cardiovascular: S1, S2, no appreciable murmur  Abdomen: Soft, nontender, BS +, biliary drain present  Extremities:  Right forearm amputated, bilateral lower extremities swelling  Neurologic: Alert and oriented x4, cooperative    Lab Results   Component Value Date     (L) 02/02/2023    K 3.5 02/02/2023    CO2 19 (L) 02/02/2023    BUN 19.7 02/02/2023    CREATININE 0.90 02/02/2023    CALCIUM 8.4 (L) 02/02/2023    EGFRNONAA >60 05/23/2022      Lab Results   Component Value Date    ALT 64 (H) 02/02/2023    AST 46 (H) 02/02/2023    ALKPHOS 148  02/02/2023    BILITOT 4.7 (H) 02/02/2023      Lab Results   Component Value Date    WBC 7.9 02/02/2023    HGB 8.9 (L) 02/02/2023    HCT 27.4 (L) 02/02/2023    MCV 91.6 02/02/2023     02/02/2023           Medications:   ergocalciferol  50,000 Units Oral Q7 Days    mirtazapine  15 mg Oral QHS    pantoprazole  40 mg Intravenous BID    sodium bicarbonate  650 mg Oral BID    sodium chloride 0.9%  10 mL Intravenous Q6H    tamsulosin  0.4 mg Oral Daily    zinc sulfate  220 mg Oral Daily      sodium chloride, sodium chloride, acetaminophen, albuterol, bisacodyL, dextrose 10%, dextrose 10%, docusate sodium, meclizine, ondansetron, ondansetron, oxyCODONE, prochlorperazine, Flushing PICC Protocol **AND** sodium chloride 0.9% **AND** sodium chloride 0.9%       Assessment/Plan:  COVID-19 infection-(vaccinated)- off of isolation now  TERRENCE -prerenal etiology from poor p.o. intake, high biliary drain output - resolved    Acute severe Metabolic acidosis due to renal failure- improving   Acute on chronic blood loss anemia, hemorrhagic duodenopathy- Hb stable   Hypovolemic Hyponatremia - resolved   Hypokalemia- resolved  Obstructive jaundice s/p biliary stent placement 12/21/2022, failed internalization 01/06/2023, Elevated liver enzymes   Proctitis- being treated   Severe physical deconditioning  Severe malnutrition  HX:  Duodenal cancer, PVD, right arm amputation, CAD      Admitted as inpatient on 01/12/2023  Appreciate ID input  Completed 14 days course of Zosyn on 1/29/23 for proctitis per ID recs   2/2- patient spiked a fever of 101.8, probably erroneous/after hot coffee or breakfast given no repeat fever, no leukocytosis and Blood cultures x2- negative x 48hours   Continue biliary drain.  Surgical oncologist recommended outpatient evaluation for Whipple's once patient is strong enough  Continue Protonix.  GI signed off.  Monitor Hb while inpatient- so far stable   Stable from COVID-19 standpoint.  Completed remdesivir for  3 days.  Continue supportive care, now off of isolation and on RA  1/23- left lower extremity Doppler was negative for DVT  Continue p.o. bicarb 650mg BID   Cr normalized   Continue therapy while in house     Home medications were reviewed  and appropriate meds resumed on admission  CM informed me Insurance denied his rehab given he does not have the rehab diagnosis, will check for TCU  Morning labs to be ordered for Monday      VTE prophylaxis:  SCD    Patient condition:  Fair     Anticipated discharge and Disposition:   SNF/TCU. Pt is medically cleared for transfer     All diagnosis and differential diagnosis have been reviewed; assessment and plan has been documented; I have personally reviewed the labs and test results that are presently available; I have reviewed the patients medication list; I have reviewed the consulting providers response and recommendations. I have reviewed or attempted to review medical records based upon their availability    All of the patient's questions have been  addressed and answered. Patient's is agreeable to the above stated plan. I will continue to monitor closely and make adjustments to medical management as needed.  _______________________________________________________________________    CV Ultrasound doppler venous DVT leg left  There was no evidence of deep or superficial vein thrombosis in the left   lower extremity.       José Manuel Kirk MD  Department of Hospital Medicine   [facility]   02/04/2023

## 2023-02-04 NOTE — PT/OT/SLP PROGRESS
Physical Therapy      Patient Name:  Quincy Triplett   MRN:  [id number]    Patient not seen today secondary to Patient fatigue. Will follow-up 2/5/23.

## 2023-02-05 PROCEDURE — 25000003 PHARM REV CODE 250: Performed by: INTERNAL MEDICINE

## 2023-02-05 PROCEDURE — 63600175 PHARM REV CODE 636 W HCPCS: Performed by: INTERNAL MEDICINE

## 2023-02-05 PROCEDURE — 25000003 PHARM REV CODE 250: Performed by: PHYSICIAN ASSISTANT

## 2023-02-05 PROCEDURE — A4216 STERILE WATER/SALINE, 10 ML: HCPCS | Performed by: INTERNAL MEDICINE

## 2023-02-05 PROCEDURE — C9113 INJ PANTOPRAZOLE SODIUM, VIA: HCPCS | Performed by: INTERNAL MEDICINE

## 2023-02-05 PROCEDURE — 25000003 PHARM REV CODE 250: Performed by: NURSE PRACTITIONER

## 2023-02-05 PROCEDURE — 21400001 HC TELEMETRY ROOM

## 2023-02-05 RX ADMIN — SODIUM CHLORIDE, PRESERVATIVE FREE 10 ML: 5 INJECTION INTRAVENOUS at 08:02

## 2023-02-05 RX ADMIN — MIRTAZAPINE 15 MG: 15 TABLET, FILM COATED ORAL at 08:02

## 2023-02-05 RX ADMIN — OXYCODONE HYDROCHLORIDE 5 MG: 5 TABLET ORAL at 06:02

## 2023-02-05 RX ADMIN — SODIUM BICARBONATE 650 MG: 650 TABLET ORAL at 08:02

## 2023-02-05 RX ADMIN — HYDROXYZINE PAMOATE 25 MG: 25 CAPSULE ORAL at 06:02

## 2023-02-05 RX ADMIN — PANTOPRAZOLE SODIUM 40 MG: 40 INJECTION, POWDER, FOR SOLUTION INTRAVENOUS at 08:02

## 2023-02-05 RX ADMIN — SODIUM CHLORIDE, PRESERVATIVE FREE 10 ML: 5 INJECTION INTRAVENOUS at 12:02

## 2023-02-05 RX ADMIN — SODIUM CHLORIDE, PRESERVATIVE FREE 10 ML: 5 INJECTION INTRAVENOUS at 06:02

## 2023-02-05 RX ADMIN — TAMSULOSIN HYDROCHLORIDE 0.4 MG: 0.4 CAPSULE ORAL at 09:02

## 2023-02-05 RX ADMIN — PANTOPRAZOLE SODIUM 40 MG: 40 INJECTION, POWDER, FOR SOLUTION INTRAVENOUS at 09:02

## 2023-02-05 RX ADMIN — SODIUM BICARBONATE 650 MG: 650 TABLET ORAL at 09:02

## 2023-02-05 RX ADMIN — ZINC SULFATE 220 MG (50 MG) CAPSULE 220 MG: CAPSULE at 09:02

## 2023-02-05 NOTE — PROGRESS NOTES
[facility] Hospital Medicine Progress Note        Chief Complaint: Inpatient Follow-up for FTT      HPI:  Quincy Triplett is a 82 y.o. male with a past medical history of essential hypertension, hyperlipidemia, CAD, obstructive jaundice, and duodenal cancer presented to [facility] on 1/11/2023 for weakness,  decreased appetite, decreased urine output, and nausea began 3 days ago. Patient reports recent biliary drain placement on 12/21/2022 with Dr. Brown and was discharged 3 days ago.  Patient states he has whipple procedure scheduled soon by Dr. Brown. Denied fever, chills, cough, congestion, chest pain, shortness of breath, abdominal pain, vomiting, diarrhea, hematuria, and dysuria.   Initial vital signs in ED were /78, pulse 85, respirations 18, temperature 36.6° C, and SpO2 98%.  Labs revealed WBC 7.9, RBC 4, hemoglobin 11.6, hematocrit 35.6, sodium 130, chloride 108, CO2 11, BUN 42.3, creatinine 2.79, phosphorus 5.2, alkaline phosphatase 376, albumin 2.6, bilirubin total 13.7, AST 76, , lipase 85, and troponin undetectable.  Flu testing was negative.  COVID testing was positive.  UA revealed 1+ protein, cloudy appearance, positive nitrate, 3+ bilirubin, 1+ leukocytes, and 7 RBCs.  Patient was given 1 L NS, IV calcium gluconate 1 g, 5 units insulin IV, sodium bicarb 50 mEq, and Lokelma 10 g. Patient was admitted to hospital medicine service for further medical management.   Surgical oncology evaluated the patient recommended to postpone the surgery given overall decline in the functional status with COVID-19 infection and metabolic acidosis from renal failure. Patient was initiated on bicarb drip for metabolic acidosis.  Nephrology was consulted for metabolic acidosis renal failure.  Recommended IV fluids, encourage p.o. intake to compensate losses.  Attempts to internalize biliary drain were unsuccessful on 01/06/2023.  GI was consulted for elevated bilirubin, as  patient had biliary drain with good output recommended conservative management.  GI had reconsulted given positive FOBT with slowly drifting hemoglobin.  EGD 01/19/2023 revealed hemorrhagic duodenopathy, acquired duodenal stenosis.  Hemostatic spray was deployed, no specimens were collected.  Stomach and esophagus was normal.  1 unit of PRBC was transfused due to anemia.  Surgical oncology re-evaluated and recommended outpatient follow up for Whipple surgery.  Initial plan was to discharge to home with home health but patient reported generalized weakness and PT recommended rehab.  Discharge was held and currently awaiting placement.  Zosyn was continued while inpatient with plan to stop antibiotics on 01/29/2023.      Interval Hx:   Sitting in bed, eating his breakfast.  No complaints.  He is able to get up and walk to the restroom and back.  Given he lives alone he is concerned going home without going to TCU 1st.  Discussed we understand his concern as well.   will give us update tomorrow  Case discussed with patient nurse on the floor    Objective/physical exam:  Vitals:    02/04/23 1504 02/04/23 1828 02/04/23 2006 02/04/23 2257   BP: 113/72  108/69 131/79   Patient Position:   Lying Lying   Pulse: 73  87 81   Resp:  18 18 18   Temp: 98 °F (36.7 °C)  97.5 °F (36.4 °C) 97.4 °F (36.3 °C)   TempSrc: Oral  Oral Oral   SpO2: 99%  98% 98%   Weight:       Height:         General: In no acute distress, afebrile, jaundiced with scleral icterus, elderly male  Respiratory: Clear to auscultation bilaterally to the bases, on room air  Cardiovascular: S1, S2, no appreciable murmur  Abdomen: Soft, nontender, BS +, biliary drain present  Extremities:  Right forearm amputated, bilateral lower extremities swelling  Neurologic: Alert and oriented x4, cooperative    Lab Results   Component Value Date     02/04/2023    K 4.0 02/04/2023    CO2 17 (L) 02/04/2023    BUN 17.5 02/04/2023    CREATININE 0.78 02/04/2023     CALCIUM 8.5 (L) 02/04/2023    EGFRNONAA >60 05/23/2022      Lab Results   Component Value Date    ALT 64 (H) 02/02/2023    AST 46 (H) 02/02/2023    ALKPHOS 148 02/02/2023    BILITOT 4.7 (H) 02/02/2023      Lab Results   Component Value Date    WBC 6.1 02/04/2023    HGB 10.2 (L) 02/04/2023    HCT 31.1 (L) 02/04/2023    MCV 90.1 02/04/2023     02/04/2023           Medications:   ergocalciferol  50,000 Units Oral Q7 Days    mirtazapine  15 mg Oral QHS    pantoprazole  40 mg Intravenous BID    sodium bicarbonate  650 mg Oral BID    sodium chloride 0.9%  10 mL Intravenous Q6H    tamsulosin  0.4 mg Oral Daily    zinc sulfate  220 mg Oral Daily      sodium chloride, sodium chloride, acetaminophen, albuterol, bisacodyL, dextrose 10%, dextrose 10%, docusate sodium, hydrOXYzine pamoate, meclizine, ondansetron, ondansetron, oxyCODONE, prochlorperazine, Flushing PICC Protocol **AND** sodium chloride 0.9% **AND** sodium chloride 0.9%       Assessment/Plan:  COVID-19 infection-(vaccinated)- off of isolation now  TERRENCE -prerenal etiology from poor p.o. intake, high biliary drain output - resolved    Metabolic acidosis due to renal failure- improving   Acute on chronic blood loss anemia, hemorrhagic duodenopathy- Hb stable   Hypovolemic Hyponatremia - resolved   Hypokalemia- resolved  Obstructive jaundice s/p biliary stent placement 12/21/2022, failed internalization 01/06/2023, Elevated liver enzymes   Proctitis- being treated   Severe physical deconditioning  Severe malnutrition  HX:  Duodenal cancer, PVD, right arm amputation, CAD      Admitted as inpatient on 01/12/2023  Appreciate ID input  Completed 14 days course of Zosyn on 1/29/23 for proctitis per ID recs   2/2- patient spiked a fever of 101.8, probably erroneous/after hot coffee or breakfast given no repeat fever, no leukocytosis and Blood cultures x2- negative x 48hours   Continue biliary drain.  Surgical oncologist recommended outpatient evaluation for Whipple's  once patient is strong enough  Continue Protonix.  GI signed off.  Monitor Hb while inpatient- so far stable   Stable from COVID-19 standpoint.  Completed remdesivir for 3 days.  Continue supportive care, now off of isolation and on RA  1/23- left lower extremity Doppler was negative for DVT  Continue p.o. bicarb 650mg BID   Cr normalized   Continue therapy while in house     Home medications were reviewed  and appropriate meds resumed on admission  CM informed me Insurance denied his rehab given he does not have the rehab diagnosis, will check for TCU  Morning BMP, mag ordered      VTE prophylaxis:  SCD    Patient condition:  Fair     Anticipated discharge and Disposition:   SNF/TCU. Pt is medically cleared for transfer     All diagnosis and differential diagnosis have been reviewed; assessment and plan has been documented; I have personally reviewed the labs and test results that are presently available; I have reviewed the patients medication list; I have reviewed the consulting providers response and recommendations. I have reviewed or attempted to review medical records based upon their availability    All of the patient's questions have been  addressed and answered. Patient's is agreeable to the above stated plan. I will continue to monitor closely and make adjustments to medical management as needed.  _______________________________________________________________________    CV Ultrasound doppler venous DVT leg left  There was no evidence of deep or superficial vein thrombosis in the left   lower extremity.       José Manuel Kirk MD  Department of Hospital Medicine   [facility]   02/05/2023

## 2023-02-06 LAB
ALBUMIN SERPL-MCNC: 1.9 G/DL (ref 3.4–4.8)
ALBUMIN/GLOB SERPL: 0.6 RATIO (ref 1.1–2)
ALP SERPL-CCNC: 368 UNIT/L (ref 40–150)
ALT SERPL-CCNC: 78 UNIT/L (ref 0–55)
AST SERPL-CCNC: 73 UNIT/L (ref 5–34)
BILIRUBIN DIRECT+TOT PNL SERPL-MCNC: 5.6 MG/DL
BUN SERPL-MCNC: 17 MG/DL (ref 8.4–25.7)
CALCIUM SERPL-MCNC: 8.5 MG/DL (ref 8.8–10)
CHLORIDE SERPL-SCNC: 108 MMOL/L (ref 98–107)
CO2 SERPL-SCNC: 19 MMOL/L (ref 23–31)
CREAT SERPL-MCNC: 0.91 MG/DL (ref 0.73–1.18)
GFR SERPLBLD CREATININE-BSD FMLA CKD-EPI: >60 MLS/MIN/1.73/M2
GLOBULIN SER-MCNC: 3 GM/DL (ref 2.4–3.5)
GLUCOSE SERPL-MCNC: 90 MG/DL (ref 82–115)
MAGNESIUM SERPL-MCNC: 1.6 MG/DL (ref 1.6–2.6)
POTASSIUM SERPL-SCNC: 3.9 MMOL/L (ref 3.5–5.1)
PROT SERPL-MCNC: 4.9 GM/DL (ref 5.8–7.6)
SODIUM SERPL-SCNC: 138 MMOL/L (ref 136–145)

## 2023-02-06 PROCEDURE — 83735 ASSAY OF MAGNESIUM: CPT | Performed by: INTERNAL MEDICINE

## 2023-02-06 PROCEDURE — 25000003 PHARM REV CODE 250: Performed by: INTERNAL MEDICINE

## 2023-02-06 PROCEDURE — C9113 INJ PANTOPRAZOLE SODIUM, VIA: HCPCS | Performed by: INTERNAL MEDICINE

## 2023-02-06 PROCEDURE — 63600175 PHARM REV CODE 636 W HCPCS: Performed by: INTERNAL MEDICINE

## 2023-02-06 PROCEDURE — 25000003 PHARM REV CODE 250: Performed by: NURSE PRACTITIONER

## 2023-02-06 PROCEDURE — 97116 GAIT TRAINING THERAPY: CPT | Mod: CQ

## 2023-02-06 PROCEDURE — 25000003 PHARM REV CODE 250: Performed by: PHYSICIAN ASSISTANT

## 2023-02-06 PROCEDURE — 80053 COMPREHEN METABOLIC PANEL: CPT | Performed by: INTERNAL MEDICINE

## 2023-02-06 PROCEDURE — A4216 STERILE WATER/SALINE, 10 ML: HCPCS | Performed by: INTERNAL MEDICINE

## 2023-02-06 PROCEDURE — 21400001 HC TELEMETRY ROOM

## 2023-02-06 RX ORDER — SODIUM BICARBONATE 650 MG/1
1300 TABLET ORAL 2 TIMES DAILY
Status: DISCONTINUED | OUTPATIENT
Start: 2023-02-06 | End: 2023-02-10 | Stop reason: HOSPADM

## 2023-02-06 RX ADMIN — PANTOPRAZOLE SODIUM 40 MG: 40 INJECTION, POWDER, FOR SOLUTION INTRAVENOUS at 09:02

## 2023-02-06 RX ADMIN — OXYCODONE HYDROCHLORIDE 5 MG: 5 TABLET ORAL at 11:02

## 2023-02-06 RX ADMIN — PANTOPRAZOLE SODIUM 40 MG: 40 INJECTION, POWDER, FOR SOLUTION INTRAVENOUS at 10:02

## 2023-02-06 RX ADMIN — ONDANSETRON 4 MG: 4 TABLET, ORALLY DISINTEGRATING ORAL at 10:02

## 2023-02-06 RX ADMIN — ZINC SULFATE 220 MG (50 MG) CAPSULE 220 MG: CAPSULE at 09:02

## 2023-02-06 RX ADMIN — SODIUM CHLORIDE, PRESERVATIVE FREE 10 ML: 5 INJECTION INTRAVENOUS at 12:02

## 2023-02-06 RX ADMIN — SODIUM CHLORIDE, PRESERVATIVE FREE 10 ML: 5 INJECTION INTRAVENOUS at 06:02

## 2023-02-06 RX ADMIN — ONDANSETRON 4 MG: 4 TABLET, ORALLY DISINTEGRATING ORAL at 06:02

## 2023-02-06 RX ADMIN — OXYCODONE HYDROCHLORIDE 5 MG: 5 TABLET ORAL at 06:02

## 2023-02-06 RX ADMIN — SODIUM BICARBONATE 650 MG: 650 TABLET ORAL at 09:02

## 2023-02-06 RX ADMIN — TAMSULOSIN HYDROCHLORIDE 0.4 MG: 0.4 CAPSULE ORAL at 09:02

## 2023-02-06 RX ADMIN — HYDROXYZINE PAMOATE 25 MG: 25 CAPSULE ORAL at 10:02

## 2023-02-06 RX ADMIN — SODIUM CHLORIDE, PRESERVATIVE FREE 10 ML: 5 INJECTION INTRAVENOUS at 09:02

## 2023-02-06 RX ADMIN — SODIUM BICARBONATE 1300 MG: 650 TABLET ORAL at 10:02

## 2023-02-06 RX ADMIN — ONDANSETRON 4 MG: 4 TABLET, ORALLY DISINTEGRATING ORAL at 11:02

## 2023-02-06 RX ADMIN — MIRTAZAPINE 15 MG: 15 TABLET, FILM COATED ORAL at 10:02

## 2023-02-06 RX ADMIN — OXYCODONE HYDROCHLORIDE 5 MG: 5 TABLET ORAL at 10:02

## 2023-02-06 NOTE — PT/OT/SLP PROGRESS
Physical Therapy Treatment    Patient Name:  Quincy Triplett   MRN:  [id number]    Recommendations:     Discharge Recommendations: rehabilitation facility  Discharge Equipment Recommendations: cane, quad  Barriers to discharge: Decreased caregiver support    Assessment:     Quincy Triplett is a 82 y.o. male admitted with a medical diagnosis of COVID-19.  He presents with the following impairments/functional limitations: weakness, gait instability, impaired endurance, impaired functional mobility, impaired cardiopulmonary response to activity .    Rehab Prognosis: Good; patient would benefit from acute skilled PT services to address these deficits and reach maximum level of function.    Recent Surgery: Procedure(s) (LRB):  EGD (N/A)  EGD,WITH HEMORRHAGE CONTROL 18 Days Post-Op    Plan:     During this hospitalization, patient to be seen 6 x/week to address the identified rehab impairments via gait training, therapeutic activities and progress toward the following goals:    Plan of Care Expires:  02/20/23    Subjective     Chief Complaint:   Patient/Family Comments/goals:   Pain/Comfort:         Objective:     Communicated with nurse prior to session.  Patient found up in chair with telemetry upon PT entry to room.     General Precautions: Standard, fall  Orthopedic Precautions: N/A  Braces: N/A  Respiratory Status: Room air     Functional Mobility:  Transfers:     Sit to Stand:  contact guard assistance with straight cane  Gait: pt amb x2 trials for 150ft and 60ft with Judd and SPC with some unsteadiness noted with cues to correct and for eccentric control.       AM-PAC 6 CLICK MOBILITY          Treatment & Education:      Patient left up in chair with all lines intact and call button in reach..    GOALS:   Multidisciplinary Problems       Physical Therapy Goals          Problem: Physical Therapy    Goal Priority Disciplines Outcome Goal Variances Interventions   Physical Therapy Goal     PT, PT/OT Ongoing,  Progressing     Description: Goals to be met by: 23     Patient will increase functional independence with mobility by performin. Supine to sit with [place]  2. Sit to supine with [place]  3. Sit to stand transfer with Modified [place]  4. Gait  x 200 feet with Modified [place] using Quad Cane.                          Time Tracking:     PT Received On: 23  PT Start Time: 1525     PT Stop Time: 1537  PT Total Time (min): 12 min     Billable Minutes: Gait Training 12    Treatment Type: Treatment  PT/PTA: PTA     PTA Visit Number: 1     2023

## 2023-02-06 NOTE — PROGRESS NOTES
[facility] Hospital Medicine Progress Note        Chief Complaint: Inpatient Follow-up for FTT      HPI:  Quincy Triplett is a 82 y.o. male with a past medical history of essential hypertension, hyperlipidemia, CAD, obstructive jaundice, and duodenal cancer presented to [facility] on 1/11/2023 for weakness,  decreased appetite, decreased urine output, and nausea began 3 days ago. Patient reports recent biliary drain placement on 12/21/2022 with Dr. Brown and was discharged 3 days ago.  Patient states he has whipple procedure scheduled soon by Dr. Brown. Denied fever, chills, cough, congestion, chest pain, shortness of breath, abdominal pain, vomiting, diarrhea, hematuria, and dysuria.   Initial vital signs in ED were /78, pulse 85, respirations 18, temperature 36.6° C, and SpO2 98%.  Labs revealed WBC 7.9, RBC 4, hemoglobin 11.6, hematocrit 35.6, sodium 130, chloride 108, CO2 11, BUN 42.3, creatinine 2.79, phosphorus 5.2, alkaline phosphatase 376, albumin 2.6, bilirubin total 13.7, AST 76, , lipase 85, and troponin undetectable.  Flu testing was negative.  COVID testing was positive.  UA revealed 1+ protein, cloudy appearance, positive nitrate, 3+ bilirubin, 1+ leukocytes, and 7 RBCs.  Patient was given 1 L NS, IV calcium gluconate 1 g, 5 units insulin IV, sodium bicarb 50 mEq, and Lokelma 10 g. Patient was admitted to hospital medicine service for further medical management.   Surgical oncology evaluated the patient recommended to postpone the surgery given overall decline in the functional status with COVID-19 infection and metabolic acidosis from renal failure. Patient was initiated on bicarb drip for metabolic acidosis.  Nephrology was consulted for metabolic acidosis renal failure.  Recommended IV fluids, encourage p.o. intake to compensate losses.  Attempts to internalize biliary drain were unsuccessful on 01/06/2023.  GI was consulted for elevated bilirubin, as  patient had biliary drain with good output recommended conservative management.  GI had reconsulted given positive FOBT with slowly drifting hemoglobin.  EGD 01/19/2023 revealed hemorrhagic duodenopathy, acquired duodenal stenosis.  Hemostatic spray was deployed, no specimens were collected.  Stomach and esophagus was normal.  1 unit of PRBC was transfused due to anemia.  Surgical oncology re-evaluated and recommended outpatient follow up for Whipple surgery.  Initial plan was to discharge to home with home health but patient reported generalized weakness and PT recommended rehab.  Discharge was held and currently awaiting placement.  Zosyn was continued while inpatient with plan to stop antibiotics on 01/29/2023.      Interval Hx:   Sitting in chair.  Discussed no words from TCU yet.  Patient reported he would like to talk to  regarding other options.  His brother-in-law is at bedside    Case discussed with patient nurse and  on the floor    Objective/physical exam:  Vitals:    02/05/23 1942 02/06/23 0015 02/06/23 0500 02/06/23 0805   BP: 116/68 133/80 (!) 146/85 126/82   BP Location:  Left arm Left arm    Patient Position:  Lying Lying    Pulse: 72 80 88 95   Resp: 18 18 18    Temp: 98.7 °F (37.1 °C) 97.5 °F (36.4 °C) 98.4 °F (36.9 °C) 98.4 °F (36.9 °C)   TempSrc: Oral Oral Oral Oral   SpO2: 99% 99% 98% 98%   Weight:       Height:         General: In no acute distress, afebrile, jaundiced with scleral icterus, elderly male  Respiratory: Clear to auscultation bilaterally to the bases, on room air  Cardiovascular: S1, S2, no appreciable murmur  Abdomen: Soft, nontender, BS +, biliary drain present  Extremities:  Right forearm amputated, bilateral lower extremities swelling  Neurologic: Alert and oriented x4, cooperative    Lab Results   Component Value Date     02/06/2023    K 3.9 02/06/2023    CO2 19 (L) 02/06/2023    BUN 17.0 02/06/2023    CREATININE 0.91 02/06/2023    CALCIUM 8.5 (L)  02/06/2023    EGFRNONAA >60 05/23/2022      Lab Results   Component Value Date    ALT 78 (H) 02/06/2023    AST 73 (H) 02/06/2023    ALKPHOS 368 (H) 02/06/2023    BILITOT 5.6 (H) 02/06/2023      Lab Results   Component Value Date    WBC 6.1 02/04/2023    HGB 10.2 (L) 02/04/2023    HCT 31.1 (L) 02/04/2023    MCV 90.1 02/04/2023     02/04/2023           Medications:   ergocalciferol  50,000 Units Oral Q7 Days    mirtazapine  15 mg Oral QHS    pantoprazole  40 mg Intravenous BID    sodium bicarbonate  650 mg Oral BID    sodium chloride 0.9%  10 mL Intravenous Q6H    tamsulosin  0.4 mg Oral Daily    zinc sulfate  220 mg Oral Daily      sodium chloride, sodium chloride, acetaminophen, albuterol, bisacodyL, dextrose 10%, dextrose 10%, docusate sodium, hydrOXYzine pamoate, meclizine, ondansetron, ondansetron, oxyCODONE, prochlorperazine, Flushing PICC Protocol **AND** sodium chloride 0.9% **AND** sodium chloride 0.9%       Assessment/Plan:  COVID-19 infection-(vaccinated)- off of isolation now  TERRENCE -prerenal etiology from poor p.o. intake, high biliary drain output - resolved    Metabolic acidosis due to renal failure- improving   Acute on chronic blood loss anemia, hemorrhagic duodenopathy- Hb stable   Hypovolemic Hyponatremia - resolved   Hypokalemia- resolved  Obstructive jaundice s/p biliary stent placement 12/21/2022, failed internalization 01/06/2023, Elevated liver enzymes with alk phos   Proctitis- being treated   Severe physical deconditioning  Severe malnutrition  HX:  Duodenal cancer, PVD, right arm amputation, CAD      Admitted as inpatient on 01/12/2023  Appreciate ID input  Completed 14 days course of Zosyn on 1/29/23 for proctitis per ID recs   2/2- patient spiked a fever of 101.8, probably erroneous/after hot coffee or breakfast given no repeat fever, no leukocytosis and Blood cultures x2- negative x 48hours   Continue biliary drain.  Surgical oncologist recommended outpatient evaluation for Whipple's  once patient is strong enough  Continue Protonix.  GI signed off.  Monitor Hb while inpatient- so far stable   Stable from COVID-19 standpoint.  Completed remdesivir for 3 days.  Continue supportive care, now off of isolation and on RA  1/23- left lower extremity Doppler was negative for DVT  Metabolic acidosis persists, increase p.o. [id number] BID   Cr normalized   Continue therapy while in house     Home medications were reviewed  and appropriate meds resumed on admission  CM informed me Insurance denied his rehab given he does not have the rehab diagnosis, will check for TCU/ SNF   Repeat labs Thursday      VTE prophylaxis:  SCD    Patient condition:  Fair     Anticipated discharge and Disposition:   SNF/TCU. Pt is medically cleared for transfer     All diagnosis and differential diagnosis have been reviewed; assessment and plan has been documented; I have personally reviewed the labs and test results that are presently available; I have reviewed the patients medication list; I have reviewed the consulting providers response and recommendations. I have reviewed or attempted to review medical records based upon their availability    All of the patient's questions have been  addressed and answered. Patient's is agreeable to the above stated plan. I will continue to monitor closely and make adjustments to medical management as needed.  _______________________________________________________________________    CV Ultrasound doppler venous DVT leg left  There was no evidence of deep or superficial vein thrombosis in the left   lower extremity.       José Manuel Kirk MD  Department of Hospital Medicine   [facility]   02/06/2023

## 2023-02-06 NOTE — PLAN OF CARE
TCU has no beds at this time and there is a waiting list. Patient updated and other options discussed. Patient would like to proceed with skilled nursing facility (SNF). Freedom of choice obtained. Referral sent to [facility]. PASRR completed. [facility] notified.

## 2023-02-07 ENCOUNTER — DOCUMENTATION ONLY (OUTPATIENT)
Dept: SURGICAL ONCOLOGY | Facility: CLINIC | Age: 83
End: 2023-02-07
Payer: MEDICARE

## 2023-02-07 LAB
ALBUMIN SERPL-MCNC: 1.9 G/DL (ref 3.4–4.8)
ALBUMIN/GLOB SERPL: 0.5 RATIO (ref 1.1–2)
ALP SERPL-CCNC: 344 UNIT/L (ref 40–150)
ALT SERPL-CCNC: 78 UNIT/L (ref 0–55)
AST SERPL-CCNC: 74 UNIT/L (ref 5–34)
BACTERIA BLD CULT: NORMAL
BACTERIA BLD CULT: NORMAL
BILIRUBIN DIRECT+TOT PNL SERPL-MCNC: 4.9 MG/DL
BUN SERPL-MCNC: 16.6 MG/DL (ref 8.4–25.7)
CALCIUM SERPL-MCNC: 8.7 MG/DL (ref 8.8–10)
CHLORIDE SERPL-SCNC: 109 MMOL/L (ref 98–107)
CO2 SERPL-SCNC: 19 MMOL/L (ref 23–31)
CREAT SERPL-MCNC: 0.99 MG/DL (ref 0.73–1.18)
GFR SERPLBLD CREATININE-BSD FMLA CKD-EPI: >60 MLS/MIN/1.73/M2
GLOBULIN SER-MCNC: 3.7 GM/DL (ref 2.4–3.5)
GLUCOSE SERPL-MCNC: 105 MG/DL (ref 82–115)
POTASSIUM SERPL-SCNC: 3.8 MMOL/L (ref 3.5–5.1)
PROT SERPL-MCNC: 5.6 GM/DL (ref 5.8–7.6)
SODIUM SERPL-SCNC: 138 MMOL/L (ref 136–145)

## 2023-02-07 PROCEDURE — 63600175 PHARM REV CODE 636 W HCPCS: Performed by: INTERNAL MEDICINE

## 2023-02-07 PROCEDURE — 25000003 PHARM REV CODE 250: Performed by: INTERNAL MEDICINE

## 2023-02-07 PROCEDURE — 25000003 PHARM REV CODE 250: Performed by: RADIOLOGY

## 2023-02-07 PROCEDURE — 25000003 PHARM REV CODE 250: Performed by: NURSE PRACTITIONER

## 2023-02-07 PROCEDURE — 63600175 PHARM REV CODE 636 W HCPCS: Performed by: RADIOLOGY

## 2023-02-07 PROCEDURE — 80053 COMPREHEN METABOLIC PANEL: CPT | Performed by: INTERNAL MEDICINE

## 2023-02-07 PROCEDURE — C9113 INJ PANTOPRAZOLE SODIUM, VIA: HCPCS | Performed by: INTERNAL MEDICINE

## 2023-02-07 PROCEDURE — A4216 STERILE WATER/SALINE, 10 ML: HCPCS | Performed by: INTERNAL MEDICINE

## 2023-02-07 PROCEDURE — 21400001 HC TELEMETRY ROOM

## 2023-02-07 RX ORDER — LIDOCAINE HYDROCHLORIDE 20 MG/ML
INJECTION, SOLUTION INFILTRATION; PERINEURAL
Status: COMPLETED | OUTPATIENT
Start: 2023-02-07 | End: 2023-02-07

## 2023-02-07 RX ORDER — HYDROMORPHONE HYDROCHLORIDE 2 MG/ML
0.5 INJECTION, SOLUTION INTRAMUSCULAR; INTRAVENOUS; SUBCUTANEOUS EVERY 4 HOURS PRN
Status: DISCONTINUED | OUTPATIENT
Start: 2023-02-07 | End: 2023-02-10 | Stop reason: HOSPADM

## 2023-02-07 RX ORDER — FENTANYL CITRATE 50 UG/ML
INJECTION, SOLUTION INTRAMUSCULAR; INTRAVENOUS
Status: COMPLETED | OUTPATIENT
Start: 2023-02-07 | End: 2023-02-07

## 2023-02-07 RX ORDER — MORPHINE SULFATE 4 MG/ML
2 INJECTION, SOLUTION INTRAMUSCULAR; INTRAVENOUS EVERY 4 HOURS PRN
Status: DISCONTINUED | OUTPATIENT
Start: 2023-02-07 | End: 2023-02-07

## 2023-02-07 RX ADMIN — SODIUM BICARBONATE 1300 MG: 650 TABLET ORAL at 08:02

## 2023-02-07 RX ADMIN — SODIUM CHLORIDE, PRESERVATIVE FREE 10 ML: 5 INJECTION INTRAVENOUS at 12:02

## 2023-02-07 RX ADMIN — LIDOCAINE HYDROCHLORIDE 5 ML: 20 INJECTION, SOLUTION INFILTRATION; PERINEURAL at 11:02

## 2023-02-07 RX ADMIN — PANTOPRAZOLE SODIUM 40 MG: 40 INJECTION, POWDER, FOR SOLUTION INTRAVENOUS at 08:02

## 2023-02-07 RX ADMIN — TAMSULOSIN HYDROCHLORIDE 0.4 MG: 0.4 CAPSULE ORAL at 08:02

## 2023-02-07 RX ADMIN — SODIUM CHLORIDE, PRESERVATIVE FREE 10 ML: 5 INJECTION INTRAVENOUS at 06:02

## 2023-02-07 RX ADMIN — OXYCODONE HYDROCHLORIDE 5 MG: 5 TABLET ORAL at 03:02

## 2023-02-07 RX ADMIN — FENTANYL CITRATE 25 MCG: 50 INJECTION, SOLUTION INTRAMUSCULAR; INTRAVENOUS at 11:02

## 2023-02-07 RX ADMIN — HYDROXYZINE PAMOATE 25 MG: 25 CAPSULE ORAL at 08:02

## 2023-02-07 RX ADMIN — OXYCODONE HYDROCHLORIDE 5 MG: 5 TABLET ORAL at 08:02

## 2023-02-07 RX ADMIN — FENTANYL CITRATE 25 MCG: 50 INJECTION, SOLUTION INTRAMUSCULAR; INTRAVENOUS at 12:02

## 2023-02-07 RX ADMIN — HYDROMORPHONE HYDROCHLORIDE 0.5 MG: 2 INJECTION INTRAMUSCULAR; INTRAVENOUS; SUBCUTANEOUS at 03:02

## 2023-02-07 RX ADMIN — DOCUSATE SODIUM 50 MG: 50 CAPSULE, LIQUID FILLED ORAL at 08:02

## 2023-02-07 RX ADMIN — MIRTAZAPINE 15 MG: 15 TABLET, FILM COATED ORAL at 08:02

## 2023-02-07 RX ADMIN — HYDROMORPHONE HYDROCHLORIDE 0.5 MG: 2 INJECTION INTRAMUSCULAR; INTRAVENOUS; SUBCUTANEOUS at 11:02

## 2023-02-07 NOTE — PLAN OF CARE
Spoke to Shauna CHERY she stated she's going call pt's son. Then she'll give me a cb on acceptance

## 2023-02-07 NOTE — PROGRESS NOTES
Erickson Bonds, RN  Caller: Patient's Brother Called (Today,  9:11 AM)  Good Morning Ma'am,     My name is Erickson I'm a PAR (Patient Access Rep). I'm up front confirming patient appointments for tomorrow and with this patient he is currently in the hospital, but his appointment was set for tomorrow at 11 am to see Ms. Bonner our nurse. Please give the patient's brother a call back with a new appointment.     : 42   Pt's Brother Phone: [phone]        Maurice Lorenzblanc- Brother         [id number]:  I spoke with patient's brother, he tells me he is unsure of when he will be getting discharged from the hospital.  Gave him a new follow up appt of 2/15/23 at 2:30pm.

## 2023-02-07 NOTE — CONSULTS
"    Quincy Triplett 1940  [id number]  2/7/2023    CONSULTING PHYSICIAN: Reagan    CC: Urinary Retention      HPI: Per medicine note: "Quincy Triplett is a 82 y.o. male with a past medical history of essential hypertension, hyperlipidemia, CAD, obstructive jaundice, and duodenal cancer presented to [facility] on 1/11/2023 for weakness,  decreased appetite, decreased urine output, and nausea began 3 days ago. Patient reports recent biliary drain placement on 12/21/2022 with Dr. Brown and was discharged 3 days ago.  Patient states he has whipple procedure scheduled soon by Dr. Brown. Denied fever, chills, cough, congestion, chest pain, shortness of breath, abdominal pain, vomiting, diarrhea, hematuria, and dysuria.   Initial vital signs in ED were /78, pulse 85, respirations 18, temperature 36.6° C, and SpO2 98%.  Labs revealed WBC 7.9, RBC 4, hemoglobin 11.6, hematocrit 35.6, sodium 130, chloride 108, CO2 11, BUN 42.3, creatinine 2.79, phosphorus 5.2, alkaline phosphatase 376, albumin 2.6, bilirubin total 13.7, AST 76, , lipase 85, and troponin undetectable.  Flu testing was negative.  COVID testing was positive.  UA revealed 1+ protein, cloudy appearance, positive nitrate, 3+ bilirubin, 1+ leukocytes, and 7 RBCs.  Patient was given 1 L NS, IV calcium gluconate 1 g, 5 units insulin IV, sodium bicarb 50 mEq, and Lokelma 10 g. Patient was admitted to hospital medicine service for further medical management.     Surgical oncology evaluated the patient recommended to postpone the surgery given overall decline in the functional status with COVID-19 infection and metabolic acidosis from renal failure. Patient was initiated on bicarb drip for metabolic acidosis.  Nephrology was consulted for metabolic acidosis renal failure.  Recommended IV fluids, encourage p.o. intake to compensate losses.  Attempts to internalize biliary drain were unsuccessful on 01/06/2023.  GI was consulted for elevated bilirubin, as " "patient had biliary drain with good output recommended conservative management.  GI had reconsulted given positive FOBT with slowly drifting hemoglobin.  EGD 01/19/2023 revealed hemorrhagic duodenopathy, acquired duodenal stenosis.  Hemostatic spray was deployed, no specimens were collected.  Stomach and esophagus was normal.  1 unit of PRBC was transfused due to anemia.  Surgical oncology re-evaluated and recommended outpatient follow up for Whipple surgery.  Initial plan was to discharge to home with home health but patient reported generalized weakness and PT recommended rehab.  Discharge was held and currently awaiting placement.  Zosyn was continued while inpatient with plan to stop antibiotics on 01/29/2023."        Amazingly after >2-3 weeks of admission including renal US and CT 2nd week of January that show a distended bladder,  is now being consulted as his imaging shows hydronephrosis and distended bladder.  I think this patient has been poorly voiding at baseline.  Nursing staff unable to pass campos.  He normally follows with Dr. Negron on flomax.  Denies retention or campos or cysto previous.  He has multiple complex medical issues at this time.  His Cr is normal currently.        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, unspecified extremity     Coronary artery disease     Dyslipidemia     Hypertension        Past Surgical History:   Procedure Laterality Date    AMPUTATION Right     Right arm    CAROTID STENT      ERCP N/A 12/21/2022    Procedure: ERCP;  Surgeon: Sushil Anderson MD;  Location: [facility] ENDOSCOPY;  Service: Gastroenterology;  Laterality: N/A;  food in abdomen    ERCP, WITH BIOPSY  12/21/2022    Procedure: ERCP, WITH BIOPSY;  Surgeon: Sushil Anderson MD;  Location: [facility] ENDOSCOPY;  Service: Gastroenterology;;    LEFT HEART CATHETERIZATION      TONSILLECTOMY         History reviewed. No " Admission Reconciliation is Completed  Discharge Reconciliation is Not Complete pertinent family history.    Social History     Tobacco Use    Smoking status: Never    Smokeless tobacco: Never   Substance Use Topics    Alcohol use: Never    Drug use: Never       Current Facility-Administered Medications   Medication Dose Route Frequency Provider Last Rate Last Admin    0.9%  NaCl infusion (for blood administration)   Intravenous Q24H PRN Ashwini Mcmanus MD        0.9%  NaCl infusion (for blood administration)   Intravenous Q24H PRN Bridger Rivera MD        acetaminophen tablet 650 mg  650 mg Oral Q6H PRN Ashwini Mcmanus MD   650 mg at 02/02/23 0820    albuterol inhaler 2 puff  2 puff Inhalation Q6H PRN Jesús Bansal PA-C        bisacodyL suppository 10 mg  10 mg Rectal Daily PRN BEN Freed   10 mg at 01/23/23 0856    dextrose 10% bolus 125 mL 125 mL  12.5 g Intravenous PRN Yaya B Curet IV, MD        dextrose 10% bolus 250 mL 250 mL  25 g Intravenous PRN Yaya B Curet IV, MD        docusate sodium capsule 50 mg  50 mg Oral BID PRN Ashwiin Mcmanus MD   50 mg at 01/22/23 0828    ergocalciferol capsule 50,000 Units  50,000 Units Oral Q7 Days Jesús Bansal PA-C   50,000 Units at 02/02/23 0820    HYDROmorphone (PF) injection 0.5 mg  0.5 mg Intravenous Q4H PRN José Manuel Kirk MD   0.5 mg at 02/07/23 1127    hydrOXYzine pamoate capsule 25 mg  25 mg Oral Q8H PRN ISAI Mendez   25 mg at 02/06/23 2204    meclizine tablet 12.5 mg  12.5 mg Oral TID PRN Bri Marrero MD   12.5 mg at 01/23/23 0847    mirtazapine tablet 15 mg  15 mg Oral QHS Ashwini Mcmanus MD   15 mg at 02/06/23 2205    ondansetron disintegrating tablet 4 mg  4 mg Oral Q8H PRN Bri Marrero MD   4 mg at 02/06/23 2205    ondansetron injection 4 mg  4 mg Intravenous Q6H PRN KELVIN Bird   4 mg at 01/25/23 1136    oxyCODONE immediate release tablet 5 mg  5 mg Oral Q4H PRN Ashwini Mcmanus MD   5 mg at 02/07/23 0809    pantoprazole injection 40 mg  40 mg Intravenous BID Ashwini Mcmanus MD    40 mg at 02/07/23 0810    prochlorperazine injection Soln 5 mg  5 mg Intravenous Q6H PRN KELVIN Bird   5 mg at 01/13/23 2141    sodium bicarbonate tablet 1,300 mg  1,300 mg Oral BID José Manuel Kirk MD   1,300 mg at 02/07/23 0809    sodium chloride 0.9% flush 10 mL  10 mL Intravenous Q6H Ashwini Mcmanus MD   10 mL at 02/07/23 0636    And    sodium chloride 0.9% flush 10 mL  10 mL Intravenous PRN Ashwini Mcmanus MD   10 mL at 01/31/23 2036    tamsulosin 24 hr capsule 0.4 mg  0.4 mg Oral Daily Bri Marrero MD   0.4 mg at 02/07/23 0809       Review of patient's allergies indicates:  No Known Allergies    ROS: 12 point review of systems negative other than the HPI      PHYSICAL EXAM:  Vitals:    02/07/23 0809 02/07/23 1113 02/07/23 1127 02/07/23 1210   BP:  (!) 147/84  125/82   Pulse:  73  80   Resp: 18  18 16   Temp:  98.1 °F (36.7 °C)     TempSrc:  Oral     SpO2:  100%  100%   Weight:       Height:             Intake/Output Summary (Last 24 hours) at 2/7/2023 1417  Last data filed at 2/7/2023 0800  Gross per 24 hour   Intake 810 ml   Output 1000 ml   Net -190 ml       GEN: WN/WD NAD  HEENT: NCAT, PERRLA, EOMI, OP clear, nares patent  CV: RRR  RESP: Even and unlabored  ABD: obese, distended, biliary drain in place  : mild penoscrotal edema  EXT: no C/C/E  NEURO: no focal deficits, MAEW, AAOx4      LABS:  [unfilled]      Recent Results (from the past 24 hour(s))   Comprehensive Metabolic Panel    Collection Time: 02/07/23 10:12 AM   Result Value Ref Range    Sodium Level 138 136 - 145 mmol/L    Potassium Level 3.8 3.5 - 5.1 mmol/L    Chloride 109 (H) 98 - 107 mmol/L    Carbon Dioxide 19 (L) 23 - 31 mmol/L    Glucose Level 105 82 - 115 mg/dL    Blood Urea Nitrogen 16.6 8.4 - 25.7 mg/dL    Creatinine 0.99 0.73 - 1.18 mg/dL    Calcium Level Total 8.7 (L) 8.8 - 10.0 mg/dL    Protein Total 5.6 (L) 5.8 - 7.6 gm/dL    Albumin Level 1.9 (L) 3.4 - 4.8 g/dL    Globulin 3.7 (H) 2.4 - 3.5 gm/dL    Albumin/Globulin  Ratio 0.5 (L) 1.1 - 2.0 ratio    Bilirubin Total 4.9 (H) <=1.5 mg/dL    Alkaline Phosphatase 344 (H) 40 - 150 unit/L    Alanine Aminotransferase 78 (H) 0 - 55 unit/L    Aspartate Aminotransferase 74 (H) 5 - 34 unit/L    eGFR >60 mls/min/1.73/m2         IMAGING:  Imaging Results              X-Ray Chest AP Portable (Final result)  Result time 01/12/23 06:13:17      Final result by Zhanna Langley MD (01/12/23 06:13:17)                   Impression:      No acute abnormality of the chest.      Electronically signed by: Zhanna Langley  Date:    01/12/2023  Time:    06:13               Narrative:    EXAMINATION:  XR CHEST AP PORTABLE    CLINICAL HISTORY:  Shortness of breath    COMPARISON:  Chest x-ray dated 12/31/2022    FINDINGS:  The heart is normal in size.  There is no focal airspace consolidation.  There is no pleural effusion or visible pneumothorax.                                    18F campos placed using sterile technique and no advanced urologic maneuvers other than placing the campos in standard fashion    ASSESSMENT:  -BPH  -Chronic incomplete bladder emptying  -Multiple comorobidity    PLAN:  -Campos placed easily.  Needs 2-4 weeks.  Can follow up with Dr. Negron outpatient for void trial vs exchange pending his clinical condition.  -Flomax if BP tolerable  -Start proscar  -Call with questions.  Continue medical management.    Rob Anderson MD         Admission Reconciliation is Completed  Discharge Reconciliation is Completed

## 2023-02-07 NOTE — PROGRESS NOTES
"Inpatient Nutrition Assessment    Admit Date: 1/11/2023   Total duration of encounter: 27 days     Nutrition Recommendation/Prescription     Oral diet as tolerated.  Continue Boost Glucose Control (provides 250 kcal, 14 g protein per serving).  Monitor intake, tolerance, weight, and labs    Communication of Recommendations: reviewed with patient/caregiver    Nutrition Assessment     Malnutrition Assessment/Nutrition-Focused Physical Exam    Malnutrition in the context of chronic illness  Degree of Malnutrition: severe malnutrition  Energy Intake: < 75% of estimated energy requirement for >/= 1 month  Interpretation of Weight Loss: >7.5% in 3 months  Body Fat: severe depletion  Area of Body Fat Loss: orbital region  and upper arm region - triceps / biceps  Muscle Mass Loss: severe depletion  Area of Muscle Mass Loss: temple region - temporalis muscle and clavicle bone region - pectoralis major, deltoid, trapezius muscles  Fluid Accumulation: does not meet criteria  Edema: unable to obtain  Reduced  Strength: unable to obtain  A minimum of two characteristics is recommended for diagnosis of either severe or non-severe malnutrition.    Chart Review    Reason Seen: continuous nutrition monitoring and follow-up (previous consult for "dehydration")    Malnutrition Screening Tool Results   Have you recently lost weight without trying?: No  Have you been eating poorly because of a decreased appetite?: No   MST Score: 0     Diagnosis:  COVID-19 infection-(vaccinated)- off of isolation now  TERRENCE -prerenal etiology from poor p.o. intake, high biliary drain output - improving   Acute severe Metabolic acidosis due to renal failure- improving   Acute on chronic blood loss anemia, hemorrhagic duodenopathy- Hb stable   Hypovolemic Hyponatremia - resolved   Hypokalemia  Obstructive jaundice s/p biliary stent placement 12/21/2022, failed internalization 01/06/2023, Elevated liver enzymes   Proctitis- being treated   Failure to " thrive   Severe malnutrition    Relevant Medical History:   Essential hypertension  Hyperlipidemia  CAD  Obstructive jaundice   Duodenal cancer    Scheduled Meds:   ergocalciferol  50,000 Units Oral Q7 Days    mirtazapine  15 mg Oral QHS    pantoprazole  40 mg Intravenous BID    sodium bicarbonate  1,300 mg Oral BID    sodium chloride 0.9%  10 mL Intravenous Q6H    tamsulosin  0.4 mg Oral Daily   Calorie Containing IV Medications: no significant kcals from medications at this time    Lab Results   Component Value Date/Time     02/07/2023 10:12 AM    K 3.8 02/07/2023 10:12 AM    CHLORIDE 109 (H) 02/07/2023 10:12 AM    BUN 16.6 02/07/2023 10:12 AM    CREATININE 0.99 02/07/2023 10:12 AM    EGFRNORACEVR >60 02/07/2023 10:12 AM    GLUCOSE 105 02/07/2023 10:12 AM    CALCIUM 8.7 (L) 02/07/2023 10:12 AM    PHOS 3.5 01/22/2023 05:41 AM    MG 1.60 02/06/2023 04:59 AM    ALKPHOS 344 (H) 02/07/2023 10:12 AM    AST 74 (H) 02/07/2023 10:12 AM    ALT 78 (H) 02/07/2023 10:12 AM    ALBUMIN 1.9 (L) 02/07/2023 10:12 AM    CRP 3.70 01/13/2023 11:47 PM     Diet/PN Order: Diet diabetic  Oral Supplement Order: Boost Glucose Control  Tube Feeding Order: none  Appetite/Oral Intake: fair/% of meals (this includes Boost Glucose Control TID).  Factors Affecting Nutritional Intake: decreased appetite and pain  Food/Sabianism/Cultural Preferences: none reported  Food Allergies: none reported    Skin Integrity: drain/device(s)  Wound(s):   biliary tube noted    Comments    1/13/23 Patient reports decreased/poor appetite currently and prior to admission, reports significant weight loss over the past few months, agreeable to chocolate Boost, reports awaiting Whipple procedure as outpatient.    1/17/23 Patient reports appetite had improved and was good, now decreased/fair due to pain, does not want Boost.    1/20/23 Nurse reports patient had EGD yesterday and has requested to remain on clear liquid diet for now, will add Boost Breeze for  "additional kcal/protein, will also provide PPN recommendations.    23 Nurse reports patient consuming 25-50% of meals and 100% of Boost, will increase Boost frequency to three times daily.    23 Fair appetite/intake.    23 Patient reports good appetite, good intake of meals, consuming some of the Boost.    23 The nurse reports patient is doing well. Fair appetite and PO intake. The nurse reports patient drinking Boost Glucose Control TID. No tolerance issues reported.    23: The pt's brother in law reports ~85% PO intake with a fairly good appetite. The pt reports that he feels nauseated sometimes. Pt reports that he has been having good ostomy output. No new chewing/swallowing difficulties noted. Pt drinking Boost Glucose Control TID. Will monitor.    Anthropometrics    Height: 5' 9" (175.3 cm) Height Method: Stated  Last Weight: 69.3 kg (152 lb 12.5 oz) (23 1230) Weight Method: Bed Scale  BMI (Calculated): 22.6  BMI Classification: normal (BMI 18.5-24.9)        Ideal Body Weight (IBW), Male: 160 lb     % Ideal Body Weight, Male (lb): 95.49 %                 Usual Body Weight (UBW), k kg  % Usual Body Weight: 80.75     Usual Weight Provided By: patient    Wt Readings from Last 5 Encounters:   23 69.3 kg (152 lb 12.5 oz)   22 75.8 kg (167 lb)   22 79.8 kg (176 lb)     Weight Change(s) Since Admission:  Admit Weight: 70.3 kg (155 lb) (23 2320)  69.3 kg (23)  No new weights (2023)    Estimated Needs    Weight Used For Calorie Calculations: 69.3 kg (152 lb 12.5 oz)  Energy Calorie Requirements (kcal): 1943 kcal/d, 1.4 stress factor  Energy Need Method: Rich- Jeor  Weight Used For Protein Calculations: 69.3 kg (152 lb 12.5 oz)  Protein Requirements: 104 g/d, 1.5 g/kg  Fluid Requirements (mL): 1943 ml/d, 1 ml/kcal  Temp: 98.1 °F (36.7 °C)       Enteral Nutrition    Patient not receiving enteral nutrition at this time.    Parenteral " Nutrition    Patient not receiving parenteral nutrition support at this time.    Evaluation of Received Nutrient Intake    Calories: meeting estimated needs  Protein: meeting estimated needs    Patient Education    Not applicable.    Nutrition Diagnosis     PES: Malnutrition related to cancer as evidenced by <75% needs met >1 month, severe fat depletion, severe muscle depletion, and >7.5% weight loss in 3 months. (continues)    Interventions/Goals     Intervention(s): general/healthful diet, commercial beverage, and collaboration with other providers  Goal: Meet greater than 75% of nutritional needs by follow-up. (goal met)    Monitoring & Evaluation     Dietitian will monitor food and beverage intake.  Nutrition Risk/Follow-Up: moderate (follow-up in 3-5 days)   Please consult if re-assessment needed sooner.

## 2023-02-07 NOTE — PLAN OF CARE
Left a msg for Shauna to give me a cb about referral that was sent ,she's in morning meeting awaiting a cb

## 2023-02-07 NOTE — PT/OT/SLP PROGRESS
Occupational Therapy      Patient Name:  Quincy Triplett   MRN:  [id number]    Patient not seen today secondary to declining due to 10/10 abdominal pain. Nurse notified. Will follow-up as appropriate.    2/7/2023

## 2023-02-07 NOTE — OP NOTE
Radiology Post-Procedure Note    Pre Op Diagnosis: Biliary Drain pulled Back.    Post Op Diagnosis: Drain replaced    Procedure:  Replacement    Procedure performed by: Kodi Ambrocio M.D.    Written Informed Consent Obtained: Yes    Specimen Removed: YES     Estimated Blood Loss: Minimal    Findings:   Standard Drain Replacement    Patient tolerated procedure well.    Kodi Ambrocio MD

## 2023-02-07 NOTE — PT/OT/SLP PROGRESS
Physical Therapy      Patient Name:  Quincy Triplett   MRN:  [id number]    Patient not seen today secondary to Pain, nursing informed. Will follow-up as appropriate.

## 2023-02-07 NOTE — PROGRESS NOTES
[facility] Hospital Medicine Progress Note        Chief Complaint: Inpatient Follow-up for FTT      HPI:  Quincy Triplett is a 82 y.o. male with a past medical history of essential hypertension, hyperlipidemia, CAD, obstructive jaundice, and duodenal cancer presented to [facility] on 1/11/2023 for weakness,  decreased appetite, decreased urine output, and nausea began 3 days ago. Patient reports recent biliary drain placement on 12/21/2022 with Dr. Brown and was discharged 3 days ago.  Patient states he has whipple procedure scheduled soon by Dr. Brown. Denied fever, chills, cough, congestion, chest pain, shortness of breath, abdominal pain, vomiting, diarrhea, hematuria, and dysuria.   Initial vital signs in ED were /78, pulse 85, respirations 18, temperature 36.6° C, and SpO2 98%.  Labs revealed WBC 7.9, RBC 4, hemoglobin 11.6, hematocrit 35.6, sodium 130, chloride 108, CO2 11, BUN 42.3, creatinine 2.79, phosphorus 5.2, alkaline phosphatase 376, albumin 2.6, bilirubin total 13.7, AST 76, , lipase 85, and troponin undetectable.  Flu testing was negative.  COVID testing was positive.  UA revealed 1+ protein, cloudy appearance, positive nitrate, 3+ bilirubin, 1+ leukocytes, and 7 RBCs.  Patient was given 1 L NS, IV calcium gluconate 1 g, 5 units insulin IV, sodium bicarb 50 mEq, and Lokelma 10 g. Patient was admitted to hospital medicine service for further medical management.   Surgical oncology evaluated the patient recommended to postpone the surgery given overall decline in the functional status with COVID-19 infection and metabolic acidosis from renal failure. Patient was initiated on bicarb drip for metabolic acidosis.  Nephrology was consulted for metabolic acidosis renal failure.  Recommended IV fluids, encourage p.o. intake to compensate losses.  Attempts to internalize biliary drain were unsuccessful on 01/06/2023.  GI was consulted for elevated bilirubin, as  patient had biliary drain with good output recommended conservative management.  GI had reconsulted given positive FOBT with slowly drifting hemoglobin.  EGD 01/19/2023 revealed hemorrhagic duodenopathy, acquired duodenal stenosis.  Hemostatic spray was deployed, no specimens were collected.  Stomach and esophagus was normal.  1 unit of PRBC was transfused due to anemia.  Surgical oncology re-evaluated and recommended outpatient follow up for Whipple surgery.  Initial plan was to discharge to home with home health but patient reported generalized weakness and PT recommended rehab.  Discharge was held and currently awaiting placement.  Zosyn was continued while inpatient with plan to stop antibiotics on 01/29/2023.  2/2- patient spiked a fever of 101.8, probably erroneous/after hot coffee or breakfast given no repeat fever, no leukocytosis and Blood cultures x2- negative x 48hours   Continue biliary drain.  Surgical oncologist recommended outpatient evaluation for Whipple's once patient is strong enough      Interval Hx:   Laying in bed, reports he has developed upper abdominal pain this morning which is not going away.  When I examined him he was extremely tender to light palpation.    I noted his urine was also very dark in color.  Discussed I will order the CT scan of his abdomen and repeat his CMP verbalized understanding   His brother-in-law is at bedside    Case discussed with patient nurse and  on the floor    Objective/physical exam:  Vitals:    02/06/23 1937 02/06/23 2205 02/07/23 0008 02/07/23 0427   BP: (!) 98/58  127/75 128/88   BP Location:       Patient Position:       Pulse: 73  75 80   Resp: 18 19 18 18   Temp: 97.9 °F (36.6 °C)  97.8 °F (36.6 °C) 97.9 °F (36.6 °C)   TempSrc: Oral  Oral Oral   SpO2: 98%  100% 99%   Weight:       Height:         General: In no acute distress, afebrile, jaundiced with scleral icterus, elderly male  Respiratory: Clear to auscultation bilaterally to the bases,  on room air  Cardiovascular: S1, S2, no appreciable murmur  Abdomen: Soft, extremely tender in right upper quadrant and epigastric region, BS +, biliary drain present  Extremities:  Right forearm amputated, bilateral lower extremities swelling  Neurologic: Alert and oriented x4, cooperative    Lab Results   Component Value Date     02/06/2023    K 3.9 02/06/2023    CO2 19 (L) 02/06/2023    BUN 17.0 02/06/2023    CREATININE 0.91 02/06/2023    CALCIUM 8.5 (L) 02/06/2023    EGFRNONAA >60 05/23/2022      Lab Results   Component Value Date    ALT 78 (H) 02/06/2023    AST 73 (H) 02/06/2023    ALKPHOS 368 (H) 02/06/2023    BILITOT 5.6 (H) 02/06/2023      Lab Results   Component Value Date    WBC 6.1 02/04/2023    HGB 10.2 (L) 02/04/2023    HCT 31.1 (L) 02/04/2023    MCV 90.1 02/04/2023     02/04/2023           Medications:   ergocalciferol  50,000 Units Oral Q7 Days    mirtazapine  15 mg Oral QHS    pantoprazole  40 mg Intravenous BID    sodium bicarbonate  1,300 mg Oral BID    sodium chloride 0.9%  10 mL Intravenous Q6H    tamsulosin  0.4 mg Oral Daily      sodium chloride, sodium chloride, acetaminophen, albuterol, bisacodyL, dextrose 10%, dextrose 10%, docusate sodium, hydrOXYzine pamoate, meclizine, ondansetron, ondansetron, oxyCODONE, prochlorperazine, Flushing PICC Protocol **AND** sodium chloride 0.9% **AND** sodium chloride 0.9%       Assessment/Plan:  COVID-19 infection-(vaccinated)- off of isolation now  TERRENCE -prerenal etiology from poor p.o. intake, high biliary drain output - resolved    Obstructive jaundice s/p biliary stent placement 12/21/2022, failed internalization 01/06/2023, Elevated liver enzymes with alk phos   Dislodgement of biliary drain with intra and extrahepatic biliary duct dilation  Incidental bilateral hydro nephrosis, hydro ureters and distended bladder due to outlet obstruction  Metabolic acidosis- improving   Acute on chronic blood loss anemia, hemorrhagic duodenopathy- Hb  stable   Hypovolemic Hyponatremia - resolved   Hypokalemia- resolved  Proctitis- being treated   Severe physical deconditioning  Severe malnutrition  HX:  Duodenal cancer, PVD, right arm amputation, CAD  Acute upper abdominal pain with increase in Alk Phos      Admitted as inpatient on 01/12/2023  Appreciate ID input, Completed 14 days course of Zosyn on 1/29/23 for proctitis per ID recs   2/7- developed acute upper abdominal pain, extremely tender to mild touch, instead of ruq u/s given it will be more painful for him, will ordere CT Abdomen pelvis wo contrast--> interval development of intra and extrahepatic biliary dilation due to retraction of the percutaneous transhepatic biliary drain.  Repositioning recommended.  Again mass in the 2nd portion of duodenum seen.  Interval development of bilateral hydronephrosis and hydroureter and significant urinary bladder distention, bladder outlet obstruction suspected enlarged and partially calcified prostate  Ordered stat CMP as well --> again showed elevated alk-phos and liver enzymes.  Consulted IR, patient is status post successful replacement with a new 10 [place] external biliary drain with Dr. Ambrocio  Ordered Paul catheter, nurse was unable to place the Paul.  Urology consulted given resistance and patient has bilateral hydro nephrosis and hydro ureters  Dr. Rob Anderson place the Paul.  Recommended to keep the Paul for 2-4 weeks and then follow-up with Dr. Negron outpatient for a void trial versus exchange pending his clinical condition.  Started Proscar  Continue Protonix.  GI signed off.  Monitor Hb while inpatient- so far stable   Stable from COVID-19 standpoint.  Completed remdesivir for 3 days.  Continue supportive care, now off of isolation and on RA  1/23- left lower extremity Doppler was negative for DVT  Metabolic acidosis persists, increase p.o. [id number] BID   Cr normalized   Continue therapy while in house     Home medications were reviewed  and  appropriate meds resumed on admission  CM informed me Insurance denied his rehab given he does not have the rehab diagnosis, awaiting tcu/snf  Morning CBC, CMP, Mag ordered     VTE prophylaxis:  SCD    Patient condition:  Fair     Anticipated discharge and Disposition:   SNF/TCU. Pt is medically cleared for transfer     All diagnosis and differential diagnosis have been reviewed; assessment and plan has been documented; I have personally reviewed the labs and test results that are presently available; I have reviewed the patients medication list; I have reviewed the consulting providers response and recommendations. I have reviewed or attempted to review medical records based upon their availability    All of the patient's questions have been  addressed and answered. Patient's is agreeable to the above stated plan. I will continue to monitor closely and make adjustments to medical management as needed.  _______________________________________________________________________    CV Ultrasound doppler venous DVT leg left  There was no evidence of deep or superficial vein thrombosis in the left   lower extremity.       José Manuel Kirk MD  Department of Hospital Medicine   [facility]   02/07/2023

## 2023-02-08 LAB
ALBUMIN SERPL-MCNC: 1.6 G/DL (ref 3.4–4.8)
ALBUMIN/GLOB SERPL: 0.6 RATIO (ref 1.1–2)
ALP SERPL-CCNC: 258 UNIT/L (ref 40–150)
ALT SERPL-CCNC: 61 UNIT/L (ref 0–55)
AST SERPL-CCNC: 54 UNIT/L (ref 5–34)
BASOPHILS # BLD AUTO: 0.06 X10(3)/MCL (ref 0–0.2)
BASOPHILS NFR BLD AUTO: 0.8 %
BILIRUBIN DIRECT+TOT PNL SERPL-MCNC: 3.5 MG/DL
BUN SERPL-MCNC: 15.4 MG/DL (ref 8.4–25.7)
CALCIUM SERPL-MCNC: 7.9 MG/DL (ref 8.8–10)
CHLORIDE SERPL-SCNC: 104 MMOL/L (ref 98–107)
CO2 SERPL-SCNC: 21 MMOL/L (ref 23–31)
CREAT SERPL-MCNC: 0.87 MG/DL (ref 0.73–1.18)
EOSINOPHIL # BLD AUTO: 0.14 X10(3)/MCL (ref 0–0.9)
EOSINOPHIL NFR BLD AUTO: 1.8 %
ERYTHROCYTE [DISTWIDTH] IN BLOOD BY AUTOMATED COUNT: 16.3 % (ref 11.5–17)
GFR SERPLBLD CREATININE-BSD FMLA CKD-EPI: >60 MLS/MIN/1.73/M2
GLOBULIN SER-MCNC: 2.9 GM/DL (ref 2.4–3.5)
GLUCOSE SERPL-MCNC: 100 MG/DL (ref 82–115)
HCT VFR BLD AUTO: 24 % (ref 42–52)
HGB BLD-MCNC: 7.9 GM/DL (ref 14–18)
IMM GRANULOCYTES # BLD AUTO: 0.08 X10(3)/MCL (ref 0–0.04)
IMM GRANULOCYTES NFR BLD AUTO: 1 %
LYMPHOCYTES # BLD AUTO: 1.42 X10(3)/MCL (ref 0.6–4.6)
LYMPHOCYTES NFR BLD AUTO: 18.6 %
MAGNESIUM SERPL-MCNC: 1.4 MG/DL (ref 1.6–2.6)
MCH RBC QN AUTO: 29.2 PG
MCHC RBC AUTO-ENTMCNC: 32.9 MG/DL (ref 33–36)
MCV RBC AUTO: 88.6 FL (ref 80–94)
MONOCYTES # BLD AUTO: 0.79 X10(3)/MCL (ref 0.1–1.3)
MONOCYTES NFR BLD AUTO: 10.4 %
NEUTROPHILS # BLD AUTO: 5.13 X10(3)/MCL (ref 2.1–9.2)
NEUTROPHILS NFR BLD AUTO: 67.4 %
NRBC BLD AUTO-RTO: 0 %
PHOSPHATE SERPL-MCNC: 3.3 MG/DL (ref 2.3–4.7)
PLATELET # BLD AUTO: 269 X10(3)/MCL (ref 130–400)
PMV BLD AUTO: 9.4 FL (ref 7.4–10.4)
POCT GLUCOSE: 116 MG/DL (ref 70–110)
POCT GLUCOSE: 120 MG/DL (ref 70–110)
POCT GLUCOSE: 131 MG/DL (ref 70–110)
POTASSIUM SERPL-SCNC: 3.5 MMOL/L (ref 3.5–5.1)
PROT SERPL-MCNC: 4.5 GM/DL (ref 5.8–7.6)
RBC # BLD AUTO: 2.71 X10(6)/MCL (ref 4.7–6.1)
SODIUM SERPL-SCNC: 136 MMOL/L (ref 136–145)
WBC # SPEC AUTO: 7.6 X10(3)/MCL (ref 4.5–11.5)

## 2023-02-08 PROCEDURE — 25000003 PHARM REV CODE 250: Performed by: NURSE PRACTITIONER

## 2023-02-08 PROCEDURE — 97110 THERAPEUTIC EXERCISES: CPT | Mod: CO

## 2023-02-08 PROCEDURE — 63600175 PHARM REV CODE 636 W HCPCS: Performed by: INTERNAL MEDICINE

## 2023-02-08 PROCEDURE — 25000003 PHARM REV CODE 250: Performed by: INTERNAL MEDICINE

## 2023-02-08 PROCEDURE — 97116 GAIT TRAINING THERAPY: CPT | Mod: CQ

## 2023-02-08 PROCEDURE — 63600175 PHARM REV CODE 636 W HCPCS: Performed by: NURSE PRACTITIONER

## 2023-02-08 PROCEDURE — 85025 COMPLETE CBC W/AUTO DIFF WBC: CPT | Performed by: INTERNAL MEDICINE

## 2023-02-08 PROCEDURE — 21400001 HC TELEMETRY ROOM

## 2023-02-08 PROCEDURE — C9113 INJ PANTOPRAZOLE SODIUM, VIA: HCPCS | Performed by: INTERNAL MEDICINE

## 2023-02-08 PROCEDURE — A4216 STERILE WATER/SALINE, 10 ML: HCPCS | Performed by: INTERNAL MEDICINE

## 2023-02-08 PROCEDURE — 80053 COMPREHEN METABOLIC PANEL: CPT | Performed by: INTERNAL MEDICINE

## 2023-02-08 PROCEDURE — 97530 THERAPEUTIC ACTIVITIES: CPT | Mod: CQ

## 2023-02-08 PROCEDURE — 83735 ASSAY OF MAGNESIUM: CPT | Performed by: INTERNAL MEDICINE

## 2023-02-08 PROCEDURE — 84100 ASSAY OF PHOSPHORUS: CPT | Performed by: INTERNAL MEDICINE

## 2023-02-08 RX ORDER — LANOLIN ALCOHOL/MO/W.PET/CERES
400 CREAM (GRAM) TOPICAL DAILY
Status: DISCONTINUED | OUTPATIENT
Start: 2023-02-08 | End: 2023-02-10 | Stop reason: HOSPADM

## 2023-02-08 RX ORDER — MAGNESIUM SULFATE HEPTAHYDRATE 40 MG/ML
4 INJECTION, SOLUTION INTRAVENOUS ONCE
Status: COMPLETED | OUTPATIENT
Start: 2023-02-08 | End: 2023-02-08

## 2023-02-08 RX ADMIN — OXYCODONE HYDROCHLORIDE 5 MG: 5 TABLET ORAL at 06:02

## 2023-02-08 RX ADMIN — SODIUM CHLORIDE, PRESERVATIVE FREE 10 ML: 5 INJECTION INTRAVENOUS at 06:02

## 2023-02-08 RX ADMIN — SODIUM CHLORIDE, PRESERVATIVE FREE 10 ML: 5 INJECTION INTRAVENOUS at 12:02

## 2023-02-08 RX ADMIN — PANTOPRAZOLE SODIUM 40 MG: 40 INJECTION, POWDER, FOR SOLUTION INTRAVENOUS at 09:02

## 2023-02-08 RX ADMIN — DOCUSATE SODIUM 50 MG: 50 CAPSULE, LIQUID FILLED ORAL at 06:02

## 2023-02-08 RX ADMIN — MIRTAZAPINE 15 MG: 15 TABLET, FILM COATED ORAL at 09:02

## 2023-02-08 RX ADMIN — MAGNESIUM OXIDE TAB 400 MG (241.3 MG ELEMENTAL MG) 400 MG: 400 (241.3 MG) TAB at 03:02

## 2023-02-08 RX ADMIN — MAGNESIUM SULFATE 4 G: 2 INJECTION INTRAVENOUS at 09:02

## 2023-02-08 RX ADMIN — HYDROXYZINE PAMOATE 25 MG: 25 CAPSULE ORAL at 06:02

## 2023-02-08 RX ADMIN — OXYCODONE HYDROCHLORIDE 5 MG: 5 TABLET ORAL at 02:02

## 2023-02-08 RX ADMIN — HYDROMORPHONE HYDROCHLORIDE 0.5 MG: 2 INJECTION INTRAMUSCULAR; INTRAVENOUS; SUBCUTANEOUS at 08:02

## 2023-02-08 RX ADMIN — TAMSULOSIN HYDROCHLORIDE 0.4 MG: 0.4 CAPSULE ORAL at 09:02

## 2023-02-08 RX ADMIN — HYDROMORPHONE HYDROCHLORIDE 0.5 MG: 2 INJECTION INTRAMUSCULAR; INTRAVENOUS; SUBCUTANEOUS at 10:02

## 2023-02-08 RX ADMIN — HYDROMORPHONE HYDROCHLORIDE 0.5 MG: 2 INJECTION INTRAMUSCULAR; INTRAVENOUS; SUBCUTANEOUS at 03:02

## 2023-02-08 RX ADMIN — PANTOPRAZOLE SODIUM 40 MG: 40 INJECTION, POWDER, FOR SOLUTION INTRAVENOUS at 08:02

## 2023-02-08 RX ADMIN — SODIUM BICARBONATE 1300 MG: 650 TABLET ORAL at 09:02

## 2023-02-08 RX ADMIN — ONDANSETRON 4 MG: 2 INJECTION INTRAMUSCULAR; INTRAVENOUS at 02:02

## 2023-02-08 NOTE — PT/OT/SLP PROGRESS
Physical Therapy Treatment    Patient Name:  Quincy Triplett   MRN:  [id number]    Recommendations:     Discharge Recommendations: rehabilitation facility  Discharge Equipment Recommendations: cane, quad  Barriers to discharge: Decreased caregiver support    Assessment:     Quincy Triplett is a 82 y.o. male admitted with a medical diagnosis of COVID-19.  He presents with the following impairments/functional limitations: weakness, gait instability, impaired endurance, impaired balance, decreased safety awareness, impaired functional mobility .    Rehab Prognosis: Good; patient would benefit from acute skilled PT services to address these deficits and reach maximum level of function.    Recent Surgery: Procedure(s) (LRB):  EGD (N/A)  EGD,WITH HEMORRHAGE CONTROL 20 Days Post-Op    Plan:     During this hospitalization, patient to be seen 6 x/week to address the identified rehab impairments via gait training, therapeutic activities and progress toward the following goals:    Plan of Care Expires:  02/20/23    Subjective     Chief Complaint:   Patient/Family Comments/goals:   Pain/Comfort:         Objective:     Communicated with nurse prior to session.  Patient found HOB elevated with telemetry upon PT entry to room.     General Precautions: Standard, fall  Orthopedic Precautions: N/A  Braces: N/A  Respiratory Status: Room air     Functional Mobility:  Bed Mobility:     Supine to Sit: minimum assistance  Transfers:     Sit to Stand:  contact guard assistance with rolling walker  Gait: pt amb with SPC and Judd for 70ft with noted unsteadiness with cues for eccentric control      AM-PAC 6 CLICK MOBILITY          Treatment & Education:  Pt performed sit to stand transfers from elevated surface without UE support x10 reps.     Patient left up in chair with all lines intact and call button in reach..    GOALS:   Multidisciplinary Problems       Physical Therapy Goals          Problem: Physical Therapy    Goal Priority  Disciplines Outcome Goal Variances Interventions   Physical Therapy Goal     PT, PT/OT Ongoing, Progressing     Description: Goals to be met by: 23     Patient will increase functional independence with mobility by performin. Supine to sit with Harper  2. Sit to supine with Harper  3. Sit to stand transfer with Modified [place]  4. Gait  x 200 feet with Modified [place] using Quad Cane.                          Time Tracking:     PT Received On: 23  PT Start Time: 1407     PT Stop Time: 1432  PT Total Time (min): 25 min     Billable Minutes: Gait Training 13 and Therapeutic Activity 12    Treatment Type: Treatment  PT/PTA: PTA     PTA Visit Number: 2     2023

## 2023-02-08 NOTE — PT/OT/SLP PROGRESS
Occupational Therapy  Treatment    Quincy Triplett   MRN: [id number]   Admitting Diagnosis: COVID-19    OT Date of Treatment: 02/08/23   OT Start Time: 1435  OT Stop Time: 1447  OT Total Time (min): 12 min     Billable Minutes:  Therapeutic Exercise 12  Total Minutes: 12     OT/IZAIAH: IZAIAH     IZAIAH Visit Number: 5    General Precautions: Standard, fall  Orthopedic Precautions: N/A  Braces: N/A    Spiritual, Cultural Beliefs, Mosque Practices, Values that Affect Care: no    Subjective:  Communicated with nurse prior to session.    Objective:  Patient found with: telemetry    Additional Treatment:  UE there ex with LUE with green theraband 1 set of 10 reps.  Patient became noxious and began to vomit. Nurse notified.    Patient left up in chair with all lines intact, call button in reach, and nurse present    ASSESSMENT:  Quincy Triplett is a 82 y.o. male with a medical diagnosis of COVID-19 and presents with decreased balance, decreased AX tolerance and decreased ADL skills.    Rehab potential is good    Activity tolerance: Good    Discharge recommendations: rehabilitation facility     Equipment recommendations: cane, quad     GOALS:   Multidisciplinary Problems       Occupational Therapy Goals          Problem: Occupational Therapy    Goal Priority Disciplines Outcome Interventions   Occupational Therapy Goal     OT, PT/OT Ongoing, Progressing    Description: Goals to be met by:  2/9/2023    Patient will increase functional independence with ADLs by performing:    LE Dressing with Modified [place].  Grooming while standing with Modified [place].  Toileting from toilet with Modified [place] for hygiene and clothing management.   Toilet transfer to toilet with Modified [place].                         Plan:  Patient to be seen 5 x/week to address the above listed problems via self-care/home management, therapeutic activities, therapeutic exercises  Plan of Care expires:    Plan of Care reviewed with:  patient    02/08/2023

## 2023-02-08 NOTE — PROGRESS NOTES
[facility] Hospital Medicine Progress Note        Chief Complaint: Inpatient Follow-up for FTT      HPI:  Quincy Triplett is a 82 y.o. male with a past medical history of essential hypertension, hyperlipidemia, CAD, obstructive jaundice, and duodenal cancer presented to [facility] on 1/11/2023 for weakness,  decreased appetite, decreased urine output, and nausea began 3 days ago. Patient reports recent biliary drain placement on 12/21/2022 with Dr. Brown and was discharged 3 days ago.  Patient states he has whipple procedure scheduled soon by Dr. Brown. Denied fever, chills, cough, congestion, chest pain, shortness of breath, abdominal pain, vomiting, diarrhea, hematuria, and dysuria.   Initial vital signs in ED were /78, pulse 85, respirations 18, temperature 36.6° C, and SpO2 98%.  Labs revealed WBC 7.9, RBC 4, hemoglobin 11.6, hematocrit 35.6, sodium 130, chloride 108, CO2 11, BUN 42.3, creatinine 2.79, phosphorus 5.2, alkaline phosphatase 376, albumin 2.6, bilirubin total 13.7, AST 76, , lipase 85, and troponin undetectable.  Flu testing was negative.  COVID testing was positive.  UA revealed 1+ protein, cloudy appearance, positive nitrate, 3+ bilirubin, 1+ leukocytes, and 7 RBCs.  Patient was given 1 L NS, IV calcium gluconate 1 g, 5 units insulin IV, sodium bicarb 50 mEq, and Lokelma 10 g. Patient was admitted to hospital medicine service for further medical management.   Surgical oncology evaluated the patient recommended to postpone the surgery given overall decline in the functional status with COVID-19 infection and metabolic acidosis from renal failure. Patient was initiated on bicarb drip for metabolic acidosis.  Nephrology was consulted for metabolic acidosis renal failure.  Recommended IV fluids, encourage p.o. intake to compensate losses.  Attempts to internalize biliary drain were unsuccessful on 01/06/2023.  GI was consulted for elevated bilirubin, as  patient had biliary drain with good output recommended conservative management.  GI had reconsulted given positive FOBT with slowly drifting hemoglobin.  EGD 01/19/2023 revealed hemorrhagic duodenopathy, acquired duodenal stenosis.  Hemostatic spray was deployed, no specimens were collected.  Stomach and esophagus was normal.  1 unit of PRBC was transfused due to anemia.  Surgical oncology re-evaluated and recommended outpatient follow up for Whipple surgery.  Initial plan was to discharge to home with home health but patient reported generalized weakness and PT recommended rehab.  Discharge was held and currently awaiting placement.  Zosyn was continued while inpatient with plan to stop antibiotics on 01/29/2023.  2/2- patient spiked a fever of 101.8, probably erroneous/after hot coffee or breakfast given no repeat fever, no leukocytosis and Blood cultures x2- negative x 48hours   Continue biliary drain.  Surgical oncologist recommended outpatient evaluation for Whipple's once patient is strong enough      Interval Hx:   Laying in bed, feels much better today.  Very appreciative for her within that has been done for him on this hospital stay.    His brother-in-law and daughter are at bedside    Patient inquired if he have heard anything from Trini Varghese.  Informed that  has not heard anything and she will let him know once she does.  Case discussed with patient nurse and  on the floor    Objective/physical exam:  Vitals:    02/07/23 2359 02/08/23 0244 02/08/23 0425 02/08/23 0618   BP: 129/68  109/68    Pulse: 98  88    Resp: 18 19 18 18   Temp: 97.9 °F (36.6 °C)  98 °F (36.7 °C)    TempSrc: Oral  Oral    SpO2: 97%  97%    Weight:       Height:         General: In no acute distress, afebrile, jaundiced with scleral icterus, elderly male  Respiratory: Clear to auscultation bilaterally to the bases, on room air  Cardiovascular: S1, S2, no appreciable murmur  Abdomen: Soft, extremely  tender in right upper quadrant and epigastric region, BS +, biliary drain present  Extremities:  Right forearm amputated, bilateral lower extremities swelling  Neurologic: Alert and oriented x4, cooperative    Lab Results   Component Value Date     02/08/2023    K 3.5 02/08/2023    CO2 21 (L) 02/08/2023    BUN 15.4 02/08/2023    CREATININE 0.87 02/08/2023    CALCIUM 7.9 (L) 02/08/2023    EGFRNONAA >60 05/23/2022      Lab Results   Component Value Date    ALT 61 (H) 02/08/2023    AST 54 (H) 02/08/2023    ALKPHOS 258 (H) 02/08/2023    BILITOT 3.5 (H) 02/08/2023      Lab Results   Component Value Date    WBC 7.6 02/08/2023    HGB 7.9 (L) 02/08/2023    HCT 24.0 (L) 02/08/2023    MCV 88.6 02/08/2023     02/08/2023           Medications:   ergocalciferol  50,000 Units Oral Q7 Days    mirtazapine  15 mg Oral QHS    pantoprazole  40 mg Intravenous BID    sodium bicarbonate  1,300 mg Oral BID    sodium chloride 0.9%  10 mL Intravenous Q6H    tamsulosin  0.4 mg Oral Daily      sodium chloride, sodium chloride, acetaminophen, albuterol, bisacodyL, dextrose 10%, dextrose 10%, docusate sodium, HYDROmorphone, hydrOXYzine pamoate, meclizine, ondansetron, ondansetron, oxyCODONE, prochlorperazine, Flushing PICC Protocol **AND** sodium chloride 0.9% **AND** sodium chloride 0.9%       Assessment/Plan:  COVID-19 infection-(vaccinated)- off of isolation now  Obstructive jaundice s/p biliary stent placement 12/21/2022, failed internalization 01/06/2023, Elevated liver enzymes with alk phos   Dislodgement of biliary drain with intra and extrahepatic biliary duct dilation  Incidental bilateral hydro nephrosis, hydro ureters and distended bladder due to outlet obstruction--> s/p campos  Metabolic acidosis- improving   Acute on chronic blood loss anemia, hemorrhagic duodenopathy- Hb stable   Hypovolemic Hyponatremia - resolved   Hypokalemia- resolved  Proctitis- being treated   Severe physical deconditioning  Severe  malnutrition  HX:  Duodenal cancer, PVD, right arm amputation, CAD  Acute upper abdominal pain with increase in Alk Phos  Severe hypomagnesemia      Admitted as inpatient on 01/12/2023  Appreciate ID input, Completed 14 days course of Zosyn on 1/29/23 for proctitis per ID recs   2/7- developed acute upper abdominal pain, extremely tender to mild touch, instead of ruq u/s given it will be more painful for him, will ordere CT Abdomen pelvis wo contrast--> interval development of intra and extrahepatic biliary dilation due to retraction of the percutaneous transhepatic biliary drain.  Repositioning recommended.  Again mass in the 2nd portion of duodenum seen.  Interval development of bilateral hydronephrosis and hydroureter and significant urinary bladder distention, bladder outlet obstruction suspected enlarged and partially calcified prostate  Ordered stat CMP as well --> again showed elevated alk-phos and liver enzymes.  Consulted IR, patient is status post successful replacement with a new 10 Upper sorbian external biliary drain with Dr. Ambrocio, appreciate greatly  Ordered Paul catheter, nurse was unable to place the Paul.  Urology consulted given resistance and patient has bilateral hydro nephrosis and hydro ureters  Dr. Rob Anderson place the Paul.  Recommended to keep the Paul for 2-4 weeks and then follow-up with Dr. Negron outpatient for a void trial versus exchange pending his clinical condition.  Started Proscar  Noted severe hypomagnesemia of 1.4, ordered Mag sulfate 4 g IV x1  Continue Protonix.  GI signed off.  Monitor Hb while inpatient- so far stable   Stable from COVID-19 standpoint.  Completed remdesivir for 3 days.  Continue supportive care, now off of isolation and on RA  [facility] labs on Friday lic acidosis persists, increase p.o. [id number] BID   Cr normalized   Continue therapy while in house     Home medications were reviewed  and appropriate meds resumed on admission  CM informed me Insurance  denied his rehab given he does not have the rehab diagnosis, awaiting to hear from Trini Varghese   Repeat labs Friday     VTE prophylaxis:  SCD    Patient condition:  Fair     Anticipated discharge and Disposition:   SNF. Pt is medically cleared for transfer     Critical care note:  Critical care diagnosis:  Severe hypomagnesemia requiring IV Mag sulfate  Critical care interventions: Hands-on evaluation, review of labs/radiographs/records and discussion with patient and family if present  Critical care time spent: 35 minutes      All diagnosis and differential diagnosis have been reviewed; assessment and plan has been documented; I have personally reviewed the labs and test results that are presently available; I have reviewed the patients medication list; I have reviewed the consulting providers response and recommendations. I have reviewed or attempted to review medical records based upon their availability    All of the patient's questions have been  addressed and answered. Patient's is agreeable to the above stated plan. I will continue to monitor closely and make adjustments to medical management as needed.  _______________________________________________________________________    IR Biliary Catheter Exchange with Imaging  Narrative: EXAMINATION:  Fluoroscopic guidance    Percutaneous transhepatic cholangiogram    External biliary drain replacement.    CLINICAL HISTORY:  Biliary drain replacement    ATTENDING: Kodi Ambrocio    ANESTHESIA: Lidocaine was utilized for local anesthesia.    TECHNIQUE:  After the risks, benefits and alternatives were discussed, consent was obtained. A time out was performed to verify the patient's identity and procedure.    The patient was placed supine on the angiographic table. The abdomen was cleaned and prepped in normal sterile fashion.    Contrast injection was performed through the existing drain demonstrating positioning within the right periphery of the liver.    The  drain was subsequently removed over a Glidewire.  Multiple attempts to cross the common bile duct stricture or unsuccessful.  The Glidewire was exchanged for an Amplatz wire through a hockey-stick catheter.    A new 10 Lao external biliary drain was placed with the pigtail formed in the distal common bile duct.    Catheter was sutured to the skin using 0 monofilament. The patient tolerated the procedure well. There were no immediate complications.    All needle, wire and catheter manipulations were done under fluoroscopic guidance.    EBL: < 10mL    Fluoro Time (min): 4.3    Fluoro Dose (mGy): 29.1  Impression: Successful replacement with a new 10 Lao external biliary drain placed.    I, Kodi Ambrocio, was present for the entire procedure.    Electronically signed by: Kodi Ambrocio  Date:    02/07/2023  Time:    13:42  CT Abdomen Pelvis  Without Contrast  Narrative: EXAMINATION:  CT ABDOMEN PELVIS WITHOUT CONTRAST    CLINICAL HISTORY:  Abdominal pain, acute, nonlocalized;duodenal cancer with obstructed jaundice;    TECHNIQUE:  Low dose axial images, sagittal and coronal reformations were obtained from the lung bases to the pubic symphysis.  No contrast was administered.  Automatic exposure control is utilized to reduce patient radiation exposure.    COMPARISON:  01/14/2023    FINDINGS:  There is mild bibasilar atelectasis.    There is a. percutaneous biliary drain seen which is now within the right lobe of the liver.  Previously the drain extended through the intrahepatic biliary system to the extrahepatic biliary system and into the duodenum.  There is dilatation of the biliary tree which has progressed since the prior examination due to retraction of the biliary drain.  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  This is best seen on images number 40 through 44 series 2.  There is mild dilatation of the  pancreatic duct.    The gallbladder is distended..    The spleen appears normal and adrenal glands appear normal.  No adrenal nodule is seen.    There is a cyst in the lower pole of the right kidney.  There is bilateral hydronephrosis and hydroureter seen.  Urinary bladder is markedly distended.  This is an acute change prior examination.  Bladder outlet obstruction should be considered.    The prostate enlarged in size and partially calcified.    The circumferential wall thickening seen in the region of the rectum on the prior examination has resolved.  Visualized portions of the colon appear grossly unremarkable.    No free air or fluid is seen.  Impression: Interval development of intra and extrahepatic biliary dilatation due to retraction of the percutaneous transhepatic biliary drain.  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  Bladder outlet obstruction is suspected.    Enlarged and partially calcified prostate    This report was flagged in Epic as abnormal.    Electronically signed by: Clau Santacruz  Date:    02/07/2023  Time:    09:59       José Manuel Kirk MD  Department of Hospital Medicine   [facility]   02/08/2023

## 2023-02-09 LAB
POCT GLUCOSE: 106 MG/DL (ref 70–110)
POCT GLUCOSE: 131 MG/DL (ref 70–110)
POCT GLUCOSE: 97 MG/DL (ref 70–110)

## 2023-02-09 PROCEDURE — 21400001 HC TELEMETRY ROOM

## 2023-02-09 PROCEDURE — C9113 INJ PANTOPRAZOLE SODIUM, VIA: HCPCS | Performed by: INTERNAL MEDICINE

## 2023-02-09 PROCEDURE — 63600175 PHARM REV CODE 636 W HCPCS: Performed by: INTERNAL MEDICINE

## 2023-02-09 PROCEDURE — 25000003 PHARM REV CODE 250: Performed by: INTERNAL MEDICINE

## 2023-02-09 PROCEDURE — 97116 GAIT TRAINING THERAPY: CPT | Mod: CQ

## 2023-02-09 PROCEDURE — 97168 OT RE-EVAL EST PLAN CARE: CPT

## 2023-02-09 PROCEDURE — 63600175 PHARM REV CODE 636 W HCPCS: Performed by: NURSE PRACTITIONER

## 2023-02-09 PROCEDURE — 25000003 PHARM REV CODE 250: Performed by: PHYSICIAN ASSISTANT

## 2023-02-09 PROCEDURE — A4216 STERILE WATER/SALINE, 10 ML: HCPCS | Performed by: INTERNAL MEDICINE

## 2023-02-09 RX ADMIN — TAMSULOSIN HYDROCHLORIDE 0.4 MG: 0.4 CAPSULE ORAL at 10:02

## 2023-02-09 RX ADMIN — SODIUM BICARBONATE 1300 MG: 650 TABLET ORAL at 10:02

## 2023-02-09 RX ADMIN — MIRTAZAPINE 15 MG: 15 TABLET, FILM COATED ORAL at 09:02

## 2023-02-09 RX ADMIN — ONDANSETRON 4 MG: 2 INJECTION INTRAMUSCULAR; INTRAVENOUS at 05:02

## 2023-02-09 RX ADMIN — OXYCODONE HYDROCHLORIDE 5 MG: 5 TABLET ORAL at 10:02

## 2023-02-09 RX ADMIN — MAGNESIUM OXIDE TAB 400 MG (241.3 MG ELEMENTAL MG) 400 MG: 400 (241.3 MG) TAB at 10:02

## 2023-02-09 RX ADMIN — ERGOCALCIFEROL 50000 UNITS: 1.25 CAPSULE ORAL at 10:02

## 2023-02-09 RX ADMIN — PANTOPRAZOLE SODIUM 40 MG: 40 INJECTION, POWDER, FOR SOLUTION INTRAVENOUS at 09:02

## 2023-02-09 RX ADMIN — SODIUM BICARBONATE 1300 MG: 650 TABLET ORAL at 09:02

## 2023-02-09 RX ADMIN — SODIUM CHLORIDE, PRESERVATIVE FREE 10 ML: 5 INJECTION INTRAVENOUS at 12:02

## 2023-02-09 RX ADMIN — SODIUM CHLORIDE, PRESERVATIVE FREE 10 ML: 5 INJECTION INTRAVENOUS at 05:02

## 2023-02-09 RX ADMIN — OXYCODONE HYDROCHLORIDE 5 MG: 5 TABLET ORAL at 05:02

## 2023-02-09 RX ADMIN — PANTOPRAZOLE SODIUM 40 MG: 40 INJECTION, POWDER, FOR SOLUTION INTRAVENOUS at 10:02

## 2023-02-09 NOTE — PLAN OF CARE
Clinical updates send to Trini Varghese and left message for Shauna regarding status of referral.

## 2023-02-09 NOTE — PT/OT/SLP RE-EVAL
Occupational Therapy   Re-evaluation    Name: Quincy Triplett  MRN: [id number]  Admitting Diagnosis:  COVID-19  Recent Surgery: Procedure(s) (LRB):  EGD (N/A)  EGD,WITH HEMORRHAGE CONTROL 21 Days Post-Op    Recommendations:     Discharge Recommendations: rehabilitation facility  Discharge Equipment Recommendations: cane, quad  Barriers to discharge:       Assessment:     Quincy Triplett is a 82 y.o. male with a medical diagnosis of COVID-19.  He presents with the following performance deficits affecting function are weakness, impaired endurance, impaired self care skills, impaired functional mobility.      Rehab Prognosis:  good; patient would benefit from acute skilled OT services to address these deficits and reach maximum level of function.       Plan:     Patient to be seen 3 x/week, 5 x/week to address the above listed problems via self-care/home management, therapeutic activities, therapeutic exercises  Plan of Care Expires:    Plan of Care Reviewed with: patient    Subjective       Pain/Comfort:  Pain Rating 1: 0/10    Objective:     Communicated with: nrsg prior to session.  Patient found up in chair with:   upon OT entry to room.    General Precautions: Standard, fall  Orthopedic Precautions: N/A  Braces: N/A  Respiratory Status: Room air    Occupational Performance:        Functional Mobility/Transfers:  Patient completed Toilet Transfer Step Transfer technique with supervision with  straight cane  Functional Mobility: no LOB   Pt then demonstrated x10 sit <> stand from chair with good technique   Activities of Daily Living:  Lower Body Dressing: supervision using 1 arm to don/doff BLE shoes   Toileting: supervision using cane; assist to remove brief and apply new brief in standing          Physical Exam:  Upper Extremity Strength:    -       Left Upper Extremity: WFL    AMPAC 6 Click:  AMPAC Total Score:          Patient left up in chair with all lines intact and call button in reach    GOALS:    Multidisciplinary Problems       Occupational Therapy Goals          Problem: Occupational Therapy    Goal Priority Disciplines Outcome Interventions   Occupational Therapy Goal     OT, PT/OT Ongoing, Progressing    Description: Goals to be met by:  2/9/2023    Patient will increase functional independence with ADLs by performing:    LE Dressing with Modified [place].  Grooming while standing with Modified [place].  Toileting from toilet with Modified [place] for hygiene and clothing management.   Toilet transfer to toilet with Modified [place].                         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, unspecified extremity     Coronary artery disease     Dyslipidemia     Hypertension          Past Surgical History:   Procedure Laterality Date    AMPUTATION Right     Right arm    CAROTID STENT      ERCP N/A 12/21/2022    Procedure: ERCP;  Surgeon: Sushil Anderson MD;  Location: [facility] ENDOSCOPY;  Service: Gastroenterology;  Laterality: N/A;  food in abdomen    ERCP, WITH BIOPSY  12/21/2022    Procedure: ERCP, WITH BIOPSY;  Surgeon: Sushil Anderson MD;  Location: [facility] ENDOSCOPY;  Service: Gastroenterology;;    LEFT HEART CATHETERIZATION      TONSILLECTOMY         Time Tracking:     OT Date of Treatment:    OT Start Time: 0946  OT Stop Time: 0955  OT Total Time (min): 9 min    Billable Minutes:Re-eval 9min    2/9/2023

## 2023-02-09 NOTE — PT/OT/SLP PROGRESS
Physical Therapy Treatment    Patient Name:  Quincy Triplett   MRN:  [id number]    Recommendations:     Discharge Recommendations: rehabilitation facility  Discharge Equipment Recommendations: cane, quad  Barriers to discharge: Decreased caregiver support    Assessment:     Quincy Triplett is a 82 y.o. male admitted with a medical diagnosis of COVID-19.  He presents with the following impairments/functional limitations: weakness, gait instability, impaired endurance, impaired functional mobility .    Rehab Prognosis: Good; patient would benefit from acute skilled PT services to address these deficits and reach maximum level of function.    Recent Surgery: Procedure(s) (LRB):  EGD (N/A)  EGD,WITH HEMORRHAGE CONTROL 21 Days Post-Op    Plan:     During this hospitalization, patient to be seen 6 x/week to address the identified rehab impairments via gait training, therapeutic activities and progress toward the following goals:    Plan of Care Expires:  02/20/23    Subjective     Chief Complaint: Pt reports fatigue today and requests to only ambulate  Patient/Family Comments/goals:   Pain/Comfort:         Objective:     Communicated with nurse prior to session.  Patient found supine with telemetry upon PT entry to room.     General Precautions: Standard, fall  Orthopedic Precautions: N/A  Braces: N/A  Respiratory Status: Room air     Functional Mobility:  Bed Mobility:     Supine to Sit: contact guard assistance  Sit to Supine: contact guard assistance  Transfers:     Sit to Stand:  contact guard assistance with straight cane  Gait: pt amb x175ft with SPC and Judd with 2 LOB with Judd to correct and cues for eccentric control; pt required frequent standing rest breaks secondary to fatigue        Patient left supine with all lines intact and call button in reach..    GOALS:   Multidisciplinary Problems       Physical Therapy Goals          Problem: Physical Therapy    Goal Priority Disciplines Outcome Goal Variances  Interventions   Physical Therapy Goal     PT, PT/OT Ongoing, Progressing     Description: Goals to be met by: 23     Patient will increase functional independence with mobility by performin. Supine to sit with Noble  2. Sit to supine with Noble  3. Sit to stand transfer with Modified [place]  4. Gait  x 200 feet with Modified Noble using Quad Cane.                          Time Tracking:     PT Received On: 23  PT Start Time: 1018     PT Stop Time: 1027  PT Total Time (min): 9 min     Billable Minutes: Gait Training 9    Treatment Type: Treatment  PT/PTA: PTA     PTA Visit Number: 3     2023

## 2023-02-09 NOTE — PROGRESS NOTES
"Inpatient Nutrition Assessment    Admit Date: 1/11/2023   Total duration of encounter: 29 days     Nutrition Recommendation/Prescription     Oral diet as tolerated.  Continue Boost Glucose Control (provides 250 kcal, 14 g protein per serving).    Communication of Recommendations: reviewed with patient/caregiver    Nutrition Assessment     Malnutrition Assessment/Nutrition-Focused Physical Exam    Malnutrition in the context of chronic illness  Degree of Malnutrition: severe malnutrition  Energy Intake: < 75% of estimated energy requirement for >/= 1 month  Interpretation of Weight Loss: >7.5% in 3 months  Body Fat: severe depletion  Area of Body Fat Loss: orbital region  and upper arm region - triceps / biceps  Muscle Mass Loss: severe depletion  Area of Muscle Mass Loss: temple region - temporalis muscle and clavicle bone region - pectoralis major, deltoid, trapezius muscles  Fluid Accumulation: does not meet criteria  Edema: unable to obtain  Reduced  Strength: unable to obtain  A minimum of two characteristics is recommended for diagnosis of either severe or non-severe malnutrition.    Chart Review    Reason Seen: continuous nutrition monitoring and follow-up (previous consult for "dehydration")    Malnutrition Screening Tool Results   Have you recently lost weight without trying?: No  Have you been eating poorly because of a decreased appetite?: No   MST Score: 0     Diagnosis:  COVID-19 infection-(vaccinated)- off of isolation now  TERRENCE -prerenal etiology from poor p.o. intake, high biliary drain output - improving   Acute severe Metabolic acidosis due to renal failure- improving   Acute on chronic blood loss anemia, hemorrhagic duodenopathy- Hb stable   Hypovolemic Hyponatremia - resolved   Hypokalemia  Obstructive jaundice s/p biliary stent placement 12/21/2022, failed internalization 01/06/2023, Elevated liver enzymes   Proctitis- being treated   Failure to thrive   Severe malnutrition    Relevant Medical " History:   Essential hypertension  Hyperlipidemia  CAD  Obstructive jaundice   Duodenal cancer    Scheduled Meds:   ergocalciferol  50,000 Units Oral Q7 Days    magnesium oxide  400 mg Oral Daily    mirtazapine  15 mg Oral QHS    pantoprazole  40 mg Intravenous BID    sodium bicarbonate  1,300 mg Oral BID    sodium chloride 0.9%  10 mL Intravenous Q6H    tamsulosin  0.4 mg Oral Daily   Calorie Containing IV Medications: no significant kcals from medications at this time    Lab Results   Component Value Date/Time     02/08/2023 03:55 AM    K 3.5 02/08/2023 03:55 AM    CHLORIDE 104 02/08/2023 03:55 AM    BUN 15.4 02/08/2023 03:55 AM    CREATININE 0.87 02/08/2023 03:55 AM    EGFRNORACEVR >60 02/08/2023 03:55 AM    GLUCOSE 100 02/08/2023 03:55 AM    CALCIUM 7.9 (L) 02/08/2023 03:55 AM    PHOS 3.3 02/08/2023 03:55 AM    MG 1.40 (L) 02/08/2023 03:55 AM    ALKPHOS 258 (H) 02/08/2023 03:55 AM    AST 54 (H) 02/08/2023 03:55 AM    ALT 61 (H) 02/08/2023 03:55 AM    ALBUMIN 1.6 (L) 02/08/2023 03:55 AM    CRP 3.70 01/13/2023 11:47 PM     Diet/PN Order: Diet diabetic  Oral Supplement Order: Boost Glucose Control  Tube Feeding Order: none  Appetite/Oral Intake: fair/% of meals (this includes Boost Glucose Control TID).  Factors Affecting Nutritional Intake: decreased appetite and pain  Food/Episcopalian/Cultural Preferences: none reported  Food Allergies: none reported    Skin Integrity: drain/device(s)  Wound(s):   biliary tube noted    Comments    1/13/23 Patient reports decreased/poor appetite currently and prior to admission, reports significant weight loss over the past few months, agreeable to chocolate Boost, reports awaiting Whipple procedure as outpatient.    1/17/23 Patient reports appetite had improved and was good, now decreased/fair due to pain, does not want Boost.    1/20/23 Nurse reports patient had EGD yesterday and has requested to remain on clear liquid diet for now, will add Boost Breeze for additional  "kcal/protein, will also provide PPN recommendations.    23 Nurse reports patient consuming 25-50% of meals and 100% of Boost, will increase Boost frequency to three times daily.    23 Fair appetite/intake.    23 Patient reports good appetite, good intake of meals, consuming some of the Boost.    23 The nurse reports patient is doing well. Fair appetite and PO intake. The nurse reports patient drinking Boost Glucose Control TID. No tolerance issues reported.    23: The pt's brother in law reports ~85% PO intake with a fairly good appetite. The pt reports that he feels nauseated sometimes. Pt reports that he has been having good ostomy output. No new chewing/swallowing difficulties noted. Pt drinking Boost Glucose Control TID. Will monitor.    23 Patient reports appetite is still pretty good.    Anthropometrics    Height: 5' 9" (175.3 cm) Height Method: Stated  Last Weight: 69.3 kg (152 lb 12.5 oz) (23 1230) Weight Method: Bed Scale  BMI (Calculated): 22.6  BMI Classification: normal (BMI 18.5-24.9)        Ideal Body Weight (IBW), Male: 160 lb     % Ideal Body Weight, Male (lb): 95.49 %                 Usual Body Weight (UBW), k kg  % Usual Body Weight: 80.75     Usual Weight Provided By: patient    Wt Readings from Last 5 Encounters:   23 69.3 kg (152 lb 12.5 oz)   22 75.8 kg (167 lb)   22 79.8 kg (176 lb)     Weight Change(s) Since Admission:  Admit Weight: 70.3 kg (155 lb) (23 2320)  69.3 kg (23)  No new weights (2023)    Estimated Needs    Weight Used For Calorie Calculations: 69.3 kg (152 lb 12.5 oz)  Energy Calorie Requirements (kcal): 1943 kcal/d, 1.4 stress factor  Energy Need Method: Ramsay- Jeor  Weight Used For Protein Calculations: 69.3 kg (152 lb 12.5 oz)  Protein Requirements: 104 g/d, 1.5 g/kg  Fluid Requirements (mL): 1943 ml/d, 1 ml/kcal  Temp: 97.8 °F (36.6 °C)       Enteral Nutrition    Patient not receiving enteral nutrition " at this time.    Parenteral Nutrition    Patient not receiving parenteral nutrition support at this time.    Evaluation of Received Nutrient Intake    Calories: meeting estimated needs  Protein: meeting estimated needs    Patient Education    Not applicable.    Nutrition Diagnosis     PES: Malnutrition related to cancer as evidenced by <75% needs met >1 month, severe fat depletion, severe muscle depletion, and >7.5% weight loss in 3 months. (continues)    Interventions/Goals     Intervention(s): general/healthful diet, commercial beverage, and collaboration with other providers  Goal: Meet greater than 75% of nutritional needs by follow-up. (goal met)    Monitoring & Evaluation     Dietitian will monitor food and beverage intake.  Nutrition Risk/Follow-Up: moderate (follow-up in 3-5 days)   Please consult if re-assessment needed sooner.

## 2023-02-09 NOTE — PLAN OF CARE
Problem: Occupational Therapy  Goal: Occupational Therapy Goal  Description: Goals to be met by:  2/9/2023    Patient will increase functional independence with ADLs by performing:    LE Dressing with Modified Mathews.  Grooming while standing with Modified Mathews.  Toileting from toilet with Modified Mathews for hygiene and clothing management.   Toilet transfer to toilet with Modified Mathews.    Outcome: Ongoing, Progressing

## 2023-02-10 VITALS
HEIGHT: 69 IN | TEMPERATURE: 98 F | RESPIRATION RATE: 18 BRPM | WEIGHT: 152.75 LBS | SYSTOLIC BLOOD PRESSURE: 126 MMHG | BODY MASS INDEX: 22.63 KG/M2 | DIASTOLIC BLOOD PRESSURE: 64 MMHG | HEART RATE: 82 BPM | OXYGEN SATURATION: 98 %

## 2023-02-10 PROBLEM — N17.9 AKI (ACUTE KIDNEY INJURY): Status: RESOLVED | Noted: 2023-02-10 | Resolved: 2023-02-10

## 2023-02-10 PROBLEM — C17.0 DUODENAL CANCER: Chronic | Status: ACTIVE | Noted: 2023-02-10

## 2023-02-10 PROBLEM — R53.1 GENERALIZED WEAKNESS: Status: ACTIVE | Noted: 2023-02-10

## 2023-02-10 PROBLEM — N17.9 AKI (ACUTE KIDNEY INJURY): Status: ACTIVE | Noted: 2023-02-10

## 2023-02-10 PROCEDURE — A4216 STERILE WATER/SALINE, 10 ML: HCPCS | Performed by: INTERNAL MEDICINE

## 2023-02-10 PROCEDURE — 25000003 PHARM REV CODE 250: Performed by: INTERNAL MEDICINE

## 2023-02-10 PROCEDURE — 63600175 PHARM REV CODE 636 W HCPCS: Performed by: INTERNAL MEDICINE

## 2023-02-10 PROCEDURE — C1751 CATH, INF, PER/CENT/MIDLINE: HCPCS

## 2023-02-10 PROCEDURE — C9113 INJ PANTOPRAZOLE SODIUM, VIA: HCPCS | Performed by: INTERNAL MEDICINE

## 2023-02-10 RX ORDER — OXYCODONE HYDROCHLORIDE 5 MG/1
5 TABLET ORAL EVERY 6 HOURS PRN
Qty: 28 TABLET | Refills: 0 | Status: SHIPPED | OUTPATIENT
Start: 2023-02-10 | End: 2023-02-17

## 2023-02-10 RX ORDER — MIRTAZAPINE 15 MG/1
15 TABLET, FILM COATED ORAL NIGHTLY
Qty: 30 TABLET | Refills: 0 | Status: ON HOLD | OUTPATIENT
Start: 2023-02-10 | End: 2023-03-10 | Stop reason: HOSPADM

## 2023-02-10 RX ORDER — POLYETHYLENE GLYCOL 3350 17 G/17G
17 POWDER, FOR SOLUTION ORAL DAILY
Qty: 30 EACH | Refills: 0 | Status: ON HOLD | OUTPATIENT
Start: 2023-02-10 | End: 2023-03-10 | Stop reason: SDUPTHER

## 2023-02-10 RX ORDER — HYDROXYZINE PAMOATE 25 MG/1
25 CAPSULE ORAL EVERY 8 HOURS PRN
Qty: 24 CAPSULE | Refills: 0 | Status: SHIPPED | OUTPATIENT
Start: 2023-02-10 | End: 2023-02-17

## 2023-02-10 RX ORDER — FINASTERIDE 5 MG/1
5 TABLET, FILM COATED ORAL DAILY
Qty: 30 TABLET | Refills: 1 | Status: ON HOLD | OUTPATIENT
Start: 2023-02-10 | End: 2023-04-28 | Stop reason: HOSPADM

## 2023-02-10 RX ADMIN — MAGNESIUM OXIDE TAB 400 MG (241.3 MG ELEMENTAL MG) 400 MG: 400 (241.3 MG) TAB at 09:02

## 2023-02-10 RX ADMIN — SODIUM BICARBONATE 1300 MG: 650 TABLET ORAL at 09:02

## 2023-02-10 RX ADMIN — SODIUM CHLORIDE, PRESERVATIVE FREE 10 ML: 5 INJECTION INTRAVENOUS at 05:02

## 2023-02-10 RX ADMIN — SODIUM CHLORIDE, PRESERVATIVE FREE 10 ML: 5 INJECTION INTRAVENOUS at 12:02

## 2023-02-10 RX ADMIN — PANTOPRAZOLE SODIUM 40 MG: 40 INJECTION, POWDER, FOR SOLUTION INTRAVENOUS at 09:02

## 2023-02-10 RX ADMIN — TAMSULOSIN HYDROCHLORIDE 0.4 MG: 0.4 CAPSULE ORAL at 09:02

## 2023-02-10 RX ADMIN — SODIUM CHLORIDE, PRESERVATIVE FREE 10 ML: 5 INJECTION INTRAVENOUS at 01:02

## 2023-02-10 NOTE — PROGRESS NOTES
[facility] Hospital Medicine Progress Note        Chief Complaint: Inpatient Follow-up for FTT      HPI:  Quincy Triplett is a 82 y.o. male with a past medical history of essential hypertension, hyperlipidemia, CAD, obstructive jaundice, and duodenal cancer presented to [facility] on 1/11/2023 for weakness,  decreased appetite, decreased urine output, and nausea began 3 days ago. Patient reports recent biliary drain placement on 12/21/2022 with Dr. Brown and was discharged 3 days ago.  Patient states he has whipple procedure scheduled soon by Dr. Brown. Denied fever, chills, cough, congestion, chest pain, shortness of breath, abdominal pain, vomiting, diarrhea, hematuria, and dysuria.   Initial vital signs in ED were /78, pulse 85, respirations 18, temperature 36.6° C, and SpO2 98%.  Labs revealed WBC 7.9, RBC 4, hemoglobin 11.6, hematocrit 35.6, sodium 130, chloride 108, CO2 11, BUN 42.3, creatinine 2.79, phosphorus 5.2, alkaline phosphatase 376, albumin 2.6, bilirubin total 13.7, AST 76, , lipase 85, and troponin undetectable.  Flu testing was negative.  COVID testing was positive.  UA revealed 1+ protein, cloudy appearance, positive nitrate, 3+ bilirubin, 1+ leukocytes, and 7 RBCs.  Patient was given 1 L NS, IV calcium gluconate 1 g, 5 units insulin IV, sodium bicarb 50 mEq, and Lokelma 10 g. Patient was admitted to hospital medicine service for further medical management.   Surgical oncology evaluated the patient recommended to postpone the surgery given overall decline in the functional status with COVID-19 infection and metabolic acidosis from renal failure. Patient was initiated on bicarb drip for metabolic acidosis.  Nephrology was consulted for metabolic acidosis renal failure.  Recommended IV fluids, encourage p.o. intake to compensate losses.  Attempts to internalize biliary drain were unsuccessful on 01/06/2023.  GI was consulted for elevated bilirubin, as  patient had biliary drain with good output recommended conservative management.  GI had reconsulted given positive FOBT with slowly drifting hemoglobin.  EGD 01/19/2023 revealed hemorrhagic duodenopathy, acquired duodenal stenosis.  Hemostatic spray was deployed, no specimens were collected.  Stomach and esophagus was normal.  1 unit of PRBC was transfused due to anemia.  Surgical oncology re-evaluated and recommended outpatient follow up for Whipple surgery.  Initial plan was to discharge to home with home health but patient reported generalized weakness and PT recommended rehab.  Discharge was held and currently awaiting placement.  Zosyn was continued while inpatient with plan to stop antibiotics on 01/29/2023.  2/2- patient spiked a fever of 101.8, probably erroneous/after hot coffee or breakfast given no repeat fever, no leukocytosis and Blood cultures x2- negative x 48hours   Continue biliary drain.  Surgical oncologist recommended outpatient evaluation for Whipple's once patient is strong enough      Interval Hx:   2/8/23 Laying in bed, feels much better today.  Very appreciative for her within that has been done for him on this hospital stay.    His brother-in-law and daughter are at bedside    Patient inquired if he have heard anything from Trini Varghese.  Informed that  has not heard anything and she will let him know once she does.  Case discussed with patient nurse and  on the floor      2/9/23 pt voices no complins. Told him that trini gordillo is looking at is insurance for acceptance but he refers he will no longer wait. Refers he will like to be discharged in am.     Objective/physical exam:  Vitals:    02/09/23 1106 02/09/23 1539 02/09/23 1745 02/09/23 1933   BP: 124/72 138/80  127/73   Pulse: 70 (!) 111  78   Resp: 18 18 18    Temp: 97.8 °F (36.6 °C) 97.5 °F (36.4 °C)  97.7 °F (36.5 °C)   TempSrc: Oral Oral  Oral   SpO2: 100% 99%  98%   Weight:       Height:          General: In no acute distress, afebrile, jaundiced with scleral icterus, elderly male  Respiratory: Clear to auscultation bilaterally to the bases, on room air  Cardiovascular: S1, S2, no appreciable murmur  Abdomen: Soft, extremely tender in right upper quadrant and epigastric region, BS +, biliary drain present  Extremities:  Right forearm amputated, bilateral lower extremities swelling  Neurologic: Alert and oriented x4, cooperative    Lab Results   Component Value Date     02/08/2023    K 3.5 02/08/2023    CO2 21 (L) 02/08/2023    BUN 15.4 02/08/2023    CREATININE 0.87 02/08/2023    CALCIUM 7.9 (L) 02/08/2023    EGFRNONAA >60 05/23/2022      Lab Results   Component Value Date    ALT 61 (H) 02/08/2023    AST 54 (H) 02/08/2023    ALKPHOS 258 (H) 02/08/2023    BILITOT 3.5 (H) 02/08/2023      Lab Results   Component Value Date    WBC 7.6 02/08/2023    HGB 7.9 (L) 02/08/2023    HCT 24.0 (L) 02/08/2023    MCV 88.6 02/08/2023     02/08/2023           Medications:   ergocalciferol  50,000 Units Oral Q7 Days    magnesium oxide  400 mg Oral Daily    mirtazapine  15 mg Oral QHS    pantoprazole  40 mg Intravenous BID    sodium bicarbonate  1,300 mg Oral BID    sodium chloride 0.9%  10 mL Intravenous Q6H    tamsulosin  0.4 mg Oral Daily      sodium chloride, sodium chloride, acetaminophen, albuterol, bisacodyL, dextrose 10%, dextrose 10%, docusate sodium, HYDROmorphone, hydrOXYzine pamoate, meclizine, ondansetron, ondansetron, oxyCODONE, prochlorperazine, Flushing PICC Protocol **AND** sodium chloride 0.9% **AND** sodium chloride 0.9%       Assessment/Plan:  COVID-19 infection-(vaccinated)- off of isolation now  Obstructive jaundice s/p biliary stent placement 12/21/2022, failed internalization 01/06/2023, Elevated liver enzymes with alk phos   Dislodgement of biliary drain with intra and extrahepatic biliary duct dilation  Incidental bilateral hydro nephrosis, hydro ureters and distended bladder due to  outlet obstruction--> s/p campos  Metabolic acidosis- improving   Acute on chronic blood loss anemia, hemorrhagic duodenopathy- Hb stable   Hypovolemic Hyponatremia - resolved   Hypokalemia- resolved  Proctitis- being treated   Severe physical deconditioning  Severe malnutrition  HX:  Duodenal cancer, PVD, right arm amputation, CAD  Acute upper abdominal pain with increase in Alk Phos  Severe hypomagnesemia      Admitted as inpatient on 01/12/2023  Appreciate ID input, Completed 14 days course of Zosyn on 1/29/23 for proctitis per ID recs   2/7- developed acute upper abdominal pain, extremely tender to mild touch, instead of ruq u/s given it will be more painful for him, will ordere CT Abdomen pelvis wo contrast--> interval development of intra and extrahepatic biliary dilation due to retraction of the percutaneous transhepatic biliary drain.  Repositioning recommended.  Again mass in the 2nd portion of duodenum seen.  Interval development of bilateral hydronephrosis and hydroureter and significant urinary bladder distention, bladder outlet obstruction suspected enlarged and partially calcified prostate  Ordered stat CMP as well --> again showed elevated alk-phos and liver enzymes.  Consulted IR, patient is status post successful replacement with a new 10 [place] external biliary drain with Dr. Ambrocio, appreciate greatly  Ordered Campos catheter, nurse was unable to place the Campos.  Urology consulted given resistance and patient has bilateral hydro nephrosis and hydro ureters  Dr. Rob Anderson place the Campos.  Recommended to keep the Campos for 2-4 weeks and then follow-up with Dr. Negron outpatient for a void trial versus exchange pending his clinical condition.  Started Proscar.  Continue Protonix.  GI signed off.  Monitor Hb while inpatient- so far stable   Stable from COVID-19 standpoint.  Completed remdesivir for 3 days.  Continue supportive care, now off of isolation and on RA  [place] labs on Friday lic  acidosis persists, increase p.o. [id number] BID   Cr normalized   Continue therapy while in house     Home medications were reviewed  and appropriate meds resumed on admission  CM informed me Insurance denied his rehab given he does not have the rehab diagnosis, awaiting to hear from Trini Varghese still        VTE prophylaxis:  SCD    Patient condition:  Fair     Anticipated discharge and Disposition:   SNF. Pt is medically cleared for transfer         All diagnosis and differential diagnosis have been reviewed; assessment and plan has been documented; I have personally reviewed the labs and test results that are presently available; I have reviewed the patients medication list; I have reviewed the consulting providers response and recommendations. I have reviewed or attempted to review medical records based upon their availability    All of the patient's questions have been  addressed and answered. Patient's is agreeable to the above stated plan. I will continue to monitor closely and make adjustments to medical management as needed.  _______________________________________________________________________    IR Biliary Catheter Exchange with Imaging  Narrative: EXAMINATION:  Fluoroscopic guidance    Percutaneous transhepatic cholangiogram    External biliary drain replacement.    CLINICAL HISTORY:  Biliary drain replacement    ATTENDING: Kodi Ambrocio    ANESTHESIA: Lidocaine was utilized for local anesthesia.    TECHNIQUE:  After the risks, benefits and alternatives were discussed, consent was obtained. A time out was performed to verify the patient's identity and procedure.    The patient was placed supine on the angiographic table. The abdomen was cleaned and prepped in normal sterile fashion.    Contrast injection was performed through the existing drain demonstrating positioning within the right periphery of the liver.    The drain was subsequently removed over a Glidewire.  Multiple attempts to cross  the common bile duct stricture or unsuccessful.  The Glidewire was exchanged for an Amplatz wire through a hockey-stick catheter.    A new 10 Salv[place]orean external biliary drain was placed with the pigtail formed in the distal common bile duct.    Catheter was sutured to the skin using 0 monofilament. The patient tolerated the procedure well. There were no immediate complications.    All needle, wire and catheter manipulations were done under fluoroscopic guidance.    EBL: < 10mL    Fluoro Time (min): 4.3    Fluoro Dose (mGy): 29.1  Impression: Successful replacement with a new 10 [place]orean external biliary drain placed.    I, Kodi Ambrocio, was present for the entire procedure.    Electronically signed by: Kodi Ambrocio  Date:    02/07/2023  Time:    13:42  CT Abdomen Pelvis  Without Contrast  Narrative: EXAMINATION:  CT ABDOMEN PELVIS WITHOUT CONTRAST    CLINICAL HISTORY:  Abdominal pain, acute, nonlocalized;duodenal cancer with obstructed jaundice;    TECHNIQUE:  Low dose axial images, sagittal and coronal reformations were obtained from the lung bases to the pubic symphysis.  No contrast was administered.  Automatic exposure control is utilized to reduce patient radiation exposure.    COMPARISON:  01/14/2023    FINDINGS:  There is mild bibasilar atelectasis.    There is a. percutaneous biliary drain seen which is now within the right lobe of the liver.  Previously the drain extended through the intrahepatic biliary system to the extrahepatic biliary system and into the duodenum.  There is dilatation of the biliary tree which has progressed since the prior examination due to retraction of the biliary drain.  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  This is best seen on images number 40 through 44 series 2.  There is mild dilatation of the pancreatic duct.    The gallbladder is distended..    The spleen appears  normal and adrenal glands appear normal.  No adrenal nodule is seen.    There is a cyst in the lower pole of the right kidney.  There is bilateral hydronephrosis and hydroureter seen.  Urinary bladder is markedly distended.  This is an acute change prior examination.  Bladder outlet obstruction should be considered.    The prostate enlarged in size and partially calcified.    The circumferential wall thickening seen in the region of the rectum on the prior examination has resolved.  Visualized portions of the colon appear grossly unremarkable.    No free air or fluid is seen.  Impression: Interval development of intra and extrahepatic biliary dilatation due to retraction of the percutaneous transhepatic biliary drain.  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  Bladder outlet obstruction is suspected.    Enlarged and partially calcified prostate    This report was flagged in Epic as abnormal.    Electronically signed by: Clau Santacruz  Date:    02/07/2023  Time:    09:59       Rolf Donnelly MD  Department of Hospital Medicine   [facility]   02/09/2023

## 2023-02-10 NOTE — PT/OT/SLP PROGRESS
Physical Therapy      Patient Name:  Quincy Triplett   MRN:  [id number]    Patient not seen today secondary to Other (Comment) (Pt reports DCing today and wishes to save his strength for that. Discussed safety awareness with mobility at home and discussed DME recs with CM.).

## 2023-02-10 NOTE — PT/OT/SLP PROGRESS
Attempted OT session this AM, pt declined stating he is d/c today and wants to save his energy. Provided max encouragement for participation. Will f/u as appropriate.

## 2023-02-10 NOTE — DISCHARGE SUMMARY
[facility] Hospital Medicine Discharge Summary    Admit Date: 1/11/2023  Discharge Date and Time: 2/10/76859:33 AM  Admitting Physician:  Team  Discharging Physician: Rolf Donnelly MD.  Primary Care Physician: Reji Waters Jr, MD  Consults: Gastroenterology, Hospital Medicine, Interventional Radiology, Nephrology, and Urology    Discharge Diagnoses:  COVID-19 infection-(vaccinated)- remdesivir x 3 days/off of isolation     Obstructive jaundice s/p biliary stent placement 12/21/2022, failed internalization 01/06/2023    Dislodgement of biliary drain with intra and extrahepatic biliary duct dilation, drain repositioned by interventional radiology    Incidental bilateral hydro nephrosis, hydro ureters and distended bladder due to outlet obstruction/bph--> s/p campos placement     Samir/Metabolic acidosis    Acute on chronic blood loss anemia, hemorrhagic duodenopathy- Hb stable     Acute symptomatic anemia post prbc transfusion     Hypovolemic Hyponatremia - resolved     Hypokalemia- resolved    Proctitis- being treated     Severe physical deconditioning    Severe malnutrition    HX:  Duodenal cancer, PVD, right arm amputation, CAD    Acute upper abdominal pain with increase in Alk Phos    Severe hypomagnesemia     hemorrhagic duodenopathy, acquired duodenal stenosi                    Hospital Course:   Quincy Triplett is a 82 y.o. male with a past medical history of essential hypertension, hyperlipidemia, CAD, obstructive jaundice, and duodenal cancer presented to [facility] on 1/11/2023 for weakness,  decreased appetite, decreased urine output, and nausea began 3 days ago. Patient reports recent biliary drain placement on 12/21/2022 with Dr. Brown and was discharged 3 days ago.  Patient states he has whipple procedure scheduled soon by Dr. Brown. Denied fever, chills, cough, congestion, chest pain, shortness of breath, abdominal pain, vomiting, diarrhea, hematuria, and dysuria.      Initial vital signs in ED were /78, pulse 85, respirations 18, temperature 36.6° C, and SpO2 98%.  Labs revealed WBC 7.9, RBC 4, hemoglobin 11.6, hematocrit 35.6, sodium 130, chloride 108, CO2 11, BUN 42.3, creatinine 2.79, phosphorus 5.2, alkaline phosphatase 376, albumin 2.6, bilirubin total 13.7, AST 76, , lipase 85, and troponin undetectable.  Flu testing was negative.  COVID testing was positive.  UA revealed 1+ protein, cloudy appearance, positive nitrate, 3+ bilirubin, 1+ leukocytes, and 7 RBCs.  Patient was given 1 L NS, IV calcium gluconate 1 g, 5 units insulin IV, sodium bicarb 50 mEq, and Lokelma 10 g. Patient was admitted to hospital medicine service for further medical management.     Surgical oncology evaluated the patient recommended to postpone the surgery given overall decline in the functional status with COVID-19 infection and metabolic acidosis from renal failure. Patient was initiated on bicarb drip for metabolic acidosis.  Nephrology was consulted for metabolic acidosis ,renal failure.  Recommended IV fluids, encourage p.o. intake to compensate losses.  Attempts to internalize biliary drain were unsuccessful on 01/06/2023.  GI was consulted for elevated bilirubin, as patient had biliary drain with good output recommended conservative management.  GI had reconsulted given positive FOBT with slowly drifting hemoglobin.  EGD 01/19/2023 revealed hemorrhagic duodenopathy, acquired duodenal stenosis.  Hemostatic spray was deployed, no specimens were collected.  Stomach and esophagus was normal.  1 unit of PRBC was transfused due to anemia.  Surgical oncology re-evaluated and recommended outpatient follow up for Whipple surgery.  Initial plan was to discharge to home with home health but patient reported generalized weakness and PT recommended rehab.  was consulted. Urology was consulted for urinary retention with hydronephrosis, hydroureter /distended bladder by US/ CT,  nursing staff had problems placing campos so it was placed by urology. He normally follows up with dr Jamil valdovinos urology and is on flomax, proscar added. Zosyn was continued while inpatient and stopped on 01/29/2023, completed 14 days for proctitis..  Blood cultures x2- negative  Biliary drain functional after getting repositioned by interventional radiology.  Surgical oncologist recommended outpatient evaluation for Whipple's once patient is strong enough.  Pt was denied rehab,  attempted snf but as of today no definite placement. Trini gordillo was awaiting insurance approval but pt has decided to go home with home health , [facility] home care . Pt will be discharge home with home health w follow up w dr trev torres as well as urology       Pt was seen and examined on the day of discharge  Vitals:  VITAL SIGNS: 24 HRS MIN & MAX LAST   Temp  Min: 97.5 °F (36.4 °C)  Max: 98 °F (36.7 °C) 97.6 °F (36.4 °C)   BP  Min: 124/72  Max: 138/80 128/78   Pulse  Min: 68  Max: 111  80   Resp  Min: 18  Max: 18 18   SpO2  Min: 96 %  Max: 100 % 97 %       Physical Exam:  General: In no acute distress, afebrile, jaundiced with scleral icterus, elderly male    Respiratory: Clear to auscultation bilaterally to the bases, on room air    Cardiovascular: S1, S2, no appreciable murmur    Abdomen: Soft, extremely tender in right upper quadrant and epigastric region, BS +, biliary drain present    Extremities:  Right forearm amputated, bilateral lower extremities swelling    Neurologic: Alert and oriented x4, cooperative    Procedures Performed: No admission procedures for hospital encounter.     Significant Diagnostic Studies: See Full reports for all details    Recent Labs   Lab 02/04/23  0725 02/08/23  0355   WBC 6.1 7.6   RBC 3.45* 2.71*   HGB 10.2* 7.9*   HCT 31.1* 24.0*   MCV 90.1 88.6   MCH 29.6 29.2   MCHC 32.8* 32.9*   RDW 16.5 16.3    269   MPV 10.5* 9.4       Recent Labs   Lab 02/04/23  0725 02/06/23  0459  02/07/23  1012 02/08/23  0355    138 138 136   K 4.0 3.9 3.8 3.5   CO2 17* 19* 19* 21*   BUN 17.5 17.0 16.6 15.4   CREATININE 0.78 0.91 0.99 0.87   CALCIUM 8.5* 8.5* 8.7* 7.9*   MG 1.70 1.60  --  1.40*   ALBUMIN  --  1.9* 1.9* 1.6*   ALKPHOS  --  368* 344* 258*   ALT  --  78* 78* 61*   AST  --  73* 74* 54*   BILITOT  --  5.6* 4.9* 3.5*        Microbiology Results (last 7 days)       Procedure Component Value Units Date/Time    Blood Culture [[id number]]  (Normal) Collected: 02/02/23 0956    Order Status: Completed Specimen: Blood Updated: 02/07/23 1100     CULTURE, BLOOD ([facility]) No Growth at 5 days    Blood Culture [[id number]]  (Normal) Collected: 02/02/23 0956    Order Status: Completed Specimen: Blood Updated: 02/07/23 1100     CULTURE, BLOOD ([facility]) No Growth at 5 days             IR Biliary Catheter Exchange with Imaging  Narrative: EXAMINATION:  Fluoroscopic guidance    Percutaneous transhepatic cholangiogram    External biliary drain replacement.    CLINICAL HISTORY:  Biliary drain replacement    ATTENDING: Kodi Ambrocio    ANESTHESIA: Lidocaine was utilized for local anesthesia.    TECHNIQUE:  After the risks, benefits and alternatives were discussed, consent was obtained. A time out was performed to verify the patient's identity and procedure.    The patient was placed supine on the angiographic table. The abdomen was cleaned and prepped in normal sterile fashion.    Contrast injection was performed through the existing drain demonstrating positioning within the right periphery of the liver.    The drain was subsequently removed over a Glidewire.  Multiple attempts to cross the common bile duct stricture or unsuccessful.  The Glidewire was exchanged for an Amplatz wire through a hockey-stick catheter.    A new 10 Divehi external biliary drain was placed with the pigtail formed in the distal common bile duct.    Catheter was sutured to the skin using 0 monofilament. The patient tolerated the procedure well.  There were no immediate complications.    All needle, wire and catheter manipulations were done under fluoroscopic guidance.    EBL: < 10mL    Fluoro Time (min): 4.3    Fluoro Dose (mGy): 29.1  Impression: Successful replacement with a new 10 Uzbek external biliary drain placed.    I, Kodi Ambrocio, was present for the entire procedure.    Electronically signed by: Kodi Ambrocio  Date:    02/07/2023  Time:    13:42  CT Abdomen Pelvis  Without Contrast  Narrative: EXAMINATION:  CT ABDOMEN PELVIS WITHOUT CONTRAST    CLINICAL HISTORY:  Abdominal pain, acute, nonlocalized;duodenal cancer with obstructed jaundice;    TECHNIQUE:  Low dose axial images, sagittal and coronal reformations were obtained from the lung bases to the pubic symphysis.  No contrast was administered.  Automatic exposure control is utilized to reduce patient radiation exposure.    COMPARISON:  01/14/2023    FINDINGS:  There is mild bibasilar atelectasis.    There is a. percutaneous biliary drain seen which is now within the right lobe of the liver.  Previously the drain extended through the intrahepatic biliary system to the extrahepatic biliary system and into the duodenum.  There is dilatation of the biliary tree which has progressed since the prior examination due to retraction of the biliary drain.  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  This is best seen on images number 40 through 44 series 2.  There is mild dilatation of the pancreatic duct.    The gallbladder is distended..    The spleen appears normal and adrenal glands appear normal.  No adrenal nodule is seen.    There is a cyst in the lower pole of the right kidney.  There is bilateral hydronephrosis and hydroureter seen.  Urinary bladder is markedly distended.  This is an acute change prior examination.  Bladder outlet obstruction should be considered.    The prostate enlarged  in size and partially calcified.    The circumferential wall thickening seen in the region of the rectum on the prior examination has resolved.  Visualized portions of the colon appear grossly unremarkable.    No free air or fluid is seen.  Impression: Interval development of intra and extrahepatic biliary dilatation due to retraction of the percutaneous transhepatic biliary drain.  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  Bladder outlet obstruction is suspected.    Enlarged and partially calcified prostate    This report was flagged in Epic as abnormal.    Electronically signed by: Clau Santacruz  Date:    02/07/2023  Time:    09:59         Medication List        START taking these medications      docusate sodium 50 MG capsule  Commonly known as: COLACE  Take 1 capsule (50 mg total) by mouth 2 (two) times daily.     ergocalciferol 50,000 unit Cap  Commonly known as: ERGOCALCIFEROL  Take 1 capsule (50,000 Units total) by mouth every 7 days. for 12 doses     metroNIDAZOLE 500 MG tablet  Commonly known as: FLAGYL  Take 1 tablet (500 mg total) by mouth 3 (three) times daily. for 5 days  Replaces: metroNIDAZOLE 0.75 % gel     mirtazapine 15 MG tablet  Commonly known as: REMERON  Take 1 tablet (15 mg total) by mouth every evening.     sodium bicarbonate 650 MG tablet  Take 1 tablet (650 mg total) by mouth 2 (two) times daily. for 14 days            CONTINUE taking these medications      meclizine 12.5 mg tablet  Commonly known as: ANTIVERT     pantoprazole 40 MG tablet  Commonly known as: PROTONIX     tamsulosin 0.4 mg Cap  Commonly known as: FLOMAX            STOP taking these medications      metroNIDAZOLE 0.75 % gel  Commonly known as: METROGEL  Replaced by: metroNIDAZOLE 500 MG  tablet     ondansetron 4 MG tablet  Commonly known as: ZOFRAN            ASK your doctor about these medications      ciprofloxacin HCl 500 MG tablet  Commonly known as: CIPRO  Take 1 tablet (500 mg total) by mouth 2 (two) times daily. for 5 days  Ask about: Should I take this medication?     oxyCODONE 5 MG immediate release tablet  Commonly known as: ROXICODONE  Take 1 tablet (5 mg total) by mouth every 4 (four) hours as needed for Pain.  Ask about: Should I take this medication?               Where to Get Your Medications        These medications were sent to [facility] - [address]  [address] [facility] [address]      Phone: [phone]   ciprofloxacin HCl 500 MG tablet  docusate sodium 50 MG capsule  ergocalciferol 50,000 unit Cap  metroNIDAZOLE 500 MG tablet  mirtazapine 15 MG tablet  oxyCODONE 5 MG immediate release tablet  sodium bicarbonate 650 MG tablet          Explained in detail to the patient about the discharge plan, medications, and follow-up visits. Pt understands and agrees with the treatment plan  Discharge Disposition: Home-Health Care [facility]   Discharged Condition: stable  Diet-   Dietary Orders (From admission, onward)       Start     Ordered    01/24/23 2109  Dietary nutrition supplements Boost Glucose Control Chocolate; TID  Continuous        Question Answer Comment   Select PO Supplement: Boost Glucose Control Chocolate    Frequency: TID        01/24/23 2108    01/21/23 0550  Diet diabetic  Diet effective now         01/21/23 0549                   Medications Per DC med rec  Activities as tolerated   Contact information for follow-up providers       Reji Waters Jr, MD. Go on 1/30/2023.    Specialty: Internal Medicine  Why: Follow up appointment  on Monday Jan 30 at 9:00 am  Contact information:  [address] [place] [address] Drive  [address]  [facility] [address]  [phone]               Dr Abdirahman Brown. Go in 2 week(s).    Why: Follow up appointment on Feb. 8 at  11:00 am                     Contact information for after-discharge care       Home Medical Care       [place] .    Service: Home Health Services  Contact information:  [facility]  [facility] [address]  [phone]                                 For further questions contact hospitalist office    Discharge time 33 minutes    For worsening symptoms, chest pain, shortness of breath, increased abdominal pain, high grade fever, stroke or stroke like symptoms, immediately go to the nearest Emergency Room or call 911 as soon as possible.      Rolf Rowe M.D, on 2/10/2023. at 9:33 AM.

## 2023-02-10 NOTE — PLAN OF CARE
02/10/23 1201   Final Note   Assessment Type Final Discharge Note   Anticipated Discharge Disposition Home-Health  ([facility])   Hospital Resources/Appts/Education Provided Post-Acute resouces added to AVS   Post-Acute Status   Post-Acute Authorization Home Health   Home Health Status Set-up Complete/Auth obtained     Notified Christine with [facility] of discharge today. Discharge information sent via Careport. Patient is requesting [facility] for PT. I did make Christine aware and she will arrange if they can accommodate him. Notified Shauna at [facility] to cancel referral. PT also recommends cane. Patient will borrow from family or purchase if needed.

## 2023-02-10 NOTE — DISCHARGE INSTRUCTIONS
1- please follow up with dr trev torres, call and make appt .  2- please follow up with Dr Sonido valdovinos urology, call and make follow up appt for 2 weeks

## 2023-02-11 ENCOUNTER — HOSPITAL ENCOUNTER (EMERGENCY)
Facility: HOSPITAL | Age: 83
Discharge: HOME | End: 2023-02-11
Attending: EMERGENCY MEDICINE
Payer: MEDICARE

## 2023-02-11 VITALS
RESPIRATION RATE: 18 BRPM | DIASTOLIC BLOOD PRESSURE: 78 MMHG | WEIGHT: 155 LBS | BODY MASS INDEX: 22.96 KG/M2 | HEART RATE: 87 BPM | OXYGEN SATURATION: 96 % | SYSTOLIC BLOOD PRESSURE: 149 MMHG | HEIGHT: 69 IN

## 2023-02-11 DIAGNOSIS — T14.8XXA DRAINAGE FROM WOUND: Primary | ICD-10-CM

## 2023-02-11 LAB
ALBUMIN SERPL-MCNC: 2 G/DL (ref 3.4–4.8)
ALBUMIN/GLOB SERPL: 0.6 RATIO (ref 1.1–2)
ALP SERPL-CCNC: 580 UNIT/L (ref 40–150)
ALT SERPL-CCNC: 83 UNIT/L (ref 0–55)
AST SERPL-CCNC: 77 UNIT/L (ref 5–34)
BASOPHILS # BLD AUTO: 0.1 X10(3)/MCL (ref 0–0.2)
BASOPHILS NFR BLD AUTO: 0.9 %
BILIRUBIN DIRECT+TOT PNL SERPL-MCNC: 4.7 MG/DL
BUN SERPL-MCNC: 16.7 MG/DL (ref 8.4–25.7)
CALCIUM SERPL-MCNC: 8.7 MG/DL (ref 8.8–10)
CHLORIDE SERPL-SCNC: 109 MMOL/L (ref 98–107)
CO2 SERPL-SCNC: 21 MMOL/L (ref 23–31)
CREAT SERPL-MCNC: 0.82 MG/DL (ref 0.73–1.18)
EOSINOPHIL # BLD AUTO: 0.17 X10(3)/MCL (ref 0–0.9)
EOSINOPHIL NFR BLD AUTO: 1.6 %
ERYTHROCYTE [DISTWIDTH] IN BLOOD BY AUTOMATED COUNT: 15.7 % (ref 11.5–17)
GFR SERPLBLD CREATININE-BSD FMLA CKD-EPI: >60 MLS/MIN/1.73/M2
GLOBULIN SER-MCNC: 3.6 GM/DL (ref 2.4–3.5)
GLUCOSE SERPL-MCNC: 93 MG/DL (ref 82–115)
HCT VFR BLD AUTO: 31.4 % (ref 42–52)
HGB BLD-MCNC: 10.4 GM/DL (ref 14–18)
IMM GRANULOCYTES # BLD AUTO: 0.1 X10(3)/MCL (ref 0–0.04)
IMM GRANULOCYTES NFR BLD AUTO: 0.9 %
LYMPHOCYTES # BLD AUTO: 2.29 X10(3)/MCL (ref 0.6–4.6)
LYMPHOCYTES NFR BLD AUTO: 21.4 %
MCH RBC QN AUTO: 29.7 PG
MCHC RBC AUTO-ENTMCNC: 33.1 MG/DL (ref 33–36)
MCV RBC AUTO: 89.7 FL (ref 80–94)
MONOCYTES # BLD AUTO: 0.82 X10(3)/MCL (ref 0.1–1.3)
MONOCYTES NFR BLD AUTO: 7.7 %
NEUTROPHILS # BLD AUTO: 7.21 X10(3)/MCL (ref 2.1–9.2)
NEUTROPHILS NFR BLD AUTO: 67.5 %
NRBC BLD AUTO-RTO: 0 %
PLATELET # BLD AUTO: 399 X10(3)/MCL (ref 130–400)
PMV BLD AUTO: 9.2 FL (ref 7.4–10.4)
POTASSIUM SERPL-SCNC: 3.5 MMOL/L (ref 3.5–5.1)
PROT SERPL-MCNC: 5.6 GM/DL (ref 5.8–7.6)
RBC # BLD AUTO: 3.5 X10(6)/MCL (ref 4.7–6.1)
SODIUM SERPL-SCNC: 141 MMOL/L (ref 136–145)
WBC # SPEC AUTO: 10.7 X10(3)/MCL (ref 4.5–11.5)

## 2023-02-11 PROCEDURE — 85025 COMPLETE CBC W/AUTO DIFF WBC: CPT | Performed by: NURSE PRACTITIONER

## 2023-02-11 PROCEDURE — 80053 COMPREHEN METABOLIC PANEL: CPT | Performed by: NURSE PRACTITIONER

## 2023-02-11 PROCEDURE — 99284 EMERGENCY DEPT VISIT MOD MDM: CPT | Mod: 25

## 2023-02-11 NOTE — FIRST PROVIDER EVALUATION
"Medical screening examination initiated.  I have conducted a focused provider triage encounter, findings are as follows:    Brief history of present illness:  81y/o M presents to the ED with concerns of his bile drain leaking from the inside. Denies any pain.     Vitals:    02/11/23 1521   BP: (!) 137/92   Pulse: 80   Resp: 18   SpO2: 100%   Weight: 70.3 kg (155 lb)   Height: 5' 9" (1.753 m)       Pertinent physical exam:  AAA x 3    Brief workup plan:  Labs    Preliminary workup initiated; this workup will be continued and followed by the physician or advanced practice provider that is assigned to the patient when roomed.  "

## 2023-02-11 NOTE — ED NOTES
Pt. C/o leaking billary duct drain.. noted small amount of drainage to skin.. noted drainage in collection container.. will continue to monitor.. denies other complaints.

## 2023-02-12 NOTE — ED PROVIDER NOTES
Encounter Date: 2/11/2023    SCRIBE #1 NOTE: I, Pedrito Eagle, am scribing for, and in the presence of,  Dr. Valdez. I have scribed the following portions of the note - Other sections scribed: HPI, ROS, Physical Exam, MDM, Attending.     History     Chief Complaint   Patient presents with    biliary drain leaking     Pt states home health came to check his biliary drain and it is leaking from site. Pt denies fever or pain. Pt states he is unsure how lone it has been leaking.      81 y/o male with history of HTN and CAD presents to ED for biliary drain leakage onset today.  Pt was discharged home yesterday after being admitted for 30 days.  Pt says his drain was not leaking while admitted, and he only had the bandage changed once.  Daughter says that Home Health noticed that his gown was wet today near the drain site, which worried her, so she brought him here after Home Health changed his bandage.  She says that the length of the tube outside of pt's abdomen looks about the same as it has been, and the drain is still draining into the bag.  Pt has appointment with Dr. Brown, who placed the drain, next week.    The history is provided by the patient and a relative.   Illness   The current episode started today. The problem occurs continuously. The problem has been unchanged. The pain is at a severity of 0/10. Pertinent negatives include no fever, no abdominal pain, no constipation, no diarrhea, no nausea, no vomiting, no cough, no shortness of breath and no rash. Recently, medical care has been given at this facility.   Review of patient's allergies indicates:  No Known Allergies  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, unspecified extremity     Coronary artery disease     Dyslipidemia     Hypertension      Past Surgical History:   Procedure Laterality Date    AMPUTATION Right     Right arm    CAROTID STENT      ERCP N/A 12/21/2022     Procedure: ERCP;  Surgeon: Sushil Anderson MD;  Location: [facility];  Service: Gastroenterology;  Laterality: N/A;  food in abdomen    ERCP, WITH BIOPSY  12/21/2022    Procedure: ERCP, WITH BIOPSY;  Surgeon: Sushil Anderson MD;  Location: [facility] ENDOSCOPY;  Service: Gastroenterology;;    LEFT HEART CATHETERIZATION      TONSILLECTOMY       History reviewed. No pertinent family history.  Social History     Tobacco Use    Smoking status: Never    Smokeless tobacco: Never   Substance Use Topics    Alcohol use: Never    Drug use: Never     Review of Systems   Constitutional:  Negative for chills, diaphoresis and fever.   Eyes:  Negative for visual disturbance.   Respiratory:  Negative for cough and shortness of breath.    Cardiovascular:  Negative for chest pain and palpitations.   Gastrointestinal:  Negative for abdominal pain, constipation, diarrhea, nausea and vomiting.   Skin:  Negative for rash.   All other systems reviewed and are negative.    Physical Exam     Initial Vitals [02/11/23 1521]   BP Pulse Resp Temp SpO2   (!) 137/92 80 18 -- 100 %      MAP       --         Physical Exam    Nursing note and vitals reviewed.  Constitutional: He appears well-developed and well-nourished. No distress.   HENT:   Head: Normocephalic and atraumatic.   Mouth/Throat: Oropharynx is clear and moist.   Eyes: Conjunctivae are normal. No scleral icterus.   Neck: Neck supple.   Normal range of motion.  Cardiovascular:  Normal rate and regular rhythm.           Pulmonary/Chest: Breath sounds normal. No respiratory distress. He exhibits no tenderness.   Abdominal: Abdomen is soft. He exhibits no distension. There is no abdominal tenderness.   Percutaneous biliary drain to RUQ (small amount of yellow drainage to bandage; bag full of biliary drianage; no erythema or skin breakdown surrounding; skin intact)   Musculoskeletal:         General: Normal range of motion.      Cervical back: Normal range of motion and  neck supple.      Lumbar back: Normal. No tenderness. Normal range of motion.     Neurological: He is alert and oriented to person, place, and time.   Skin: Skin is warm and dry.       ED Course   Procedures  Labs Reviewed   COMPREHENSIVE METABOLIC PANEL - Abnormal; Notable for the following components:       Result Value    Chloride 109 (*)     Carbon Dioxide 21 (*)     Calcium Level Total 8.7 (*)     Protein Total 5.6 (*)     Albumin Level 2.0 (*)     Globulin 3.6 (*)     Albumin/Globulin Ratio 0.6 (*)     Bilirubin Total 4.7 (*)     Alkaline Phosphatase 580 (*)     Alanine Aminotransferase 83 (*)     Aspartate Aminotransferase 77 (*)     All other components within normal limits   CBC WITH DIFFERENTIAL - Abnormal; Notable for the following components:    RBC 3.50 (*)     Hgb 10.4 (*)     Hct 31.4 (*)     IG# 0.10 (*)     All other components within normal limits   CBC W/ AUTO DIFFERENTIAL    Narrative:     The following orders were created for panel order CBC Auto Differential.  Procedure                               Abnormality         Status                     ---------                               -----------         ------                     CBC with Differential[[id number]]        Abnormal            Final result                 Please view results for these tests on the individual orders.          Imaging Results              CT Abdomen Pelvis  Without Contrast (Final result)  Result time 02/11/23 20:43:06      Final result by Shant Sequeira MD (02/11/23 20:43:06)                   Impression:      1. Drainage catheter distal end loops along the medial margin of the right hepatic lobe but it is not certain whether this is within the biliary duct system.  Contrast injection fluid drainage catheter shall be useful to further assess.    2. Other findings without significant interval difference.      Electronically signed by: Shant Sequeira  Date:    02/11/2023  Time:    20:43               Narrative:     EXAMINATION:  CT ABDOMEN PELVIS WITHOUT CONTRAST    CLINICAL HISTORY:  increased drainage from bilairy drain site, concern for displacement;    TECHNIQUE:  Multidetector axial images were obtained from the  diaphragms to below symphysis pubis without the administration of IV contrast. Oral contrast was not administered.    Dose length product of 668 mGycm. Automated exposure control was utilized to minimize radiation dose.    COMPARISON:  February 7, 2023.    FINDINGS:  There is small right dependent pleural which shows interval size increase.  Further atelectasis right lower lung zone.  No convincing acute consolidation.  Trace amount of pericardial effusion.  Heart is enlarged in size.  Coronary arteries calcified plaques.    Percutaneously placed of the biliary drainage catheter distal end loops along the medial margin of right hepatic lobe and also abuts the biliary duct.  It is not certain whether this catheter traverses into the biliary duct system.  There is mild dilatation of the intrahepatic biliary radicles which is slightly less apparent since the previous exam.  Extrahepatic duct dilatation with maximum caliber of 18 mm is similar.  No calcified choledocholithiasis.  Gallbladder is not thickened and there is no intraluminal calcified calculus.  Within limitation noncontrast technique, no acute findings of the liver, pancreas and the spleen identified.    The adrenal glands noncontrast evaluation is unremarkable.  Stable size of right kidney parapelvic cystic structure for which no further specific imaging is recommended.  There are unchanged mild perinephric standings.  There is no collecting system dilatation.    Stomach is mostly decompressed.  No abnormal dilatation of loops of small bowel.  Colon is nondistended.  No free fluid.    Urinary bladder is decompressed around the Paul's catheter.    There is trace of anterolisthesis of L5 on S1.  Demineralization of bone and degenerative changes                                        Medications - No data to display           Scribe Attestation:   Scribe #1: I performed the above scribed service and the documentation accurately describes the services I performed. I attest to the accuracy of the note.    Attending Attestation:           Physician Attestation for Scribe:  Physician Attestation Statement for Scribe #1: I, Aleksandra Valdez MD, reviewed documentation, as scribed by Pedrito Eagle in my presence, and it is both accurate and complete.         Medical Decision Making  Problems Addressed:  Drainage from wound: acute illness or injury        ED assessment:    Mr. Triplett was just discharged from prolonged inpt stay w/ biliary drainage catheter in place - returned today for evaluation after home health recommended assessment of drainage from site. Nontoxic appearing, afebrile, no skin breakdown at site. There is drainage in the collection bag c/w working catheter.     Differential diagnosis (including but not limited to):   Biliary catheter obstruction, catheter displacement, maturation of the drainage catheter tract  No fever or other sign of infection at this time.     ED management:   Laboratory studies at the patient's baseline. CT scan w/ unchanged positioning of catheter. Case discussed with surgeon on call who advised some drainage from site ok/expected and recommends follow up with outpatient surgeon - can call in AM to schedule close follow up.     Amount and/or Complexity of Data Reviewed  Independent historian: daughter    Summary of history: reported home health assessed line and was concerned for drainage.   External data reviewed: notes from previous admissions  Summary of data reviewed: recent admission w/ duodenal lesion, underwent PTC placement and biopsy - dx adenocarcinoma, postoperative course complicated by dislodged catheter and urosepsis. upcoming follow up with his surgeon  Risk and benefits of testing: discussed   Labs: ordered and  reviewed  Radiology: ordered and reviewed    Discussion of management or test interpretation with external provider(s): discussed with surgery consultant   Summary of discussion: as below    Risk  Shared decision making     Critical Care  none    I, Aleksandra Valdez MD personally performed the history, PE, MDM, and procedures as documented above and agree with the scribe's documentation.         ED Course as of 02/13/23 1653   Sat Feb 11, 2023   4460 Discussed with Dr. Dick who advised all biliary drains that are in place over a couple of weeks will have some drainage at the site.  Advised local wound care at home, dressing changes and to keep his appointment with Dr. Brown for this coming week. [KS]      ED Course User Index  [KS] Aleksandra Valdez MD                 Clinical Impression:   Final diagnoses:  [L24.A9] Drainage from wound (Primary)        ED Disposition Condition    Discharge Stable          ED Prescriptions    None       Follow-up Information       Follow up With Specialties Details Why Contact Info    Abdirahman Brown MD Surgical Oncology  keep scheduled appointment [address] [facility]  [address] [place] [id number]  [phone]      [facility] - Emergency Dept Emergency Medicine  As needed, If symptoms worsen [address] [facility] [id number] [phone]             Aleksandra Valdez MD  03/06/23 0591

## 2023-02-14 ENCOUNTER — PATIENT OUTREACH (OUTPATIENT)
Dept: ADMINISTRATIVE | Facility: CLINIC | Age: 83
End: 2023-02-14
Payer: MEDICARE

## 2023-02-14 NOTE — PROGRESS NOTES
C3 nurse spoke with Quincy Triplett for a TCC post hospital discharge follow up call. The patient had a scheduled HOSFU appointment with Reji Waters Jr, MD on 2/14/23 @ 9:15 and Abdirahman Brown MD (Surgical Oncology) on 2/15/23 @ 2:30.

## 2023-02-15 ENCOUNTER — OFFICE VISIT (OUTPATIENT)
Dept: SURGICAL ONCOLOGY | Facility: CLINIC | Age: 83
End: 2023-02-15
Payer: MEDICARE

## 2023-02-15 VITALS
HEART RATE: 94 BPM | DIASTOLIC BLOOD PRESSURE: 64 MMHG | BODY MASS INDEX: 23.4 KG/M2 | SYSTOLIC BLOOD PRESSURE: 93 MMHG | HEIGHT: 69 IN | WEIGHT: 158 LBS

## 2023-02-15 DIAGNOSIS — C17.0 DUODENAL CANCER: Primary | ICD-10-CM

## 2023-02-15 DIAGNOSIS — C17.0 DUODENAL CANCER: Primary | Chronic | ICD-10-CM

## 2023-02-15 DIAGNOSIS — C24.1 MALIGNANT NEOPLASM OF AMPULLA OF VATER: ICD-10-CM

## 2023-02-15 PROCEDURE — 99999 PR PBB SHADOW E&M-EST. PATIENT-LVL III: CPT | Mod: PBBFAC,,, | Performed by: SURGERY

## 2023-02-15 PROCEDURE — 3074F SYST BP LT 130 MM HG: CPT | Mod: CPTII,S$GLB,, | Performed by: SURGERY

## 2023-02-15 PROCEDURE — 1101F PR PT FALLS ASSESS DOC 0-1 FALLS W/OUT INJ PAST YR: ICD-10-PCS | Mod: CPTII,S$GLB,, | Performed by: SURGERY

## 2023-02-15 PROCEDURE — 1159F MED LIST DOCD IN RCRD: CPT | Mod: CPTII,S$GLB,, | Performed by: SURGERY

## 2023-02-15 PROCEDURE — 99215 PR OFFICE/OUTPT VISIT, EST, LEVL V, 40-54 MIN: ICD-10-PCS | Mod: S$GLB,,, | Performed by: SURGERY

## 2023-02-15 PROCEDURE — 3288F PR FALLS RISK ASSESSMENT DOCUMENTED: ICD-10-PCS | Mod: CPTII,S$GLB,, | Performed by: SURGERY

## 2023-02-15 PROCEDURE — 3074F PR MOST RECENT SYSTOLIC BLOOD PRESSURE < 130 MM HG: ICD-10-PCS | Mod: CPTII,S$GLB,, | Performed by: SURGERY

## 2023-02-15 PROCEDURE — 1159F PR MEDICATION LIST DOCUMENTED IN MEDICAL RECORD: ICD-10-PCS | Mod: CPTII,S$GLB,, | Performed by: SURGERY

## 2023-02-15 PROCEDURE — 3078F PR MOST RECENT DIASTOLIC BLOOD PRESSURE < 80 MM HG: ICD-10-PCS | Mod: CPTII,S$GLB,, | Performed by: SURGERY

## 2023-02-15 PROCEDURE — 99999 PR PBB SHADOW E&M-EST. PATIENT-LVL III: ICD-10-PCS | Mod: PBBFAC,,, | Performed by: SURGERY

## 2023-02-15 PROCEDURE — 3288F FALL RISK ASSESSMENT DOCD: CPT | Mod: CPTII,S$GLB,, | Performed by: SURGERY

## 2023-02-15 PROCEDURE — 99215 OFFICE O/P EST HI 40 MIN: CPT | Mod: S$GLB,,, | Performed by: SURGERY

## 2023-02-15 PROCEDURE — 1111F PR DISCHARGE MEDS RECONCILED W/ CURRENT OUTPATIENT MED LIST: ICD-10-PCS | Mod: CPTII,S$GLB,, | Performed by: SURGERY

## 2023-02-15 PROCEDURE — 1111F DSCHRG MED/CURRENT MED MERGE: CPT | Mod: CPTII,S$GLB,, | Performed by: SURGERY

## 2023-02-15 PROCEDURE — 3078F DIAST BP <80 MM HG: CPT | Mod: CPTII,S$GLB,, | Performed by: SURGERY

## 2023-02-15 PROCEDURE — 1101F PT FALLS ASSESS-DOCD LE1/YR: CPT | Mod: CPTII,S$GLB,, | Performed by: SURGERY

## 2023-02-15 NOTE — PROGRESS NOTES
Chief complaint:  Duodenal adenocarcinoma     HPI:  82-year-old male presented with mild symptoms of jaundice, as well as significant weight loss.  Ultrasound exam suggested biliary dilation.  CT scan showed circumferential thickening of the 2nd portion of the duodenum and intra and extrahepatic biliary dilation.  There was also pancreatic ductal dilation.  There is no evidence of metastatic disease.  I personally reviewed and interpreted the images.  Patient underwent upper endoscopy and attempted ERCP with the finding of a malignant appearing mass at the site of the ampulla.  Biopsies proved poorly differentiated adenocarcinoma.  The bile duct could not be cannulated.  The patient denies fevers, chills or cholangitic symptoms.  He maintains an excellent functional status for his age, walks over 30 minutes a day, no history of alcohol or tobacco use.    The patient was admitted for biliary obstruction and underwent PTC catheter placement, could not be internalized.  Also developed urosepsis and urinary retention, currently with catheter in place, follow-up with urology is arranged.  He currently denies fevers or chills, no symptoms of jaundice.    He is currently undergoing outpatient physical therapy and is slowly regaining his strength.    Greater than 60 minutes was required for complete chart review, imaging review including interpretation of imaging, coordination with referring/other physicians, patient counseling regarding diagnosis/treatment plan, answering questions, medical decision making, and documentation.        Past Medical and Surgical History  Allergies :   Patient has no known allergies.    @[facility]@  Medical :   He has a past medical history of Atherosclerosis of native arteries of extremities with intermittent claudication, unspecified extremity, Coronary artery disease, Dyslipidemia, and Hypertension.    Surgical :   He has a past surgical history that includes Tonsillectomy; Amputation  (Right); Left heart catheterization; Carotid stent; ERCP (N/A, 12/21/2022); and ercp, with biopsy (12/21/2022).     Family History  His family history is not on file.    Social History  He reports that he has never smoked. He has never used smokeless tobacco. He reports that he does not drink alcohol and does not use drugs.     Review of Systems   Constitutional:  Negative for activity change, appetite change, chills, diaphoresis, fatigue and fever.   HENT:  Negative for congestion, ear pain, tinnitus and trouble swallowing.    Eyes:  Negative for photophobia and pain.   Respiratory:  Negative for apnea, cough, choking, chest tightness, shortness of breath and stridor.    Cardiovascular:  Negative for chest pain, palpitations and leg swelling.   Endocrine: Negative for cold intolerance and heat intolerance.   Genitourinary:  Negative for difficulty urinating, dysuria, enuresis, flank pain, frequency and hematuria.   Musculoskeletal:  Negative for arthralgias, back pain and gait problem.   Neurological:  Negative for dizziness, seizures, syncope, facial asymmetry, speech difficulty, weakness, light-headedness, numbness and headaches.   Psychiatric/Behavioral:  Negative for agitation, behavioral problems, confusion and decreased concentration.    All other systems reviewed and are negative.     Objective   Physical Exam  Constitutional:       General: He is not in acute distress.     Appearance: Normal appearance. He is not ill-appearing, toxic-appearing or diaphoretic.   HENT:      Head: Normocephalic and atraumatic.      Nose: No congestion or rhinorrhea.      Mouth/Throat:      Mouth: Mucous membranes are moist.      Pharynx: Oropharynx is clear.   Eyes:      General: No scleral icterus.     Extraocular Movements: Extraocular movements intact.      Pupils: Pupils are equal, round, and reactive to light.   Cardiovascular:      Rate and Rhythm: Normal rate and regular rhythm.      Pulses: Normal pulses.      Heart  "sounds: Normal heart sounds. No murmur heard.    No friction rub. No gallop.   Pulmonary:      Effort: Pulmonary effort is normal. No respiratory distress.      Breath sounds: Normal breath sounds. No stridor. No wheezing or rhonchi.   Chest:      Chest wall: No tenderness.   Abdominal:      General: Abdomen is flat. There is no distension.      Palpations: Abdomen is soft. There is no mass.      Tenderness: There is no abdominal tenderness. There is no guarding or rebound.      Hernia: No hernia is present.   Musculoskeletal:         General: No swelling, tenderness, deformity or signs of injury.      Cervical back: Normal range of motion and neck supple. No rigidity or tenderness.      Right lower leg: No edema.      Left lower leg: No edema.   Skin:     General: Skin is warm.      Capillary Refill: Capillary refill takes less than 2 seconds.      Coloration: Skin is not jaundiced or pale.      Findings: No bruising, erythema, lesion or rash.   Neurological:      General: No focal deficit present.      Mental Status: He is alert and oriented to person, place, and time.   Psychiatric:         Mood and Affect: Mood normal.         Behavior: Behavior normal.         Thought Content: Thought content normal.         Judgment: Judgment normal.     VITAL SIGNS: 24 HR MIN & MAX LAST    @FLOWSTAT(6:24::1)@           @FLOWSTAT(5:24::1)@  93/64     @FLOWSTAT(8:24::1)@  94     @FLOWSTAT(9:24::1)@       @FLOWSTAT(10:24::1)@         HT: 5' 9" (175.3 cm)  WT: 71.7 kg (158 lb)  BMI: 23.3       Assessment & Plan     Adenocarcinoma of the ampulla Vater/duodenum.  No obvious evidence of metastatic disease on CT of the abdomen.    Diagnosis, staging, prognosis and treatment guidelines for ampullary cancer were again discussed with the patient in detail, all questions were addressed.Using visual aids and drawings, I described the anatomy and potential surgical procedures.    After recent prolonged admission his functional status has " suffered as well as his strength, he is currently undergoing physical therapy and needs to get stronger in order to tolerate surgical intervention.  Tentatively Scheduled for pancreaticoduodenectomy/Whipple procedure late March  Return to clinic preoperatively with restaging imaging and to reassess functional status/frailty    Abdirahman Brown MD  Surgical Oncology  Complex General, Gastrointestinal and Hepatobiliary Surgery

## 2023-03-02 ENCOUNTER — HOSPITAL ENCOUNTER (INPATIENT)
Facility: HOSPITAL | Age: 83
LOS: 8 days | Discharge: HOME HEALTH | DRG: 919 | End: 2023-03-10
Attending: EMERGENCY MEDICINE | Admitting: HOSPITALIST
Payer: MEDICARE

## 2023-03-02 DIAGNOSIS — E87.6 HYPOKALEMIA: ICD-10-CM

## 2023-03-02 DIAGNOSIS — N30.00 ACUTE CYSTITIS WITHOUT HEMATURIA: ICD-10-CM

## 2023-03-02 DIAGNOSIS — A41.9 SEPSIS, DUE TO UNSPECIFIED ORGANISM, UNSPECIFIED WHETHER ACUTE ORGAN DYSFUNCTION PRESENT: Primary | ICD-10-CM

## 2023-03-02 DIAGNOSIS — R78.81 BACTEREMIA: ICD-10-CM

## 2023-03-02 DIAGNOSIS — E83.42 HYPOMAGNESEMIA: ICD-10-CM

## 2023-03-02 DIAGNOSIS — I95.9 HYPOTENSION: ICD-10-CM

## 2023-03-02 DIAGNOSIS — N17.9 AKI (ACUTE KIDNEY INJURY): ICD-10-CM

## 2023-03-02 LAB
ALBUMIN SERPL-MCNC: 2.6 G/DL (ref 3.4–4.8)
ALBUMIN/GLOB SERPL: 0.8 RATIO (ref 1.1–2)
ALP SERPL-CCNC: 686 UNIT/L (ref 40–150)
ALT SERPL-CCNC: 69 UNIT/L (ref 0–55)
ANION GAP SERPL CALC-SCNC: 16 MEQ/L
APPEARANCE UR: CLEAR
AST SERPL-CCNC: 85 UNIT/L (ref 5–34)
BACTERIA #/AREA URNS AUTO: ABNORMAL /HPF
BASOPHILS # BLD AUTO: 0.06 X10(3)/MCL (ref 0–0.2)
BASOPHILS NFR BLD AUTO: 0.4 %
BILIRUB UR QL STRIP.AUTO: ABNORMAL MG/DL
BILIRUBIN DIRECT+TOT PNL SERPL-MCNC: 2.3 MG/DL
BUN SERPL-MCNC: 40.9 MG/DL (ref 8.4–25.7)
BUN SERPL-MCNC: 43.1 MG/DL (ref 8.4–25.7)
CALCIUM SERPL-MCNC: 7.8 MG/DL (ref 8.8–10)
CALCIUM SERPL-MCNC: 8.6 MG/DL (ref 8.8–10)
CHLORIDE SERPL-SCNC: 103 MMOL/L (ref 98–107)
CHLORIDE SERPL-SCNC: 99 MMOL/L (ref 98–107)
CO2 SERPL-SCNC: 16 MMOL/L (ref 23–31)
CO2 SERPL-SCNC: 21 MMOL/L (ref 23–31)
COLOR UR AUTO: YELLOW
CREAT SERPL-MCNC: 2.15 MG/DL (ref 0.73–1.18)
CREAT SERPL-MCNC: 2.33 MG/DL (ref 0.73–1.18)
CREAT/UREA NIT SERPL: 19
EOSINOPHIL # BLD AUTO: 0.01 X10(3)/MCL (ref 0–0.9)
EOSINOPHIL NFR BLD AUTO: 0.1 %
ERYTHROCYTE [DISTWIDTH] IN BLOOD BY AUTOMATED COUNT: 15 % (ref 11.5–17)
FLUAV AG UPPER RESP QL IA.RAPID: NOT DETECTED
FLUBV AG UPPER RESP QL IA.RAPID: NOT DETECTED
GFR SERPLBLD CREATININE-BSD FMLA CKD-EPI: 27 MLS/MIN/1.73/M2
GFR SERPLBLD CREATININE-BSD FMLA CKD-EPI: 30 MLS/MIN/1.73/M2
GLOBULIN SER-MCNC: 3.3 GM/DL (ref 2.4–3.5)
GLUCOSE SERPL-MCNC: 86 MG/DL (ref 82–115)
GLUCOSE SERPL-MCNC: 87 MG/DL (ref 82–115)
GLUCOSE UR QL STRIP.AUTO: 100 MG/DL
HCT VFR BLD AUTO: 29.9 % (ref 42–52)
HGB BLD-MCNC: 10 G/DL (ref 14–18)
IMM GRANULOCYTES # BLD AUTO: 0.08 X10(3)/MCL (ref 0–0.04)
IMM GRANULOCYTES NFR BLD AUTO: 0.5 %
KETONES UR QL STRIP.AUTO: NEGATIVE MG/DL
LACTATE SERPL-SCNC: 2.6 MMOL/L (ref 0.5–2.2)
LACTATE SERPL-SCNC: 3 MMOL/L (ref 0.5–2.2)
LEUKOCYTE ESTERASE UR QL STRIP.AUTO: ABNORMAL UNIT/L
LIPASE SERPL-CCNC: 211 U/L
LYMPHOCYTES # BLD AUTO: 0.16 X10(3)/MCL (ref 0.6–4.6)
LYMPHOCYTES NFR BLD AUTO: 1.1 %
MAGNESIUM SERPL-MCNC: 1.5 MG/DL (ref 1.6–2.6)
MAGNESIUM SERPL-MCNC: 1.5 MG/DL (ref 1.6–2.6)
MCH RBC QN AUTO: 29.5 PG
MCHC RBC AUTO-ENTMCNC: 33.4 G/DL (ref 33–36)
MCV RBC AUTO: 88.2 FL (ref 80–94)
MONOCYTES # BLD AUTO: 0.6 X10(3)/MCL (ref 0.1–1.3)
MONOCYTES NFR BLD AUTO: 4 %
NEUTROPHILS # BLD AUTO: 14.24 X10(3)/MCL (ref 2.1–9.2)
NEUTROPHILS NFR BLD AUTO: 93.9 %
NITRITE UR QL STRIP.AUTO: NEGATIVE
NRBC BLD AUTO-RTO: 0 %
PH UR STRIP.AUTO: 5.5 [PH]
PLATELET # BLD AUTO: 300 X10(3)/MCL (ref 130–400)
PMV BLD AUTO: 9.4 FL (ref 7.4–10.4)
POTASSIUM SERPL-SCNC: 3.2 MMOL/L (ref 3.5–5.1)
POTASSIUM SERPL-SCNC: 3.8 MMOL/L (ref 3.5–5.1)
PROT SERPL-MCNC: 5.9 GM/DL (ref 5.8–7.6)
PROT UR QL STRIP.AUTO: 30 MG/DL
RBC # BLD AUTO: 3.39 X10(6)/MCL (ref 4.7–6.1)
RBC #/AREA URNS AUTO: ABNORMAL /HPF
RBC UR QL AUTO: ABNORMAL UNIT/L
SARS-COV-2 RNA RESP QL NAA+PROBE: NOT DETECTED
SODIUM SERPL-SCNC: 135 MMOL/L (ref 136–145)
SODIUM SERPL-SCNC: 135 MMOL/L (ref 136–145)
SP GR UR STRIP.AUTO: >=1.03
SQUAMOUS #/AREA URNS AUTO: ABNORMAL /HPF
TROPONIN I SERPL-MCNC: 0.02 NG/ML (ref 0–0.04)
UROBILINOGEN UR STRIP-ACNC: 0.2 MG/DL
WBC # SPEC AUTO: 15.2 X10(3)/MCL (ref 4.5–11.5)
WBC #/AREA URNS AUTO: ABNORMAL /HPF

## 2023-03-02 PROCEDURE — 25000003 PHARM REV CODE 250: Performed by: EMERGENCY MEDICINE

## 2023-03-02 PROCEDURE — 63600175 PHARM REV CODE 636 W HCPCS: Performed by: EMERGENCY MEDICINE

## 2023-03-02 PROCEDURE — 83605 ASSAY OF LACTIC ACID: CPT | Performed by: EMERGENCY MEDICINE

## 2023-03-02 PROCEDURE — 84484 ASSAY OF TROPONIN QUANT: CPT | Performed by: EMERGENCY MEDICINE

## 2023-03-02 PROCEDURE — 96366 THER/PROPH/DIAG IV INF ADDON: CPT

## 2023-03-02 PROCEDURE — 63600175 PHARM REV CODE 636 W HCPCS: Performed by: STUDENT IN AN ORGANIZED HEALTH CARE EDUCATION/TRAINING PROGRAM

## 2023-03-02 PROCEDURE — 83735 ASSAY OF MAGNESIUM: CPT | Performed by: EMERGENCY MEDICINE

## 2023-03-02 PROCEDURE — 93005 ELECTROCARDIOGRAM TRACING: CPT

## 2023-03-02 PROCEDURE — 93010 ELECTROCARDIOGRAM REPORT: CPT | Mod: 76,,, | Performed by: INTERNAL MEDICINE

## 2023-03-02 PROCEDURE — 80053 COMPREHEN METABOLIC PANEL: CPT | Performed by: EMERGENCY MEDICINE

## 2023-03-02 PROCEDURE — 83735 ASSAY OF MAGNESIUM: CPT | Performed by: STUDENT IN AN ORGANIZED HEALTH CARE EDUCATION/TRAINING PROGRAM

## 2023-03-02 PROCEDURE — 0240U COVID/FLU A&B PCR: CPT | Performed by: EMERGENCY MEDICINE

## 2023-03-02 PROCEDURE — 93010 ELECTROCARDIOGRAM REPORT: CPT | Mod: ,,, | Performed by: INTERNAL MEDICINE

## 2023-03-02 PROCEDURE — 96375 TX/PRO/DX INJ NEW DRUG ADDON: CPT

## 2023-03-02 PROCEDURE — 96365 THER/PROPH/DIAG IV INF INIT: CPT

## 2023-03-02 PROCEDURE — 87077 CULTURE AEROBIC IDENTIFY: CPT | Performed by: EMERGENCY MEDICINE

## 2023-03-02 PROCEDURE — 99291 CRITICAL CARE FIRST HOUR: CPT

## 2023-03-02 PROCEDURE — 20000000 HC ICU ROOM

## 2023-03-02 PROCEDURE — 83690 ASSAY OF LIPASE: CPT | Performed by: EMERGENCY MEDICINE

## 2023-03-02 PROCEDURE — 81001 URINALYSIS AUTO W/SCOPE: CPT | Performed by: EMERGENCY MEDICINE

## 2023-03-02 PROCEDURE — 93010 EKG 12-LEAD: ICD-10-PCS | Mod: 76,,, | Performed by: INTERNAL MEDICINE

## 2023-03-02 PROCEDURE — 25000003 PHARM REV CODE 250: Performed by: STUDENT IN AN ORGANIZED HEALTH CARE EDUCATION/TRAINING PROGRAM

## 2023-03-02 PROCEDURE — 85025 COMPLETE CBC W/AUTO DIFF WBC: CPT | Performed by: EMERGENCY MEDICINE

## 2023-03-02 RX ORDER — SODIUM CHLORIDE 9 MG/ML
1000 INJECTION, SOLUTION INTRAVENOUS
Status: COMPLETED | OUTPATIENT
Start: 2023-03-02 | End: 2023-03-02

## 2023-03-02 RX ORDER — NOREPINEPHRINE BITARTRATE/D5W 8 MG/250ML
0-3 PLASTIC BAG, INJECTION (ML) INTRAVENOUS CONTINUOUS
Status: DISCONTINUED | OUTPATIENT
Start: 2023-03-02 | End: 2023-03-03

## 2023-03-02 RX ORDER — ONDANSETRON 2 MG/ML
4 INJECTION INTRAMUSCULAR; INTRAVENOUS
Status: COMPLETED | OUTPATIENT
Start: 2023-03-02 | End: 2023-03-02

## 2023-03-02 RX ORDER — SODIUM CHLORIDE, SODIUM LACTATE, POTASSIUM CHLORIDE, CALCIUM CHLORIDE 600; 310; 30; 20 MG/100ML; MG/100ML; MG/100ML; MG/100ML
1000 INJECTION, SOLUTION INTRAVENOUS
Status: COMPLETED | OUTPATIENT
Start: 2023-03-02 | End: 2023-03-03

## 2023-03-02 RX ORDER — MAGNESIUM SULFATE HEPTAHYDRATE 40 MG/ML
2 INJECTION, SOLUTION INTRAVENOUS
Status: COMPLETED | OUTPATIENT
Start: 2023-03-02 | End: 2023-03-02

## 2023-03-02 RX ADMIN — NOREPINEPHRINE BITARTRATE 0.02 MCG/KG/MIN: 8 INJECTION, SOLUTION INTRAVENOUS at 09:03

## 2023-03-02 RX ADMIN — POTASSIUM BICARBONATE 25 MEQ: 977.5 TABLET, EFFERVESCENT ORAL at 06:03

## 2023-03-02 RX ADMIN — MAGNESIUM SULFATE HEPTAHYDRATE 2 G: 40 INJECTION, SOLUTION INTRAVENOUS at 06:03

## 2023-03-02 RX ADMIN — ONDANSETRON 4 MG: 2 INJECTION INTRAMUSCULAR; INTRAVENOUS at 02:03

## 2023-03-02 RX ADMIN — SODIUM CHLORIDE, POTASSIUM CHLORIDE, SODIUM LACTATE AND CALCIUM CHLORIDE 1000 ML: 600; 310; 30; 20 INJECTION, SOLUTION INTRAVENOUS at 09:03

## 2023-03-02 RX ADMIN — SODIUM CHLORIDE 1000 ML: 9 INJECTION, SOLUTION INTRAVENOUS at 06:03

## 2023-03-02 RX ADMIN — SODIUM CHLORIDE 500 ML: 9 INJECTION, SOLUTION INTRAVENOUS at 07:03

## 2023-03-02 RX ADMIN — PIPERACILLIN AND TAZOBACTAM 4.5 G: 4; .5 INJECTION, POWDER, LYOPHILIZED, FOR SOLUTION INTRAVENOUS; PARENTERAL at 04:03

## 2023-03-02 RX ADMIN — SODIUM CHLORIDE 1000 ML: 9 INJECTION, SOLUTION INTRAVENOUS at 03:03

## 2023-03-02 NOTE — ED PROVIDER NOTES
Encounter Date: 3/2/2023       History     Chief Complaint   Patient presents with    Fatigue     c/o weakness , unable to sit up and nausea and vomiting. Per EMS , bp on scene was 70/40. On recheck EMS got 119/68. 60 ml bolus NS per EMS     82-year-old male with a history of CAD, HLD, HTN, PAD with intermittent claudication, Adenocarcinoma of the ampulla Vater/duodenum and no obvious evidence of metastatic disease on CT of the abdomen, states he is not been eating or drinking very well over the last 4 days and started feeling very weak.  He feels like he is getting dehydrated and he was scheduled to have fluids by the home health nurse this afternoon but was so weak, he decided to come to the emergency room by way of ambulance.  Initial blood pressure 70/40 but he is received about 600 mL of saline and now it is 113/62 and he says he is feeling much better.  He is prescribed oxycodone but states he is not been having any pain so he is not taking any pain medication.  He states he is continuing to lose weight and has been hoping to gain weight is we could have a Whipple procedure by Dr. Abdirahman Brown.  He currently has a biliary drain to an external bag with green drainage, draining without any problem.  He also has a chronic indwelling Paul due to urinary retention.  His GI doctors Dr. Anderson and PCP is Dr. Waters.  He is here with his brother-in-law who helps care for him along with his ex-wife.      Review of patient's allergies indicates:  No Known Allergies  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, unspecified extremity     Coronary artery disease     Dyslipidemia     Hypertension      Past Surgical History:   Procedure Laterality Date    AMPUTATION Right     Right arm    CAROTID STENT      EGD, WITH HEMORRHAGE CONTROL  1/19/2023    Procedure: EGD,WITH HEMORRHAGE CONTROL;  Surgeon: Ranjeet Perez MD;  Location: [facility] OR;   Service: Gastroenterology;;  NextPowder HemeSpray    ERCP N/A 12/21/2022    Procedure: ERCP;  Surgeon: Sushil Anderson MD;  Location: [facility] ENDOSCOPY;  Service: Gastroenterology;  Laterality: N/A;  food in abdomen    ERCP, WITH BIOPSY  12/21/2022    Procedure: ERCP, WITH BIOPSY;  Surgeon: Sushil Anderson MD;  Location: [facility] ENDOSCOPY;  Service: Gastroenterology;;    ESOPHAGOGASTRODUODENOSCOPY N/A 1/19/2023    Procedure: EGD;  Surgeon: Ranjeet Perez MD;  Location: [facility] OR;  Service: Gastroenterology;  Laterality: N/A;    LEFT HEART CATHETERIZATION      TONSILLECTOMY       No family history on file.  Social History     Tobacco Use    Smoking status: Never    Smokeless tobacco: Never   Substance Use Topics    Alcohol use: Never    Drug use: Never     Review of Systems   Neurological:  Positive for weakness.   All other systems reviewed and are negative.    Physical Exam     Initial Vitals   BP Pulse Resp Temp SpO2   03/02/23 1428 03/02/23 1428 03/02/23 1427 03/02/23 1427 03/02/23 1428   113/62 99 20 98.6 °F (37 °C) 97 %      MAP       --                Physical Exam    Nursing note and vitals reviewed.  Constitutional: Vital signs are normal.   Frail and chronically ill appearing in no acute distress   HENT:   Head: Normocephalic and atraumatic.   Mouth/Throat: Oropharynx is clear and moist.   Eyes: Pupils are equal, round, and reactive to light.   Neck: Neck supple.   Cardiovascular:  Normal rate, regular rhythm and normal heart sounds.           Pulmonary/Chest: Breath sounds normal. No respiratory distress.   Abdominal: Abdomen is soft. There is no abdominal tenderness.   Musculoskeletal:         General: No tenderness or edema.      Cervical back: Neck supple. No edema or erythema.      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   Neurological: He is alert and oriented to person, place, and time. GCS score is 15. GCS eye subscore is 4.  GCS verbal subscore is 5. GCS motor subscore is 6.   Skin: Skin is warm and dry. Capillary refill takes less than 2 seconds.   Psychiatric: He has a normal mood and affect. Thought content normal.       ED Course   Critical Care    Date/Time: 3/2/2023 9:35 PM  Performed by: Suman Steele MD  Authorized by: Suman Steele MD   Direct patient critical care time: 65 minutes  Total critical care time (exclusive of procedural time) : 65 minutes  Critical care time was exclusive of separately billable procedures and treating other patients.  Critical care was necessary to treat or prevent imminent or life-threatening deterioration of the following conditions: sepsis and shock.  Critical care was time spent personally by me on the following activities: development of treatment plan with patient or surrogate, discussions with consultants, interpretation of cardiac output measurements, evaluation of patient's response to treatment, examination of patient, obtaining history from patient or surrogate, ordering and performing treatments and interventions, ordering and review of laboratory studies, ordering and review of radiographic studies, pulse oximetry, re-evaluation of patient's condition and review of old charts.      Labs Reviewed   CBC WITH DIFFERENTIAL - Abnormal; Notable for the following components:       Result Value    WBC 15.2 (*)     RBC 3.39 (*)     Hgb 10.0 (*)     Hct 29.9 (*)     Lymph # 0.16 (*)     Neut # 14.24 (*)     IG# 0.08 (*)     All other components within normal limits   COMPREHENSIVE METABOLIC PANEL - Abnormal; Notable for the following components:    Sodium Level 135 (*)     Potassium Level 3.2 (*)     Carbon Dioxide 21 (*)     Blood Urea Nitrogen 43.1 (*)     Creatinine 2.33 (*)     Calcium Level Total 8.6 (*)     Albumin Level 2.6 (*)     Albumin/Globulin Ratio 0.8 (*)     Bilirubin Total 2.3 (*)     Alkaline Phosphatase 686 (*)     Alanine Aminotransferase 69 (*)     Aspartate  Aminotransferase 85 (*)     All other components within normal limits   LIPASE - Abnormal; Notable for the following components:    Lipase Level 211 (*)     All other components within normal limits   MAGNESIUM - Abnormal; Notable for the following components:    Magnesium Level 1.50 (*)     All other components within normal limits   URINALYSIS, REFLEX TO URINE CULTURE - Abnormal; Notable for the following components:    Protein, UA 30 (*)     Glucose,  (*)     Blood, UA Large (*)     Bilirubin, UA Small (*)     Leukocyte Esterase, UA Small (*)     All other components within normal limits   LACTIC ACID, PLASMA - Abnormal; Notable for the following components:    Lactic Acid Level 3.0 (*)     All other components within normal limits   URINALYSIS, MICROSCOPIC - Abnormal; Notable for the following components:    Bacteria, UA Many (*)     RBC, UA 11-20 (*)     Squamous Epithelial Cells, UA Few (*)     All other components within normal limits    Narrative:     Urine microscopic results may be inaccurate, <12 cc sample submitted   LACTIC ACID, PLASMA - Abnormal; Notable for the following components:    Lactic Acid Level 2.6 (*)     All other components within normal limits   BASIC METABOLIC PANEL - Abnormal; Notable for the following components:    Sodium Level 135 (*)     Carbon Dioxide 16 (*)     Blood Urea Nitrogen 40.9 (*)     Creatinine 2.15 (*)     Calcium Level Total 7.8 (*)     All other components within normal limits   MAGNESIUM - Abnormal; Notable for the following components:    Magnesium Level 1.50 (*)     All other components within normal limits   TROPONIN I - Normal   COVID/FLU A&B PCR - Normal    Narrative:     The Xpert Xpress SARS-CoV-2/FLU/RSV plus is a rapid, multiplexed real-time PCR test intended for the simultaneous qualitative detection and differentiation of SARS-CoV-2, Influenza A, Influenza B, and respiratory syncytial virus (RSV) viral RNA in either nasopharyngeal swab or nasal swab  specimens.         BLOOD CULTURE [facility]   BLOOD CULTURE [facility]   LACTIC ACID, PLASMA     EKG Readings: (Independently Interpreted)   Initial Reading: No STEMI. Rhythm: Sinus Tachycardia. Heart Rate: 113. Ectopy: No Ectopy PACs. ST Segments: Normal ST Segments. T Waves: Normal. Clinical Impression: Sinus Tachycardia with PACs     Imaging Results              CT Chest Abdomen Pelvis Without Contrast (XPD) (Final result)  Result time 03/02/23 17:40:53      Final result by Nancy Redd MD (03/02/23 17:40:53)                   Impression:      1. Slight interval retraction of the percutaneous biliary stent which is now coiled slightly more distally in the right lobe of the liver.  There is however slightly improved dilatation of the extrahepatic bile ducts.  2. Known ampullary/duodenal mass poorly evaluated on this noncontrast CT.  No bowel obstruction.  3. Bladder wall appears mildly thickened which may be related to under distension.  Correlate with urinalysis.      Electronically signed by: Nancy Redd  Date:    03/02/2023  Time:    17:40               Narrative:    EXAMINATION:  CT CHEST ABDOMEN PELVIS WITHOUT CONTRAST(XPD)    CLINICAL HISTORY:  Septic, biliary stent for cancer of ampula and duodenum;    TECHNIQUE:  Helical acquisition through the chest, abdomen and pelvis without  IV administration of contrast. Axial, sagittal and coronal reformats interpreted.    Automated tube current modulation, weight-based exposure dosing, and/or iterative reconstruction technique utilized to reach lowest reasonably achievable exposure rate.    DLP: 603 mGy*cm    COMPARISON:  CT abdomen pelvis 02/11/2023    FINDINGS:  BASE OF NECK: Nodular thyroid.  Thyroid nodule measures less than 1.4 cm and requires no imaging follow-up per consensus guidelines.    HEART: Normal size. There are coronary artery calcifications.    THORACIC VASCULATURE: Thoracic aorta is unremarkable.    RACHNA/MEDIASTINUM: No enlarged lymph nodes by size  criteria. Evaluation of hilar lymphadenopathy is limited without contrast.    AIRWAYS: Patent.    LUNGS/PLEURA: Mild dependent atelectasis.    THORACIC SOFT TISSUES: Unremarkable.    HEPATOBILIARY: Mild interval retraction percutaneous biliary stent coiled in the region of the right lobe of the liver.  No significant biliary ductal dilatation.  Limited evaluation for intrahepatic biliary ductal dilatation on noncontrast CT.  Probable trace, unchanged dilated biliary radicle at the anterior, superior right lobe of the liver..  Common hepatic duct measures 12 mm; previously 18 mm. Gallbladder is not overly distended.    PANCREAS: No inflammatory change.    SPLEEN: Normal in size    ADRENALS: No mass.    KIDNEYS/URETERS: No renal or ureteral calculus.  No hydronephrosis.  Incidental right renal cyst requires no imaging follow-up.    GI TRACT/MESENTERY: Known ampullary/duodenal mass poorly evaluated on this noncontrast CT.  No bowel obstruction.  The appendix is normal.    PERITONEUM: No free fluid.No free air.    LYMPH NODES: Small peripancreatic lymph nodes are similar to prior.    ABDOMINOPELVIC VASCULATURE: Aortoiliac atherosclerosis.  Saccular dilatation at the infrarenal aorta are measures 2.5 cm in diameter.    BLADDER: Paul catheter in the bladder.  Bladder wall appears mildly thickened.    REPRODUCTIVE ORGANS: Mild prostatomegaly.    ABDOMINAL WALL: Unremarkable.    BONES: Degenerative facet arthropathy in the lower lumbar spine.  Multilevel disc osteophytes at the thoracolumbar spine.                                       X-Ray Chest 1 View (Final result)  Result time 03/02/23 16:01:30      Final result by Brandon Ny MD (03/02/23 16:01:30)                   Impression:      No acute chest disease is identified.      Electronically signed by: Brandon Ny  Date:    03/02/2023  Time:    16:01               Narrative:    EXAMINATION:  XR CHEST 1 VIEW    CLINICAL HISTORY:  hypotension;,  .    COMPARISON:  January 12 2023    FINDINGS:  No alveolar consolidation, effusion, or pneumothorax is seen.   The thoracic aorta is normal  cardiac silhouette, central pulmonary vessels and mediastinum are normal in size and are grossly unremarkable.   visualized osseous structures are grossly unremarkable.    Catheter in RUQ                                       Medications   NORepinephrine 8 mg in dextrose 5% 250 mL infusion (0.04 mcg/kg/min × 65.8 kg Intravenous Rate/Dose Change 3/2/23 2200)   docusate sodium capsule 50 mg (has no administration in time range)   finasteride tablet 5 mg (has no administration in time range)   pantoprazole EC tablet 40 mg (has no administration in time range)   polyethylene glycol packet 17 g (has no administration in time range)   sodium chloride 0.9% flush 10 mL (has no administration in time range)   heparin (porcine) injection 5,000 Units (has no administration in time range)   ondansetron injection 4 mg (has no administration in time range)   lactated ringers bolus 500 mL (has no administration in time range)   lactated ringers infusion (has no administration in time range)   cefTRIAXone (ROCEPHIN) 1 g in dextrose 5 % in water (D5W) 5 % 50 mL IVPB (MB+) (has no administration in time range)   ondansetron injection 4 mg (4 mg Intravenous Given 3/2/23 1447)   0.9%  NaCl infusion (1,000 mLs Intravenous New Bag 3/2/23 1542)   piperacillin-tazobactam (ZOSYN) 4.5 g in dextrose 5 % in water (D5W) 5 % 100 mL IVPB (MB+) (0 g Intravenous Stopped 3/2/23 1718)   0.9%  NaCl infusion (1,000 mLs Intravenous New Bag 3/2/23 1818)   magnesium sulfate 2g in water 50mL IVPB (premix) (0 g Intravenous Stopped 3/2/23 2018)   potassium bicarbonate disintegrating tablet 25 mEq (25 mEq Oral Given 3/2/23 1818)   sodium chloride 0.9% bolus 500 mL 500 mL (0 mLs Intravenous Stopped 3/2/23 2046)   lactated ringers infusion (1,000 mLs Intravenous New Bag 3/2/23 2126)     Medical Decision Making:   Initial  "Assessment:   82-year-old male with a history of CAD, HLD, HTN, PAD with intermittent claudication, Adenocarcinoma of the ampulla Vater/duodenum and no obvious evidence of metastatic disease on CT of the abdomen, states he is not been eating or drinking very well over the last 4 days and started feeling very weak.  He feels like he is getting dehydrated and he was scheduled to have fluids by the home health nurse this afternoon but was so weak, he decided to come to the emergency room by way of ambulance.  Initial blood pressure 70/40 but he is received about 600 mL of saline and now it is 113/62 and he says he is feeling much better.  He is prescribed oxycodone but states he is not been having any pain so he is not taking any pain medication.  He states he is continuing to lose weight and has been hoping to gain weight is we could have a Whipple procedure by Dr. Abdirahman Brown.  He currently has a biliary drain to an external bag with green drainage, draining without any problem.  He also has a chronic indwelling Paul due to urinary retention.  His GI doctors Dr. Anderson and PCP is Dr. Waters.  He is here with his brother-in-law who helps care for him along with his ex-wife.    Differential Diagnosis:   Differential diagnosis includes but is not limited to ascending colitis, peritonitis, UTI, kidney stone, urosepsis, pneumonia, DVT, PE. Malfunction of biliary catheter, intra-abdominal abscess  Clinical Tests:   Lab Tests: Reviewed  Radiological Study: Reviewed  Sepsis Perfusion Assessment: "I attest a sepsis perfusion exam was performed within 6 hours of sepsis, severe sepsis, or septic shock presentation, following fluid resuscitation."    Sepsis Perfusion Assessment Complete: 3/2/2023 7:21 PM    ED Management:  Mr. Triplett was evaluated in the emergency room with history, physical exam, lab work, and CT scan.  He was found to have an elevated lactic acid at 3.0 and elevated WBC count so IV fluids was ordered.  " I also ordered lactic acid, blood cultures, antibiotics.  Other:   I have discussed this case with another health care provider.       <> Summary of the Discussion: Case was discussed with Meme nurse practitioner for the hospitalist service.  Patient map has been fluctuating to slightly above versus slightly below 65.  Meme is asking if we can observe and resuscitate 1st patient further to make sure he is not going to decompensate and require ICU admission.  We have two Ivs , fluids infusing, antibiotics and potassium infusing, also need to start Magnesium but this may cause hypotension so we will do this cautiously and recheck Lactic acid.  Additional MDM:   Sepsis - SIRS Criteria: Temperature: Temp Normal. Heart Rate: HR > 90. Tachypnea: RR Normal. White Count: WBC > 12,000. .  Sepsis: Airway: The patient's airway was good (normal gag reflex and breathing). The lactic acid was: borderline (3.0-4.0). Antibiotic selection is designed to cover the patient at risk for MRSA and pseudomonas. The patient was treated with Zosyn 4.5 g IVPB. The patient was hypotensive and required fluid resuscitation. The patient was treated with the following IV Fluids to maximize the BP and the patient's CVP: NS bolus (30 mL/kg). The patient's blood pressure remained low despite IV fluids and required pressor agents. The patient's blood pressure additionally was treated with: a norepinephrine drip. Consultants: Critical Care. stable         ED Course as of 03/03/23 0044   Thu Mar 02, 2023   1612 Lactic acid noted to be 3.0 with a white blood cell count of 03947.  Blood cultures have been drawn so go ahead and give him a dose of Zosyn empirically.  His blood pressure did briefly drop to 75/50 but is picking back up after fluids were increased.  He is asymptomatic. [SH]   1630 BP 84/45  O2sat 96% on RA.  Pt is asymptomatic.  He has received 1 liter NS per [facility] and about 200ml NS here thus far.  Awaiting Chem result to  determine if we can do a contrasted CT [SH]   1703 LFTs appear to be baseline but Bun and CR elevated at 43/2.3 and potassium and magnesium are low.  Will replace electrolytes and giving IV fluids [SH]   1742 BP 91/42 MAP 62  O2sat 96%RA  Asymptomatic [SH]   1812 BP 90/57 Map 70.   O2sat 96% RA. Antibiotics finished.  Finishing first liter NS ( second if you count the liter from [facility] ambulance) [SH]   1848 94/53 MAP 63 [SH]   1921 I, Suman Steele M.D., have assumed care this patient at 7:00 p.m..  This is a 82-year-old gentleman who presents emergency department for generalized fatigue and weakness.  Patient states it has been ongoing for last 4 days.  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  Patient states he had a stent placed with improvement in his symptoms.  He states that he was admitted approximately a month ago for this stent and unfortunately caught COVID at the time which got him very weak.  Patient states he is actually doing better until recently when he started feeling weak again.  He does state he has a indwelling Paul catheter has been in place for last 6 weeks.  States it was placed because of urinary retention secondary to BPH.  Patient states he used to have a history of hypertension but no longer takes any hypertensive or hyperlipidemic medication.  States that the only medications he is currently taking his Flomax, appetite pill and a depression pill.  Patient adamantly denies any pain at this time.    I reviewed the workup that was obtained thus far.  He does have elevated white count of 15, elevated lactic acid at 3 and TERRENCE.  It does seem that he has a urinary tract infection and Zosyn was started for this.  A CT was obtained which showed mild movement of the biliary stent although decreased biliary dilatation.    His blood pressure has improved.  His map is now 75.  Did  receive a 30 cc/kilogram necessary to meet the sepsis criteria.  I did perform a perfusion assessment which shows adequate capillary refill.  I will speak with the hospitalist in regards to admission.   [BS]   2014 Patient started having more frequent PACs.  Will obtain repeat BMP and Mag to make sure electrolytes and not worsened.  Will start the magnesium infusion at this time. [BS]   2041 Lactate, Ovidio(!): 2.6  Lactic acid improving [BS]   2042 Creatinine(!): 2.15 [BS]   2042 BUN(!): 40.9  Improving [BS]   2042 Map remains above 65 [BS]   2107 Patient's map has been downtrending over last hour.  His maps were now less than 65.  Most recent map 57.  Did receive 30 cc/kilogram of IVF.  Will start him on low-dose Levophed.  He will need to be admitted to the ICU.   [BS]   2134 ICU agrees with admission [BS]      ED Course User Index  [BS] Suman Steele MD  [SH] Lindsey Ribeiro MD                 Clinical Impression:   Final diagnoses:  [I95.9] Hypotension  [N17.9] TERRENCE (acute kidney injury) (Primary)  [A41.9] Sepsis, due to unspecified organism, unspecified whether acute organ dysfunction present  [E87.6] Hypokalemia  [E83.42] Hypomagnesemia  [N30.00] Acute cystitis without hematuria        ED Disposition Condition    Admit                 Suman Steele MD  03/02/23 2136       Suman Steele MD  03/03/23 0044

## 2023-03-02 NOTE — ED TRIAGE NOTES
c/o weakness , unable to sit up and nausea and vomiting. Per EMS , bp on scene was 70/40. On recheck EMS got 119/68.  600 ml bolus NS per  EMS

## 2023-03-03 PROBLEM — I95.9 HYPOTENSION: Status: ACTIVE | Noted: 2023-03-03

## 2023-03-03 PROBLEM — A41.9 SEPSIS: Status: ACTIVE | Noted: 2023-03-03

## 2023-03-03 LAB
ALBUMIN SERPL-MCNC: 2.1 G/DL (ref 3.4–4.8)
ALBUMIN/GLOB SERPL: 0.8 RATIO (ref 1.1–2)
ALP SERPL-CCNC: 450 UNIT/L (ref 40–150)
ALT SERPL-CCNC: 55 UNIT/L (ref 0–55)
AST SERPL-CCNC: 65 UNIT/L (ref 5–34)
BASOPHILS # BLD AUTO: 0.07 X10(3)/MCL (ref 0–0.2)
BASOPHILS NFR BLD AUTO: 0.4 %
BILIRUBIN DIRECT+TOT PNL SERPL-MCNC: 2.3 MG/DL
BUN SERPL-MCNC: 39.9 MG/DL (ref 8.4–25.7)
CALCIUM SERPL-MCNC: 8.1 MG/DL (ref 8.8–10)
CHLORIDE SERPL-SCNC: 104 MMOL/L (ref 98–107)
CO2 SERPL-SCNC: 17 MMOL/L (ref 23–31)
CREAT SERPL-MCNC: 1.84 MG/DL (ref 0.73–1.18)
EOSINOPHIL # BLD AUTO: 0 X10(3)/MCL (ref 0–0.9)
EOSINOPHIL NFR BLD AUTO: 0 %
ERYTHROCYTE [DISTWIDTH] IN BLOOD BY AUTOMATED COUNT: 15.5 % (ref 11.5–17)
GFR SERPLBLD CREATININE-BSD FMLA CKD-EPI: 36 MLS/MIN/1.73/M2
GLOBULIN SER-MCNC: 2.6 GM/DL (ref 2.4–3.5)
GLUCOSE SERPL-MCNC: 94 MG/DL (ref 82–115)
HCT VFR BLD AUTO: 27.1 % (ref 42–52)
HGB BLD-MCNC: 9 G/DL (ref 14–18)
IMM GRANULOCYTES # BLD AUTO: 0.27 X10(3)/MCL (ref 0–0.04)
IMM GRANULOCYTES NFR BLD AUTO: 1.4 %
LACTATE SERPL-SCNC: 1.9 MMOL/L (ref 0.5–2.2)
LACTATE SERPL-SCNC: 2.8 MMOL/L (ref 0.5–2.2)
LYMPHOCYTES # BLD AUTO: 0.71 X10(3)/MCL (ref 0.6–4.6)
LYMPHOCYTES NFR BLD AUTO: 3.6 %
MAGNESIUM SERPL-MCNC: 1.9 MG/DL (ref 1.6–2.6)
MCH RBC QN AUTO: 29.5 PG
MCHC RBC AUTO-ENTMCNC: 33.2 G/DL (ref 33–36)
MCV RBC AUTO: 88.9 FL (ref 80–94)
MONOCYTES # BLD AUTO: 0.62 X10(3)/MCL (ref 0.1–1.3)
MONOCYTES NFR BLD AUTO: 3.1 %
NEUTROPHILS # BLD AUTO: 18.23 X10(3)/MCL (ref 2.1–9.2)
NEUTROPHILS NFR BLD AUTO: 91.5 %
NRBC BLD AUTO-RTO: 0 %
PHOSPHATE SERPL-MCNC: 4.9 MG/DL (ref 2.3–4.7)
PLATELET # BLD AUTO: 238 X10(3)/MCL (ref 130–400)
PMV BLD AUTO: 9.8 FL (ref 7.4–10.4)
POTASSIUM SERPL-SCNC: 4.7 MMOL/L (ref 3.5–5.1)
PROT SERPL-MCNC: 4.7 GM/DL (ref 5.8–7.6)
RBC # BLD AUTO: 3.05 X10(6)/MCL (ref 4.7–6.1)
SODIUM SERPL-SCNC: 135 MMOL/L (ref 136–145)
WBC # SPEC AUTO: 19.9 X10(3)/MCL (ref 4.5–11.5)

## 2023-03-03 PROCEDURE — 93005 ELECTROCARDIOGRAM TRACING: CPT

## 2023-03-03 PROCEDURE — 80053 COMPREHEN METABOLIC PANEL: CPT | Performed by: STUDENT IN AN ORGANIZED HEALTH CARE EDUCATION/TRAINING PROGRAM

## 2023-03-03 PROCEDURE — 85025 COMPLETE CBC W/AUTO DIFF WBC: CPT | Performed by: STUDENT IN AN ORGANIZED HEALTH CARE EDUCATION/TRAINING PROGRAM

## 2023-03-03 PROCEDURE — 83605 ASSAY OF LACTIC ACID: CPT | Performed by: EMERGENCY MEDICINE

## 2023-03-03 PROCEDURE — 83735 ASSAY OF MAGNESIUM: CPT | Performed by: STUDENT IN AN ORGANIZED HEALTH CARE EDUCATION/TRAINING PROGRAM

## 2023-03-03 PROCEDURE — 25000003 PHARM REV CODE 250: Performed by: STUDENT IN AN ORGANIZED HEALTH CARE EDUCATION/TRAINING PROGRAM

## 2023-03-03 PROCEDURE — 84100 ASSAY OF PHOSPHORUS: CPT | Performed by: STUDENT IN AN ORGANIZED HEALTH CARE EDUCATION/TRAINING PROGRAM

## 2023-03-03 PROCEDURE — 25000003 PHARM REV CODE 250: Performed by: PHYSICIAN ASSISTANT

## 2023-03-03 PROCEDURE — 93010 EKG 12-LEAD: ICD-10-PCS | Mod: ,,, | Performed by: INTERNAL MEDICINE

## 2023-03-03 PROCEDURE — 63600175 PHARM REV CODE 636 W HCPCS: Performed by: STUDENT IN AN ORGANIZED HEALTH CARE EDUCATION/TRAINING PROGRAM

## 2023-03-03 PROCEDURE — 93010 ELECTROCARDIOGRAM REPORT: CPT | Mod: ,,, | Performed by: INTERNAL MEDICINE

## 2023-03-03 PROCEDURE — 25000003 PHARM REV CODE 250: Performed by: INTERNAL MEDICINE

## 2023-03-03 PROCEDURE — 11000001 HC ACUTE MED/SURG PRIVATE ROOM

## 2023-03-03 RX ORDER — POLYETHYLENE GLYCOL 3350 17 G/17G
17 POWDER, FOR SOLUTION ORAL DAILY
Status: DISCONTINUED | OUTPATIENT
Start: 2023-03-03 | End: 2023-03-10 | Stop reason: HOSPADM

## 2023-03-03 RX ORDER — SODIUM CHLORIDE 0.9 % (FLUSH) 0.9 %
10 SYRINGE (ML) INJECTION
Status: DISCONTINUED | OUTPATIENT
Start: 2023-03-03 | End: 2023-03-10 | Stop reason: HOSPADM

## 2023-03-03 RX ORDER — TALC
6 POWDER (GRAM) TOPICAL NIGHTLY PRN
Status: DISCONTINUED | OUTPATIENT
Start: 2023-03-03 | End: 2023-03-10 | Stop reason: HOSPADM

## 2023-03-03 RX ORDER — ACETAMINOPHEN 325 MG/1
650 TABLET ORAL EVERY 4 HOURS PRN
Status: DISCONTINUED | OUTPATIENT
Start: 2023-03-03 | End: 2023-03-10

## 2023-03-03 RX ORDER — HEPARIN SODIUM 5000 [USP'U]/ML
5000 INJECTION, SOLUTION INTRAVENOUS; SUBCUTANEOUS EVERY 12 HOURS
Status: DISCONTINUED | OUTPATIENT
Start: 2023-03-03 | End: 2023-03-10 | Stop reason: HOSPADM

## 2023-03-03 RX ORDER — SODIUM CHLORIDE, SODIUM LACTATE, POTASSIUM CHLORIDE, CALCIUM CHLORIDE 600; 310; 30; 20 MG/100ML; MG/100ML; MG/100ML; MG/100ML
INJECTION, SOLUTION INTRAVENOUS CONTINUOUS
Status: DISCONTINUED | OUTPATIENT
Start: 2023-03-03 | End: 2023-03-04

## 2023-03-03 RX ORDER — PANTOPRAZOLE SODIUM 40 MG/1
40 TABLET, DELAYED RELEASE ORAL DAILY
Status: DISCONTINUED | OUTPATIENT
Start: 2023-03-03 | End: 2023-03-10 | Stop reason: HOSPADM

## 2023-03-03 RX ORDER — FINASTERIDE 5 MG/1
5 TABLET, FILM COATED ORAL DAILY
Status: DISCONTINUED | OUTPATIENT
Start: 2023-03-03 | End: 2023-03-10 | Stop reason: HOSPADM

## 2023-03-03 RX ORDER — ONDANSETRON 2 MG/ML
4 INJECTION INTRAMUSCULAR; INTRAVENOUS EVERY 8 HOURS PRN
Status: DISCONTINUED | OUTPATIENT
Start: 2023-03-03 | End: 2023-03-10 | Stop reason: HOSPADM

## 2023-03-03 RX ADMIN — ACETAMINOPHEN 325MG 650 MG: 325 TABLET ORAL at 04:03

## 2023-03-03 RX ADMIN — SODIUM CHLORIDE, POTASSIUM CHLORIDE, SODIUM LACTATE AND CALCIUM CHLORIDE 500 ML: 600; 310; 30; 20 INJECTION, SOLUTION INTRAVENOUS at 12:03

## 2023-03-03 RX ADMIN — FINASTERIDE 5 MG: 5 TABLET, FILM COATED ORAL at 08:03

## 2023-03-03 RX ADMIN — SODIUM CHLORIDE, POTASSIUM CHLORIDE, SODIUM LACTATE AND CALCIUM CHLORIDE: 600; 310; 30; 20 INJECTION, SOLUTION INTRAVENOUS at 04:03

## 2023-03-03 RX ADMIN — HEPARIN SODIUM 5000 UNITS: 5000 INJECTION, SOLUTION INTRAVENOUS; SUBCUTANEOUS at 08:03

## 2023-03-03 RX ADMIN — PANTOPRAZOLE SODIUM 40 MG: 40 TABLET, DELAYED RELEASE ORAL at 08:03

## 2023-03-03 RX ADMIN — Medication 6 MG: at 08:03

## 2023-03-03 RX ADMIN — CEFTRIAXONE 1 G: 1 INJECTION, POWDER, FOR SOLUTION INTRAMUSCULAR; INTRAVENOUS at 12:03

## 2023-03-03 RX ADMIN — SODIUM CHLORIDE, POTASSIUM CHLORIDE, SODIUM LACTATE AND CALCIUM CHLORIDE: 600; 310; 30; 20 INJECTION, SOLUTION INTRAVENOUS at 08:03

## 2023-03-03 RX ADMIN — SODIUM CHLORIDE, POTASSIUM CHLORIDE, SODIUM LACTATE AND CALCIUM CHLORIDE: 600; 310; 30; 20 INJECTION, SOLUTION INTRAVENOUS at 12:03

## 2023-03-03 RX ADMIN — ACETAMINOPHEN 325MG 650 MG: 325 TABLET ORAL at 12:03
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
## 2023-03-03 NOTE — NURSING
Nurses Note -- 4 Eyes      3/3/2023   12:12 AM      Skin assessed during: Admit      [x] No Pressure Injuries Present    [x]Prevention Measures Documented      [] Yes- Altered Skin Integrity Present or Discovered   [] LDA Added if Not in Epic (Describe Wound)   [] New Altered Skin Integrity was Present on Admit and Documented in LDA   [] Wound Image Taken    Wound Care Consulted? No    Attending Nurse:  Ezra Henderson RN     Second RN/Staff Member:  PRAMOD Whitaker
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
## 2023-03-03 NOTE — NURSING
Nurses Note -- 4 Eyes      3/3/2023   11:32 AM      Skin assessed during: Q Shift Change      [x] No Pressure Injuries Present    [x]Prevention Measures Documented      [] Yes- Altered Skin Integrity Present or Discovered   [] LDA Added if Not in Epic (Describe Wound)   [] New Altered Skin Integrity was Present on Admit and Documented in LDA   [] Wound Image Taken    Wound Care Consulted? No    Attending Nurse:  Elva Brumfield RN     Second RN/Staff Member:  Maximo Cruz RN
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
## 2023-03-03 NOTE — PLAN OF CARE
03/03/23 1400   Discharge Assessment   Assessment Type Discharge Planning Assessment   Confirmed/corrected address, phone number and insurance Yes   Confirmed Demographics Correct on Facesheet   Source of Information patient   When was your last doctors appointment?   (PCP: Dr. Waters)   Communicated SHAUN with patient/caregiver Date not available/Unable to determine   Reason For Admission sepsis   People in Home alone   Do you expect to return to your current living situation? No   Do you have help at home or someone to help you manage your care at home? Yes   Who are your caregiver(s) and their phone number(s)? ex-wife and children   Prior to hospitilization cognitive status: Alert/Oriented   Current cognitive status: Alert/Oriented   Home Accessibility wheelchair accessible   Home Layout Able to live on 1st floor   Equipment Currently Used at Home none   Readmission within 30 days? Yes   Patient currently being followed by outpatient case management? No   Do you currently have service(s) that help you manage your care at home? Yes   Name and Contact number of agency [facility]   Is the pt/caregiver preference to resume services with current agency Yes   Do you take prescription medications? Yes  ([facility])   Do you have prescription coverage? Yes   Coverage [facility]   Do you have any problems affording any of your prescribed medications? No   Is the patient taking medications as prescribed? yes   Who is going to help you get home at discharge? family   How do you get to doctors appointments? car, drives self   Are you on dialysis? No   Do you take coumadin? No   Discharge Plan A Home Health   Discharge Plan B Home Health   DME Needed Upon Discharge  none   Discharge Plan discussed with: Patient   Discharge Barriers Identified None     Clinical updates sent to [facility]

## 2023-03-03 NOTE — PLAN OF CARE
Problem: Adult Inpatient Plan of Care  Goal: Plan of Care Review  Outcome: Ongoing, Progressing  Goal: Patient-Specific Goal (Individualized)  Outcome: Ongoing, Progressing  Goal: Absence of Hospital-Acquired Illness or Injury  Outcome: Ongoing, Progressing  Goal: Optimal Comfort and Wellbeing  Outcome: Ongoing, Progressing  Goal: Readiness for Transition of Care  Outcome: Ongoing, Progressing     Problem: Adjustment to Illness (Sepsis/Septic Shock)  Goal: Optimal Coping  Outcome: Ongoing, Progressing     Problem: Infection Progression (Sepsis/Septic Shock)  Goal: Absence of Infection Signs and Symptoms  Outcome: Ongoing, Progressing     Problem: Nutrition Impaired (Sepsis/Septic Shock)  Goal: Optimal Nutrition Intake  Outcome: Ongoing, Progressing     Problem: Fluid and Electrolyte Imbalance (Acute Kidney Injury/Impairment)  Goal: Fluid and Electrolyte Balance  Outcome: Ongoing, Progressing     Problem: Oral Intake Inadequate (Acute Kidney Injury/Impairment)  Goal: Optimal Nutrition Intake  Outcome: Ongoing, Progressing     Problem: Renal Function Impairment (Acute Kidney Injury/Impairment)  Goal: Effective Renal Function  Outcome: Ongoing, Progressing     Problem: Skin Injury Risk Increased  Goal: Skin Health and Integrity  Outcome: Ongoing, Progressing

## 2023-03-03 NOTE — PLAN OF CARE
Problem: Adult Inpatient Plan of Care  Goal: Plan of Care Review  Outcome: Ongoing, Progressing  Goal: Patient-Specific Goal (Individualized)  Outcome: Ongoing, Progressing  Goal: Absence of Hospital-Acquired Illness or Injury  Outcome: Ongoing, Progressing  Goal: Optimal Comfort and Wellbeing  Outcome: Ongoing, Progressing  Goal: Readiness for Transition of Care  Outcome: Ongoing, Progressing     Problem: Adjustment to Illness (Sepsis/Septic Shock)  Goal: Optimal Coping  Outcome: Ongoing, Progressing     Problem: Infection Progression (Sepsis/Septic Shock)  Goal: Absence of Infection Signs and Symptoms  Outcome: Ongoing, Progressing     Problem: Nutrition Impaired (Sepsis/Septic Shock)  Goal: Optimal Nutrition Intake  Outcome: Ongoing, Progressing     Problem: Fluid and Electrolyte Imbalance (Acute Kidney Injury/Impairment)  Goal: Fluid and Electrolyte Balance  Outcome: Ongoing, Progressing     Problem: Oral Intake Inadequate (Acute Kidney Injury/Impairment)  Goal: Optimal Nutrition Intake  Outcome: Ongoing, Progressing     Problem: Renal Function Impairment (Acute Kidney Injury/Impairment)  Goal: Effective Renal Function  Outcome: Ongoing, Progressing     Problem: Infection  Goal: Absence of Infection Signs and Symptoms  Outcome: Ongoing, Progressing     Problem: Skin Injury Risk Increased  Goal: Skin Health and Integrity  Outcome: Ongoing, Progressing

## 2023-03-04 ENCOUNTER — APPOINTMENT (OUTPATIENT)
Dept: CARDIOLOGY | Facility: HOSPITAL | Age: 83
End: 2023-03-04
Attending: INTERNAL MEDICINE
Payer: MEDICARE

## 2023-03-04 LAB
ALBUMIN SERPL-MCNC: 1.9 G/DL (ref 3.4–4.8)
ALBUMIN/GLOB SERPL: 0.7 RATIO (ref 1.1–2)
ALP SERPL-CCNC: 329 UNIT/L (ref 40–150)
ALT SERPL-CCNC: 44 UNIT/L (ref 0–55)
AST SERPL-CCNC: 37 UNIT/L (ref 5–34)
AV INDEX (PROSTH): 0.72
AV MEAN GRADIENT: 3 MMHG
AV PEAK GRADIENT: 7 MMHG
AV VALVE AREA: 2.48 CM2
AV VELOCITY RATIO: 0.69
BASOPHILS # BLD AUTO: 0.04 X10(3)/MCL (ref 0–0.2)
BASOPHILS NFR BLD AUTO: 0.4 %
BILIRUBIN DIRECT+TOT PNL SERPL-MCNC: 1.9 MG/DL
BSA FOR ECHO PROCEDURE: 1.81 M2
BUN SERPL-MCNC: 39.9 MG/DL (ref 8.4–25.7)
CALCIUM SERPL-MCNC: 8.6 MG/DL (ref 8.8–10)
CHLORIDE SERPL-SCNC: 107 MMOL/L (ref 98–107)
CO2 SERPL-SCNC: 21 MMOL/L (ref 23–31)
CREAT SERPL-MCNC: 1.63 MG/DL (ref 0.73–1.18)
CV ECHO LV RWT: 0.4 CM
DOP CALC AO PEAK VEL: 1.28 M/S
DOP CALC AO VTI: 28.9 CM
DOP CALC LVOT AREA: 3.5 CM2
DOP CALC LVOT DIAMETER: 2.1 CM
DOP CALC LVOT PEAK VEL: 0.88 M/S
DOP CALC LVOT STROKE VOLUME: 71.66 CM3
DOP CALC MV VTI: 35.2 CM
DOP CALCLVOT PEAK VEL VTI: 20.7 CM
E WAVE DECELERATION TIME: 235 MSEC
E/A RATIO: 0.99
E/E' RATIO: 6.87 M/S
ECHO LV POSTERIOR WALL: 1.05 CM (ref 0.6–1.1)
EJECTION FRACTION: 60 %
EOSINOPHIL # BLD AUTO: 0.22 X10(3)/MCL (ref 0–0.9)
EOSINOPHIL NFR BLD AUTO: 2.2 %
ERYTHROCYTE [DISTWIDTH] IN BLOOD BY AUTOMATED COUNT: 15.7 % (ref 11.5–17)
FRACTIONAL SHORTENING: 31 % (ref 28–44)
GFR SERPLBLD CREATININE-BSD FMLA CKD-EPI: 42 MLS/MIN/1.73/M2
GLOBULIN SER-MCNC: 2.8 GM/DL (ref 2.4–3.5)
GLUCOSE SERPL-MCNC: 100 MG/DL (ref 82–115)
HCT VFR BLD AUTO: 25.2 % (ref 42–52)
HGB BLD-MCNC: 8.3 G/DL (ref 14–18)
IMM GRANULOCYTES # BLD AUTO: 0.03 X10(3)/MCL (ref 0–0.04)
IMM GRANULOCYTES NFR BLD AUTO: 0.3 %
INTERVENTRICULAR SEPTUM: 0.84 CM (ref 0.6–1.1)
LEFT ATRIUM SIZE: 4.2 CM
LEFT INTERNAL DIMENSION IN SYSTOLE: 3.68 CM (ref 2.1–4)
LEFT VENTRICLE DIASTOLIC VOLUME INDEX: 74.73 ML/M2
LEFT VENTRICLE DIASTOLIC VOLUME: 136 ML
LEFT VENTRICLE MASS INDEX: 103 G/M2
LEFT VENTRICLE SYSTOLIC VOLUME INDEX: 31.5 ML/M2
LEFT VENTRICLE SYSTOLIC VOLUME: 57.4 ML
LEFT VENTRICULAR INTERNAL DIMENSION IN DIASTOLE: 5.31 CM (ref 3.5–6)
LEFT VENTRICULAR MASS: 186.57 G
LV LATERAL E/E' RATIO: 6.08 M/S
LV SEPTAL E/E' RATIO: 7.9 M/S
LVOT MG: 2 MMHG
LVOT MV: 0.59 CM/S
LYMPHOCYTES # BLD AUTO: 1.21 X10(3)/MCL (ref 0.6–4.6)
LYMPHOCYTES NFR BLD AUTO: 11.9 %
MCH RBC QN AUTO: 29.5 PG
MCHC RBC AUTO-ENTMCNC: 32.9 G/DL (ref 33–36)
MCV RBC AUTO: 89.7 FL (ref 80–94)
MONOCYTES # BLD AUTO: 0.46 X10(3)/MCL (ref 0.1–1.3)
MONOCYTES NFR BLD AUTO: 4.5 %
MV MEAN GRADIENT: 1 MMHG
MV PEAK A VEL: 0.8 M/S
MV PEAK E VEL: 0.79 M/S
MV PEAK GRADIENT: 4 MMHG
MV STENOSIS PRESSURE HALF TIME: 58 MS
MV VALVE AREA BY CONTINUITY EQUATION: 2.04 CM2
MV VALVE AREA P 1/2 METHOD: 3.79 CM2
NEUTROPHILS # BLD AUTO: 8.25 X10(3)/MCL (ref 2.1–9.2)
NEUTROPHILS NFR BLD AUTO: 80.7 %
NRBC BLD AUTO-RTO: 0 %
PISA TR MAX VEL: 2.97 M/S
PLATELET # BLD AUTO: 199 X10(3)/MCL (ref 130–400)
PMV BLD AUTO: 10.1 FL (ref 7.4–10.4)
POTASSIUM SERPL-SCNC: 3.5 MMOL/L (ref 3.5–5.1)
PROT SERPL-MCNC: 4.7 GM/DL (ref 5.8–7.6)
RA PRESSURE: 8 MMHG
RBC # BLD AUTO: 2.81 X10(6)/MCL (ref 4.7–6.1)
SINUS: 3.7 CM
SODIUM SERPL-SCNC: 136 MMOL/L (ref 136–145)
TDI LATERAL: 0.13 M/S
TDI SEPTAL: 0.1 M/S
TDI: 0.12 M/S
TR MAX PG: 35 MMHG
TRICUSPID ANNULAR PLANE SYSTOLIC EXCURSION: 2.24 CM
TV REST PULMONARY ARTERY PRESSURE: 43 MMHG
WBC # SPEC AUTO: 10.2 X10(3)/MCL (ref 4.5–11.5)

## 2023-03-04 PROCEDURE — 63600175 PHARM REV CODE 636 W HCPCS: Performed by: INTERNAL MEDICINE

## 2023-03-04 PROCEDURE — 76937 US GUIDE VASCULAR ACCESS: CPT

## 2023-03-04 PROCEDURE — 85025 COMPLETE CBC W/AUTO DIFF WBC: CPT | Performed by: INTERNAL MEDICINE

## 2023-03-04 PROCEDURE — 93306 TTE W/DOPPLER COMPLETE: CPT

## 2023-03-04 PROCEDURE — C1751 CATH, INF, PER/CENT/MIDLINE: HCPCS

## 2023-03-04 PROCEDURE — 11000001 HC ACUTE MED/SURG PRIVATE ROOM

## 2023-03-04 PROCEDURE — 87040 BLOOD CULTURE FOR BACTERIA: CPT | Performed by: INTERNAL MEDICINE

## 2023-03-04 PROCEDURE — 80053 COMPREHEN METABOLIC PANEL: CPT | Performed by: INTERNAL MEDICINE

## 2023-03-04 PROCEDURE — 36410 VNPNXR 3YR/> PHY/QHP DX/THER: CPT

## 2023-03-04 PROCEDURE — 63600175 PHARM REV CODE 636 W HCPCS: Performed by: STUDENT IN AN ORGANIZED HEALTH CARE EDUCATION/TRAINING PROGRAM

## 2023-03-04 PROCEDURE — 25000003 PHARM REV CODE 250: Performed by: INTERNAL MEDICINE

## 2023-03-04 PROCEDURE — 25000003 PHARM REV CODE 250: Performed by: STUDENT IN AN ORGANIZED HEALTH CARE EDUCATION/TRAINING PROGRAM

## 2023-03-04 PROCEDURE — 87088 URINE BACTERIA CULTURE: CPT | Performed by: INTERNAL MEDICINE

## 2023-03-04 PROCEDURE — 93306 ECHO (CUPID ONLY): ICD-10-PCS | Mod: 26,,, | Performed by: INTERNAL MEDICINE

## 2023-03-04 PROCEDURE — 93306 TTE W/DOPPLER COMPLETE: CPT | Mod: 26,,, | Performed by: INTERNAL MEDICINE

## 2023-03-04 RX ORDER — SODIUM CHLORIDE 0.9 % (FLUSH) 0.9 %
10 SYRINGE (ML) INJECTION EVERY 6 HOURS
Status: DISCONTINUED | OUTPATIENT
Start: 2023-03-05 | End: 2023-03-10 | Stop reason: HOSPADM

## 2023-03-04 RX ORDER — TAMSULOSIN HYDROCHLORIDE 0.4 MG/1
0.4 CAPSULE ORAL DAILY
Status: DISCONTINUED | OUTPATIENT
Start: 2023-03-04 | End: 2023-03-10 | Stop reason: HOSPADM

## 2023-03-04 RX ORDER — SODIUM CHLORIDE, SODIUM LACTATE, POTASSIUM CHLORIDE, CALCIUM CHLORIDE 600; 310; 30; 20 MG/100ML; MG/100ML; MG/100ML; MG/100ML
INJECTION, SOLUTION INTRAVENOUS CONTINUOUS
Status: ACTIVE | OUTPATIENT
Start: 2023-03-04 | End: 2023-03-05

## 2023-03-04 RX ORDER — SODIUM CHLORIDE 0.9 % (FLUSH) 0.9 %
10 SYRINGE (ML) INJECTION
Status: DISCONTINUED | OUTPATIENT
Start: 2023-03-04 | End: 2023-03-10 | Stop reason: HOSPADM

## 2023-03-04 RX ADMIN — HEPARIN SODIUM 5000 UNITS: 5000 INJECTION, SOLUTION INTRAVENOUS; SUBCUTANEOUS at 08:03

## 2023-03-04 RX ADMIN — TAMSULOSIN HYDROCHLORIDE 0.4 MG: 0.4 CAPSULE ORAL at 02:03

## 2023-03-04 RX ADMIN — FINASTERIDE 5 MG: 5 TABLET, FILM COATED ORAL at 08:03

## 2023-03-04 RX ADMIN — AMPICILLIN SODIUM 500 MG: 500 INJECTION, POWDER, FOR SOLUTION INTRAMUSCULAR; INTRAVENOUS at 09:03

## 2023-03-04 RX ADMIN — SODIUM CHLORIDE, POTASSIUM CHLORIDE, SODIUM LACTATE AND CALCIUM CHLORIDE: 600; 310; 30; 20 INJECTION, SOLUTION INTRAVENOUS at 03:03

## 2023-03-04 RX ADMIN — Medication 6 MG: at 08:03

## 2023-03-04 RX ADMIN — DOCUSATE SODIUM 50 MG: 50 CAPSULE, LIQUID FILLED ORAL at 08:03

## 2023-03-04 RX ADMIN — SODIUM CHLORIDE, PRESERVATIVE FREE 10 ML: 5 INJECTION INTRAVENOUS at 11:03

## 2023-03-04 RX ADMIN — CEFTRIAXONE 1 G: 1 INJECTION, POWDER, FOR SOLUTION INTRAMUSCULAR; INTRAVENOUS at 12:03

## 2023-03-04 RX ADMIN — PANTOPRAZOLE SODIUM 40 MG: 40 TABLET, DELAYED RELEASE ORAL at 08:03

## 2023-03-04 NOTE — NURSING
Nurses Note -- 4 Eyes      3/4/2023   9:56 AM      Skin assessed during: Q Shift Change      [x] No Pressure Injuries Present    [x]Prevention Measures Documented      [x] Yes- Altered Skin Integrity Present or Discovered   [] LDA Added if Not in Epic (Describe Wound)   [] New Altered Skin Integrity was Present on Admit and Documented in LDA   [] Wound Image Taken    Wound Care Consulted? No    Attending Nurse:  Donna Elaine RN     Second RN/Staff Member:  PRAMOD Stevenson
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
## 2023-03-04 NOTE — NURSING
Nurses Note -- 4 Eyes      3/4/2023   4:01 AM      Skin assessed during: Daily Assessment      [x] No Pressure Injuries Present    [x]Prevention Measures Documented      [] Yes- Altered Skin Integrity Present or Discovered   [] LDA Added if Not in Epic (Describe Wound)   [] New Altered Skin Integrity was Present on Admit and Documented in LDA   [] Wound Image Taken    Wound Care Consulted? No    Attending Nurse:  Ezra Henderson RN     Second RN/Staff Member:  PRAMOD Whitaker

## 2023-03-05 LAB
ALBUMIN SERPL-MCNC: 1.8 G/DL (ref 3.4–4.8)
ALBUMIN/GLOB SERPL: 0.8 RATIO (ref 1.1–2)
ALP SERPL-CCNC: 541 UNIT/L (ref 40–150)
ALT SERPL-CCNC: 41 UNIT/L (ref 0–55)
AST SERPL-CCNC: 34 UNIT/L (ref 5–34)
BACTERIA BLD CULT: ABNORMAL
BASOPHILS # BLD AUTO: 0.06 X10(3)/MCL (ref 0–0.2)
BASOPHILS NFR BLD AUTO: 0.8 %
BILIRUBIN DIRECT+TOT PNL SERPL-MCNC: 2.3 MG/DL
BUN SERPL-MCNC: 35.3 MG/DL (ref 8.4–25.7)
CALCIUM SERPL-MCNC: 8.3 MG/DL (ref 8.8–10)
CHLORIDE SERPL-SCNC: 110 MMOL/L (ref 98–107)
CO2 SERPL-SCNC: 22 MMOL/L (ref 23–31)
CREAT SERPL-MCNC: 1.27 MG/DL (ref 0.73–1.18)
EOSINOPHIL # BLD AUTO: 0.35 X10(3)/MCL (ref 0–0.9)
EOSINOPHIL NFR BLD AUTO: 4.7 %
ERYTHROCYTE [DISTWIDTH] IN BLOOD BY AUTOMATED COUNT: 15.7 % (ref 11.5–17)
GFR SERPLBLD CREATININE-BSD FMLA CKD-EPI: 57 MLS/MIN/1.73/M2
GLOBULIN SER-MCNC: 2.4 GM/DL (ref 2.4–3.5)
GLUCOSE SERPL-MCNC: 81 MG/DL (ref 82–115)
GRAM STN SPEC: ABNORMAL
HCT VFR BLD AUTO: 25.3 % (ref 42–52)
HGB BLD-MCNC: 8.1 G/DL (ref 14–18)
IMM GRANULOCYTES # BLD AUTO: 0.03 X10(3)/MCL (ref 0–0.04)
IMM GRANULOCYTES NFR BLD AUTO: 0.4 %
LACTATE SERPL-SCNC: 2 MMOL/L (ref 0.5–2.2)
LYMPHOCYTES # BLD AUTO: 1.68 X10(3)/MCL (ref 0.6–4.6)
LYMPHOCYTES NFR BLD AUTO: 22.6 %
MCH RBC QN AUTO: 28.4 PG
MCHC RBC AUTO-ENTMCNC: 32 G/DL (ref 33–36)
MCV RBC AUTO: 88.8 FL (ref 80–94)
MONOCYTES # BLD AUTO: 0.46 X10(3)/MCL (ref 0.1–1.3)
MONOCYTES NFR BLD AUTO: 6.2 %
NEUTROPHILS # BLD AUTO: 4.86 X10(3)/MCL (ref 2.1–9.2)
NEUTROPHILS NFR BLD AUTO: 65.3 %
NRBC BLD AUTO-RTO: 0 %
PLATELET # BLD AUTO: 193 X10(3)/MCL (ref 130–400)
PMV BLD AUTO: 10 FL (ref 7.4–10.4)
POTASSIUM SERPL-SCNC: 4 MMOL/L (ref 3.5–5.1)
PROT SERPL-MCNC: 4.2 GM/DL (ref 5.8–7.6)
RBC # BLD AUTO: 2.85 X10(6)/MCL (ref 4.7–6.1)
SODIUM SERPL-SCNC: 142 MMOL/L (ref 136–145)
WBC # SPEC AUTO: 7.4 X10(3)/MCL (ref 4.5–11.5)

## 2023-03-05 PROCEDURE — 63600175 PHARM REV CODE 636 W HCPCS: Performed by: INTERNAL MEDICINE

## 2023-03-05 PROCEDURE — 83605 ASSAY OF LACTIC ACID: CPT | Performed by: INTERNAL MEDICINE

## 2023-03-05 PROCEDURE — 25000003 PHARM REV CODE 250: Performed by: STUDENT IN AN ORGANIZED HEALTH CARE EDUCATION/TRAINING PROGRAM

## 2023-03-05 PROCEDURE — 11000001 HC ACUTE MED/SURG PRIVATE ROOM

## 2023-03-05 PROCEDURE — 25000003 PHARM REV CODE 250: Performed by: INTERNAL MEDICINE

## 2023-03-05 PROCEDURE — 85025 COMPLETE CBC W/AUTO DIFF WBC: CPT | Performed by: INTERNAL MEDICINE

## 2023-03-05 PROCEDURE — A4216 STERILE WATER/SALINE, 10 ML: HCPCS | Performed by: INTERNAL MEDICINE

## 2023-03-05 PROCEDURE — 63600175 PHARM REV CODE 636 W HCPCS: Performed by: STUDENT IN AN ORGANIZED HEALTH CARE EDUCATION/TRAINING PROGRAM

## 2023-03-05 PROCEDURE — 80053 COMPREHEN METABOLIC PANEL: CPT | Performed by: INTERNAL MEDICINE

## 2023-03-05 PROCEDURE — C1751 CATH, INF, PER/CENT/MIDLINE: HCPCS

## 2023-03-05 RX ORDER — CEFTRIAXONE 2 G/50ML
2 INJECTION, SOLUTION INTRAVENOUS
Status: DISCONTINUED | OUTPATIENT
Start: 2023-03-05 | End: 2023-03-06

## 2023-03-05 RX ORDER — MIDODRINE HYDROCHLORIDE 5 MG/1
5 TABLET ORAL 3 TIMES DAILY
Status: DISCONTINUED | OUTPATIENT
Start: 2023-03-05 | End: 2023-03-06

## 2023-03-05 RX ADMIN — MIDODRINE HYDROCHLORIDE 5 MG: 5 TABLET ORAL at 03:03

## 2023-03-05 RX ADMIN — AMPICILLIN SODIUM 2 G: 2 INJECTION, POWDER, FOR SOLUTION INTRAMUSCULAR; INTRAVENOUS at 10:03

## 2023-03-05 RX ADMIN — HEPARIN SODIUM 5000 UNITS: 5000 INJECTION, SOLUTION INTRAVENOUS; SUBCUTANEOUS at 08:03

## 2023-03-05 RX ADMIN — POLYETHYLENE GLYCOL 3350 17 G: 17 POWDER, FOR SOLUTION ORAL at 08:03

## 2023-03-05 RX ADMIN — SODIUM CHLORIDE, PRESERVATIVE FREE 10 ML: 5 INJECTION INTRAVENOUS at 06:03

## 2023-03-05 RX ADMIN — AMPICILLIN SODIUM 2 G: 2 INJECTION, POWDER, FOR SOLUTION INTRAMUSCULAR; INTRAVENOUS at 06:03

## 2023-03-05 RX ADMIN — AMPICILLIN SODIUM 500 MG: 500 INJECTION, POWDER, FOR SOLUTION INTRAMUSCULAR; INTRAVENOUS at 01:03

## 2023-03-05 RX ADMIN — DOCUSATE SODIUM 50 MG: 50 CAPSULE, LIQUID FILLED ORAL at 08:03

## 2023-03-05 RX ADMIN — CEFTRIAXONE 1 G: 1 INJECTION, POWDER, FOR SOLUTION INTRAMUSCULAR; INTRAVENOUS at 12:03

## 2023-03-05 RX ADMIN — MIDODRINE HYDROCHLORIDE 5 MG: 5 TABLET ORAL at 10:03

## 2023-03-05 RX ADMIN — PANTOPRAZOLE SODIUM 40 MG: 40 TABLET, DELAYED RELEASE ORAL at 08:03

## 2023-03-05 RX ADMIN — TAMSULOSIN HYDROCHLORIDE 0.4 MG: 0.4 CAPSULE ORAL at 08:03

## 2023-03-05 RX ADMIN — FINASTERIDE 5 MG: 5 TABLET, FILM COATED ORAL at 08:03

## 2023-03-05 RX ADMIN — AMPICILLIN SODIUM 2 G: 2 INJECTION, POWDER, FOR SOLUTION INTRAMUSCULAR; INTRAVENOUS at 09:03

## 2023-03-05 RX ADMIN — CEFTRIAXONE 2 G: 2 INJECTION, SOLUTION INTRAVENOUS at 12:03

## 2023-03-05 RX ADMIN — Medication 6 MG: at 08:03

## 2023-03-05 RX ADMIN — AMPICILLIN SODIUM 2 G: 2 INJECTION, POWDER, FOR SOLUTION INTRAMUSCULAR; INTRAVENOUS at 03:03

## 2023-03-05 RX ADMIN — MIDODRINE HYDROCHLORIDE 5 MG: 5 TABLET ORAL at 08:03

## 2023-03-05 RX ADMIN — AMPICILLIN SODIUM 500 MG: 500 INJECTION, POWDER, FOR SOLUTION INTRAMUSCULAR; INTRAVENOUS at 06:03

## 2023-03-05 RX ADMIN — SODIUM CHLORIDE, PRESERVATIVE FREE 10 ML: 5 INJECTION INTRAVENOUS at 11:03

## 2023-03-05 RX ADMIN — SODIUM CHLORIDE, PRESERVATIVE FREE 10 ML: 5 INJECTION INTRAVENOUS at 12:03

## 2023-03-05 RX ADMIN — CEFTRIAXONE 2 G: 2 INJECTION, SOLUTION INTRAVENOUS at 11:03

## 2023-03-05 NOTE — NURSING
Nurses Note -- 4 Eyes      3/5/2023   5:15 PM      Skin assessed during: Q Shift Change      [x] No Pressure Injuries Present    []Prevention Measures Documented      [] Yes- Altered Skin Integrity Present or Discovered   [] LDA Added if Not in Epic (Describe Wound)   [] New Altered Skin Integrity was Present on Admit and Documented in LDA   [] Wound Image Taken    Wound Care Consulted? No    Attending Nurse:  Angelica Mace RN     Second RN/Staff Member:  KEYONA CHRISTIANSON RN

## 2023-03-05 NOTE — PROCEDURES
"Quincy Triplett is a 82 y.o. male patient.    Temp: 98.5 °F (36.9 °C) (03/04/23 1200)  Pulse: 68 (03/04/23 1212)  Resp: 19 (03/04/23 1212)  BP: 112/63 (03/04/23 1212)  SpO2: 100 % (03/04/23 1212)  Weight: 67.3 kg (148 lb 5.9 oz) (03/03/23 0000)  Height: 5' 9" (175.3 cm) (03/03/23 0000)    PICC  Time out: Immediately prior to procedure a time out was called to verify the correct patient, procedure, equipment, support staff and site/side marked as required  Indications: med administration  Preparation: skin prepped with ChloraPrep  Skin prep agent dried: skin prep agent completely dried prior to procedure  Sterile barriers: all five maximum sterile barriers used - cap, mask, sterile gown, sterile gloves, and large sterile sheet  Hand hygiene: hand hygiene performed prior to central venous catheter insertion  Location details: left basilic  Catheter type: single lumen  Catheter size: 4 Fr  Catheter Length: 14cm    Ultrasound guidance: yes  Needle advanced into vessel with real time Ultrasound guidance.  Guidewire confirmed in vessel.  Sterile sheath used.    Arm circumference 30 cm      Name Luis Daniel Moreno   3/4/2023    "

## 2023-03-06 LAB
ALBUMIN SERPL-MCNC: 1.8 G/DL (ref 3.4–4.8)
ALBUMIN/GLOB SERPL: 0.6 RATIO (ref 1.1–2)
ALP SERPL-CCNC: 650 UNIT/L (ref 40–150)
ALT SERPL-CCNC: 40 UNIT/L (ref 0–55)
AST SERPL-CCNC: 34 UNIT/L (ref 5–34)
BACTERIA UR CULT: NO GROWTH
BASOPHILS # BLD AUTO: 0.07 X10(3)/MCL (ref 0–0.2)
BASOPHILS NFR BLD AUTO: 0.9 %
BILIRUBIN DIRECT+TOT PNL SERPL-MCNC: 2 MG/DL
BUN SERPL-MCNC: 23.8 MG/DL (ref 8.4–25.7)
CALCIUM SERPL-MCNC: 8.5 MG/DL (ref 8.8–10)
CHLORIDE SERPL-SCNC: 110 MMOL/L (ref 98–107)
CO2 SERPL-SCNC: 19 MMOL/L (ref 23–31)
CREAT SERPL-MCNC: 1.18 MG/DL (ref 0.73–1.18)
EOSINOPHIL # BLD AUTO: 0.37 X10(3)/MCL (ref 0–0.9)
EOSINOPHIL NFR BLD AUTO: 4.6 %
ERYTHROCYTE [DISTWIDTH] IN BLOOD BY AUTOMATED COUNT: 15.8 % (ref 11.5–17)
GFR SERPLBLD CREATININE-BSD FMLA CKD-EPI: >60 MLS/MIN/1.73/M2
GLOBULIN SER-MCNC: 3 GM/DL (ref 2.4–3.5)
GLUCOSE SERPL-MCNC: 90 MG/DL (ref 82–115)
HCT VFR BLD AUTO: 26.8 % (ref 42–52)
HGB BLD-MCNC: 8.8 G/DL (ref 14–18)
IMM GRANULOCYTES # BLD AUTO: 0.05 X10(3)/MCL (ref 0–0.04)
IMM GRANULOCYTES NFR BLD AUTO: 0.6 %
LYMPHOCYTES # BLD AUTO: 1.95 X10(3)/MCL (ref 0.6–4.6)
LYMPHOCYTES NFR BLD AUTO: 24 %
MAGNESIUM SERPL-MCNC: 1.7 MG/DL (ref 1.6–2.6)
MCH RBC QN AUTO: 29.5 PG
MCHC RBC AUTO-ENTMCNC: 32.8 G/DL (ref 33–36)
MCV RBC AUTO: 89.9 FL (ref 80–94)
MONOCYTES # BLD AUTO: 0.66 X10(3)/MCL (ref 0.1–1.3)
MONOCYTES NFR BLD AUTO: 8.1 %
NEUTROPHILS # BLD AUTO: 5.02 X10(3)/MCL (ref 2.1–9.2)
NEUTROPHILS NFR BLD AUTO: 61.8 %
NRBC BLD AUTO-RTO: 0 %
PHOSPHATE SERPL-MCNC: 3.6 MG/DL (ref 2.3–4.7)
PLATELET # BLD AUTO: 197 X10(3)/MCL (ref 130–400)
PMV BLD AUTO: 10.1 FL (ref 7.4–10.4)
POTASSIUM SERPL-SCNC: 3.4 MMOL/L (ref 3.5–5.1)
PROT SERPL-MCNC: 4.8 GM/DL (ref 5.8–7.6)
RBC # BLD AUTO: 2.98 X10(6)/MCL (ref 4.7–6.1)
SODIUM SERPL-SCNC: 139 MMOL/L (ref 136–145)
WBC # SPEC AUTO: 8.1 X10(3)/MCL (ref 4.5–11.5)

## 2023-03-06 PROCEDURE — 25000003 PHARM REV CODE 250: Performed by: INTERNAL MEDICINE

## 2023-03-06 PROCEDURE — A4216 STERILE WATER/SALINE, 10 ML: HCPCS | Performed by: INTERNAL MEDICINE

## 2023-03-06 PROCEDURE — 63600175 PHARM REV CODE 636 W HCPCS: Performed by: INTERNAL MEDICINE

## 2023-03-06 PROCEDURE — 97166 OT EVAL MOD COMPLEX 45 MIN: CPT

## 2023-03-06 PROCEDURE — 63600175 PHARM REV CODE 636 W HCPCS: Performed by: STUDENT IN AN ORGANIZED HEALTH CARE EDUCATION/TRAINING PROGRAM

## 2023-03-06 PROCEDURE — 84100 ASSAY OF PHOSPHORUS: CPT | Performed by: INTERNAL MEDICINE

## 2023-03-06 PROCEDURE — 11000001 HC ACUTE MED/SURG PRIVATE ROOM

## 2023-03-06 PROCEDURE — 83735 ASSAY OF MAGNESIUM: CPT | Performed by: INTERNAL MEDICINE

## 2023-03-06 PROCEDURE — 85025 COMPLETE CBC W/AUTO DIFF WBC: CPT | Performed by: INTERNAL MEDICINE

## 2023-03-06 PROCEDURE — 80053 COMPREHEN METABOLIC PANEL: CPT | Performed by: INTERNAL MEDICINE

## 2023-03-06 PROCEDURE — 25000003 PHARM REV CODE 250: Performed by: STUDENT IN AN ORGANIZED HEALTH CARE EDUCATION/TRAINING PROGRAM

## 2023-03-06 RX ORDER — MIDODRINE HYDROCHLORIDE 2.5 MG/1
2.5 TABLET ORAL 2 TIMES DAILY WITH MEALS
Status: DISCONTINUED | OUTPATIENT
Start: 2023-03-06 | End: 2023-03-07

## 2023-03-06 RX ADMIN — PANTOPRAZOLE SODIUM 40 MG: 40 TABLET, DELAYED RELEASE ORAL at 08:03

## 2023-03-06 RX ADMIN — SODIUM CHLORIDE, PRESERVATIVE FREE 10 ML: 5 INJECTION INTRAVENOUS at 05:03

## 2023-03-06 RX ADMIN — Medication 6 MG: at 08:03

## 2023-03-06 RX ADMIN — AMPICILLIN SODIUM 2 G: 2 INJECTION, POWDER, FOR SOLUTION INTRAMUSCULAR; INTRAVENOUS at 06:03

## 2023-03-06 RX ADMIN — AMPICILLIN SODIUM 2 G: 2 INJECTION, POWDER, FOR SOLUTION INTRAMUSCULAR; INTRAVENOUS at 05:03

## 2023-03-06 RX ADMIN — TAMSULOSIN HYDROCHLORIDE 0.4 MG: 0.4 CAPSULE ORAL at 08:03

## 2023-03-06 RX ADMIN — POTASSIUM BICARBONATE 25 MEQ: 977.5 TABLET, EFFERVESCENT ORAL at 08:03

## 2023-03-06 RX ADMIN — AMPICILLIN SODIUM 2 G: 2 INJECTION, POWDER, FOR SOLUTION INTRAMUSCULAR; INTRAVENOUS at 09:03

## 2023-03-06 RX ADMIN — FINASTERIDE 5 MG: 5 TABLET, FILM COATED ORAL at 08:03

## 2023-03-06 RX ADMIN — DOCUSATE SODIUM 50 MG: 50 CAPSULE, LIQUID FILLED ORAL at 08:03

## 2023-03-06 RX ADMIN — HEPARIN SODIUM 5000 UNITS: 5000 INJECTION, SOLUTION INTRAVENOUS; SUBCUTANEOUS at 08:03

## 2023-03-06 RX ADMIN — SODIUM CHLORIDE, PRESERVATIVE FREE 10 ML: 5 INJECTION INTRAVENOUS at 06:03

## 2023-03-06 RX ADMIN — SODIUM CHLORIDE, PRESERVATIVE FREE 10 ML: 5 INJECTION INTRAVENOUS at 11:03

## 2023-03-06 RX ADMIN — AMPICILLIN SODIUM 2 G: 2 INJECTION, POWDER, FOR SOLUTION INTRAMUSCULAR; INTRAVENOUS at 10:03

## 2023-03-06 RX ADMIN — AMPICILLIN SODIUM 2 G: 2 INJECTION, POWDER, FOR SOLUTION INTRAMUSCULAR; INTRAVENOUS at 01:03

## 2023-03-06 RX ADMIN — SODIUM CHLORIDE, PRESERVATIVE FREE 10 ML: 5 INJECTION INTRAVENOUS at 12:03

## 2023-03-06 RX ADMIN — AMPICILLIN SODIUM 2 G: 2 INJECTION, POWDER, FOR SOLUTION INTRAMUSCULAR; INTRAVENOUS at 02:03

## 2023-03-06 NOTE — PROGRESS NOTES
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  Patient presented to outlying facility with complaints of fatigue, nausea, vomiting and poor oral intake..  Patient was noted to have acute kidney injury, anion gap metabolic acidosis, lactic acidosis, leukocytosis.  Patient did not respond to IV fluid resuscitation and therefore was transferred to our hospital ICU for vasopressors.  Concern for septic shock, possible urinary source.  Placed on Zosyn initially, soon switch to ceftriaxone.  Cultures ordered.  One septic shock resolved patient was downgraded to hospitalist service on 3/3.  Blood cultures growing Streptococcus as of 3/3.  Id has been consulted GI was consulted to and they have consulted IR for external biliary drain replacement    Interval Hx:   Patient seen and examined afebrile blood pressure actually on the higher side     Objective/physical exam:  General: In no acute distress, afebrile  Chest: Clear to auscultation bilaterally  Heart: S1, S2, no appreciable murmur  Abdomen: Soft, nontender, BS +  MSK: Warm, right forearm amputated   Neurologic: Alert and oriented x4,     VITAL SIGNS: 24 HRS MIN & MAX LAST   Temp  Min: 97.9 °F (36.6 °C)  Max: 98.6 °F (37 °C) 98.2 °F (36.8 °C)   BP  Min: 106/61  Max: 149/67 (!) 149/67   Pulse  Min: 60  Max: 85  85   Resp  Min: 18  Max: 35 (!) 21   SpO2  Min: 88 %  Max: 100 % 99 %       Recent Labs   Lab 03/04/23  0155 03/05/23  0138 03/06/23  0145   WBC 10.2 7.4 8.1   RBC 2.81* 2.85* 2.98*   HGB 8.3* 8.1* 8.8*   HCT 25.2* 25.3* 26.8*   MCV 89.7 88.8 89.9   MCH 29.5 28.4 29.5    MCHC 32.9* 32.0* 32.8*   RDW 15.7 15.7 15.8    193 197   MPV 10.1 10.0 10.1       Recent Labs   Lab 03/02/23  1958 03/03/23  0401 03/04/23  0155 03/05/23  0138 03/06/23  0145   * 135* 136 142 139   K 3.8 4.7 3.5 4.0 3.4*   CO2 16* 17* 21* 22* 19*   BUN 40.9* 39.9* 39.9* 35.3* 23.8   CREATININE 2.15* 1.84* 1.63* 1.27* 1.18   CALCIUM 7.8* 8.1* 8.6* 8.3* 8.5*   MG 1.50* 1.90  --   --  1.70   ALBUMIN  --  2.1* 1.9* 1.8* 1.8*   ALKPHOS  --  450* 329* 541* 650*   ALT  --  55 44 41 40   AST  --  65* 37* 34 34   BILITOT  --  2.3* 1.9* 2.3* 2.0*          Microbiology Results (last 7 days)       Procedure Component Value Units Date/Time    Urine Culture High Risk [[id number]] Collected: 03/04/23 1403    Order Status: Completed Specimen: Urine, Catheterized Updated: 03/06/23 0732     Urine Culture No Growth    Blood Culture [[id number]]  (Normal) Collected: 03/02/23 1545    Order Status: Completed Specimen: Blood from Antecubital, Left Updated: 03/05/23 1900     CULTURE, BLOOD ([facility]) No Growth At 72 Hours    Blood Culture [[id number]]  (Normal) Collected: 03/04/23 1209    Order Status: Completed Specimen: Blood Updated: 03/05/23 1400     CULTURE, BLOOD ([facility]) No Growth At 24 Hours    Blood Culture [[id number]]  (Normal) Collected: 03/04/23 1119    Order Status: Completed Specimen: Blood Updated: 03/05/23 1200     CULTURE, BLOOD ([facility]) No Growth At 24 Hours    Blood Culture [[id number]]  (Abnormal)  (Susceptibility) Collected: 03/02/23 1550    Order Status: Completed Specimen: Blood from Arm, Left Updated: 03/05/23 0655     CULTURE, BLOOD ([facility]) Enterococcus faecium     Comment: Ampicillin results may be used to predict susceptibility to amoxicillin-clavulanate, ampicillin-sulbactam, and piperacillin-tazobactam.        GRAM STAIN Gram Positive Cocci, probable Streptococcus      Seen in gram stain of broth only      2 of 2 bottles positive             See below for Radiology    Scheduled Med:   ampicillin IVPB  2 g  Intravenous Q4H    cefTRIAXone (ROCEPHIN) IVPB  2 g Intravenous Q12H    docusate sodium  50 mg Oral BID    finasteride  5 mg Oral Daily    heparin (porcine)  5,000 Units Subcutaneous Q12H    midodrine  5 mg Oral TID    pantoprazole  40 mg Oral Daily    polyethylene glycol  17 g Oral Daily    sodium chloride 0.9%  10 mL Intravenous Q6H    tamsulosin  0.4 mg Oral Daily        Continuous Infusions:       PRN Meds:  acetaminophen, melatonin, ondansetron, sodium chloride 0.9%, Flushing PICC Protocol **AND** sodium chloride 0.9% **AND** sodium chloride 0.9%       Assessment/Plan:  Enterococcus faecium bacteremia  Septic shock secondary to above-improving   Acute kidney injury with anion gap metabolic acidosis on admit-improving   Acute on chronic normocytic anemia  Ampulla of Vater adenocarcinoma awaiting Whipple's   Biliary obstruction status post biliary stent/ drain  History of essential HTN , now with soft BP  Chronic urinary retention secondary to BPH with indwelling Paul   Hyperlipidemia   History of CAD     Plan-  On ampicillin as per the sensitivities, id consulted repeat blood cultures have been negative source of bacteremia most likely biliary tract   GI consulted IR for the external drain patency and they are planning to change it tomorrow   Dr. Abdirahman Brown has been consulted to  Continue home meds-bowel regimen, tamsulosin, finasteride, ppi  I will reduce the dose of midodrine today since blood pressure has been on the higher side  Creatinine is improving , actually back to normal  Potassium is low we will supplement   Repeat blood work in a.m.  VTE prophylaxis: SubQ heparin     Patient condition:  Fair     Anticipated discharge and Disposition:         All diagnosis and differential diagnosis have been reviewed; assessment and plan has been documented; I have personally reviewed the labs and test results that are presently available; I have reviewed the patients medication list; I have reviewed the  consulting providers response and recommendations. I have reviewed or attempted to review medical records based upon their availability    All of the patient's questions have been  addressed and answered. Patient's is agreeable to the above stated plan. I will continue to monitor closely and make adjustments to medical management as needed.  _____________________________________________________________________    Nutrition Status:    Radiology:  Echo  · The left ventricle is normal in size with normal systolic function.  · The estimated ejection fraction is 60%.  · Grade I left ventricular diastolic dysfunction.  · Normal right ventricular size with normal right ventricular systolic   function.  · Moderate tricuspid regurgitation.  · Aortic valve sclerosis without stenosis. Mild aortic regurgitation.  · There is pulmonary hypertension.The estimated PA systolic pressure is 43   mmHg.         Donna Parra MD   03/06/2023

## 2023-03-06 NOTE — PROGRESS NOTES
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  Patient presented to outlying facility with complaints of fatigue, nausea, vomiting and poor oral intake..  Patient was noted to have acute kidney injury, anion gap metabolic acidosis, lactic acidosis, leukocytosis.  Patient did not respond to IV fluid resuscitation and therefore was transferred to our hospital ICU for vasopressors.  Concern for septic shock, possible urinary source.  Placed on Zosyn initially, soon switch to ceftriaxone.  Cultures ordered.  One septic shock resolved patient was downgraded to hospitalist service on 3/3.  Blood cultures growing Streptococcus as of 3/3.    Interval Hx:   Afebrile, BP noted to be soft. Midodrine added. Blood cultures growing Enterococcus Faecium sensitive to ampicillin. Await ID in put. Will consult GI to evaluate biliary stent given rising alk phos and T.bili in the setting of bacteremia     Objective/physical exam:  General: In no acute distress, afebrile  Chest: Clear to auscultation bilaterally  Heart: S1, S2, no appreciable murmur  Abdomen: Soft, nontender, BS +  MSK: Warm, right forearm amputated   Neurologic: Alert and oriented x4,     VITAL SIGNS: 24 HRS MIN & MAX LAST   Temp  Min: 98.5 °F (36.9 °C)  Max: 98.7 °F (37.1 °C) 98.6 °F (37 °C)   BP  Min: 98/52  Max: 114/78 114/78   Pulse  Min: 60  Max: 78  63   Resp  Min: 20  Max: 25 20   SpO2  Min: 88 %  Max: 100 % 100 %       Recent Labs   Lab 03/03/23  0401 03/04/23  0155 03/05/23  0138   WBC 19.9* 10.2 7.4   RBC 3.05* 2.81* 2.85*   HGB 9.0* 8.3* 8.1*   HCT 27.1* 25.2* 25.3*    MCV 88.9 89.7 88.8   MCH 29.5 29.5 28.4   MCHC 33.2 32.9* 32.0*   RDW 15.5 15.7 15.7    199 193   MPV 9.8 10.1 10.0       Recent Labs   Lab 03/02/23  1506 03/02/23  1958 03/03/23  0401 03/04/23  0155 03/05/23  0138   * 135* 135* 136 142   K 3.2* 3.8 4.7 3.5 4.0   CO2 21* 16* 17* 21* 22*   BUN 43.1* 40.9* 39.9* 39.9* 35.3*   CREATININE 2.33* 2.15* 1.84* 1.63* 1.27*   CALCIUM 8.6* 7.8* 8.1* 8.6* 8.3*   MG 1.50* 1.50* 1.90  --   --    ALBUMIN 2.6*  --  2.1* 1.9* 1.8*   ALKPHOS 686*  --  450* 329* 541*   ALT 69*  --  55 44 41   AST 85*  --  65* 37* 34   BILITOT 2.3*  --  2.3* 1.9* 2.3*          Microbiology Results (last 7 days)       Procedure Component Value Units Date/Time    Blood Culture [[id number]]  (Normal) Collected: 03/04/23 1209    Order Status: Completed Specimen: Blood Updated: 03/05/23 1400     CULTURE, BLOOD ([facility]) No Growth At 24 Hours    Blood Culture [[id number]]  (Normal) Collected: 03/04/23 1119    Order Status: Completed Specimen: Blood Updated: 03/05/23 1200     CULTURE, BLOOD ([facility]) No Growth At 24 Hours    Blood Culture [[id number]]  (Abnormal)  (Susceptibility) Collected: 03/02/23 1550    Order Status: Completed Specimen: Blood from Arm, Left Updated: 03/05/23 0655     CULTURE, BLOOD ([facility]) Enterococcus faecium     Comment: Ampicillin results may be used to predict susceptibility to amoxicillin-clavulanate, ampicillin-sulbactam, and piperacillin-tazobactam.        GRAM STAIN Gram Positive Cocci, probable Streptococcus      Seen in gram stain of broth only      2 of 2 bottles positive    Urine Culture High Risk [[id number]] Collected: 03/04/23 1403    Order Status: Completed Specimen: Urine, Catheterized Updated: 03/05/23 0648     Urine Culture No Growth At 24 Hours    Blood Culture [[id number]]  (Normal) Collected: 03/02/23 1545    Order Status: Completed Specimen: Blood from Antecubital, Left Updated: 03/04/23 1900     CULTURE, BLOOD ([facility]) No Growth At 48 Hours             See below for  Radiology    Scheduled Med:   ampicillin IVPB  2 g Intravenous Q4H    cefTRIAXone (ROCEPHIN) IVPB  2 g Intravenous Q12H    docusate sodium  50 mg Oral BID    finasteride  5 mg Oral Daily    heparin (porcine)  5,000 Units Subcutaneous Q12H    midodrine  5 mg Oral TID    pantoprazole  40 mg Oral Daily    polyethylene glycol  17 g Oral Daily    sodium chloride 0.9%  10 mL Intravenous Q6H    tamsulosin  0.4 mg Oral Daily        Continuous Infusions:       PRN Meds:  acetaminophen, melatonin, ondansetron, sodium chloride 0.9%, Flushing PICC Protocol **AND** sodium chloride 0.9% **AND** sodium chloride 0.9%       Assessment/Plan:  Enterococcus faecium bacteremia  Septic shock secondary to above-improving   Acute kidney injury with anion gap metabolic acidosis on admit-improving   Acute on chronic normocytic anemia  Ampulla of Vater adenocarcinoma awaiting Whipple's   Biliary obstruction status post biliary stent/ drain  History of essential HTN , now with soft BP  Chronic urinary retention secondary to BPH with indwelling Paul   Hyperlipidemia   History of CAD     Plan-  Leukocytosis resolved. Urine culture - no growth   Blood culture growing Enterococcus Faecium.  Repeat culture from 3/4 - No growth 24h   Source for Enterococcus faecium bacteremia  Likely biliary tract    He has biliary stent/drain in place and this is concerning.  Labs resulted rising Alk phos and T.bili . Will consult GI for evaluation of biliary stent.   Ampicillin and Rocephin dose escalated targeting bacteremia   Consult  Infectious Disease   Renal function recovering  Metabolic acidosis improving   S/P IVF resuscitation   Avoid nephrotoxins.     Await evaluation by Dr. Brown consulted  per patient request   Continue home meds-bowel regimen, tamsulosin, finasteride, ppi      Critical care diagnosis:  Sepsis requiring IV antibiotic     Critical care interventions: Hands-on evaluation, review of labs/radiographs/records and discussion with patient  and family if present  Critical care time spent: 35 minutes    VTE prophylaxis: SubQ heparin     Patient condition:  Fair     Anticipated discharge and Disposition:         All diagnosis and differential diagnosis have been reviewed; assessment and plan has been documented; I have personally reviewed the labs and test results that are presently available; I have reviewed the patients medication list; I have reviewed the consulting providers response and recommendations. I have reviewed or attempted to review medical records based upon their availability    All of the patient's questions have been  addressed and answered. Patient's is agreeable to the above stated plan. I will continue to monitor closely and make adjustments to medical management as needed.  _____________________________________________________________________    Nutrition Status:    Radiology:  Echo  · The left ventricle is normal in size with normal systolic function.  · The estimated ejection fraction is 60%.  · Grade I left ventricular diastolic dysfunction.  · Normal right ventricular size with normal right ventricular systolic   function.  · Moderate tricuspid regurgitation.  · Aortic valve sclerosis without stenosis. Mild aortic regurgitation.  · There is pulmonary hypertension.The estimated PA systolic pressure is 43   mmHg.         Sue Espinoza MD   03/05/2023

## 2023-03-06 NOTE — NURSING
Nurses Note -- 4 Eyes      3/6/2023   5:32 AM      Skin assessed during: Daily Assessment      [x] No Pressure Injuries Present    [x]Prevention Measures Documented      [] Yes- Altered Skin Integrity Present or Discovered   [] LDA Added if Not in Epic (Describe Wound)   [] New Altered Skin Integrity was Present on Admit and Documented in LDA   [] Wound Image Taken    Wound Care Consulted? No    Attending Nurse:  Ezra Henderson RN     Second RN/Staff Member:  PRAMOD Peres

## 2023-03-06 NOTE — CONSULTS
Gastroenterology Consultation Note    Reason for Consult:  bacteremia, elevated alk phos and Tbili s/p biliary stent    PCP:   Reji Waters Jr, MD    Referring MD:  Shanta Hampton MD    Hospital Day: 4     Initial History of Present Illness (HPI):  This is a 82 y.o. male known to Dr. 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 Patient admitted to [facility] 03/02/23 with TERRENCE, lactic acidosis, sepsis.     During hospital course, alk phos and Tbili have trended up.  Alk phos 686 -- 450 -- 329 -- 541 -- 650  Tbili 2.3 -- 2.3 -- 1.9 -- 2.3 -- 2.0  GI consulted for elevated alk phos and Tbili s/p biliary stent.    Other labs:  WBC 8.1  H&H 8.8/26.8  Plt 197  K 3.4  AST 34  ALT 40  Lactate 2.0  Blood cultures growing Enterococcus faecium - Patient currently on ampicillin, ceftriaxone. ID consulted.    CT chest/abd/pel 03/02/23: slight interval retraction of percutaneous biliary stent which is now coiled slightly more distally in right lobe of liver, slightly improved dilation of extrahepatic bile ducts    Patient resting in bed, nurse is administering meds at bedside. Patient denies abd pain. His biliary drain output is clear yellow/brown. He states the output amount is about the same as usual. He states the color of the output is normal, although it varies based on what he is eating. No complaints at this time.     He has asked for Dr. Abdirahman Brown to come and evaluate him for a Whipple - this was planned in January 2023 but postponed due to COVID+ status.    EGD 01/19/23 Dr. Perez for LYDIA: acquired duodenal stenosis, hemorrhagic duodenopathy    ROS:  Review of Systems   Constitutional:  Positive for malaise/fatigue.   Respiratory:  Negative for cough and shortness of breath.    Cardiovascular:  Negative for  chest pain.   Gastrointestinal:  Negative for abdominal pain, nausea and vomiting.   Neurological:  Positive for weakness.     Medical History:   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, unspecified extremity     Coronary artery disease     Dyslipidemia     Hypertension        Surgical History:   Past Surgical History:   Procedure Laterality Date    AMPUTATION Right     Right arm    CAROTID STENT      EGD, WITH HEMORRHAGE CONTROL  1/19/2023    Procedure: EGD,WITH HEMORRHAGE CONTROL;  Surgeon: Ranjeet Perez MD;  Location: [facility];  Service: Gastroenterology;;  NextPowder HemeSpray    ERCP N/A 12/21/2022    Procedure: ERCP;  Surgeon: Sushil Anderson MD;  Location: [facility] ENDOSCOPY;  Service: Gastroenterology;  Laterality: N/A;  food in abdomen    ERCP, WITH BIOPSY  12/21/2022    Procedure: ERCP, WITH BIOPSY;  Surgeon: Sushil Anderson MD;  Location: [facility] ENDOSCOPY;  Service: Gastroenterology;;    ESOPHAGOGASTRODUODENOSCOPY N/A 1/19/2023    Procedure: EGD;  Surgeon: Ranjeet Perez MD;  Location: [facility] OR;  Service: Gastroenterology;  Laterality: N/A;    LEFT HEART CATHETERIZATION      TONSILLECTOMY         Family History:   No family history on file..     Social History:   Social History     Tobacco Use    Smoking status: Never    Smokeless tobacco: Never   Substance Use Topics    Alcohol use: Never       Allergies:  Review of patient's allergies indicates:  No Known Allergies    Medications Prior to Admission   Medication Sig Dispense Refill Last Dose    docusate sodium (COLACE) 50 MG capsule Take 1 capsule (50 mg total) by mouth 2 (two) times daily. 60 capsule 1     ergocalciferol (ERGOCALCIFEROL) 50,000 unit Cap Take 1 capsule (50,000 Units total) by mouth every 7 days. for 12 doses 4 capsule 2     finasteride (PROSCAR) 5 mg tablet Take 1 tablet (5 mg total) by mouth once daily. 30 tablet 1     meclizine (ANTIVERT) 12.5  "mg tablet Take 12.5 mg by mouth 3 (three) times daily as needed.       mirtazapine (REMERON) 15 MG tablet Take 1 tablet (15 mg total) by mouth every evening. (Patient not taking: Reported on 2/14/2023) 30 tablet 0     pantoprazole (PROTONIX) 40 MG tablet Take 40 mg by mouth once daily.       polyethylene glycol (GLYCOLAX) 17 gram PwPk Take 17 g by mouth once daily. (Patient not taking: Reported on 2/14/2023) 30 each 0     tamsulosin (FLOMAX) 0.4 mg Cap Take by mouth once daily.            Objective Findings:    Vital Signs:  BP (!) 149/67   Pulse 85   Temp 98.2 °F (36.8 °C) (Oral)   Resp (!) 21   Ht 5' 9" (1.753 m)   Wt 67.3 kg (148 lb 5.9 oz)   SpO2 99%   BMI 21.91 kg/m²   Body mass index is 21.91 kg/m².    Physical Exam:  Physical Exam  Constitutional:       General: He is not in acute distress.     Appearance: He is normal weight. He is ill-appearing.   HENT:      Head: Normocephalic and atraumatic.      Right Ear: External ear normal.      Left Ear: External ear normal.      Nose: Nose normal.      Mouth/Throat:      Pharynx: Oropharynx is clear.   Eyes:      Conjunctiva/sclera: Conjunctivae normal.   Pulmonary:      Effort: Pulmonary effort is normal. No respiratory distress.   Abdominal:      General: Bowel sounds are normal. There is no distension.      Palpations: Abdomen is soft.      Tenderness: There is no abdominal tenderness. There is no guarding.      Comments: Percutaneous biliary drain in RUQ with clear yellow/brown output   Musculoskeletal:         General: Normal range of motion.      Cervical back: Normal range of motion and neck supple.      Comments: S/p right forearm amputation, well healed   Skin:     General: Skin is warm and dry.      Coloration: Skin is jaundiced.   Neurological:      Mental Status: He is alert and oriented to person, place, and time. Mental status is at baseline.      Motor: No weakness.   Psychiatric:         Mood and Affect: Mood normal.         Behavior: Behavior " normal.         Thought Content: Thought content normal.       Labs:  Recent Results (from the past 24 hour(s))   Lactic Acid, Plasma    Collection Time: 03/05/23  8:51 AM   Result Value Ref Range    Lactic Acid Level 2.0 0.5 - 2.2 mmol/L   Comprehensive Metabolic Panel    Collection Time: 03/06/23  1:45 AM   Result Value Ref Range    Sodium Level 139 136 - 145 mmol/L    Potassium Level 3.4 (L) 3.5 - 5.1 mmol/L    Chloride 110 (H) 98 - 107 mmol/L    Carbon Dioxide 19 (L) 23 - 31 mmol/L    Glucose Level 90 82 - 115 mg/dL    Blood Urea Nitrogen 23.8 8.4 - 25.7 mg/dL    Creatinine 1.18 0.73 - 1.18 mg/dL    Calcium Level Total 8.5 (L) 8.8 - 10.0 mg/dL    Protein Total 4.8 (L) 5.8 - 7.6 gm/dL    Albumin Level 1.8 (L) 3.4 - 4.8 g/dL    Globulin 3.0 2.4 - 3.5 gm/dL    Albumin/Globulin Ratio 0.6 (L) 1.1 - 2.0 ratio    Bilirubin Total 2.0 (H) <=1.5 mg/dL    Alkaline Phosphatase 650 (H) 40 - 150 unit/L    Alanine Aminotransferase 40 0 - 55 unit/L    Aspartate Aminotransferase 34 5 - 34 unit/L    eGFR >60 mls/min/1.73/m2   Magnesium    Collection Time: 03/06/23  1:45 AM   Result Value Ref Range    Magnesium Level 1.70 1.60 - 2.60 mg/dL   Phosphorus    Collection Time: 03/06/23  1:45 AM   Result Value Ref Range    Phosphorus Level 3.6 2.3 - 4.7 mg/dL   CBC with Differential    Collection Time: 03/06/23  1:45 AM   Result Value Ref Range    WBC 8.1 4.5 - 11.5 x10(3)/mcL    RBC 2.98 (L) 4.70 - 6.10 x10(6)/mcL    Hgb 8.8 (L) 14.0 - 18.0 g/dL    Hct 26.8 (L) 42.0 - 52.0 %    MCV 89.9 80.0 - 94.0 fL    MCH 29.5 pg    MCHC 32.8 (L) 33.0 - 36.0 g/dL    RDW 15.8 11.5 - 17.0 %    Platelet 197 130 - 400 x10(3)/mcL    MPV 10.1 7.4 - 10.4 fL    Neut % 61.8 %    Lymph % 24.0 %    Mono % 8.1 %    Eos % 4.6 %    Basophil % 0.9 %    Lymph # 1.95 0.6 - 4.6 x10(3)/mcL    Neut # 5.02 2.1 - 9.2 x10(3)/mcL    Mono # 0.66 0.1 - 1.3 x10(3)/mcL    Eos # 0.37 0 - 0.9 x10(3)/mcL    Baso # 0.07 0 - 0.2 x10(3)/mcL    IG# 0.05 (H) 0 - 0.04 x10(3)/mcL    IG%  0.6 %    NRBC% 0.0 %       CT Chest Abdomen Pelvis Without Contrast (XPD)   Final Result      1. Slight interval retraction of the percutaneous biliary stent which is now coiled slightly more distally in the right lobe of the liver.  There is however slightly improved dilatation of the extrahepatic bile ducts.   2. Known ampullary/duodenal mass poorly evaluated on this noncontrast CT.  No bowel obstruction.   3. Bladder wall appears mildly thickened which may be related to under distension.  Correlate with urinalysis.         Electronically signed by: Nancy Redd   Date:    03/02/2023   Time:    17:40      X-Ray Chest 1 View   Final Result      No acute chest disease is identified.         Electronically signed by: Brandon Ny   Date:    03/02/2023   Time:    16:01            Assessment/Plan:  This is a 82 y.o. male known to Dr. 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 Patient admitted to [facility] 03/02/23 with TERRENCE, lactic acidosis, sepsis.  During hospital course, alk phos and Tbili have trended up. GI consulted for elevated alk phos and Tbili s/p biliary stent.    Duodenal/ampulla of Vater adenocarcinoma with subsequent biliary obstruction  - s/p external biliary drain placed by IR 01/04/23 - endoscopic attempts to place internal drain were futile due to positioning of disease  - CT chest/abd/pel 03/02/23 with slight interval retraction of percutaneous biliary stent which is now coiled slightly more distally in the right lobe of the liver, slightly improved dilation of extrahepatic bile ducts  - patient has requested Dr. Abdirahman Brown to evaluate for Whipple procedure  - no endoscopic intervention indicated at this time  - consult IR for external biliary drain patency  eval  Hyperbilirubinemia, elevated alk phos  - Alk phos 686 -- 450 -- 329 -- 541 -- 650  - Tbili 2.3 -- 2.3 -- 1.9 -- 2.3 -- 2.0  - trend LFTs daily  - await IR evaluation  Bacteremia  - blood cultures growing Enterococcus faecium  - WBC 8.1  - patient on ampicillin, ceftriaxone  - ID consulted, appreciate reccs    Thank you for allowing us to participate in the care of Quinyc Triplett.    CAITY FreedC  Gastroenterology  [facility]

## 2023-03-06 NOTE — PLAN OF CARE
Problem: Occupational Therapy  Goal: Occupational Therapy Goal  Description: Goals to be met by: 3/20/2023     Patient will increase functional independence with ADLs by performing:    LE Dressing with Modified [place].  Grooming while standing with Modified [place].  Toileting from toilet with Modified [place] for hygiene and clothing management.   Toilet transfer to toilet with Modified [place].    Outcome: Ongoing, Progressing
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
## 2023-03-06 NOTE — PLAN OF CARE
Pt is current with [facility].Clinical updates submitted via [facility].

## 2023-03-07 ENCOUNTER — HOSPITAL ENCOUNTER (OUTPATIENT)
Dept: INTERVENTIONAL RADIOLOGY/VASCULAR | Facility: HOSPITAL | Age: 83
Discharge: HOME | End: 2023-03-07
Payer: MEDICARE

## 2023-03-07 VITALS
SYSTOLIC BLOOD PRESSURE: 161 MMHG | OXYGEN SATURATION: 100 % | RESPIRATION RATE: 23 BRPM | HEART RATE: 59 BPM | DIASTOLIC BLOOD PRESSURE: 78 MMHG

## 2023-03-07 LAB
ALBUMIN SERPL-MCNC: 2.4 G/DL (ref 3.4–4.8)
ALP SERPL-CCNC: 77 UNIT/L (ref 40–150)
ALT SERPL-CCNC: 14 UNIT/L (ref 0–55)
ANION GAP SERPL CALC-SCNC: 8 MEQ/L
AST SERPL-CCNC: 24 UNIT/L (ref 5–34)
BACTERIA BLD CULT: NORMAL
BASOPHILS # BLD AUTO: 0.06 X10(3)/MCL (ref 0–0.2)
BASOPHILS NFR BLD AUTO: 0.9 %
BILIRUBIN DIRECT+TOT PNL SERPL-MCNC: 0.5 MG/DL (ref 0–0.8)
BILIRUBIN DIRECT+TOT PNL SERPL-MCNC: 0.6 MG/DL (ref 0–?)
BILIRUBIN DIRECT+TOT PNL SERPL-MCNC: 1.1 MG/DL
BUN SERPL-MCNC: 17.4 MG/DL (ref 8.4–25.7)
CALCIUM SERPL-MCNC: 8.1 MG/DL (ref 8.8–10)
CHLORIDE SERPL-SCNC: 110 MMOL/L (ref 98–107)
CO2 SERPL-SCNC: 21 MMOL/L (ref 23–31)
CREAT SERPL-MCNC: 1.05 MG/DL (ref 0.73–1.18)
CREAT/UREA NIT SERPL: 17
EOSINOPHIL # BLD AUTO: 0.55 X10(3)/MCL (ref 0–0.9)
EOSINOPHIL NFR BLD AUTO: 7.8 %
ERYTHROCYTE [DISTWIDTH] IN BLOOD BY AUTOMATED COUNT: 15.8 % (ref 11.5–17)
GFR SERPLBLD CREATININE-BSD FMLA CKD-EPI: >60 MLS/MIN/1.73/M2
GLUCOSE SERPL-MCNC: 83 MG/DL (ref 82–115)
HCT VFR BLD AUTO: 26.4 % (ref 42–52)
HGB BLD-MCNC: 8.5 G/DL (ref 14–18)
IMM GRANULOCYTES # BLD AUTO: 0.06 X10(3)/MCL (ref 0–0.04)
IMM GRANULOCYTES NFR BLD AUTO: 0.9 %
INR BLD: 1.1 (ref 0–1.3)
LYMPHOCYTES # BLD AUTO: 1.81 X10(3)/MCL (ref 0.6–4.6)
LYMPHOCYTES NFR BLD AUTO: 25.8 %
MCH RBC QN AUTO: 28.7 PG
MCHC RBC AUTO-ENTMCNC: 32.2 G/DL (ref 33–36)
MCV RBC AUTO: 89.2 FL (ref 80–94)
MONOCYTES # BLD AUTO: 0.62 X10(3)/MCL (ref 0.1–1.3)
MONOCYTES NFR BLD AUTO: 8.8 %
NEUTROPHILS # BLD AUTO: 3.91 X10(3)/MCL (ref 2.1–9.2)
NEUTROPHILS NFR BLD AUTO: 55.8 %
NRBC BLD AUTO-RTO: 0 %
PATH REV: NORMAL
PLATELET # BLD AUTO: 188 X10(3)/MCL (ref 130–400)
PMV BLD AUTO: 9.8 FL (ref 7.4–10.4)
POTASSIUM SERPL-SCNC: 3.5 MMOL/L (ref 3.5–5.1)
PROT SERPL-MCNC: 5.2 GM/DL (ref 5.8–7.6)
PROTHROMBIN TIME: 14.1 SECONDS (ref 12.5–14.5)
RBC # BLD AUTO: 2.96 X10(6)/MCL (ref 4.7–6.1)
SODIUM SERPL-SCNC: 139 MMOL/L (ref 136–145)
WBC # SPEC AUTO: 7 X10(3)/MCL (ref 4.5–11.5)

## 2023-03-07 PROCEDURE — 63600175 PHARM REV CODE 636 W HCPCS: Performed by: INTERNAL MEDICINE

## 2023-03-07 PROCEDURE — 27201423 OPTIME MED/SURG SUP & DEVICES STERILE SUPPLY: Performed by: RADIOLOGY

## 2023-03-07 PROCEDURE — 63600175 PHARM REV CODE 636 W HCPCS: Performed by: STUDENT IN AN ORGANIZED HEALTH CARE EDUCATION/TRAINING PROGRAM

## 2023-03-07 PROCEDURE — C1729 CATH, DRAINAGE: HCPCS | Performed by: RADIOLOGY

## 2023-03-07 PROCEDURE — 47536 EXCHANGE BILIARY DRG CATH: CPT

## 2023-03-07 PROCEDURE — 97535 SELF CARE MNGMENT TRAINING: CPT | Mod: CO

## 2023-03-07 PROCEDURE — 25000003 PHARM REV CODE 250: Performed by: INTERNAL MEDICINE

## 2023-03-07 PROCEDURE — C1769 GUIDE WIRE: HCPCS | Performed by: RADIOLOGY

## 2023-03-07 PROCEDURE — A4216 STERILE WATER/SALINE, 10 ML: HCPCS | Performed by: INTERNAL MEDICINE

## 2023-03-07 PROCEDURE — 11000001 HC ACUTE MED/SURG PRIVATE ROOM

## 2023-03-07 PROCEDURE — 85610 PROTHROMBIN TIME: CPT | Performed by: RADIOLOGY

## 2023-03-07 PROCEDURE — 80048 BASIC METABOLIC PNL TOTAL CA: CPT | Performed by: INTERNAL MEDICINE

## 2023-03-07 PROCEDURE — 25000003 PHARM REV CODE 250: Performed by: PHYSICIAN ASSISTANT

## 2023-03-07 PROCEDURE — 63600175 PHARM REV CODE 636 W HCPCS: Performed by: RADIOLOGY

## 2023-03-07 PROCEDURE — 85025 COMPLETE CBC W/AUTO DIFF WBC: CPT | Performed by: INTERNAL MEDICINE

## 2023-03-07 PROCEDURE — 80076 HEPATIC FUNCTION PANEL: CPT

## 2023-03-07 PROCEDURE — 25000003 PHARM REV CODE 250: Performed by: RADIOLOGY

## 2023-03-07 PROCEDURE — 25000003 PHARM REV CODE 250: Performed by: STUDENT IN AN ORGANIZED HEALTH CARE EDUCATION/TRAINING PROGRAM

## 2023-03-07 RX ORDER — LIDOCAINE HYDROCHLORIDE 20 MG/ML
INJECTION, SOLUTION INFILTRATION; PERINEURAL
Status: COMPLETED | OUTPATIENT
Start: 2023-03-07 | End: 2023-03-07

## 2023-03-07 RX ORDER — HYDROCODONE BITARTRATE AND ACETAMINOPHEN 5; 325 MG/1; MG/1
1 TABLET ORAL EVERY 6 HOURS PRN
Status: DISCONTINUED | OUTPATIENT
Start: 2023-03-07 | End: 2023-03-10

## 2023-03-07 RX ORDER — FENTANYL CITRATE 50 UG/ML
INJECTION, SOLUTION INTRAMUSCULAR; INTRAVENOUS
Status: COMPLETED | OUTPATIENT
Start: 2023-03-07 | End: 2023-03-07

## 2023-03-07 RX ORDER — MIDAZOLAM HYDROCHLORIDE 1 MG/ML
INJECTION INTRAMUSCULAR; INTRAVENOUS
Status: COMPLETED | OUTPATIENT
Start: 2023-03-07 | End: 2023-03-07

## 2023-03-07 RX ADMIN — FINASTERIDE 5 MG: 5 TABLET, FILM COATED ORAL at 08:03

## 2023-03-07 RX ADMIN — HEPARIN SODIUM 5000 UNITS: 5000 INJECTION, SOLUTION INTRAVENOUS; SUBCUTANEOUS at 08:03

## 2023-03-07 RX ADMIN — FENTANYL CITRATE 25 MCG: 50 INJECTION, SOLUTION INTRAMUSCULAR; INTRAVENOUS at 07:03

## 2023-03-07 RX ADMIN — ACETAMINOPHEN 325MG 650 MG: 325 TABLET ORAL at 11:03

## 2023-03-07 RX ADMIN — TAMSULOSIN HYDROCHLORIDE 0.4 MG: 0.4 CAPSULE ORAL at 08:03

## 2023-03-07 RX ADMIN — DOCUSATE SODIUM 50 MG: 50 CAPSULE, LIQUID FILLED ORAL at 08:03

## 2023-03-07 RX ADMIN — MIDAZOLAM 1 MG: 1 INJECTION INTRAMUSCULAR; INTRAVENOUS at 07:03

## 2023-03-07 RX ADMIN — AMPICILLIN SODIUM 2 G: 2 INJECTION, POWDER, FOR SOLUTION INTRAMUSCULAR; INTRAVENOUS at 09:03

## 2023-03-07 RX ADMIN — AMPICILLIN SODIUM 2 G: 2 INJECTION, POWDER, FOR SOLUTION INTRAMUSCULAR; INTRAVENOUS at 10:03

## 2023-03-07 RX ADMIN — HYDROCODONE BITARTRATE AND ACETAMINOPHEN 1 TABLET: 5; 325 TABLET ORAL at 01:03

## 2023-03-07 RX ADMIN — PANTOPRAZOLE SODIUM 40 MG: 40 TABLET, DELAYED RELEASE ORAL at 08:03

## 2023-03-07 RX ADMIN — AMPICILLIN SODIUM 2 G: 2 INJECTION, POWDER, FOR SOLUTION INTRAMUSCULAR; INTRAVENOUS at 05:03

## 2023-03-07 RX ADMIN — SODIUM CHLORIDE, PRESERVATIVE FREE 10 ML: 5 INJECTION INTRAVENOUS at 11:03

## 2023-03-07 RX ADMIN — AMPICILLIN SODIUM 2 G: 2 INJECTION, POWDER, FOR SOLUTION INTRAMUSCULAR; INTRAVENOUS at 02:03

## 2023-03-07 RX ADMIN — SODIUM CHLORIDE, PRESERVATIVE FREE 10 ML: 5 INJECTION INTRAVENOUS at 05:03

## 2023-03-07 RX ADMIN — AMPICILLIN SODIUM 2 G: 2 INJECTION, POWDER, FOR SOLUTION INTRAMUSCULAR; INTRAVENOUS at 01:03

## 2023-03-07 RX ADMIN — SODIUM CHLORIDE, PRESERVATIVE FREE 10 ML: 5 INJECTION INTRAVENOUS at 12:03

## 2023-03-07 RX ADMIN — Medication 6 MG: at 08:03

## 2023-03-07 RX ADMIN — LIDOCAINE HYDROCHLORIDE 5 ML: 20 INJECTION, SOLUTION INFILTRATION; PERINEURAL at 07:03
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
## 2023-03-07 NOTE — PT/OT/SLP PROGRESS
Physical Therapy      Patient Name:  Quincy Triplett   MRN:  [id number]  Pt in surgery 3/7; f/u 3/8

## 2023-03-07 NOTE — OP NOTE
Radiology Post-Procedure Note    Pre Op Diagnosis:  Biliary Drain extracted    Post Op Diagnosis: replaced    Procedure:  Drain Replacement    Procedure performed by: Kodi Ambrocio M.D.    Written Informed Consent Obtained: Yes    Specimen Removed: No    Estimated Blood Loss: Minimal    Findings:   Standard Drain Exchange    Patient tolerated procedure well.    Kodi Ambrocio MD

## 2023-03-07 NOTE — PROGRESS NOTES
SURG ONC COURTESY CONSULT PER PT REQUEST    CHART REVIEWED  PT ADMITTED WITH UROSEPSIS, ON PRESSORS  HAS IMPROVED SIGNIFICANTLY  BILIARY STENT CHANGED YESTERDAY    PT SCHEDULED FOR WHIPPLE 3/27/2023  THIS HAD BEEN DELAYED ONCE DUE TO MULTIPLE MEDICAL ISSUES THAT HAVE AFFECTED HIS CONDITIONING AND HEALTH    HE LOOKS BETTER TODAY  TELLS ME HE FEELS BETTER    PLAN  PER PRIMARY TEAM  WE WILL REGROUP WITH PT AFTER DISCHARGE TO ASSESS HIS OVERALL HEALTH TO DETERMINE TIMING OF WHIPPLE PROCEDURE AS THIS IS A VERY BIG PROCEDURE AND PT WILL NEED TO RECOVER BEFORE UNDERGOING THIS SURGERY  DR LOVE AWARE OF THE ABOVE

## 2023-03-07 NOTE — PROGRESS NOTES
"Gastroenterology Progress Note    Subjective/Interval History:  WBC 7.0 - improving  H&H 8.5/26.4 - stable  Plt 188  INR 1.10  No LFTs ordered today    IR replaced external biliary drain today.     Also surgical oncology has seen patient. He is scheduled for [facility] on 03/27/23 with Dr. Abdirahman Brown if he is strong enough.    Spoke with nurse regarding patient. He c/o pain at the external biliary drain site and has just been given some pain medication.    Patient lying in bed. He is feeling well aside from drain site pain. Drain output dark green. Patient educated on importance of protecting external biliary drain and preventing dislodgement or removal. He is understanding and agreeable.    ROS:  Review of Systems   Constitutional:  Positive for malaise/fatigue.   Respiratory:  Negative for cough and shortness of breath.    Cardiovascular:  Negative for chest pain.   Gastrointestinal:  Positive for abdominal pain (at drain site). Negative for nausea and vomiting.   Neurological:  Positive for weakness.     Vital Signs:  BP (!) 152/85   Pulse 62   Temp 97.6 °F (36.4 °C) (Oral)   Resp 19   Ht 5' 9" (1.753 m)   Wt 67.3 kg (148 lb 5.9 oz)   SpO2 98%   BMI 21.91 kg/m²   Body mass index is 21.91 kg/m².    Physical Exam:  Physical Exam  Constitutional:       General: He is not in acute distress.     Appearance: He is normal weight. He is ill-appearing.   HENT:      Head: Normocephalic and atraumatic.      Right Ear: External ear normal.      Left Ear: External ear normal.      Nose: Nose normal.      Mouth/Throat:      Pharynx: Oropharynx is clear.   Eyes:      Conjunctiva/sclera: Conjunctivae normal.   Pulmonary:      Effort: Pulmonary effort is normal. No respiratory distress.   Abdominal:      General: Bowel sounds are normal. There is distension.      Palpations: Abdomen is soft.      Tenderness: There is no abdominal tenderness. There is no guarding.      Comments: External biliary drain to RUQ with dark green " output   Musculoskeletal:         General: Normal range of motion.      Cervical back: Normal range of motion.      Comments: Right forearm amputation   Skin:     General: Skin is warm and dry.      Coloration: Skin is jaundiced.   Neurological:      Mental Status: He is alert and oriented to person, place, and time. Mental status is at baseline.      Motor: No weakness.   Psychiatric:         Mood and Affect: Mood normal.         Behavior: Behavior normal.         Thought Content: Thought content normal.       Labs:  Recent Results (from the past 24 hour(s))   Protime-INR    Collection Time: 03/07/23  1:57 AM   Result Value Ref Range    PT 14.1 12.5 - 14.5 seconds    INR 1.10 0.00 - 1.30   Basic Metabolic Panel    Collection Time: 03/07/23  1:57 AM   Result Value Ref Range    Sodium Level 139 136 - 145 mmol/L    Potassium Level 3.5 3.5 - 5.1 mmol/L    Chloride 110 (H) 98 - 107 mmol/L    Carbon Dioxide 21 (L) 23 - 31 mmol/L    Glucose Level 83 82 - 115 mg/dL    Blood Urea Nitrogen 17.4 8.4 - 25.7 mg/dL    Creatinine 1.05 0.73 - 1.18 mg/dL    BUN/Creatinine Ratio 17     Calcium Level Total 8.1 (L) 8.8 - 10.0 mg/dL    Anion Gap 8.0 mEq/L    eGFR >60 mls/min/1.73/m2   CBC with Differential    Collection Time: 03/07/23  1:57 AM   Result Value Ref Range    WBC 7.0 4.5 - 11.5 x10(3)/mcL    RBC 2.96 (L) 4.70 - 6.10 x10(6)/mcL    Hgb 8.5 (L) 14.0 - 18.0 g/dL    Hct 26.4 (L) 42.0 - 52.0 %    MCV 89.2 80.0 - 94.0 fL    MCH 28.7 pg    MCHC 32.2 (L) 33.0 - 36.0 g/dL    RDW 15.8 11.5 - 17.0 %    Platelet 188 130 - 400 x10(3)/mcL    MPV 9.8 7.4 - 10.4 fL    Neut % 55.8 %    Lymph % 25.8 %    Mono % 8.8 %    Eos % 7.8 %    Basophil % 0.9 %    Lymph # 1.81 0.6 - 4.6 x10(3)/mcL    Neut # 3.91 2.1 - 9.2 x10(3)/mcL    Mono # 0.62 0.1 - 1.3 x10(3)/mcL    Eos # 0.55 0 - 0.9 x10(3)/mcL    Baso # 0.06 0 - 0.2 x10(3)/mcL    IG# 0.06 (H) 0 - 0.04 x10(3)/mcL    IG% 0.9 %    NRBC% 0.0 %         Assessment/Plan:  This is a 82 y.o. male known  to Dr. 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 Patient admitted to [facility] 03/02/23 with TERRENCE, lactic acidosis, sepsis.  During hospital course, alk phos and Tbili have trended up. GI consulted for elevated alk phos and Tbili s/p biliary stent.     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 Abdirahman Brown - this was previously scheduled for January 2023 but postponed due to COVID infection  Hyperbilirubinemia, elevated alk phos  - Alk phos 686 -- 450 -- 329 -- 541 -- 650  - Tbili 2.3 -- 2.3 -- 1.9 -- 2.3 -- 2.0  - trend LFTs daily  Bacteremia  - blood cultures growing Enterococcus faecium  - WBC 7.0  - patient on ampicillin, ceftriaxone  - ID consulted, appreciate reccs     No further GI recommendations at this time. GI will sign off. Please call with any questions.    Michelle Bruno PARobertaC  Gastroenterology  [facility]

## 2023-03-07 NOTE — NURSING
Nurses Note -- 4 Eyes      3/7/2023   3:12 AM      Skin assessed during: Daily Assessment      [x] No Pressure Injuries Present    [x]Prevention Measures Documented      [] Yes- Altered Skin Integrity Present or Discovered   [] LDA Added if Not in Epic (Describe Wound)   [] New Altered Skin Integrity was Present on Admit and Documented in LDA   [] Wound Image Taken    Wound Care Consulted? No    Attending Nurse:  Ezra Henderson RN     Second RN/Staff Member:  PRAMOD Peres

## 2023-03-07 NOTE — PT/OT/SLP PROGRESS
Occupational Therapy  Treatment    Quincy Triplett   MRN: [id number]   Admitting Diagnosis: Sepsis    OT Date of Treatment: 03/07/23   OT Start Time: 0830  OT Stop Time: 0853  OT Total Time (min): 23 min     Billable Minutes:  Self Care/Home Management 2  Total Minutes: 23           IZAIAH Visit Number: 1    General Precautions: Standard,    Orthopedic Precautions:    Braces:           Subjective:  Communicated with RN prior to session.  152/85  Distracted  Alert  Cooperative    Objective:  (Bed Mobility- Min A)  (Sitting Balance-SBA)  Pt. Ambulating from EOB to bathroom, HHA CGA.  Pt. Performing toilet t/f with CGA for safe descend onto low surface.  Pt. Standing at sink while performing grooming task (brushing teeth) with appropriate preparation noted.          Patient left up in chair with all lines intact and call button in reach    ASSESSMENT:  Quincy Triplett is a 82 y.o. male with a medical diagnosis of Sepsis Pt. Tolerated session well overall cueing for safety at times, continue POC    Activity tolerance: Good    Discharge recommendations: home with home health     Equipment recommendations:       GOALS:   Multidisciplinary Problems       Occupational Therapy Goals          Problem: Occupational Therapy    Goal Priority Disciplines Outcome Interventions   Occupational Therapy Goal     OT, PT/OT Ongoing, Progressing    Description: Goals to be met by: 3/20/2023     Patient will increase functional independence with ADLs by performing:    LE Dressing with Modified [place].  Grooming while standing with Modified [place].  Toileting from toilet with Modified [place] for hygiene and clothing management.   Toilet transfer to toilet with Modified [place].                         Plan:  Patient to be seen 3 x/week, 5 x/week to address the above listed problems via self-care/home management, therapeutic activities, therapeutic exercises  Plan of Care expires:    Plan of Care reviewed with:  patient         03/07/2023

## 2023-03-07 NOTE — CONSULTS
Infectious Diseases Consultation       Inpatient consult to Infectious Diseases  Consult performed by: Essence Soria MD  Consult ordered by: Shanta Hampton MD      HPI:   82-year-old male with past medical history of CAD, HLD, HTN, right mid forearm amputation, adenocarcinoma of ampulla of Vater, biliary obstruction s/p external biliary drain placed by IR on 01/04/2023, BPH with chronic urinary retention and Paul catheter dependent, is [facility] with complaints of fatigue, nausea, vomiting and poor oral intake.  He was evaluated and noted to be in acute kidney injury as well as had leukocytosis, metabolic/lactic acidosis, was hypotensive requiring vasopressors and transferred to the ICU due to concern for septic shock.  On presentation here he remains without fevers but noted to have leukocytosis of up to 19 on 03/03, creatinine up to 2.3, elevated total bilirubin and anemic.  Urinalysis with 0-5 WBC, small LE, many bacteria a urine culture negative.  One of 2 blood culture sets on 03/02 with Enterococcus faecium.  CT scan of abdomen and pelvis done on 03/02 showed slightly interval retraction of the percutaneous biliary stent which is now colored slightly more distally in the right lobe of the liver with slightly improved dilatation of the extrahepatic bile ducts with known ampullary/duodenal mass poor evaluated on the noncontrasted CT, mildly thickened bladder wall appearance which may be due to under distention.  He has been seen by GI team with inputs noted including awaiting IR evaluation.  He is currently on antibiotic coverage with ampicillin.    Past Medical and Surgical History  Allergies :   Patient has no known allergies.    Medical :   He has a past medical history of Atherosclerosis of native arteries of extremities with intermittent claudication, unspecified extremity, Coronary  artery disease, Dyslipidemia, and Hypertension.    Surgical :   He has a past surgical history that includes Tonsillectomy; Amputation (Right); Left heart catheterization; Carotid stent; ERCP (N/A, 12/21/2022); ercp, with biopsy (12/21/2022); Esophagogastroduodenoscopy (N/A, 1/19/2023); and egd, with hemorrhage control (1/19/2023).     Family History  His family history is not on file.    Social History  He reports that he has never smoked. He has never used smokeless tobacco. He reports that he does not drink alcohol and does not use drugs.     Review of Systems   Constitutional:  Positive for malaise/fatigue.   HENT: Negative.     Respiratory: Negative.     Gastrointestinal: Negative.    Genitourinary: Negative.    Musculoskeletal: Negative.    Neurological:  Positive for weakness.   Endo/Heme/Allergies: Negative.    Psychiatric/Behavioral: Negative.     All other Systems review done and negative.    Objective   Physical Exam  Vitals reviewed.   Constitutional:       General: He is not in acute distress.  HENT:      Head: Normocephalic and atraumatic.      Mouth/Throat:      Comments: Poor dentition  Eyes:      Pupils: Pupils are equal, round, and reactive to light.   Cardiovascular:      Rate and Rhythm: Normal rate and regular rhythm.      Heart sounds: Normal heart sounds.   Pulmonary:      Effort: Pulmonary effort is normal. No respiratory distress.      Breath sounds: Normal breath sounds.   Abdominal:      General: Bowel sounds are normal. There is no distension.      Palpations: Abdomen is soft.      Tenderness: There is no abdominal tenderness.      Comments: RUQ biliary drain noted with yellowish brown content   Genitourinary:     Comments: Paul catheter in  Musculoskeletal:      Cervical back: Neck supple.      Comments: R forearm healed amputation stump    Skin:     Coloration: Skin is jaundiced.      Findings: No erythema or rash.   Neurological:      Mental Status: He is alert and oriented to person,  "place, and time.   Psychiatric:      Comments: Calm and cooperative     VITAL SIGNS: 24 HR MIN & MAX LAST    Temp  Min: 97.6 °F (36.4 °C)  Max: 98.5 °F (36.9 °C)  97.6 °F (36.4 °C)        BP  Min: 112/69  Max: 157/81  (!) 157/81     Pulse  Min: 70  Max: 85  76     Resp  Min: 18  Max: 21  18    SpO2  Min: 95 %  Max: 100 %  100 %      HT: 5' 9" (175.3 cm)  WT: 67.3 kg (148 lb 5.9 oz)  BMI: 21.9     Recent Results (from the past 24 hour(s))   Comprehensive Metabolic Panel    Collection Time: 03/06/23  1:45 AM   Result Value Ref Range    Sodium Level 139 136 - 145 mmol/L    Potassium Level 3.4 (L) 3.5 - 5.1 mmol/L    Chloride 110 (H) 98 - 107 mmol/L    Carbon Dioxide 19 (L) 23 - 31 mmol/L    Glucose Level 90 82 - 115 mg/dL    Blood Urea Nitrogen 23.8 8.4 - 25.7 mg/dL    Creatinine 1.18 0.73 - 1.18 mg/dL    Calcium Level Total 8.5 (L) 8.8 - 10.0 mg/dL    Protein Total 4.8 (L) 5.8 - 7.6 gm/dL    Albumin Level 1.8 (L) 3.4 - 4.8 g/dL    Globulin 3.0 2.4 - 3.5 gm/dL    Albumin/Globulin Ratio 0.6 (L) 1.1 - 2.0 ratio    Bilirubin Total 2.0 (H) <=1.5 mg/dL    Alkaline Phosphatase 650 (H) 40 - 150 unit/L    Alanine Aminotransferase 40 0 - 55 unit/L    Aspartate Aminotransferase 34 5 - 34 unit/L    eGFR >60 mls/min/1.73/m2   Magnesium    Collection Time: 03/06/23  1:45 AM   Result Value Ref Range    Magnesium Level 1.70 1.60 - 2.60 mg/dL   Phosphorus    Collection Time: 03/06/23  1:45 AM   Result Value Ref Range    Phosphorus Level 3.6 2.3 - 4.7 mg/dL   CBC with Differential    Collection Time: 03/06/23  1:45 AM   Result Value Ref Range    WBC 8.1 4.5 - 11.5 x10(3)/mcL    RBC 2.98 (L) 4.70 - 6.10 x10(6)/mcL    Hgb 8.8 (L) 14.0 - 18.0 g/dL    Hct 26.8 (L) 42.0 - 52.0 %    MCV 89.9 80.0 - 94.0 fL    MCH 29.5 pg    MCHC 32.8 (L) 33.0 - 36.0 g/dL    RDW 15.8 11.5 - 17.0 %    Platelet 197 130 - 400 x10(3)/mcL    MPV 10.1 7.4 - 10.4 fL    Neut % 61.8 %    Lymph % 24.0 %    Mono % 8.1 %    Eos % 4.6 %    Basophil % 0.9 %    Lymph # 1.95 " 0.6 - 4.6 x10(3)/mcL    Neut # 5.02 2.1 - 9.2 x10(3)/mcL    Mono # 0.66 0.1 - 1.3 x10(3)/mcL    Eos # 0.37 0 - 0.9 x10(3)/mcL    Baso # 0.07 0 - 0.2 x10(3)/mcL    IG# 0.05 (H) 0 - 0.04 x10(3)/mcL    IG% 0.6 %    NRBC% 0.0 %       Imaging  Imaging Results              CT Chest Abdomen Pelvis Without Contrast (XPD) (Final result)  Result time 03/02/23 17:40:53      Final result by Nancy Redd MD (03/02/23 17:40:53)                   Impression:      1. Slight interval retraction of the percutaneous biliary stent which is now coiled slightly more distally in the right lobe of the liver.  There is however slightly improved dilatation of the extrahepatic bile ducts.  2. Known ampullary/duodenal mass poorly evaluated on this noncontrast CT.  No bowel obstruction.  3. Bladder wall appears mildly thickened which may be related to under distension.  Correlate with urinalysis.      Electronically signed by: Nancy Redd  Date:    03/02/2023  Time:    17:40               Narrative:    EXAMINATION:  CT CHEST ABDOMEN PELVIS WITHOUT CONTRAST(XPD)    CLINICAL HISTORY:  Septic, biliary stent for cancer of ampula and duodenum;    TECHNIQUE:  Helical acquisition through the chest, abdomen and pelvis without  IV administration of contrast. Axial, sagittal and coronal reformats interpreted.    Automated tube current modulation, weight-based exposure dosing, and/or iterative reconstruction technique utilized to reach lowest reasonably achievable exposure rate.    DLP: 603 mGy*cm    COMPARISON:  CT abdomen pelvis 02/11/2023    FINDINGS:  BASE OF NECK: Nodular thyroid.  Thyroid nodule measures less than 1.4 cm and requires no imaging follow-up per consensus guidelines.    HEART: Normal size. There are coronary artery calcifications.    THORACIC VASCULATURE: Thoracic aorta is unremarkable.    RACHNA/MEDIASTINUM: No enlarged lymph nodes by size criteria. Evaluation of hilar lymphadenopathy is limited without contrast.    AIRWAYS:  Patent.    LUNGS/PLEURA: Mild dependent atelectasis.    THORACIC SOFT TISSUES: Unremarkable.    HEPATOBILIARY: Mild interval retraction percutaneous biliary stent coiled in the region of the right lobe of the liver.  No significant biliary ductal dilatation.  Limited evaluation for intrahepatic biliary ductal dilatation on noncontrast CT.  Probable trace, unchanged dilated biliary radicle at the anterior, superior right lobe of the liver..  Common hepatic duct measures 12 mm; previously 18 mm. Gallbladder is not overly distended.    PANCREAS: No inflammatory change.    SPLEEN: Normal in size    ADRENALS: No mass.    KIDNEYS/URETERS: No renal or ureteral calculus.  No hydronephrosis.  Incidental right renal cyst requires no imaging follow-up.    GI TRACT/MESENTERY: Known ampullary/duodenal mass poorly evaluated on this noncontrast CT.  No bowel obstruction.  The appendix is normal.    PERITONEUM: No free fluid.No free air.    LYMPH NODES: Small peripancreatic lymph nodes are similar to prior.    ABDOMINOPELVIC VASCULATURE: Aortoiliac atherosclerosis.  Saccular dilatation at the infrarenal aorta are measures 2.5 cm in diameter.    BLADDER: Paul catheter in the bladder.  Bladder wall appears mildly thickened.    REPRODUCTIVE ORGANS: Mild prostatomegaly.    ABDOMINAL WALL: Unremarkable.    BONES: Degenerative facet arthropathy in the lower lumbar spine.  Multilevel disc osteophytes at the thoracolumbar spine.                                       X-Ray Chest 1 View (Final result)  Result time 03/02/23 16:01:30      Final result by Brandon Ny MD (03/02/23 16:01:30)                   Impression:      No acute chest disease is identified.      Electronically signed by: Brandon Ny  Date:    03/02/2023  Time:    16:01               Narrative:    EXAMINATION:  XR CHEST 1 VIEW    CLINICAL HISTORY:  hypotension;, .    COMPARISON:  January 12 2023    FINDINGS:  No alveolar consolidation, effusion, or pneumothorax  is seen.   The thoracic aorta is normal  cardiac silhouette, central pulmonary vessels and mediastinum are normal in size and are grossly unremarkable.   visualized osseous structures are grossly unremarkable.    Catheter in RUQ                                       Impression  1. Enterococcus sepsis with shock  2.  Acute kidney injury  3.  Obstructive jaundice with possible cholangitis  4.  Adenocarcinoma of ampulla of Vater   5. BPH with Obstructive uropathy, Paul catheter dependent  6. Acute kidney injury  7.  Anemia    Recommendations  I agree with the management of this patient.  History of adenocarcinoma of the ampulla of Vater complicated with biliary obstruction status post stent placement, presenting with sepsis syndrome and shock and Enterococcus isolated from the blood.  We could be dealing with a biliary source versus urinary source in the setting of a history of obstructive uropathy with Paul catheter dependence, however urinalysis not impressive with 0-5 WBC and urine culture negative.  He does seem to have responded to current treatment with resolution of leukocytosis, without much of fevers and has since been downgraded to the hospitalist team.  We will continue antibiotic coverage with ampicillin and plan about a couple of weeks of coverage with option of transitioning to oral formulation when ready for discharge.  Renal impairment noted and now with normalized creatinine.  He has been seen by the GI team with inputs noted and awaiting IR evaluation.  Guarded long-term prognosis.  Case is discussed with patient and nursing staff.  I would like to thank the team very much for the opportunity to assist in the care of this patient.
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
## 2023-03-08 LAB
ALBUMIN SERPL-MCNC: 2.1 G/DL (ref 3.4–4.8)
ALBUMIN/GLOB SERPL: 0.6 RATIO (ref 1.1–2)
ALP SERPL-CCNC: 715 UNIT/L (ref 40–150)
ALT SERPL-CCNC: 47 UNIT/L (ref 0–55)
AST SERPL-CCNC: 45 UNIT/L (ref 5–34)
BASOPHILS # BLD AUTO: 0.03 X10(3)/MCL (ref 0–0.2)
BASOPHILS NFR BLD AUTO: 0.3 %
BILIRUBIN DIRECT+TOT PNL SERPL-MCNC: 4.1 MG/DL
BUN SERPL-MCNC: 21.3 MG/DL (ref 8.4–25.7)
CALCIUM SERPL-MCNC: 8.6 MG/DL (ref 8.8–10)
CHLORIDE SERPL-SCNC: 103 MMOL/L (ref 98–107)
CO2 SERPL-SCNC: 17 MMOL/L (ref 23–31)
CREAT SERPL-MCNC: 1.62 MG/DL (ref 0.73–1.18)
EOSINOPHIL # BLD AUTO: 0.2 X10(3)/MCL (ref 0–0.9)
EOSINOPHIL NFR BLD AUTO: 2.3 %
ERYTHROCYTE [DISTWIDTH] IN BLOOD BY AUTOMATED COUNT: 15.5 % (ref 11.5–17)
GFR SERPLBLD CREATININE-BSD FMLA CKD-EPI: 42 MLS/MIN/1.73/M2
GLOBULIN SER-MCNC: 3.6 GM/DL (ref 2.4–3.5)
GLUCOSE SERPL-MCNC: 89 MG/DL (ref 82–115)
HCT VFR BLD AUTO: 32 % (ref 42–52)
HGB BLD-MCNC: 10.3 G/DL (ref 14–18)
IMM GRANULOCYTES # BLD AUTO: 0.07 X10(3)/MCL (ref 0–0.04)
IMM GRANULOCYTES NFR BLD AUTO: 0.8 %
LYMPHOCYTES # BLD AUTO: 1.69 X10(3)/MCL (ref 0.6–4.6)
LYMPHOCYTES NFR BLD AUTO: 19.2 %
MCH RBC QN AUTO: 28.9 PG
MCHC RBC AUTO-ENTMCNC: 32.2 G/DL (ref 33–36)
MCV RBC AUTO: 89.9 FL (ref 80–94)
MONOCYTES # BLD AUTO: 0.67 X10(3)/MCL (ref 0.1–1.3)
MONOCYTES NFR BLD AUTO: 7.6 %
NEUTROPHILS # BLD AUTO: 6.16 X10(3)/MCL (ref 2.1–9.2)
NEUTROPHILS NFR BLD AUTO: 69.8 %
NRBC BLD AUTO-RTO: 0 %
PLATELET # BLD AUTO: 180 X10(3)/MCL (ref 130–400)
PMV BLD AUTO: 9.6 FL (ref 7.4–10.4)
POTASSIUM SERPL-SCNC: 3.5 MMOL/L (ref 3.5–5.1)
PROT SERPL-MCNC: 5.7 GM/DL (ref 5.8–7.6)
RBC # BLD AUTO: 3.56 X10(6)/MCL (ref 4.7–6.1)
SODIUM SERPL-SCNC: 133 MMOL/L (ref 136–145)
WBC # SPEC AUTO: 8.8 X10(3)/MCL (ref 4.5–11.5)

## 2023-03-08 PROCEDURE — 25000003 PHARM REV CODE 250: Performed by: STUDENT IN AN ORGANIZED HEALTH CARE EDUCATION/TRAINING PROGRAM

## 2023-03-08 PROCEDURE — 97162 PT EVAL MOD COMPLEX 30 MIN: CPT

## 2023-03-08 PROCEDURE — 80053 COMPREHEN METABOLIC PANEL: CPT

## 2023-03-08 PROCEDURE — 25000003 PHARM REV CODE 250: Performed by: NURSE PRACTITIONER

## 2023-03-08 PROCEDURE — A4216 STERILE WATER/SALINE, 10 ML: HCPCS | Performed by: INTERNAL MEDICINE

## 2023-03-08 PROCEDURE — 25000003 PHARM REV CODE 250: Performed by: INTERNAL MEDICINE

## 2023-03-08 PROCEDURE — 85025 COMPLETE CBC W/AUTO DIFF WBC: CPT | Performed by: INTERNAL MEDICINE

## 2023-03-08 PROCEDURE — 97535 SELF CARE MNGMENT TRAINING: CPT | Mod: CO

## 2023-03-08 PROCEDURE — 11000001 HC ACUTE MED/SURG PRIVATE ROOM

## 2023-03-08 PROCEDURE — 63600175 PHARM REV CODE 636 W HCPCS: Performed by: STUDENT IN AN ORGANIZED HEALTH CARE EDUCATION/TRAINING PROGRAM

## 2023-03-08 PROCEDURE — 63600175 PHARM REV CODE 636 W HCPCS: Performed by: INTERNAL MEDICINE

## 2023-03-08 RX ORDER — AMPICILLIN 500 MG/1
500 CAPSULE ORAL 2 TIMES DAILY
Status: DISCONTINUED | OUTPATIENT
Start: 2023-03-08 | End: 2023-03-10 | Stop reason: HOSPADM

## 2023-03-08 RX ORDER — SODIUM CHLORIDE 9 MG/ML
INJECTION, SOLUTION INTRAVENOUS CONTINUOUS
Status: ACTIVE | OUTPATIENT
Start: 2023-03-08 | End: 2023-03-09

## 2023-03-08 RX ADMIN — AMPICILLIN 500 MG: 500 CAPSULE ORAL at 08:03

## 2023-03-08 RX ADMIN — AMPICILLIN SODIUM 2 G: 2 INJECTION, POWDER, FOR SOLUTION INTRAMUSCULAR; INTRAVENOUS at 02:03

## 2023-03-08 RX ADMIN — TAMSULOSIN HYDROCHLORIDE 0.4 MG: 0.4 CAPSULE ORAL at 08:03

## 2023-03-08 RX ADMIN — DOCUSATE SODIUM 50 MG: 50 CAPSULE, LIQUID FILLED ORAL at 08:03

## 2023-03-08 RX ADMIN — SODIUM CHLORIDE, PRESERVATIVE FREE 10 ML: 5 INJECTION INTRAVENOUS at 05:03

## 2023-03-08 RX ADMIN — ONDANSETRON 4 MG: 2 INJECTION INTRAMUSCULAR; INTRAVENOUS at 03:03

## 2023-03-08 RX ADMIN — HEPARIN SODIUM 5000 UNITS: 5000 INJECTION, SOLUTION INTRAVENOUS; SUBCUTANEOUS at 08:03

## 2023-03-08 RX ADMIN — PANTOPRAZOLE SODIUM 40 MG: 40 TABLET, DELAYED RELEASE ORAL at 08:03

## 2023-03-08 RX ADMIN — SODIUM CHLORIDE, PRESERVATIVE FREE 10 ML: 5 INJECTION INTRAVENOUS at 12:03

## 2023-03-08 RX ADMIN — AMPICILLIN SODIUM 2 G: 2 INJECTION, POWDER, FOR SOLUTION INTRAMUSCULAR; INTRAVENOUS at 10:03

## 2023-03-08 RX ADMIN — Medication 6 MG: at 08:03

## 2023-03-08 RX ADMIN — SODIUM CHLORIDE: 9 INJECTION, SOLUTION INTRAVENOUS at 08:03

## 2023-03-08 RX ADMIN — ONDANSETRON 4 MG: 2 INJECTION INTRAMUSCULAR; INTRAVENOUS at 02:03

## 2023-03-08 RX ADMIN — AMPICILLIN SODIUM 2 G: 2 INJECTION, POWDER, FOR SOLUTION INTRAMUSCULAR; INTRAVENOUS at 05:03

## 2023-03-08 RX ADMIN — FINASTERIDE 5 MG: 5 TABLET, FILM COATED ORAL at 08:03

## 2023-03-08 NOTE — PT/OT/SLP PROGRESS
Occupational Therapy  Treatment    Quincy Triplett   MRN: [id number]   Admitting Diagnosis: Sepsis    OT Date of Treatment: 03/08/23   OT Start Time: 0930  OT Stop Time: 0953  OT Total Time (min): 23 min     Billable Minutes:  Self Care/Home Management 2  Total Minutes: 23     OT/IZAIAH: IZAIAH     IZAIAH Visit Number: 2    General Precautions: Standard,    Orthopedic Precautions:    Braces:           Subjective:  Communicated with RN prior to session.  74HR, 130/61  Alert  Cooperative    Objective:  (Bed Mobility- CGA)  (Sitting balance-SBA)  (Sit to stand-CGA)  Pt. Ambulating from EOB to bathroom HHA with CGA for balance. Pt. Standing at sink with CGA while performing grooming task (brushing teeth) with appropriate preparation noted.  Pt. Performing toilet t/f with CGA for safe descend onto low surface.        Patient left up in chair with all lines intact and call button in reach    ASSESSMENT:  Quincy Triplett is a 82 y.o. male with a medical diagnosis of Sepsis Pt. Tolerated session well overall increased activity tolerance noted. Continue POC    Activity tolerance: Good    Discharge recommendations: home with home health     Equipment recommendations:       GOALS:   Multidisciplinary Problems       Occupational Therapy Goals          Problem: Occupational Therapy    Goal Priority Disciplines Outcome Interventions   Occupational Therapy Goal     OT, PT/OT Ongoing, Progressing    Description: Goals to be met by: 3/20/2023     Patient will increase functional independence with ADLs by performing:    LE Dressing with Modified [place].  Grooming while standing with Modified [place].  Toileting from toilet with Modified [place] for hygiene and clothing management.   Toilet transfer to toilet with Modified [place].                         Plan:  Patient to be seen 3 x/week, 5 x/week to address the above listed problems via self-care/home management, therapeutic activities, therapeutic exercises  Plan of  Care expires:    Plan of Care reviewed with: patient         03/08/2023

## 2023-03-08 NOTE — PLAN OF CARE
Problem: Fluid and Electrolyte Imbalance (Acute Kidney Injury/Impairment)  Goal: Fluid and Electrolyte Balance  Outcome: Ongoing, Progressing  3/8/2023 1741 by Ashlee Tesfaye RN  Flowsheets (Taken 3/8/2023 1741)  Fluid/Electrolyte Management:   intravenous fluid replacement initiated   fluids provided

## 2023-03-08 NOTE — PLAN OF CARE
Problem: Adult Inpatient Plan of Care  Goal: Optimal Comfort and Wellbeing  Outcome: Ongoing, Progressing  Intervention: Monitor Pain and Promote Comfort  Flowsheets (Taken 3/7/2023 1824)  Pain Management Interventions:   breathing exercises utilized   care clustered   diversional activity provided   medication offered   pain management plan reviewed with patient/caregiver   pillow support provided   quiet environment facilitated   premedicated for activity   position adjusted   relaxation techniques promoted  Intervention: Provide Person-Centered Care  Flowsheets (Taken 3/7/2023 1824)  Trust Relationship/Rapport:   care explained   questions encouraged   choices provided   reassurance provided   emotional support provided   thoughts/feelings acknowledged   empathic listening provided   questions answered

## 2023-03-08 NOTE — PT/OT/SLP EVAL
Physical Therapy Evaluation    Patient Name:  Quincy Triplett   MRN:  [id number]    Recommendations:     Discharge Recommendations: home health PT   Discharge Equipment Recommendations:     Barriers to discharge:  medical status    Assessment:     Quincy Triplett is a 82 y.o. male admitted with a medical diagnosis of Sepsis, adenocarcinoma awaiting Whipple's s/p drain placement. He presents with the following impairments/functional limitations: weakness.    Rehab Prognosis: Good; patient would benefit from acute skilled PT services to address these deficits and reach maximum level of function.    Recent Surgery: * No surgery found *      Plan:     During this hospitalization, patient to be seen 5 x/week to address the identified rehab impairments via gait training, therapeutic activities, therapeutic exercises and progress toward the following goals:    Plan of Care Expires:  03/28/23    Subjective     Chief Complaint: weakness  Patient/Family Comments/goals: return to PLOF  Pain/Comfort:       Patients cultural, spiritual, Jewish conflicts given the current situation: no    Living Environment:  Pt lives at home alone but states ex wife is staying with him at this time. Independent w/ alll mobility prior to admit   Upon discharge, patient will have assistance from ex wife.    Objective:     Patient found supine upon PT entry to room.    General Precautions: Standard,    Orthopedic Precautions:    Braces:    Respiratory Status: Room air    Exams:  RLE ROM: WFL  RLE Strength: 4/5  LLE ROM: WFL  LLE Strength: 4/5    Functional Mobility:  Bed Mobility:     Supine to Sit: minimum assistance  Transfers:     Sit to Stand:  minimum assistance with no AD  Gait: Pt ambulated 600' w/ CGA for safety.        Patient left up in chair with all lines intact.    GOALS:   Multidisciplinary Problems       Physical Therapy Goals          Problem: Physical Therapy    Goal Priority Disciplines Outcome Goal Variances Interventions    Physical Therapy Goal     PT, PT/OT Ongoing, Progressing     Description: Goals to be met by: 3/28/23     Patient will increase functional independence with mobility by performin. Supine to sit with [place]  2. Sit to supine with [place]  3. Sit to stand transfer with [place]  4. Gait  x 400 feet with [place] using No Assistive Device.                          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, unspecified extremity     Coronary artery disease     Dyslipidemia     Hypertension        Past Surgical History:   Procedure Laterality Date    AMPUTATION Right     Right arm    CAROTID STENT      EGD, WITH HEMORRHAGE CONTROL  2023    Procedure: EGD,WITH HEMORRHAGE CONTROL;  Surgeon: Ranjeet Perez MD;  Location: [facility];  Service: Gastroenterology;;  [employer] HemeSpray    ERCP N/A 2022    Procedure: ERCP;  Surgeon: Sushil Anderson MD;  Location: [facility] ENDOSCOPY;  Service: Gastroenterology;  Laterality: N/A;  food in abdomen    ERCP, WITH BIOPSY  2022    Procedure: ERCP, WITH BIOPSY;  Surgeon: Sushil Anderson MD;  Location: [facility] ENDOSCOPY;  Service: Gastroenterology;;    ESOPHAGOGASTRODUODENOSCOPY N/A 2023    Procedure: EGD;  Surgeon: Ranjeet Perez MD;  Location: [facility] OR;  Service: Gastroenterology;  Laterality: N/A;    LEFT HEART CATHETERIZATION      TONSILLECTOMY         Time Tracking:     PT Received On: 23  PT Start Time: 1500     PT Stop Time: 1530  PT Total Time (min): 30 min     Billable Minutes: Evaluation 30      2023

## 2023-03-08 NOTE — PLAN OF CARE
Problem: Physical Therapy  Goal: Physical Therapy Goal  Description: Goals to be met by: 3/28/23     Patient will increase functional independence with mobility by performin. Supine to sit with [place]  2. Sit to supine with [place]  3. Sit to stand transfer with [place]  4. Gait  x 400 feet with [place] using No Assistive Device.     Outcome: Ongoing, Progressing

## 2023-03-08 NOTE — NURSING
Nurses Note -- 4 Eyes      3/8/2023   4:26 AM      Skin assessed during: Daily Assessment      [x] No Pressure Injuries Present    [x]Prevention Measures Documented      [] Yes- Altered Skin Integrity Present or Discovered   [] LDA Added if Not in Epic (Describe Wound)   [] New Altered Skin Integrity was Present on Admit and Documented in LDA   [] Wound Image Taken    Wound Care Consulted? No    Attending Nurse:  Ezra Henderson RN     Second RN/Staff Member:  PRAMOD Gonzales

## 2023-03-09 LAB
ANION GAP SERPL CALC-SCNC: 10 MEQ/L
BACTERIA BLD CULT: NORMAL
BACTERIA BLD CULT: NORMAL
BNP BLD-MCNC: 116.6 PG/ML
BUN SERPL-MCNC: 25.9 MG/DL (ref 8.4–25.7)
CALCIUM SERPL-MCNC: 8.2 MG/DL (ref 8.8–10)
CHLORIDE SERPL-SCNC: 108 MMOL/L (ref 98–107)
CO2 SERPL-SCNC: 15 MMOL/L (ref 23–31)
CREAT SERPL-MCNC: 1.85 MG/DL (ref 0.73–1.18)
CREAT/UREA NIT SERPL: 14
GFR SERPLBLD CREATININE-BSD FMLA CKD-EPI: 36 MLS/MIN/1.73/M2
GLUCOSE SERPL-MCNC: 87 MG/DL (ref 82–115)
POTASSIUM SERPL-SCNC: 3.7 MMOL/L (ref 3.5–5.1)
SODIUM SERPL-SCNC: 133 MMOL/L (ref 136–145)

## 2023-03-09 PROCEDURE — 63600175 PHARM REV CODE 636 W HCPCS: Performed by: STUDENT IN AN ORGANIZED HEALTH CARE EDUCATION/TRAINING PROGRAM

## 2023-03-09 PROCEDURE — 25000003 PHARM REV CODE 250: Performed by: STUDENT IN AN ORGANIZED HEALTH CARE EDUCATION/TRAINING PROGRAM

## 2023-03-09 PROCEDURE — 80048 BASIC METABOLIC PNL TOTAL CA: CPT | Performed by: INTERNAL MEDICINE

## 2023-03-09 PROCEDURE — 25000003 PHARM REV CODE 250: Performed by: INTERNAL MEDICINE

## 2023-03-09 PROCEDURE — 25000003 PHARM REV CODE 250: Performed by: NURSE PRACTITIONER

## 2023-03-09 PROCEDURE — 97535 SELF CARE MNGMENT TRAINING: CPT

## 2023-03-09 PROCEDURE — C1751 CATH, INF, PER/CENT/MIDLINE: HCPCS

## 2023-03-09 PROCEDURE — A4216 STERILE WATER/SALINE, 10 ML: HCPCS | Performed by: INTERNAL MEDICINE

## 2023-03-09 PROCEDURE — 11000001 HC ACUTE MED/SURG PRIVATE ROOM

## 2023-03-09 PROCEDURE — 83880 ASSAY OF NATRIURETIC PEPTIDE: CPT | Performed by: INTERNAL MEDICINE

## 2023-03-09 RX ORDER — SODIUM CHLORIDE 9 MG/ML
INJECTION, SOLUTION INTRAVENOUS CONTINUOUS
Status: ACTIVE | OUTPATIENT
Start: 2023-03-09 | End: 2023-03-09

## 2023-03-09 RX ORDER — SODIUM BICARBONATE 650 MG/1
650 TABLET ORAL 3 TIMES DAILY
Status: DISCONTINUED | OUTPATIENT
Start: 2023-03-09 | End: 2023-03-10 | Stop reason: HOSPADM

## 2023-03-09 RX ADMIN — AMPICILLIN 500 MG: 500 CAPSULE ORAL at 09:03

## 2023-03-09 RX ADMIN — AMPICILLIN 500 MG: 500 CAPSULE ORAL at 08:03

## 2023-03-09 RX ADMIN — SODIUM CHLORIDE, PRESERVATIVE FREE 10 ML: 5 INJECTION INTRAVENOUS at 12:03

## 2023-03-09 RX ADMIN — SODIUM BICARBONATE 650 MG TABLET 650 MG: at 09:03

## 2023-03-09 RX ADMIN — HEPARIN SODIUM 5000 UNITS: 5000 INJECTION, SOLUTION INTRAVENOUS; SUBCUTANEOUS at 09:03

## 2023-03-09 RX ADMIN — HEPARIN SODIUM 5000 UNITS: 5000 INJECTION, SOLUTION INTRAVENOUS; SUBCUTANEOUS at 08:03

## 2023-03-09 RX ADMIN — SODIUM CHLORIDE, PRESERVATIVE FREE 10 ML: 5 INJECTION INTRAVENOUS at 07:03

## 2023-03-09 RX ADMIN — ONDANSETRON 4 MG: 2 INJECTION INTRAMUSCULAR; INTRAVENOUS at 08:03

## 2023-03-09 RX ADMIN — SODIUM CHLORIDE, PRESERVATIVE FREE 10 ML: 5 INJECTION INTRAVENOUS at 06:03

## 2023-03-09 RX ADMIN — SODIUM BICARBONATE 650 MG TABLET 650 MG: at 03:03

## 2023-03-09 RX ADMIN — PANTOPRAZOLE SODIUM 40 MG: 40 TABLET, DELAYED RELEASE ORAL at 09:03

## 2023-03-09 RX ADMIN — TAMSULOSIN HYDROCHLORIDE 0.4 MG: 0.4 CAPSULE ORAL at 09:03

## 2023-03-09 RX ADMIN — DOCUSATE SODIUM 50 MG: 50 CAPSULE, LIQUID FILLED ORAL at 09:03

## 2023-03-09 RX ADMIN — Medication 6 MG: at 08:03

## 2023-03-09 RX ADMIN — FINASTERIDE 5 MG: 5 TABLET, FILM COATED ORAL at 09:03

## 2023-03-09 RX ADMIN — ONDANSETRON 4 MG: 2 INJECTION INTRAMUSCULAR; INTRAVENOUS at 02:03

## 2023-03-09 RX ADMIN — SODIUM CHLORIDE, PRESERVATIVE FREE 10 ML: 5 INJECTION INTRAVENOUS at 11:03

## 2023-03-09 RX ADMIN — SODIUM BICARBONATE 650 MG TABLET 650 MG: at 08:03

## 2023-03-09 NOTE — NURSING
Nurses Note -- 4 Eyes      3/9/2023   12:29 PM      Skin assessed during: Q Shift Change      [x] No Pressure Injuries Present    [x]Prevention Measures Documented      [] Yes- Altered Skin Integrity Present or Discovered   [] LDA Added if Not in Epic (Describe Wound)   [] New Altered Skin Integrity was Present on Admit and Documented in LDA   [] Wound Image Taken    Wound Care Consulted? No    Attending Nurse:  Donna Elaine RN     Second RN/Staff Member:  PRAMOD Dominguez

## 2023-03-09 NOTE — PT/OT/SLP PROGRESS
Occupational Therapy   Treatment    Name: Quincy Triplett  MRN: [id number]  Admitting Diagnosis:  Sepsis       Recommendations:     Discharge Recommendations: home with home health  Discharge Equipment Recommendations:   (pending progress)  Barriers to discharge:   (severity of deficits)    Assessment:     Quincy Triplett is a 82 y.o. male with a medical diagnosis of Sepsis, TERRENCE, biliary obstruction s/p IR drain. Hx of adenocarcinoma and R proximal humerus amputation. Whipple procedure pending after d/c if stronger per pt. He presents with progression to showering with assist. Performance deficits affecting function are weakness, impaired endurance, impaired self care skills, impaired balance. Recommend home with HH with 24/7 family assistance.     Rehab Prognosis:  Good; patient would benefit from acute skilled OT services to address these deficits and reach maximum level of function.       Plan:     Patient to be seen 3 x/week, 5 x/week to address the above listed problems via self-care/home management, therapeutic activities, therapeutic exercises  Plan of Care Expires: 03/20/23  Plan of Care Reviewed with: patient, family    Subjective     Pain/Comfort:  Pain Rating 1: 0/10    Objective:     Communicated with: RN prior to session.  Patient found ambulatory in room/quintana with external biliary drain, campos catheter, telemetry, pulse ox upon OT entry to room.    General Precautions: Standard, fall    Orthopedic Precautions:N/A  Braces: N/A  Vital Signs: HR: 88  Sp02: 99%  Respiratory Status: on room air     Occupational Performance:     Functional Mobility Training/Transfer Training:  Stand>sit in chair: Transition: independence with no AD  Shower Transfer: CGA with HHA  Functional Mobility to<>from bathroom: stand by assistance with no AD, ~10ft    Activities of Daily Living Training:  Feeding:  independence from drinking from open container   Bathing: minimum assistance for showering  Upper Body Dressing: minimum  assistance for donning gown, shirt, and robe for reaching L sleeve.   Lower Body Dressing: SPV for donning slippers in standing.     AMPAC 6 Click ADL  Total Score: 20    Therapeutic Positioning  Skin integrity:  slight redness to sacrum, prophylactic dressing applied. RN notified and ordered barrier cream       The following interventions were performed in an effort to prevent and/or reduce acquired pressure ulcers: all visible skin was assessed during the course of treatment    Patient/Caregiver Education:  Patient and family provided with verbal education regarding OT role/goals/POC and Discharge/DME recommendations, and home safety. Understanding was verbalized.      Patient left up in chair with call button in reach and brother-in-law present    GOALS:   Multidisciplinary Problems       Occupational Therapy Goals          Problem: Occupational Therapy    Goal Priority Disciplines Outcome Interventions   Occupational Therapy Goal     OT, PT/OT Ongoing, Progressing    Description: Goals to be met by: 3/20/2023     Patient will increase functional independence with ADLs by performing:    LE Dressing with Modified [place].  Grooming while standing with Modified [place].  Toileting from toilet with Modified [place] for hygiene and clothing management.   Toilet transfer to toilet with Modified [place].                         Time Tracking:     OT Date of Treatment: 03/09/23  OT Start Time: 0918  OT Stop Time: 0946  OT Total Time (min): 28 min    Billable Minutes:Self Care/Home Management 2    OT/IZAIAH: OT     IZAIAH Visit Number: 3    3/9/2023

## 2023-03-09 NOTE — PROGRESS NOTES
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  Patient presented to outlying facility with complaints of fatigue, nausea, vomiting and poor oral intake..  Patient was noted to have acute kidney injury, anion gap metabolic acidosis, lactic acidosis, leukocytosis.  Patient did not respond to IV fluid resuscitation and therefore was transferred to our hospital ICU for vasopressors.  Concern for septic shock, possible urinary source.  Placed on Zosyn initially, soon switch to ceftriaxone.  Cultures ordered.  One septic shock resolved patient was downgraded to hospitalist service on 3/3.  Blood cultures growing Streptococcus as of 3/3.  Id has been consulted GI was consulted to and they have consulted IR for external biliary drain replacement  Interval Hx:   No new c/o today. Wants to go home. Antibiotic transitioned to oral Ampicillin per ID. Noted creatinine further increased to 1.8 today . IVF re initiated. CO 15. Oral Sodium bicarbonate started.     Objective/physical exam:  General: In no acute distress, afebrile, On RA  Chest: Clear to auscultation bilaterally  Heart: RRR, +S1, S2, no appreciable murmur  Abdomen: Soft, nontender, BS +, RUQ external biliary drain in place   - Paul catheter in place   MSK: Warm, trace or no  lower extremity edema, no clubbing or cyanosis  Neurologic: Alert and oriented x4, Cranial nerve II-XII intact, Strength 4/5 in all 4 extremities    VITAL SIGNS: 24 HRS MIN & MAX LAST   Temp  Min: 98.6 °F (37 °C)  Max: 98.9 °F (37.2 °C) 98.8 °F (37.1 °C)   BP  Min: 108/50  Max: 137/54 125/60   Pulse   Min: 74  Max: 94  85   Resp  Min: 21  Max: 25 (!) 24     SpO2  Min: 97 %  Max: 98 % 98 %       Recent Labs   Lab 03/06/23  0145 03/07/23  0157 03/08/23  0134   WBC 8.1 7.0 8.8   RBC 2.98* 2.96* 3.56*   HGB 8.8* 8.5* 10.3*   HCT 26.8* 26.4* 32.0*   MCV 89.9 89.2 89.9   MCH 29.5 28.7 28.9   MCHC 32.8* 32.2* 32.2*   RDW 15.8 15.8 15.5    188 180   MPV 10.1 9.8 9.6       Recent Labs   Lab 03/02/23  1958 03/03/23  0401 03/04/23 0155 03/06/23 0145 03/07/23  0157 03/07/23  1406 03/08/23  0134 03/09/23  0128   * 135*   < > 139 139  --  133* 133*   K 3.8 4.7   < > 3.4* 3.5  --  3.5 3.7   CO2 16* 17*   < > 19* 21*  --  17* 15*   BUN 40.9* 39.9*   < > 23.8 17.4  --  21.3 25.9*   CREATININE 2.15* 1.84*   < > 1.18 1.05  --  1.62* 1.85*   CALCIUM 7.8* 8.1*   < > 8.5* 8.1*  --  8.6* 8.2*   MG 1.50* 1.90  --  1.70  --   --   --   --    ALBUMIN  --  2.1*   < > 1.8*  --  2.4* 2.1*  --    ALKPHOS  --  450*   < > 650*  --  77 715*  --    ALT  --  55   < > 40  --  14 47  --    AST  --  65*   < > 34  --  24 45*  --    BILITOT  --  2.3*   < > 2.0*  --  1.1 4.1*  --     < > = values in this interval not displayed.          Microbiology Results (last 7 days)       Procedure Component Value Units Date/Time    Blood Culture [[id number]]  (Normal) Collected: 03/04/23 1209    Order Status: Completed Specimen: Blood Updated: 03/09/23 1400     CULTURE, BLOOD ([facility]) No Growth at 5 days    Blood Culture [[id number]]  (Normal) Collected: 03/04/23 1119    Order Status: Completed Specimen: Blood Updated: 03/09/23 1200     CULTURE, BLOOD ([facility]) No Growth at 5 days    Blood Culture [[id number]]  (Normal) Collected: 03/02/23 1545    Order Status: Completed Specimen: Blood from Antecubital, Left Updated: 03/07/23 1900     CULTURE, BLOOD ([facility]) No Growth at 5 days    Urine Culture High Risk [[id number]] Collected: 03/04/23 1403    Order Status: Completed Specimen: Urine, Catheterized Updated: 03/06/23 0732     Urine Culture No Growth    Blood  Culture [[id number]]  (Abnormal)  (Susceptibility) Collected: 03/02/23 1550    Order Status: Completed Specimen: Blood from Arm, Left Updated: 03/05/23 0655     CULTURE, BLOOD ([facility]) Enterococcus faecium     Comment: Ampicillin results may be used to predict susceptibility to amoxicillin-clavulanate, ampicillin-sulbactam, and piperacillin-tazobactam.        GRAM STAIN Gram Positive Cocci, probable Streptococcus      Seen in gram stain of broth only      2 of 2 bottles positive             See below for Radiology    Scheduled Med:   ampicillin  500 mg Oral BID    docusate sodium  50 mg Oral BID    finasteride  5 mg Oral Daily    heparin (porcine)  5,000 Units Subcutaneous Q12H    pantoprazole  40 mg Oral Daily    polyethylene glycol  17 g Oral Daily    sodium bicarbonate  650 mg Oral TID    sodium chloride 0.9%  10 mL Intravenous Q6H    tamsulosin  0.4 mg Oral Daily        Continuous Infusions:   sodium chloride 0.9% 125 mL/hr at 03/09/23 0920        PRN Meds:  acetaminophen, HYDROcodone-acetaminophen, melatonin, ondansetron, sodium chloride 0.9%, Flushing PICC Protocol **AND** sodium chloride 0.9% **AND** sodium chloride 0.9%       Assessment/Plan:  Enterococcus faecium bacteremia  Septic shock secondary to above-improving   Acute kidney injury- initially improved , now with increased creatinine   Non AG metabolic acidosis   Acute on chronic normocytic anemia  Ampulla of Vater adenocarcinoma awaiting Whipple's   Biliary obstruction status post biliary stent/ drain  History of essential HTN , now with soft BP  Chronic urinary retention secondary to BPH with indwelling Paul   Hyperlipidemia   History of CAD     Plan-  Ampicillin transitioned to oral per ID and recommended to continue to complete 14 days treatment with end date 03/18/23  Patient is status post replacement of the biliary drain  by IR- 3/7/23  Pt evaluated by Dr. Brown (Surgical Oncology ) and surgical intervention for Whipple procedure  planned as  outpatient pending improvement of medical condition   BP has improved . Off Midodrine   AM Labs revealed creatinine bumped to 1.8 today from 1.6 yesterday . IVF re initiated and assess response in am   Repeat blood work in a.m.  Patient working with PT and OT doing pretty well   Pt will be discharged home in the next 24 to 48h pending renal function improvement.     VTE prophylaxis: Heparin     Patient condition:  Fair     Anticipated discharge and Disposition:     Home with family     All diagnosis and differential diagnosis have been reviewed; assessment and plan has been documented; I have personally reviewed the labs and test results that are presently available; I have reviewed the patients medication list; I have reviewed the consulting providers response and recommendations. I have reviewed or attempted to review medical records based upon their availability    All of the patient's questions have been  addressed and answered. Patient's is agreeable to the above stated plan. I will continue to monitor closely and make adjustments to medical management as needed.  _____________________________________________________________________    Nutrition Status:    Radiology:  IR Biliary Catheter Exchange with Imaging  Narrative: EXAMINATION:  Ultrasound guidance    Fluoroscopic guidance    Percutaneous transhepatic cholangiogram    biliary drain placement    CLINICAL HISTORY:  Biliary catheter partially removed.    ATTENDING: Kodi Ambrocio    ANESTHESIA:    Local and Moderate Sedation - Sedation was provided by physician: An independent trained observer, sedation nurse was present to assist in the monitoring of the patients level of consciousness and physiologic status.    Moderate Sedation was performed for 10 minutes.    TECHNIQUE:  After the risks, benefits and alternatives were discussed, consent was obtained. A time out was performed to verify the patient's identity and procedure.    The patient was placed supine  on the angiographic table. The abdomen was cleaned and prepped in normal sterile fashion.  Cholangiogram confirmed the tip of the existing catheter remains within the biliary system.    A Glidewire was advanced through the existing catheter into the distal common bile duct.  The existing catheter was subsequently removed.    A new external biliary drainage catheter was placed over a Bentson wire without complication.  Contrast injection confirmed appropriate location within the distal common bile duct.    Catheter was sutured to the skin using 0 monofilament. The patient tolerated the procedure well. There were no immediate complications.    All needle, wire and catheter manipulations were done under fluoroscopic guidance.    EBL: < 10mL    Fluoro Time (min): 1.7    Fluoro Dose (mGy): 11.9  Impression: Successful placement of external biliary drainage catheter.    Electronically signed by: Kodi Ambrocio  Date:    03/07/2023  Time:    12:51      Sue Espinoza MD   03/09/2023

## 2023-03-09 NOTE — PROGRESS NOTES
Infectious Diseases Progress Note  82-year-old male with past medical history of CAD, HLD, HTN, right mid forearm amputation, adenocarcinoma of ampulla of Vater, biliary obstruction s/p external biliary drain placed by IR on 01/04/2023, BPH with chronic urinary retention and Paul catheter dependent, is admitted to [facility] on 03/02/2023, as a transfer from outlying facility where he had presented with complaints of fatigue, nausea, vomiting and poor oral intake.  He was evaluated and noted to be in acute kidney injury as well as had leukocytosis, metabolic/lactic acidosis, was hypotensive requiring vasopressors and transferred to the ICU due to concern for septic shock.  On presentation here he remains without fevers but noted to have leukocytosis of up to 19 on 03/03, creatinine up to 2.3, elevated total bilirubin and anemic.  Urinalysis with 0-5 WBC, small LE, many bacteria a urine culture negative.  One of 2 blood culture sets on 03/02 with Enterococcus faecium.  CT scan of abdomen and pelvis done on 03/02 showed slightly interval retraction of the percutaneous biliary stent which is now colored slightly more distally in the right lobe of the liver with slightly improved dilatation of the extrahepatic bile ducts with known ampullary/duodenal mass poor evaluated on the noncontrasted CT, mildly thickened bladder wall appearance which may be due to under distention.  He has been seen by GI team with inputs noted including awaiting IR evaluation.  He is currently on antibiotic coverage with ampicillin.     Subjective:  No new complaints, no fevers, doing about the same.  Lying in recliner in no acute distress      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, unspecified extremity     Coronary artery disease     Dyslipidemia     Hypertension      Past Surgical History:   Procedure Laterality Date     AMPUTATION Right     Right arm    CAROTID STENT      EGD, WITH HEMORRHAGE CONTROL  1/19/2023    Procedure: EGD,WITH HEMORRHAGE CONTROL;  Surgeon: Ranjeet Perez MD;  Location: [facility];  Service: Gastroenterology;;  [employer] HemeSpray    ERCP N/A 12/21/2022    Procedure: ERCP;  Surgeon: Sushil Anderson MD;  Location: [facility] ENDOSCOPY;  Service: Gastroenterology;  Laterality: N/A;  food in abdomen    ERCP, WITH BIOPSY  12/21/2022    Procedure: ERCP, WITH BIOPSY;  Surgeon: Sushil Anderson MD;  Location: [facility] ENDOSCOPY;  Service: Gastroenterology;;    ESOPHAGOGASTRODUODENOSCOPY N/A 1/19/2023    Procedure: EGD;  Surgeon: Ranjeet Perez MD;  Location: [facility];  Service: Gastroenterology;  Laterality: N/A;    LEFT HEART CATHETERIZATION      TONSILLECTOMY       Social History     Socioeconomic History    Marital status:    Tobacco Use    Smoking status: Never    Smokeless tobacco: Never   Substance and Sexual Activity    Alcohol use: Never    Drug use: Never     Social Determinants of Health     Food Insecurity: Unknown    Worried About Running Out of Food in the Last Year: Never true   Transportation Needs: Unknown    Lack of Transportation (Medical): No   Housing Stability: Unknown    Unable to Pay for Housing in the Last Year: No       ROS  Constitutional:  Positive for malaise/fatigue.   HENT: Negative.     Respiratory: Negative.     Gastrointestinal: Negative.    Genitourinary: Negative.    Musculoskeletal: Negative.    Neurological:  Positive for weakness.   Endo/Heme/Allergies: Negative.    Psychiatric/Behavioral: Negative.     All other Systems review done and negative.    Review of patient's allergies indicates:  No Known Allergies      Scheduled Meds:   ampicillin  500 mg Oral BID    docusate sodium  50 mg Oral BID    finasteride  5 mg Oral Daily    heparin (porcine)  5,000 Units Subcutaneous Q12H    pantoprazole  40 mg Oral Daily    polyethylene glycol  17 g Oral Daily    sodium chloride  "0.9%  10 mL Intravenous Q6H    tamsulosin  0.4 mg Oral Daily     Continuous Infusions:   sodium chloride 0.9% 50 mL/hr at 03/08/23 0812     PRN Meds:acetaminophen, HYDROcodone-acetaminophen, melatonin, ondansetron, sodium chloride 0.9%, Flushing PICC Protocol **AND** sodium chloride 0.9% **AND** sodium chloride 0.9%    Objective:  BP (!) 108/50   Pulse 94   Temp 98.6 °F (37 °C) (Oral)   Resp (!) 25   Ht 5' 9" (1.753 m)   Wt 67.3 kg (148 lb 5.9 oz)   SpO2 97%   BMI 21.91 kg/m²     Physical Exam:   Physical Exam  Vitals reviewed.   Constitutional:       General: He is not in acute distress.  HENT:      Head: Normocephalic and atraumatic.      Mouth/Throat:      Comments: Poor dentition  Cardiovascular:      Rate and Rhythm: Normal rate and regular rhythm.      Heart sounds: Normal heart sounds.   Pulmonary:      Effort: Pulmonary effort is normal. No respiratory distress.      Breath sounds: Normal breath sounds.   Abdominal:      General: Bowel sounds are normal. There is no distension.      Palpations: Abdomen is soft.      Tenderness: There is no abdominal tenderness.      Comments: RUQ biliary drain in place   Genitourinary:     Comments: Paul catheter noted  Musculoskeletal:      Cervical back: Neck supple.      Comments: R forearm healed amputation stump    Skin:     Coloration: Skin is jaundiced.      Findings: No erythema or rash.   Neurological:      Mental Status: He is alert and oriented to person, place, and time.   Psychiatric:      Comments: Calm and cooperative     Imaging  Imaging Results              CT Chest Abdomen Pelvis Without Contrast (XPD) (Final result)  Result time 03/02/23 17:40:53      Final result by Nancy Redd MD (03/02/23 17:40:53)                   Impression:      1. Slight interval retraction of the percutaneous biliary stent which is now coiled slightly more distally in the right lobe of the liver.  There is however slightly improved dilatation of the extrahepatic " bile ducts.  2. Known ampullary/duodenal mass poorly evaluated on this noncontrast CT.  No bowel obstruction.  3. Bladder wall appears mildly thickened which may be related to under distension.  Correlate with urinalysis.      Electronically signed by: Nancy Redd  Date:    03/02/2023  Time:    17:40               Narrative:    EXAMINATION:  CT CHEST ABDOMEN PELVIS WITHOUT CONTRAST(XPD)    CLINICAL HISTORY:  Septic, biliary stent for cancer of ampula and duodenum;    TECHNIQUE:  Helical acquisition through the chest, abdomen and pelvis without  IV administration of contrast. Axial, sagittal and coronal reformats interpreted.    Automated tube current modulation, weight-based exposure dosing, and/or iterative reconstruction technique utilized to reach lowest reasonably achievable exposure rate.    DLP: 603 mGy*cm    COMPARISON:  CT abdomen pelvis 02/11/2023    FINDINGS:  BASE OF NECK: Nodular thyroid.  Thyroid nodule measures less than 1.4 cm and requires no imaging follow-up per consensus guidelines.    HEART: Normal size. There are coronary artery calcifications.    THORACIC VASCULATURE: Thoracic aorta is unremarkable.    RACHNA/MEDIASTINUM: No enlarged lymph nodes by size criteria. Evaluation of hilar lymphadenopathy is limited without contrast.    AIRWAYS: Patent.    LUNGS/PLEURA: Mild dependent atelectasis.    THORACIC SOFT TISSUES: Unremarkable.    HEPATOBILIARY: Mild interval retraction percutaneous biliary stent coiled in the region of the right lobe of the liver.  No significant biliary ductal dilatation.  Limited evaluation for intrahepatic biliary ductal dilatation on noncontrast CT.  Probable trace, unchanged dilated biliary radicle at the anterior, superior right lobe of the liver..  Common hepatic duct measures 12 mm; previously 18 mm. Gallbladder is not overly distended.    PANCREAS: No inflammatory change.    SPLEEN: Normal in size    ADRENALS: No mass.    KIDNEYS/URETERS: No renal or ureteral  calculus.  No hydronephrosis.  Incidental right renal cyst requires no imaging follow-up.    GI TRACT/MESENTERY: Known ampullary/duodenal mass poorly evaluated on this noncontrast CT.  No bowel obstruction.  The appendix is normal.    PERITONEUM: No free fluid.No free air.    LYMPH NODES: Small peripancreatic lymph nodes are similar to prior.    ABDOMINOPELVIC VASCULATURE: Aortoiliac atherosclerosis.  Saccular dilatation at the infrarenal aorta are measures 2.5 cm in diameter.    BLADDER: Paul catheter in the bladder.  Bladder wall appears mildly thickened.    REPRODUCTIVE ORGANS: Mild prostatomegaly.    ABDOMINAL WALL: Unremarkable.    BONES: Degenerative facet arthropathy in the lower lumbar spine.  Multilevel disc osteophytes at the thoracolumbar spine.                                       X-Ray Chest 1 View (Final result)  Result time 03/02/23 16:01:30      Final result by Brandon Ny MD (03/02/23 16:01:30)                   Impression:      No acute chest disease is identified.      Electronically signed by: Brandon Ny  Date:    03/02/2023  Time:    16:01               Narrative:    EXAMINATION:  XR CHEST 1 VIEW    CLINICAL HISTORY:  hypotension;, .    COMPARISON:  January 12 2023    FINDINGS:  No alveolar consolidation, effusion, or pneumothorax is seen.   The thoracic aorta is normal  cardiac silhouette, central pulmonary vessels and mediastinum are normal in size and are grossly unremarkable.   visualized osseous structures are grossly unremarkable.    Catheter in RUQ                                       Lab Review   Recent Results (from the past 24 hour(s))   Comprehensive Metabolic Panel    Collection Time: 03/08/23  1:34 AM   Result Value Ref Range    Sodium Level 133 (L) 136 - 145 mmol/L    Potassium Level 3.5 3.5 - 5.1 mmol/L    Chloride 103 98 - 107 mmol/L    Carbon Dioxide 17 (L) 23 - 31 mmol/L    Glucose Level 89 82 - 115 mg/dL    Blood Urea Nitrogen 21.3 8.4 - 25.7 mg/dL     Creatinine 1.62 (H) 0.73 - 1.18 mg/dL    Calcium Level Total 8.6 (L) 8.8 - 10.0 mg/dL    Protein Total 5.7 (L) 5.8 - 7.6 gm/dL    Albumin Level 2.1 (L) 3.4 - 4.8 g/dL    Globulin 3.6 (H) 2.4 - 3.5 gm/dL    Albumin/Globulin Ratio 0.6 (L) 1.1 - 2.0 ratio    Bilirubin Total 4.1 (H) <=1.5 mg/dL    Alkaline Phosphatase 715 (H) 40 - 150 unit/L    Alanine Aminotransferase 47 0 - 55 unit/L    Aspartate Aminotransferase 45 (H) 5 - 34 unit/L    eGFR 42 mls/min/1.73/m2   CBC with Differential    Collection Time: 03/08/23  1:34 AM   Result Value Ref Range    WBC 8.8 4.5 - 11.5 x10(3)/mcL    RBC 3.56 (L) 4.70 - 6.10 x10(6)/mcL    Hgb 10.3 (L) 14.0 - 18.0 g/dL    Hct 32.0 (L) 42.0 - 52.0 %    MCV 89.9 80.0 - 94.0 fL    MCH 28.9 pg    MCHC 32.2 (L) 33.0 - 36.0 g/dL    RDW 15.5 11.5 - 17.0 %    Platelet 180 130 - 400 x10(3)/mcL    MPV 9.6 7.4 - 10.4 fL    Neut % 69.8 %    Lymph % 19.2 %    Mono % 7.6 %    Eos % 2.3 %    Basophil % 0.3 %    Lymph # 1.69 0.6 - 4.6 x10(3)/mcL    Neut # 6.16 2.1 - 9.2 x10(3)/mcL    Mono # 0.67 0.1 - 1.3 x10(3)/mcL    Eos # 0.20 0 - 0.9 x10(3)/mcL    Baso # 0.03 0 - 0.2 x10(3)/mcL    IG# 0.07 (H) 0 - 0.04 x10(3)/mcL    IG% 0.8 %    NRBC% 0.0 %             Assessment/Plan:  1. Enterococcus sepsis with shock  2.  Acute kidney injury  3.  Obstructive jaundice with possible cholangitis  4.  Adenocarcinoma of ampulla of Vater   5. BPH with Obstructive uropathy, Paul catheter dependent  6. Acute kidney injury  7.  Anemia    -Continue ampicillin with end date of 03/18, and can be transitioned to oral formulation to complete a 14 day course  -No fevers and no leukocytosis  -3/2 blood cultures 1 of 2 sets with Enterococcus faecium, 3/4 blood cultures negative  -3/2 urinalysis abnormal and 3/4 urine culture negative  -S/p external biliary drainage catheter exchange by IR on 03/07  -Surgery team on board, deferring with pole surgery timing Whipple's surgery timing with improved medical condition on to follow with  him on that as an outpatient  -Likely dealing with a biliary source versus urinary source in the setting of a history of obstructive uropathy with Paul catheter dependence, however urinalysis not impressive with 0-5 WBC and urine culture negative.  -Renal impairment noted and now with normalized creatinine  -Seen by the GI team with inputs noted   -Discussed with patient and nursing staff.  Disposition per primary team
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
## 2023-03-10 VITALS
WEIGHT: 148.38 LBS | RESPIRATION RATE: 23 BRPM | DIASTOLIC BLOOD PRESSURE: 65 MMHG | TEMPERATURE: 98 F | HEART RATE: 82 BPM | BODY MASS INDEX: 21.98 KG/M2 | SYSTOLIC BLOOD PRESSURE: 133 MMHG | OXYGEN SATURATION: 98 % | HEIGHT: 69 IN

## 2023-03-10 LAB
ANION GAP SERPL CALC-SCNC: 6 MEQ/L
BASOPHILS # BLD AUTO: 0.04 X10(3)/MCL (ref 0–0.2)
BASOPHILS NFR BLD AUTO: 0.6 %
BUN SERPL-MCNC: 25.8 MG/DL (ref 8.4–25.7)
CALCIUM SERPL-MCNC: 7.9 MG/DL (ref 8.8–10)
CHLORIDE SERPL-SCNC: 117 MMOL/L (ref 98–107)
CO2 SERPL-SCNC: 12 MMOL/L (ref 23–31)
CREAT SERPL-MCNC: 1.65 MG/DL (ref 0.73–1.18)
CREAT/UREA NIT SERPL: 16
EOSINOPHIL # BLD AUTO: 0.27 X10(3)/MCL (ref 0–0.9)
EOSINOPHIL NFR BLD AUTO: 3.9 %
ERYTHROCYTE [DISTWIDTH] IN BLOOD BY AUTOMATED COUNT: 15.9 % (ref 11.5–17)
GFR SERPLBLD CREATININE-BSD FMLA CKD-EPI: 41 MLS/MIN/1.73/M2
GLUCOSE SERPL-MCNC: 89 MG/DL (ref 82–115)
GROUP & RH: NORMAL
HCT VFR BLD AUTO: 24.4 % (ref 42–52)
HCT VFR BLD AUTO: 28.3 % (ref 42–52)
HGB BLD-MCNC: 7.6 G/DL (ref 14–18)
HGB BLD-MCNC: 8.9 G/DL (ref 14–18)
IMM GRANULOCYTES # BLD AUTO: 0.17 X10(3)/MCL (ref 0–0.04)
IMM GRANULOCYTES NFR BLD AUTO: 2.4 %
INDIRECT COOMBS GEL: NORMAL
LACTATE SERPL-SCNC: 1.1 MMOL/L (ref 0.5–2.2)
LYMPHOCYTES # BLD AUTO: 1.69 X10(3)/MCL (ref 0.6–4.6)
LYMPHOCYTES NFR BLD AUTO: 24.3 %
MAGNESIUM SERPL-MCNC: 1.3 MG/DL (ref 1.6–2.6)
MCH RBC QN AUTO: 28.8 PG
MCHC RBC AUTO-ENTMCNC: 31.1 G/DL (ref 33–36)
MCV RBC AUTO: 92.4 FL (ref 80–94)
MONOCYTES # BLD AUTO: 0.67 X10(3)/MCL (ref 0.1–1.3)
MONOCYTES NFR BLD AUTO: 9.6 %
NEUTROPHILS # BLD AUTO: 4.11 X10(3)/MCL (ref 2.1–9.2)
NEUTROPHILS NFR BLD AUTO: 59.2 %
NRBC BLD AUTO-RTO: 0 %
PHOSPHATE SERPL-MCNC: 4.2 MG/DL (ref 2.3–4.7)
PLATELET # BLD AUTO: 214 X10(3)/MCL (ref 130–400)
PMV BLD AUTO: 9.3 FL (ref 7.4–10.4)
POTASSIUM SERPL-SCNC: 3.9 MMOL/L (ref 3.5–5.1)
RBC # BLD AUTO: 2.64 X10(6)/MCL (ref 4.7–6.1)
SODIUM SERPL-SCNC: 135 MMOL/L (ref 136–145)
WBC # SPEC AUTO: 7 X10(3)/MCL (ref 4.5–11.5)

## 2023-03-10 PROCEDURE — 86900 BLOOD TYPING SEROLOGIC ABO: CPT | Performed by: INTERNAL MEDICINE

## 2023-03-10 PROCEDURE — A4216 STERILE WATER/SALINE, 10 ML: HCPCS | Performed by: INTERNAL MEDICINE

## 2023-03-10 PROCEDURE — 94761 N-INVAS EAR/PLS OXIMETRY MLT: CPT

## 2023-03-10 PROCEDURE — 25000003 PHARM REV CODE 250: Performed by: STUDENT IN AN ORGANIZED HEALTH CARE EDUCATION/TRAINING PROGRAM

## 2023-03-10 PROCEDURE — 63600175 PHARM REV CODE 636 W HCPCS: Performed by: STUDENT IN AN ORGANIZED HEALTH CARE EDUCATION/TRAINING PROGRAM

## 2023-03-10 PROCEDURE — 97116 GAIT TRAINING THERAPY: CPT

## 2023-03-10 PROCEDURE — 25000003 PHARM REV CODE 250: Performed by: INTERNAL MEDICINE

## 2023-03-10 PROCEDURE — 84100 ASSAY OF PHOSPHORUS: CPT | Performed by: INTERNAL MEDICINE

## 2023-03-10 PROCEDURE — 83605 ASSAY OF LACTIC ACID: CPT | Performed by: INTERNAL MEDICINE

## 2023-03-10 PROCEDURE — 85014 HEMATOCRIT: CPT | Performed by: INTERNAL MEDICINE

## 2023-03-10 PROCEDURE — 85025 COMPLETE CBC W/AUTO DIFF WBC: CPT | Performed by: INTERNAL MEDICINE

## 2023-03-10 PROCEDURE — 80048 BASIC METABOLIC PNL TOTAL CA: CPT | Performed by: INTERNAL MEDICINE

## 2023-03-10 PROCEDURE — 86923 COMPATIBILITY TEST ELECTRIC: CPT | Performed by: INTERNAL MEDICINE

## 2023-03-10 PROCEDURE — 63600175 PHARM REV CODE 636 W HCPCS: Performed by: INTERNAL MEDICINE

## 2023-03-10 PROCEDURE — 97535 SELF CARE MNGMENT TRAINING: CPT | Mod: CO

## 2023-03-10 PROCEDURE — 83735 ASSAY OF MAGNESIUM: CPT | Performed by: INTERNAL MEDICINE

## 2023-03-10 PROCEDURE — 25000003 PHARM REV CODE 250: Performed by: NURSE PRACTITIONER

## 2023-03-10 RX ORDER — MIDODRINE HYDROCHLORIDE 5 MG/1
5 TABLET ORAL 3 TIMES DAILY
Status: DISCONTINUED | OUTPATIENT
Start: 2023-03-10 | End: 2023-03-10 | Stop reason: HOSPADM

## 2023-03-10 RX ORDER — ONDANSETRON 4 MG/1
4 TABLET, ORALLY DISINTEGRATING ORAL EVERY 6 HOURS PRN
Qty: 28 TABLET | Refills: 0 | Status: SHIPPED | OUTPATIENT
Start: 2023-03-10 | End: 2023-03-17

## 2023-03-10 RX ORDER — HYDROCODONE BITARTRATE AND ACETAMINOPHEN 500; 5 MG/1; MG/1
TABLET ORAL
Status: DISCONTINUED | OUTPATIENT
Start: 2023-03-10 | End: 2023-03-10 | Stop reason: HOSPADM

## 2023-03-10 RX ORDER — MAGNESIUM SULFATE HEPTAHYDRATE 40 MG/ML
2 INJECTION, SOLUTION INTRAVENOUS
Status: COMPLETED | OUTPATIENT
Start: 2023-03-10 | End: 2023-03-10

## 2023-03-10 RX ORDER — ACETAMINOPHEN 325 MG/1
650 TABLET ORAL EVERY 4 HOURS PRN
Status: DISCONTINUED | OUTPATIENT
Start: 2023-03-10 | End: 2023-03-10 | Stop reason: HOSPADM

## 2023-03-10 RX ORDER — HYDROCODONE BITARTRATE AND ACETAMINOPHEN 5; 325 MG/1; MG/1
1 TABLET ORAL EVERY 6 HOURS PRN
Status: DISCONTINUED | OUTPATIENT
Start: 2023-03-10 | End: 2023-03-10 | Stop reason: HOSPADM

## 2023-03-10 RX ORDER — POLYETHYLENE GLYCOL 3350 17 G/17G
17 POWDER, FOR SOLUTION ORAL DAILY
Qty: 30 EACH | Refills: 0 | Status: ON HOLD | OUTPATIENT
Start: 2023-03-10 | End: 2023-03-17

## 2023-03-10 RX ORDER — PANTOPRAZOLE SODIUM 40 MG/1
40 TABLET, DELAYED RELEASE ORAL DAILY
Qty: 30 TABLET | Refills: 0 | Status: ON HOLD | OUTPATIENT
Start: 2023-03-10 | End: 2023-04-28

## 2023-03-10 RX ORDER — SODIUM BICARBONATE 650 MG/1
650 TABLET ORAL 3 TIMES DAILY
Qty: 15 TABLET | Refills: 0 | Status: ON HOLD | OUTPATIENT
Start: 2023-03-10 | End: 2023-04-28 | Stop reason: HOSPADM

## 2023-03-10 RX ORDER — AMPICILLIN 500 MG/1
500 CAPSULE ORAL 2 TIMES DAILY
Qty: 16 CAPSULE | Refills: 0 | Status: SHIPPED | OUTPATIENT
Start: 2023-03-10 | End: 2023-03-18

## 2023-03-10 RX ADMIN — AMPICILLIN 500 MG: 500 CAPSULE ORAL at 09:03

## 2023-03-10 RX ADMIN — TAMSULOSIN HYDROCHLORIDE 0.4 MG: 0.4 CAPSULE ORAL at 09:03

## 2023-03-10 RX ADMIN — SODIUM BICARBONATE 650 MG TABLET 650 MG: at 09:03

## 2023-03-10 RX ADMIN — MIDODRINE HYDROCHLORIDE 5 MG: 5 TABLET ORAL at 09:03

## 2023-03-10 RX ADMIN — FINASTERIDE 5 MG: 5 TABLET, FILM COATED ORAL at 09:03

## 2023-03-10 RX ADMIN — MAGNESIUM SULFATE HEPTAHYDRATE 2 G: 40 INJECTION, SOLUTION INTRAVENOUS at 12:03

## 2023-03-10 RX ADMIN — DOCUSATE SODIUM 50 MG: 50 CAPSULE, LIQUID FILLED ORAL at 09:03

## 2023-03-10 RX ADMIN — SODIUM BICARBONATE 650 MG TABLET 650 MG: at 03:03

## 2023-03-10 RX ADMIN — MIDODRINE HYDROCHLORIDE 5 MG: 5 TABLET ORAL at 03:03

## 2023-03-10 RX ADMIN — MAGNESIUM SULFATE HEPTAHYDRATE 2 G: 40 INJECTION, SOLUTION INTRAVENOUS at 10:03

## 2023-03-10 RX ADMIN — HEPARIN SODIUM 5000 UNITS: 5000 INJECTION, SOLUTION INTRAVENOUS; SUBCUTANEOUS at 09:03

## 2023-03-10 RX ADMIN — PANTOPRAZOLE SODIUM 40 MG: 40 TABLET, DELAYED RELEASE ORAL at 09:03

## 2023-03-10 RX ADMIN — SODIUM CHLORIDE, PRESERVATIVE FREE 10 ML: 5 INJECTION INTRAVENOUS at 12:03

## 2023-03-10 NOTE — PT/OT/SLP PROGRESS
Occupational Therapy  Treatment    Quincy Triplett   MRN: [id number]   Admitting Diagnosis: Sepsis    OT Date of Treatment: 03/10/23   OT Start Time: 1015  OT Stop Time: 1025  OT Total Time (min): 10 min     Billable Minutes:  Self Care/Home Management 1  Total Minutes: 10     OT/IZAIAH: IZAIAH     IZAIAH Visit Number: 4    General Precautions: Standard, fall  Orthopedic Precautions:    Braces:      Spiritual, Cultural Beliefs, Quaker Practices, Values that Affect Care: no    Subjective:  Communicated with RN prior to session.  72HR, 133/65    Objective:  Pt. UIC upon entry.  Pt. Performing LB dressing task while donning shirt using federico technique Mod I.   Pt. Ambulating from BS chair to bathroom HHA CGA standing at sink while performing grooming task (brushing teeth) with appropriate preparation noted.          Patient left up in chair with all lines intact and call button in reach    ASSESSMENT:  Quincy Triplett is a 82 y.o. male with a medical diagnosis of Sepsis Pt. Tolerated session well overall tolerating mobility well. Continue POC    Activity tolerance: Good    Discharge recommendations: home with home health     Equipment recommendations:       GOALS:   Multidisciplinary Problems       Occupational Therapy Goals          Problem: Occupational Therapy    Goal Priority Disciplines Outcome Interventions   Occupational Therapy Goal     OT, PT/OT Ongoing, Progressing    Description: Goals to be met by: 3/20/2023     Patient will increase functional independence with ADLs by performing:    LE Dressing with Modified Nash.  Grooming while standing with Modified Nash.  Toileting from toilet with Modified Nash for hygiene and clothing management.   Toilet transfer to toilet with Modified Nash.                         Plan:  Patient to be seen 3 x/week, 5 x/week to address the above listed problems via self-care/home management, therapeutic activities, therapeutic exercises  Plan of Care  expires: 03/20/23  Plan of Care reviewed with: patient         03/10/2023
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
## 2023-03-10 NOTE — PT/OT/SLP PROGRESS
Physical Therapy  Treatment    Quincy Triplett   MRN: [id number]   Admitting Diagnosis: Sepsis    PT Received On: 03/10/23  PT Start Time: 0945     PT Stop Time: 1000    PT Total Time (min): 15 min       Billable Minutes:  Gait Training 15    Treatment Type: Treatment  PT/PTA: PT     PTA Visit Number: 0       General Precautions: Standard,    Respiratory Status: Room air    Spiritual, Cultural Beliefs, Congregation Practices, Values that Affect Care: no    Subjective:  Communicated with RN prior to session.    Objective:   Pt sitting up in chair upon arrival to room.    Functional Mobility:  Sit>std w/o AD, independent. Pt ambulated 600' w/o AD, independent.      Patient left up in chair with all lines intact.    Assessment:  Quincy Triplett is a 82 y.o. male is ambulating independently w/o AD. Recommend pt ambulate 3x/day while in hospital. DC PT.    Rehab identified problem list/impairments: weakness    Rehab potential is good.    Activity tolerance: Good    Discharge recommendations: home health PT      Barriers to discharge:      Equipment recommendations:       GOALS:   Multidisciplinary Problems       Physical Therapy Goals          Problem: Physical Therapy    Goal Priority Disciplines Outcome Goal Variances Interventions   Physical Therapy Goal     PT, PT/OT Ongoing, Progressing     Description: Goals to be met by: 3/28/23     Patient will increase functional independence with mobility by performin. Supine to sit with [place]  2. Sit to supine with [place]  3. Sit to stand transfer with [place]  4. Gait  x 400 feet with [place] using No Assistive Device.                          PLAN:    Patient to be seen 5 x/week to address the above listed problems via gait training, therapeutic activities, therapeutic exercises  Plan of Care expires: 23  Plan of Care reviewed with: patient         03/10/2023
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
## 2023-03-10 NOTE — PLAN OF CARE
Problem: Adult Inpatient Plan of Care  Goal: Plan of Care Review  Outcome: Met  Goal: Patient-Specific Goal (Individualized)  Outcome: Met  Goal: Absence of Hospital-Acquired Illness or Injury  Outcome: Met  Goal: Optimal Comfort and Wellbeing  Outcome: Met  Goal: Readiness for Transition of Care  Outcome: Met     Problem: Adjustment to Illness (Sepsis/Septic Shock)  Goal: Optimal Coping  Outcome: Met     Problem: Infection Progression (Sepsis/Septic Shock)  Goal: Absence of Infection Signs and Symptoms  Outcome: Met     Problem: Nutrition Impaired (Sepsis/Septic Shock)  Goal: Optimal Nutrition Intake  Outcome: Met     Problem: Fluid and Electrolyte Imbalance (Acute Kidney Injury/Impairment)  Goal: Fluid and Electrolyte Balance  Outcome: Met     Problem: Oral Intake Inadequate (Acute Kidney Injury/Impairment)  Goal: Optimal Nutrition Intake  Outcome: Met     Problem: Renal Function Impairment (Acute Kidney Injury/Impairment)  Goal: Effective Renal Function  Outcome: Met     Problem: Infection  Goal: Absence of Infection Signs and Symptoms  Outcome: Met     Problem: Skin Injury Risk Increased  Goal: Skin Health and Integrity  Outcome: Met
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
## 2023-03-14 LAB
ABO + RH BLD: NORMAL
BLD PROD TYP BPU: NORMAL
BLOOD UNIT EXPIRATION DATE: NORMAL
BLOOD UNIT TYPE CODE: 6200
CROSSMATCH INTERPRETATION: NORMAL
DISPENSE STATUS: NORMAL
UNIT NUMBER: NORMAL

## 2023-03-16 ENCOUNTER — LAB REQUISITION (OUTPATIENT)
Dept: LAB | Facility: HOSPITAL | Age: 83
End: 2023-03-16
Payer: MEDICARE

## 2023-03-16 ENCOUNTER — HOSPITAL ENCOUNTER (INPATIENT)
Facility: HOSPITAL | Age: 83
LOS: 18 days | Discharge: NURSING, REHAB OR LONG-TERM CARE | DRG: 405 | End: 2023-04-06
Attending: EMERGENCY MEDICINE | Admitting: INTERNAL MEDICINE
Payer: MEDICARE

## 2023-03-16 ENCOUNTER — DOCUMENTATION ONLY (OUTPATIENT)
Dept: SURGICAL ONCOLOGY | Facility: CLINIC | Age: 83
End: 2023-03-16
Payer: MEDICARE

## 2023-03-16 DIAGNOSIS — R11.10 INTRACTABLE VOMITING: ICD-10-CM

## 2023-03-16 DIAGNOSIS — R07.9 CHEST PAIN: ICD-10-CM

## 2023-03-16 DIAGNOSIS — C24.1 MALIGNANT NEOPLASM OF AMPULLA OF VATER: ICD-10-CM

## 2023-03-16 DIAGNOSIS — C17.0 DUODENAL CANCER: Chronic | ICD-10-CM

## 2023-03-16 DIAGNOSIS — I10 ESSENTIAL (PRIMARY) HYPERTENSION: ICD-10-CM

## 2023-03-16 DIAGNOSIS — R10.30 LOWER ABDOMINAL PAIN: Primary | ICD-10-CM

## 2023-03-16 DIAGNOSIS — E87.20 METABOLIC ACIDOSIS: ICD-10-CM

## 2023-03-16 DIAGNOSIS — K31.1 GASTRIC OUTLET OBSTRUCTION: ICD-10-CM

## 2023-03-16 LAB
ALBUMIN SERPL-MCNC: 2.6 G/DL (ref 3.4–4.8)
ALBUMIN/GLOB SERPL: 0.7 RATIO (ref 1.1–2)
ALP SERPL-CCNC: 1691 UNIT/L (ref 40–150)
ALT SERPL-CCNC: 68 UNIT/L (ref 0–55)
APPEARANCE UR: ABNORMAL
AST SERPL-CCNC: 51 UNIT/L (ref 5–34)
BACTERIA #/AREA URNS AUTO: ABNORMAL /HPF
BILIRUB UR QL STRIP.AUTO: NEGATIVE MG/DL
BILIRUBIN DIRECT+TOT PNL SERPL-MCNC: 3.2 MG/DL
BUN SERPL-MCNC: 23.4 MG/DL (ref 8.4–25.7)
CALCIUM SERPL-MCNC: 9.1 MG/DL (ref 8.8–10)
CHLORIDE SERPL-SCNC: 109 MMOL/L (ref 98–107)
CO2 SERPL-SCNC: 9 MMOL/L (ref 23–31)
COLOR UR AUTO: YELLOW
CREAT SERPL-MCNC: 1.53 MG/DL (ref 0.73–1.18)
GFR SERPLBLD CREATININE-BSD FMLA CKD-EPI: 45 MLS/MIN/1.73/M2
GLOBULIN SER-MCNC: 3.7 GM/DL (ref 2.4–3.5)
GLUCOSE SERPL-MCNC: 74 MG/DL (ref 82–115)
GLUCOSE UR QL STRIP.AUTO: NEGATIVE MG/DL
KETONES UR QL STRIP.AUTO: NEGATIVE MG/DL
LEUKOCYTE ESTERASE UR QL STRIP.AUTO: ABNORMAL UNIT/L
NITRITE UR QL STRIP.AUTO: NEGATIVE
PH UR STRIP.AUTO: 6 [PH]
POTASSIUM SERPL-SCNC: 5.2 MMOL/L (ref 3.5–5.1)
PROT SERPL-MCNC: 6.3 GM/DL (ref 5.8–7.6)
PROT UR QL STRIP.AUTO: ABNORMAL MG/DL
RBC #/AREA URNS AUTO: ABNORMAL /HPF
RBC UR QL AUTO: 2 UNIT/L
SODIUM SERPL-SCNC: 131 MMOL/L (ref 136–145)
SP GR UR STRIP.AUTO: 1.02 (ref 1–1.03)
SQUAMOUS #/AREA URNS AUTO: ABNORMAL /HPF
UROBILINOGEN UR STRIP-ACNC: 0.2 MG/DL
WBC #/AREA URNS AUTO: ABNORMAL /HPF

## 2023-03-16 PROCEDURE — 99285 EMERGENCY DEPT VISIT HI MDM: CPT

## 2023-03-16 PROCEDURE — 80053 COMPREHEN METABOLIC PANEL: CPT | Performed by: INTERNAL MEDICINE

## 2023-03-16 PROCEDURE — 81001 URINALYSIS AUTO W/SCOPE: CPT | Performed by: INTERNAL MEDICINE

## 2023-03-16 PROCEDURE — 96360 HYDRATION IV INFUSION INIT: CPT

## 2023-03-16 NOTE — PROGRESS NOTES
NIK Rodriguez, RN  Caller: Unspecified (Today,  8:03 AM)  Saw Rodriguez called in reference to [facility]  requesting an IV of fluids to be completed at home. The request would have been sent yesterday afternoon sometime. Mr. Spring would like a call back in reference to this concern.     [phone]- Saw Rodriguez   [phone] Quincy Triplett number   [phone] is [facility] number.         0824:  Spoke with [facility], the nurse does not have documentation from his nurse visit yesterday that he needs IV fluids.  I let her know that if this is the case, the order would need to come from his PCP as Dr. Brown is not treating the patient for medical issues.  I then spoke with Saw Rodriguez, patient's son, to notify him that he should contact the [place] [facility] in regards to the request for IV fluids.

## 2023-03-16 NOTE — Clinical Note
Diagnosis: Intractable vomiting [383315]   Admitting Provider:: LASHAE SANTIAGO [[id number]]   Future Attending Provider: LASHAE SANTIAGO [[id number]]   Reason for IP Medical Treatment  (Clinical interventions that can only be accomplished in the IP setting? ) :: IV fluids, correction of acdosis, surgical evaluation for possible gastric outlet obstruction   I certify that Inpatient services for greater than or equal to 2 midnights are medically necessary:: Yes   Plans for Post-Acute care--if anticipated (pick the single best option):: A. No post acute care anticipated at this time

## 2023-03-17 LAB
ALBUMIN SERPL-MCNC: 2.4 G/DL (ref 3.4–4.8)
ALBUMIN SERPL-MCNC: 2.6 G/DL (ref 3.4–4.8)
ALBUMIN/GLOB SERPL: 0.6 RATIO (ref 1.1–2)
ALBUMIN/GLOB SERPL: 0.6 RATIO (ref 1.1–2)
ALP SERPL-CCNC: 1384 UNIT/L (ref 40–150)
ALP SERPL-CCNC: 1544 UNIT/L (ref 40–150)
ALT SERPL-CCNC: 58 UNIT/L (ref 0–55)
ALT SERPL-CCNC: 65 UNIT/L (ref 0–55)
APPEARANCE UR: CLEAR
AST SERPL-CCNC: 34 UNIT/L (ref 5–34)
AST SERPL-CCNC: 40 UNIT/L (ref 5–34)
BACTERIA #/AREA URNS AUTO: ABNORMAL /HPF
BASOPHILS # BLD AUTO: 0.07 X10(3)/MCL (ref 0–0.2)
BASOPHILS # BLD AUTO: 0.07 X10(3)/MCL (ref 0–0.2)
BASOPHILS NFR BLD AUTO: 0.7 %
BASOPHILS NFR BLD AUTO: 0.7 %
BILIRUB UR QL STRIP.AUTO: NEGATIVE MG/DL
BILIRUBIN DIRECT+TOT PNL SERPL-MCNC: 2.7 MG/DL
BILIRUBIN DIRECT+TOT PNL SERPL-MCNC: 2.9 MG/DL
BUN SERPL-MCNC: 27 MG/DL (ref 8.4–25.7)
BUN SERPL-MCNC: 28.4 MG/DL (ref 8.4–25.7)
CALCIUM SERPL-MCNC: 9.5 MG/DL (ref 8.8–10)
CALCIUM SERPL-MCNC: 9.7 MG/DL (ref 8.8–10)
CAOX CRY URNS QL MICRO: ABNORMAL /HPF
CHLORIDE SERPL-SCNC: 108 MMOL/L (ref 98–107)
CHLORIDE SERPL-SCNC: 110 MMOL/L (ref 98–107)
CO2 SERPL-SCNC: 8 MMOL/L (ref 23–31)
CO2 SERPL-SCNC: 8 MMOL/L (ref 23–31)
COLOR UR AUTO: ABNORMAL
CORRECTED TEMPERATURE (PCO2): 27 MMHG
CORRECTED TEMPERATURE (PH): 7.44
CORRECTED TEMPERATURE (PO2): 176 MMHG
CREAT SERPL-MCNC: 1.45 MG/DL (ref 0.73–1.18)
CREAT SERPL-MCNC: 1.56 MG/DL (ref 0.73–1.18)
EOSINOPHIL # BLD AUTO: 0.22 X10(3)/MCL (ref 0–0.9)
EOSINOPHIL # BLD AUTO: 0.27 X10(3)/MCL (ref 0–0.9)
EOSINOPHIL NFR BLD AUTO: 2.3 %
EOSINOPHIL NFR BLD AUTO: 2.8 %
ERYTHROCYTE [DISTWIDTH] IN BLOOD BY AUTOMATED COUNT: 16.1 % (ref 11.5–17)
ERYTHROCYTE [DISTWIDTH] IN BLOOD BY AUTOMATED COUNT: 16.3 % (ref 11.5–17)
GFR SERPLBLD CREATININE-BSD FMLA CKD-EPI: 44 MLS/MIN/1.73/M2
GFR SERPLBLD CREATININE-BSD FMLA CKD-EPI: 48 MLS/MIN/1.73/M2
GLOBULIN SER-MCNC: 3.9 GM/DL (ref 2.4–3.5)
GLOBULIN SER-MCNC: 4.2 GM/DL (ref 2.4–3.5)
GLUCOSE SERPL-MCNC: 73 MG/DL (ref 82–115)
GLUCOSE SERPL-MCNC: 78 MG/DL (ref 82–115)
GLUCOSE UR QL STRIP.AUTO: NEGATIVE MG/DL
GRAN CASTS URNS QL MICRO: ABNORMAL /LPF
HCO3 UR-SCNC: 18.3 MMOL/L
HCT VFR BLD AUTO: 30.5 % (ref 42–52)
HCT VFR BLD AUTO: 32.9 % (ref 42–52)
HGB BLD-MCNC: 10.4 G/DL (ref 14–18)
HGB BLD-MCNC: 9.1 G/DL
HGB BLD-MCNC: 9.6 G/DL (ref 14–18)
HYALINE CASTS URNS QL MICRO: ABNORMAL /LPF
IMM GRANULOCYTES # BLD AUTO: 0.07 X10(3)/MCL (ref 0–0.04)
IMM GRANULOCYTES # BLD AUTO: 0.09 X10(3)/MCL (ref 0–0.04)
IMM GRANULOCYTES NFR BLD AUTO: 0.7 %
IMM GRANULOCYTES NFR BLD AUTO: 0.9 %
KETONES UR QL STRIP.AUTO: NEGATIVE MG/DL
LACTATE SERPL-SCNC: 0.5 MMOL/L (ref 0.5–2.2)
LEUKOCYTE ESTERASE UR QL STRIP.AUTO: ABNORMAL UNIT/L
LIPASE SERPL-CCNC: 66 U/L
LYMPHOCYTES # BLD AUTO: 2.18 X10(3)/MCL (ref 0.6–4.6)
LYMPHOCYTES # BLD AUTO: 2.3 X10(3)/MCL (ref 0.6–4.6)
LYMPHOCYTES NFR BLD AUTO: 22.2 %
LYMPHOCYTES NFR BLD AUTO: 24.1 %
MAGNESIUM SERPL-MCNC: 1.7 MG/DL (ref 1.6–2.6)
MAGNESIUM SERPL-MCNC: 1.8 MG/DL (ref 1.6–2.6)
MCH RBC QN AUTO: 28.7 PG
MCH RBC QN AUTO: 28.9 PG
MCHC RBC AUTO-ENTMCNC: 31.5 G/DL (ref 33–36)
MCHC RBC AUTO-ENTMCNC: 31.6 G/DL (ref 33–36)
MCV RBC AUTO: 91.3 FL (ref 80–94)
MCV RBC AUTO: 91.4 FL (ref 80–94)
MONOCYTES # BLD AUTO: 0.65 X10(3)/MCL (ref 0.1–1.3)
MONOCYTES # BLD AUTO: 0.69 X10(3)/MCL (ref 0.1–1.3)
MONOCYTES NFR BLD AUTO: 6.8 %
MONOCYTES NFR BLD AUTO: 7 %
MUCOUS THREADS URNS QL MICRO: ABNORMAL /LPF
NEUTROPHILS # BLD AUTO: 6.21 X10(3)/MCL (ref 2.1–9.2)
NEUTROPHILS # BLD AUTO: 6.53 X10(3)/MCL (ref 2.1–9.2)
NEUTROPHILS NFR BLD AUTO: 65.2 %
NEUTROPHILS NFR BLD AUTO: 66.6 %
NITRITE UR QL STRIP.AUTO: NEGATIVE
NRBC BLD AUTO-RTO: 0 %
NRBC BLD AUTO-RTO: 0 %
PCO2 BLDA: 27 MMHG
PH SMN: 7.44 [PH]
PH UR STRIP.AUTO: 5.5 [PH]
PHOSPHATE SERPL-MCNC: 5.2 MG/DL (ref 2.3–4.7)
PLATELET # BLD AUTO: 412 X10(3)/MCL (ref 130–400)
PLATELET # BLD AUTO: 414 X10(3)/MCL (ref 130–400)
PMV BLD AUTO: 8.8 FL (ref 7.4–10.4)
PMV BLD AUTO: 9.2 FL (ref 7.4–10.4)
PO2 BLDA: 176 MMHG
POC BASE DEFICIT: -4.9 MMOL/L
POC COHB: 1 %
POC IONIZED CALCIUM: 1.19 MMOL/L (ref 1.12–1.23)
POC METHB: 1 %
POC O2HB: 96 %
POC SATURATED O2: 99.6 %
POC TEMPERATURE: 37 °C
POTASSIUM BLD-SCNC: 3.5 MMOL/L (ref 3.5–5)
POTASSIUM SERPL-SCNC: 4.6 MMOL/L (ref 3.5–5.1)
POTASSIUM SERPL-SCNC: 4.7 MMOL/L (ref 3.5–5.1)
PROT SERPL-MCNC: 6.3 GM/DL (ref 5.8–7.6)
PROT SERPL-MCNC: 6.8 GM/DL (ref 5.8–7.6)
PROT UR QL STRIP.AUTO: ABNORMAL MG/DL
RBC # BLD AUTO: 3.34 X10(6)/MCL (ref 4.7–6.1)
RBC # BLD AUTO: 3.6 X10(6)/MCL (ref 4.7–6.1)
RBC #/AREA URNS AUTO: ABNORMAL /HPF
RBC UR QL AUTO: NEGATIVE UNIT/L
SODIUM BLD-SCNC: 129 MMOL/L (ref 137–145)
SODIUM SERPL-SCNC: 128 MMOL/L (ref 136–145)
SODIUM SERPL-SCNC: 129 MMOL/L (ref 136–145)
SP GR UR STRIP.AUTO: 1.02 (ref 1–1.03)
SPECIMEN SOURCE: ABNORMAL
SQUAMOUS #/AREA URNS AUTO: <5 /HPF
UROBILINOGEN UR STRIP-ACNC: 0.2 MG/DL
WBC # SPEC AUTO: 9.5 X10(3)/MCL (ref 4.5–11.5)
WBC # SPEC AUTO: 9.8 X10(3)/MCL (ref 4.5–11.5)
WBC #/AREA URNS AUTO: 6 /HPF

## 2023-03-17 PROCEDURE — 80053 COMPREHEN METABOLIC PANEL: CPT | Performed by: EMERGENCY MEDICINE

## 2023-03-17 PROCEDURE — 99900035 HC TECH TIME PER 15 MIN (STAT)

## 2023-03-17 PROCEDURE — 25000003 PHARM REV CODE 250: Performed by: EMERGENCY MEDICINE

## 2023-03-17 PROCEDURE — 63600175 PHARM REV CODE 636 W HCPCS: Performed by: PHYSICIAN ASSISTANT

## 2023-03-17 PROCEDURE — 85025 COMPLETE CBC W/AUTO DIFF WBC: CPT | Performed by: NURSE PRACTITIONER

## 2023-03-17 PROCEDURE — 83735 ASSAY OF MAGNESIUM: CPT | Performed by: EMERGENCY MEDICINE

## 2023-03-17 PROCEDURE — 25000003 PHARM REV CODE 250: Performed by: NURSE PRACTITIONER

## 2023-03-17 PROCEDURE — 81001 URINALYSIS AUTO W/SCOPE: CPT | Performed by: EMERGENCY MEDICINE

## 2023-03-17 PROCEDURE — G0378 HOSPITAL OBSERVATION PER HR: HCPCS

## 2023-03-17 PROCEDURE — 83735 ASSAY OF MAGNESIUM: CPT | Performed by: NURSE PRACTITIONER

## 2023-03-17 PROCEDURE — 80053 COMPREHEN METABOLIC PANEL: CPT | Performed by: NURSE PRACTITIONER

## 2023-03-17 PROCEDURE — 82803 BLOOD GASES ANY COMBINATION: CPT

## 2023-03-17 PROCEDURE — 85025 COMPLETE CBC W/AUTO DIFF WBC: CPT | Performed by: EMERGENCY MEDICINE

## 2023-03-17 PROCEDURE — 83605 ASSAY OF LACTIC ACID: CPT | Performed by: EMERGENCY MEDICINE

## 2023-03-17 PROCEDURE — 25000003 PHARM REV CODE 250: Performed by: INTERNAL MEDICINE

## 2023-03-17 PROCEDURE — 83690 ASSAY OF LIPASE: CPT | Performed by: EMERGENCY MEDICINE

## 2023-03-17 PROCEDURE — 63600175 PHARM REV CODE 636 W HCPCS: Performed by: NURSE PRACTITIONER

## 2023-03-17 PROCEDURE — 96372 THER/PROPH/DIAG INJ SC/IM: CPT | Performed by: NURSE PRACTITIONER

## 2023-03-17 PROCEDURE — 84100 ASSAY OF PHOSPHORUS: CPT | Performed by: NURSE PRACTITIONER

## 2023-03-17 PROCEDURE — 25000003 PHARM REV CODE 250: Performed by: PHYSICIAN ASSISTANT

## 2023-03-17 PROCEDURE — 63600175 PHARM REV CODE 636 W HCPCS: Performed by: EMERGENCY MEDICINE

## 2023-03-17 RX ORDER — NALOXONE HCL 0.4 MG/ML
0.02 VIAL (ML) INJECTION
Status: DISCONTINUED | OUTPATIENT
Start: 2023-03-17 | End: 2023-03-28

## 2023-03-17 RX ORDER — HYDROXYZINE 50 MG/ML
50 INJECTION, SOLUTION INTRAMUSCULAR
Status: COMPLETED | OUTPATIENT
Start: 2023-03-17 | End: 2023-03-17

## 2023-03-17 RX ORDER — ONDANSETRON 2 MG/ML
4 INJECTION INTRAMUSCULAR; INTRAVENOUS
Status: COMPLETED | OUTPATIENT
Start: 2023-03-17 | End: 2023-03-17

## 2023-03-17 RX ORDER — SIMETHICONE 80 MG
1 TABLET,CHEWABLE ORAL 4 TIMES DAILY PRN
Status: DISCONTINUED | OUTPATIENT
Start: 2023-03-17 | End: 2023-04-06 | Stop reason: HOSPADM

## 2023-03-17 RX ORDER — PANTOPRAZOLE SODIUM 40 MG/1
40 TABLET, DELAYED RELEASE ORAL DAILY
Status: DISCONTINUED | OUTPATIENT
Start: 2023-03-18 | End: 2023-04-06 | Stop reason: HOSPADM

## 2023-03-17 RX ORDER — PROCHLORPERAZINE EDISYLATE 5 MG/ML
5 INJECTION INTRAMUSCULAR; INTRAVENOUS EVERY 6 HOURS PRN
Status: DISCONTINUED | OUTPATIENT
Start: 2023-03-17 | End: 2023-03-28

## 2023-03-17 RX ORDER — TAMSULOSIN HYDROCHLORIDE 0.4 MG/1
0.4 CAPSULE ORAL DAILY
Status: DISCONTINUED | OUTPATIENT
Start: 2023-03-18 | End: 2023-04-06 | Stop reason: HOSPADM

## 2023-03-17 RX ORDER — FINASTERIDE 5 MG/1
5 TABLET, FILM COATED ORAL DAILY
Status: DISCONTINUED | OUTPATIENT
Start: 2023-03-18 | End: 2023-04-06 | Stop reason: HOSPADM

## 2023-03-17 RX ORDER — MAG HYDROX/ALUMINUM HYD/SIMETH 200-200-20
30 SUSPENSION, ORAL (FINAL DOSE FORM) ORAL 4 TIMES DAILY PRN
Status: DISCONTINUED | OUTPATIENT
Start: 2023-03-17 | End: 2023-03-19

## 2023-03-17 RX ORDER — ENOXAPARIN SODIUM 100 MG/ML
40 INJECTION SUBCUTANEOUS EVERY 24 HOURS
Status: DISCONTINUED | OUTPATIENT
Start: 2023-03-17 | End: 2023-03-27

## 2023-03-17 RX ORDER — ONDANSETRON 2 MG/ML
4 INJECTION INTRAMUSCULAR; INTRAVENOUS EVERY 4 HOURS PRN
Status: DISCONTINUED | OUTPATIENT
Start: 2023-03-17 | End: 2023-04-06 | Stop reason: HOSPADM

## 2023-03-17 RX ORDER — ACETAMINOPHEN 325 MG/1
650 TABLET ORAL EVERY 6 HOURS PRN
Status: DISCONTINUED | OUTPATIENT
Start: 2023-03-17 | End: 2023-04-06 | Stop reason: HOSPADM

## 2023-03-17 RX ORDER — ALPRAZOLAM 0.25 MG/1
0.25 TABLET ORAL ONCE
Status: COMPLETED | OUTPATIENT
Start: 2023-03-17 | End: 2023-03-17

## 2023-03-17 RX ORDER — ALPRAZOLAM 0.5 MG/1
0.5 TABLET ORAL 3 TIMES DAILY PRN
Status: DISCONTINUED | OUTPATIENT
Start: 2023-03-17 | End: 2023-04-06 | Stop reason: HOSPADM

## 2023-03-17 RX ORDER — TALC
6 POWDER (GRAM) TOPICAL NIGHTLY PRN
Status: DISCONTINUED | OUTPATIENT
Start: 2023-03-17 | End: 2023-04-06 | Stop reason: HOSPADM

## 2023-03-17 RX ADMIN — ACETAMINOPHEN 325MG 650 MG: 325 TABLET ORAL at 09:03

## 2023-03-17 RX ADMIN — ALPRAZOLAM 0.5 MG: 0.5 TABLET ORAL at 09:03

## 2023-03-17 RX ADMIN — SODIUM CHLORIDE, POTASSIUM CHLORIDE, SODIUM LACTATE AND CALCIUM CHLORIDE 1000 ML: 600; 310; 30; 20 INJECTION, SOLUTION INTRAVENOUS at 12:03

## 2023-03-17 RX ADMIN — Medication 6 MG: at 10:03

## 2023-03-17 RX ADMIN — ENOXAPARIN SODIUM 40 MG: 40 INJECTION SUBCUTANEOUS at 05:03

## 2023-03-17 RX ADMIN — CEFTRIAXONE SODIUM 1 G: 1 INJECTION, POWDER, FOR SOLUTION INTRAMUSCULAR; INTRAVENOUS at 09:03

## 2023-03-17 RX ADMIN — Medication: at 09:03

## 2023-03-17 RX ADMIN — ALPRAZOLAM 0.25 MG: 0.25 TABLET ORAL at 04:03

## 2023-03-17 RX ADMIN — SODIUM BICARBONATE: 84 INJECTION, SOLUTION INTRAVENOUS at 07:03

## 2023-03-17 RX ADMIN — SODIUM BICARBONATE: 84 INJECTION, SOLUTION INTRAVENOUS at 03:03

## 2023-03-17 RX ADMIN — HYDROXYZINE HYDROCHLORIDE 50 MG: 50 INJECTION, SOLUTION INTRAMUSCULAR at 02:03

## 2023-03-17 NOTE — ED PROVIDER NOTES
Encounter Date: 3/16/2023    SCRIBE #1 NOTE: I, Orestes Vernon, am scribing for, and in the presence of,  Aleksandra Valdez MD. I have scribed the following portions of the note - Other sections scribed: HPI, ROS, PE.     History     Chief Complaint   Patient presents with    Fatigue     Hx small intestine CA awaiting surgical tx, increased weakness started yesterday     82 year old male with pmhx of CAD, HTN, and small bowel CA presents to ED c/o vomiting onset this morning (03/16) at 0500. Pt states that this morning he vomited water that he had drank at midnight. Pt states that he vomited again today after having imaging of his stomach done.  Pt reports generalized weakness and states that he is dehydrated. Per daughter pt has had decreased appetite and fluid intake. Pt states that he has normal BM and urination. Per medical record pt was admitted for sepsis and bacteremia about a month ago and is scheduled for a whipple procedure.    The history is provided by the patient. No  was used.   Emesis   This is a new problem. The current episode started today. The problem occurs intermittently. The problem has been unchanged. Pertinent negatives include no abdominal pain, no diarrhea, no fever and no headaches.   Review of patient's allergies indicates:  No Known Allergies  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, unspecified extremity     Coronary artery disease     Dyslipidemia     Hypertension      Past Surgical History:   Procedure Laterality Date    AMPUTATION Right     Right arm    CAROTID STENT      EGD, WITH HEMORRHAGE CONTROL  1/19/2023    Procedure: EGD,WITH HEMORRHAGE CONTROL;  Surgeon: Ranjeet Perez MD;  Location: [facility] OR;  Service: Gastroenterology;;  [place] Goyal    ERCP N/A 12/21/2022    Procedure: ERCP;  Surgeon: Sushil Anderson MD;  Location: [facility] ENDOSCOPY;  Service:  Gastroenterology;  Laterality: N/A;  food in abdomen    ERCP, WITH BIOPSY  12/21/2022    Procedure: ERCP, WITH BIOPSY;  Surgeon: Sushil Anderson MD;  Location: [facility] ENDOSCOPY;  Service: Gastroenterology;;    ESOPHAGOGASTRODUODENOSCOPY N/A 1/19/2023    Procedure: EGD;  Surgeon: Ranjeet Perez MD;  Location: [facility] OR;  Service: Gastroenterology;  Laterality: N/A;    LEFT HEART CATHETERIZATION      TONSILLECTOMY       No family history on file.  Social History     Tobacco Use    Smoking status: Never    Smokeless tobacco: Never   Substance Use Topics    Alcohol use: Never    Drug use: Never     Review of Systems   Constitutional:  Positive for appetite change. Negative for fatigue and fever.   Eyes:  Negative for visual disturbance.   Respiratory:  Negative for chest tightness and shortness of breath.    Cardiovascular:  Negative for chest pain.   Gastrointestinal:  Positive for nausea and vomiting. Negative for abdominal pain and diarrhea.   Genitourinary:  Negative for dysuria and hematuria.   Musculoskeletal:  Negative for back pain and neck pain.   Skin:  Negative for rash.   Allergic/Immunologic: Negative for immunocompromised state.   Neurological:  Negative for headaches.     Physical Exam     Initial Vitals [03/16/23 2307]   BP Pulse Resp Temp SpO2   124/65 66 16 97.7 °F (36.5 °C) 99 %      MAP       --         Physical Exam    Constitutional: He appears well-developed and well-nourished. No distress.   HENT:   Head: Normocephalic and atraumatic.   Mouth/Throat: Oropharynx is clear and moist.   Eyes: Conjunctivae are normal. Pupils are equal, round, and reactive to light.   Neck: Neck supple.   Normal range of motion.  Cardiovascular:  Normal rate, regular rhythm and normal heart sounds.           No murmur heard.  Pulmonary/Chest: Breath sounds normal. No respiratory distress.   Abdominal: Abdomen is soft. He exhibits no distension. There is no abdominal tenderness.   Biliary drain in place    Musculoskeletal:         General: Normal range of motion.      Cervical back: Normal range of motion and neck supple.     Neurological: He is alert and oriented to person, place, and time. GCS score is 15. GCS eye subscore is 4. GCS verbal subscore is 5. GCS motor subscore is 6.   Skin: Skin is warm and dry. No rash noted.   Psychiatric: He has a normal mood and affect.       ED Course   Procedures  Labs Reviewed   COMPREHENSIVE METABOLIC PANEL - Abnormal; Notable for the following components:       Result Value    Sodium Level 128 (*)     Chloride 108 (*)     Carbon Dioxide 8 (*)     Glucose Level 78 (*)     Blood Urea Nitrogen 28.4 (*)     Creatinine 1.56 (*)     Albumin Level 2.6 (*)     Globulin 4.2 (*)     Albumin/Globulin Ratio 0.6 (*)     Bilirubin Total 2.9 (*)     Alkaline Phosphatase 1,544 (*)     Alanine Aminotransferase 65 (*)     Aspartate Aminotransferase 40 (*)     All other components within normal limits   LIPASE - Abnormal; Notable for the following components:    Lipase Level 66 (*)     All other components within normal limits   URINALYSIS, REFLEX TO URINE CULTURE - Abnormal; Notable for the following components:    Protein, UA 1+ (*)     Leukocyte Esterase, UA 1+ (*)     All other components within normal limits   CBC WITH DIFFERENTIAL - Abnormal; Notable for the following components:    RBC 3.60 (*)     Hgb 10.4 (*)     Hct 32.9 (*)     MCHC 31.6 (*)     Platelet 414 (*)     IG# 0.07 (*)     All other components within normal limits   URINALYSIS, MICROSCOPIC - Abnormal; Notable for the following components:    RBC, UA 6-10 (*)     WBC, UA 6 (*)     Bacteria, UA Few (*)     Hyaline Casts, UA Occasional (*)     Mucous, UA Small (*)     Calcium Oxalate Crystals, UA Few (*)     Granular Casts, UA Few (*)     All other components within normal limits   COMPREHENSIVE METABOLIC PANEL - Abnormal; Notable for the following components:    Sodium Level 129 (*)     Chloride 110 (*)     Carbon Dioxide 8  (*)     Glucose Level 73 (*)     Blood Urea Nitrogen 27.0 (*)     Creatinine 1.45 (*)     Albumin Level 2.4 (*)     Globulin 3.9 (*)     Albumin/Globulin Ratio 0.6 (*)     Bilirubin Total 2.7 (*)     Alkaline Phosphatase 1,384 (*)     Alanine Aminotransferase 58 (*)     All other components within normal limits   PHOSPHORUS - Abnormal; Notable for the following components:    Phosphorus Level 5.2 (*)     All other components within normal limits   CBC WITH DIFFERENTIAL - Abnormal; Notable for the following components:    RBC 3.34 (*)     Hgb 9.6 (*)     Hct 30.5 (*)     MCHC 31.5 (*)     Platelet 412 (*)     IG# 0.09 (*)     All other components within normal limits   MAGNESIUM - Normal   LACTIC ACID, PLASMA - Normal   MAGNESIUM - Normal   CBC W/ AUTO DIFFERENTIAL    Narrative:     The following orders were created for panel order CBC auto differential.  Procedure                               Abnormality         Status                     ---------                               -----------         ------                     CBC with Differential[[id number]]        Abnormal            Final result                 Please view results for these tests on the individual orders.   CBC W/ AUTO DIFFERENTIAL    Narrative:     The following orders were created for panel order CBC with Automated Differential.  Procedure                               Abnormality         Status                     ---------                               -----------         ------                     CBC with Differential[[id number]]        Abnormal            Final result                 Please view results for these tests on the individual orders.          Imaging Results    None          Medications - No data to display      Medical Decision Making  Problems Addressed:  Duodenal cancer: chronic illness or injury with exacerbation, progression, or side effects of treatment  Gastric outlet obstruction: complicated acute illness or  injury  Intractable vomiting: acute illness or injury  Metabolic acidosis: acute illness or injury      ED assessment:    Mr. Triplett presented w/ intractable vomiting in setting of duodenal cancer, s/p ERCP w/ biliary drain in place, pending operative intervention. Hemodynamically stable on arrival. Benign abdominal examination.     Differential diagnosis (including but not limited to):   Bowel obstruction, gastric outlet obstruction, intractable vomiting, dehydration, electrolyte derangements, post procedural complication    ED management:   Labs notable for worsening elevated alkaline phosphatase, mild acute kidney injury, significant metabolic acidosis, mild hyperkalemia. CBC w/ increased H&H - likely an element of hemoconcentration due to volume contraction/dehydration.   Outpatient CT scan performed yesterday w/ developing gastric outlet obstruction. Presently feeling improved w/ antiemetics - no active vomiting at this time. Will admit for further inpt symptom control, consider NG tube if vomiting recurs. Plan to keep NPO and consult his surgeon in AM as may need procedural intervention sooner rather than later. Findings and plan discussed with the patient and he is agreeable to admission at this time.       Amount and/or Complexity of Data Reviewed  Independent historian: daughter    Summary of history: daughter reports persistent N/V, not tolerating any PO intake x several days. No noted fevers.   External data reviewed: notes from previous admissions, notes from previous ED visits, and prior imaging  Summary of data reviewed: Prior admission reviewed, biliary drain placed, planned for Whipple but prior appt canceled. Seen in ED for biliary drain check. Yesterday's CT scan reviewed as above.    Risk and benefits of testing: discussed   Labs: ordered and reviewed  Radiology: ordered and independent interpretation performed (see above or ED course)    Discussion of management or test interpretation with  external provider(s): discussed with hospitalist physician   Summary of discussion: Reviewed with hospitalist GIA who agrees w/ admission    Risk  Decision regarding hospitalization  Shared decision making     Critical Care  none    I, Aleksandra Valdez MD personally performed the history, PE, MDM, and procedures as documented above and agree with the scribe's documentation.           Scribe Attestation:   Scribe #1: I performed the above scribed service and the documentation accurately describes the services I performed. I attest to the accuracy of the note.    Attending Attestation:           Physician Attestation for Scribe:  Physician Attestation Statement for Scribe #1: I, Aleksandra Valdez MD, reviewed documentation, as scribed by Orestes Vernon in my presence, and it is both accurate and complete.                        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

## 2023-03-17 NOTE — PLAN OF CARE
Problem: Adult Inpatient Plan of Care  Goal: Plan of Care Review  Outcome: Ongoing, Progressing  Flowsheets (Taken 3/17/2023 1352)  Plan of Care Reviewed With:   patient   friend  Goal: Patient-Specific Goal (Individualized)  Outcome: Ongoing, Progressing  Goal: Absence of Hospital-Acquired Illness or Injury  Outcome: Ongoing, Progressing  Intervention: Identify and Manage Fall Risk  Flowsheets (Taken 3/17/2023 1352)  Safety Promotion/Fall Prevention:   assistive device/personal item within reach   medications reviewed   nonskid shoes/socks when out of bed   side rails raised x 2  Intervention: Prevent Skin Injury  Flowsheets (Taken 3/17/2023 1352)  Body Position:   turned   weight shifting  Skin Protection:   adhesive use limited   incontinence pads utilized   tubing/devices free from skin contact  Intervention: Prevent and Manage VTE (Venous Thromboembolism) Risk  Flowsheets (Taken 3/17/2023 1352)  Activity Management: Rolling - L1  VTE Prevention/Management:   ambulation promoted   bleeding risk assessed   ROM (active) performed  Range of Motion:   active ROM (range of motion) encouraged   ROM (range of motion) performed  Intervention: Prevent Infection  Flowsheets (Taken 3/17/2023 1352)  Infection Prevention:   rest/sleep promoted   single patient room provided  Goal: Optimal Comfort and Wellbeing  Outcome: Ongoing, Progressing  Intervention: Monitor Pain and Promote Comfort  Flowsheets (Taken 3/17/2023 1352)  Pain Management Interventions:   care clustered   medication offered   pain management plan reviewed with patient/caregiver   pillow support provided   position adjusted  Intervention: Provide Person-Centered Care  Flowsheets (Taken 3/17/2023 1352)  Trust Relationship/Rapport: care explained  Goal: Readiness for Transition of Care  Outcome: Ongoing, Progressing  Intervention: Mutually Develop Transition Plan  Flowsheets (Taken 3/17/2023 1352)  Who are your caregiver(s) and their phone number(s)?:  Edie (number in chart)  Communicated SHAUN with patient/caregiver: Yes  Do you expect to return to your current living situation?: Yes  Do you have help at home or someone to help you manage your care at home?: Yes  Readmission within 30 days?: Yes  Do you currently have service(s) that help you manage your care at home?: No  Is the pt/caregiver preference to resume services with current agency: No     Problem: Adjustment to Illness (Sepsis/Septic Shock)  Goal: Optimal Coping  Outcome: Ongoing, Progressing  Intervention: Optimize Psychosocial Adjustment to Illness  Flowsheets (Taken 3/17/2023 1352)  Supportive Measures: active listening utilized     Problem: Bleeding (Sepsis/Septic Shock)  Goal: Absence of Bleeding  Outcome: Ongoing, Progressing  Intervention: Monitor and Manage Bleeding  Flowsheets (Taken 3/17/2023 1352)  Bleeding Precautions: blood pressure closely monitored     Problem: Glycemic Control Impaired (Sepsis/Septic Shock)  Goal: Blood Glucose Level Within Desired Range  Outcome: Ongoing, Progressing  Intervention: Optimize Glycemic Control  Flowsheets (Taken 3/17/2023 1352)  Glycemic Management: blood glucose monitored     Problem: Infection Progression (Sepsis/Septic Shock)  Goal: Absence of Infection Signs and Symptoms  Outcome: Ongoing, Progressing  Intervention: Initiate Sepsis Management  Flowsheets (Taken 3/17/2023 1352)  Infection Prevention:   rest/sleep promoted   single patient room provided  Infection Management: aseptic technique maintained  Intervention: Promote Stabilization  Flowsheets (Taken 3/17/2023 1352)  Fluid/Electrolyte Management: fluids provided  Intervention: Promote Recovery  Flowsheets (Taken 3/17/2023 1352)  Activity Management: Rolling - L1     Problem: Nutrition Impaired (Sepsis/Septic Shock)  Goal: Optimal Nutrition Intake  Outcome: Ongoing, Progressing  Intervention: Promote and Optimize Nutrition Delivery  Flowsheets (Taken 3/17/2023 1352)  Nutrition Support  Management: weight trending reviewed     Problem: Fluid and Electrolyte Imbalance (Acute Kidney Injury/Impairment)  Goal: Fluid and Electrolyte Balance  Outcome: Ongoing, Progressing  Intervention: Monitor and Manage Fluid and Electrolyte Balance  Flowsheets (Taken 3/17/2023 1352)  Fluid/Electrolyte Management: fluids provided     Problem: Oral Intake Inadequate (Acute Kidney Injury/Impairment)  Goal: Optimal Nutrition Intake  Outcome: Ongoing, Progressing  Intervention: Promote and Optimize Nutrition  Flowsheets (Taken 3/17/2023 1352)  Oral Nutrition Promotion:   physical activity promoted   rest periods promoted     Problem: Renal Function Impairment (Acute Kidney Injury/Impairment)  Goal: Effective Renal Function  Outcome: Ongoing, Progressing  Intervention: Monitor and Support Renal Function  Flowsheets (Taken 3/17/2023 1352)  Medication Review/Management: medications reviewed     Problem: Coping Ineffective  Goal: Effective Coping  Outcome: Ongoing, Progressing  Intervention: Support and Enhance Coping Strategies  Flowsheets (Taken 3/17/2023 1352)  Supportive Measures: active listening utilized  Environmental Support: calm environment promoted     Problem: Skin Injury Risk Increased  Goal: Skin Health and Integrity  Outcome: Ongoing, Progressing  Intervention: Optimize Skin Protection  Flowsheets (Taken 3/17/2023 1352)  Pressure Reduction Techniques:   frequent weight shift encouraged   weight shift assistance provided  Pressure Reduction Devices: positioning supports utilized  Skin Protection:   adhesive use limited   incontinence pads utilized   tubing/devices free from skin contact  Head of Bed (HOB) Positioning: HOB at 20-30 degrees  Intervention: Promote and Optimize Oral Intake  Flowsheets (Taken 3/17/2023 1352)  Oral Nutrition Promotion:   physical activity promoted   rest periods promoted     Problem: Impaired Wound Healing  Goal: Optimal Wound Healing  Outcome: Ongoing, Progressing  Intervention:  Promote Wound Healing  Flowsheets (Taken 3/17/2023 1359)  Oral Nutrition Promotion:   physical activity promoted   rest periods promoted  Activity Management: Rolling - L1  Pain Management Interventions:   care clustered   medication offered   pain management plan reviewed with patient/caregiver   pillow support provided   position adjusted

## 2023-03-17 NOTE — PLAN OF CARE
03/17/23 1445   Discharge Assessment   Assessment Type Discharge Planning Assessment   Confirmed/corrected address, phone number and insurance Yes   Confirmed Demographics Correct on Facesheet   Source of Information patient   When was your last doctors appointment?   (Patient reports a few days ago.)   Communicated SHAUN with patient/caregiver Yes   Reason For Admission Intractable Vomiting   People in Home alone   Do you expect to return to your current living situation? Yes   Do you have help at home or someone to help you manage your care at home? Yes   Who are your caregiver(s) and their phone number(s)? Ex-Wife: Nhi: [phone]   Prior to hospitilization cognitive status: Unable to Assess   Current cognitive status: Alert/Oriented   Walking or Climbing Stairs ambulation difficulty, requires equipment   Home Layout Able to live on 1st floor   Equipment Currently Used at Home wheelchair;walker, standard;shower chair   Readmission within 30 days? Yes   Patient currently being followed by outpatient case management? No   Do you currently have service(s) that help you manage your care at home? Yes   Name and Contact number of [facility]   Is the pt/caregiver preference to resume services with current agency Yes   Do you take prescription medications? Yes   Do you have prescription coverage? Yes   Coverage [employer] Managed.   Do you have any problems affording any of your prescribed medications? No   Is the patient taking medications as prescribed? yes   Who is going to help you get home at discharge? Brother-in-law: Maurice Saini: [phone] and Ilene Ketan: [phone]   How do you get to doctors appointments? family or friend will provide   Are you on dialysis? No   Do you take coumadin? No   Discharge Plan A Home   Discharge Plan B Home Health   DME Needed Upon Discharge  none   Discharge Plan discussed with: Patient   Discharge Barriers Identified None        Patient's PCP is Reji Cochran.  Patient is active with [facility]. SW sent clinicals via [facility].  No barriers to discharge at this time.
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
## 2023-03-17 NOTE — NURSING
Nurses Note -- 4 Eyes      3/17/2023   11:44 AM      Skin assessed during: Admit      [] No Pressure Injuries Present    []Prevention Measures Documented      [x] Yes- Altered Skin Integrity Present or Discovered   [x] LDA Added if Not in Epic (Describe Wound)   [x] New Altered Skin Integrity was Present on Admit and Documented in LDA   [x] Wound Image Taken    Wound Care Consulted? Yes    Attending Nurse:  Angelica Blanco RN     Second RN/Staff Member: Altaf Acevedo RN
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
## 2023-03-17 NOTE — PROGRESS NOTES
[facility] - Oncology Acute  Wound Care    Patient Name:  Quincy Triplett   MRN:  [id number]  Date: 3/17/2023  Diagnosis: <principal problem not specified>    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, unspecified extremity     Coronary artery disease     Dyslipidemia     Hypertension        Social History     Socioeconomic History    Marital status:    Tobacco Use    Smoking status: Never    Smokeless tobacco: Never   Substance and Sexual Activity    Alcohol use: Never    Drug use: Never     Social Determinants of Health     Food Insecurity: Unknown    Worried About Running Out of Food in the Last Year: Never true   Transportation Needs: Unknown    Lack of Transportation (Medical): No   Housing Stability: Unknown    Unable to Pay for Housing in the Last Year: No       Precautions:     Allergies as of 03/16/2023    (No Known Allergies)       WOC Assessment Details/Treatment        03/17/23 1310        Altered Skin Integrity 03/17/23 1130 Coccyx #1 Other (comment) Intact skin with non-blanchable redness of localized area   Date First Assessed/Time First Assessed: 03/17/23 1130   Altered Skin Integrity Present on Admission - Did Patient arrive to the hospital with altered skin?: yes  Location: Coccyx  Wound Number: #1  Is this injury device related?: No  Primary Wound Ty...   Wound Image     Description of Altered Skin Integrity Intact skin with non-blanchable redness of localized area   Dressing Appearance Dry;Intact;Clean   Drainage Amount None   Appearance Red   Periwound Area Redness   Wound Edges Defined   Care Cleansed with:;Sterile normal saline   Dressing Applied;Foam     WOCN consulted for sacrum. Initial evaluation performed. BNL at bedside. Treatment recommendations put into place. Sacrum: Cleanse with NS. Apply desitin. Cover with Abd pad. Secure with medipore tape. Change BID and PRN.  Nursing to continue with preventative measures turning every two hours, wedge and floating heels. BETSY mattress ordered with [employer] rep. Will follow up.     03/17/2023

## 2023-03-18 LAB
ALBUMIN SERPL-MCNC: 2 G/DL (ref 3.4–4.8)
ALBUMIN/GLOB SERPL: 0.8 RATIO (ref 1.1–2)
ALP SERPL-CCNC: 891 UNIT/L (ref 40–150)
ALT SERPL-CCNC: 35 UNIT/L (ref 0–55)
AST SERPL-CCNC: 22 UNIT/L (ref 5–34)
BASOPHILS # BLD AUTO: 0.05 X10(3)/MCL (ref 0–0.2)
BASOPHILS NFR BLD AUTO: 1 %
BILIRUBIN DIRECT+TOT PNL SERPL-MCNC: 1.9 MG/DL
BUN SERPL-MCNC: 23.7 MG/DL (ref 8.4–25.7)
CALCIUM SERPL-MCNC: 8.1 MG/DL (ref 8.8–10)
CHLORIDE SERPL-SCNC: 95 MMOL/L (ref 98–107)
CO2 SERPL-SCNC: 28 MMOL/L (ref 23–31)
CREAT SERPL-MCNC: 1.25 MG/DL (ref 0.73–1.18)
EOSINOPHIL # BLD AUTO: 0.22 X10(3)/MCL (ref 0–0.9)
EOSINOPHIL NFR BLD AUTO: 4.4 %
ERYTHROCYTE [DISTWIDTH] IN BLOOD BY AUTOMATED COUNT: 15.7 % (ref 11.5–17)
ERYTHROCYTE [DISTWIDTH] IN BLOOD BY AUTOMATED COUNT: 15.7 % (ref 11.5–17)
GFR SERPLBLD CREATININE-BSD FMLA CKD-EPI: 58 MLS/MIN/1.73/M2
GLOBULIN SER-MCNC: 2.6 GM/DL (ref 2.4–3.5)
GLUCOSE SERPL-MCNC: 104 MG/DL (ref 82–115)
HCT VFR BLD AUTO: 18.3 % (ref 42–52)
HCT VFR BLD AUTO: 21.8 % (ref 42–52)
HGB BLD-MCNC: 6.5 G/DL (ref 14–18)
HGB BLD-MCNC: 7.4 G/DL (ref 14–18)
IMM GRANULOCYTES # BLD AUTO: 0.04 X10(3)/MCL (ref 0–0.04)
IMM GRANULOCYTES NFR BLD AUTO: 0.8 %
LYMPHOCYTES # BLD AUTO: 1.83 X10(3)/MCL (ref 0.6–4.6)
LYMPHOCYTES NFR BLD AUTO: 36.8 %
MCH RBC QN AUTO: 28.6 PG
MCH RBC QN AUTO: 29.7 PG
MCHC RBC AUTO-ENTMCNC: 33.9 G/DL (ref 33–36)
MCHC RBC AUTO-ENTMCNC: 35.5 G/DL (ref 33–36)
MCV RBC AUTO: 83.6 FL (ref 80–94)
MCV RBC AUTO: 84.2 FL (ref 80–94)
MONOCYTES # BLD AUTO: 0.39 X10(3)/MCL (ref 0.1–1.3)
MONOCYTES NFR BLD AUTO: 7.8 %
NEUTROPHILS # BLD AUTO: 2.44 X10(3)/MCL (ref 2.1–9.2)
NEUTROPHILS NFR BLD AUTO: 49.2 %
NRBC BLD AUTO-RTO: 0 %
NRBC BLD AUTO-RTO: 0 %
PLATELET # BLD AUTO: 305 X10(3)/MCL (ref 130–400)
PLATELET # BLD AUTO: 362 X10(3)/MCL (ref 130–400)
PMV BLD AUTO: 9.4 FL (ref 7.4–10.4)
PMV BLD AUTO: 9.5 FL (ref 7.4–10.4)
POCT GLUCOSE: 106 MG/DL (ref 70–110)
POTASSIUM SERPL-SCNC: 3.2 MMOL/L (ref 3.5–5.1)
PROT SERPL-MCNC: 4.6 GM/DL (ref 5.8–7.6)
RBC # BLD AUTO: 2.19 X10(6)/MCL (ref 4.7–6.1)
RBC # BLD AUTO: 2.59 X10(6)/MCL (ref 4.7–6.1)
SODIUM SERPL-SCNC: 133 MMOL/L (ref 136–145)
WBC # SPEC AUTO: 5 X10(3)/MCL (ref 4.5–11.5)
WBC # SPEC AUTO: 6 X10(3)/MCL (ref 4.5–11.5)

## 2023-03-18 PROCEDURE — 25000003 PHARM REV CODE 250: Performed by: INTERNAL MEDICINE

## 2023-03-18 PROCEDURE — 96372 THER/PROPH/DIAG INJ SC/IM: CPT | Performed by: INTERNAL MEDICINE

## 2023-03-18 PROCEDURE — 85027 COMPLETE CBC AUTOMATED: CPT | Performed by: NURSE PRACTITIONER

## 2023-03-18 PROCEDURE — G0378 HOSPITAL OBSERVATION PER HR: HCPCS

## 2023-03-18 PROCEDURE — 63600175 PHARM REV CODE 636 W HCPCS: Performed by: INTERNAL MEDICINE

## 2023-03-18 PROCEDURE — 63600175 PHARM REV CODE 636 W HCPCS: Performed by: PHYSICIAN ASSISTANT

## 2023-03-18 PROCEDURE — 63600175 PHARM REV CODE 636 W HCPCS: Performed by: NURSE PRACTITIONER

## 2023-03-18 PROCEDURE — 96372 THER/PROPH/DIAG INJ SC/IM: CPT | Performed by: NURSE PRACTITIONER

## 2023-03-18 PROCEDURE — 25000003 PHARM REV CODE 250: Performed by: PHYSICIAN ASSISTANT

## 2023-03-18 PROCEDURE — 85025 COMPLETE CBC W/AUTO DIFF WBC: CPT | Performed by: INTERNAL MEDICINE

## 2023-03-18 PROCEDURE — 80053 COMPREHEN METABOLIC PANEL: CPT | Performed by: NURSE PRACTITIONER

## 2023-03-18 PROCEDURE — 25000003 PHARM REV CODE 250: Performed by: EMERGENCY MEDICINE

## 2023-03-18 RX ORDER — DEXTROSE MONOHYDRATE AND SODIUM CHLORIDE 5; .9 G/100ML; G/100ML
INJECTION, SOLUTION INTRAVENOUS CONTINUOUS
Status: DISCONTINUED | OUTPATIENT
Start: 2023-03-18 | End: 2023-03-21

## 2023-03-18 RX ORDER — HYDROXYZINE 50 MG/ML
50 INJECTION, SOLUTION INTRAMUSCULAR NIGHTLY PRN
Status: DISCONTINUED | OUTPATIENT
Start: 2023-03-18 | End: 2023-04-06 | Stop reason: HOSPADM

## 2023-03-18 RX ADMIN — DEXTROSE AND SODIUM CHLORIDE: 5; 900 INJECTION, SOLUTION INTRAVENOUS at 12:03

## 2023-03-18 RX ADMIN — SODIUM BICARBONATE: 84 INJECTION, SOLUTION INTRAVENOUS at 02:03

## 2023-03-18 RX ADMIN — Medication: at 09:03

## 2023-03-18 RX ADMIN — CEFTRIAXONE SODIUM 1 G: 1 INJECTION, POWDER, FOR SOLUTION INTRAMUSCULAR; INTRAVENOUS at 08:03

## 2023-03-18 RX ADMIN — HYDROXYZINE HYDROCHLORIDE 50 MG: 50 INJECTION, SOLUTION INTRAMUSCULAR at 09:03

## 2023-03-18 RX ADMIN — ENOXAPARIN SODIUM 40 MG: 40 INJECTION SUBCUTANEOUS at 05:03

## 2023-03-18 RX ADMIN — TAMSULOSIN HYDROCHLORIDE 0.4 MG: 0.4 CAPSULE ORAL at 08:03

## 2023-03-18 RX ADMIN — SODIUM BICARBONATE: 84 INJECTION, SOLUTION INTRAVENOUS at 08:03

## 2023-03-18 RX ADMIN — PANTOPRAZOLE SODIUM 40 MG: 40 TABLET, DELAYED RELEASE ORAL at 08:03

## 2023-03-18 RX ADMIN — FINASTERIDE 5 MG: 5 TABLET, FILM COATED ORAL at 08:03

## 2023-03-18 RX ADMIN — POTASSIUM BICARBONATE 50 MEQ: 977.5 TABLET, EFFERVESCENT ORAL at 12:03

## 2023-03-18 NOTE — CONSULTS
Inpatient Nutrition Assessment    Admit Date: 3/16/2023   Total duration of encounter: 2 days     Nutrition Recommendation/Prescription     Continue diet as tolerated  Continue Boost Breeze while on Clear liquid diet. Will provide 250 lyndsey, 9 gm pro per serving. Once diet advanced, change supplement to Chocolate Boost plus per pt request.  If pt to remain on clear liquids > 7 days, recommend to begin TPN. Can start with Clinimix 4.25/5 @ 65mL/hr x24hrs. due to pt at risk of refeeding syndrome. Then transition to custom TPN to better meet estimated nutritional needs.     Communication of Recommendations: reviewed with patient/caregiver    Nutrition Assessment     Malnutrition Assessment/Nutrition-Focused Physical Exam    Malnutrition in the context of acute illness or injury  Degree of Malnutrition: non-severe (moderate) malnutrition  Energy Intake: </= 50% of estimated energy requirement for >/= 5 days  Interpretation of Weight Loss: >5% in 1 month  Body Fat:moderate depletion  Area of Body Fat Loss: orbital region  and upper arm region - triceps / biceps  Muscle Mass Loss: moderate depletion  Area of Muscle Mass Loss: temple region - temporalis muscle and clavicle bone region - pectoralis major, deltoid, trapezius muscles  Fluid Accumulation: does not meet criteria  Edema: does not meet criteria   Reduced  Strength: unable to obtain  A minimum of two characteristics is recommended for diagnosis of either severe or non-severe malnutrition.    Chart Review    Reason Seen: malnutrition screening tool (MST) and physician consult for sacral wound    Malnutrition Screening Tool Results   Have you recently lost weight without trying?: Yes: 34 lbs or more  Have you been eating poorly because of a decreased appetite?: Yes   MST Score: 5     Diagnosis:  Acute kidney injury due to intravascular volume depletion secondary to nausea and vomiting   Acute complicated bacterial cystitis, present on admission   Normocytic  "anemia  Hyponatremia  Transaminitis hyperbilirubinemia   Fatigue (Hx small intestine CA awaiting surgical tx, increased weakness started yesterday)    Relevant Medical History: hypertension, hyperlipidemia, coronary disease status post stent, peripheral arterial disease, adenocarcinoma of duodenum, BPH with chronic indwelling Paul catheter    Nutrition-Related Medications: rocephin, pantoprazole  Calorie Containing IV Medications: no significant kcals from medications at this time    Nutrition-Related Labs:  3/18: H/H-7.4/21.8, Na-133, K-3.2, Crea-1.25, Ca-8.1, Alk [id number]    Diet/PN Order: Diet clear liquid  Oral Supplement Order: none  Tube Feeding Order: none  Appetite/Oral Intake: fair/25-50% of meals  Factors Affecting Nutritional Intake: altered gastrointestinal function, nausea, and vomiting  Food/Scientology/Cultural Preferences: none reported  Food Allergies: no known food allergies    Skin Integrity: other (see comments), drain/device(s) (Redness to sacrum; RLQ biliary drain)  Wound(s):   sacral reddness    Comments    3/18/23: Pt feeling better this morning. Admitted with N/V. But is now able to tolerate liquids today. Dr Garrett added Boost Breeze with meals. Pt is to stay on liquids for now. Pt scheduled for whipple on 3/27/23; pending surgery eval while in hospital. Pt with physical wasting noted along with wt loss. If pt to remain on clear liquids > 7 days, will need TPN.     Anthropometrics    Height: 5' 9.02" (175.3 cm)    Last Weight: 65 kg (143 lb 4.8 oz) (23 1244) Weight Method: Standard Scale  BMI (Calculated): 21.2  BMI Classification: underweight (BMI less than 22 if >65 years of age)        Ideal Body Weight (IBW), Male: 160.12 lb     % Ideal Body Weight, Male (lb): 89.5 %                 Usual Body Weight (UBW), k.7 kg  % Usual Body Weight: 90.85  % Weight Change From Usual Weight: -9.35 %  Usual Weight Provided By: EMR weight history    Wt Readings from Last 5 Encounters: "   03/18/23 65 kg (143 lb 4.8 oz)   03/03/23 67.3 kg (148 lb 5.9 oz)   02/15/23 71.7 kg (158 lb)   02/11/23 70.3 kg (155 lb)   01/12/23 69.3 kg (152 lb 12.5 oz)     Weight Change(s) Since Admission:  Admit Weight: 73.5 kg (162 lb) (03/17/23 0452)  65kg; 9% wt loss x1 month. Rt arm amputation noted.     Estimated Needs    Weight Used For Calorie Calculations: 65 kg (143 lb 4.8 oz)  Energy Calorie Requirements (kcal): 1750 kcal (MSJxSF1.3)  Energy Need Method: [place]-St Jeor  Weight Used For Protein Calculations: 65 kg (143 lb 4.8 oz)  Protein Requirements: 90gm (kgx1.4)  Fluid Requirements (mL): 1750mL (1mL/kcal)  Temp: 98.1 °F (36.7 °C)       Enteral Nutrition    Patient not receiving enteral nutrition at this time.    Parenteral Nutrition    Patient not receiving parenteral nutrition support at this time.    Evaluation of Received Nutrient Intake    Calories: not meeting estimated needs  Protein: not meeting estimated needs    Patient Education    Not applicable.    Nutrition Diagnosis     PES: Malnutrition related to cancer as evidenced by 9% wt loss x1 month, fat and muscle wasting, poor diet tolerance/intake > 5 days. (new)    Interventions/Goals     Intervention(s): general/healthful diet, modified composition of parenteral nutrition, commercial beverage, and collaboration with other providers  Goal: Meet greater than 75% of nutritional needs by follow-up. (new)    Monitoring & Evaluation     Dietitian will monitor energy intake.  Nutrition Risk/Follow-Up: high (follow-up in 1-4 days)   Please consult if re-assessment needed sooner.

## 2023-03-18 NOTE — CONSULTS
Referred by RN.  Offered communion to alert Pt..  Very friendly and welcomed the visit.  I will refer him to the other Chaplains to assure he receives daily communion.  I will follow up also.    Fr. ELISHA Guerrier

## 2023-03-18 NOTE — PLAN OF CARE
Problem: Adult Inpatient Plan of Care  Goal: Plan of Care Review  Outcome: Ongoing, Progressing  Flowsheets (Taken 3/17/2023 2000)  Plan of Care Reviewed With:   patient   family  Goal: Patient-Specific Goal (Individualized)  Outcome: Ongoing, Progressing  Goal: Absence of Hospital-Acquired Illness or Injury  Outcome: Ongoing, Progressing  Intervention: Identify and Manage Fall Risk  Flowsheets (Taken 3/17/2023 2000)  Safety Promotion/Fall Prevention:   assistive device/personal item within reach   commode/urinal/bedpan at bedside   Fall Risk reviewed with patient/family   Fall Risk signage in place   family to remain at bedside   high risk medications identified   lighting adjusted   medications reviewed   nonskid shoes/socks when out of bed   side rails raised x 3   instructed to call staff for mobility  Intervention: Prevent Skin Injury  Flowsheets (Taken 3/17/2023 2000)  Body Position: supine  Skin Protection:   adhesive use limited   protective footwear used   tubing/devices free from skin contact  Intervention: Prevent and Manage VTE (Venous Thromboembolism) Risk  Flowsheets (Taken 3/17/2023 2000)  Activity Management: Rolling - L1  VTE Prevention/Management:   bleeding risk assessed   intravenous hydration  Range of Motion:   active ROM (range of motion) encouraged   ROM (range of motion) performed  Intervention: Prevent Infection  Flowsheets (Taken 3/17/2023 2000)  Infection Prevention:   environmental surveillance performed   equipment surfaces disinfected   hand hygiene promoted   personal protective equipment utilized   rest/sleep promoted   single patient room provided  Goal: Optimal Comfort and Wellbeing  Outcome: Ongoing, Progressing  Intervention: Monitor Pain and Promote Comfort  Flowsheets (Taken 3/17/2023 2000)  Pain Management Interventions:   care clustered   pain management plan reviewed with patient/caregiver   medication offered   quiet environment facilitated  Intervention: Provide  Person-Centered Care  Flowsheets (Taken 3/17/2023 2000)  Trust Relationship/Rapport:   care explained   choices provided   emotional support provided   empathic listening provided   questions answered   questions encouraged   thoughts/feelings acknowledged  Goal: Readiness for Transition of Care  Outcome: Ongoing, Progressing     Problem: Adjustment to Illness (Sepsis/Septic Shock)  Goal: Optimal Coping  Outcome: Ongoing, Progressing     Problem: Bleeding (Sepsis/Septic Shock)  Goal: Absence of Bleeding  Outcome: Ongoing, Progressing     Problem: Glycemic Control Impaired (Sepsis/Septic Shock)  Goal: Blood Glucose Level Within Desired Range  Outcome: Ongoing, Progressing     Problem: Infection Progression (Sepsis/Septic Shock)  Goal: Absence of Infection Signs and Symptoms  Outcome: Ongoing, Progressing  Intervention: Initiate Sepsis Management  Flowsheets (Taken 3/17/2023 2000)  Infection Prevention:   environmental surveillance performed   equipment surfaces disinfected   hand hygiene promoted   personal protective equipment utilized   rest/sleep promoted   single patient room provided  Infection Management: aseptic technique maintained  Isolation Precautions: precautions maintained     Problem: Nutrition Impaired (Sepsis/Septic Shock)  Goal: Optimal Nutrition Intake  Outcome: Ongoing, Progressing     Problem: Fluid and Electrolyte Imbalance (Acute Kidney Injury/Impairment)  Goal: Fluid and Electrolyte Balance  Outcome: Ongoing, Progressing  Intervention: Monitor and Manage Fluid and Electrolyte Balance  Flowsheets (Taken 3/17/2023 2000)  Fluid/Electrolyte Management: fluids provided     Problem: Oral Intake Inadequate (Acute Kidney Injury/Impairment)  Goal: Optimal Nutrition Intake  Outcome: Ongoing, Progressing  Intervention: Promote and Optimize Nutrition  Flowsheets (Taken 3/17/2023 2000)  Oral Nutrition Promotion: rest periods promoted     Problem: Renal Function Impairment (Acute Kidney  Injury/Impairment)  Goal: Effective Renal Function  Outcome: Ongoing, Progressing  Intervention: Monitor and Support Renal Function  Flowsheets (Taken 3/17/2023 2000)  Medication Review/Management: medications reviewed     Problem: Coping Ineffective  Goal: Effective Coping  Outcome: Ongoing, Progressing  Intervention: Support and Enhance Coping Strategies  Flowsheets (Taken 3/17/2023 2000)  Supportive Measures: active listening utilized  Environmental Support:   calm environment promoted   rest periods encouraged     Problem: Skin Injury Risk Increased  Goal: Skin Health and Integrity  Outcome: Ongoing, Progressing  Intervention: Optimize Skin Protection  Flowsheets (Taken 3/17/2023 2000)  Pressure Reduction Techniques:   frequent weight shift encouraged   weight shift assistance provided  Skin Protection:   adhesive use limited   protective footwear used   tubing/devices free from skin contact  Head of Bed (HOB) Positioning: HOB at 30-45 degrees     Problem: Impaired Wound Healing  Goal: Optimal Wound Healing  Outcome: Ongoing, Progressing  Intervention: Promote Wound Healing  Flowsheets (Taken 3/17/2023 2000)  Oral Nutrition Promotion: rest periods promoted  Activity Management: Rolling - L1  Pain Management Interventions:   care clustered   pain management plan reviewed with patient/caregiver   medication offered   quiet environment facilitated

## 2023-03-18 NOTE — PLAN OF CARE
Problem: Adult Inpatient Plan of Care  Goal: Plan of Care Review  Outcome: Ongoing, Progressing  Goal: Patient-Specific Goal (Individualized)  Outcome: Ongoing, Progressing  Goal: Absence of Hospital-Acquired Illness or Injury  Outcome: Ongoing, Progressing  Goal: Optimal Comfort and Wellbeing  Outcome: Ongoing, Progressing  Goal: Readiness for Transition of Care  Outcome: Ongoing, Progressing     Problem: Adjustment to Illness (Sepsis/Septic Shock)  Goal: Optimal Coping  Outcome: Ongoing, Progressing     Problem: Bleeding (Sepsis/Septic Shock)  Goal: Absence of Bleeding  Outcome: Ongoing, Progressing     Problem: Glycemic Control Impaired (Sepsis/Septic Shock)  Goal: Blood Glucose Level Within Desired Range  Outcome: Ongoing, Progressing     Problem: Infection Progression (Sepsis/Septic Shock)  Goal: Absence of Infection Signs and Symptoms  Outcome: Ongoing, Progressing     Problem: Nutrition Impaired (Sepsis/Septic Shock)  Goal: Optimal Nutrition Intake  Outcome: Ongoing, Progressing     Problem: Fluid and Electrolyte Imbalance (Acute Kidney Injury/Impairment)  Goal: Fluid and Electrolyte Balance  Outcome: Ongoing, Progressing     Problem: Oral Intake Inadequate (Acute Kidney Injury/Impairment)  Goal: Optimal Nutrition Intake  Outcome: Ongoing, Progressing     Problem: Renal Function Impairment (Acute Kidney Injury/Impairment)  Goal: Effective Renal Function  Outcome: Ongoing, Progressing     Problem: Coping Ineffective  Goal: Effective Coping  Outcome: Ongoing, Progressing     Problem: Skin Injury Risk Increased  Goal: Skin Health and Integrity  Outcome: Ongoing, Progressing     Problem: Impaired Wound Healing  Goal: Optimal Wound Healing  Outcome: Ongoing, Progressing     Problem: Infection  Goal: Absence of Infection Signs and Symptoms  Outcome: Ongoing, Progressing

## 2023-03-18 NOTE — PROGRESS NOTES
"Hospital Medicine  Progress Note    Patient Name: Quincy Triplett  MRN: [id number]  Status: OP- Observation   Admission Date: 3/16/2023  Length of Stay: 1      CC: hospital follow-up for GOO       SUBJECTIVE   82 y.o. 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 Work up in ED noted patient afebrile, HDS. Labs notable for ALP 1544, Total bilirubin 2.9, AST 40, ALT 65, potassium 5.2, bicarb 9, BUN 23, Cr 1.5.  Staging CT C/A/P from 3/15 noting developing gastric outlet obstruction, secondary to a 3.2 cm long "apple-core" lesion in the 3rd portion of the duodenum, at the level of the ampulla; however there was no evidence of distant metastatic disease.  Of notes patient recently admitted to hospital from 3/2-3/10, being treated for enterococcus faecium bacteremia, planned for antibiotics through 3/18.  Plan was to have subsequent follow-up with Surgical Oncology thereafter to discuss possible Whipple procedure.  Patient was admitted to hospital medicine services for management.  Surgical Oncology consulted to re-assess given new findings.    Today: Patient seen and examined at bedside, and chart reviewed. Seems to be tolerating liquid diet, no nausea or vomiting.  No new complaints today, but notes generalized weakness has been ongoing.      MEDICATIONS   Scheduled   cefTRIAXone (ROCEPHIN) IVPB  1 g Intravenous Q24H    enoxaparin  40 mg Subcutaneous Daily    finasteride  5 mg Oral Daily    pantoprazole  40 mg Oral Daily    tamsulosin  0.4 mg Oral Daily    zinc oxide-cod liver oil   Topical (Top) BID     Continuous Infusions   sodium bicarbonate drip 150 mL/hr at 03/18/23 0848         PHYSICAL EXAM   VITALS: T 97.5 °F (36.4 °C)   /63   P 65   RR 17   O2 99 " %    GENERAL: Awake and in NAD  LUNGS: CTA anteriorly  CVS: Normal rate  GI/: Soft, NT, bowel sounds positive.  EXTREMITIES: No peripheral edema  NEURO: AAOx3  PSYCH: Cooperative      LABS   CBC  Recent Labs     03/18/23  0451 03/18/23  0641   WBC 5.0 6.0   RBC 2.19* 2.59*   HGB 6.5* 7.4*   HCT 18.3* 21.8*   MCV 83.6 84.2   MCH 29.7 28.6   MCHC 35.5 33.9   RDW 15.7 15.7    362     CHEM  Recent Labs     03/17/23  0010 03/17/23  0342 03/18/23  0641   * 129* 133*   K 4.7 4.6 3.2*   CHLORIDE 108* 110* 95*   CO2 8* 8* 28   BUN 28.4* 27.0* 23.7   CREATININE 1.56* 1.45* 1.25*   GLUCOSE 78* 73* 104   CALCIUM 9.7 9.5 8.1*   MG 1.80 1.70  --    PHOS  --  5.2*  --    ALBUMIN 2.6* 2.4* 2.0*   GLOBULIN 4.2* 3.9* 2.6   ALKPHOS 1,544* 1,384* 891*   ALT 65* 58* 35   AST 40* 34 22   BILITOT 2.9* 2.7* 1.9*   LIPASE 66*  --   --        ASSESSMENT   Gastric outlet obstruction s/t tumor  Adenocarcinoma of the duodenum  obstructive jaundice, s/p prior percutaneous drain  Severe metabolic acidosis  Anemia of chronic disease  Recent Enterococcus faecium bacteremia    PLAN   Surgery consulted  In the interim continue with liquid diet, will add protein supplementation  Continue with gentle IV hydration for now as well, will switch to D5-NS  Replace potassium today, will continue to monitor and replace electrolytes as indicated  Consult PT for evaluation and treatment  Otherwise await surgical plan  Needs another day of antibiotics to complete treatment for recent bacteremia, then can discontinue.        Prophylaxis: SC Lovenox        Peter Garrett MD  [facility]

## 2023-03-18 NOTE — CONSULTS
[facility] - Oncology Acute  Surgical Oncology  Consult Note    Patient Name: Quincy Triplett  MRN: [id number]  Code Status: Partial Code  Admission Date: 3/16/2023  Hospital Length of Stay: 1 days  Attending Physician: Jaron Macdonald MD  Primary Care Provider: Reji Waters Jr, MD    Inpatient consult to General Surgery  Consult performed by: Ranjeet Montaño MD  Consult ordered by: Jaron Macdonald MD      Subjective:     Chief Complaint/Reason for Admission: Fatigue/Weakness    History of Present Illness:    82-year-old male known to Dr. Brown, currently on schedule for WHIPPLE on 3/27/23 per family report. He initially presented with mild symptoms of jaundice, as well as significant weight loss.  Ultrasound exam suggested biliary dilation.  CT scan showed circumferential thickening of the 2nd/3rd portion of the duodenum and intra and extrahepatic biliary dilation.  There was also pancreatic ductal dilation.  There is no evidence of metastatic disease on initial imaging and repeat imaging in ER yesterday.  I personally reviewed and interpreted the original images and ER images.  Patient previously underwent upper endoscopy and attempted ERCP with the finding of a malignant appearing mass at the site of the ampulla.  Biopsies proved poorly differentiated adenocarcinoma.  The bile duct could not be cannulated.  The patient denies fevers, chills or cholangitic symptoms. Then was admitted for biliary obstruction and underwent PTC catheter placement, could not be internalized and catheter remains with bilious output to bag. Chronic UTI issues. He currently denies fevers or chills, no symptoms of jaundice.  Was doing well outpatient with therapy but began to decline again.  Represented to ER yesterday and noted to have hyponatremia, hyperkalemia, elevated CR to 1.53, elevated bili to 3.2, alp 1691, UA weakly positive.  On abx therapy for this.  CT with developing GOO and stomach distension.   Images reviewed.  Surgery consulted.  Remains on liquids only at this time.  Tolerating without N/V.  No acute pain.    No current facility-administered medications on file prior to encounter.     Current Outpatient Medications on File Prior to Encounter   Medication Sig    ampicillin (PRINCIPEN) 500 MG capsule Take 1 capsule (500 mg total) by mouth 2 (two) times a day. for 8 days    ergocalciferol (ERGOCALCIFEROL) 50,000 unit Cap Take 1 capsule (50,000 Units total) by mouth every 7 days. for 12 doses    [] ondansetron (ZOFRAN-ODT) 4 MG TbDL Take 1 tablet (4 mg total) by mouth every 6 (six) hours as needed (nausea).    pantoprazole (PROTONIX) 40 MG tablet Take 1 tablet (40 mg total) by mouth once daily.    tamsulosin (FLOMAX) 0.4 mg Cap Take by mouth once daily.    finasteride (PROSCAR) 5 mg tablet Take 1 tablet (5 mg total) by mouth once daily.    sodium bicarbonate 650 MG tablet Take 1 tablet (650 mg total) by mouth 3 (three) times daily. for 5 days       Review of patient's allergies indicates:  No Known Allergies    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, unspecified extremity     Coronary artery disease     Dyslipidemia     Hypertension      Past Surgical History:   Procedure Laterality Date    AMPUTATION Right     Right arm    CAROTID STENT      EGD, WITH HEMORRHAGE CONTROL  2023    Procedure: EGD,WITH HEMORRHAGE CONTROL;  Surgeon: Ranjeet Perez MD;  Location: [facility];  Service: Gastroenterology;;  [facility]    ERCP N/A 2022    Procedure: ERCP;  Surgeon: Sushil Anderson MD;  Location: [facility] ENDOSCOPY;  Service: Gastroenterology;  Laterality: N/A;  food in abdomen    ERCP, WITH BIOPSY  2022    Procedure: ERCP, WITH BIOPSY;  Surgeon: Sushil Anderson MD;  Location: [facility] ENDOSCOPY;  Service: Gastroenterology;;    ESOPHAGOGASTRODUODENOSCOPY N/A 2023    Procedure: EGD;   Surgeon: Ranjeet Perez MD;  [facility];  Service: Gastroenterology;  Laterality: N/A;    LEFT HEART CATHETERIZATION      TONSILLECTOMY       Family History    None       Tobacco Use    Smoking status: Never    Smokeless tobacco: Never   Substance and Sexual Activity    Alcohol use: Never    Drug use: Never    Sexual activity: Not on file     Review of Systems   Constitutional:  Positive for malaise/fatigue and weight loss. Negative for chills and fever.   HENT:  Negative for congestion, ear pain, hearing loss, nosebleeds, sinus pain, sore throat and tinnitus.    Eyes:  Negative for blurred vision and discharge.   Respiratory:  Negative for cough, hemoptysis, sputum production and shortness of breath.    Cardiovascular:  Negative for chest pain, claudication and leg swelling.   Gastrointestinal:  Negative for abdominal pain, constipation, diarrhea, heartburn, nausea and vomiting.   Genitourinary:  Negative for dysuria, flank pain and hematuria.   Musculoskeletal:  Positive for back pain. Negative for joint pain and myalgias.   Skin:  Negative for itching and rash.   Neurological:  Positive for weakness. Negative for dizziness and headaches.   Hematological:  Does not bruise/bleed easily.   Psychiatric/Behavioral:  Negative for depression. The patient is not nervous/anxious.    Review of Systems   Constitutional:  Positive for malaise/fatigue and weight loss. Negative for chills and fever.   HENT:  Negative for congestion, ear pain, hearing loss, nosebleeds, sinus pain, sore throat and tinnitus.    Eyes:  Negative for blurred vision and discharge.   Respiratory:  Negative for cough, hemoptysis, sputum production and shortness of breath.    Cardiovascular:  Negative for chest pain, claudication and leg swelling.   Gastrointestinal:  Negative for abdominal pain, constipation, diarrhea, heartburn, nausea and vomiting.   Genitourinary:  Negative for dysuria, flank pain and hematuria.   Musculoskeletal:  Positive  for back pain. Negative for joint pain and myalgias.   Skin:  Negative for itching and rash.   Neurological:  Positive for weakness. Negative for dizziness and headaches.   Endo/Heme/Allergies:  Does not bruise/bleed easily.   Psychiatric/Behavioral:  Negative for depression. The patient is not nervous/anxious.     Objective:     Vital Signs (Most Recent):  Temp: 98.1 °F (36.7 °C) (03/18/23 1120)  Pulse: 78 (03/18/23 1120)  Resp: 16 (03/18/23 1120)  BP: 106/66 (03/18/23 1120)  SpO2: 99 % (03/18/23 1120) Vital Signs (24h Range):  Temp:  [97.4 °F (36.3 °C)-98.1 °F (36.7 °C)] 98.1 °F (36.7 °C)  Pulse:  [63-88] 78  Resp:  [16-20] 16  SpO2:  [99 %-100 %] 99 %  BP: (106-118)/(56-72) 106/66     Weight: 65 kg (143 lb 4.8 oz)  Body mass index is 21.16 kg/m².      Intake/Output Summary (Last 24 hours) at 3/18/2023 1123  Last data filed at 3/18/2023 0609  Gross per 24 hour   Intake 240 ml   Output 3770 ml   Net -3530 ml       Physical Exam  Constitutional:       General: He is not in acute distress.     Appearance: Normal appearance. He is ill-appearing. He is not toxic-appearing.   HENT:      Head: Normocephalic and atraumatic.      Right Ear: Ear canal and external ear normal.      Left Ear: Ear canal and external ear normal.      Nose: Nose normal. No congestion.      Mouth/Throat:      Mouth: Mucous membranes are moist.      Pharynx: Oropharynx is clear.   Eyes:      Extraocular Movements: Extraocular movements intact.      Conjunctiva/sclera: Conjunctivae normal.      Pupils: Pupils are equal, round, and reactive to light.   Cardiovascular:      Rate and Rhythm: Normal rate and regular rhythm.   Pulmonary:      Effort: Pulmonary effort is normal.      Breath sounds: Normal breath sounds. No wheezing.   Abdominal:      General: Abdomen is flat. There is no distension.      Palpations: Abdomen is soft.      Tenderness: There is no abdominal tenderness. There is no rebound.      Hernia: No hernia is present.    Musculoskeletal:         General: No signs of injury. Normal range of motion.      Cervical back: Normal range of motion and neck supple.   Skin:     General: Skin is warm and dry.      Coloration: Skin is not jaundiced.   Neurological:      General: No focal deficit present.      Mental Status: He is alert and oriented to person, place, and time. Mental status is at baseline.   Psychiatric:         Mood and Affect: Mood normal.         Behavior: Behavior normal.         Thought Content: Thought content normal.         Judgment: Judgment normal.       Significant Labs:  BMP:   Recent Labs   Lab 03/17/23  0342 03/18/23  0641   * 133*   K 4.6 3.2*   CO2 8* 28   BUN 27.0* 23.7   CREATININE 1.45* 1.25*   CALCIUM 9.5 8.1*   MG 1.70  --      CBC:   Recent Labs   Lab 03/18/23  0641   WBC 6.0   RBC 2.59*   HGB 7.4*   HCT 21.8*      MCV 84.2   MCH 28.6   MCHC 33.9     CMP:   Recent Labs   Lab 03/18/23  0641   CALCIUM 8.1*   ALBUMIN 2.0*   *   K 3.2*   CO2 28   BUN 23.7   CREATININE 1.25*   ALKPHOS 891*   ALT 35   AST 22   BILITOT 1.9*     Coagulation: No results for input(s): PT, INR, APTT in the last 48 hours.    Significant Diagnostics:  CT: I have reviewed all pertinent results/findings within the past 24 hours.    EXAMINATION:  CT ABDOMEN PELVIS W WO CONTRAST     CLINICAL HISTORY:  duodenal cancer;Malignant neoplasm of duodenum     TECHNIQUE:  Low dose axial images, sagittal and coronal reformations were obtained from the lung bases to the pubic symphysis before and following the IV administration of 100 mL of Visipaque 320.  No oral contrast was given.     COMPARISON:  February 11, 2023     FINDINGS:  The percutaneous biliary drain is again noted.  No intra or extrahepatic bile duct dilation is observed.  The gallbladder is present.  No liver lesion is identified.     The spleen is not enlarged.  The pancreas shows no evidence of mass or duct dilation.  The adrenal glands are unremarkable.  There is  "no calcified renal stone, enhancing renal mass, or hydronephrosis.  There are bilateral renal cysts, the largest on right measuring 3.7 cm in diameter.  The bladder is decompressed by Paul catheter.  The prostate gland is enlarged.     There is abnormal fluid distension of the stomach, consistent with gastric outlet obstruction.  There is an underlying open "apple-core" lesion in the 3rd portion of the duodenum near the ampulla measuring 3.2 cm in length.  Gastric distension is a new finding when compared to the prior study.  The small bowel is not dilated.  The terminal ileum and appendix are normal.  Mild diverticulosis is present in the colon.     The infrarenal abdominal aorta is focally dilated, reaching a maximum transverse diameter of 2.5 cm (series 9, image 117).  No retroperitoneal lymphadenopathy is appreciated.     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  No bone lesion is detected.  The subcutaneous soft tissues are unremarkable.     Impression:     Developing gastric outlet obstruction, secondary to a 3.2 cm long "apple-core" lesion in the 3rd portion of the duodenum at the level of the ampulla, presumably representing a primary adenocarcinoma.  No CT evidence of distant metastasis.     Assessment/Plan:     No new Assessment & Plan notes have been filed under this hospital service since the last note was generated.  Service: General Surgery      Thank you for your consult.  Dr. Brown to return Monday to discuss surgical recs/options and timing pending patient progression.  He can continue liquids only as long as tolerating.  If N/V develop will need decompression with NG.  Avoid solid foods for now. Cont PT as able. Consider TPN pending oral intake.  Continue to optimize medical status prior to any operative interventions.  Following.    Ranjeet Montaño MD  Surgical Oncology  [facility]  [phone]  "

## 2023-03-19 LAB
ALBUMIN SERPL-MCNC: 2 G/DL (ref 3.4–4.8)
ALBUMIN/GLOB SERPL: 0.7 RATIO (ref 1.1–2)
ALP SERPL-CCNC: 789 UNIT/L (ref 40–150)
ALT SERPL-CCNC: 32 UNIT/L (ref 0–55)
AST SERPL-CCNC: 23 UNIT/L (ref 5–34)
BASOPHILS # BLD AUTO: 0.05 X10(3)/MCL (ref 0–0.2)
BASOPHILS NFR BLD AUTO: 0.8 %
BILIRUBIN DIRECT+TOT PNL SERPL-MCNC: 1.7 MG/DL
BUN SERPL-MCNC: 15.7 MG/DL (ref 8.4–25.7)
CALCIUM SERPL-MCNC: 8.2 MG/DL (ref 8.8–10)
CHLORIDE SERPL-SCNC: 101 MMOL/L (ref 98–107)
CO2 SERPL-SCNC: 25 MMOL/L (ref 23–31)
CREAT SERPL-MCNC: 0.9 MG/DL (ref 0.73–1.18)
EOSINOPHIL # BLD AUTO: 0.19 X10(3)/MCL (ref 0–0.9)
EOSINOPHIL NFR BLD AUTO: 3 %
ERYTHROCYTE [DISTWIDTH] IN BLOOD BY AUTOMATED COUNT: 16.1 % (ref 11.5–17)
GFR SERPLBLD CREATININE-BSD FMLA CKD-EPI: >60 MLS/MIN/1.73/M2
GLOBULIN SER-MCNC: 3 GM/DL (ref 2.4–3.5)
GLUCOSE SERPL-MCNC: 95 MG/DL (ref 82–115)
HCT VFR BLD AUTO: 24.8 % (ref 42–52)
HGB BLD-MCNC: 8.2 G/DL (ref 14–18)
IMM GRANULOCYTES # BLD AUTO: 0.03 X10(3)/MCL (ref 0–0.04)
IMM GRANULOCYTES NFR BLD AUTO: 0.5 %
LYMPHOCYTES # BLD AUTO: 2.08 X10(3)/MCL (ref 0.6–4.6)
LYMPHOCYTES NFR BLD AUTO: 32.3 %
MAGNESIUM SERPL-MCNC: 1.3 MG/DL (ref 1.6–2.6)
MCH RBC QN AUTO: 29.2 PG
MCHC RBC AUTO-ENTMCNC: 33.1 G/DL (ref 33–36)
MCV RBC AUTO: 88.3 FL (ref 80–94)
MONOCYTES # BLD AUTO: 0.58 X10(3)/MCL (ref 0.1–1.3)
MONOCYTES NFR BLD AUTO: 9 %
NEUTROPHILS # BLD AUTO: 3.5 X10(3)/MCL (ref 2.1–9.2)
NEUTROPHILS NFR BLD AUTO: 54.4 %
NRBC BLD AUTO-RTO: 0 %
PHOSPHATE SERPL-MCNC: 2.8 MG/DL (ref 2.3–4.7)
PLATELET # BLD AUTO: 316 X10(3)/MCL (ref 130–400)
PMV BLD AUTO: 9 FL (ref 7.4–10.4)
POTASSIUM SERPL-SCNC: 3.7 MMOL/L (ref 3.5–5.1)
PROT SERPL-MCNC: 5 GM/DL (ref 5.8–7.6)
RBC # BLD AUTO: 2.81 X10(6)/MCL (ref 4.7–6.1)
SODIUM SERPL-SCNC: 135 MMOL/L (ref 136–145)
WBC # SPEC AUTO: 6.4 X10(3)/MCL (ref 4.5–11.5)

## 2023-03-19 PROCEDURE — 85025 COMPLETE CBC W/AUTO DIFF WBC: CPT | Performed by: INTERNAL MEDICINE

## 2023-03-19 PROCEDURE — 25000003 PHARM REV CODE 250: Performed by: INTERNAL MEDICINE

## 2023-03-19 PROCEDURE — 25000003 PHARM REV CODE 250: Performed by: PHYSICIAN ASSISTANT

## 2023-03-19 PROCEDURE — 96372 THER/PROPH/DIAG INJ SC/IM: CPT | Performed by: NURSE PRACTITIONER

## 2023-03-19 PROCEDURE — 96372 THER/PROPH/DIAG INJ SC/IM: CPT | Performed by: INTERNAL MEDICINE

## 2023-03-19 PROCEDURE — 83735 ASSAY OF MAGNESIUM: CPT | Performed by: INTERNAL MEDICINE

## 2023-03-19 PROCEDURE — 63600175 PHARM REV CODE 636 W HCPCS: Performed by: NURSE PRACTITIONER

## 2023-03-19 PROCEDURE — 63600175 PHARM REV CODE 636 W HCPCS: Performed by: INTERNAL MEDICINE

## 2023-03-19 PROCEDURE — 63600175 PHARM REV CODE 636 W HCPCS: Performed by: PHYSICIAN ASSISTANT

## 2023-03-19 PROCEDURE — 80053 COMPREHEN METABOLIC PANEL: CPT | Performed by: INTERNAL MEDICINE

## 2023-03-19 PROCEDURE — G0378 HOSPITAL OBSERVATION PER HR: HCPCS

## 2023-03-19 PROCEDURE — 84100 ASSAY OF PHOSPHORUS: CPT | Performed by: INTERNAL MEDICINE

## 2023-03-19 RX ORDER — MAGNESIUM SULFATE HEPTAHYDRATE 40 MG/ML
2 INJECTION, SOLUTION INTRAVENOUS ONCE
Status: COMPLETED | OUTPATIENT
Start: 2023-03-19 | End: 2023-03-19

## 2023-03-19 RX ORDER — LANOLIN ALCOHOL/MO/W.PET/CERES
400 CREAM (GRAM) TOPICAL 2 TIMES DAILY
Status: DISCONTINUED | OUTPATIENT
Start: 2023-03-19 | End: 2023-04-06 | Stop reason: HOSPADM

## 2023-03-19 RX ADMIN — ENOXAPARIN SODIUM 40 MG: 40 INJECTION SUBCUTANEOUS at 04:03

## 2023-03-19 RX ADMIN — Medication: at 08:03

## 2023-03-19 RX ADMIN — CEFTRIAXONE SODIUM 1 G: 1 INJECTION, POWDER, FOR SOLUTION INTRAMUSCULAR; INTRAVENOUS at 09:03

## 2023-03-19 RX ADMIN — PANTOPRAZOLE SODIUM 40 MG: 40 TABLET, DELAYED RELEASE ORAL at 08:03

## 2023-03-19 RX ADMIN — MAGNESIUM SULFATE HEPTAHYDRATE 2 G: 40 INJECTION, SOLUTION INTRAVENOUS at 11:03

## 2023-03-19 RX ADMIN — DEXTROSE AND SODIUM CHLORIDE: 5; 900 INJECTION, SOLUTION INTRAVENOUS at 04:03

## 2023-03-19 RX ADMIN — FINASTERIDE 5 MG: 5 TABLET, FILM COATED ORAL at 08:03

## 2023-03-19 RX ADMIN — DEXTROSE AND SODIUM CHLORIDE: 5; 900 INJECTION, SOLUTION INTRAVENOUS at 12:03

## 2023-03-19 RX ADMIN — Medication: at 09:03

## 2023-03-19 RX ADMIN — TAMSULOSIN HYDROCHLORIDE 0.4 MG: 0.4 CAPSULE ORAL at 08:03

## 2023-03-19 RX ADMIN — Medication 400 MG: at 11:03

## 2023-03-19 RX ADMIN — Medication 400 MG: at 09:03

## 2023-03-19 RX ADMIN — HYDROXYZINE HYDROCHLORIDE 50 MG: 50 INJECTION, SOLUTION INTRAMUSCULAR at 09:03

## 2023-03-19 NOTE — PROGRESS NOTES
"Hospital Medicine  Progress Note    Patient Name: Quincy Triplett  MRN: [id number]  Status: OP- Observation   Admission Date: 3/16/2023  Length of Stay: 1      CC: hospital follow-up for GOO       SUBJECTIVE   82 y.o. 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 Work up in ED noted patient afebrile, HDS. Labs notable for ALP 1544, Total bilirubin 2.9, AST 40, ALT 65, potassium 5.2, bicarb 9, BUN 23, Cr 1.5.  Staging CT C/A/P from 3/15 noting developing gastric outlet obstruction, secondary to a 3.2 cm long "apple-core" lesion in the 3rd portion of the duodenum, at the level of the ampulla; however there was no evidence of distant metastatic disease.  Of notes patient recently admitted to hospital from 3/2-3/10, being treated for enterococcus faecium bacteremia, planned for antibiotics through 3/18.  Plan was to have subsequent follow-up with Surgical Oncology thereafter to discuss possible Whipple procedure.  Patient was admitted to hospital medicine services for management.  Surgical Oncology consulted to re-assess given new findings.    Today: Patient seen and examined at bedside, and chart reviewed. Tolerating liquid diet quite well; no nausea or vomiting.  Minor electrolytes abnormalities, otherwise stable.      MEDICATIONS   Scheduled   cefTRIAXone (ROCEPHIN) IVPB  1 g Intravenous Q24H    enoxaparin  40 mg Subcutaneous Daily    finasteride  5 mg Oral Daily    pantoprazole  40 mg Oral Daily    tamsulosin  0.4 mg Oral Daily    zinc oxide-cod liver oil   Topical (Top) BID     Continuous Infusions   dextrose 5 % and 0.9 % NaCl 75 mL/hr at 03/19/23 0053         PHYSICAL EXAM   VITALS: T 97.7 °F (36.5 °C)   /66   P 71   RR 18   O2 99 %    GENERAL: Awake " and in NAD  LUNGS: CTA anteriorly  CVS: Normal rate  GI/: Soft, NT, bowel sounds positive.  EXTREMITIES: No peripheral edema  NEURO: AAOx3  PSYCH: Cooperative      LABS   CBC  Recent Labs     03/18/23  0641 03/19/23  0408   WBC 6.0 6.4   RBC 2.59* 2.81*   HGB 7.4* 8.2*   HCT 21.8* 24.8*   MCV 84.2 88.3   MCH 28.6 29.2   MCHC 33.9 33.1   RDW 15.7 16.1    316       CHEM  Recent Labs     03/17/23  0010 03/17/23  0342 03/18/23  0641 03/19/23  0408   * 129* 133* 135*   K 4.7 4.6 3.2* 3.7   CHLORIDE 108* 110* 95* 101   CO2 8* 8* 28 25   BUN 28.4* 27.0* 23.7 15.7   CREATININE 1.56* 1.45* 1.25* 0.90   GLUCOSE 78* 73* 104 95   CALCIUM 9.7 9.5 8.1* 8.2*   MG 1.80 1.70  --  1.30*   PHOS  --  5.2*  --  2.8   ALBUMIN 2.6* 2.4* 2.0* 2.0*   GLOBULIN 4.2* 3.9* 2.6 3.0   ALKPHOS 1,544* 1,384* 891* 789*   ALT 65* 58* 35 32   AST 40* 34 22 23   BILITOT 2.9* 2.7* 1.9* 1.7*   LIPASE 66*  --   --   --          ASSESSMENT   Gastric outlet obstruction s/t tumor  Adenocarcinoma of the duodenum  obstructive jaundice, s/p prior percutaneous drain  Severe metabolic acidosis  Anemia of chronic disease  Recent Enterococcus faecium bacteremia    PLAN   Await definitive surgical plan  In the interim continue with liquid diet, protein supplementation added  Continue with gentle IV hydration with D5, depending on timing of surgery may add PPN  Will continue to monitor and replete electrolytes as indicated, replacing Mg today  Consult PT to increase functional status  Can discontinue antibiotics as he has now completed treatment for recent bacteremia.  Discharge disposition Dependent on timing of surgery, more to follow with a definitive surgical plan        Prophylaxis: SC Sylvia Garrett MD  [facility]

## 2023-03-19 NOTE — PLAN OF CARE
Problem: Adult Inpatient Plan of Care  Goal: Plan of Care Review  Outcome: Ongoing, Progressing  Flowsheets (Taken 3/18/2023 1935)  Plan of Care Reviewed With:   patient   family  Goal: Patient-Specific Goal (Individualized)  Outcome: Ongoing, Progressing  Goal: Absence of Hospital-Acquired Illness or Injury  Outcome: Ongoing, Progressing  Intervention: Identify and Manage Fall Risk  Flowsheets (Taken 3/18/2023 1935)  Safety Promotion/Fall Prevention:   assistive device/personal item within reach   Fall Risk reviewed with patient/family   Fall Risk signage in place   family to remain at bedside   lighting adjusted   medications reviewed   nonskid shoes/socks when out of bed   in recliner, wheels locked  Intervention: Prevent Skin Injury  Flowsheets (Taken 3/18/2023 1935)  Body Position: upper extremity elevated  Skin Protection:   adhesive use limited   protective footwear used   tubing/devices free from skin contact  Intervention: Prevent and Manage VTE (Venous Thromboembolism) Risk  Flowsheets (Taken 3/18/2023 1935)  Activity Management: Up in chair - L3  VTE Prevention/Management:   bleeding risk assessed   intravenous hydration  Range of Motion:   active ROM (range of motion) encouraged   ROM (range of motion) performed  Intervention: Prevent Infection  Flowsheets (Taken 3/18/2023 1935)  Infection Prevention:   environmental surveillance performed   equipment surfaces disinfected   hand hygiene promoted   personal protective equipment utilized   rest/sleep promoted   single patient room provided  Goal: Optimal Comfort and Wellbeing  Outcome: Ongoing, Progressing  Intervention: Monitor Pain and Promote Comfort  Flowsheets (Taken 3/18/2023 1935)  Pain Management Interventions:   care clustered   quiet environment facilitated  Intervention: Provide Person-Centered Care  Flowsheets (Taken 3/18/2023 1935)  Trust Relationship/Rapport:   care explained   choices provided   emotional support provided   empathic  listening provided   questions answered   questions encouraged   thoughts/feelings acknowledged  Goal: Readiness for Transition of Care  Outcome: Ongoing, Progressing     Problem: Adjustment to Illness (Sepsis/Septic Shock)  Goal: Optimal Coping  Outcome: Ongoing, Progressing  Intervention: Optimize Psychosocial Adjustment to Illness  Flowsheets (Taken 3/18/2023 1935)  Supportive Measures: active listening utilized  Family/Support System Care: involvement promoted     Problem: Bleeding (Sepsis/Septic Shock)  Goal: Absence of Bleeding  Outcome: Ongoing, Progressing     Problem: Glycemic Control Impaired (Sepsis/Septic Shock)  Goal: Blood Glucose Level Within Desired Range  Outcome: Ongoing, Progressing     Problem: Infection Progression (Sepsis/Septic Shock)  Goal: Absence of Infection Signs and Symptoms  Outcome: Ongoing, Progressing  Intervention: Initiate Sepsis Management  Flowsheets (Taken 3/18/2023 1935)  Infection Prevention:   environmental surveillance performed   equipment surfaces disinfected   hand hygiene promoted   personal protective equipment utilized   rest/sleep promoted   single patient room provided  Infection Management: aseptic technique maintained  Isolation Precautions: precautions maintained     Problem: Nutrition Impaired (Sepsis/Septic Shock)  Goal: Optimal Nutrition Intake  Outcome: Ongoing, Progressing     Problem: Fluid and Electrolyte Imbalance (Acute Kidney Injury/Impairment)  Goal: Fluid and Electrolyte Balance  Outcome: Ongoing, Progressing  Intervention: Monitor and Manage Fluid and Electrolyte Balance  Flowsheets (Taken 3/18/2023 1935)  Fluid/Electrolyte Management: fluids provided     Problem: Oral Intake Inadequate (Acute Kidney Injury/Impairment)  Goal: Optimal Nutrition Intake  Outcome: Ongoing, Progressing     Problem: Renal Function Impairment (Acute Kidney Injury/Impairment)  Goal: Effective Renal Function  Outcome: Ongoing, Progressing     Problem: Coping Ineffective  Goal:  Effective Coping  Outcome: Ongoing, Progressing     Problem: Skin Injury Risk Increased  Goal: Skin Health and Integrity  Outcome: Ongoing, Progressing  Intervention: Optimize Skin Protection  Flowsheets (Taken 3/18/2023 1935)  Pressure Reduction Techniques:   frequent weight shift encouraged   weight shift assistance provided  Skin Protection:   adhesive use limited   protective footwear used   tubing/devices free from skin contact     Problem: Impaired Wound Healing  Goal: Optimal Wound Healing  Outcome: Ongoing, Progressing  Intervention: Promote Wound Healing  Flowsheets (Taken 3/18/2023 1935)  Sleep/Rest Enhancement:   awakenings minimized   regular sleep/rest pattern promoted  Activity Management: Up in chair - L3  Pain Management Interventions:   care clustered   quiet environment facilitated     Problem: Infection  Goal: Absence of Infection Signs and Symptoms  Outcome: Ongoing, Progressing  Intervention: Prevent or Manage Infection  Flowsheets (Taken 3/18/2023 1935)  Infection Management: aseptic technique maintained  Isolation Precautions: precautions maintained

## 2023-03-20 LAB
ANION GAP SERPL CALC-SCNC: 9 MEQ/L
BUN SERPL-MCNC: 11 MG/DL (ref 8.4–25.7)
CALCIUM SERPL-MCNC: 8.6 MG/DL (ref 8.8–10)
CHLORIDE SERPL-SCNC: 103 MMOL/L (ref 98–107)
CO2 SERPL-SCNC: 23 MMOL/L (ref 23–31)
CREAT SERPL-MCNC: 0.8 MG/DL (ref 0.73–1.18)
CREAT/UREA NIT SERPL: 14
GFR SERPLBLD CREATININE-BSD FMLA CKD-EPI: >60 MLS/MIN/1.73/M2
GLUCOSE SERPL-MCNC: 94 MG/DL (ref 82–115)
MAGNESIUM SERPL-MCNC: 1.7 MG/DL (ref 1.6–2.6)
POTASSIUM SERPL-SCNC: 3.9 MMOL/L (ref 3.5–5.1)
PREALB SERPL-MCNC: 13.2 MG/DL (ref 16–42)
SODIUM SERPL-SCNC: 135 MMOL/L (ref 136–145)

## 2023-03-20 PROCEDURE — 80048 BASIC METABOLIC PNL TOTAL CA: CPT | Performed by: INTERNAL MEDICINE

## 2023-03-20 PROCEDURE — 83735 ASSAY OF MAGNESIUM: CPT | Performed by: INTERNAL MEDICINE

## 2023-03-20 PROCEDURE — 84134 ASSAY OF PREALBUMIN: CPT | Performed by: INTERNAL MEDICINE

## 2023-03-20 PROCEDURE — 63600175 PHARM REV CODE 636 W HCPCS: Performed by: INTERNAL MEDICINE

## 2023-03-20 PROCEDURE — 25000003 PHARM REV CODE 250: Performed by: NURSE PRACTITIONER

## 2023-03-20 PROCEDURE — 96372 THER/PROPH/DIAG INJ SC/IM: CPT | Performed by: NURSE PRACTITIONER

## 2023-03-20 PROCEDURE — 11000001 HC ACUTE MED/SURG PRIVATE ROOM

## 2023-03-20 PROCEDURE — 99233 SBSQ HOSP IP/OBS HIGH 50: CPT | Mod: ,,, | Performed by: SURGERY

## 2023-03-20 PROCEDURE — 97162 PT EVAL MOD COMPLEX 30 MIN: CPT

## 2023-03-20 PROCEDURE — 99233 PR SUBSEQUENT HOSPITAL CARE,LEVL III: ICD-10-PCS | Mod: ,,, | Performed by: SURGERY

## 2023-03-20 PROCEDURE — 25000003 PHARM REV CODE 250: Performed by: INTERNAL MEDICINE

## 2023-03-20 PROCEDURE — 63600175 PHARM REV CODE 636 W HCPCS: Performed by: NURSE PRACTITIONER

## 2023-03-20 RX ADMIN — Medication 6 MG: at 08:03

## 2023-03-20 RX ADMIN — DEXTROSE AND SODIUM CHLORIDE: 5; 900 INJECTION, SOLUTION INTRAVENOUS at 11:03

## 2023-03-20 RX ADMIN — ENOXAPARIN SODIUM 40 MG: 40 INJECTION SUBCUTANEOUS at 06:03

## 2023-03-20 RX ADMIN — Medication 400 MG: at 08:03

## 2023-03-20 RX ADMIN — ACETAMINOPHEN 325MG 650 MG: 325 TABLET ORAL at 08:03

## 2023-03-20 RX ADMIN — TAMSULOSIN HYDROCHLORIDE 0.4 MG: 0.4 CAPSULE ORAL at 08:03

## 2023-03-20 RX ADMIN — Medication: at 08:03

## 2023-03-20 RX ADMIN — PANTOPRAZOLE SODIUM 40 MG: 40 TABLET, DELAYED RELEASE ORAL at 08:03

## 2023-03-20 RX ADMIN — FINASTERIDE 5 MG: 5 TABLET, FILM COATED ORAL at 08:03

## 2023-03-20 NOTE — PLAN OF CARE
Problem: Adult Inpatient Plan of Care  Goal: Plan of Care Review  Outcome: Ongoing, Progressing  Flowsheets (Taken 3/19/2023 1949)  Plan of Care Reviewed With: patient  Goal: Patient-Specific Goal (Individualized)  Outcome: Ongoing, Progressing  Goal: Absence of Hospital-Acquired Illness or Injury  Outcome: Ongoing, Progressing  Intervention: Identify and Manage Fall Risk  Flowsheets (Taken 3/19/2023 1949)  Safety Promotion/Fall Prevention:   assistive device/personal item within reach   Fall Risk reviewed with patient/family   Fall Risk signage in place   lighting adjusted   medications reviewed   nonskid shoes/socks when out of bed   instructed to call staff for mobility   side rails raised x 2  Intervention: Prevent Skin Injury  Flowsheets (Taken 3/19/2023 1949)  Body Position:   right   side-lying   position changed independently  Skin Protection:   adhesive use limited   protective footwear used   tubing/devices free from skin contact  Intervention: Prevent and Manage VTE (Venous Thromboembolism) Risk  Flowsheets (Taken 3/19/2023 1949)  Activity Management: Sitting at edge of bed - L2  VTE Prevention/Management:   bleeding precations maintained   bleeding risk assessed  Range of Motion: active ROM (range of motion) encouraged  Intervention: Prevent Infection  Flowsheets (Taken 3/19/2023 1949)  Infection Prevention:   environmental surveillance performed   equipment surfaces disinfected   hand hygiene promoted   personal protective equipment utilized   rest/sleep promoted   single patient room provided  Goal: Optimal Comfort and Wellbeing  Outcome: Ongoing, Progressing  Intervention: Monitor Pain and Promote Comfort  Flowsheets (Taken 3/19/2023 1949)  Pain Management Interventions:   care clustered   quiet environment facilitated  Intervention: Provide Person-Centered Care  Flowsheets (Taken 3/19/2023 1949)  Trust Relationship/Rapport:   care explained   choices provided   emotional support provided   empathic  listening provided   questions answered   thoughts/feelings acknowledged  Goal: Readiness for Transition of Care  Outcome: Ongoing, Progressing     Problem: Adjustment to Illness (Sepsis/Septic Shock)  Goal: Optimal Coping  Outcome: Ongoing, Progressing     Problem: Bleeding (Sepsis/Septic Shock)  Goal: Absence of Bleeding  Outcome: Ongoing, Progressing     Problem: Glycemic Control Impaired (Sepsis/Septic Shock)  Goal: Blood Glucose Level Within Desired Range  Outcome: Ongoing, Progressing     Problem: Infection Progression (Sepsis/Septic Shock)  Goal: Absence of Infection Signs and Symptoms  Outcome: Ongoing, Progressing  Intervention: Initiate Sepsis Management  Flowsheets (Taken 3/19/2023 1949)  Infection Prevention:   environmental surveillance performed   equipment surfaces disinfected   hand hygiene promoted   personal protective equipment utilized   rest/sleep promoted   single patient room provided  Infection Management: aseptic technique maintained  Isolation Precautions: precautions maintained     Problem: Nutrition Impaired (Sepsis/Septic Shock)  Goal: Optimal Nutrition Intake  Outcome: Ongoing, Progressing     Problem: Fluid and Electrolyte Imbalance (Acute Kidney Injury/Impairment)  Goal: Fluid and Electrolyte Balance  Outcome: Ongoing, Progressing  Intervention: Monitor and Manage Fluid and Electrolyte Balance  Flowsheets (Taken 3/19/2023 1949)  Fluid/Electrolyte Management: fluids provided     Problem: Oral Intake Inadequate (Acute Kidney Injury/Impairment)  Goal: Optimal Nutrition Intake  Outcome: Ongoing, Progressing     Problem: Renal Function Impairment (Acute Kidney Injury/Impairment)  Goal: Effective Renal Function  Outcome: Ongoing, Progressing     Problem: Coping Ineffective  Goal: Effective Coping  Outcome: Ongoing, Progressing     Problem: Skin Injury Risk Increased  Goal: Skin Health and Integrity  Outcome: Ongoing, Progressing     Problem: Impaired Wound Healing  Goal: Optimal Wound  Healing  Outcome: Ongoing, Progressing  Intervention: Promote Wound Healing  Flowsheets (Taken 3/19/2023 1949)  Sleep/Rest Enhancement:   awakenings minimized   regular sleep/rest pattern promoted  Activity Management: Sitting at edge of bed - L2  Pain Management Interventions:   care clustered   quiet environment facilitated     Problem: Infection  Goal: Absence of Infection Signs and Symptoms  Outcome: Ongoing, Progressing  Intervention: Prevent or Manage Infection  Flowsheets (Taken 3/19/2023 1949)  Infection Management: aseptic technique maintained  Isolation Precautions: precautions maintained

## 2023-03-20 NOTE — PROGRESS NOTES
SURG ONC    PT RESTING IN BED  NO ACUTE COMPLAINTS  DENIES ANY PAIN  TOLERATING LIQUIDS  NO NAUSEA OR VOMITING    ABD SOFT, FLAT, NONTENDER    VSS; NO FEVER    PLAN  DR LOVE AWARE AND WILL SEE PT TODAY TO DISCUSS PLAN

## 2023-03-20 NOTE — PROGRESS NOTES
"[facility]  Hospital Medicine Progress Note        Chief Complaint: Inpatient Follow-up for GOO    HPI:   82 y.o. 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 Work up in ED noted patient afebrile, HDS. Labs notable for ALP 1544, Total bilirubin 2.9, AST 40, ALT 65, potassium 5.2, bicarb 9, BUN 23, Cr 1.5.  Staging CT C/A/P from 3/15 noting developing gastric outlet obstruction, secondary to a 3.2 cm long "apple-core" lesion in the 3rd portion of the duodenum, at the level of the ampulla; however there was no evidence of distant metastatic disease.  Of notes patient recently admitted to hospital from 3/2-3/10, being treated for enterococcus faecium bacteremia, planned for antibiotics through 3/18.  Plan was to have subsequent follow-up with Surgical Oncology thereafter to discuss possible Whipple procedure.  Patient was admitted to hospital medicine services for management.  Surgical Oncology consulted to re-assess given new findings.  Interval Hx:   Patient was seen and examined at bedside.  He was resting comfortably.  Family member/friend at bedside.  Patient reported he is awaiting Dr. Brown recommendations.   Denied any abdominal pain, nausea, vomiting.    Objective/physical exam:  GENERAL: Awake and in NAD  LUNGS: CTA anteriorly  CVS: Normal rate  GI/: Soft, NT, bowel sounds positive.  EXTREMITIES: No peripheral edema  NEURO: AAOx3  PSYCH: Cooperative    VITAL SIGNS: 24 HRS MIN & MAX LAST   Temp  Min: 97.6 °F (36.4 °C)  Max: 98.2 °F (36.8 °C) 98.2 °F (36.8 °C)   BP  Min: 113/72  Max: 159/71 (!) 159/71     Pulse  Min: 67  Max: 85  85   Resp  Min: 17  Max: 18 18   SpO2  Min: 97 %  Max: 99 % 99 %       Recent Labs   Lab " 03/18/23  0451 03/18/23  0641 03/19/23  0408   WBC 5.0 6.0 6.4   RBC 2.19* 2.59* 2.81*   HGB 6.5* 7.4* 8.2*   HCT 18.3* 21.8* 24.8*   MCV 83.6 84.2 88.3   MCH 29.7 28.6 29.2   MCHC 35.5 33.9 33.1   RDW 15.7 15.7 16.1    362 316   MPV 9.4 9.5 9.0       Recent Labs   Lab 03/17/23  0342 03/17/23  1643 03/18/23  0641 03/19/23  0408 03/20/23  0345   *  --  133* 135* 135*   K 4.6  --  3.2* 3.7 3.9   CO2 8*  --  28 25 23   BUN 27.0*  --  23.7 15.7 11.0   CREATININE 1.45*  --  1.25* 0.90 0.80   CALCIUM 9.5  --  8.1* 8.2* 8.6*   PH  --  7.44  --   --   --    MG 1.70  --   --  1.30* 1.70   ALBUMIN 2.4*  --  2.0* 2.0*  --    ALKPHOS 1,384*  --  891* 789*  --    ALT 58*  --  35 32  --    AST 34  --  22 23  --    BILITOT 2.7*  --  1.9* 1.7*  --           Microbiology Results (last 7 days)       ** No results found for the last 168 hours. **             See below for Radiology    Scheduled Med:   enoxaparin  40 mg Subcutaneous Daily    finasteride  5 mg Oral Daily    magnesium oxide  400 mg Oral BID    pantoprazole  40 mg Oral Daily    tamsulosin  0.4 mg Oral Daily    zinc oxide-cod liver oil   Topical (Top) BID        Continuous Infusions:   dextrose 5 % and 0.9 % NaCl 75 mL/hr at 03/20/23 1127        PRN Meds:  acetaminophen, ALPRAZolam, hydrOXYzine, melatonin, naloxone, ondansetron, prochlorperazine, simethicone       Assessment/Plan:  Gastric outlet obstruction s/t tumor  Adenocarcinoma of the duodenum  obstructive jaundice, s/p prior percutaneous drain  Severe metabolic acidosis  Anemia of chronic disease  Recent Enterococcus faecium bacteremia      Patient was evaluated by Surgical Oncology NP this morning awaiting Dr. Brown's recommendations for definitive surgical plan  Continue with current care  Continue therapy services   Continue current home medications  Continue D5 NS follow nutrition recs  Reviewed labs sodium 135, K 3.9, chloride 103, bicarb 23, BUN 11, creatinine 0.8, Mag 1.7, prealbumin 13.2   Case  "discussed with case management  Labs in the a.m.      VTE prophylaxis:  Lovenox    Patient condition:  Guarded    Anticipated discharge and Disposition:   To be decided        _____________________________________________________________________    Nutrition Status:    Radiology:  CT Abdomen Pelvis W Wo Contrast  Narrative: EXAMINATION:  CT ABDOMEN PELVIS W WO CONTRAST    CLINICAL HISTORY:  duodenal cancer;Malignant neoplasm of duodenum    TECHNIQUE:  Low dose axial images, sagittal and coronal reformations were obtained from the lung bases to the pubic symphysis before and following the IV administration of 100 mL of Visipaque 320.  No oral contrast was given.    COMPARISON:  February 11, 2023    FINDINGS:  The percutaneous biliary drain is again noted.  No intra or extrahepatic bile duct dilation is observed.  The gallbladder is present.  No liver lesion is identified.    The spleen is not enlarged.  The pancreas shows no evidence of mass or duct dilation.  The adrenal glands are unremarkable.  There is no calcified renal stone, enhancing renal mass, or hydronephrosis.  There are bilateral renal cysts, the largest on right measuring 3.7 cm in diameter.  The bladder is decompressed by Paul catheter.  The prostate gland is enlarged.    There is abnormal fluid distension of the stomach, consistent with gastric outlet obstruction.  There is an underlying open "apple-core" lesion in the 3rd portion of the duodenum near the ampulla measuring 3.2 cm in length.  Gastric distension is a new finding when compared to the prior study.  The small bowel is not dilated.  The terminal ileum and appendix are normal.  Mild diverticulosis is present in the colon.    The infrarenal abdominal aorta is focally dilated, reaching a maximum transverse diameter of 2.5 cm (series 9, image 117).  No retroperitoneal lymphadenopathy is appreciated.    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  No bone lesion is detected.  The subcutaneous soft tissues are unremarkable.  Impression: Developing gastric outlet obstruction, secondary to a 3.2 cm long "apple-core" lesion in the 3rd portion of the duodenum at the level of the ampulla, presumably representing a primary adenocarcinoma.  No CT evidence of distant metastasis.    Additional findings as above.    Electronically signed by: Sonido Houston  Date:    03/15/2023  Time:    13:09  CT Chest With Contrast  Narrative: EXAMINATION:  CT CHEST WITH CONTRAST    CLINICAL HISTORY:  duodenal cancer;Malignant neoplasm of duodenum    TECHNIQUE:  Low dose axial images, sagittal and coronal reformations were obtained from the thoracic inlet to the lung bases following the IV administration of 100 mL of Visipaque 320.    COMPARISON:  March 2, 2023    FINDINGS:  Support devices: None    Heart/pericardial/pleural spaces: Cardiac size is not enlarged.  Coronary calcium is heavy.  No pleural or pericardial effusion is observed.    Hilum/mediastinum/great vessels: No hilar or mediastinal lymphadenopathy is detected.  The aortic arch and pulmonary trunk are normal in caliber.    Chest wall/diaphragm/upper abdomen: No axillary or supraclavicular lymphadenopathy is seen.  The abdomen is addressed by separate dedicated dictation.  No bone lesion is detected.  Flowing anterior longitudinal ligament ossification fuses the middle and lower thoracic spine.    Lungs: Hypoventilatory changes are present in the lungs dependently.  There is no pulmonary nodule.    Central airway: The tracheobronchial tree is patent.  Impression: No evidence of metastatic disease in the thorax.    Electronically signed by: Sonido Houston  Date:    03/15/2023  Time:    12:55      Ashwini Mcmanus MD   03/20/2023                "

## 2023-03-20 NOTE — PLAN OF CARE
Problem: Physical Therapy  Goal: Physical Therapy Goal  Description: Goals to be met by: 23     Patient will increase functional independence with mobility by performin. Supine to sit with [place]  2. Sit to stand transfer with [place]  3. Bed to chair transfer with [place] using No Assistive Device  4. Gait  x 500 feet with [place] using No Assistive Device.     Outcome: Ongoing, Progressing

## 2023-03-20 NOTE — PT/OT/SLP EVAL
Physical Therapy Evaluation/Re-Evaluation    Patient Name:  Quincy Triplett   MRN:  [id number]    Recommendations:     Discharge Recommendations: home health PT   Discharge Equipment Recommendations: none   Barriers to discharge:  medical acuity    Assessment:     Quincy Triplett is a 82 y.o. male admitted with a medical diagnosis of adenocarcinoma of duodenum, gastric outlet obstruction s/t tumor, obstructive jaundice s/p percutaneous drain, severe metabolic acidosis.  He presents with the following impairments/functional limitations: weakness, impaired endurance, impaired self care skills, impaired functional mobility, gait instability, impaired balance. Pt requires CGA to min A for mobility with poor balance, increased lateral sway, and multiple LOB. Pt aware of  deficits and requests ongoing therapy services to prevent decline during inpatient stay.    Rehab Prognosis: Good; patient would benefit from acute skilled PT services to address these deficits and reach maximum level of function.    Recent Surgery: * No surgery found *      Plan:     During this hospitalization, patient to be seen 5 x/week to address the identified rehab impairments via gait training, therapeutic activities, therapeutic exercises and progress toward the following goals:    Plan of Care Expires:  04/20/23    Subjective     Chief Complaint: unable to eat solid foods  Patient/Family Comments/goals: remain inpatient until surgery  Pain/Comfort:  Pain Rating 1: 0/10    Patients cultural, spiritual, Denominational conflicts given the current situation:      Living Environment:  Pt lives in single level home alone with no steps to enter. Pt lived alone prior but has family staying with him at this time due to recent hospitalizations.  Prior to admission, patients level of function was supervision.  Equipment used at home: none.  DME owned (not currently used): none.  Upon discharge, patient will have assistance from family.    Objective:      Communicated with nurse prior to session.  Patient found HOB elevated with peripheral IV, campos catheter, Other (comments) (percutaneous drain)  upon PT entry to room.    General Precautions: Standard, fall  Orthopedic Precautions:N/A   Braces: N/A  Respiratory Status: Room air        Exams:  Cognitive Exam:  Patient is oriented to Person, Place, Time, and Situation  RLE ROM: WFL  RLE Strength: WFL  LLE ROM: WFL  LLE Strength: WFL  Skin integrity: Visible skin intact      Functional Mobility:  Bed Mobility:     Supine to Sit: contact guard assistance  Transfers:     Sit to Stand:  contact guard assistance with no AD  Gait: 200ft with CGA, L lateral trunk lean with moderate deviation from path, 3 LOB throughout duration requiring min A to recover  Balance: Unsupported dynamic standing balance fair -      AM-PAC 6 CLICK MOBILITY  Total Score:18       Treatment & Education:    Patient provided with verbal education regarding PT POC.  Understanding was verbalized.     Patient left up in chair with all lines intact and call button in reach.    GOALS:   Multidisciplinary Problems       Physical Therapy Goals          Problem: Physical Therapy    Goal Priority Disciplines Outcome Goal Variances Interventions   Physical Therapy Goal     PT, PT/OT Ongoing, Progressing     Description: Goals to be met by: 23     Patient will increase functional independence with mobility by performin. Supine to sit with [place]  2. Sit to stand transfer with [place]  3. Bed to chair transfer with [place] using No Assistive Device  4. Gait  x 500 feet with [place] using No Assistive Device.                          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;  Service: Gastroenterology;;  NextPowder HemeSpray    ERCP N/A 12/21/2022    Procedure: ERCP;  Surgeon: Sushil Anderson MD;  Location: [facility] ENDOSCOPY;  Service: Gastroenterology;  Laterality: N/A;  food in abdomen    ERCP, WITH BIOPSY  12/21/2022    Procedure: ERCP, WITH BIOPSY;  Surgeon: Sushil Anderson MD;  Location: [facility] ENDOSCOPY;  Service: Gastroenterology;;    ESOPHAGOGASTRODUODENOSCOPY N/A 1/19/2023    Procedure: EGD;  Surgeon: Ranjeet Perez MD;  Location: [facility];  Service: Gastroenterology;  Laterality: N/A;    LEFT HEART CATHETERIZATION      TONSILLECTOMY         Time Tracking:     PT Received On: 03/20/23  PT Start Time: 0918     PT Stop Time: 0940  PT Total Time (min): 22 min     Billable Minutes: Evaluation moderate      03/20/2023

## 2023-03-21 LAB
ALBUMIN SERPL-MCNC: 2 G/DL (ref 3.4–4.8)
ALBUMIN/GLOB SERPL: 0.8 RATIO (ref 1.1–2)
ALP SERPL-CCNC: 864 UNIT/L (ref 40–150)
ALT SERPL-CCNC: 47 UNIT/L (ref 0–55)
AST SERPL-CCNC: 44 UNIT/L (ref 5–34)
BASOPHILS # BLD AUTO: 0.05 X10(3)/MCL (ref 0–0.2)
BASOPHILS NFR BLD AUTO: 0.6 %
BILIRUBIN DIRECT+TOT PNL SERPL-MCNC: 1.8 MG/DL
BUN SERPL-MCNC: 8.9 MG/DL (ref 8.4–25.7)
CALCIUM SERPL-MCNC: 8.5 MG/DL (ref 8.8–10)
CHLORIDE SERPL-SCNC: 106 MMOL/L (ref 98–107)
CO2 SERPL-SCNC: 24 MMOL/L (ref 23–31)
CREAT SERPL-MCNC: 0.76 MG/DL (ref 0.73–1.18)
EOSINOPHIL # BLD AUTO: 0.12 X10(3)/MCL (ref 0–0.9)
EOSINOPHIL NFR BLD AUTO: 1.5 %
ERYTHROCYTE [DISTWIDTH] IN BLOOD BY AUTOMATED COUNT: 15.9 % (ref 11.5–17)
GFR SERPLBLD CREATININE-BSD FMLA CKD-EPI: >60 MLS/MIN/1.73/M2
GLOBULIN SER-MCNC: 2.5 GM/DL (ref 2.4–3.5)
GLUCOSE SERPL-MCNC: 99 MG/DL (ref 82–115)
HCT VFR BLD AUTO: 24.1 % (ref 42–52)
HGB BLD-MCNC: 7.7 G/DL (ref 14–18)
IMM GRANULOCYTES # BLD AUTO: 0.03 X10(3)/MCL (ref 0–0.04)
IMM GRANULOCYTES NFR BLD AUTO: 0.4 %
LYMPHOCYTES # BLD AUTO: 1.61 X10(3)/MCL (ref 0.6–4.6)
LYMPHOCYTES NFR BLD AUTO: 20.5 %
MCH RBC QN AUTO: 28.5 PG
MCHC RBC AUTO-ENTMCNC: 32 G/DL (ref 33–36)
MCV RBC AUTO: 89.3 FL (ref 80–94)
MONOCYTES # BLD AUTO: 0.5 X10(3)/MCL (ref 0.1–1.3)
MONOCYTES NFR BLD AUTO: 6.4 %
NEUTROPHILS # BLD AUTO: 5.56 X10(3)/MCL (ref 2.1–9.2)
NEUTROPHILS NFR BLD AUTO: 70.6 %
NRBC BLD AUTO-RTO: 0 %
PLATELET # BLD AUTO: 309 X10(3)/MCL (ref 130–400)
PMV BLD AUTO: 9.6 FL (ref 7.4–10.4)
POTASSIUM SERPL-SCNC: 4 MMOL/L (ref 3.5–5.1)
PROT SERPL-MCNC: 4.5 GM/DL (ref 5.8–7.6)
RBC # BLD AUTO: 2.7 X10(6)/MCL (ref 4.7–6.1)
SODIUM SERPL-SCNC: 136 MMOL/L (ref 136–145)
WBC # SPEC AUTO: 7.9 X10(3)/MCL (ref 4.5–11.5)

## 2023-03-21 PROCEDURE — B4185 PARENTERAL SOL 10 GM LIPIDS: HCPCS | Performed by: SURGERY

## 2023-03-21 PROCEDURE — 11000001 HC ACUTE MED/SURG PRIVATE ROOM

## 2023-03-21 PROCEDURE — 25000003 PHARM REV CODE 250: Performed by: INTERNAL MEDICINE

## 2023-03-21 PROCEDURE — 63600175 PHARM REV CODE 636 W HCPCS: Performed by: NURSE PRACTITIONER

## 2023-03-21 PROCEDURE — 85025 COMPLETE CBC W/AUTO DIFF WBC: CPT | Performed by: INTERNAL MEDICINE

## 2023-03-21 PROCEDURE — 99233 SBSQ HOSP IP/OBS HIGH 50: CPT | Mod: ,,, | Performed by: SURGERY

## 2023-03-21 PROCEDURE — 25000003 PHARM REV CODE 250: Performed by: SURGERY

## 2023-03-21 PROCEDURE — A4216 STERILE WATER/SALINE, 10 ML: HCPCS | Performed by: SURGERY

## 2023-03-21 PROCEDURE — 80053 COMPREHEN METABOLIC PANEL: CPT | Performed by: INTERNAL MEDICINE

## 2023-03-21 PROCEDURE — 36569 INSJ PICC 5 YR+ W/O IMAGING: CPT

## 2023-03-21 PROCEDURE — C1751 CATH, INF, PER/CENT/MIDLINE: HCPCS

## 2023-03-21 PROCEDURE — 97116 GAIT TRAINING THERAPY: CPT

## 2023-03-21 PROCEDURE — 25000003 PHARM REV CODE 250: Performed by: NURSE PRACTITIONER

## 2023-03-21 PROCEDURE — 99233 PR SUBSEQUENT HOSPITAL CARE,LEVL III: ICD-10-PCS | Mod: ,,, | Performed by: SURGERY

## 2023-03-21 RX ORDER — SODIUM CHLORIDE 0.9 % (FLUSH) 0.9 %
10 SYRINGE (ML) INJECTION EVERY 6 HOURS
Status: DISCONTINUED | OUTPATIENT
Start: 2023-03-21 | End: 2023-04-04

## 2023-03-21 RX ORDER — SODIUM CHLORIDE 0.9 % (FLUSH) 0.9 %
10 SYRINGE (ML) INJECTION
Status: DISCONTINUED | OUTPATIENT
Start: 2023-03-21 | End: 2023-04-04

## 2023-03-21 RX ADMIN — SODIUM CHLORIDE, PRESERVATIVE FREE 10 ML: 5 INJECTION INTRAVENOUS at 12:03

## 2023-03-21 RX ADMIN — Medication 400 MG: at 09:03

## 2023-03-21 RX ADMIN — FINASTERIDE 5 MG: 5 TABLET, FILM COATED ORAL at 09:03

## 2023-03-21 RX ADMIN — TAMSULOSIN HYDROCHLORIDE 0.4 MG: 0.4 CAPSULE ORAL at 09:03

## 2023-03-21 RX ADMIN — Medication 6 MG: at 08:03

## 2023-03-21 RX ADMIN — LEUCINE, PHENYLALANINE, LYSINE, METHIONINE, ISOLEUCINE, VALINE, HISTIDINE, THREONINE, TRYPTOPHAN, ALANINE, GLYCINE, ARGININE, PROLINE, SERINE, TYROSINE, SODIUM ACETATE, DIBASIC POTASSIUM PHOSPHATE, MAGNESIUM CHLORIDE, SODIUM CHLORIDE, CALCIUM CHLORIDE, DEXTROSE
365; 280; 290; 200; 300; 290; 240; 210; 90; 1035; 515; 575; 340; 250; 20; 340; 261; 51; 59; 33; 15 INJECTION INTRAVENOUS at 07:03

## 2023-03-21 RX ADMIN — PANTOPRAZOLE SODIUM 40 MG: 40 TABLET, DELAYED RELEASE ORAL at 09:03

## 2023-03-21 RX ADMIN — Medication: at 08:03

## 2023-03-21 RX ADMIN — Medication 400 MG: at 08:03

## 2023-03-21 RX ADMIN — ENOXAPARIN SODIUM 40 MG: 40 INJECTION SUBCUTANEOUS at 05:03

## 2023-03-21 RX ADMIN — I.V. FAT EMULSION 250 ML: 20 EMULSION INTRAVENOUS at 10:03

## 2023-03-21 RX ADMIN — SODIUM CHLORIDE, PRESERVATIVE FREE 10 ML: 5 INJECTION INTRAVENOUS at 05:03

## 2023-03-21 RX ADMIN — ACETAMINOPHEN 325MG 650 MG: 325 TABLET ORAL at 08:03

## 2023-03-21 RX ADMIN — Medication: at 09:03

## 2023-03-21 NOTE — CONSULTS
Inpatient Nutrition Assessment    Admit Date: 3/16/2023   Total duration of encounter: 5 days     Nutrition Recommendation/Prescription     Continue diet as tolerated, advance as medically feasible  Continue Boost Breeze while on Clear liquid diet. Will provide 250 lyndsey, 9 gm pro per serving. Once diet advanced, change supplement to Chocolate Boost plus per pt request.  Start TPN. Can start with Clinimix 5/15 @ 75 mL/hr x 24 hours. Monitor for refeeding syndrome and provide recommendations if needed.      Communication of Recommendations: reviewed with nurse, reviewed with patient, and reviewed with pharmacy    Nutrition Assessment     Malnutrition Assessment/Nutrition-Focused Physical Exam    Malnutrition in the context of acute illness or injury  Degree of Malnutrition: non-severe (moderate) malnutrition  Energy Intake: </= 50% of estimated energy requirement for >/= 5 days  Interpretation of Weight Loss: >5% in 1 month  Body Fat:moderate depletion  Area of Body Fat Loss: orbital region  and upper arm region - triceps / biceps  Muscle Mass Loss: moderate depletion  Area of Muscle Mass Loss: temple region - temporalis muscle and clavicle bone region - pectoralis major, deltoid, trapezius muscles  Fluid Accumulation: does not meet criteria  Edema: does not meet criteria   Reduced  Strength: unable to obtain  A minimum of two characteristics is recommended for diagnosis of either severe or non-severe malnutrition.    Chart Review    Reason Seen: malnutrition screening tool (MST) and physician consult for initiate TPN; sacral wound    Malnutrition Screening Tool Results   Have you recently lost weight without trying?: Yes: 34 lbs or more  Have you been eating poorly because of a decreased appetite?: Yes   MST Score: 5     Diagnosis:  Acute kidney injury due to intravascular volume depletion secondary to nausea and vomiting   Acute complicated bacterial cystitis, present on admission   Normocytic  "anemia  Hyponatremia  Transaminitis hyperbilirubinemia   Fatigue (Hx small intestine CA awaiting surgical tx, increased weakness started yesterday)    Relevant Medical History: hypertension, hyperlipidemia, coronary disease status post stent, peripheral arterial disease, adenocarcinoma of duodenum, BPH with chronic indwelling Paul catheter    Nutrition-Related Medications: Magnesium oxide, pantoprazole, D5W + NS @ 75 mL/hr  Calorie Containing IV Medications: no significant kcals from medications at this time    Nutrition-Related Labs:  3/18: H/H-7.4/21.8, Na-133, K-3.2, Crea-1.25, Ca-8.1, Alk phos-891  3/21/2023: H/H 7.7/24.1, Ca 8.5, Alk Phos 864, Alb 2.0, Total Bili 1.8, AST 44, Gluc 99    Diet/PN Order: Diet clear liquid  amino acid 5% in 15% dextrose (CLINIMIX-E) solution (1L provides 50gm AA, 150gm CHO (510 kcal/L dextrose), Na 35, K 30, Mg 5, Ca 4.5, Acetate 80, Cl 39, Phos 15) (710 total kcal/L)  Oral Supplement Order: Boost Breeze  Tube Feeding Order: none  Appetite/Oral Intake: good/% of meals  Factors Affecting Nutritional Intake: altered gastrointestinal function  Food/Catholic/Cultural Preferences: none reported  Food Allergies: no known food allergies    Skin Integrity: drain/device(s)  Wound(s):   sacral reddness    Comments    3/18/23: Pt feeling better this morning. Admitted with N/V. But is now able to tolerate liquids today. Dr Garrett added Boost Breeze with meals. Pt is to stay on liquids for now. Pt scheduled for whipple on 3/27/23; pending surgery eval while in hospital. Pt with physical wasting noted along with wt loss. If pt to remain on clear liquids > 7 days, will need TPN.     3/21/2023: Pt reports drinking/eating >75% PO intake of meals. Pt receives Boost Breeze BID. Pt denies N/V. Provided TPN recommendations per consult. Will monitor for refeeding syndrome. Last BM documented as 3/19/2023.    Anthropometrics    Height: 5' 9.02" (175.3 cm)    Last Weight: 65 kg (143 lb 4.8 oz) " (23 1244) Weight Method: Standard Scale  BMI (Calculated): 21.2  BMI Classification: underweight (BMI less than 22 if >65 years of age)        Ideal Body Weight (IBW), Male: 160.12 lb     % Ideal Body Weight, Male (lb): 89.5 %                 Usual Body Weight (UBW), k.7 kg  % Usual Body Weight: 90.85  % Weight Change From Usual Weight: -9.35 %  Usual Weight Provided By: EMR weight history    Wt Readings from Last 5 Encounters:   23 65 kg (143 lb 4.8 oz)   23 67.3 kg (148 lb 5.9 oz)   02/15/23 71.7 kg (158 lb)   23 70.3 kg (155 lb)   23 69.3 kg (152 lb 12.5 oz)     Weight Change(s) Since Admission:  Admit Weight: 73.5 kg (162 lb) (23 0452)  65kg; 9% wt loss x1 month. Rt arm amputation noted.   3/18/2023: 65 kg    Estimated Needs    Weight Used For Calorie Calculations: 65 kg (143 lb 4.8 oz)  Energy Calorie Requirements (kcal): 1750 kcal (MSJxSF1.3)  Energy Need Method: [place]-St Jeor  Weight Used For Protein Calculations: 65 kg (143 lb 4.8 oz)  Protein Requirements: 90gm (kgx1.4)  Fluid Requirements (mL): 1750mL (1mL/kcal)  Temp: 98.7 °F (37.1 °C)       Enteral Nutrition    Patient not receiving enteral nutrition at this time.    Parenteral Nutrition    Patient not receiving parenteral nutrition support at this time.    Evaluation of Received Nutrient Intake    Calories: not meeting estimated needs  Protein: not meeting estimated needs    Patient Education    Not applicable.    Nutrition Diagnosis     PES: Malnutrition related to cancer as evidenced by 9% wt loss x1 month, fat and muscle wasting, poor diet tolerance/intake > 5 days. (continues)    Interventions/Goals     Intervention(s): general/healthful diet, modified composition of parenteral nutrition, commercial beverage, and collaboration with other providers  Goal: Meet greater than 75% of nutritional needs by follow-up. (goal progressing)    Monitoring & Evaluation     Dietitian will monitor energy intake.  Nutrition  Risk/Follow-Up: high (follow-up in 1-4 days)   Please consult if re-assessment needed sooner.

## 2023-03-21 NOTE — PROGRESS NOTES
Patient with complete gastric outlet obstruction secondary to duodenal adenocarcinoma.  Scans reviewed, no evidence of metastatic disease  Evidence of significant protein malnutrition with albumin 2.0   Needs short course of parenteral nutrition prior to surgery, place PICC line and start TPN per nutrition recommendations.  Plan for Whipple procedure this coming Monday.    Abdirahman Brown MD  Surgical Oncology  Complex General, Gastrointestinal and Hepatobiliary Surgery

## 2023-03-21 NOTE — PROGRESS NOTES
"Hospital Medicine  Progress Note    Patient Name: Quincy Triplett  MRN: [id number]  Status: IP- Inpatient   Admission Date: 3/16/2023  Length of Stay: 2      CC: hospital follow-up for GOO       SUBJECTIVE   82 y.o. white male with a history that includes recently diagnosed adenocarcinoma of ampulla vater with resulting obstructive jaundice requiring biliary drain placement, presented back to [facility] on 3/16 with vomiting x 1 day, associated with inability to maintain oral intake, as well as chills, weakness and fatigue. Work up in ED noted patient afebrile, HDS. Labs notable for ALP 1544, Total bilirubin 2.9, AST 40, ALT 65, potassium 5.2, bicarb 9, BUN 23, Cr 1.5.  Staging CT C/A/P from 3/15 noting developing gastric outlet obstruction, secondary to a 3.2 cm long "apple-core" lesion in the 3rd portion of the duodenum, at the level of the ampulla; however there was no evidence of distant metastatic disease.  Of notes patient recently admitted to hospital from 3/2-3/10, being treated for enterococcus faecium bacteremia, planned for antibiotics through 3/18.  Plan was to have subsequent follow-up with Surgical Oncology thereafter to discuss possible Whipple procedure.  Patient was admitted to hospital medicine services for management.  Surgical Oncology consulted to re-assess given new findings.    Today: Patient seen and examined at bedside, and chart reviewed. Tolerating liquid diet quite well; no nausea or vomiting.  Minor electrolytes abnormalities, otherwise stable.      MEDICATIONS   Scheduled   enoxaparin  40 mg Subcutaneous Daily    fat emulsion 20%  250 mL Intravenous Twice Weekly    [START ON 3/24/2023] fat emulsion 20%  250 mL Intravenous Every Tues, Fri    finasteride  5 mg Oral Daily    magnesium oxide  400 mg Oral BID    pantoprazole  40 mg Oral Daily    sodium chloride 0.9%  10 mL Intravenous Q6H    tamsulosin  0.4 mg Oral Daily    zinc oxide-cod liver oil   Topical (Top) BID     Continuous Infusions   " amino acid 5 % in 15% dextrose      dextrose 5 % and 0.9 % NaCl 75 mL/hr at 03/20/23 1127         PHYSICAL EXAM   VITALS: T 98.7 °F (37.1 °C)   /60   P 72   RR 18   O2 98 %    GENERAL: Awake and in NAD  LUNGS: CTA anteriorly  CVS: Normal rate  GI/: Soft, NT, bowel sounds positive.  EXTREMITIES: No peripheral edema  NEURO: AAOx3  PSYCH: Cooperative      LABS   CBC  Recent Labs     03/19/23  0408 03/21/23  0352   WBC 6.4 7.9   RBC 2.81* 2.70*   HGB 8.2* 7.7*   HCT 24.8* 24.1*   MCV 88.3 89.3   MCH 29.2 28.5   MCHC 33.1 32.0*   RDW 16.1 15.9    309     CHEM  Recent Labs     03/19/23  0408 03/20/23  0345 03/21/23  0352   * 135* 136   K 3.7 3.9 4.0   CHLORIDE 101 103 106   CO2 25 23 24   BUN 15.7 11.0 8.9   CREATININE 0.90 0.80 0.76   GLUCOSE 95 94 99   CALCIUM 8.2* 8.6* 8.5*   MG 1.30* 1.70  --    PHOS 2.8  --   --    ALBUMIN 2.0*  --  2.0*   GLOBULIN 3.0  --  2.5   ALKPHOS 789*  --  864*   ALT 32  --  47   AST 23  --  44*   BILITOT 1.7*  --  1.8*       ASSESSMENT   Gastric outlet obstruction s/t tumor  Adenocarcinoma of the duodenum  obstructive jaundice, s/p prior percutaneous drain  Severe metabolic acidosis  Anemia of chronic disease  Recent Enterococcus faecium bacteremia    PLAN   Surgery tentatively planning for resection on Mon  Will continue medical optimization in the interim  Continue with liquid diet, with dietary protein supplementation  Start on TPN, can discontinue D5.  Continue therapy services to increase functional status  Otherwise continue current management and monitoring in the interim        Prophylaxis: SC Lovenox        Peter Garrett MD  [facility]

## 2023-03-21 NOTE — PROCEDURES
"Quincy Triplett is a 82 y.o. male patient.    Temp: 97.9 °F (36.6 °C) (03/21/23 0725)  Pulse: 64 (03/21/23 0725)  Resp: 18 (03/21/23 0725)  BP: 135/78 (03/21/23 0725)  SpO2: 99 % (03/21/23 0725)  Weight: 65 kg (143 lb 4.8 oz) (03/18/23 1244)  Height: 5' 9.02" (175.3 cm) (03/18/23 1249)    PICC  Date/Time: 3/21/2023 11:07 AM  Performed by: Angeles Ureña RN  Consent Done: Yes  Time out: Immediately prior to procedure a time out was called to verify the correct patient, procedure, equipment, support staff and site/side marked as required  Indications: med administration and vascular access  Anesthesia: local infiltration  Local anesthetic: lidocaine 1% without epinephrine  Anesthetic Total (mL): 4  Preparation: skin prepped with ChloraPrep  Skin prep agent dried: skin prep agent completely dried prior to procedure  Sterile barriers: all five maximum sterile barriers used - cap, mask, sterile gown, sterile gloves, and large sterile sheet  Hand hygiene: hand hygiene performed prior to central venous catheter insertion  Location details: left basilic  Catheter type: double lumen  Catheter size: 5 Fr  Catheter Length: 45cm    Ultrasound guidance: yes  Vessel Caliber: medium and patent, compressibility normal  Needle advanced into vessel with real time Ultrasound guidance.  Guidewire confirmed in vessel.  Sterile sheath used.  Number of attempts: 1  Post-procedure: blood return through all ports, sterile dressing applied and chlorhexidine patch    Assessment: placement verified by x-ray  Complications: none  Comments: Arm circ 26cm      Angeles Ureña RN  3/21/2023    "

## 2023-03-21 NOTE — PT/OT/SLP PROGRESS
Physical Therapy Treatment    Patient Name:  Quincy Triplett   MRN:  [id number]    Recommendations:     Discharge Recommendations: home health PT  Discharge Equipment Recommendations: none  Barriers to discharge:  medical complexity, pending surgery    Assessment:     Quincy Triplett is a 82 y.o. male admitted with a medical diagnosis of  adenocarcinoma of duodenum, gastric outlet obstruction s/t tumor, obstructive jaundice s/p percutaneous drain, severe metabolic acidosis. Pt pending surgical procedure for Monday 3/27/23 .  He presents with the following impairments/functional limitations: gait instability, impaired balance, impaired functional mobility, impaired self care skills, impaired endurance.    Rehab Prognosis: Good; patient would benefit from acute skilled PT services to address these deficits and reach maximum level of function.    Recent Surgery: * No surgery found *      Plan:     During this hospitalization, patient to be seen 5 x/week to address the identified rehab impairments via gait training, therapeutic activities, therapeutic exercises and progress toward the following goals:    Plan of Care Expires:  04/20/23    Subjective     Chief Complaint:   Patient/Family Comments/goals: stay stronger for surgery  Pain/Comfort:  Pain Rating 1: 0/10      Objective:     Communicated with nurse prior to session.  Patient found left sidelying with Other (comments), PICC line, campos catheter (percutaneous drain) upon PT entry to room. Pt sleeping but easily alerted to verbal cues. Agreeable to gait training but declined further exercise.    General Precautions: Standard, fall  Orthopedic Precautions: N/A  Braces: N/A  Respiratory Status: Room air  Blood Pressure:   Skin Integrity: Visible skin intact      Functional Mobility:  Bed Mobility:     Supine to Sit: supervision  Transfers:     Sit to Stand:  minimum assistance with hand-held assist  Gait: 300ft SBA without AD. Wide ANNALISE with poor foot clearance at  times. Verbal instruction to correct with poor pt acceptance. No LOB but decline in tarik throughout duration.    Therapeutic Activities/Exercises:  Pt declined further activity despite max encouragement    Education:  Patient provided with verbal education regarding risk of falls and strategies to improve safety with mobility.  Understanding was verbalized, however additional teaching warranted.     Patient left sitting edge of bed with all lines intact, call button in reach, and family present..    GOALS:   Multidisciplinary Problems       Physical Therapy Goals          Problem: Physical Therapy    Goal Priority Disciplines Outcome Goal Variances Interventions   Physical Therapy Goal     PT, PT/OT Ongoing, Progressing     Description: Goals to be met by: 23     Patient will increase functional independence with mobility by performin. Supine to sit with [place]  2. Sit to stand transfer with [place]  3. Bed to chair transfer with [place] using No Assistive Device  4. Gait  x 500 feet with [place] using No Assistive Device.                          Time Tracking:     PT Received On: 23  PT Start Time: 1358     PT Stop Time: 1415  PT Total Time (min): 17 min     Billable Minutes: Gait Training 17min    Treatment Type: Treatment  PT/PTA: PT     Number of PTA visits since last PT visit: 2023

## 2023-03-22 LAB
ALBUMIN SERPL-MCNC: 1.9 G/DL (ref 3.4–4.8)
ALBUMIN/GLOB SERPL: 0.6 RATIO (ref 1.1–2)
ALP SERPL-CCNC: 742 UNIT/L (ref 40–150)
ALT SERPL-CCNC: 43 UNIT/L (ref 0–55)
AST SERPL-CCNC: 39 UNIT/L (ref 5–34)
BILIRUBIN DIRECT+TOT PNL SERPL-MCNC: 1.5 MG/DL
BUN SERPL-MCNC: 8.4 MG/DL (ref 8.4–25.7)
CALCIUM SERPL-MCNC: 8.6 MG/DL (ref 8.8–10)
CHLORIDE SERPL-SCNC: 109 MMOL/L (ref 98–107)
CO2 SERPL-SCNC: 22 MMOL/L (ref 23–31)
CREAT SERPL-MCNC: 0.77 MG/DL (ref 0.73–1.18)
GFR SERPLBLD CREATININE-BSD FMLA CKD-EPI: >60 MLS/MIN/1.73/M2
GLOBULIN SER-MCNC: 3 GM/DL (ref 2.4–3.5)
GLUCOSE SERPL-MCNC: 118 MG/DL (ref 82–115)
MAGNESIUM SERPL-MCNC: 1.7 MG/DL (ref 1.6–2.6)
PHOSPHATE SERPL-MCNC: 2.8 MG/DL (ref 2.3–4.7)
POTASSIUM SERPL-SCNC: 3.9 MMOL/L (ref 3.5–5.1)
PROT SERPL-MCNC: 4.9 GM/DL (ref 5.8–7.6)
SODIUM SERPL-SCNC: 138 MMOL/L (ref 136–145)

## 2023-03-22 PROCEDURE — 97530 THERAPEUTIC ACTIVITIES: CPT | Mod: CQ

## 2023-03-22 PROCEDURE — 11000001 HC ACUTE MED/SURG PRIVATE ROOM

## 2023-03-22 PROCEDURE — 25000003 PHARM REV CODE 250: Performed by: INTERNAL MEDICINE

## 2023-03-22 PROCEDURE — 25000003 PHARM REV CODE 250: Performed by: NURSE PRACTITIONER

## 2023-03-22 PROCEDURE — 63600175 PHARM REV CODE 636 W HCPCS: Performed by: NURSE PRACTITIONER

## 2023-03-22 PROCEDURE — A4216 STERILE WATER/SALINE, 10 ML: HCPCS | Performed by: SURGERY

## 2023-03-22 PROCEDURE — 80053 COMPREHEN METABOLIC PANEL: CPT | Performed by: SURGERY

## 2023-03-22 PROCEDURE — 84100 ASSAY OF PHOSPHORUS: CPT | Performed by: SURGERY

## 2023-03-22 PROCEDURE — 83735 ASSAY OF MAGNESIUM: CPT | Performed by: SURGERY

## 2023-03-22 PROCEDURE — 25000003 PHARM REV CODE 250: Performed by: SURGERY

## 2023-03-22 RX ADMIN — LEUCINE, PHENYLALANINE, LYSINE, METHIONINE, ISOLEUCINE, VALINE, HISTIDINE, THREONINE, TRYPTOPHAN, ALANINE, GLYCINE, ARGININE, PROLINE, SERINE, TYROSINE, SODIUM ACETATE, DIBASIC POTASSIUM PHOSPHATE, MAGNESIUM CHLORIDE, SODIUM CHLORIDE, CALCIUM CHLORIDE, DEXTROSE
365; 280; 290; 200; 300; 290; 240; 210; 90; 1035; 515; 575; 340; 250; 20; 340; 261; 51; 59; 33; 15 INJECTION INTRAVENOUS at 09:03

## 2023-03-22 RX ADMIN — Medication: at 09:03

## 2023-03-22 RX ADMIN — FINASTERIDE 5 MG: 5 TABLET, FILM COATED ORAL at 09:03

## 2023-03-22 RX ADMIN — SODIUM CHLORIDE, PRESERVATIVE FREE 10 ML: 5 INJECTION INTRAVENOUS at 06:03

## 2023-03-22 RX ADMIN — Medication 6 MG: at 09:03

## 2023-03-22 RX ADMIN — PANTOPRAZOLE SODIUM 40 MG: 40 TABLET, DELAYED RELEASE ORAL at 09:03

## 2023-03-22 RX ADMIN — Medication 400 MG: at 09:03

## 2023-03-22 RX ADMIN — ENOXAPARIN SODIUM 40 MG: 40 INJECTION SUBCUTANEOUS at 04:03

## 2023-03-22 RX ADMIN — SODIUM CHLORIDE, PRESERVATIVE FREE 10 ML: 5 INJECTION INTRAVENOUS at 12:03

## 2023-03-22 RX ADMIN — ACETAMINOPHEN 325MG 650 MG: 325 TABLET ORAL at 11:03

## 2023-03-22 RX ADMIN — ACETAMINOPHEN 325MG 650 MG: 325 TABLET ORAL at 09:03

## 2023-03-22 RX ADMIN — ONDANSETRON 4 MG: 2 INJECTION INTRAMUSCULAR; INTRAVENOUS at 11:03

## 2023-03-22 RX ADMIN — SODIUM CHLORIDE, PRESERVATIVE FREE 10 ML: 5 INJECTION INTRAVENOUS at 11:03

## 2023-03-22 RX ADMIN — TAMSULOSIN HYDROCHLORIDE 0.4 MG: 0.4 CAPSULE ORAL at 09:03

## 2023-03-22 NOTE — PT/OT/SLP PROGRESS
Physical Therapy Treatment    Patient Name:  Quincy Triplett   MRN:  [id number]    Recommendations:     Discharge Recommendations:  home health PT   Discharge Equipment Recommendations: none     Subjective     Patient awake and alert.     Objective:     General Precautions: Standard, fall   Orthopedic Precautions:N/A   Braces:    Respiratory Status: Room air   Communicated with nurse prior to treatment.     Functional Mobility:    Rolling:Activity did not occur  Supine to sit:Activity did not occur  Sit to stand transfer: Moderate Assistance  Bed to chair transfer:Activity did not occur    Pt stood x 2 trials but too weak to ambulate. Gait belt issued and family to walk with nursing assisst later if he fells better. Last treatment he ambulated 300 ft without AD.     Patient left up in chair with  family present.    AM-PAC 6 CLICK MOBILITY        GOALS:   Multidisciplinary Problems       Physical Therapy Goals          Problem: Physical Therapy    Goal Priority Disciplines Outcome Goal Variances Interventions   Physical Therapy Goal     PT, PT/OT Ongoing, Progressing     Description: Goals to be met by: 23     Patient will increase functional independence with mobility by performin. Supine to sit with [place]  2. Sit to stand transfer with [place]  3. Bed to chair transfer with [place] using No Assistive Device  4. Gait  x 500 feet with [place] using No Assistive Device.                          Assessment:     Quincy Triplett is a 82 y.o. male admitted with a medical diagnosis of <principal problem not specified>.     Patient Active Problem List   Diagnosis    Jaundice    Gastric outlet obstruction    COVID-19    TERRENCE (acute kidney injury)    Generalized weakness    Duodenal cancer    Sepsis    Hypotension        Rehab Prognosis: Good; patient would benefit from acute skilled PT services to address these deficits and reach maximum level of function.    Recent Surgery: Procedure(s)  (LRB):  WHIPPLE PROCEDURE (N/A)      Plan:     During this hospitalization, patient to be seen 5 x/week to address the identified rehab impairments via gait training, therapeutic activities, therapeutic exercises and progress toward the following goals:    Plan of Care Expires:  04/20/23    Billable Minutes     Billable Minutes: Act 10 minutes.     Treatment Type: Treatment  PT/PTA: PTA     Number of PTA visits since last PT visit: 2     03/22/2023

## 2023-03-22 NOTE — PLAN OF CARE
Problem: Adult Inpatient Plan of Care  Goal: Plan of Care Review  Outcome: Ongoing, Progressing  Flowsheets (Taken 3/21/2023 2009)  Plan of Care Reviewed With:   patient   daughter  Goal: Patient-Specific Goal (Individualized)  Outcome: Ongoing, Progressing  Flowsheets (Taken 3/21/2023 2009)  Anxieties, Fears or Concerns: plan of treatment, surgery  Individualized Care Needs: assist with adls, iv nutrition, daily labs  Goal: Absence of Hospital-Acquired Illness or Injury  Outcome: Ongoing, Progressing  Intervention: Identify and Manage Fall Risk  Flowsheets (Taken 3/21/2023 2009)  Safety Promotion/Fall Prevention:   assistive device/personal item within reach   Fall Risk reviewed with patient/family   Fall Risk signage in place   instructed to call staff for mobility  Intervention: Prevent Skin Injury  Flowsheets (Taken 3/21/2023 2009)  Body Position:   weight shifting   position changed independently  Skin Protection:   tubing/devices free from skin contact   adhesive use limited  Intervention: Prevent and Manage VTE (Venous Thromboembolism) Risk  Flowsheets (Taken 3/21/2023 2009)  VTE Prevention/Management: (pt. on lovenox sq)   intravenous hydration   other (see comments)  Range of Motion: active ROM (range of motion) encouraged  Intervention: Prevent Infection  Flowsheets (Taken 3/21/2023 2009)  Infection Prevention:   single patient room provided   hand hygiene promoted   rest/sleep promoted  Goal: Optimal Comfort and Wellbeing  Outcome: Ongoing, Progressing  Intervention: Monitor Pain and Promote Comfort  Flowsheets (Taken 3/21/2023 2009)  Pain Management Interventions:   medication offered   quiet environment facilitated   warm blanket provided  Intervention: Provide Person-Centered Care  Flowsheets (Taken 3/21/2023 2009)  Trust Relationship/Rapport:   care explained   questions answered   choices provided   emotional support provided   empathic listening provided   questions encouraged   reassurance  provided   thoughts/feelings acknowledged

## 2023-03-22 NOTE — CONSULTS
Consults  Patient Name: Quincy Triplett   MRN: [id number]   Admission Date: 3/16/2023   Hospital Length of Stay: 3   Attending Provider: Ashwini Mcmanus MD   Consulting Provider: Belinda Pelayo RN  Reason for Consult: Goals of Care  Primary Care Physician:  Reji Waters Jr, MD     Principal Problem: <principal problem not specified>       Final diagnoses:  [R11.10] Intractable vomiting      Assessment/Plan:     I reviewed the patient and family's understanding of the seriousness of the illness and its expected prognosis. We discussed the patient's goals of care and treatment preferences.        Mr. Triplett is AA&O, sitting in recliner. His brother in law is present in the room. Pt reports nausea, for which he recently got a dose of Zofran. He states he is starting to feel better and that he had thrown up earlier. Encouraged use of damp cloth to base of throat and/or neck to help ease nausea.     Pt goals of care unchanged in that he wishes to continue to move forward with aggressive interventions.    Recommendations:     Continue zofran and compazine ordered prn N/V.      Interval History:   PICC placed 3/21/2023 for administration of short course of TPN prior to planned Whipple procedure planned for Monday, 03/27/2023. Biliary tube with dark brown liquid draining to gravity. Will continue to follow and assist.        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    Sepsis 03/03/2023    Hypotension 03/03/2023     Resolved Ambulatory Problems     Diagnosis Date Noted    No Resolved Ambulatory Problems     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, unspecified extremity     Coronary artery disease     Dyslipidemia     Hypertension          Past Surgical History:   Procedure Laterality Date    AMPUTATION Right     Right arm    CAROTID STENT      EGD, WITH HEMORRHAGE CONTROL  1/19/2023    Procedure: EGD,WITH HEMORRHAGE CONTROL;  Surgeon: Ranjeet Perez MD;  Location: [facility];  Service: Gastroenterology;;  NextPowder HemeSpray    ERCP N/A 12/21/2022    Procedure: ERCP;  Surgeon: Sushil Anderson MD;  Location: [facility];  Service: Gastroenterology;  Laterality: N/A;  food in abdomen    ERCP, WITH BIOPSY  12/21/2022    Procedure: ERCP, WITH BIOPSY;  Surgeon: Sushil Anderson MD;  Location: [facility];  Service: Gastroenterology;;    ESOPHAGOGASTRODUODENOSCOPY N/A 1/19/2023    Procedure: EGD;  Surgeon: Ranjeet Perez MD;  Location: [facility];  Service: Gastroenterology;  Laterality: N/A;    LEFT HEART CATHETERIZATION      TONSILLECTOMY          Review of patient's allergies indicates:  No Known Allergies       Current Facility-Administered Medications:     acetaminophen tablet 650 mg, 650 mg, Oral, Q6H PRN, ISAI Mendez, 650 mg at 03/22/23 1129    ALPRAZolam tablet 0.5 mg, 0.5 mg, Oral, TID PRN, Jaron Macdonald MD, 0.5 mg at 03/17/23 2115    amino acid 5% in 15% dextrose (CLINIMIX-E) solution (1L provides 50gm AA, 150gm CHO (510 kcal/L dextrose), Na 35, K 30, Mg 5, Ca 4.5, Acetate 80, Cl 39, Phos 15) (710 total kcal/L), , Intravenous, Continuous, Abdirahman Brown MD, Last Rate: 75 mL/hr at 03/21/23 1940, New Bag at 03/21/23 1940    enoxaparin injection 40 mg, 40 mg, Subcutaneous, Daily, ISAI Mendez, 40 mg at 03/21/23 1712    [START ON 3/24/2023] fat emulsion 20% infusion 250 mL, 250 mL, Intravenous, Every Tues, Fri, Abdirahman Brown MD    finasteride tablet 5 mg, 5 mg, Oral, Daily, Jaron Macdonald MD, 5 mg at 03/22/23 0946    hydrOXYzine injection 50 mg, 50 mg, Intramuscular, Nightly PRN, Peter Garrett MD, 50 mg at 03/19/23 2110    magnesium oxide tablet 400 mg, 400 mg, Oral, BID, Peter MATTHEWS  "MD Tami, 400 mg at 03/22/23 0947    melatonin tablet 6 mg, 6 mg, Oral, Nightly PRN, JF MendezP-BC, 6 mg at 03/21/23 2049    naloxone 0.4 mg/mL injection 0.02 mg, 0.02 mg, Intravenous, PRN, Suad Soria AGACNP-BC    ondansetron injection 4 mg, 4 mg, Intravenous, Q4H PRN, Suad Soria AGACNP-BC, 4 mg at 03/22/23 1154    pantoprazole EC tablet 40 mg, 40 mg, Oral, Daily, Jaron Macdonald MD, 40 mg at 03/22/23 0946    prochlorperazine injection Soln 5 mg, 5 mg, Intravenous, Q6H PRN, Suad Soria AGACNP-BC    simethicone chewable tablet 80 mg, 1 tablet, Oral, QID PRN, Suad Soria AGACNP-BC    Flushing PICC Protocol, , , Until Discontinued **AND** sodium chloride 0.9% flush 10 mL, 10 mL, Intravenous, Q6H, 10 mL at 03/22/23 1154 **AND** sodium chloride 0.9% flush 10 mL, 10 mL, Intravenous, PRN, Abdirahman Brown MD    tamsulosin 24 hr capsule 0.4 mg, 0.4 mg, Oral, Daily, Jaron Macdonald MD, 0.4 mg at 03/22/23 0947    zinc oxide-cod liver oil 40 % paste, , Topical (Top), BID, Lee Montaño MD, Given at 03/22/23 0958     acetaminophen, ALPRAZolam, hydrOXYzine, melatonin, naloxone, ondansetron, prochlorperazine, simethicone, Flushing PICC Protocol **AND** sodium chloride 0.9% **AND** sodium chloride 0.9%     No family history on file.       Review of Systems   Gastrointestinal:  Positive for nausea and vomiting.          Objective:   /64   Pulse (!) 113   Temp 99 °F (37.2 °C) (Oral)   Resp 18   Ht 5' 9.02" (1.753 m)   Wt 65 kg (143 lb 4.8 oz)   SpO2 98%   BMI 21.15 kg/m²      Physical Exam   Constitutional: He is oriented to person, place, and time. He appears ill.   HENT:   Mouth/Throat: Mucous membranes are moist.   Cardiovascular: Tachycardia present. Pulmonary:      Effort: Pulmonary effort is normal.     Neurological: He is oriented to person, place, and time.   Skin: Skin is warm.   Psychiatric: His behavior is normal. Mood normal.        Review of " Symptoms  Review of Symptoms      Symptom Assessment (ESAS 0-10 Scale)  Pain:  0  Dyspnea:  0  Anxiety:  0  Nausea:  3  Depression:  0  Anorexia:  0  Fatigue:  0  Insomnia:  0  Restlessness:  0  Agitation:  0         Performance Status:  40    Living Arrangements:  Lives alone and Lives in home    Psychosocial/Cultural:   See Palliative Psychosocial Note: Yes  82 yr old  male. Father of 2 adult children. Retired .  **Primary  to Follow**  Palliative Care  Consult: No    Spiritual:  F - Alaina and Belief:   following for daily communion.  I - Importance:  Yes  C - Community:  Yes  A - Address in Care:  No     Time-Based Charting:  No    Advance Care Planning   Advance Directives:   Do Not Resuscitate: Partial Code Status - DNI.      Decision Making:  Patient answered questions  Goals of Care: What is most important right now is to focus on improvement in condition but with limits to invasive therapies, remaining as independent as possible, proceeding with surgery. Accordingly, we have decided that the best plan to meet the patient's goals includes continuing with treatment.        PAINAD: N/A    Caregiver burden formerly assessed: yes      No results displayed because visit has over 200 results.               Belinda Pelayo RN  Palliative Medicine  [facility]

## 2023-03-22 NOTE — PROGRESS NOTES
"Hospital Medicine  Progress Note    Patient Name: Quincy Triplett  MRN: [id number]  Status: IP- Inpatient   Admission Date: 3/16/2023  Length of Stay: 3      CC: hospital follow-up for GOO       SUBJECTIVE   82 y.o. 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 Work up in ED noted patient afebrile, HDS. Labs notable for ALP 1544, Total bilirubin 2.9, AST 40, ALT 65, potassium 5.2, bicarb 9, BUN 23, Cr 1.5.  Staging CT C/A/P from 3/15 noting developing gastric outlet obstruction, secondary to a 3.2 cm long "apple-core" lesion in the 3rd portion of the duodenum, at the level of the ampulla; however there was no evidence of distant metastatic disease.  Of notes patient recently admitted to hospital from 3/2-3/10, being treated for enterococcus faecium bacteremia, planned for antibiotics through 3/18.  Plan was to have subsequent follow-up with Surgical Oncology thereafter to discuss possible Whipple procedure.  Patient was admitted to hospital medicine services for management.  Surgical Oncology consulted given GOO, planning for Whipple on Mon 3/27.  In the interim patient will be optimized medially for surgery.  Continued on liquids, dietary protein supplementation added.  Also started on TPN in the interim.  Continued to work with physical therapy.    Today: Patient seen and examined at bedside, and chart reviewed. Tolerating liquid diet.  Labs grossly stable.      MEDICATIONS   Scheduled   enoxaparin  40 mg Subcutaneous Daily    fat emulsion 20%  250 mL Intravenous Twice Weekly    [START ON 3/24/2023] fat emulsion 20%  250 mL Intravenous Every Tues, Fri    finasteride  5 mg Oral Daily    magnesium oxide  400 mg Oral BID    pantoprazole  40 mg Oral Daily    " sodium chloride 0.9%  10 mL Intravenous Q6H    tamsulosin  0.4 mg Oral Daily    zinc oxide-cod liver oil   Topical (Top) BID     Continuous Infusions   amino acid 5 % in 15% dextrose 75 mL/hr at 03/21/23 1940       PHYSICAL EXAM   VITALS: T 97.9 °F (36.6 °C)   BP (!) 142/84   P 61   RR 18   O2 99 %    GENERAL: Awake and in NAD  LUNGS: CTA anteriorly  CVS: Normal rate  GI/: Soft, NT, bowel sounds positive.  EXTREMITIES: No peripheral edema  NEURO: AAOx3  PSYCH: Cooperative      LABS   CBC  Recent Labs     03/21/23  0352   WBC 7.9   RBC 2.70*   HGB 7.7*   HCT 24.1*   MCV 89.3   MCH 28.5   MCHC 32.0*   RDW 15.9          CHEM  Recent Labs     03/20/23  0345 03/21/23  0352 03/22/23  0353   * 136 138   K 3.9 4.0 3.9   CHLORIDE 103 106 109*   CO2 23 24 22*   BUN 11.0 8.9 8.4   CREATININE 0.80 0.76 0.77   GLUCOSE 94 99 118*   CALCIUM 8.6* 8.5* 8.6*   MG 1.70  --  1.70   PHOS  --   --  2.8   ALBUMIN  --  2.0* 1.9*   GLOBULIN  --  2.5 3.0   ALKPHOS  --  864* 742*   ALT  --  47 43   AST  --  44* 39*   BILITOT  --  1.8* 1.5         ASSESSMENT   Gastric outlet obstruction s/t tumor  Adenocarcinoma of the duodenum  obstructive jaundice, s/p prior percutaneous drain  Severe metabolic acidosis  Anemia of chronic disease  Recent Enterococcus faecium bacteremia    PLAN   Surgery tentatively planning for resection on Mon  Will continue medical optimization in the interim  Continue with liquid diet, dietary protein supplementation along with TPN  Continue therapy services to increase functional status  Otherwise continue current management and monitoring awaiting surgery        Prophylaxis: SC Sylvia Garrett MD  [facility] Medicine

## 2023-03-23 LAB
ALBUMIN SERPL-MCNC: 1.7 G/DL (ref 3.4–4.8)
ALBUMIN/GLOB SERPL: 0.6 RATIO (ref 1.1–2)
ALP SERPL-CCNC: 844 UNIT/L (ref 40–150)
ALT SERPL-CCNC: 65 UNIT/L (ref 0–55)
AST SERPL-CCNC: 64 UNIT/L (ref 5–34)
BILIRUBIN DIRECT+TOT PNL SERPL-MCNC: 2.7 MG/DL
BUN SERPL-MCNC: 17.9 MG/DL (ref 8.4–25.7)
CALCIUM SERPL-MCNC: 8.6 MG/DL (ref 8.8–10)
CHLORIDE SERPL-SCNC: 105 MMOL/L (ref 98–107)
CO2 SERPL-SCNC: 25 MMOL/L (ref 23–31)
CREAT SERPL-MCNC: 0.77 MG/DL (ref 0.73–1.18)
GFR SERPLBLD CREATININE-BSD FMLA CKD-EPI: >60 MLS/MIN/1.73/M2
GLOBULIN SER-MCNC: 2.8 GM/DL (ref 2.4–3.5)
GLUCOSE SERPL-MCNC: 113 MG/DL (ref 82–115)
MAGNESIUM SERPL-MCNC: 1.7 MG/DL (ref 1.6–2.6)
PHOSPHATE SERPL-MCNC: 2.9 MG/DL (ref 2.3–4.7)
POTASSIUM SERPL-SCNC: 3.8 MMOL/L (ref 3.5–5.1)
PROT SERPL-MCNC: 4.5 GM/DL (ref 5.8–7.6)
SODIUM SERPL-SCNC: 135 MMOL/L (ref 136–145)

## 2023-03-23 PROCEDURE — 84100 ASSAY OF PHOSPHORUS: CPT | Performed by: SURGERY

## 2023-03-23 PROCEDURE — 25000003 PHARM REV CODE 250: Performed by: INTERNAL MEDICINE

## 2023-03-23 PROCEDURE — 25000003 PHARM REV CODE 250: Performed by: SURGERY

## 2023-03-23 PROCEDURE — 11000001 HC ACUTE MED/SURG PRIVATE ROOM

## 2023-03-23 PROCEDURE — 80053 COMPREHEN METABOLIC PANEL: CPT | Performed by: SURGERY

## 2023-03-23 PROCEDURE — A4216 STERILE WATER/SALINE, 10 ML: HCPCS | Performed by: SURGERY

## 2023-03-23 PROCEDURE — 25000003 PHARM REV CODE 250: Performed by: NURSE PRACTITIONER

## 2023-03-23 PROCEDURE — 63600175 PHARM REV CODE 636 W HCPCS: Performed by: NURSE PRACTITIONER

## 2023-03-23 PROCEDURE — 83735 ASSAY OF MAGNESIUM: CPT | Performed by: SURGERY

## 2023-03-23 RX ADMIN — Medication: at 09:03

## 2023-03-23 RX ADMIN — ACETAMINOPHEN 325MG 650 MG: 325 TABLET ORAL at 08:03

## 2023-03-23 RX ADMIN — SODIUM CHLORIDE, PRESERVATIVE FREE 10 ML: 5 INJECTION INTRAVENOUS at 12:03

## 2023-03-23 RX ADMIN — ENOXAPARIN SODIUM 40 MG: 40 INJECTION SUBCUTANEOUS at 05:03

## 2023-03-23 RX ADMIN — LEUCINE, PHENYLALANINE, LYSINE, METHIONINE, ISOLEUCINE, VALINE, HISTIDINE, THREONINE, TRYPTOPHAN, ALANINE, GLYCINE, ARGININE, PROLINE, SERINE, TYROSINE, SODIUM ACETATE, DIBASIC POTASSIUM PHOSPHATE, MAGNESIUM CHLORIDE, SODIUM CHLORIDE, CALCIUM CHLORIDE, DEXTROSE
365; 280; 290; 200; 300; 290; 240; 210; 90; 1035; 515; 575; 340; 250; 20; 340; 261; 51; 59; 33; 15 INJECTION INTRAVENOUS at 09:03

## 2023-03-23 RX ADMIN — Medication: at 08:03

## 2023-03-23 RX ADMIN — PANTOPRAZOLE SODIUM 40 MG: 40 TABLET, DELAYED RELEASE ORAL at 09:03

## 2023-03-23 RX ADMIN — ACETAMINOPHEN 325MG 650 MG: 325 TABLET ORAL at 01:03

## 2023-03-23 RX ADMIN — Medication 400 MG: at 09:03

## 2023-03-23 RX ADMIN — TAMSULOSIN HYDROCHLORIDE 0.4 MG: 0.4 CAPSULE ORAL at 09:03

## 2023-03-23 RX ADMIN — FINASTERIDE 5 MG: 5 TABLET, FILM COATED ORAL at 08:03

## 2023-03-23 RX ADMIN — SODIUM CHLORIDE, PRESERVATIVE FREE 10 ML: 5 INJECTION INTRAVENOUS at 09:03

## 2023-03-23 RX ADMIN — Medication 6 MG: at 08:03

## 2023-03-23 RX ADMIN — SODIUM CHLORIDE, PRESERVATIVE FREE 10 ML: 5 INJECTION INTRAVENOUS at 05:03

## 2023-03-23 RX ADMIN — Medication 400 MG: at 08:03

## 2023-03-23 NOTE — PT/OT/SLP PROGRESS
Physical Therapy      Patient Name:  Quincy Triplett   MRN:  [id number]    Attempted treatment but pt already ambulated twice with family today and decline to ambulated at this time. He said he would ambulate in PM and is schedule allows I will do so.  Cont POC.

## 2023-03-23 NOTE — PROGRESS NOTES
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  Patient presented to outlying facility with complaints of fatigue, nausea, vomiting and poor oral intake..  Patient was noted to have acute kidney injury, anion gap metabolic acidosis, lactic acidosis, leukocytosis.  Patient did not respond to IV fluid resuscitation and therefore was transferred to our hospital ICU for vasopressors.  Concern for septic shock, possible urinary source.  Placed on Zosyn initially, soon switch to ceftriaxone.  Cultures ordered.  One septic shock resolved patient was downgraded to hospitalist service on 3/3.  Blood cultures growing Streptococcus as of 3/3.  Id has been consulted GI was consulted to and they have consulted IR for external biliary drain replacement    Interval Hx:   Patient seen and examined afebrile no other issues issues reported overnight  Objective/physical exam:  General: In no acute distress, afebrile  Chest: Clear to auscultation bilaterally  Heart: S1, S2, no appreciable murmur  Abdomen: Soft, nontender, BS +  MSK: Warm, right forearm amputated   Neurologic: Alert and oriented x4,     VITAL SIGNS: 24 HRS MIN & MAX LAST   No data recorded 97.9 °F (36.6 °C)   No data recorded 133/65   No data recorded  82   No data recorded (!) 23   No data recorded 98 %       Recent Labs   Lab 03/18/23  0641 03/19/23  0408 03/21/23  0352   WBC 6.0 6.4 7.9   RBC 2.59* 2.81* 2.70*   HGB 7.4* 8.2* 7.7*   HCT 21.8* 24.8* 24.1*   MCV 84.2 88.3 89.3   MCH 28.6 29.2 28.5   MCHC 33.9 33.1 32.0*   RDW 15.7 16.1 15.9    316 309   MPV 9.5 9.0 9.6        Recent Labs   Lab 03/17/23  1643 03/18/23  0641 03/20/23  0345 03/21/23  0352 03/22/23  0353 03/23/23  0340   NA  --    < > 135* 136 138 135*   K  --    < > 3.9 4.0 3.9 3.8   CO2  --    < > 23 24 22* 25   BUN  --    < > 11.0 8.9 8.4 17.9   CREATININE  --    < > 0.80 0.76 0.77 0.77   CALCIUM  --    < > 8.6* 8.5* 8.6* 8.6*   PH 7.44  --   --   --   --   --    MG  --    < > 1.70  --  1.70 1.70   ALBUMIN  --    < >  --  2.0* 1.9* 1.7*   ALKPHOS  --    < >  --  864* 742* 844*   ALT  --    < >  --  47 43 65*   AST  --    < >  --  44* 39* 64*   BILITOT  --    < >  --  1.8* 1.5 2.7*    < > = values in this interval not displayed.          Microbiology Results (last 7 days)       Procedure Component Value Units Date/Time    Blood Culture [[id number]]  (Normal) Collected: 03/04/23 1209    Order Status: Completed Specimen: Blood Updated: 03/09/23 1400     CULTURE, BLOOD ([facility]) No Growth at 5 days    Blood Culture [[id number]]  (Normal) Collected: 03/04/23 1119    Order Status: Completed Specimen: Blood Updated: 03/09/23 1200     CULTURE, BLOOD ([facility]) No Growth at 5 days    Blood Culture [[id number]]  (Normal) Collected: 03/02/23 1545    Order Status: Completed Specimen: Blood from Antecubital, Left Updated: 03/07/23 1900     CULTURE, BLOOD ([facility]) No Growth at 5 days    Urine Culture High Risk [[id number]] Collected: 03/04/23 1403    Order Status: Completed Specimen: Urine, Catheterized Updated: 03/06/23 0732     Urine Culture No Growth    Blood Culture [[id number]]  (Abnormal)  (Susceptibility) Collected: 03/02/23 1550    Order Status: Completed Specimen: Blood from Arm, Left Updated: 03/05/23 0655     CULTURE, BLOOD ([facility]) Enterococcus faecium     Comment: Ampicillin results may be used to predict susceptibility to amoxicillin-clavulanate, ampicillin-sulbactam, and piperacillin-tazobactam.        GRAM STAIN Gram Positive Cocci, probable Streptococcus      Seen in gram stain of broth only      2 of 2 bottles positive              See below for Radiology    Scheduled Med:         Continuous Infusions:       PRN Meds:         Assessment/Plan:  Enterococcus faecium bacteremia  Septic shock secondary to above-improving   Acute kidney injury with anion gap metabolic acidosis on admit-improving   Acute on chronic normocytic anemia  Ampulla of Vater adenocarcinoma awaiting Whipple's   Biliary obstruction status post biliary stent/ drain  History of essential HTN , now with soft BP  Chronic urinary retention secondary to BPH with indwelling Paul   Hyperlipidemia   History of CAD     Plan-  Awaiting ID recommendations continue with ampicillin as per the sensitivities  Patient is status post replacement of the biliary drain  by IR  We had consulted Dr. Brown as per patient's request and they will follow-up with the patient outpatient and then will decide about the procedure  Blood pressure on the higher side.  Midodrine  Creatinine is improving , actually back to normal  Potassium is low we will supplement   Repeat blood work in a.m.  Patient working with PT and OT doing pretty well   Potential discharge soon once ID decides about how long we need to continue the antibiotics      This is a late entry patient was seen on March 8, 2023  VTE prophylaxis: SubQ heparin     Patient condition:  Fair     Anticipated discharge and Disposition:         All diagnosis and differential diagnosis have been reviewed; assessment and plan has been documented; I have personally reviewed the labs and test results that are presently available; I have reviewed the patients medication list; I have reviewed the consulting providers response and recommendations. I have reviewed or attempted to review medical records based upon their availability    All of the patient's questions have been  addressed and answered. Patient's is agreeable to the above stated plan. I will continue to monitor closely and make adjustments to medical management as  needed.  _____________________________________________________________________    Nutrition Status:    Radiology:  X-Ray Chest 1 View  Narrative: EXAMINATION:  XR CHEST 1 VIEW    CLINICAL HISTORY:  PICC placement;    TECHNIQUE:  One view    COMPARISON:  March 2, 2023    FINDINGS:  Left upper extremity approach PICC line terminates within the midportion of the superior vena cava.  Cardiopericardial silhouette is within normal limits. Lungs are without dense focal or segmental consolidation, congestive process, pleural effusions or pneumothorax.  Impression: Optimal placement of the PICC line.    Electronically signed by: Shant Sequeira  Date:    03/21/2023  Time:    11:07      Donna Parra MD   03/23/2023

## 2023-03-23 NOTE — PHYSICIAN QUERY
PT Name: Quincy Triplett  MR #: [id number]    DOCUMENTATION CLARIFICATION     CDS: Carlie Phelps RN, CCDS   Contact Information: [email or web address]    This form is a permanent document in the medical record.     Query Date: 2023    By submitting this query, we are merely seeking further clarification of documentation.. Please utilize your independent clinical judgment when addressing the question(s) below.    The medical record contains the following:   Indicators  Supporting Clinical Findings Location in Medical Record    x Energy Intake  Energy Intake: </= 50% of estimated energy requirement for >/= 5 days  3/21 RD    x Weight Loss  Interpretation of Weight Loss: >5% in 1 month  3/21 RD    x Fat Loss  Body Fat:moderate depletion   Area of Body Fat Loss: orbital region  and upper arm region - triceps / biceps  3/21 RD    x Muscle Loss  Muscle Mass Loss: moderate depletion   Area of Muscle Mass Loss: temple region - temporalis muscle and clavicle bone region - pectoralis major, deltoid, trapezius muscles  3/21 RD    Edema/Fluid Accumulation      Reduced  Strength (by dynamometer)      x Weight, BMI, Usual Body Weight  BMI (Calculated): 21.2   Last Weight: 65 kg (143 lb 4.8 oz)    Usual Body Weight (UBW), k.7 kg     Wt Readings from Last 5 Encounters:   23 65 kg (143 lb 4.8 oz)   23 67.3 kg (148 lb 5.9 oz)   02/15/23 71.7 kg (158 lb)   23 70.3 kg (155 lb)   23 69.3 kg (152 lb 12.5 oz)     3/21 RD    Delayed Wound Healing      x Registered Dietician Diagnosis  Malnutrition in the context of acute illness or injury   Degree of Malnutrition: non-severe (moderate) malnutrition  3/21 RD    x Acute or Chronic Illness   82-year-old white male multiple medical problems chief of which is 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  He presents with intractable nausea, vomiting, and dehydration.    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 Will provide 250 lyndsey, 9 gm pro per serving. Once diet advanced, change supplement to Chocolate Boost plus per pt request.  Start TPN. Can start with Clinimix 5/15 @ 75 mL/hr x 24 hours. Monitor for refeeding syndrome and provide recommendations if needed.  3/21 RD    x Other  Evidence of significant protein malnutrition with albumin 2.0  3/21 General Surgery, Dr. Brown      Academy of Nutrition and Dietetics (Academy) and the American Society for Parenteral and Enteral Nutrition (A.S.P.E.N.) Clinical Characteristics to support Malnutrition   Malnutrition in the Context of Acute Illness or Injury Malnutrition in the Context of Chronic Illness or Injury Malnutrition in the Context of Social or Environmental Circumstances   Malnutrition Level Moderate Severe Moderate Severe   Moderate   Severe   Energy Intake <75%                   >7 days <50%                 >5 days <75%           >1 month <75%                      >1 month   <75% for >3 months   <50% for >1 month   Weight Loss   1-2% in 1 week >2% in 1 week 5% in 1 month >5% in 1 month 5% in 1 month >5% in 1 month    5% in 1 month >5% in 1 month 7.5% in 3 months >7.5% in 3 months 7.5% in 3 months >7.5% in 3 months    7.5% in 3 months >7.5% in 3 months 10% in 6 months >10% in 6 months 10% in 6 months >10% in 6 months        20% in 1 year                    >20% in 1 year                                                                  20% in 1 year                            >20% in 1 year                                                  Subcutaneous Fat Loss Mild  Moderate  Mild  Severe    Mild   Severe   Muscle Loss Mild  Moderate  Mild   Severe    Mild   Severe   Edema/Fluid Accumulation Mild Moderate to severe  Mild  Severe   Mild   Severe   Reduced  Strength         (based on standards supplied by  of dynamometer) N/A Measurably reduced N/A Measurably reduced N/A Measurably reduced     Criteria for mild malnutrition is defined as 1 characteristic outlined above within the established moderate or severe parameters.  A minimum of 2 out of the 6 characteristics noted above are recommended for a diagnosis of moderate or severe malnutrition.  Chronic illness/injury is a disease/condition lasting 3 months or longer.    The noted clinical guidelines are only system guidelines and do not replace the providers clinical judgment.    Provider, please specify diagnosis or diagnoses associated with above clinical findings.    [ x ] Moderate Malnutrition - a minimum of 2 of the 6 moderate malnutrition characteristics noted above    [  ] Malnutrition, Unspecified degree   [  ] Other Nutritional Diagnosis (please specify): _______   [  ] Clinically Undetermined     Please document in your progress notes daily for the duration of treatment until resolved and  include in your discharge summary.      References:    SHANNAN Segovia, & SOFIA Aguilar (2022, April). Assessment and management of anorexia and cachexia in palliative care. Retrieved May 23, 2022, from [email or web address]     THEODORA Johnson, PhD, RD, Mónica SELBY P., PhD, RN, OWEN Barry MD, PhD, Ilana DUMONT A., MS, RD, [facility], RAFIA Cartwright, MS, RD, The Academy Malnutrition Work Group, The A.S.P.E.N. Board of Directors. (2012). Consensus Statement: Academy of Nutrition and Dietetics and American Society for Parenteral and Enteral Nutrition: Characteristics Recommended for the Identification and Documentation of Adult Malnutrition (Undernutrition). Journal of Parenteral and Enteral  Nutrition, 363, 275-712. doi:10.1177/6773098474551815     Form No. 37138

## 2023-03-23 NOTE — PROGRESS NOTES
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 white male with a history that includes recently diagnosed adenocarcinoma of ampulla vater with resulting obstructive jaundice requiring biliary drain placement, presented back to [facility] on 3/16 with vomiting x 1 day, associated with inability to maintain oral intake, as well as chills, weakness and fatigue. Work up in ED noted patient afebrile, HDS. Labs notable for ALP 1544, Total bilirubin 2.9, AST 40, ALT 65, potassium 5.2, bicarb 9, BUN 23, Cr 1.5.  Staging CT C/A/P from 3/15 noting developing gastric outlet obstruction, secondary to a 3.2 cm long "apple-core" lesion in the 3rd portion of the duodenum, at the level of the ampulla; however there was no evidence of distant metastatic disease.  Of notes patient recently admitted to hospital from 3/2-3/10, being treated for enterococcus faecium bacteremia, planned for antibiotics through 3/18.  Plan was to have subsequent follow-up with Surgical Oncology thereafter to discuss possible Whipple procedure.  Patient was admitted to hospital medicine services for management.  Surgical Oncology consulted given GOO, planning for Whipple on Mon 3/27.  In the interim patient will be optimized medially for surgery.  Continued on liquids, dietary protein supplementation added.  Also started on TPN in the interim.  Continued to work with physical therapy.     Interval Hx:     No acute events overnight.  Patient was seen and examined at bedside.  Family member at bedside.  Patient reported he had an episode of vomiting yesterday small amount but later felt better and no further episodes of vomiting or nausea.  Patient reported is able to tolerate liquid diet this morning.  Patient is looking forward for procedure next week with surgical oncology.        Objective/physical exam:  General: In no acute " distress, afebrile  Chest: Clear to auscultation bilaterally  Heart: RRR, +S1, S2, no appreciable murmur  Abdomen: Soft, nontender, biliary drain noted with minimal clear bilious output  MSK:  Right upper extremity amputation noted, no enema  Neurologic: Alert and oriented   VITAL SIGNS: 24 HRS MIN & MAX LAST   Temp  Min: 97.8 °F (36.6 °C)  Max: 99.7 °F (37.6 °C) 97.8 °F (36.6 °C)   BP  Min: 93/61  Max: 139/64 122/80   Pulse  Min: 59  Max: 113  (!) 59     Resp  Min: 18  Max: 18 18   SpO2  Min: 98 %  Max: 100 % 100 %       Recent Labs   Lab 03/18/23  0641 03/19/23  0408 03/21/23  0352   WBC 6.0 6.4 7.9   RBC 2.59* 2.81* 2.70*   HGB 7.4* 8.2* 7.7*   HCT 21.8* 24.8* 24.1*   MCV 84.2 88.3 89.3   MCH 28.6 29.2 28.5   MCHC 33.9 33.1 32.0*   RDW 15.7 16.1 15.9    316 309   MPV 9.5 9.0 9.6       Recent Labs   Lab 03/17/23  1643 03/18/23  0641 03/20/23  0345 03/21/23  0352 03/22/23  0353 03/23/23  0340   NA  --    < > 135* 136 138 135*   K  --    < > 3.9 4.0 3.9 3.8   CO2  --    < > 23 24 22* 25   BUN  --    < > 11.0 8.9 8.4 17.9   CREATININE  --    < > 0.80 0.76 0.77 0.77   CALCIUM  --    < > 8.6* 8.5* 8.6* 8.6*   PH 7.44  --   --   --   --   --    MG  --    < > 1.70  --  1.70 1.70   ALBUMIN  --    < >  --  2.0* 1.9* 1.7*   ALKPHOS  --    < >  --  864* 742* 844*   ALT  --    < >  --  47 43 65*   AST  --    < >  --  44* 39* 64*   BILITOT  --    < >  --  1.8* 1.5 2.7*    < > = values in this interval not displayed.          Microbiology Results (last 7 days)       ** No results found for the last 168 hours. **             See below for Radiology    Scheduled Med:   enoxaparin  40 mg Subcutaneous Daily    [START ON 3/24/2023] fat emulsion 20%  250 mL Intravenous Every Tues, Fri    finasteride  5 mg Oral Daily    magnesium oxide  400 mg Oral BID    pantoprazole  40 mg Oral Daily    sodium chloride 0.9%  10 mL Intravenous Q6H    tamsulosin  0.4 mg Oral Daily    zinc oxide-cod liver oil   Topical (Top) BID         Continuous Infusions:   amino acid 5 % in 15% dextrose 75 mL/hr at 03/22/23 2109        PRN Meds:  acetaminophen, ALPRAZolam, hydrOXYzine, melatonin, naloxone, ondansetron, prochlorperazine, simethicone, Flushing PICC Protocol **AND** sodium chloride 0.9% **AND** sodium chloride 0.9%       Assessment/Plan:  Gastric outlet obstruction s/t tumor  Adenocarcinoma of the duodenum  obstructive jaundice, s/p prior percutaneous drain  Severe metabolic acidosis  Anemia of chronic disease  Recent Enterococcus faecium bacteremia    Surgical oncology following.  Appreciate their input.  Patient is planned to have Whipple's procedure next Monday.  Continue with TPN in the interim through PICC line   Continue with liquid diet, protein supplementation, follow nutrition recs   Continue physical therapy services   Reviewed labs sodium 135, BUN 17.9, creatinine 0.77, alkaline phosphatase 844, AST 64, AL T 65 LFTs slightly elevated  Labs in the a.m.   Reviewed medications  Palliative care team following.  Appreciate their assistance.  Case discussed with case management.  Discussed regarding disposition post surgery and also regarding palliative care or hospice postsurgery.    Code status DNI      VTE prophylaxis:  Lovenox    Patient condition:  Guarded  Anticipated discharge and Disposition:   To be decided      _____________________________________________________________________    Nutrition Status:    Radiology:  X-Ray Chest 1 View  Narrative: EXAMINATION:  XR CHEST 1 VIEW    CLINICAL HISTORY:  PICC placement;    TECHNIQUE:  One view    COMPARISON:  March 2, 2023    FINDINGS:  Left upper extremity approach PICC line terminates within the midportion of the superior vena cava.  Cardiopericardial silhouette is within normal limits. Lungs are without dense focal or segmental consolidation, congestive process, pleural effusions or pneumothorax.  Impression: Optimal placement of the PICC line.    Electronically signed by: Shant  Hua  Date:    03/21/2023  Time:    11:07      Ashwini Mcmanus MD   03/23/2023

## 2023-03-23 NOTE — PROGRESS NOTES
SURG ONC    PT STATES HE IS FEELING BETTER  TOLERATING CLEARS  ONE EPISODE VOMITING SMALL AMOUNT  NO FEVER, REPORTS SOME CHILLS  NO COMPLAINTS OF PAIN    ABD SOFT, BENIGN, DRAIN BILIOUS    VSS  CMP REVIEWED  NO CBC DONE    LONG DISCUSSION WITH PT AND FAMILY AGAIN ABOUT ANTICIPATED SURGERY ON Monday; QUESTIONS ANSWERED    PLAN  CONTINUE MANAGEMENT  LABS IN AM

## 2023-03-23 NOTE — PT/OT/SLP PROGRESS
Physical Therapy      Patient Name:  Quincy Triplett   MRN:  [id number]    Patient not seen today secondary to refused and wanting to rest. Will follow-up tomorrow.

## 2023-03-23 NOTE — PROGRESS NOTES
Inpatient Nutrition Assessment    Admit Date: 3/16/2023   Total duration of encounter: 7 days     Nutrition Recommendation/Prescription     Continue diet as tolerated, advance as medically feasible  Continue Boost Breeze while on Clear liquid diet. Will provide 250 lyndsey, 9 gm pro per serving. Once diet advanced, change supplement to Chocolate Boost plus per pt request.  ContinueTPN, monitor for Refeeding Syndrome. Clinimix 5/15 @ 75 mL/hr    Calories Provided  1421 kcal/d, 81% needs (includes lipids)   Protein Provided  90 g/d, 100% needs   Dextrose Provided  270 g/d, GIR 2.88 mg CHO/kg/min   Fluid Provided  1800 ml/d, 102% needs     Monitor appetite, PO intake, weight, and labs      Communication of Recommendations: reviewed with nurse, reviewed with patient, and reviewed with pharmacy    Nutrition Assessment     Malnutrition Assessment/Nutrition-Focused Physical Exam    Malnutrition in the context of acute illness or injury  Degree of Malnutrition: non-severe (moderate) malnutrition  Energy Intake: </= 50% of estimated energy requirement for >/= 5 days  Interpretation of Weight Loss: >5% in 1 month  Body Fat:moderate depletion  Area of Body Fat Loss: orbital region  and upper arm region - triceps / biceps  Muscle Mass Loss: moderate depletion  Area of Muscle Mass Loss: temple region - temporalis muscle and clavicle bone region - pectoralis major, deltoid, trapezius muscles  Fluid Accumulation: does not meet criteria  Edema: does not meet criteria   Reduced  Strength: unable to obtain  A minimum of two characteristics is recommended for diagnosis of either severe or non-severe malnutrition.    Chart Review    Reason Seen: malnutrition screening tool (MST) and physician consult for initiate TPN; sacral wound    Malnutrition Screening Tool Results   Have you recently lost weight without trying?: Yes: 34 lbs or more  Have you been eating poorly because of a decreased appetite?: Yes   MST Score: 5      Diagnosis:  Acute kidney injury due to intravascular volume depletion secondary to nausea and vomiting   Acute complicated bacterial cystitis, present on admission   Normocytic anemia  Hyponatremia  Transaminitis hyperbilirubinemia   Fatigue (Hx small intestine CA awaiting surgical tx, increased weakness started yesterday)    Relevant Medical History: hypertension, hyperlipidemia, coronary disease status post stent, peripheral arterial disease, adenocarcinoma of duodenum, BPH with chronic indwelling Paul catheter    Nutrition-Related Medications: Magnesium oxide, pantoprazole, zinc oxide-code liver oil, Clinimix 5/15 @ 75 mL/hr + IL 2 times weekly  Calorie Containing IV Medications: Clinimix    Nutrition-Related Labs:  3/18: H/H-7.4/21.8, Na-133, K-3.2, Crea-1.25, Ca-8.1, Alk phos-891  3/21/2023: H/H 7.7/24.1, Ca 8.5, Alk Phos 864, Alb 2.0, Total Bili 1.8, AST 44, Gluc 99  3/23/2023: Na 135, Ca 8.6, Alk Phose 844, Alb 1.7, Total Bili 2.7, AST 64, ALT 65, Gluc 113    Diet/PN Order: Diet clear liquid  amino acid 5% in 15% dextrose (CLINIMIX-E) solution (1L provides 50gm AA, 150gm CHO (510 kcal/L dextrose), Na 35, K 30, Mg 5, Ca 4.5, Acetate 80, Cl 39, Phos 15) (710 total kcal/L)  amino acid 5% in 15% dextrose (CLINIMIX-E) solution (1L provides 50gm AA, 150gm CHO (510 kcal/L dextrose), Na 35, K 30, Mg 5, Ca 4.5, Acetate 80, Cl 39, Phos 15) (710 total kcal/L)  Oral Supplement Order: Boost Breeze  Tube Feeding Order: none  Appetite/Oral Intake: good/% of meals  Factors Affecting Nutritional Intake: altered gastrointestinal function, nausea, and vomiting  Food/Episcopal/Cultural Preferences: none reported  Food Allergies: no known food allergies    Skin Integrity: wound  Wound(s):   sacral reddness    Comments    3/18/23: Pt feeling better this morning. Admitted with N/V. But is now able to tolerate liquids today. Dr Antill added Boost Breeze with meals. Pt is to stay on liquids for now. Pt scheduled for  "whipple on 3/27/23; pending surgery eval while in hospital. Pt with physical wasting noted along with wt loss. If pt to remain on clear liquids > 7 days, will need TPN.     3/21/2023: Pt reports drinking/eating >75% PO intake of meals. Pt receives Boost Breeze BID. Pt denies N/V. Provided TPN recommendations per consult. Will monitor for refeeding syndrome. Last BM documented as 3/19/2023.    3/23/2023: Pt reports good appetite/PO intake. Implemented food preferences. Pt receives Boost Breeze BID. Pt on Clinimix-E @ 75 mL/hr + IL 2 times weekly. Pt reported N/V yesterday but none today. Pt anticipating bowel resection Monday. Palliative following. Will monitor.    Anthropometrics    Height: 5' 9.02" (175.3 cm)    Last Weight: 65 kg (143 lb 4.8 oz) (23 1244) Weight Method: Standard Scale  BMI (Calculated): 21.2  BMI Classification: underweight (BMI less than 22 if >65 years of age)        Ideal Body Weight (IBW), Male: 160.12 lb     % Ideal Body Weight, Male (lb): 89.5 %                 Usual Body Weight (UBW), k.7 kg  % Usual Body Weight: 90.85  % Weight Change From Usual Weight: -9.35 %  Usual Weight Provided By: EMR weight history    Wt Readings from Last 5 Encounters:   23 65 kg (143 lb 4.8 oz)   23 67.3 kg (148 lb 5.9 oz)   02/15/23 71.7 kg (158 lb)   23 70.3 kg (155 lb)   23 69.3 kg (152 lb 12.5 oz)     Weight Change(s) Since Admission:  Admit Weight: 73.5 kg (162 lb) (23 0452)  65kg; 9% wt loss x1 month. Rt arm amputation noted.   3/18/2023: 65 kg    Estimated Needs    Weight Used For Calorie Calculations: 65 kg (143 lb 4.8 oz)  Energy Calorie Requirements (kcal): 1750 kcal (MSJxSF1.3)  Energy Need Method: [place]- Jeor  Weight Used For Protein Calculations: 65 kg (143 lb 4.8 oz)  Protein Requirements: 90gm (kgx1.4)  Fluid Requirements (mL): 1750mL (1mL/kcal)  Temp: 98 °F (36.7 °C)       Enteral Nutrition    Patient not receiving enteral nutrition at this " [FreeTextEntry1] : cpx time.    Parenteral Nutrition    Standard Formula: Clinimix E 5/15  Custom Formula: not applicable  Additives: none  Rate/Volume: 75 mL/hr  Lipids: 250 ml 20% IV lipid emulsion twice weekly  Total Nutrition Provided by Parenteral Nutrition:  Calories Provided  1421 kcal/d, 81% needs (includes lipids)   Protein Provided  90 g/d, 100% needs   Dextrose Provided  270 g/d, GIR 2.88 mg CHO/kg/min   Fluid Provided  1800 ml/d, 102% needs       Evaluation of Received Nutrient Intake    Calories: meeting estimated needs  Protein: meeting estimated needs    Patient Education    Not applicable.    Nutrition Diagnosis     PES: Malnutrition related to cancer as evidenced by 9% wt loss x1 month, fat and muscle wasting, poor diet tolerance/intake > 5 days. (continues)    Interventions/Goals     Intervention(s): general/healthful diet, modified composition of parenteral nutrition, commercial beverage, and collaboration with other providers  Goal: Meet greater than 75% of nutritional needs by follow-up. (goal progressing)    Monitoring & Evaluation     Dietitian will monitor energy intake.  Nutrition Risk/Follow-Up: high (follow-up in 1-4 days)   Please consult if re-assessment needed sooner.      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

## 2023-03-24 LAB
ALBUMIN SERPL-MCNC: 1.7 G/DL (ref 3.4–4.8)
ALBUMIN/GLOB SERPL: 0.6 RATIO (ref 1.1–2)
ALP SERPL-CCNC: 701 UNIT/L (ref 40–150)
ALT SERPL-CCNC: 63 UNIT/L (ref 0–55)
AST SERPL-CCNC: 56 UNIT/L (ref 5–34)
BASOPHILS # BLD AUTO: 0.06 X10(3)/MCL (ref 0–0.2)
BASOPHILS NFR BLD AUTO: 0.6 %
BILIRUBIN DIRECT+TOT PNL SERPL-MCNC: 3.1 MG/DL
BUN SERPL-MCNC: 18.2 MG/DL (ref 8.4–25.7)
CALCIUM SERPL-MCNC: 8.5 MG/DL (ref 8.8–10)
CHLORIDE SERPL-SCNC: 106 MMOL/L (ref 98–107)
CO2 SERPL-SCNC: 22 MMOL/L (ref 23–31)
CREAT SERPL-MCNC: 0.64 MG/DL (ref 0.73–1.18)
EOSINOPHIL # BLD AUTO: 0.24 X10(3)/MCL (ref 0–0.9)
EOSINOPHIL NFR BLD AUTO: 2.6 %
ERYTHROCYTE [DISTWIDTH] IN BLOOD BY AUTOMATED COUNT: 15.4 % (ref 11.5–17)
GFR SERPLBLD CREATININE-BSD FMLA CKD-EPI: >60 MLS/MIN/1.73/M2
GLOBULIN SER-MCNC: 2.7 GM/DL (ref 2.4–3.5)
GLUCOSE SERPL-MCNC: 115 MG/DL (ref 82–115)
HCT VFR BLD AUTO: 25.4 % (ref 42–52)
HGB BLD-MCNC: 8.3 G/DL (ref 14–18)
IMM GRANULOCYTES # BLD AUTO: 0.05 X10(3)/MCL (ref 0–0.04)
IMM GRANULOCYTES NFR BLD AUTO: 0.5 %
LYMPHOCYTES # BLD AUTO: 1.35 X10(3)/MCL (ref 0.6–4.6)
LYMPHOCYTES NFR BLD AUTO: 14.5 %
MAGNESIUM SERPL-MCNC: 1.7 MG/DL (ref 1.6–2.6)
MCH RBC QN AUTO: 28.7 PG (ref 27–31)
MCHC RBC AUTO-ENTMCNC: 32.7 G/DL (ref 33–36)
MCV RBC AUTO: 87.9 FL (ref 80–94)
MONOCYTES # BLD AUTO: 0.8 X10(3)/MCL (ref 0.1–1.3)
MONOCYTES NFR BLD AUTO: 8.6 %
NEUTROPHILS # BLD AUTO: 6.84 X10(3)/MCL (ref 2.1–9.2)
NEUTROPHILS NFR BLD AUTO: 73.2 %
NRBC BLD AUTO-RTO: 0 %
PHOSPHATE SERPL-MCNC: 3 MG/DL (ref 2.3–4.7)
PLATELET # BLD AUTO: 189 X10(3)/MCL (ref 130–400)
PMV BLD AUTO: 9.8 FL (ref 7.4–10.4)
POTASSIUM SERPL-SCNC: 4 MMOL/L (ref 3.5–5.1)
PROT SERPL-MCNC: 4.4 GM/DL (ref 5.8–7.6)
RBC # BLD AUTO: 2.89 X10(6)/MCL (ref 4.7–6.1)
SODIUM SERPL-SCNC: 135 MMOL/L (ref 136–145)
WBC # SPEC AUTO: 9.3 X10(3)/MCL (ref 4.5–11.5)

## 2023-03-24 PROCEDURE — 85025 COMPLETE CBC W/AUTO DIFF WBC: CPT | Performed by: NURSE PRACTITIONER

## 2023-03-24 PROCEDURE — 25000003 PHARM REV CODE 250: Performed by: SURGERY

## 2023-03-24 PROCEDURE — 84100 ASSAY OF PHOSPHORUS: CPT | Performed by: SURGERY

## 2023-03-24 PROCEDURE — 11000001 HC ACUTE MED/SURG PRIVATE ROOM

## 2023-03-24 PROCEDURE — 25000003 PHARM REV CODE 250: Performed by: INTERNAL MEDICINE

## 2023-03-24 PROCEDURE — 80053 COMPREHEN METABOLIC PANEL: CPT | Performed by: SURGERY

## 2023-03-24 PROCEDURE — 83735 ASSAY OF MAGNESIUM: CPT | Performed by: SURGERY

## 2023-03-24 PROCEDURE — A4216 STERILE WATER/SALINE, 10 ML: HCPCS | Performed by: SURGERY

## 2023-03-24 PROCEDURE — 25000003 PHARM REV CODE 250: Performed by: NURSE PRACTITIONER

## 2023-03-24 PROCEDURE — B4185 PARENTERAL SOL 10 GM LIPIDS: HCPCS | Performed by: SURGERY

## 2023-03-24 PROCEDURE — 63600175 PHARM REV CODE 636 W HCPCS: Performed by: NURSE PRACTITIONER

## 2023-03-24 RX ORDER — MUPIROCIN 20 MG/G
OINTMENT TOPICAL 2 TIMES DAILY
Status: DISPENSED | OUTPATIENT
Start: 2023-03-24 | End: 2023-03-29

## 2023-03-24 RX ADMIN — SODIUM CHLORIDE, PRESERVATIVE FREE 10 ML: 5 INJECTION INTRAVENOUS at 06:03

## 2023-03-24 RX ADMIN — LEUCINE, PHENYLALANINE, LYSINE, METHIONINE, ISOLEUCINE, VALINE, HISTIDINE, THREONINE, TRYPTOPHAN, ALANINE, GLYCINE, ARGININE, PROLINE, SERINE, TYROSINE, SODIUM ACETATE, DIBASIC POTASSIUM PHOSPHATE, MAGNESIUM CHLORIDE, SODIUM CHLORIDE, CALCIUM CHLORIDE, DEXTROSE
365; 280; 290; 200; 300; 290; 240; 210; 90; 1035; 515; 575; 340; 250; 20; 340; 261; 51; 59; 33; 15 INJECTION INTRAVENOUS at 09:03

## 2023-03-24 RX ADMIN — Medication: at 09:03

## 2023-03-24 RX ADMIN — ENOXAPARIN SODIUM 40 MG: 40 INJECTION SUBCUTANEOUS at 05:03

## 2023-03-24 RX ADMIN — TAMSULOSIN HYDROCHLORIDE 0.4 MG: 0.4 CAPSULE ORAL at 09:03

## 2023-03-24 RX ADMIN — PANTOPRAZOLE SODIUM 40 MG: 40 TABLET, DELAYED RELEASE ORAL at 09:03

## 2023-03-24 RX ADMIN — Medication 6 MG: at 08:03

## 2023-03-24 RX ADMIN — ALPRAZOLAM 0.5 MG: 0.5 TABLET ORAL at 08:03

## 2023-03-24 RX ADMIN — MUPIROCIN: 20 OINTMENT TOPICAL at 09:03

## 2023-03-24 RX ADMIN — Medication 400 MG: at 09:03

## 2023-03-24 RX ADMIN — ACETAMINOPHEN 325MG 650 MG: 325 TABLET ORAL at 08:03

## 2023-03-24 RX ADMIN — I.V. FAT EMULSION 250 ML: 20 EMULSION INTRAVENOUS at 09:03

## 2023-03-24 RX ADMIN — FINASTERIDE 5 MG: 5 TABLET, FILM COATED ORAL at 08:03

## 2023-03-24 RX ADMIN — Medication 400 MG: at 08:03

## 2023-03-24 NOTE — PLAN OF CARE
SW met with patient at bedside and discussed the request for CM at this time. Pt stated he will need to go somewhere. SW asked pt to elaborate. Pt shared that he has a brain tumor and is waiting on results to determine next treatment steps. Pt noted he will let his son know that SW came by. Sw verbalized understanding. SW also informed pt that as long as he is on this unit she will be monitoring his care as well and facilitate discharge.

## 2023-03-24 NOTE — PLAN OF CARE
Problem: Adult Inpatient Plan of Care  Goal: Plan of Care Review  Outcome: Ongoing, Progressing  Flowsheets (Taken 3/23/2023 1930)  Plan of Care Reviewed With:   patient   daughter  Goal: Patient-Specific Goal (Individualized)  Outcome: Ongoing, Progressing  Flowsheets (Taken 3/23/2023 1930)  Anxieties, Fears or Concerns: plan of treatment, surgery on monday  Individualized Care Needs: assist with adls, iv nutrition, daily labs  Goal: Absence of Hospital-Acquired Illness or Injury  Outcome: Ongoing, Progressing  Intervention: Identify and Manage Fall Risk  Flowsheets (Taken 3/23/2023 1930)  Safety Promotion/Fall Prevention:   nonskid shoes/socks when out of bed   instructed to call staff for mobility  Intervention: Prevent Skin Injury  Flowsheets (Taken 3/23/2023 1930)  Body Position:   position changed independently   weight shifting  Skin Protection:   tubing/devices free from skin contact   adhesive use limited  Intervention: Prevent and Manage VTE (Venous Thromboembolism) Risk  Flowsheets (Taken 3/23/2023 1939)  Activity Management: Ambulated in room - L4  VTE Prevention/Management:   fluids promoted   intravenous hydration  Range of Motion: active ROM (range of motion) encouraged  Intervention: Prevent Infection  Flowsheets (Taken 3/23/2023 1939)  Infection Prevention:   single patient room provided   hand hygiene promoted   rest/sleep promoted  Goal: Optimal Comfort and Wellbeing  Outcome: Ongoing, Progressing  Intervention: Monitor Pain and Promote Comfort  Flowsheets (Taken 3/23/2023 1939)  Pain Management Interventions:   medication offered   quiet environment facilitated  Intervention: Provide Person-Centered Care  Flowsheets (Taken 3/23/2023 1939)  Trust Relationship/Rapport:   care explained   questions answered   choices provided   emotional support provided   questions encouraged   reassurance provided   thoughts/feelings acknowledged

## 2023-03-25 LAB
ALBUMIN SERPL-MCNC: 1.8 G/DL (ref 3.4–4.8)
ALBUMIN/GLOB SERPL: 0.6 RATIO (ref 1.1–2)
ALP SERPL-CCNC: 811 UNIT/L (ref 40–150)
ALT SERPL-CCNC: 62 UNIT/L (ref 0–55)
AST SERPL-CCNC: 58 UNIT/L (ref 5–34)
BILIRUBIN DIRECT+TOT PNL SERPL-MCNC: 3.5 MG/DL
BUN SERPL-MCNC: 17.5 MG/DL (ref 8.4–25.7)
CALCIUM SERPL-MCNC: 8.6 MG/DL (ref 8.8–10)
CHLORIDE SERPL-SCNC: 108 MMOL/L (ref 98–107)
CO2 SERPL-SCNC: 25 MMOL/L (ref 23–31)
CREAT SERPL-MCNC: 0.68 MG/DL (ref 0.73–1.18)
GFR SERPLBLD CREATININE-BSD FMLA CKD-EPI: >60 MLS/MIN/1.73/M2
GLOBULIN SER-MCNC: 2.9 GM/DL (ref 2.4–3.5)
GLUCOSE SERPL-MCNC: 106 MG/DL (ref 82–115)
MAGNESIUM SERPL-MCNC: 1.9 MG/DL (ref 1.6–2.6)
PHOSPHATE SERPL-MCNC: 3.5 MG/DL (ref 2.3–4.7)
POTASSIUM SERPL-SCNC: 4.1 MMOL/L (ref 3.5–5.1)
PROT SERPL-MCNC: 4.7 GM/DL (ref 5.8–7.6)
SODIUM SERPL-SCNC: 137 MMOL/L (ref 136–145)

## 2023-03-25 PROCEDURE — 11000001 HC ACUTE MED/SURG PRIVATE ROOM

## 2023-03-25 PROCEDURE — A4216 STERILE WATER/SALINE, 10 ML: HCPCS | Performed by: SURGERY

## 2023-03-25 PROCEDURE — 25000003 PHARM REV CODE 250: Performed by: SURGERY

## 2023-03-25 PROCEDURE — 97116 GAIT TRAINING THERAPY: CPT | Mod: CQ

## 2023-03-25 PROCEDURE — 25000003 PHARM REV CODE 250: Performed by: INTERNAL MEDICINE

## 2023-03-25 PROCEDURE — 25000003 PHARM REV CODE 250: Performed by: NURSE PRACTITIONER

## 2023-03-25 PROCEDURE — 80053 COMPREHEN METABOLIC PANEL: CPT | Performed by: SURGERY

## 2023-03-25 PROCEDURE — 83735 ASSAY OF MAGNESIUM: CPT | Performed by: SURGERY

## 2023-03-25 PROCEDURE — 63600175 PHARM REV CODE 636 W HCPCS: Performed by: NURSE PRACTITIONER

## 2023-03-25 PROCEDURE — 84100 ASSAY OF PHOSPHORUS: CPT | Performed by: SURGERY

## 2023-03-25 RX ORDER — POLYETHYLENE GLYCOL 3350 17 G/17G
17 POWDER, FOR SOLUTION ORAL ONCE
Status: COMPLETED | OUTPATIENT
Start: 2023-03-26 | End: 2023-03-26

## 2023-03-25 RX ADMIN — MUPIROCIN: 20 OINTMENT TOPICAL at 11:03

## 2023-03-25 RX ADMIN — MUPIROCIN: 20 OINTMENT TOPICAL at 09:03

## 2023-03-25 RX ADMIN — Medication 400 MG: at 10:03

## 2023-03-25 RX ADMIN — Medication 400 MG: at 09:03

## 2023-03-25 RX ADMIN — SODIUM CHLORIDE, PRESERVATIVE FREE 10 ML: 5 INJECTION INTRAVENOUS at 11:03

## 2023-03-25 RX ADMIN — Medication: at 10:03

## 2023-03-25 RX ADMIN — Medication 6 MG: at 09:03

## 2023-03-25 RX ADMIN — ALPRAZOLAM 0.5 MG: 0.5 TABLET ORAL at 09:03

## 2023-03-25 RX ADMIN — ENOXAPARIN SODIUM 40 MG: 40 INJECTION SUBCUTANEOUS at 05:03

## 2023-03-25 RX ADMIN — PANTOPRAZOLE SODIUM 40 MG: 40 TABLET, DELAYED RELEASE ORAL at 10:03

## 2023-03-25 RX ADMIN — FINASTERIDE 5 MG: 5 TABLET, FILM COATED ORAL at 09:03

## 2023-03-25 RX ADMIN — SODIUM CHLORIDE, PRESERVATIVE FREE 10 ML: 5 INJECTION INTRAVENOUS at 04:03

## 2023-03-25 RX ADMIN — SODIUM CHLORIDE, PRESERVATIVE FREE 10 ML: 5 INJECTION INTRAVENOUS at 05:03

## 2023-03-25 RX ADMIN — SODIUM CHLORIDE, PRESERVATIVE FREE 10 ML: 5 INJECTION INTRAVENOUS at 10:03

## 2023-03-25 RX ADMIN — Medication: at 09:03

## 2023-03-25 RX ADMIN — TAMSULOSIN HYDROCHLORIDE 0.4 MG: 0.4 CAPSULE ORAL at 10:03

## 2023-03-25 NOTE — PROGRESS NOTES
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 Work up in ED noted patient afebrile, HDS. Labs notable for ALP 1544, Total bilirubin 2.9, AST 40, ALT 65, potassium 5.2, bicarb 9, BUN 23, Cr 1.5.  Staging CT C/A/P from 3/15 noting developing gastric outlet obstruction, secondary to a 3.2 cm long "apple-core" lesion in the 3rd portion of the duodenum, at the level of the ampulla; however there was no evidence of distant metastatic disease.  Of notes patient recently admitted to hospital from 3/2-3/10, being treated for enterococcus faecium bacteremia, planned for antibiotics through 3/18.  Plan was to have subsequent follow-up with Surgical Oncology thereafter to discuss possible Whipple procedure.  Patient was admitted to hospital medicine services for management.  Surgical Oncology consulted given GOO, planning for Whipple on Mon 3/27.  In the interim patient will be optimized medially for surgery.  Continued on liquids, dietary protein supplementation added.  Also started on TPN in the interim.  Continued to work with physical therapy.     Interval Hx:     No acute events overnight.  Patient was seen and examined at bedside.  No family member at bedside.  Offered no complaints today.    Objective/physical exam:  General: In no acute distress, afebrile  Chest: Clear to auscultation bilaterally  Heart: RRR, +S1, S2, no appreciable murmur  Abdomen: Soft, nontender, biliary drain noted with minimal clear bilious output  MSK:  Right upper extremity amputation noted, no enema  Neurologic: " Alert and oriented   VITAL SIGNS: 24 HRS MIN & MAX LAST   Temp  Min: 98 °F (36.7 °C)  Max: 98.9 °F (37.2 °C) 98.9 °F (37.2 °C)   BP  Min: 130/78  Max: 162/68 133/68   Pulse  Min: 66  Max: 74  73     Resp  Min: 17  Max: 20 18   SpO2  Min: 99 %  Max: 99 % 99 %       Recent Labs   Lab 03/19/23  0408 03/21/23  0352 03/24/23  0418   WBC 6.4 7.9 9.3   RBC 2.81* 2.70* 2.89*   HGB 8.2* 7.7* 8.3*   HCT 24.8* 24.1* 25.4*   MCV 88.3 89.3 87.9   MCH 29.2 28.5 28.7   MCHC 33.1 32.0* 32.7*   RDW 16.1 15.9 15.4    309 189   MPV 9.0 9.6 9.8       Recent Labs   Lab 03/22/23  0353 03/23/23  0340 03/24/23 0418    135* 135*   K 3.9 3.8 4.0   CO2 22* 25 22*   BUN 8.4 17.9 18.2   CREATININE 0.77 0.77 0.64*   CALCIUM 8.6* 8.6* 8.5*   MG 1.70 1.70 1.70   ALBUMIN 1.9* 1.7* 1.7*   ALKPHOS 742* 844* 701*   ALT 43 65* 63*   AST 39* 64* 56*   BILITOT 1.5 2.7* 3.1*          Microbiology Results (last 7 days)       ** No results found for the last 168 hours. **             See below for Radiology    Scheduled Med:   enoxaparin  40 mg Subcutaneous Daily    fat emulsion 20%  250 mL Intravenous Every Tues, Fri    finasteride  5 mg Oral Daily    magnesium oxide  400 mg Oral BID    mupirocin   Nasal BID    pantoprazole  40 mg Oral Daily    sodium chloride 0.9%  10 mL Intravenous Q6H    tamsulosin  0.4 mg Oral Daily    zinc oxide-cod liver oil   Topical (Top) BID        Continuous Infusions:   amino acid 5 % in 15% dextrose 75 mL/hr at 03/23/23 2133    amino acid 5 % in 15% dextrose      [START ON 3/26/2023] amino acid 5 % in 15% dextrose          PRN Meds:  acetaminophen, ALPRAZolam, hydrOXYzine, melatonin, naloxone, ondansetron, prochlorperazine, simethicone, Flushing PICC Protocol **AND** sodium chloride 0.9% **AND** sodium chloride 0.9%       Assessment/Plan:  Gastric outlet obstruction s/t tumor  Adenocarcinoma of the duodenum  obstructive jaundice, s/p prior percutaneous drain  Severe metabolic acidosis  Anemia of chronic  disease  Recent Enterococcus faecium bacteremia    Reviewed labs from today showed sodium 135, K 4, chloride 106, bicarb 22 BUN 18.2, creatinine 0.64, , AST 56, ALT 63 Mag 1.7  CBC showed white count 9.3, hemoglobin 8.3, platelets 189  LFTs elevated but stable  Patient tolerating liquid p.o. diet  Surgical oncology following.  Appreciate their input.  Patient is planned to have Whipple's procedure next Monday.  Optimize nutrition in the interim with TPN through PICC line protein supplements  continue physical therapy services   Reviewed medications  Palliative care team following.  Appreciate their assistance.  Case discussed with case management.       Code status DNI      VTE prophylaxis:  Lovenox    Patient condition:  Guarded    Anticipated discharge and Disposition:   To be decided      _____________________________________________________________________    Nutrition Status:    Radiology:  X-Ray Chest 1 View  Narrative: EXAMINATION:  XR CHEST 1 VIEW    CLINICAL HISTORY:  PICC placement;    TECHNIQUE:  One view    COMPARISON:  March 2, 2023    FINDINGS:  Left upper extremity approach PICC line terminates within the midportion of the superior vena cava.  Cardiopericardial silhouette is within normal limits. Lungs are without dense focal or segmental consolidation, congestive process, pleural effusions or pneumothorax.  Impression: Optimal placement of the PICC line.    Electronically signed by: Shant Sequeira  Date:    03/21/2023  Time:    11:07      Ashwini Mcmanus MD   03/24/2023

## 2023-03-25 NOTE — PROGRESS NOTES
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 Work up in ED noted patient afebrile, HDS. Labs notable for ALP 1544, Total bilirubin 2.9, AST 40, ALT 65, potassium 5.2, bicarb 9, BUN 23, Cr 1.5.  Staging CT C/A/P from 3/15 noting developing gastric outlet obstruction, secondary to a 3.2 cm long "apple-core" lesion in the 3rd portion of the duodenum, at the level of the ampulla; however there was no evidence of distant metastatic disease.  Of notes patient recently admitted to hospital from 3/2-3/10, being treated for enterococcus faecium bacteremia, planned for antibiotics through 3/18.  Plan was to have subsequent follow-up with Surgical Oncology thereafter to discuss possible Whipple procedure.  Patient was admitted to hospital medicine services for management.  Surgical Oncology consulted given GOO, planning for Whipple on Mon 3/27.  In the interim patient will be optimized medially for surgery.  Continued on liquids, dietary protein supplementation added.  Also started on TPN in the interim.  Continued to work with physical therapy.     Interval Hx:     No acute events overnight.  Patient was seen and examined at bedside.  No family member at bedside.  Pt reported he hasnt had any BM. Pt tolerating liq diet. NO episodes of nausea and vomiting.Ambulating with PT.     Objective/physical exam:  General: In no acute distress, afebrile  Chest: Clear to auscultation bilaterally  Heart: RRR, +S1, S2, no appreciable murmur  Abdomen: Soft, nontender, biliary drain noted with minimal clear " bilious output  MSK:  Right upper extremity amputation noted, no pedal edema   Neurologic: Alert and oriented   VITAL SIGNS: 24 HRS MIN & MAX LAST   Temp  Min: 97.5 °F (36.4 °C)  Max: 98.8 °F (37.1 °C) 98.1 °F (36.7 °C)   BP  Min: 123/76  Max: 134/81 (!) 128/95   Pulse  Min: 65  Max: 79  72     Resp  Min: 16  Max: 20 19   SpO2  Min: 98 %  Max: 100 % 100 %       Recent Labs   Lab 03/19/23  0408 03/21/23  0352 03/24/23  0418   WBC 6.4 7.9 9.3   RBC 2.81* 2.70* 2.89*   HGB 8.2* 7.7* 8.3*   HCT 24.8* 24.1* 25.4*   MCV 88.3 89.3 87.9   MCH 29.2 28.5 28.7   MCHC 33.1 32.0* 32.7*   RDW 16.1 15.9 15.4    309 189   MPV 9.0 9.6 9.8       Recent Labs   Lab 03/23/23  0340 03/24/23  0418 03/25/23  0349   * 135* 137   K 3.8 4.0 4.1   CO2 25 22* 25   BUN 17.9 18.2 17.5   CREATININE 0.77 0.64* 0.68*   CALCIUM 8.6* 8.5* 8.6*   MG 1.70 1.70 1.90   ALBUMIN 1.7* 1.7* 1.8*   ALKPHOS 844* 701* 811*   ALT 65* 63* 62*   AST 64* 56* 58*   BILITOT 2.7* 3.1* 3.5*          Microbiology Results (last 7 days)       ** No results found for the last 168 hours. **             See below for Radiology    Scheduled Med:   enoxaparin  40 mg Subcutaneous Daily    finasteride  5 mg Oral Daily    magnesium oxide  400 mg Oral BID    mupirocin   Nasal BID    pantoprazole  40 mg Oral Daily    sodium chloride 0.9%  10 mL Intravenous Q6H    tamsulosin  0.4 mg Oral Daily    zinc oxide-cod liver oil   Topical (Top) BID        Continuous Infusions:   amino acid 5 % in 15% dextrose 75 mL/hr at 03/24/23 2128    [START ON 3/26/2023] amino acid 5 % in 15% dextrose          PRN Meds:  acetaminophen, ALPRAZolam, hydrOXYzine, melatonin, naloxone, ondansetron, prochlorperazine, simethicone, Flushing PICC Protocol **AND** sodium chloride 0.9% **AND** sodium chloride 0.9%       Assessment/Plan:  Gastric outlet obstruction s/t tumor  Adenocarcinoma of the duodenum  obstructive jaundice, s/p prior percutaneous drain  Severe metabolic acidosis  Anemia of chronic  disease  Recent Enterococcus faecium bacteremia    Reviewed labs from today showed sodium 137, K 4.1, chloride 108, bicarb 25 BUN 17.5, creatinine 0.68, , AST 58, ALT 62 Mag 1.9.LFTs elevated but stable  Patient tolerating liquid p.o. diet  Will give miralax x 1  Surgical oncology following.  Appreciate their input.  Patient is planned to have Whipple's procedure  Monday.  Optimizing nutrition in the interim with TPN through PICC line protein supplements  continue physical therapy services   Reviewed medications  Palliative care team following.  Appreciate their assistance.  Case management on board       Code status DNI      VTE prophylaxis:  Lovenox    Patient condition:  Guarded    Anticipated discharge and Disposition:   To be decided      _____________________________________________________________________    Nutrition Status:    Radiology:  X-Ray Chest 1 View  Narrative: EXAMINATION:  XR CHEST 1 VIEW    CLINICAL HISTORY:  PICC placement;    TECHNIQUE:  One view    COMPARISON:  March 2, 2023    FINDINGS:  Left upper extremity approach PICC line terminates within the midportion of the superior vena cava.  Cardiopericardial silhouette is within normal limits. Lungs are without dense focal or segmental consolidation, congestive process, pleural effusions or pneumothorax.  Impression: Optimal placement of the PICC line.    Electronically signed by: Shant Sequeira  Date:    03/21/2023  Time:    11:07      Ashwini Mcmanus MD   03/25/2023

## 2023-03-25 NOTE — PT/OT/SLP PROGRESS
Physical Therapy Treatment    Patient Name:  Quincy Triplett   MRN:  [id number]    Recommendations:     Discharge Recommendations:  home health PT   Discharge Equipment Recommendations: none     Subjective     Patient awake and alert.     Objective:     General Precautions: Standard, fall   Orthopedic Precautions:N/A   Braces:    Respiratory Status: Room air   Communicated with nurse prior to treatment.     Functional Mobility:    Rolling:Stand-by Assistance  Supine to sit:Stand-by Assistance  Sit to stand transfer: Minimal Assistance  Bed to chair transfer:Stand-by Assistance    Gait 175 ft x 2 without AD SBA.     Patient left supine with all lines intact and call button in reach.    AM-PAC 6 CLICK MOBILITY        GOALS:   Multidisciplinary Problems       Physical Therapy Goals          Problem: Physical Therapy    Goal Priority Disciplines Outcome Goal Variances Interventions   Physical Therapy Goal     PT, PT/OT Ongoing, Progressing     Description: Goals to be met by: 23     Patient will increase functional independence with mobility by performin. Supine to sit with [place]  2. Sit to stand transfer with [place]  3. Bed to chair transfer with [place] using No Assistive Device  4. Gait  x 500 feet with [place] using No Assistive Device.                          Assessment:     Quincy Triplett is a 82 y.o. male admitted with a medical diagnosis of <principal problem not specified>.     Patient Active Problem List   Diagnosis    Jaundice    Gastric outlet obstruction    COVID-19    TERRENCE (acute kidney injury)    Generalized weakness    Duodenal cancer    Sepsis    Hypotension        Rehab Prognosis: Good; patient would benefit from acute skilled PT services to address these deficits and reach maximum level of function.    Recent Surgery: Procedure(s) (LRB):  WHIPPLE PROCEDURE (N/A)      Plan:     During this hospitalization, patient to be seen 5 x/week to address the identified rehab  impairments via gait training, therapeutic activities, therapeutic exercises and progress toward the following goals:    Plan of Care Expires:  04/20/23    Billable Minutes     Billable Minutes: Gait Training 15    Treatment Type: Treatment  PT/PTA: PTA     Number of PTA visits since last PT visit: 3     03/25/2023

## 2023-03-26 LAB
ALBUMIN SERPL-MCNC: 2 G/DL (ref 3.4–4.8)
ALBUMIN/GLOB SERPL: 0.6 RATIO (ref 1.1–2)
ALP SERPL-CCNC: 998 UNIT/L (ref 40–150)
ALT SERPL-CCNC: 60 UNIT/L (ref 0–55)
AST SERPL-CCNC: 49 UNIT/L (ref 5–34)
BILIRUBIN DIRECT+TOT PNL SERPL-MCNC: 3 MG/DL
BUN SERPL-MCNC: 18.6 MG/DL (ref 8.4–25.7)
CALCIUM SERPL-MCNC: 9.2 MG/DL (ref 8.8–10)
CHLORIDE SERPL-SCNC: 107 MMOL/L (ref 98–107)
CO2 SERPL-SCNC: 20 MMOL/L (ref 23–31)
CREAT SERPL-MCNC: 0.64 MG/DL (ref 0.73–1.18)
GFR SERPLBLD CREATININE-BSD FMLA CKD-EPI: >60 MLS/MIN/1.73/M2
GLOBULIN SER-MCNC: 3.2 GM/DL (ref 2.4–3.5)
GLUCOSE SERPL-MCNC: 119 MG/DL (ref 82–115)
MAGNESIUM SERPL-MCNC: 2 MG/DL (ref 1.6–2.6)
PHOSPHATE SERPL-MCNC: 3.7 MG/DL (ref 2.3–4.7)
POTASSIUM SERPL-SCNC: 4.6 MMOL/L (ref 3.5–5.1)
PROT SERPL-MCNC: 5.2 GM/DL (ref 5.8–7.6)
SODIUM SERPL-SCNC: 134 MMOL/L (ref 136–145)

## 2023-03-26 PROCEDURE — 84100 ASSAY OF PHOSPHORUS: CPT | Performed by: SURGERY

## 2023-03-26 PROCEDURE — 63600175 PHARM REV CODE 636 W HCPCS: Performed by: NURSE PRACTITIONER

## 2023-03-26 PROCEDURE — 25000003 PHARM REV CODE 250: Performed by: SURGERY

## 2023-03-26 PROCEDURE — 25000003 PHARM REV CODE 250: Performed by: INTERNAL MEDICINE

## 2023-03-26 PROCEDURE — A4216 STERILE WATER/SALINE, 10 ML: HCPCS | Performed by: SURGERY

## 2023-03-26 PROCEDURE — 25000003 PHARM REV CODE 250: Performed by: NURSE PRACTITIONER

## 2023-03-26 PROCEDURE — 80053 COMPREHEN METABOLIC PANEL: CPT | Performed by: SURGERY

## 2023-03-26 PROCEDURE — 11000001 HC ACUTE MED/SURG PRIVATE ROOM

## 2023-03-26 PROCEDURE — 83735 ASSAY OF MAGNESIUM: CPT | Performed by: SURGERY

## 2023-03-26 RX ADMIN — ENOXAPARIN SODIUM 40 MG: 40 INJECTION SUBCUTANEOUS at 05:03

## 2023-03-26 RX ADMIN — Medication 400 MG: at 08:03

## 2023-03-26 RX ADMIN — ACETAMINOPHEN 325MG 650 MG: 325 TABLET ORAL at 08:03

## 2023-03-26 RX ADMIN — Medication 6 MG: at 08:03

## 2023-03-26 RX ADMIN — Medication: at 08:03

## 2023-03-26 RX ADMIN — POLYETHYLENE GLYCOL 3350 17 G: 17 POWDER, FOR SOLUTION ORAL at 05:03

## 2023-03-26 RX ADMIN — SODIUM CHLORIDE, PRESERVATIVE FREE 10 ML: 5 INJECTION INTRAVENOUS at 05:03

## 2023-03-26 RX ADMIN — SODIUM CHLORIDE, PRESERVATIVE FREE 10 ML: 5 INJECTION INTRAVENOUS at 11:03

## 2023-03-26 RX ADMIN — ALPRAZOLAM 0.5 MG: 0.5 TABLET ORAL at 08:03

## 2023-03-26 RX ADMIN — MUPIROCIN: 20 OINTMENT TOPICAL at 08:03

## 2023-03-26 RX ADMIN — Medication: at 09:03

## 2023-03-26 RX ADMIN — LEUCINE, PHENYLALANINE, LYSINE, METHIONINE, ISOLEUCINE, VALINE, HISTIDINE, THREONINE, TRYPTOPHAN, ALANINE, GLYCINE, ARGININE, PROLINE, SERINE, TYROSINE, SODIUM ACETATE, DIBASIC POTASSIUM PHOSPHATE, MAGNESIUM CHLORIDE, SODIUM CHLORIDE, CALCIUM CHLORIDE, DEXTROSE
365; 280; 290; 200; 300; 290; 240; 210; 90; 1035; 515; 575; 340; 250; 20; 340; 261; 51; 59; 33; 15 INJECTION INTRAVENOUS at 11:03

## 2023-03-26 RX ADMIN — TAMSULOSIN HYDROCHLORIDE 0.4 MG: 0.4 CAPSULE ORAL at 08:03

## 2023-03-26 RX ADMIN — PANTOPRAZOLE SODIUM 40 MG: 40 TABLET, DELAYED RELEASE ORAL at 08:03

## 2023-03-26 RX ADMIN — FINASTERIDE 5 MG: 5 TABLET, FILM COATED ORAL at 08:03

## 2023-03-26 NOTE — PROGRESS NOTES
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 Work up in ED noted patient afebrile, HDS. Labs notable for ALP 1544, Total bilirubin 2.9, AST 40, ALT 65, potassium 5.2, bicarb 9, BUN 23, Cr 1.5.  Staging CT C/A/P from 3/15 noting developing gastric outlet obstruction, secondary to a 3.2 cm long "apple-core" lesion in the 3rd portion of the duodenum, at the level of the ampulla; however there was no evidence of distant metastatic disease.  Of notes patient recently admitted to hospital from 3/2-3/10, being treated for enterococcus faecium bacteremia, planned for antibiotics through 3/18.  Plan was to have subsequent follow-up with Surgical Oncology thereafter to discuss possible Whipple procedure.  Patient was admitted to hospital medicine services for management.  Surgical Oncology consulted given GOO, planning for Whipple on Mon 3/27.  In the interim patient will be optimized medially for surgery.  Continued on liquids, dietary protein supplementation added.  Also started on TPN in the interim.  Continued to work with physical therapy.     Interval Hx:     No acute events overnight.  Patient was seen and examined at bedside.  Family member at bedside.  Patient offered no complaints and waiting for surgery tomorrow.  Family member asking about NPO status .  Patient reported he had a bowel movement yesterday and also tolerating liquids with no episodes nausea vomiting.    Objective/physical exam:  General: In no acute distress, afebrile  Chest: Clear to auscultation bilaterally  Heart: " RRR, +S1, S2, no appreciable murmur  Abdomen: Soft, nontender, biliary drain noted with minimal clear bilious output  MSK:  Right upper extremity amputation noted, no pedal edema   Neurologic: Alert and oriented   VITAL SIGNS: 24 HRS MIN & MAX LAST   Temp  Min: 97.7 °F (36.5 °C)  Max: 98.1 °F (36.7 °C) 98 °F (36.7 °C)   BP  Min: 111/75  Max: 128/95 115/72   Pulse  Min: 58  Max: 78  78     Resp  Min: 18  Max: 19 18   SpO2  Min: 96 %  Max: 100 % 100 %       Recent Labs   Lab 03/21/23  0352 03/24/23  0418   WBC 7.9 9.3   RBC 2.70* 2.89*   HGB 7.7* 8.3*   HCT 24.1* 25.4*   MCV 89.3 87.9   MCH 28.5 28.7   MCHC 32.0* 32.7*   RDW 15.9 15.4    189   MPV 9.6 9.8       Recent Labs   Lab 03/24/23  0418 03/25/23  0349 03/26/23  0354   * 137 134*   K 4.0 4.1 4.6   CO2 22* 25 20*   BUN 18.2 17.5 18.6   CREATININE 0.64* 0.68* 0.64*   CALCIUM 8.5* 8.6* 9.2   MG 1.70 1.90 2.00   ALBUMIN 1.7* 1.8* 2.0*   ALKPHOS 701* 811* 998*   ALT 63* 62* 60*   AST 56* 58* 49*   BILITOT 3.1* 3.5* 3.0*          Microbiology Results (last 7 days)       ** No results found for the last 168 hours. **             See below for Radiology    Scheduled Med:   enoxaparin  40 mg Subcutaneous Daily    finasteride  5 mg Oral Daily    magnesium oxide  400 mg Oral BID    mupirocin   Nasal BID    pantoprazole  40 mg Oral Daily    sodium chloride 0.9%  10 mL Intravenous Q6H    tamsulosin  0.4 mg Oral Daily    zinc oxide-cod liver oil   Topical (Top) BID        Continuous Infusions:   amino acid 5 % in 15% dextrose          PRN Meds:  acetaminophen, ALPRAZolam, hydrOXYzine, melatonin, naloxone, ondansetron, prochlorperazine, simethicone, Flushing PICC Protocol **AND** sodium chloride 0.9% **AND** sodium chloride 0.9%       Assessment/Plan:  Gastric outlet obstruction s/t tumor  Adenocarcinoma of the duodenum  obstructive jaundice, s/p prior percutaneous drain  Severe metabolic acidosis  Anemia of chronic disease  Recent Enterococcus faecium  bacteremia    Patient remains hemodynamically stable on room air  Reviewed labs from today sodium 34, potassium 4.6 bicarb 20 glucose 119 BUN 18.6, creatinine 0.64 alkaline phosphatase 998, AST 49, ALT 60   Patient tolerating liquids   Surgical oncology following.  Patient is planned to have Whipple's procedure  tomorrow.  Optimizing nutrition in the interim with Clinimix through PICC line ,protein supplements albumin 2 grams/deciliter  continue physical therapy services   Reviewed current medications  Palliative care team following.  Appreciate their assistance.  Case management on board       Code status DNI      VTE prophylaxis:  Lovenox    Patient condition:  Guarded    Anticipated discharge and Disposition:   To be decided      _____________________________________________________________________    Nutrition Status:    Radiology:  X-Ray Chest 1 View  Narrative: EXAMINATION:  XR CHEST 1 VIEW    CLINICAL HISTORY:  PICC placement;    TECHNIQUE:  One view    COMPARISON:  March 2, 2023    FINDINGS:  Left upper extremity approach PICC line terminates within the midportion of the superior vena cava.  Cardiopericardial silhouette is within normal limits. Lungs are without dense focal or segmental consolidation, congestive process, pleural effusions or pneumothorax.  Impression: Optimal placement of the PICC line.    Electronically signed by: Shant Sequeira  Date:    03/21/2023  Time:    11:07      Ashwini Mcmanus MD   03/26/2023

## 2023-03-26 NOTE — PLAN OF CARE
Problem: Adult Inpatient Plan of Care  Goal: Plan of Care Review  Outcome: Ongoing, Progressing  Flowsheets (Taken 3/26/2023 0112)  Plan of Care Reviewed With: patient     Problem: Adult Inpatient Plan of Care  Goal: Optimal Comfort and Wellbeing  Outcome: Ongoing, Progressing  Intervention: Monitor Pain and Promote Comfort  Flowsheets (Taken 3/26/2023 0112)  Pain Management Interventions:   care clustered   medication offered   quiet environment facilitated  Intervention: Provide Person-Centered Care  Flowsheets (Taken 3/26/2023 0112)  Trust Relationship/Rapport:   care explained   choices provided   emotional support provided   empathic listening provided   questions answered   questions encouraged   thoughts/feelings acknowledged     Problem: Coping Ineffective  Goal: Effective Coping  Outcome: Ongoing, Progressing      68yo woman with HTN, afib not on anticoagulation due to GIB, recent CABG (10 days PTA) was placed in CDU after she presented to the ED c/o R leg pain and swelling with increased SOB x 1 day. ED evaluation revealed mild RIC and b/l pleural effusions; pt did have an episode of hypotension but she remained hypotensive and responded to fluids and albumin. Plan is for monitoring, possible thoracentesis. Exam as noted with RLE erythema and pain, no DVT but will treat for possible early cellulitis and observe; concerned for pt's ability to walk at home as pain from the leg is relatively severe. CT surgery aware of various issues and will also monitor.

## 2023-03-27 ENCOUNTER — ANESTHESIA EVENT (OUTPATIENT)
Dept: SURGERY | Facility: HOSPITAL | Age: 83
DRG: 405 | End: 2023-03-27
Payer: MEDICARE

## 2023-03-27 ENCOUNTER — ANESTHESIA (OUTPATIENT)
Dept: SURGERY | Facility: HOSPITAL | Age: 83
DRG: 405 | End: 2023-03-27
Payer: MEDICARE

## 2023-03-27 LAB
ABO + RH BLD: NORMAL
ABO + RH BLD: NORMAL
ALBUMIN SERPL-MCNC: 2 G/DL (ref 3.4–4.8)
ALBUMIN/GLOB SERPL: 0.5 RATIO (ref 1.1–2)
ALP SERPL-CCNC: 1026 UNIT/L (ref 40–150)
ALT SERPL-CCNC: 63 UNIT/L (ref 0–55)
AST SERPL-CCNC: 56 UNIT/L (ref 5–34)
BASOPHILS # BLD AUTO: 0.06 X10(3)/MCL (ref 0–0.2)
BASOPHILS NFR BLD AUTO: 0.9 %
BILIRUBIN DIRECT+TOT PNL SERPL-MCNC: 3.5 MG/DL
BLD PROD TYP BPU: NORMAL
BLD PROD TYP BPU: NORMAL
BLOOD UNIT EXPIRATION DATE: NORMAL
BLOOD UNIT EXPIRATION DATE: NORMAL
BLOOD UNIT TYPE CODE: 6200
BLOOD UNIT TYPE CODE: 6200
BUN SERPL-MCNC: 22.5 MG/DL (ref 8.4–25.7)
CALCIUM SERPL-MCNC: 9.5 MG/DL (ref 8.8–10)
CHLORIDE SERPL-SCNC: 109 MMOL/L (ref 98–107)
CO2 SERPL-SCNC: 19 MMOL/L (ref 23–31)
CREAT SERPL-MCNC: 0.71 MG/DL (ref 0.73–1.18)
CROSSMATCH INTERPRETATION: NORMAL
CROSSMATCH INTERPRETATION: NORMAL
DISPENSE STATUS: NORMAL
DISPENSE STATUS: NORMAL
EOSINOPHIL # BLD AUTO: 0.29 X10(3)/MCL (ref 0–0.9)
EOSINOPHIL NFR BLD AUTO: 4.5 %
ERYTHROCYTE [DISTWIDTH] IN BLOOD BY AUTOMATED COUNT: 15.7 % (ref 11.5–17)
GFR SERPLBLD CREATININE-BSD FMLA CKD-EPI: >60 MLS/MIN/1.73/M2
GLOBULIN SER-MCNC: 3.7 GM/DL (ref 2.4–3.5)
GLUCOSE SERPL-MCNC: 86 MG/DL (ref 82–115)
GLUCOSE SERPL-MCNC: 88 MG/DL (ref 70–110)
GROUP & RH: NORMAL
HCT VFR BLD AUTO: 25.3 % (ref 42–52)
HGB BLD-MCNC: 8.1 G/DL (ref 14–18)
IMM GRANULOCYTES # BLD AUTO: 0.13 X10(3)/MCL (ref 0–0.04)
IMM GRANULOCYTES NFR BLD AUTO: 2 %
INDIRECT COOMBS GEL: NORMAL
INR BLD: 0.97 (ref 0–1.3)
LYMPHOCYTES # BLD AUTO: 1.59 X10(3)/MCL (ref 0.6–4.6)
LYMPHOCYTES NFR BLD AUTO: 24.5 %
MAGNESIUM SERPL-MCNC: 2 MG/DL (ref 1.6–2.6)
MCH RBC QN AUTO: 28.7 PG (ref 27–31)
MCHC RBC AUTO-ENTMCNC: 32 G/DL (ref 33–36)
MCV RBC AUTO: 89.7 FL (ref 80–94)
MONOCYTES # BLD AUTO: 0.66 X10(3)/MCL (ref 0.1–1.3)
MONOCYTES NFR BLD AUTO: 10.2 %
NEUTROPHILS # BLD AUTO: 3.76 X10(3)/MCL (ref 2.1–9.2)
NEUTROPHILS NFR BLD AUTO: 57.9 %
NRBC BLD AUTO-RTO: 0 %
PHOSPHATE SERPL-MCNC: 3.6 MG/DL (ref 2.3–4.7)
PLATELET # BLD AUTO: 198 X10(3)/MCL (ref 130–400)
PMV BLD AUTO: 11.2 FL (ref 7.4–10.4)
POCT GLUCOSE: 82 MG/DL (ref 70–110)
POTASSIUM SERPL-SCNC: 4.7 MMOL/L (ref 3.5–5.1)
PROT SERPL-MCNC: 5.7 GM/DL (ref 5.8–7.6)
PROTHROMBIN TIME: 12.8 SECONDS (ref 12.5–14.5)
RBC # BLD AUTO: 2.82 X10(6)/MCL (ref 4.7–6.1)
SODIUM SERPL-SCNC: 134 MMOL/L (ref 136–145)
SPECIMEN OUTDATE: NORMAL
UNIT NUMBER: NORMAL
UNIT NUMBER: NORMAL
WBC # SPEC AUTO: 6.5 X10(3)/MCL (ref 4.5–11.5)

## 2023-03-27 PROCEDURE — 27000221 HC OXYGEN, UP TO 24 HOURS

## 2023-03-27 PROCEDURE — 38747 REMOVE ABDOMINAL LYMPH NODES: CPT | Mod: 59,,, | Performed by: SURGERY

## 2023-03-27 PROCEDURE — 48150 PARTIAL REMOVAL OF PANCREAS: CPT | Mod: ,,, | Performed by: SURGERY

## 2023-03-27 PROCEDURE — 38747 PR REMOVE ABD LYMPH NODES RAD REGNL: ICD-10-PCS | Mod: 59,,, | Performed by: SURGERY

## 2023-03-27 PROCEDURE — 84100 ASSAY OF PHOSPHORUS: CPT | Performed by: SURGERY

## 2023-03-27 PROCEDURE — P9016 RBC LEUKOCYTES REDUCED: HCPCS | Performed by: SURGERY

## 2023-03-27 PROCEDURE — 88305 TISSUE EXAM BY PATHOLOGIST: CPT

## 2023-03-27 PROCEDURE — 85610 PROTHROMBIN TIME: CPT | Performed by: INTERNAL MEDICINE

## 2023-03-27 PROCEDURE — 48150 PR PART REMV PANC,PROX+REMV DUOD+ANAST: ICD-10-PCS | Mod: AS,,, | Performed by: NURSE PRACTITIONER

## 2023-03-27 PROCEDURE — 47420 PR INCIS BILE DUCT/EXPL,DRAIN,REMV CALC: ICD-10-PCS | Mod: ,,, | Performed by: SURGERY

## 2023-03-27 PROCEDURE — 80053 COMPREHEN METABOLIC PANEL: CPT | Performed by: SURGERY

## 2023-03-27 PROCEDURE — 47420 INCISION OF BILE DUCT: CPT | Mod: AS,,, | Performed by: NURSE PRACTITIONER

## 2023-03-27 PROCEDURE — 71000039 HC RECOVERY, EACH ADD'L HOUR: Performed by: SURGERY

## 2023-03-27 PROCEDURE — 25000003 PHARM REV CODE 250: Performed by: SURGERY

## 2023-03-27 PROCEDURE — 71000015 HC POSTOP RECOV 1ST HR: Performed by: SURGERY

## 2023-03-27 PROCEDURE — 63600175 PHARM REV CODE 636 W HCPCS

## 2023-03-27 PROCEDURE — 63600175 PHARM REV CODE 636 W HCPCS: Performed by: NURSE PRACTITIONER

## 2023-03-27 PROCEDURE — 25000003 PHARM REV CODE 250: Performed by: NURSE PRACTITIONER

## 2023-03-27 PROCEDURE — 88311 DECALCIFY TISSUE: CPT

## 2023-03-27 PROCEDURE — 71000033 HC RECOVERY, INTIAL HOUR: Performed by: SURGERY

## 2023-03-27 PROCEDURE — 37000009 HC ANESTHESIA EA ADD 15 MINS: Performed by: SURGERY

## 2023-03-27 PROCEDURE — 36000709 HC OR TIME LEV III EA ADD 15 MIN: Performed by: SURGERY

## 2023-03-27 PROCEDURE — 83735 ASSAY OF MAGNESIUM: CPT | Performed by: SURGERY

## 2023-03-27 PROCEDURE — 11000001 HC ACUTE MED/SURG PRIVATE ROOM

## 2023-03-27 PROCEDURE — 25000003 PHARM REV CODE 250: Performed by: INTERNAL MEDICINE

## 2023-03-27 PROCEDURE — A4216 STERILE WATER/SALINE, 10 ML: HCPCS | Performed by: SURGERY

## 2023-03-27 PROCEDURE — 47100 WEDGE BIOPSY OF LIVER: CPT | Mod: AS,,, | Performed by: NURSE PRACTITIONER

## 2023-03-27 PROCEDURE — 47420 PR INCIS BILE DUCT/EXPL,DRAIN,REMV CALC: ICD-10-PCS | Mod: AS,,, | Performed by: NURSE PRACTITIONER

## 2023-03-27 PROCEDURE — 27201423 OPTIME MED/SURG SUP & DEVICES STERILE SUPPLY: Performed by: SURGERY

## 2023-03-27 PROCEDURE — 47420 INCISION OF BILE DUCT: CPT | Mod: ,,, | Performed by: SURGERY

## 2023-03-27 PROCEDURE — 47100 PR WEDGE BIOPSY OF LIVER: ICD-10-PCS | Mod: ,,, | Performed by: SURGERY

## 2023-03-27 PROCEDURE — 47100 PR WEDGE BIOPSY OF LIVER: ICD-10-PCS | Mod: AS,,, | Performed by: NURSE PRACTITIONER

## 2023-03-27 PROCEDURE — 85025 COMPLETE CBC W/AUTO DIFF WBC: CPT | Performed by: INTERNAL MEDICINE

## 2023-03-27 PROCEDURE — 37000008 HC ANESTHESIA 1ST 15 MINUTES: Performed by: SURGERY

## 2023-03-27 PROCEDURE — C1729 CATH, DRAINAGE: HCPCS | Performed by: SURGERY

## 2023-03-27 PROCEDURE — 38747 REMOVE ABDOMINAL LYMPH NODES: CPT | Mod: AS,59,, | Performed by: NURSE PRACTITIONER

## 2023-03-27 PROCEDURE — 88307 TISSUE EXAM BY PATHOLOGIST: CPT

## 2023-03-27 PROCEDURE — 25000003 PHARM REV CODE 250

## 2023-03-27 PROCEDURE — 38747 PR REMOVE ABD LYMPH NODES RAD REGNL: ICD-10-PCS | Mod: AS,59,, | Performed by: NURSE PRACTITIONER

## 2023-03-27 PROCEDURE — 36000708 HC OR TIME LEV III 1ST 15 MIN: Performed by: SURGERY

## 2023-03-27 PROCEDURE — 48150 PR PART REMV PANC,PROX+REMV DUOD+ANAST: ICD-10-PCS | Mod: ,,, | Performed by: SURGERY

## 2023-03-27 PROCEDURE — 86923 COMPATIBILITY TEST ELECTRIC: CPT | Mod: 91 | Performed by: SURGERY

## 2023-03-27 PROCEDURE — 86900 BLOOD TYPING SEROLOGIC ABO: CPT | Performed by: SURGERY

## 2023-03-27 PROCEDURE — 36620 INSERTION CATHETER ARTERY: CPT | Performed by: ANESTHESIOLOGY

## 2023-03-27 PROCEDURE — 88304 TISSUE EXAM BY PATHOLOGIST: CPT | Performed by: SURGERY

## 2023-03-27 PROCEDURE — 88309 TISSUE EXAM BY PATHOLOGIST: CPT

## 2023-03-27 PROCEDURE — 47100 WEDGE BIOPSY OF LIVER: CPT | Mod: ,,, | Performed by: SURGERY

## 2023-03-27 PROCEDURE — 48150 PARTIAL REMOVAL OF PANCREAS: CPT | Mod: AS,,, | Performed by: NURSE PRACTITIONER

## 2023-03-27 RX ORDER — DEXAMETHASONE SODIUM PHOSPHATE 4 MG/ML
INJECTION, SOLUTION INTRA-ARTICULAR; INTRALESIONAL; INTRAMUSCULAR; INTRAVENOUS; SOFT TISSUE
Status: DISCONTINUED | OUTPATIENT
Start: 2023-03-27 | End: 2023-03-27

## 2023-03-27 RX ORDER — DIPHENHYDRAMINE HYDROCHLORIDE 50 MG/ML
25 INJECTION INTRAMUSCULAR; INTRAVENOUS ONCE
Status: DISCONTINUED | OUTPATIENT
Start: 2023-03-27 | End: 2023-03-27

## 2023-03-27 RX ORDER — ROCURONIUM BROMIDE 10 MG/ML
INJECTION, SOLUTION INTRAVENOUS
Status: DISCONTINUED | OUTPATIENT
Start: 2023-03-27 | End: 2023-03-27

## 2023-03-27 RX ORDER — LIDOCAINE HYDROCHLORIDE 20 MG/ML
INJECTION, SOLUTION EPIDURAL; INFILTRATION; INTRACAUDAL; PERINEURAL
Status: DISCONTINUED | OUTPATIENT
Start: 2023-03-27 | End: 2023-03-27

## 2023-03-27 RX ORDER — DIPHENHYDRAMINE HYDROCHLORIDE 50 MG/ML
12.5 INJECTION INTRAMUSCULAR; INTRAVENOUS EVERY 6 HOURS PRN
Status: DISCONTINUED | OUTPATIENT
Start: 2023-03-27 | End: 2023-03-28

## 2023-03-27 RX ORDER — HYDROMORPHONE HYDROCHLORIDE 2 MG/ML
INJECTION, SOLUTION INTRAMUSCULAR; INTRAVENOUS; SUBCUTANEOUS
Status: DISCONTINUED | OUTPATIENT
Start: 2023-03-27 | End: 2023-03-27

## 2023-03-27 RX ORDER — INSULIN ASPART 100 [IU]/ML
0-5 INJECTION, SOLUTION INTRAVENOUS; SUBCUTANEOUS
Status: DISCONTINUED | OUTPATIENT
Start: 2023-03-27 | End: 2023-04-06 | Stop reason: HOSPADM

## 2023-03-27 RX ORDER — IBUPROFEN 200 MG
15 TABLET ORAL
Status: DISCONTINUED | OUTPATIENT
Start: 2023-03-27 | End: 2023-03-28

## 2023-03-27 RX ORDER — MIDAZOLAM HYDROCHLORIDE 1 MG/ML
INJECTION INTRAMUSCULAR; INTRAVENOUS
Status: COMPLETED
Start: 2023-03-27 | End: 2023-03-27

## 2023-03-27 RX ORDER — PROMETHAZINE HYDROCHLORIDE 25 MG/1
25 TABLET ORAL EVERY 6 HOURS PRN
Status: DISCONTINUED | OUTPATIENT
Start: 2023-03-27 | End: 2023-04-06 | Stop reason: HOSPADM

## 2023-03-27 RX ORDER — GLUCAGON 1 MG
1 KIT INJECTION
Status: DISCONTINUED | OUTPATIENT
Start: 2023-03-27 | End: 2023-04-06 | Stop reason: HOSPADM

## 2023-03-27 RX ORDER — SODIUM CHLORIDE, SODIUM LACTATE, POTASSIUM CHLORIDE, CALCIUM CHLORIDE 600; 310; 30; 20 MG/100ML; MG/100ML; MG/100ML; MG/100ML
INJECTION, SOLUTION INTRAVENOUS CONTINUOUS
Status: CANCELLED | OUTPATIENT
Start: 2023-03-27

## 2023-03-27 RX ORDER — HYDROMORPHONE HYDROCHLORIDE 2 MG/ML
0.4 INJECTION, SOLUTION INTRAMUSCULAR; INTRAVENOUS; SUBCUTANEOUS EVERY 5 MIN PRN
Status: DISCONTINUED | OUTPATIENT
Start: 2023-03-27 | End: 2023-03-27

## 2023-03-27 RX ORDER — IBUPROFEN 200 MG
24 TABLET ORAL
Status: DISCONTINUED | OUTPATIENT
Start: 2023-03-27 | End: 2023-04-06 | Stop reason: HOSPADM

## 2023-03-27 RX ORDER — ONDANSETRON 2 MG/ML
INJECTION INTRAMUSCULAR; INTRAVENOUS
Status: DISCONTINUED | OUTPATIENT
Start: 2023-03-27 | End: 2023-03-27

## 2023-03-27 RX ORDER — IBUPROFEN 200 MG
16 TABLET ORAL
Status: DISCONTINUED | OUTPATIENT
Start: 2023-03-27 | End: 2023-04-06 | Stop reason: HOSPADM

## 2023-03-27 RX ORDER — LIDOCAINE HYDROCHLORIDE 10 MG/ML
1 INJECTION, SOLUTION EPIDURAL; INFILTRATION; INTRACAUDAL; PERINEURAL ONCE
Status: CANCELLED | OUTPATIENT
Start: 2023-03-27 | End: 2023-03-27

## 2023-03-27 RX ORDER — PROPOFOL 10 MG/ML
VIAL (ML) INTRAVENOUS
Status: DISCONTINUED | OUTPATIENT
Start: 2023-03-27 | End: 2023-03-27

## 2023-03-27 RX ORDER — ONDANSETRON 4 MG/1
4 TABLET, ORALLY DISINTEGRATING ORAL ONCE
Status: CANCELLED | OUTPATIENT
Start: 2023-03-27 | End: 2023-03-27

## 2023-03-27 RX ORDER — ACETAMINOPHEN 10 MG/ML
1000 INJECTION, SOLUTION INTRAVENOUS EVERY 8 HOURS
Status: DISPENSED | OUTPATIENT
Start: 2023-03-27 | End: 2023-03-28

## 2023-03-27 RX ORDER — PHENYLEPHRINE HYDROCHLORIDE 10 MG/ML
INJECTION INTRAVENOUS
Status: DISCONTINUED | OUTPATIENT
Start: 2023-03-27 | End: 2023-03-27

## 2023-03-27 RX ORDER — BUPIVACAINE HYDROCHLORIDE 5 MG/ML
INJECTION, SOLUTION EPIDURAL; INTRACAUDAL
Status: DISCONTINUED | OUTPATIENT
Start: 2023-03-27 | End: 2023-03-27 | Stop reason: HOSPADM

## 2023-03-27 RX ORDER — ENOXAPARIN SODIUM 100 MG/ML
30 INJECTION SUBCUTANEOUS EVERY 24 HOURS
Status: DISCONTINUED | OUTPATIENT
Start: 2023-03-28 | End: 2023-03-28

## 2023-03-27 RX ORDER — HYDROCODONE BITARTRATE AND ACETAMINOPHEN 500; 5 MG/1; MG/1
TABLET ORAL
Status: DISCONTINUED | OUTPATIENT
Start: 2023-03-27 | End: 2023-04-06 | Stop reason: HOSPADM

## 2023-03-27 RX ORDER — ERTAPENEM 1 G/1
INJECTION, POWDER, LYOPHILIZED, FOR SOLUTION INTRAMUSCULAR; INTRAVENOUS
Status: DISCONTINUED | OUTPATIENT
Start: 2023-03-27 | End: 2023-03-27

## 2023-03-27 RX ORDER — ALVIMOPAN 12 MG/1
12 CAPSULE ORAL 2 TIMES DAILY
Status: DISCONTINUED | OUTPATIENT
Start: 2023-03-27 | End: 2023-03-29 | Stop reason: SDUPTHER

## 2023-03-27 RX ORDER — SODIUM CHLORIDE, SODIUM GLUCONATE, SODIUM ACETATE, POTASSIUM CHLORIDE AND MAGNESIUM CHLORIDE 30; 37; 368; 526; 502 MG/100ML; MG/100ML; MG/100ML; MG/100ML; MG/100ML
INJECTION, SOLUTION INTRAVENOUS CONTINUOUS
Status: DISCONTINUED | OUTPATIENT
Start: 2023-03-27 | End: 2023-03-27

## 2023-03-27 RX ORDER — ONDANSETRON 2 MG/ML
8 INJECTION INTRAMUSCULAR; INTRAVENOUS EVERY 8 HOURS PRN
Status: DISCONTINUED | OUTPATIENT
Start: 2023-03-27 | End: 2023-04-06 | Stop reason: HOSPADM

## 2023-03-27 RX ORDER — DIPHENHYDRAMINE HYDROCHLORIDE 50 MG/ML
12.5 INJECTION INTRAMUSCULAR; INTRAVENOUS EVERY 4 HOURS PRN
Status: DISCONTINUED | OUTPATIENT
Start: 2023-03-27 | End: 2023-04-06 | Stop reason: HOSPADM

## 2023-03-27 RX ORDER — HYDROCODONE BITARTRATE AND ACETAMINOPHEN 5; 325 MG/1; MG/1
1 TABLET ORAL EVERY 4 HOURS PRN
Status: CANCELLED | OUTPATIENT
Start: 2023-03-27

## 2023-03-27 RX ORDER — NALOXONE HCL 0.4 MG/ML
0.02 VIAL (ML) INJECTION
Status: DISCONTINUED | OUTPATIENT
Start: 2023-03-27 | End: 2023-04-06 | Stop reason: HOSPADM

## 2023-03-27 RX ORDER — FENTANYL CITRATE 50 UG/ML
INJECTION, SOLUTION INTRAMUSCULAR; INTRAVENOUS
Status: DISCONTINUED | OUTPATIENT
Start: 2023-03-27 | End: 2023-03-27

## 2023-03-27 RX ORDER — ALBUMIN HUMAN 250 G/1000ML
SOLUTION INTRAVENOUS CONTINUOUS PRN
Status: DISCONTINUED | OUTPATIENT
Start: 2023-03-27 | End: 2023-03-27

## 2023-03-27 RX ORDER — SODIUM CHLORIDE, SODIUM GLUCONATE, SODIUM ACETATE, POTASSIUM CHLORIDE AND MAGNESIUM CHLORIDE 30; 37; 368; 526; 502 MG/100ML; MG/100ML; MG/100ML; MG/100ML; MG/100ML
INJECTION, SOLUTION INTRAVENOUS CONTINUOUS
Status: CANCELLED | OUTPATIENT
Start: 2023-03-27 | End: 2023-04-26

## 2023-03-27 RX ORDER — HYDROMORPHONE HCL IN 0.9% NACL 6 MG/30 ML
PATIENT CONTROLLED ANALGESIA SYRINGE INTRAVENOUS CONTINUOUS
Status: DISCONTINUED | OUTPATIENT
Start: 2023-03-27 | End: 2023-03-31

## 2023-03-27 RX ORDER — IBUPROFEN 200 MG
30 TABLET ORAL
Status: DISCONTINUED | OUTPATIENT
Start: 2023-03-27 | End: 2023-03-28

## 2023-03-27 RX ORDER — SODIUM CHLORIDE, SODIUM LACTATE, POTASSIUM CHLORIDE, CALCIUM CHLORIDE 600; 310; 30; 20 MG/100ML; MG/100ML; MG/100ML; MG/100ML
INJECTION, SOLUTION INTRAVENOUS CONTINUOUS
Status: DISCONTINUED | OUTPATIENT
Start: 2023-03-27 | End: 2023-03-29

## 2023-03-27 RX ADMIN — SODIUM CHLORIDE, POTASSIUM CHLORIDE, SODIUM LACTATE AND CALCIUM CHLORIDE: 600; 310; 30; 20 INJECTION, SOLUTION INTRAVENOUS at 04:03

## 2023-03-27 RX ADMIN — ROCURONIUM BROMIDE 20 MG: 10 SOLUTION INTRAVENOUS at 11:03

## 2023-03-27 RX ADMIN — PHENYLEPHRINE HYDROCHLORIDE 100 MCG: 10 INJECTION INTRAVENOUS at 10:03

## 2023-03-27 RX ADMIN — ERTAPENEM 1 G: 1 INJECTION INTRAMUSCULAR; INTRAVENOUS at 11:03

## 2023-03-27 RX ADMIN — Medication 400 MG: at 09:03

## 2023-03-27 RX ADMIN — ACETAMINOPHEN 1000 MG: 10 INJECTION, SOLUTION INTRAVENOUS at 09:03

## 2023-03-27 RX ADMIN — HYDROMORPHONE HYDROCHLORIDE 0.4 MG: 2 INJECTION INTRAMUSCULAR; INTRAVENOUS; SUBCUTANEOUS at 12:03

## 2023-03-27 RX ADMIN — ALVIMOPAN 12 MG: 12 CAPSULE ORAL at 09:03

## 2023-03-27 RX ADMIN — HYDROMORPHONE HYDROCHLORIDE 1.2 MG: 2 INJECTION INTRAMUSCULAR; INTRAVENOUS; SUBCUTANEOUS at 02:03

## 2023-03-27 RX ADMIN — Medication: at 06:03

## 2023-03-27 RX ADMIN — SODIUM CHLORIDE, PRESERVATIVE FREE 10 ML: 5 INJECTION INTRAVENOUS at 06:03

## 2023-03-27 RX ADMIN — MIDAZOLAM HYDROCHLORIDE 2 MG: 1 INJECTION, SOLUTION INTRAMUSCULAR; INTRAVENOUS at 08:03

## 2023-03-27 RX ADMIN — ROCURONIUM BROMIDE 15 MG: 10 SOLUTION INTRAVENOUS at 12:03

## 2023-03-27 RX ADMIN — DEXAMETHASONE SODIUM PHOSPHATE 4 MG: 4 INJECTION, SOLUTION INTRA-ARTICULAR; INTRALESIONAL; INTRAMUSCULAR; INTRAVENOUS; SOFT TISSUE at 10:03

## 2023-03-27 RX ADMIN — ALBUMIN HUMAN: 250 SOLUTION INTRAVENOUS at 11:03

## 2023-03-27 RX ADMIN — FENTANYL CITRATE 50 MCG: 50 INJECTION, SOLUTION INTRAMUSCULAR; INTRAVENOUS at 11:03

## 2023-03-27 RX ADMIN — PHENYLEPHRINE HYDROCHLORIDE 20 MCG/MIN: 10 INJECTION INTRAVENOUS at 11:03

## 2023-03-27 RX ADMIN — ROCURONIUM BROMIDE 15 MG: 10 SOLUTION INTRAVENOUS at 11:03

## 2023-03-27 RX ADMIN — MUPIROCIN: 20 OINTMENT TOPICAL at 09:03

## 2023-03-27 RX ADMIN — FENTANYL CITRATE 50 MCG: 50 INJECTION, SOLUTION INTRAMUSCULAR; INTRAVENOUS at 10:03

## 2023-03-27 RX ADMIN — HYDROMORPHONE HYDROCHLORIDE 0.4 MG: 2 INJECTION INTRAMUSCULAR; INTRAVENOUS; SUBCUTANEOUS at 02:03

## 2023-03-27 RX ADMIN — FENTANYL CITRATE 25 MCG: 50 INJECTION, SOLUTION INTRAMUSCULAR; INTRAVENOUS at 11:03

## 2023-03-27 RX ADMIN — ONDANSETRON 4 MG: 2 INJECTION INTRAMUSCULAR; INTRAVENOUS at 02:03

## 2023-03-27 RX ADMIN — ONDANSETRON 8 MG: 2 INJECTION INTRAMUSCULAR; INTRAVENOUS at 11:03

## 2023-03-27 RX ADMIN — PROPOFOL 100 MG: 10 INJECTION, EMULSION INTRAVENOUS at 10:03

## 2023-03-27 RX ADMIN — LEUCINE, PHENYLALANINE, LYSINE, METHIONINE, ISOLEUCINE, VALINE, HISTIDINE, THREONINE, TRYPTOPHAN, ALANINE, GLYCINE, ARGININE, PROLINE, SERINE, TYROSINE, SODIUM ACETATE, DIBASIC POTASSIUM PHOSPHATE, MAGNESIUM CHLORIDE, SODIUM CHLORIDE, CALCIUM CHLORIDE, DEXTROSE
365; 280; 290; 200; 300; 290; 240; 210; 90; 1035; 515; 575; 340; 250; 20; 340; 261; 51; 59; 33; 15 INJECTION INTRAVENOUS at 11:03

## 2023-03-27 RX ADMIN — SODIUM CHLORIDE, PRESERVATIVE FREE 10 ML: 5 INJECTION INTRAVENOUS at 11:03

## 2023-03-27 RX ADMIN — SODIUM CHLORIDE, PRESERVATIVE FREE 10 ML: 5 INJECTION INTRAVENOUS at 05:03

## 2023-03-27 RX ADMIN — SODIUM CHLORIDE, SODIUM GLUCONATE, SODIUM ACETATE, POTASSIUM CHLORIDE AND MAGNESIUM CHLORIDE: 526; 502; 368; 37; 30 INJECTION, SOLUTION INTRAVENOUS at 10:03

## 2023-03-27 RX ADMIN — FINASTERIDE 5 MG: 5 TABLET, FILM COATED ORAL at 09:03

## 2023-03-27 RX ADMIN — SUGAMMADEX 130 MG: 100 INJECTION, SOLUTION INTRAVENOUS at 02:03

## 2023-03-27 RX ADMIN — LIDOCAINE HYDROCHLORIDE 4 ML: 20 INJECTION, SOLUTION EPIDURAL; INFILTRATION; INTRACAUDAL; PERINEURAL at 10:03

## 2023-03-27 RX ADMIN — ROCURONIUM BROMIDE 50 MG: 10 SOLUTION INTRAVENOUS at 10:03

## 2023-03-27 RX ADMIN — ALBUMIN HUMAN: 250 SOLUTION INTRAVENOUS at 01:03

## 2023-03-27 NOTE — ANESTHESIA PREPROCEDURE EVALUATION
82-year-old male known to Dr. Brown, currently on schedule for WHIPPLE on 3/27/23 per family report. He initially presented with mild symptoms of jaundice, as well as significant weight loss.  Ultrasound exam suggested biliary dilation.  CT scan showed circumferential thickening of the 2nd/3rd portion of the duodenum and intra and extrahepatic biliary dilation.  There was also pancreatic ductal dilation.  There is no evidence of metastatic disease on initial imaging and repeat imaging in ER yesterday.  I personally reviewed and interpreted the original images and ER images.  Patient previously underwent upper endoscopy and attempted ERCP with the finding of a malignant appearing mass at the site of the ampulla.  Biopsies proved poorly differentiated adenocarcinoma.  The bile duct could not be cannulated.  The patient denies fevers, chills or cholangitic symptoms. Then was admitted for biliary obstruction and underwent PTC catheter placement, could not be internalized and catheter remains with bilious output to bag. Chronic UTI issues. He currently denies fevers or chills, no symptoms of jaundice.  Was doing well outpatient with therapy but began to decline again.  Represented to ER yesterday and noted to have hyponatremia, hyperkalemia, elevated CR to 1.53, elevated bili to 3.2, alp 1691, UA weakly positive.  On abx therapy for this.  CT with developing GOO and stomach distension.  Images reviewed.  Surgery consulted.  Remains on liquids only at this time.  Tolerating without N/V.  No acute pain.                                                                                                    03/27/2023  Quincy Triplett is a 82 y.o., male.  WHIPPLE PROCEDURE (Abdomen)  In [facility], [facility] OR       Pre-op Assessment    I have reviewed the Patient Summary Reports.     I have reviewed the Nursing Notes. I have reviewed the NPO Status.   I have reviewed the Medications.     Review of Systems  Anesthesia Hx:  No  problems with previous Anesthesia    Hematology/Oncology:  Hematology Normal   Oncology Normal     EENT/Dental:EENT/Dental Normal   Cardiovascular:   Exercise tolerance: poor Hypertension CAD  CABG/stent   Denies Angina.  Denies Orthopnea.  Denies IVEY.  Functional Capacity 2 METS    Pulmonary:  Pulmonary Normal    Renal/:   Denies Chronic Renal Disease.     Hepatic/GI:  Hepatic/GI Normal    Musculoskeletal:  Musculoskeletal Normal    Neurological:  Neurology Normal    Endocrine:  Endocrine Normal  Denies Morbid Obesity / BMI > 40  Dermatological:  Skin Normal    Psych:  Psychiatric Normal          Latest Reference Range & Units Most Recent   WBC 4.5 - 11.5 x10(3)/mcL 6.5  3/27/23 04:21   RBC 4.70 - 6.10 x10(6)/mcL 2.82 (L)  3/27/23 04:21   Hemoglobin 14.0 - 18.0 g/dL 8.1 (L)  3/27/23 04:21   Hematocrit 42.0 - 52.0 % 25.3 (L)  3/27/23 04:21   MCV 80.0 - 94.0 fL 89.7  3/27/23 04:21   MCH 27.0 - 31.0 pg 28.7  3/27/23 04:21   MCHC 33.0 - 36.0 g/dL 32.0 (L)  3/27/23 04:21   RDW 11.5 - 17.0 % 15.7  3/27/23 04:21   Platelets 130 - 400 x10(3)/mcL 198  3/27/23 04:21   MPV 7.4 - 10.4 fL 11.2 (H)  3/27/23 04:21   Platelet Estimate >Normal  Normal  11/16/20 07:30   Neut % % 57.9  3/27/23 04:21   LYMPH % % 24.5  3/27/23 04:21   Mono % % 10.2  3/27/23 04:21   Eosinophil % % 4.5  3/27/23 04:21   Basophil % % 0.9  3/27/23 04:21   Immature Granulocytes % 2.0  3/27/23 04:21   Gran # (ANC) 2.10 - 9.20 x10(3)/mcL 3.20  11/22/21 07:40   Neut # 2.1 - 9.2 x10(3)/mcL 3.76  3/27/23 04:21   Lymph # 0.6 - 4.6 x10(3)/mcL 1.59  3/27/23 04:21   Mono # 0.1 - 1.3 x10(3)/mcL 0.66  3/27/23 04:21   Eos # 0 - 0.9 x10(3)/mcL 0.29  3/27/23 04:21   Baso # 0 - 0.2 x10(3)/mcL 0.06  3/27/23 04:21   Immature Grans (Abs) 0 - 0.04 x10(3)/mcL 0.13 (H)  3/27/23 04:21   nRBC % 0.0  3/27/23 04:21   Iron 65 - 175 ug/dL 47 (L)  1/15/23 03:20   TIBC 250 - 450 ug/dL 177 (L)  1/15/23 03:20   Iron Binding Capacity Unsaturated 69 - 240 ug/dL 130  1/15/23 03:20    Transferrin mg/dL 146  1/15/23 03:20   Ferritin 21.81 - 274.66 ng/mL 400.98 (H)  1/15/23 03:20   Folate 7.0 - 31.4 ng/mL 11.6  1/16/23 04:06   Vitamin B-12 213 - 816 pg/mL 1,283 (H)  11/28/22 08:34   Iron Saturation 20 - 50 % 27  1/15/23 03:20   Sed Rate 0 - 15 mm/hr 45 (H)  1/12/23 10:32   Retic 1.1 - 2.1 % 1.57  12/13/22 11:12   Retic Count Abs 0.026 - 0.095  0.0559  12/13/22 11:12   Protime 12.5 - 14.5 seconds 12.8  3/27/23 04:21   INR 0.00 - 1.30  0.97  3/27/23 04:21   D-Dimer 0.00 - 0.50 ug/mL FEU 0.53 (H)  1/14/23 06:15   Sodium 136 - 145 mmol/L 134 (L)  3/27/23 04:21   Potassium 3.5 - 5.1 mmol/L 4.7  3/27/23 04:21   Chloride 98 - 107 mmol/L 109 (H)  3/27/23 04:21   CO2 23 - 31 mmol/L 19 (L)  3/27/23 04:21   Anion Gap mEq/L 9.0  3/20/23 03:45   BUN 8.4 - 25.7 mg/dL 22.5  3/27/23 04:21   Creatinine 0.73 - 1.18 mg/dL 0.71 (L)  3/27/23 04:21   BUN/CREAT RATIO  14  3/20/23 03:45   eGFR mls/min/1.73/m2 >60  3/27/23 04:21   eGFR if non African American mls/min/1.73/m2 >60  5/23/22 07:30   eGFR if African American  >60  11/22/21 07:40   Glucose 82 - 115 mg/dL 86  3/27/23 04:21   Calcium 8.8 - 10.0 mg/dL 9.5  3/27/23 04:21   Phosphorus 2.3 - 4.7 mg/dL 3.6  3/27/23 04:21   Magnesium 1.60 - 2.60 mg/dL 2.00  3/27/23 04:21   Alkaline Phosphatase 40 - 150 unit/L 1,026 (H)  3/27/23 04:21   PROTEIN TOTAL 5.8 - 7.6 gm/dL 5.7 (L)  3/27/23 04:21   Albumin 3.4 - 4.8 g/dL 2.0 (L)  3/27/23 04:21   Albumin/Globulin Ratio 1.1 - 2.0 ratio 0.5 (L)  3/27/23 04:21   Prealbumin 16.0 - 42.0 mg/dL 13.2 (L)  3/20/23 03:45   BILIRUBIN TOTAL <=1.5 mg/dL 3.5 (H)  3/27/23 04:21   Bilirubin Direct 0.0 - <0.5 mg/dL 0.6 (H)  3/7/23 14:06   Bilirubin, Indirect 0.00 - 0.80 mg/dL 0.50  3/7/23 14:06   AST 5 - 34 unit/L 56 (H)  3/27/23 04:21   ALT 0 - 55 unit/L 63 (H)  3/27/23 04:21   Lipase <=60 U/L 66 (H)  3/17/23 00:10   CRP <5.00 mg/L 3.70  1/13/23 23:47   Globulin, Total 2.4 - 3.5 gm/dL 3.7 (H)  3/27/23 04:21   Cholesterol <=200 mg/dL  84  11/28/22 08:34   HDL 35 - 60 mg/dL 30 (L)  11/28/22 08:34   LDL Cholesterol External 50.00 - 140.00 mg/dL 33.00 (L)  11/28/22 08:34   Total Cholesterol/HDL Ratio 0 - 5  3  11/28/22 08:34   Triglycerides 34 - 140 mg/dL 104  11/28/22 08:34   Very Low Density Lipoprotein  21  11/28/22 08:34   BNP <=100.0 pg/mL 116.6 (H)  3/9/23 12:10    - 220 U/L 143  1/12/23 10:32   Troponin I 0.000 - 0.045 ng/mL 0.016  3/2/23 15:06   Lactate, Ovidio 0.5 - 2.2 mmol/L 0.5  3/17/23 02:08   Thyroid Stimulating Hormone 0.350 - 4.940 uIU/mL 0.788  12/31/22 07:08   PSA, Screen <<=4.00 ng/mL 2.15  11/22/21 07:40   Pathology Review  Hyperbilirubinemia.    Decreased total protein.    Carlos Briseno MD  3/7/23 14:06   Group & Rh  A POS  3/10/23 08:32   Indirect Cherelle GEL  NEG  3/10/23 08:32   PREPARE RBC SOFT  Rpt  3/10/23 08:32   Hep A IgM Nonreactive  Nonreactive  12/5/22 08:30   Hep B C IgM Nonreactive  Nonreactive  12/5/22 08:30   Hepatitis B Surface Antigen Nonreactive  Nonreactive  12/5/22 08:30   Hepatitis C Ab Nonreactive  Nonreactive  12/5/22 08:30   Influenza A, Molecular Not Detected  Not Detected  3/2/23 14:57   Influenza B, Molecular Not Detected  Not Detected  3/2/23 14:57   SARS-CoV2 (COVID-19) Qualitative PCR Not Detected, Negative, Invalid  Not Detected  3/2/23 14:57   Color, UA Yellow, Light-Yellow, Dark Yellow, Anna, Straw  Dark Yellow  3/17/23 00:45   Appearance, UA Clear  Clear  3/17/23 00:45   Specific Gravity,UA 1.005 - 1.030  1.024  3/17/23 00:45   pH, UA 5.0 - 8.5  5.5  3/17/23 00:45   Protein, UA Negative mg/dL 1+ !  3/17/23 00:45   Glucose, UA Negative, Normal mg/dL Negative  3/17/23 00:45   Ketones, UA Negative mg/dL Negative  3/17/23 00:45   Occult Blood UA Negative unit/L Negative  3/17/23 00:45   NITRITE UA Negative  Negative  3/17/23 00:45   UROBILINOGEN UA  0.2  11/22/21 07:40   Bilirubin (UA) >Negative  Negative  11/22/21 07:40   Bilirubin, UA Negative mg/dL Negative  3/17/23 00:45   Urobilinogen, UA  0.2, 1.0, Normal mg/dL 0.2  3/17/23 00:45   Leukocytes, UA Negative unit/L 1+ !  3/17/23 00:45   RBC, UA None Seen, 0-2, 3-5, 0-5 /HPF 6-10 !  3/17/23 00:45   WBC, UA <=5 /HPF 6 (H)  3/17/23 00:45   Bacteria, UA None Seen, Rare, Occasional /HPF Few !  3/17/23 00:45   Squam Epithel, UA <=5 /HPF <5  3/17/23 00:45   Hyaline Casts, UA None Seen /LPF Occasional !  3/17/23 00:45   Amorphous Phosphate Crystals, UA None Seen /HPF Few !  12/31/22 14:48   Mucous, UA None Seen /LPF Small !  3/17/23 00:45   Ca Oxalate Josefina, UA None Seen /HPF Few !  3/17/23 00:45   Granular Casts, UA None Seen /LPF Few !  3/17/23 00:45   Creatinine, Urine 63.0 - 166.0 mg/dL 122.9  1/12/23 21:04   Urine Urea Nitrogen mg/dL 816.0  1/12/23 21:04   Sodium, Urine mmol/L <20.0  1/12/23 21:04   Osmolality, Urine 300 - 1,300 mOsm/kg 532  1/12/23 21:04   Occult Blood Negative  Negative  1/16/23 08:42   Occult Blood Stool 2 Negative, N/A  Positive !  1/16/23 16:55   Occult Blood Stool 3 Negative, N/A  Positive !  1/16/23 11:20   Stool Color 1  Brown  11/21/22 06:00   Stool Consistancy 1  soft  11/21/22 06:00   Stool Color 2  Green  1/16/23 16:55   Stool Consistancy 2  soft  1/16/23 16:55   Stool Color 3  Brown  1/16/23 11:20   Stool Consistancy 3  formed  1/16/23 11:20   BLOOD CULTURE OLG  Rpt  3/4/23 12:09   CULTURE, URINE  Rpt  3/4/23 14:03   POCT Glucose 70 - 110 mg/dL 106  3/18/23 05:40   Specimen source  Venous sample  3/17/23 16:43   POC PH  7.44  3/17/23 16:43   POC PCO2 mmHg 27  3/17/23 16:43   POC PO2 mmHg 176  3/17/23 16:43   POC HCO3 mmol/l 18.3  3/17/23 16:43   POC SATURATED O2 % 99.6  3/17/23 16:43   Base Deficit mmol/l -4.9  3/17/23 16:43   POC Ionized Calcium 1.12 - 1.23 mmol/l 1.19  3/17/23 16:43   POC Sodium 137 - 145 mmol/l 129 !  3/17/23 16:43   POC Potassium 3.5 - 5.0 mmol/l 3.5  3/17/23 16:43   POC O2Hb % 96.0  3/17/23 16:43   POC COHb % 1.0  3/17/23 16:43   POC MetHb % 1.0  3/17/23 16:43   POC HEMOGLOBIN g/dL 9.1  3/17/23 16:43    Corrected Temperature (pCO2) mmHg 27  3/17/23 16:43   Corrected Temperature (pO2) mmHg 176  3/17/23 16:43   Correct Temperature (PH)  7.44  3/17/23 16:43   CT ABDOMEN PELVIS W WO CONTRAST  Rpt  3/15/23 11:04   CT ABDOMEN PELVIS WITH CONTRAST  Rpt  1/3/23 09:56   CT ABDOMEN PELVIS WITHOUT CONTRAST  Rpt  2/11/23 20:16   CT CHEST WITH CONTRAST  Rpt  3/15/23 11:03   CT HEAD WITHOUT CONTRAST  Rpt  11/28/14 11:10   CT CHEST ABDOMEN PELVIS WITHOUT CONTRAST(XPD)  Rpt  3/2/23 17:30   XR CHEST 1 VIEW  Rpt  3/21/23 11:05   XR CHEST AP PORTABLE  Rpt  1/12/23 02:38   XR CHEST PA LATERAL WITH LORDOTIC VIEW  Rpt  9/19/16 10:14   NM LUNG SCAN PERFUSION  Rpt  1/13/23 10:43   IR BILIARY DRAINAGE INT AND EXT  Rpt  1/6/23 08:56   US ABDOMEN COMPLETE  Rpt  12/31/22 20:14   US GUIDED NEEDLE PLACEMENT  Rpt  1/4/23 10:21   US RETROPERITONEAL COMPLETE  Rpt  1/12/23 20:58   EKG 12-LEAD  Rpt  3/3/23 10:07   ECHO (CUPID ONLY)  Rpt  3/4/23 14:00   CV US DOPPLER VENOUS LEG LEFT (CUPID ONLY)  Rpt  1/23/23 09:56   SPECIMEN TO PATHOLOGY  Rpt  12/21/22 12:11   AV index (prosthetic)  0.72  3/4/23 14:00   CARDIAC MONITORING STRIPS  Rpt  3/2/23 00:00   CROSSMATCH INTERPRETATION  Compatible  3/10/23 08:32   Left Ventricle Relative Wall Thickness cm 0.40  3/4/23 14:00   E/E' ratio m/s 6.87  3/4/23 14:00   EF % 60  3/4/23 14:00   IR BILIARY CATHETER EXCHANGE WITH IMAGING  Rpt  3/7/23 08:37   Left Ventricular Outflow Tract Mean Gradient mmHg 2.00  3/4/23 14:00   Left Ventricular Outflow Tract Mean Velocity cm/s 0.59  3/4/23 14:00   Right Atrial Pressure (from IVC) mmHg 8  3/4/23 14:00   SURG - FL SURGERY FLUORO USAGE  Rpt  12/21/22 12:38   (L): Data is abnormally low  (H): Data is abnormally high  !: Data is abnormal   Rpt: View report in Results Review for more informationThe left ventricle is normal in size with normal systolic function.   The estimated ejection fraction is 60%.   Grade I left ventricular diastolic dysfunction.   Normal right  ventricular size with normal right ventricular systolic function.   Moderate tricuspid regurgitation.   Aortic valve sclerosis without stenosis. Mild aortic regurgitation.   There is pulmonary hypertension.The estimated PA systolic pressure is 43 mmHg.     Vitals    Height Weight BMI (Calculated) BSA (Calculated - sq m) BP Pulse       112/63 68         Physical Exam  General: Alert, Oriented, Well nourished and Cooperative    Airway:  Mallampati: II   Mouth Opening: Normal  TM Distance: Normal  Tongue: Normal  Neck ROM: Normal ROM    Dental:  Intact    Chest/Lungs:  Clear to auscultation, Normal Respiratory Rate    Heart:  Rate: Normal  Rhythm: Regular Rhythm        Anesthesia Plan  Type of Anesthesia, risks & benefits discussed:    Anesthesia Type: Gen ETT  Intra-op Monitoring Plan: Standard ASA Monitors and Art Line  Post Op Pain Control Plan: multimodal analgesia  Induction:  IV and Inhalation  Airway Plan: Direct  Informed Consent: Informed consent signed with the Patient and all parties understand the risks and agree with anesthesia plan.  All questions answered. Patient consented to blood products? Yes  ASA Score: 4  Day of Surgery Review of History & Physical: H&P Update referred to the surgeon/provider.  Anesthesia Plan Notes: T and M two units PRBCs  will begin to  give blood in holding preop    Ready For Surgery From Anesthesia Perspective.     .

## 2023-03-27 NOTE — PROGRESS NOTES
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 Work up in ED noted patient afebrile, HDS. Labs notable for ALP 1544, Total bilirubin 2.9, AST 40, ALT 65, potassium 5.2, bicarb 9, BUN 23, Cr 1.5.  Staging CT C/A/P from 3/15 noting developing gastric outlet obstruction, secondary to a 3.2 cm long "apple-core" lesion in the 3rd portion of the duodenum, at the level of the ampulla; however there was no evidence of distant metastatic disease.  Of notes patient recently admitted to hospital from 3/2-3/10, being treated for enterococcus faecium bacteremia, planned for antibiotics through 3/18.  Plan was to have subsequent follow-up with Surgical Oncology thereafter to discuss possible Whipple procedure.  Patient was admitted to hospital medicine services for management.      Surgical Oncology consulted given GOO, planning for Whipple on Mon 3/27.  In the interim patient will be optimized medially for surgery.  Continued on liquids, dietary protein supplementation added.  Also started on TPN in the interim. Continued to work with physical therapy.     Interval Hx:     No acute events overnight.  Patient was not seen today, patient was taken to operating room early this morning before 7 a.m. per nursing report.      Objective/physical exam:  Patient having Whipple's procedure  VITAL SIGNS: 24 HRS MIN & MAX LAST   Temp  Min: 96.5 °F (35.8 °C)  Max: 98 °F (36.7 °C) 96.5 °F (35.8 °C)   BP  Min: 101/60  Max: 139/76 135/78   Pulse  Min: 64  Max: 82  71     Resp  Min: 18  Max: 20 18   SpO2  Min: 98 %  " Max: 100 % 100 %       Recent Labs   Lab 03/21/23  0352 03/24/23  0418 03/27/23  0421   WBC 7.9 9.3 6.5   RBC 2.70* 2.89* 2.82*   HGB 7.7* 8.3* 8.1*   HCT 24.1* 25.4* 25.3*   MCV 89.3 87.9 89.7   MCH 28.5 28.7 28.7   MCHC 32.0* 32.7* 32.0*   RDW 15.9 15.4 15.7    189 198   MPV 9.6 9.8 11.2*       Recent Labs   Lab 03/25/23  0349 03/26/23  0354 03/27/23  0421    134* 134*   K 4.1 4.6 4.7   CO2 25 20* 19*   BUN 17.5 18.6 22.5   CREATININE 0.68* 0.64* 0.71*   CALCIUM 8.6* 9.2 9.5   MG 1.90 2.00 2.00   ALBUMIN 1.8* 2.0* 2.0*   ALKPHOS 811* 998* 1,026*   ALT 62* 60* 63*   AST 58* 49* 56*   BILITOT 3.5* 3.0* 3.5*          Microbiology Results (last 7 days)       ** No results found for the last 168 hours. **             See below for Radiology    Scheduled Med:   enoxaparin  40 mg Subcutaneous Daily    finasteride  5 mg Oral Daily    magnesium oxide  400 mg Oral BID    mupirocin   Nasal BID    pantoprazole  40 mg Oral Daily    sodium chloride 0.9%  10 mL Intravenous Q6H    tamsulosin  0.4 mg Oral Daily    zinc oxide-cod liver oil   Topical (Top) BID        Continuous Infusions:   amino acid 5 % in 15% dextrose 75 mL/hr at 03/26/23 2322        PRN Meds:  sodium chloride, acetaminophen, ALPRAZolam, hydrOXYzine, melatonin, naloxone, ondansetron, prochlorperazine, simethicone, Flushing PICC Protocol **AND** sodium chloride 0.9% **AND** sodium chloride 0.9%       Assessment/Plan:  Gastric outlet obstruction s/t tumor  Adenocarcinoma of the duodenum  obstructive jaundice, s/p prior percutaneous drain  Severe metabolic acidosis  Anemia of chronic disease  Recent Enterococcus faecium bacteremia    Patient is currently having Whipple's procedure with surgical oncology Dr. Abdirahman Brown.    Reviewed labs from this morning hemoglobin 8.1, platelets 198k, PT 12.8, INR 0.97 sodium 134, potassium 4.7, bicarb 19, chloride 109, BUN 22.5, creatinine 0.71, increased alkaline phosphatase 1026, AST 56, ALT 63 albumin 2     Revisited patient's room multiple times patient was  in OR/PACU even in the evening.  Case discussed with case management.      VTE prophylaxis:  Lovenox    Patient condition:  Guarded    Anticipated discharge and Disposition:   To be decided      _____________________________________________________________________    Nutrition Status:    Radiology:  X-Ray Chest 1 View  Narrative: EXAMINATION:  XR CHEST 1 VIEW    CLINICAL HISTORY:  PICC placement;    TECHNIQUE:  One view    COMPARISON:  March 2, 2023    FINDINGS:  Left upper extremity approach PICC line terminates within the midportion of the superior vena cava.  Cardiopericardial silhouette is within normal limits. Lungs are without dense focal or segmental consolidation, congestive process, pleural effusions or pneumothorax.  Impression: Optimal placement of the PICC line.    Electronically signed by: Shant Sequeira  Date:    03/21/2023  Time:    11:07      Ashwini Mcmanus MD   03/27/2023

## 2023-03-27 NOTE — PLAN OF CARE
Problem: Adult Inpatient Plan of Care  Goal: Plan of Care Review  Outcome: Ongoing, Progressing  Flowsheets (Taken 3/26/2023 2341)  Plan of Care Reviewed With:   patient   daughter     Problem: Adult Inpatient Plan of Care  Goal: Absence of Hospital-Acquired Illness or Injury  Outcome: Ongoing, Progressing  Intervention: Identify and Manage Fall Risk  Flowsheets (Taken 3/26/2023 2341)  Safety Promotion/Fall Prevention:   assistive device/personal item within reach   Fall Risk reviewed with patient/family   Fall Risk signage in place   lighting adjusted   medications reviewed   muscle strengthening facilitated   nonskid shoes/socks when out of bed   side rails raised x 3   instructed to call staff for mobility  Intervention: Prevent Skin Injury  Flowsheets (Taken 3/26/2023 2341)  Body Position:   position changed independently   weight shifting  Skin Protection:   incontinence pads utilized   tubing/devices free from skin contact  Intervention: Prevent and Manage VTE (Venous Thromboembolism) Risk  Flowsheets (Taken 3/26/2023 2341)  Activity Management: Ambulated in room - L4  VTE Prevention/Management:   ambulation promoted   bleeding precations maintained   bleeding risk assessed  Range of Motion: active ROM (range of motion) encouraged  Intervention: Prevent Infection  Flowsheets (Taken 3/26/2023 2341)  Infection Prevention:   cohorting utilized   equipment surfaces disinfected   hand hygiene promoted   personal protective equipment utilized   rest/sleep promoted   single patient room provided   visitors restricted/screened     Problem: Adult Inpatient Plan of Care  Goal: Optimal Comfort and Wellbeing  Outcome: Ongoing, Progressing  Intervention: Monitor Pain and Promote Comfort  Flowsheets (Taken 3/26/2023 2341)  Pain Management Interventions:   pain management plan reviewed with patient/caregiver   care clustered   quiet environment facilitated   medication offered   relaxation techniques  promoted  Intervention: Provide Person-Centered Care  Flowsheets (Taken 3/26/2023 2341)  Trust Relationship/Rapport:   care explained   choices provided   emotional support provided   empathic listening provided   questions answered   questions encouraged   reassurance provided   thoughts/feelings acknowledged     Problem: Bleeding (Sepsis/Septic Shock)  Goal: Absence of Bleeding  Outcome: Ongoing, Progressing  Intervention: Monitor and Manage Bleeding  Flowsheets (Taken 3/26/2023 2341)  Bleeding Precautions: blood pressure closely monitored     Problem: Nutrition Impaired (Sepsis/Septic Shock)  Goal: Optimal Nutrition Intake  Outcome: Ongoing, Progressing  Intervention: Promote and Optimize Nutrition Delivery  Flowsheets (Taken 3/26/2023 2341)  Nutrition Support Management: parenteral nutrition initiated     Problem: Infection  Goal: Absence of Infection Signs and Symptoms  Outcome: Ongoing, Progressing  Intervention: Prevent or Manage Infection  Flowsheets (Taken 3/26/2023 2341)  Isolation Precautions: precautions maintained

## 2023-03-27 NOTE — ANESTHESIA PROCEDURE NOTES
Arterial    Diagnosis: pancreatic ca    Patient location during procedure: holding area  Procedure start time: 3/27/2023 9:01 AM  Timeout: 3/27/2023 9:00 AM  Procedure end time: 3/27/2023 9:12 AM    Staffing  Authorizing Provider: Sushil Simon MD  Performing Provider: Sushil Simon MD    Anesthesiologist was present at the time of the procedure.    Preanesthetic Checklist  Completed: patient identified, IV checked, site marked, risks and benefits discussed, surgical consent, monitors and equipment checked, pre-op evaluation, timeout performed and anesthesia consent givenArterial  Skin Prep: chlorhexidine gluconate and isopropyl alcohol  Local Infiltration: lidocaine  Orientation: left  Location: radial    Catheter Size: 20 G  Catheter placement by Ultrasound guidance. Heme positive aspiration all ports.   Vessel Caliber: small, patent, compressibility normal  Needle advanced into vessel with real time Ultrasound guidance.  Guidewire confirmed in vessel.  Sterile sheath used.Insertion Attempts: 2  Assessment  Dressing: secured with tape and tegaderm  Patient: Tolerated well

## 2023-03-27 NOTE — ANESTHESIA PROCEDURE NOTES
Intubation    Date/Time: 3/27/2023 10:37 AM  Performed by: Christina Turner  Authorized by: Sushil Simon MD     Intubation:     Induction:  Intravenous    Intubated:  Postinduction    Mask Ventilation:  Easy with oral airway    Attempts:  1    Attempted By:  Student    Method of Intubation:  Direct    Blade:  Leblanc 2    Laryngeal View Grade: Grade IIA - cords partially seen      Difficult Airway Encountered?: No      Complications:  None    Airway Device:  Oral endotracheal tube    Airway Device Size:  7.5    Style/Cuff Inflation:  Cuffed (inflated to minimal occlusive pressure)    Tube secured:  23    Secured at:  The lips    Placement Verified By:  Capnometry    Complicating Factors:  None    Findings Post-Intubation:  BS equal bilateral and atraumatic/condition of teeth unchanged

## 2023-03-27 NOTE — OP NOTE
Date of Surgery: 3/27/2023    Surgeon:  Abdirahman Brown MD    Assistant:  Maria Luisa WARREN                          Pre-op Diagnosis:  Duodenal adenocarcinoma with obstruction    Post-op Diagnosis:  Same    Procedures:    Pancreaticoduodenectomy (Whipple procedure)  Portal/celiac lymphadenectomy  Common bile duct exploration  Falciform ligament pedicle flap  Wedge resection of segment 4 liver lesion   Cholecystectomy    Anesthesia:  GETA    EBL:  100 cc    Specimens:  Pancreaticoduodenectomy/Whipple, gallbladder, wedge resection segment 4 liver    Findings:  On initial abdominal exploration there was a 2 cm fibrotic lesion on the surface of segment 4 of the liver with attached omentum consistent with fat necrosis.  Wedge resection was performed and there was sent for histopathological evaluation.  This did not preclude aggressive approach to his obstructive primary which needed both therapeutic and palliative care as well as his bile duct obstruction.  The tumor was in the 3rd portion of the duodenum with some extension into the retroperitoneum, retroperitoneal adipose tissue was taken EN bloc for margin.  Pancreatic parenchyma was firm and the duct was slightly dilated, standard Whipple procedure was performed with reconstruction via pancreaticojejunostomy in a duct to mucosa fashion with binding sutures placed.  Pancreatic duct was stented.  End-to-side choledochojejunostomy and Billroth II type gastrojejunostomy completed the reconstruction.  The PTC catheter was left in place for the immediate postop period but we will plan to remove.      Procedure in detail: After informed consent was obtained the patient was brought to the operating room and placed in the supine position.  General endotracheal anesthesia was administered without difficulty.  The patient's abdomen was prepped and draped in sterile fashion.  Under ultrasound guidance bilateral transversus abdominis plain nerve blocks were performed using  liposomal bupivacaine.  A subcostal  incision was made and carried down through the fascia and peritoneum.  The falciform ligament was taken down off the anterior abdominal wall and divided at the umbilicus using the LigaSure.  The falciform ligament pedicle flap was created preserving the blood supply to the flap from the umbilical fissure.  It was placed over the right lobe of the liver.  The abdomen was completely explored, there is no evidence of metastatic disease.  There was a benign-appearing 2 cm nodule with stuck on appearance on the surface of segment 4 of the liver consistent with an area of fat necrosis.  This was wedge resected with dissection carried out through the liver parenchyma using electrocautery on high power.  Meticulous hemostasis was achieved at the hepatic transection margin using electrocautery on high power.  Excellent hemostasis was noted it was sent for histopathological evaluation.  The gastrocolic ligament was incised and the plane was developed between the middle colic and the gastroepiploic vessels.  This plan was continued to the right and the hepatic flexure was taken down in the transverse colon and hepatic flexure were retracted inferiorly.  A self-retaining retractor was placed.  A wound protector was used.  The lesser sac was opened widely.  The inferior border of the pancreas was exposed.  The right gastroepiploic vein and the middle colic vein were ligated at their entry into the SMV.  An extended Kocher maneuver was performed to the level of the left renal vein.  The inferior border of the pancreas was dissected exposing the anterior surface of the SMV, the tunnel was created beneath the neck of the pancreas superiorly.  The tumor appeared resectable.  Stomach was divided from the greater curvature to the incisura using a GUME stapler.  The portal dissection was then performed, removing the lymph node over the hepatic artery and extending this dissection towards the celiac.   The inferior border of the hepatic artery was dissected in the adventitial plane and the gastroduodenal artery was identified and dissected circumferentially.  The right gastric artery was divided using the LigaSure device..  The gastroduodenal artery was ligated using 2-0 silk ligatures and a 4-0 Prolene suture ligature on the hepatic artery side.  It was divided.  Posterior to this the portal vein was dissected along its anterior aspect and the tunnel was completed beneath the neck of the pancreas without difficulty.  Dissection was carried to the right of the eveline hepatis exposing the bile duct and dissecting the retro-portal lymph nodes all the way up to the caudate lobe of the liver.  These were left en bloc with the specimen.  The gallbladder was taken down in top-down fashion and the cystic artery and duct were dissected circumferentially and divided between 2-0 silk sutures .  The bile duct was then divided in the distal and oversewn to prevent spillage.  The proximal bile duct was explored using biliary dilators, and was patent with no evidence of obstruction in the right or left hepatic ducts.  A bulldog clamp was placed on the bile duct to prevent ongoing bile spillage.  The ligament of Treitz was divided using a GUME stapler and the fourth portion of the duodenum reflected to the right of the mesenteric vessels.  The pancreas was then divided over a large right angle clamp beneath the neck using the Bovie cautery.  The duct was dilated  and the pancreatic parenchyma was firm .  The uncinate process was then dissected off the SMV and portal vein using gentle blunt and sharp dissection with entering branches divided between 3-0 silk ligatures.  The uncinate process was then dissected off of the SMA in the adventitial plane, dividing branches entering the pancreas between hips and using the LigaSure device.  Once the specimen was freed, the pancreatic margin and bile duct margins were marked with suture,  and sent for histopathological evaluation.  The area was then irrigated with copious amounts of antimicrobial solution and meticulous hemostasis was achieved.  The stapled end of the jejunum was brought up, it was attached to the pancreas using a posterior row of 2-0 silk mattress sutures tied anteriorly.  Small enterotomy was made.  A duct to mucosa anastomosis was then performed under loupe magnification, using interrupted 5-0 PDS suture over a 5 Armenian feeding tube stent.  The anastomosis was completed with an anterior row of 3-0 silk inverting sutures.  The falciform pedicled flap was then advanced and use as a spacer between the GDA vascular stump and the pancreaticojejunostomy.  The apex of the jejunal limb was then brought up, an enterotomy was made of appropriate size, and a end-to-side choledocho jejunostomy was performed using interrupted 4-0 PDS suture circumferentially.  The jejunum was then identified emanating from the prior site of the ligament of Treitz, the first mobile loop was brought up in antecolic fashion and attached to the stomach using 2-0 silk Lembert sutures.  Gastrotomy and enterotomies were made, and a side to side Billroth II type gastrojejunostomy was performed using a GUME 75 mm blue load stapler.  The common enterotomy was closed using a TA 60 blue load stapler.  The anastomosis was carefully examined, it was of adequate patency, there is no evidence of tension or ischemia whatsoever.  The abdomen was then irrigated with copious amounts of antimicrobial solution, meticulous hemostasis was achieved.  A 19 Armenian Gavin drain was left near the pancreaticojejunostomy anastomosis and biliary anastomosis.  The fascia was closed using running #1 looped PDS suture.  Subcutaneous tissues were closed with absorbable suture, skin closed with skin staples.  Sterile dressings were applied.  The patient tolerated the procedure well he was brought to recovery room in stable condition.    Significant  surgical tasks conducted by the assistant:  The skilled assistance of the nurse practitioner KELVIN Olivares was necessary for the successful completion of this case.  She was essential for the proper positioning of the patient, manipulation of instruments, proper exposure, manipulation of tissue, and wound closure          Abdirahman Brown MD  Surgical Oncology  Complex General, Gastrointestinal and Hepatobiliary Surgery

## 2023-03-27 NOTE — PROGRESS NOTES
Inpatient Nutrition Assessment    Admit Date: 3/16/2023   Total duration of encounter: 11 days     Nutrition Recommendation/Prescription     Readvance diet as tolerated  Continue Boost Breeze while on Clear liquid diet. Will provide 250 lyndsey, 9 gm pro per serving. Once diet advanced, change supplement to Chocolate Boost plus per pt request.  ContinueTPN, monitor for Refeeding Syndrome. Clinimix 5/15 @ 75 mL/hr    Calories Provided  1421 kcal/d, 81% needs (includes lipids)   Protein Provided  90 g/d, 100% needs   Dextrose Provided  270 g/d, GIR 2.88 mg CHO/kg/min   Fluid Provided  1800 ml/d, 102% needs     Monitor appetite, PO intake, weight, and labs      Communication of Recommendations: reviewed with nurse, reviewed with patient, and reviewed with pharmacy    Nutrition Assessment     Malnutrition Assessment/Nutrition-Focused Physical Exam    Malnutrition in the context of acute illness or injury  Degree of Malnutrition: non-severe (moderate) malnutrition  Energy Intake: </= 50% of estimated energy requirement for >/= 5 days  Interpretation of Weight Loss: >5% in 1 month  Body Fat:moderate depletion  Area of Body Fat Loss: orbital region  and upper arm region - triceps / biceps  Muscle Mass Loss: moderate depletion  Area of Muscle Mass Loss: temple region - temporalis muscle and clavicle bone region - pectoralis major, deltoid, trapezius muscles  Fluid Accumulation: does not meet criteria  Edema: does not meet criteria   Reduced  Strength: unable to obtain  A minimum of two characteristics is recommended for diagnosis of either severe or non-severe malnutrition.    Chart Review    Reason Seen: malnutrition screening tool (MST), physician consult for initiate TPN; sacral wound, and follow-up    Malnutrition Screening Tool Results   Have you recently lost weight without trying?: Yes: 34 lbs or more  Have you been eating poorly because of a decreased appetite?: Yes   MST Score: 5     Diagnosis:  Acute kidney injury  due to intravascular volume depletion secondary to nausea and vomiting   Acute complicated bacterial cystitis, present on admission   Normocytic anemia  Hyponatremia  Transaminitis hyperbilirubinemia   Fatigue (Hx small intestine CA awaiting surgical tx, increased weakness started yesterday)    Relevant Medical History: hypertension, hyperlipidemia, coronary disease status post stent, peripheral arterial disease, adenocarcinoma of duodenum, BPH with chronic indwelling Paul catheter    Nutrition-Related Medications: Finasteride, Magnesium oxide, pantoprazole, zinc oxide-code liver oil, Clinimix 5/15 @ 75 mL/hr + IL 2 times weekly  Calorie Containing IV Medications: Clinimix    Nutrition-Related Labs:  3/18: H/H-7.4/21.8, Na-133, K-3.2, Crea-1.25, Ca-8.1, Alk phos-891  3/21/2023: H/H 7.7/24.1, Ca 8.5, Alk Phos 864, Alb 2.0, Total Bili 1.8, AST 44, Gluc 99  3/23/2023: Na 135, Ca 8.6, Alk Phose 844, Alb 1.7, Total Bili 2.7, AST 64, ALT 65, Gluc 113  3/27/2023: H/H 8.1/25.3, Na 134, Cl 109, Crea 0.71, Alk Phos 1026, alb 2.0, Total Bili 3.5, AST 56, ALT 63, Gluc 86    Diet/PN Order: amino acid 5% in 15% dextrose (CLINIMIX-E) solution (1L provides 50gm AA, 150gm CHO (510 kcal/L dextrose), Na 35, K 30, Mg 5, Ca 4.5, Acetate 80, Cl 39, Phos 15) (710 total kcal/L)  Diet NPO  amino acid 5% in 15% dextrose (CLINIMIX-E) solution (1L provides 50gm AA, 150gm CHO (510 kcal/L dextrose), Na 35, K 30, Mg 5, Ca 4.5, Acetate 80, Cl 39, Phos 15) (710 total kcal/L)  Oral Supplement Order: Boost Breeze  Tube Feeding Order: none  Appetite/Oral Intake: NPO/NPO  Factors Affecting Nutritional Intake: altered gastrointestinal function and NPO  Food/[employer]/Cultural Preferences: none reported  Food Allergies: no known food allergies    Skin Integrity: intact  Wound(s):   sacral reddness    Comments    3/18/23: Pt feeling better this morning. Admitted with N/V. But is now able to tolerate liquids today. Dr Garrett added Boost Breeze with meals.  "Pt is to stay on liquids for now. Pt scheduled for whipple on 3/27/23; pending surgery eval while in hospital. Pt with physical wasting noted along with wt loss. If pt to remain on clear liquids > 7 days, will need TPN.     3/21/2023: Pt reports drinking/eating >75% PO intake of meals. Pt receives Boost Breeze BID. Pt denies N/V. Provided TPN recommendations per consult. Will monitor for refeeding syndrome. Last BM documented as 3/19/2023.    3/23/2023: Pt reports good appetite/PO intake. Implemented food preferences. Pt receives Boost Breeze BID. Pt on Clinimix-E @ 75 mL/hr + [place] 2 times weekly. Pt reported N/V yesterday but none today. Pt anticipating bowel resection Monday. Palliative following. Will monitor.    3/27/2023: Pt NPO for WHIPPLE procedure, per FNP note, pt will regroup after discharge to assess overall health to determine when to reschedule WHIPPLE procedure. TPN bag hanging at time of visit, pt not in room. Will monitor.    Anthropometrics    Height: 5' 9.02" (175.3 cm)    Last Weight: 65 kg (143 lb 4.8 oz) (23 1244) Weight Method: Standard Scale  BMI (Calculated): 21.2  BMI Classification: underweight (BMI less than 22 if >65 years of age)        Ideal Body Weight (IBW), Male: 160.12 lb     % Ideal Body Weight, Male (lb): 89.5 %                 Usual Body Weight (UBW), k.7 kg  % Usual Body Weight: 90.85  % Weight Change From Usual Weight: -9.35 %  Usual Weight Provided By: EMR weight history    Wt Readings from Last 5 Encounters:   23 65 kg (143 lb 4.8 oz)   23 67.3 kg (148 lb 5.9 oz)   02/15/23 71.7 kg (158 lb)   23 70.3 kg (155 lb)   23 69.3 kg (152 lb 12.5 oz)     Weight Change(s) Since Admission:  Admit Weight: 73.5 kg (162 lb) (23 0452)  65kg; 9% wt loss x1 month. Rt arm amputation noted.   3/18/2023: 65 kg    Estimated Needs    Weight Used For Calorie Calculations: 65 kg (143 lb 4.8 oz)  Energy Calorie Requirements (kcal): 1750 kcal (MSJxSF1.3)  Energy " Need Method: [facility]  Weight Used For Protein Calculations: 65 kg (143 lb 4.8 oz)  Protein Requirements: 90gm (kgx1.4)  Fluid Requirements (mL): 1750mL (1mL/kcal)  Temp: 96.9 °F (36.1 °C)       Enteral Nutrition    Patient not receiving enteral nutrition at this time.    Parenteral Nutrition    Standard Formula: Clinimix E 5/15  Custom Formula: not applicable  Additives: none  Rate/Volume: 75 mL/hr  Lipids: 250 ml 20% IV lipid emulsion twice weekly  Total Nutrition Provided by Parenteral Nutrition:  Calories Provided  1421 kcal/d, 81% needs (includes lipids)   Protein Provided  90 g/d, 100% needs   Dextrose Provided  270 g/d, GIR 2.88 mg CHO/kg/min   Fluid Provided  1800 ml/d, 102% needs       Evaluation of Received Nutrient Intake    Calories: meeting estimated needs  Protein: meeting estimated needs    Patient Education    Not applicable.    Nutrition Diagnosis     PES: Malnutrition related to cancer as evidenced by 9% wt loss x1 month, fat and muscle wasting, poor diet tolerance/intake > 5 days. (continues)    Interventions/Goals     Intervention(s): general/healthful diet, modified composition of parenteral nutrition, commercial beverage, and collaboration with other providers  Goal: Meet greater than 75% of nutritional needs by follow-up. (goal progressing)    Monitoring & Evaluation     Dietitian will monitor energy intake.  Nutrition Risk/Follow-Up: high (follow-up in 1-4 days)   Please consult if re-assessment needed sooner.

## 2023-03-27 NOTE — ANESTHESIA POSTPROCEDURE EVALUATION
Anesthesia Post Evaluation    Patient: Quincy Triplett    Procedure(s) Performed: Procedure(s) (LRB):  WHIPPLE PROCEDURE (N/A)    Final Anesthesia Type: general      Patient location during evaluation: PACU  Patient participation: Yes- Able to Participate  Level of consciousness: awake and alert and oriented  Post-procedure vital signs: reviewed and stable  Pain management: adequate  Airway patency: patent  GUERO mitigation strategies: Multimodal analgesia and Verification of full reversal of neuromuscular block  PONV status at discharge: No PONV  Anesthetic complications: no      Cardiovascular status: hemodynamically stable  Respiratory status: unassisted  Hydration status: euvolemic  Follow-up not needed.          Vitals Value Taken Time   /69 03/27/23 1731   Temp 36.7 °C (98.1 °F) 03/27/23 1540   Pulse 92 03/27/23 1743   Resp 17 03/27/23 1743   SpO2 98 % 03/27/23 1743   Vitals shown include unvalidated device data.      No case tracking events are documented in the log.      Pain/Mariusz Score: Pain Rating Prior to Med Admin: 2 (3/26/2023  8:59 PM)  Pain Rating Post Med Admin: 0 (3/26/2023  9:49 PM)  Mariusz Score: 9 (3/27/2023  5:00 PM)

## 2023-03-28 LAB
ALBUMIN SERPL-MCNC: 3 G/DL (ref 3.4–4.8)
ALBUMIN/GLOB SERPL: 0.8 RATIO (ref 1.1–2)
ALP SERPL-CCNC: 669 UNIT/L (ref 40–150)
ALT SERPL-CCNC: 87 UNIT/L (ref 0–55)
AMYLASE FLD-CCNC: 408 U/L
APTT PPP: 34.9 SECONDS (ref 23.2–33.7)
AST SERPL-CCNC: 81 UNIT/L (ref 5–34)
BASOPHILS # BLD AUTO: 0.05 X10(3)/MCL (ref 0–0.2)
BASOPHILS NFR BLD AUTO: 0.4 %
BILIRUBIN DIRECT+TOT PNL SERPL-MCNC: 5.1 MG/DL
BUN SERPL-MCNC: 28.1 MG/DL (ref 8.4–25.7)
CALCIUM SERPL-MCNC: 9.6 MG/DL (ref 8.8–10)
CHLORIDE SERPL-SCNC: 105 MMOL/L (ref 98–107)
CO2 SERPL-SCNC: 17 MMOL/L (ref 23–31)
CREAT SERPL-MCNC: 0.96 MG/DL (ref 0.73–1.18)
EOSINOPHIL # BLD AUTO: 0.03 X10(3)/MCL (ref 0–0.9)
EOSINOPHIL NFR BLD AUTO: 0.2 %
ERYTHROCYTE [DISTWIDTH] IN BLOOD BY AUTOMATED COUNT: 16.2 % (ref 11.5–17)
GFR SERPLBLD CREATININE-BSD FMLA CKD-EPI: >60 MLS/MIN/1.73/M2
GLOBULIN SER-MCNC: 3.9 GM/DL (ref 2.4–3.5)
GLUCOSE SERPL-MCNC: 159 MG/DL (ref 82–115)
HCT VFR BLD AUTO: 36.1 % (ref 42–52)
HGB BLD-MCNC: 11.8 G/DL (ref 14–18)
IMM GRANULOCYTES # BLD AUTO: 0.12 X10(3)/MCL (ref 0–0.04)
IMM GRANULOCYTES NFR BLD AUTO: 0.8 %
INR BLD: 1.14 (ref 0–1.3)
LYMPHOCYTES # BLD AUTO: 1.25 X10(3)/MCL (ref 0.6–4.6)
LYMPHOCYTES NFR BLD AUTO: 8.8 %
MAGNESIUM SERPL-MCNC: 2.1 MG/DL (ref 1.6–2.6)
MCH RBC QN AUTO: 29 PG (ref 27–31)
MCHC RBC AUTO-ENTMCNC: 32.7 G/DL (ref 33–36)
MCV RBC AUTO: 88.7 FL (ref 80–94)
MONOCYTES # BLD AUTO: 0.7 X10(3)/MCL (ref 0.1–1.3)
MONOCYTES NFR BLD AUTO: 4.9 %
NEUTROPHILS # BLD AUTO: 12.03 X10(3)/MCL (ref 2.1–9.2)
NEUTROPHILS NFR BLD AUTO: 84.9 %
NRBC BLD AUTO-RTO: 0 %
PHOSPHATE SERPL-MCNC: 3.9 MG/DL (ref 2.3–4.7)
PLATELET # BLD AUTO: 273 X10(3)/MCL (ref 130–400)
PMV BLD AUTO: 10.1 FL (ref 7.4–10.4)
POCT GLUCOSE: 121 MG/DL (ref 70–110)
POCT GLUCOSE: 122 MG/DL (ref 70–110)
POCT GLUCOSE: 129 MG/DL (ref 70–110)
POCT GLUCOSE: 168 MG/DL (ref 70–110)
POCT GLUCOSE: 206 MG/DL (ref 70–110)
POCT GLUCOSE: 223 MG/DL (ref 70–110)
POTASSIUM SERPL-SCNC: 5.9 MMOL/L (ref 3.5–5.1)
POTASSIUM SERPL-SCNC: 6.4 MMOL/L (ref 3.5–5.1)
PROT SERPL-MCNC: 6.9 GM/DL (ref 5.8–7.6)
PROTHROMBIN TIME: 14.5 SECONDS (ref 12.5–14.5)
RBC # BLD AUTO: 4.07 X10(6)/MCL (ref 4.7–6.1)
SODIUM SERPL-SCNC: 132 MMOL/L (ref 136–145)
WBC # SPEC AUTO: 14.2 X10(3)/MCL (ref 4.5–11.5)

## 2023-03-28 PROCEDURE — 25000003 PHARM REV CODE 250: Performed by: NURSE PRACTITIONER

## 2023-03-28 PROCEDURE — 63600175 PHARM REV CODE 636 W HCPCS: Performed by: NURSE PRACTITIONER

## 2023-03-28 PROCEDURE — 63600175 PHARM REV CODE 636 W HCPCS: Performed by: INTERNAL MEDICINE

## 2023-03-28 PROCEDURE — 85025 COMPLETE CBC W/AUTO DIFF WBC: CPT | Performed by: NURSE PRACTITIONER

## 2023-03-28 PROCEDURE — 84100 ASSAY OF PHOSPHORUS: CPT | Performed by: INTERNAL MEDICINE

## 2023-03-28 PROCEDURE — 25000003 PHARM REV CODE 250: Performed by: SURGERY

## 2023-03-28 PROCEDURE — 97164 PT RE-EVAL EST PLAN CARE: CPT

## 2023-03-28 PROCEDURE — 85730 THROMBOPLASTIN TIME PARTIAL: CPT | Performed by: NURSE PRACTITIONER

## 2023-03-28 PROCEDURE — 83735 ASSAY OF MAGNESIUM: CPT | Performed by: INTERNAL MEDICINE

## 2023-03-28 PROCEDURE — 97530 THERAPEUTIC ACTIVITIES: CPT

## 2023-03-28 PROCEDURE — A4216 STERILE WATER/SALINE, 10 ML: HCPCS | Performed by: SURGERY

## 2023-03-28 PROCEDURE — 85610 PROTHROMBIN TIME: CPT | Performed by: NURSE PRACTITIONER

## 2023-03-28 PROCEDURE — 80053 COMPREHEN METABOLIC PANEL: CPT | Performed by: INTERNAL MEDICINE

## 2023-03-28 PROCEDURE — 11000001 HC ACUTE MED/SURG PRIVATE ROOM

## 2023-03-28 PROCEDURE — 25000003 PHARM REV CODE 250: Performed by: INTERNAL MEDICINE

## 2023-03-28 PROCEDURE — 84132 ASSAY OF SERUM POTASSIUM: CPT | Performed by: NURSE PRACTITIONER

## 2023-03-28 PROCEDURE — 82150 ASSAY OF AMYLASE: CPT | Performed by: INTERNAL MEDICINE

## 2023-03-28 RX ORDER — ENOXAPARIN SODIUM 100 MG/ML
30 INJECTION SUBCUTANEOUS EVERY 12 HOURS
Status: DISCONTINUED | OUTPATIENT
Start: 2023-03-28 | End: 2023-04-04

## 2023-03-28 RX ADMIN — Medication 400 MG: at 08:03

## 2023-03-28 RX ADMIN — ALVIMOPAN 12 MG: 12 CAPSULE ORAL at 08:03

## 2023-03-28 RX ADMIN — Medication: at 04:03

## 2023-03-28 RX ADMIN — SODIUM CHLORIDE, POTASSIUM CHLORIDE, SODIUM LACTATE AND CALCIUM CHLORIDE: 600; 310; 30; 20 INJECTION, SOLUTION INTRAVENOUS at 04:03

## 2023-03-28 RX ADMIN — MUPIROCIN: 20 OINTMENT TOPICAL at 08:03

## 2023-03-28 RX ADMIN — PANTOPRAZOLE SODIUM 40 MG: 40 TABLET, DELAYED RELEASE ORAL at 08:03

## 2023-03-28 RX ADMIN — ONDANSETRON 4 MG: 2 INJECTION INTRAMUSCULAR; INTRAVENOUS at 07:03

## 2023-03-28 RX ADMIN — Medication: at 08:03

## 2023-03-28 RX ADMIN — DEXTROSE 250 ML: 10 SOLUTION INTRAVENOUS at 01:03

## 2023-03-28 RX ADMIN — SODIUM CHLORIDE, PRESERVATIVE FREE 10 ML: 5 INJECTION INTRAVENOUS at 11:03

## 2023-03-28 RX ADMIN — ENOXAPARIN SODIUM 30 MG: 30 INJECTION SUBCUTANEOUS at 08:03

## 2023-03-28 RX ADMIN — TAMSULOSIN HYDROCHLORIDE 0.4 MG: 0.4 CAPSULE ORAL at 08:03

## 2023-03-28 RX ADMIN — ENOXAPARIN SODIUM 30 MG: 30 INJECTION SUBCUTANEOUS at 09:03

## 2023-03-28 RX ADMIN — SODIUM CHLORIDE, PRESERVATIVE FREE 10 ML: 5 INJECTION INTRAVENOUS at 05:03

## 2023-03-28 RX ADMIN — INSULIN HUMAN 5 UNITS: 100 INJECTION, SOLUTION PARENTERAL at 01:03

## 2023-03-28 RX ADMIN — ONDANSETRON 4 MG: 2 INJECTION INTRAMUSCULAR; INTRAVENOUS at 03:03

## 2023-03-28 RX ADMIN — PROMETHAZINE HYDROCHLORIDE 25 MG: 25 TABLET ORAL at 06:03

## 2023-03-28 RX ADMIN — ONDANSETRON 8 MG: 2 INJECTION INTRAMUSCULAR; INTRAVENOUS at 08:03

## 2023-03-28 RX ADMIN — ACETAMINOPHEN 1000 MG: 10 INJECTION, SOLUTION INTRAVENOUS at 05:03

## 2023-03-28 RX ADMIN — SODIUM CHLORIDE, POTASSIUM CHLORIDE, SODIUM LACTATE AND CALCIUM CHLORIDE: 600; 310; 30; 20 INJECTION, SOLUTION INTRAVENOUS at 11:03

## 2023-03-28 RX ADMIN — FINASTERIDE 5 MG: 5 TABLET, FILM COATED ORAL at 08:03

## 2023-03-28 NOTE — PT/OT/SLP RE-EVAL
Physical Therapy Re-evaluation    Patient Name:  Quincy Triplett   MRN:  [id number]    Recommendations:     Discharge Recommendations: home health PT  Discharge Equipment Recommendations: none   Barriers to discharge: None    Assessment:     Quincy Triplett is a 82 y.o. male admitted with a medical diagnosis of s/p Whipple procedure.  He presents with the following impairments/functional limitations: weakness, gait instability, impaired balance, impaired endurance, decreased safety awareness, impaired functional mobility. The pt tolerated session well. The pt did not wish to ambulate today, but was agreeable to transfer to chair. Pt afraid of pain prior to mobility. Progress to ambulation at next session.     Rehab Prognosis:  Good; patient would benefit from acute skilled PT services to address these deficits and reach maximum level of function.      Recent Surgery: Procedure(s) (LRB):  WHIPPLE PROCEDURE (N/A) 1 Day Post-Op    Plan:     During this hospitalization, patient to be seen 5 x/week to address the above listed problems via gait training, therapeutic activities, therapeutic exercises  Plan of Care Expires:  04/20/23  Plan of Care Reviewed with: patient, family    Subjective     Communicated with NSG prior to session.  Patient found supine with PICC line, campos catheter, MADISYN drain, hemovac upon PT entry to room, agreeable to evaluation.      Chief Complaint: pain  Patient comments/goals: return to PLOF  Pain/Comfort:  Location 1: abdomen  Pain Addressed 1: Reposition, Distraction    Patients cultural, spiritual, Jainism conflicts given the current situation:        Objective:     Patient found with: PICC line, campos catheter, MADISYN drain, hemovac     General Precautions: Standard, fall  Orthopedic Precautions: N/A  Braces: N/A  Respiratory Status: Room air    Exams:  RLE ROM: WFL  RLE Strength: WFL  LLE ROM: WFL  LLE Strength: WFL    Functional Mobility:  Bed Mobility:     Scooting: minimum  assistance  Supine to Sit: minimum assistance  Sit to Supine: minimum assistance  Transfers:     Sit to Stand:  minimum assistance  Bed to Chair: minimum assistance with using  Step Transfer    Patient left supine with all lines intact, call button in reach, and NSG notified.    GOALS:   Multidisciplinary Problems       Physical Therapy Goals          Problem: Physical Therapy    Goal Priority Disciplines Outcome Goal Variances Interventions   Physical Therapy Goal     PT, PT/OT Ongoing, Progressing     Description: Goals to be met by: 23     Patient will increase functional independence with mobility by performin. Supine to sit with [place]  2. Sit to stand transfer with [place]  3. Bed to chair transfer with [place] using No Assistive Device  4. Gait  x 500 feet with [place] using No Assistive Device.                          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, unspecified extremity     Coronary artery disease     Dyslipidemia     Hypertension        Past Surgical History:   Procedure Laterality Date    AMPUTATION Right     Right arm    CAROTID STENT      EGD, WITH HEMORRHAGE CONTROL  2023    Procedure: EGD,WITH HEMORRHAGE CONTROL;  Surgeon: Ranjeet Perez MD;  Location: [facility];  Service: Gastroenterology;;  Quincy Goyal    ERCP N/A 2022    Procedure: ERCP;  Surgeon: Sushil Anderson MD;  Location: [facility] ENDOSCOPY;  Service: Gastroenterology;  Laterality: N/A;  food in abdomen    ERCP, WITH BIOPSY  2022    Procedure: ERCP, WITH BIOPSY;  Surgeon: Sushil Anderson MD;  Location: [facility] ENDOSCOPY;  Service: Gastroenterology;;    ESOPHAGOGASTRODUODENOSCOPY N/A 2023    Procedure: EGD;  Surgeon: Ranjeet Perez MD;  Location: [facility];  Service: Gastroenterology;  Laterality: N/A;    LEFT HEART CATHETERIZATION      TONSILLECTOMY      WHIPPLE  PROCEDURE N/A 3/27/2023    Procedure: WHIPPLE PROCEDURE;  Surgeon: Abdirahman Brown MD;  Location: [facility];  Service: Oncology;  Laterality: N/A;       Time Tracking:     PT Received On: 03/28/23  PT Start Time: 0930     PT Stop Time: 0953  PT Total Time (min): 23 min     Billable Minutes: Re-eval 15 and Therapeutic Activity 8      03/28/2023

## 2023-03-28 NOTE — PT/OT/SLP PROGRESS
Physical Therapy Treatment    Patient Name:  Quincy Triplett   MRN:  [id number]    Recommendations:     Discharge Recommendations: home health PT  Discharge Equipment Recommendations: none  Barriers to discharge: None    Assessment:     Qiuncy Triplett is a 82 y.o. male admitted with a medical diagnosis of s/p Whipple procedure.  He presents with the following impairments/functional limitations: gait instability, weakness, impaired balance, impaired endurance, decreased safety awareness, impaired functional mobility. Pt assisted back to bed with PT, requiring min A stand step transfer. Progress as able.     Rehab Prognosis: Good; patient would benefit from acute skilled PT services to address these deficits and reach maximum level of function.    Recent Surgery: Procedure(s) (LRB):  WHIPPLE PROCEDURE (N/A) 1 Day Post-Op    Plan:     During this hospitalization, patient to be seen 5 x/week to address the identified rehab impairments via gait training, therapeutic activities, therapeutic exercises and progress toward the following goals:    Plan of Care Expires:  04/20/23    Subjective     Chief Complaint: pain  Patient/Family Comments/goals: return to PLOF  Pain/Comfort:  Location 1: abdomen  Pain Addressed 1: Reposition, Distraction      Objective:     Communicated with NSG prior to session.  Patient found supine with PICC line, campos catheter, MADISYN drain, hemovac upon PT entry to room.     General Precautions: Standard, fall  Orthopedic Precautions: N/A  Braces: N/A  Respiratory Status: Room air     Functional Mobility:  Bed Mobility:     Scooting: minimum assistance  Supine to Sit: minimum assistance  Sit to Supine: minimum assistance  Transfers:     Sit to Stand:  minimum assistance with no AD    Patient left supine with all lines intact, call button in reach, and NSG notified..    GOALS:   Multidisciplinary Problems       Physical Therapy Goals          Problem: Physical Therapy    Goal Priority Disciplines  Outcome Goal Variances Interventions   Physical Therapy Goal     PT, PT/OT Ongoing, Progressing     Description: Goals to be met by: 23     Patient will increase functional independence with mobility by performin. Supine to sit with [place]  2. Sit to stand transfer with [place]  3. Bed to chair transfer with [place] using No Assistive Device  4. Gait  x 500 feet with [place] using No Assistive Device.                          Time Tracking:     PT Received On: 23  PT Start Time: 1215     PT Stop Time: 1230  PT Total Time (min): 15 min     Billable Minutes: Therapeutic Activity 15    Treatment Type: Treatment  PT/PTA: PT     Number of PTA visits since last PT visit: 0     2023

## 2023-03-28 NOTE — PROGRESS NOTES
SURG ONC POD 1    PT DOING WELL  TELLS ME HAVING NO PAIN  TOLERATING CLEARS  NO NAUSEA OR VOMITING  NO FEVER OR CHILLS    ABD SOFT  PREVENA WELL SECURED  DRAIN SEROSANG  BILIARY DRAIN IN PLACE    VSS; NO FEVER  WBC 19695  HH 11/36  LYTES PENDING  UO MORE THAN ADEQUATE    PLAN  CONSULT PT  MOBILIZE, IS  FOLLOW DRAIN AMYLASE

## 2023-03-28 NOTE — NURSING
Nurses Note -- 4 Eyes      3/27/2023   10:18 PM      Skin assessed during: Transfer      [x] No Pressure Injuries Present    [x]Prevention Measures Documented      [] Yes- Altered Skin Integrity Present or Discovered   [] LDA Added if Not in Epic (Describe Wound)   [] New Altered Skin Integrity was Present on Admit and Documented in LDA   [] Wound Image Taken    Wound Care Consulted? No    Attending Nurse:  Colleen Bhatti RN     Second RN/Staff Member:  Estefani BENAVIDEZ

## 2023-03-28 NOTE — PROGRESS NOTES
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 Work up in ED noted patient afebrile, HDS. Labs notable for ALP 1544, Total bilirubin 2.9, AST 40, ALT 65, potassium 5.2, bicarb 9, BUN 23, Cr 1.5.  Staging CT C/A/P from 3/15 noting developing gastric outlet obstruction, secondary to a 3.2 cm long "apple-core" lesion in the 3rd portion of the duodenum, at the level of the ampulla; however there was no evidence of distant metastatic disease.  Of notes patient recently admitted to hospital from 3/2-3/10, being treated for enterococcus faecium bacteremia, planned for antibiotics through 3/18.  Plan was to have subsequent follow-up with Surgical Oncology thereafter to discuss possible Whipple procedure.  Patient was admitted to hospital medicine services for management.      Surgical Oncology consulted given GOO, planning for Whipple on Mon 3/27.  In the interim patient will be optimized medially for surgery.  Continued on liquids, dietary protein supplementation added.  Also started on TPN in the interim. Continued to work with physical therapy.    On 03/27/2023, Patient underwent pancreatico duodenectomy portal/celiac lymphadenectomy, common bile duct exploration, falciform ligament pedicle flap, wedge resection of segment 4 liver lesion, cholecystectomy under GA.  Estimated blood loss 100 cc.       Interval Hx:     Patient was seen and examined at bedside this morning.  Ex-wife at bedside.  He reported he is doing great and he is grateful regarding the care he is receiving and he is " surprised that he did not feel any pain during the procedure and even now.  Patient is currently on Dilaudid pump.  A.m. labs showed hyperkalemia of 6.4 and a repeat still high 5.9, ordered insulin and dextrose.      Objective/physical exam:  General:  Alert no apparent distress Dilaudid IV pump  Chest:  Diminished bilaterally at bases, otherwise Clear  Heart:  Regular rate and rhythm  Abdomen:  Soft mildly distended, Prevena in place, biliary drain in place   Extremities:  Warm, no pedal edema   : Paul in place   Neuro: Alert and oriented  VITAL SIGNS: 24 HRS MIN & MAX LAST   Temp  Min: 97.3 °F (36.3 °C)  Max: 97.8 °F (36.6 °C) 97.8 °F (36.6 °C)   BP  Min: 112/75  Max: 150/69 136/83   Pulse  Min: 79  Max: 95  93     Resp  Min: 13  Max: 20 18   SpO2  Min: 97 %  Max: 100 % 98 %       Recent Labs   Lab 03/24/23  0418 03/27/23  0421 03/28/23  0532   WBC 9.3 6.5 14.2*   RBC 2.89* 2.82* 4.07*   HGB 8.3* 8.1* 11.8*   HCT 25.4* 25.3* 36.1*   MCV 87.9 89.7 88.7   MCH 28.7 28.7 29.0   MCHC 32.7* 32.0* 32.7*   RDW 15.4 15.7 16.2    198 273   MPV 9.8 11.2* 10.1       Recent Labs   Lab 03/26/23  0354 03/27/23  0421 03/28/23  0811 03/28/23  1036   * 134* 132*  --    K 4.6 4.7 6.4* 5.9*   CO2 20* 19* 17*  --    BUN 18.6 22.5 28.1*  --    CREATININE 0.64* 0.71* 0.96  --    CALCIUM 9.2 9.5 9.6  --    MG 2.00 2.00 2.10  --    ALBUMIN 2.0* 2.0* 3.0*  --    ALKPHOS 998* 1,026* 669*  --    ALT 60* 63* 87*  --    AST 49* 56* 81*  --    BILITOT 3.0* 3.5* 5.1*  --           Microbiology Results (last 7 days)       ** No results found for the last 168 hours. **             See below for Radiology    Scheduled Med:   alvimopan  12 mg Oral BID    enoxaparin  30 mg Subcutaneous Q12H    finasteride  5 mg Oral Daily    magnesium oxide  400 mg Oral BID    mupirocin   Nasal BID    pantoprazole  40 mg Oral Daily    sodium chloride 0.9%  10 mL Intravenous Q6H    tamsulosin  0.4 mg Oral Daily    zinc oxide-cod liver oil   Topical  (Top) BID        Continuous Infusions:   hydromorphone in 0.9 % NaCl 6 mg/30 ml      lactated ringers 75 mL/hr at 03/28/23 1617        PRN Meds:  sodium chloride, acetaminophen, ALPRAZolam, dextrose 10%, dextrose 10%, dextrose 10%, dextrose 10%, diphenhydrAMINE, glucagon (human recombinant), glucose, glucose, hydrOXYzine, insulin aspart U-100, melatonin, naloxone, ondansetron, ondansetron, promethazine, simethicone, Flushing PICC Protocol **AND** sodium chloride 0.9% **AND** sodium chloride 0.9%       Assessment/Plan:  Gastric outlet obstruction s/t tumor s/p Whipples 3/27/23  Adenocarcinoma of the duodenum  obstructive jaundice, s/p prior percutaneous drain  Acute Hyperkalemia  Anemia of chronic disease  Recent Enterococcus faecium bacteremia    Postop day 1  Surgical oncology following.  Follow recs   Labs from this morning showed mildly elevated white count WBC 14 K, hemoglobin stable at 11.8, platelets 273, sodium 132, potassium 6.4, repeat 5.9, glucose 159, bicarb 17, BUN 28.1, creatinine 0.96 alkaline phosphatase 669, AST 81, ALT 87  Ordered insulin, dextrose, repeat BMP  Discontinued Clinimix  Noted elevation in BUN initiated lactated Ringer's   Diet per surgery   Encourage incentive spirometry   PTOT  Pain control  Case discussed with the case management likely patient need home health pending progress with the PT.  Labs in the a.m.    VTE prophylaxis:  Lovenox    Patient condition:  Guarded    Anticipated discharge and Disposition:   To be decided    Critical care time spent:  35 minutes   Critical care diagnosis:  Acute hyperkalemia requiring IV insulin, dextrose  Critical care intervention:  Hands on evaluation, lab review, chart review ordering labs care discussions with the care team  _____________________________________________________________________    Nutrition Status:    Radiology:  X-Ray Abdomen AP 1 View  Narrative: EXAMINATION:  XR ABDOMEN AP 1 VIEW    CLINICAL HISTORY:  incorrect  count;    COMPARISON:  None    FINDINGS:  Two frontal images of the abdomen.  There is a right upper quadrant drain.  There is a Paul catheter.  Additional catheter overlies the abdomen.  There is a surgical clip in the expected area of the gallbladder fossa.  There are skin staples.  No curvilinear foreign body suspicious for suture needle is seen.  Impression: As above.    Electronically signed by: Quincy Roebrts  Date:    03/27/2023  Time:    15:23      Ashwini Mcmanus MD   03/28/2023

## 2023-03-29 LAB
ALBUMIN SERPL-MCNC: 2.3 G/DL (ref 3.4–4.8)
ALBUMIN/GLOB SERPL: 0.6 RATIO (ref 1.1–2)
ALP SERPL-CCNC: 565 UNIT/L (ref 40–150)
ALT SERPL-CCNC: 71 UNIT/L (ref 0–55)
ANION GAP SERPL CALC-SCNC: 7 MEQ/L
APTT PPP: 25.3 SECONDS (ref 23.2–33.7)
AST SERPL-CCNC: 50 UNIT/L (ref 5–34)
BASOPHILS # BLD AUTO: 0.03 X10(3)/MCL (ref 0–0.2)
BASOPHILS NFR BLD AUTO: 0.2 %
BILIRUBIN DIRECT+TOT PNL SERPL-MCNC: 4.4 MG/DL
BUN SERPL-MCNC: 27.4 MG/DL (ref 8.4–25.7)
BUN SERPL-MCNC: 28.1 MG/DL (ref 8.4–25.7)
CALCIUM SERPL-MCNC: 9 MG/DL (ref 8.8–10)
CALCIUM SERPL-MCNC: 9.6 MG/DL (ref 8.8–10)
CHLORIDE SERPL-SCNC: 100 MMOL/L (ref 98–107)
CHLORIDE SERPL-SCNC: 100 MMOL/L (ref 98–107)
CO2 SERPL-SCNC: 18 MMOL/L (ref 23–31)
CO2 SERPL-SCNC: 18 MMOL/L (ref 23–31)
CREAT SERPL-MCNC: 0.81 MG/DL (ref 0.73–1.18)
CREAT SERPL-MCNC: 0.83 MG/DL (ref 0.73–1.18)
CREAT UR-MCNC: 20.4 MG/DL (ref 63–166)
CREAT/UREA NIT SERPL: 34
EOSINOPHIL # BLD AUTO: 0.03 X10(3)/MCL (ref 0–0.9)
EOSINOPHIL NFR BLD AUTO: 0.2 %
ERYTHROCYTE [DISTWIDTH] IN BLOOD BY AUTOMATED COUNT: 15.9 % (ref 11.5–17)
GFR SERPLBLD CREATININE-BSD FMLA CKD-EPI: >60 MLS/MIN/1.73/M2
GFR SERPLBLD CREATININE-BSD FMLA CKD-EPI: >60 MLS/MIN/1.73/M2
GLOBULIN SER-MCNC: 3.6 GM/DL (ref 2.4–3.5)
GLUCOSE SERPL-MCNC: 111 MG/DL (ref 82–115)
GLUCOSE SERPL-MCNC: 140 MG/DL (ref 82–115)
HCT VFR BLD AUTO: 33.5 % (ref 42–52)
HGB BLD-MCNC: 11 G/DL (ref 14–18)
IMM GRANULOCYTES # BLD AUTO: 0.16 X10(3)/MCL (ref 0–0.04)
IMM GRANULOCYTES NFR BLD AUTO: 1.3 %
INR BLD: 1.16 (ref 0–1.3)
LYMPHOCYTES # BLD AUTO: 1.21 X10(3)/MCL (ref 0.6–4.6)
LYMPHOCYTES NFR BLD AUTO: 9.5 %
MAGNESIUM SERPL-MCNC: 1.8 MG/DL (ref 1.6–2.6)
MCH RBC QN AUTO: 29.3 PG (ref 27–31)
MCHC RBC AUTO-ENTMCNC: 32.8 G/DL (ref 33–36)
MCV RBC AUTO: 89.1 FL (ref 80–94)
MONOCYTES # BLD AUTO: 1.08 X10(3)/MCL (ref 0.1–1.3)
MONOCYTES NFR BLD AUTO: 8.5 %
NEUTROPHILS # BLD AUTO: 10.25 X10(3)/MCL (ref 2.1–9.2)
NEUTROPHILS NFR BLD AUTO: 80.3 %
NRBC BLD AUTO-RTO: 0 %
OSMOLALITY UR: 148 MOSM/KG (ref 300–1300)
PHOSPHATE SERPL-MCNC: 3.1 MG/DL (ref 2.3–4.7)
PLATELET # BLD AUTO: 257 X10(3)/MCL (ref 130–400)
PMV BLD AUTO: 9.2 FL (ref 7.4–10.4)
POCT GLUCOSE: 107 MG/DL (ref 70–110)
POCT GLUCOSE: 109 MG/DL (ref 70–110)
POCT GLUCOSE: 152 MG/DL (ref 70–110)
POCT GLUCOSE: 86 MG/DL (ref 70–110)
POTASSIUM SERPL-SCNC: 5 MMOL/L (ref 3.5–5.1)
POTASSIUM SERPL-SCNC: 5.3 MMOL/L (ref 3.5–5.1)
PROT SERPL-MCNC: 5.9 GM/DL (ref 5.8–7.6)
PROTHROMBIN TIME: 14.7 SECONDS (ref 12.5–14.5)
RBC # BLD AUTO: 3.76 X10(6)/MCL (ref 4.7–6.1)
SODIUM SERPL-SCNC: 125 MMOL/L (ref 136–145)
SODIUM SERPL-SCNC: 126 MMOL/L (ref 136–145)
SODIUM UR-SCNC: <20 MMOL/L
WBC # SPEC AUTO: 12.8 X10(3)/MCL (ref 4.5–11.5)

## 2023-03-29 PROCEDURE — 25000003 PHARM REV CODE 250: Performed by: INTERNAL MEDICINE

## 2023-03-29 PROCEDURE — B4185 PARENTERAL SOL 10 GM LIPIDS: HCPCS | Performed by: SURGERY

## 2023-03-29 PROCEDURE — 63600175 PHARM REV CODE 636 W HCPCS: Performed by: SURGERY

## 2023-03-29 PROCEDURE — 85610 PROTHROMBIN TIME: CPT | Performed by: NURSE PRACTITIONER

## 2023-03-29 PROCEDURE — 85025 COMPLETE CBC W/AUTO DIFF WBC: CPT | Performed by: NURSE PRACTITIONER

## 2023-03-29 PROCEDURE — 97530 THERAPEUTIC ACTIVITIES: CPT | Mod: CQ

## 2023-03-29 PROCEDURE — A4216 STERILE WATER/SALINE, 10 ML: HCPCS | Performed by: SURGERY

## 2023-03-29 PROCEDURE — 11000001 HC ACUTE MED/SURG PRIVATE ROOM

## 2023-03-29 PROCEDURE — 80053 COMPREHEN METABOLIC PANEL: CPT | Performed by: INTERNAL MEDICINE

## 2023-03-29 PROCEDURE — 83735 ASSAY OF MAGNESIUM: CPT | Performed by: INTERNAL MEDICINE

## 2023-03-29 PROCEDURE — 25000003 PHARM REV CODE 250: Performed by: NURSE PRACTITIONER

## 2023-03-29 PROCEDURE — 25000003 PHARM REV CODE 250: Performed by: SURGERY

## 2023-03-29 PROCEDURE — 85730 THROMBOPLASTIN TIME PARTIAL: CPT | Performed by: NURSE PRACTITIONER

## 2023-03-29 PROCEDURE — 84100 ASSAY OF PHOSPHORUS: CPT | Performed by: INTERNAL MEDICINE

## 2023-03-29 PROCEDURE — A4217 STERILE WATER/SALINE, 500 ML: HCPCS | Performed by: SURGERY

## 2023-03-29 PROCEDURE — 97166 OT EVAL MOD COMPLEX 45 MIN: CPT

## 2023-03-29 PROCEDURE — 63600175 PHARM REV CODE 636 W HCPCS: Performed by: NURSE PRACTITIONER

## 2023-03-29 PROCEDURE — 84300 ASSAY OF URINE SODIUM: CPT | Performed by: INTERNAL MEDICINE

## 2023-03-29 PROCEDURE — 82570 ASSAY OF URINE CREATININE: CPT | Performed by: INTERNAL MEDICINE

## 2023-03-29 PROCEDURE — 83935 ASSAY OF URINE OSMOLALITY: CPT | Performed by: INTERNAL MEDICINE

## 2023-03-29 RX ORDER — METOCLOPRAMIDE HYDROCHLORIDE 5 MG/ML
10 INJECTION INTRAMUSCULAR; INTRAVENOUS 3 TIMES DAILY
Status: DISCONTINUED | OUTPATIENT
Start: 2023-03-29 | End: 2023-04-03

## 2023-03-29 RX ORDER — ALVIMOPAN 12 MG/1
12 CAPSULE ORAL DAILY
Status: DISCONTINUED | OUTPATIENT
Start: 2023-03-29 | End: 2023-03-29

## 2023-03-29 RX ORDER — ALVIMOPAN 12 MG/1
12 CAPSULE ORAL 2 TIMES DAILY
Status: DISPENSED | OUTPATIENT
Start: 2023-03-29 | End: 2023-04-05

## 2023-03-29 RX ADMIN — FINASTERIDE 5 MG: 5 TABLET, FILM COATED ORAL at 09:03

## 2023-03-29 RX ADMIN — ALVIMOPAN 12 MG: 12 CAPSULE ORAL at 08:03

## 2023-03-29 RX ADMIN — TAMSULOSIN HYDROCHLORIDE 0.4 MG: 0.4 CAPSULE ORAL at 08:03

## 2023-03-29 RX ADMIN — PROMETHAZINE HYDROCHLORIDE 25 MG: 25 TABLET ORAL at 01:03

## 2023-03-29 RX ADMIN — Medication: at 09:03

## 2023-03-29 RX ADMIN — ALVIMOPAN 12 MG: 12 CAPSULE ORAL at 09:03

## 2023-03-29 RX ADMIN — ENOXAPARIN SODIUM 30 MG: 30 INJECTION SUBCUTANEOUS at 08:03

## 2023-03-29 RX ADMIN — SODIUM CHLORIDE, PRESERVATIVE FREE 10 ML: 5 INJECTION INTRAVENOUS at 11:03

## 2023-03-29 RX ADMIN — SODIUM CHLORIDE, PRESERVATIVE FREE 10 ML: 5 INJECTION INTRAVENOUS at 05:03

## 2023-03-29 RX ADMIN — Medication 400 MG: at 09:03

## 2023-03-29 RX ADMIN — VASOPRESSIN: 20 INJECTION, SOLUTION INTRAVENOUS at 10:03

## 2023-03-29 RX ADMIN — Medication: at 08:03

## 2023-03-29 RX ADMIN — I.V. FAT EMULSION 250 ML: 20 EMULSION INTRAVENOUS at 09:03

## 2023-03-29 RX ADMIN — METOCLOPRAMIDE 10 MG: 5 INJECTION, SOLUTION INTRAMUSCULAR; INTRAVENOUS at 03:03

## 2023-03-29 RX ADMIN — METOCLOPRAMIDE 10 MG: 5 INJECTION, SOLUTION INTRAMUSCULAR; INTRAVENOUS at 09:03

## 2023-03-29 RX ADMIN — Medication 400 MG: at 08:03

## 2023-03-29 RX ADMIN — PANTOPRAZOLE SODIUM 40 MG: 40 TABLET, DELAYED RELEASE ORAL at 08:03

## 2023-03-29 RX ADMIN — Medication: at 11:03

## 2023-03-29 RX ADMIN — ENOXAPARIN SODIUM 30 MG: 30 INJECTION SUBCUTANEOUS at 09:03

## 2023-03-29 NOTE — PT/OT/SLP PROGRESS
Physical Therapy Treatment    Patient Name:  Quincy Triplett   MRN:  [id number]    Recommendations:     Discharge Recommendations:  home with home health, home health PT, rehabilitation facility (pending pt progress with PT. if pt were to d/c home he would required 24/7 assistance at this time.)   Discharge Equipment Recommendations: none     Subjective     Patient awake and alert.     Objective:     General Precautions: Standard, fall   Orthopedic Precautions:N/A   Braces:    Respiratory Status: Room air   Communicated with nurse prior to treatment.     Functional Mobility:    Rolling:Modified Independent  Supine to sit:Modified Independent  Sit to stand transfer: Modified Independent  Bed to chair transfer:Modified Independent    200 ft x 2 without AD SBA.     Patient left up in chair with all lines intact and call button in reach.    AM-PAC 6 CLICK MOBILITY        GOALS:   Multidisciplinary Problems       Physical Therapy Goals          Problem: Physical Therapy    Goal Priority Disciplines Outcome Goal Variances Interventions   Physical Therapy Goal     PT, PT/OT Ongoing, Progressing     Description: Goals to be met by: 23     Patient will increase functional independence with mobility by performin. Supine to sit with [place]  2. Sit to stand transfer with [place]  3. Bed to chair transfer with [place] using No Assistive Device  4. Gait  x 500 feet with [place] using No Assistive Device.                          Assessment:     Quincy Triplett is a 82 y.o. male admitted with a medical diagnosis of <principal problem not specified>.     Patient Active Problem List   Diagnosis    Jaundice    Gastric outlet obstruction    COVID-19    TERRENCE (acute kidney injury)    Generalized weakness    Duodenal cancer    Sepsis    Hypotension        Rehab Prognosis: Good; patient would benefit from acute skilled PT services to address these deficits and reach maximum level of function.    Recent  Surgery: Procedure(s) (LRB):  WHIPPLE PROCEDURE (N/A)  LYMPHADENECTOMY  EXPLORATION, COMMON BILE DUCT  BIOPSY, LIVER  CHOLECYSTECTOMY 2 Days Post-Op    Plan:     During this hospitalization, patient to be seen 5 x/week to address the identified rehab impairments via gait training, therapeutic activities, therapeutic exercises and progress toward the following goals:    Plan of Care Expires:  04/20/23    Billable Minutes     Billable Minutes: Gait Training 15    Treatment Type: Treatment  PT/PTA: PTA     Number of PTA visits since last PT visit: 1 03/29/2023

## 2023-03-29 NOTE — PROGRESS NOTES
Inpatient Nutrition Assessment    Admit Date: 3/16/2023   Total duration of encounter: 13 days     Nutrition Recommendation/Prescription     Advance diet as tolerated per MD. Goal Diet: Low Residue.  Continue Boost Breeze while on Clear liquid diet. Will provide 250 lyndsey, 9 gm pro per serving. Once diet advanced, change supplement to Chocolate Boost plus per pt request.  TPN recs until able to advance diet and pt able to have good tolerance of solids:  AA15% 600 mL (90 gm), D70 494 mL (346 gm), @ 75 mL/hr with IVPB IL 20% 250 mL 3x/weel. This will provide:  1750 kcal (100% est needs)  90 gm protein (100% est needs)  1800 mL fluid (103% est needs)  346 gm Dextrose   4. Electrolytes per pharmacy    Communication of Recommendations: reviewed with nurse, reviewed with patient, and reviewed with pharmacy    Nutrition Assessment     Malnutrition Assessment/Nutrition-Focused Physical Exam    Malnutrition in the context of acute illness or injury  Degree of Malnutrition: non-severe (moderate) malnutrition  Energy Intake: </= 50% of estimated energy requirement for >/= 5 days  Interpretation of Weight Loss: >5% in 1 month  Body Fat:moderate depletion  Area of Body Fat Loss: orbital region  and upper arm region - triceps / biceps  Muscle Mass Loss: moderate depletion  Area of Muscle Mass Loss: temple region - temporalis muscle and clavicle bone region - pectoralis major, deltoid, trapezius muscles  Fluid Accumulation: does not meet criteria  Edema: does not meet criteria   Reduced  Strength: unable to obtain  A minimum of two characteristics is recommended for diagnosis of either severe or non-severe malnutrition.    Chart Review    Reason Seen: malnutrition screening tool (MST), physician consult for initiate TPN; sacral wound, and follow-up    Malnutrition Screening Tool Results   Have you recently lost weight without trying?: Yes: 34 lbs or more  Have you been eating poorly because of a decreased appetite?: Yes   MST  Score: 5     Diagnosis:  Acute kidney injury due to intravascular volume depletion secondary to nausea and vomiting   Acute complicated bacterial cystitis, present on admission   Normocytic anemia  Hyponatremia  Transaminitis hyperbilirubinemia   Fatigue (Hx small intestine CA awaiting surgical tx, increased weakness started yesterday)    Relevant Medical History: hypertension, hyperlipidemia, coronary disease status post stent, peripheral arterial disease, adenocarcinoma of duodenum, BPH with chronic indwelling Paul catheter    Nutrition-Related Medications: SSI, Reglan, LR, Protonix   Calorie Containing IV Medications: no significant kcals from medications at this time    Nutrition-Related Labs:  3/18: H/H-7.4/21.8, Na-133, K-3.2, Crea-1.25, Ca-8.1, Alk phos-891  3/21/2023: H/H 7.7/24.1, Ca 8.5, Alk Phos 864, Alb 2.0, Total Bili 1.8, AST 44, Gluc 99  3/23/2023: Na 135, Ca 8.6, Alk Phose 844, Alb 1.7, Total Bili 2.7, AST 64, ALT 65, Gluc 113  3/27/2023: H/H 8.1/25.3, Na 134, Cl 109, Crea 0.71, Alk Phos 1026, alb 2.0, Total Bili 3.5, AST 56, ALT 63, Gluc 86  3/29/23: Na 126, Glu 111, GFR>60    Diet/PN Order: Diet clear liquid  Oral Supplement Order: Boost Breeze  Tube Feeding Order: none  Appetite/Oral Intake: poor/0-25% of meals  Factors Affecting Nutritional Intake: altered gastrointestinal function, clear liquid diet, and nausea  Food/Samaritan/Cultural Preferences: none reported  Food Allergies: no known food allergies    Skin Integrity: drain/device(s), incision  Wound(s):   sacral reddness    Comments    3/18/23: Pt feeling better this morning. Admitted with N/V. But is now able to tolerate liquids today. Dr Garrett added Boost Breeze with meals. Pt is to stay on liquids for now. Pt scheduled for whipple on 3/27/23; pending surgery eval while in hospital. Pt with physical wasting noted along with wt loss. If pt to remain on clear liquids > 7 days, will need TPN.     3/21/2023: Pt reports drinking/eating >75%  "PO intake of meals. Pt receives Boost Breeze BID. Pt denies N/V. Provided TPN recommendations per consult. Will monitor for refeeding syndrome. Last BM documented as 3/19/2023.    3/23/2023: Pt reports good appetite/PO intake. Implemented food preferences. Pt receives Boost Breeze BID. Pt on Clinimix-E @ 75 mL/hr + IL 2 times weekly. Pt reported N/V yesterday but none today. Pt anticipating bowel resection Monday. Palliative following. Will monitor.    3/27/2023: Pt NPO for WHIPPLE procedure, per FNP note, pt will regroup after discharge to assess overall health to determine when to reschedule WHIPPLE procedure. TPN bag hanging at time of visit, pt not in room. Will monitor.    3/29/23: Spoke with RN who reports that pt's TPN was d/c'ed yesterday when potassium was running high. Pt was on Clinimix E TPN; pt is now just on clears but unable to take in much 2/2 nausea.     Anthropometrics    Height: 5' 9.02" (175.3 cm)    Last Weight: 65 kg (143 lb 4.8 oz) (23 1244) Weight Method: Standard Scale  BMI (Calculated): 21.2  BMI Classification: underweight (BMI less than 22 if >65 years of age)        Ideal Body Weight (IBW), Male: 160.12 lb     % Ideal Body Weight, Male (lb): 89.5 %                 Usual Body Weight (UBW), k.7 kg  % Usual Body Weight: 90.85  % Weight Change From Usual Weight: -9.35 %  Usual Weight Provided By: EMR weight history    Wt Readings from Last 5 Encounters:   23 65 kg (143 lb 4.8 oz)   23 67.3 kg (148 lb 5.9 oz)   02/15/23 71.7 kg (158 lb)   23 70.3 kg (155 lb)   23 69.3 kg (152 lb 12.5 oz)     Weight Change(s) Since Admission:  Admit Weight: 73.5 kg (162 lb) (23 0452)  65kg; 9% wt loss x1 month. Rt arm amputation noted.   3/18/2023: 65 kg  3/29/23: no new wt noted     Estimated Needs    Weight Used For Calorie Calculations: 65 kg (143 lb 4.8 oz)  Energy Calorie Requirements (kcal): 1750 kcal (MSJxSF1.3)  Energy Need Method: [place]-St Jeor  Weight Used " For Protein Calculations: 65 kg (143 lb 4.8 oz)  Protein Requirements: 90gm (kgx1.4)  Fluid Requirements (mL): 1750mL (1mL/kcal)  Temp: 97.8 °F (36.6 °C)       Enteral Nutrition    Patient not receiving enteral nutrition at this time.    Parenteral Nutrition    Patient not receiving parenteral nutrition at this time.       Evaluation of Received Nutrient Intake    Calories: not meeting estimated needs  Protein: not meeting estimated needs    Patient Education    Not applicable.    Nutrition Diagnosis     PES: Malnutrition related to cancer as evidenced by 9% wt loss x1 month, fat and muscle wasting, poor diet tolerance/intake > 5 days. (continues)    Interventions/Goals     Intervention(s): general/healthful diet, modified composition of parenteral nutrition, commercial beverage, and collaboration with other providers  Goal: Meet greater than 75% of nutritional needs by follow-up. (goal not met)    Monitoring & Evaluation     Dietitian will monitor energy intake and electrolyte/renal panel.  Nutrition Risk/Follow-Up: high (follow-up in 1-4 days)   Please consult if re-assessment needed sooner.

## 2023-03-29 NOTE — PLAN OF CARE
Problem: Adult Inpatient Plan of Care  Goal: Plan of Care Review  Outcome: Ongoing, Progressing  Goal: Patient-Specific Goal (Individualized)  Outcome: Ongoing, Progressing  Goal: Absence of Hospital-Acquired Illness or Injury  Outcome: Ongoing, Progressing  Goal: Optimal Comfort and Wellbeing  Outcome: Ongoing, Progressing  Goal: Readiness for Transition of Care  Outcome: Ongoing, Progressing     Problem: Adjustment to Illness (Sepsis/Septic Shock)  Goal: Optimal Coping  Outcome: Ongoing, Progressing     Problem: Bleeding (Sepsis/Septic Shock)  Goal: Absence of Bleeding  Outcome: Ongoing, Progressing     Problem: Glycemic Control Impaired (Sepsis/Septic Shock)  Goal: Blood Glucose Level Within Desired Range  Outcome: Ongoing, Progressing     Problem: Infection Progression (Sepsis/Septic Shock)  Goal: Absence of Infection Signs and Symptoms  Outcome: Ongoing, Progressing     Problem: Nutrition Impaired (Sepsis/Septic Shock)  Goal: Optimal Nutrition Intake  Outcome: Ongoing, Progressing     Problem: Fluid and Electrolyte Imbalance (Acute Kidney Injury/Impairment)  Goal: Fluid and Electrolyte Balance  Outcome: Ongoing, Progressing     Problem: Oral Intake Inadequate (Acute Kidney Injury/Impairment)  Goal: Optimal Nutrition Intake  Outcome: Ongoing, Progressing     Problem: Renal Function Impairment (Acute Kidney Injury/Impairment)  Goal: Effective Renal Function  Outcome: Ongoing, Progressing     Problem: Coping Ineffective  Goal: Effective Coping  Outcome: Ongoing, Progressing     Problem: Skin Injury Risk Increased  Goal: Skin Health and Integrity  Outcome: Ongoing, Progressing     Problem: Impaired Wound Healing  Goal: Optimal Wound Healing  Outcome: Ongoing, Progressing     Problem: Infection  Goal: Absence of Infection Signs and Symptoms  Outcome: Ongoing, Progressing     Problem: Fall Injury Risk  Goal: Absence of Fall and Fall-Related Injury  Outcome: Ongoing, Progressing

## 2023-03-29 NOTE — PROGRESS NOTES
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 Work up in ED noted patient afebrile, HDS. Labs notable for ALP 1544, Total bilirubin 2.9, AST 40, ALT 65, potassium 5.2, bicarb 9, BUN 23, Cr 1.5.  Staging CT C/A/P from 3/15 noting developing gastric outlet obstruction, secondary to a 3.2 cm long "apple-core" lesion in the 3rd portion of the duodenum, at the level of the ampulla; however there was no evidence of distant metastatic disease.  Of notes patient recently admitted to hospital from 3/2-3/10, being treated for enterococcus faecium bacteremia, planned for antibiotics through 3/18.  Plan was to have subsequent follow-up with Surgical Oncology thereafter to discuss possible Whipple procedure.  Patient was admitted to hospital medicine services for management.      Surgical Oncology consulted given GOO, planning for Whipple on Mon 3/27.  In the interim patient will be optimized medially for surgery.  Continued on liquids, dietary protein supplementation added.  Also started on TPN in the interim. Continued to work with physical therapy.    On 03/27/2023, Patient underwent pancreatico duodenectomy portal/celiac lymphadenectomy, common bile duct exploration, falciform ligament pedicle flap, wedge resection of segment 4 liver lesion, cholecystectomy under GA.  Estimated blood loss 100 cc.       Interval Hx:     Patient was seen and examined at bedside this morning.Reported he is not feeling any pain, recommended to cut down using dilaudid pump. Complained of some nausea last night ,NO " Bms yet.Family member at bedside.      Objective/physical exam:  General:  Alert no apparent distress Dilaudid IV pump  Chest:  Diminished bilaterally at bases, otherwise Clear  Heart:  Regular rate and rhythm  Abdomen:  Soft,mildly distended, Prevena in place, biliary drain in place   Extremities:  Warm, no pedal edema   : Paul in place   Neuro: Alert and oriented  VITAL SIGNS: 24 HRS MIN & MAX LAST   Temp  Min: 97.4 °F (36.3 °C)  Max: 98.3 °F (36.8 °C) 98.3 °F (36.8 °C)   BP  Min: 131/86  Max: 159/81 131/86   Pulse  Min: 67  Max: 91  67     Resp  Min: 16  Max: 20 18   SpO2  Min: 97 %  Max: 98 % 98 %       Recent Labs   Lab 03/27/23  0421 03/28/23  0532 03/29/23  0511   WBC 6.5 14.2* 12.8*   RBC 2.82* 4.07* 3.76*   HGB 8.1* 11.8* 11.0*   HCT 25.3* 36.1* 33.5*   MCV 89.7 88.7 89.1   MCH 28.7 29.0 29.3   MCHC 32.0* 32.7* 32.8*   RDW 15.7 16.2 15.9    273 257   MPV 11.2* 10.1 9.2       Recent Labs   Lab 03/27/23  0421 03/28/23  0811 03/28/23  1036 03/29/23  0511 03/29/23  1020   * 132*  --  126* 125*   K 4.7 6.4* 5.9* 5.0 5.3*   CO2 19* 17*  --  18* 18*   BUN 22.5 28.1*  --  27.4* 28.1*   CREATININE 0.71* 0.96  --  0.81 0.83   CALCIUM 9.5 9.6  --  9.6 9.0   MG 2.00 2.10  --  1.80  --    ALBUMIN 2.0* 3.0*  --  2.3*  --    ALKPHOS 1,026* 669*  --  565*  --    ALT 63* 87*  --  71*  --    AST 56* 81*  --  50*  --    BILITOT 3.5* 5.1*  --  4.4*  --           Microbiology Results (last 7 days)       ** No results found for the last 168 hours. **             See below for Radiology    Scheduled Med:   alvimopan  12 mg Oral BID    enoxaparin  30 mg Subcutaneous Q12H    fat emulsion 20%  250 mL Intravenous Every Mon, Wed, Fri    finasteride  5 mg Oral Daily    magnesium oxide  400 mg Oral BID    metoclopramide HCl  10 mg Intravenous TID    pantoprazole  40 mg Oral Daily    sodium chloride 0.9%  10 mL Intravenous Q6H    tamsulosin  0.4 mg Oral Daily    zinc oxide-cod liver oil   Topical (Top) BID         Continuous Infusions:   hydromorphone in 0.9 % NaCl 6 mg/30 ml      lactated ringers 75 mL/hr at 03/28/23 8819    TPN ADULT CENTRAL LINE CUSTOM          PRN Meds:  sodium chloride, acetaminophen, ALPRAZolam, dextrose 10%, dextrose 10%, dextrose 10%, dextrose 10%, diphenhydrAMINE, glucagon (human recombinant), glucose, glucose, hydrOXYzine, insulin aspart U-100, melatonin, naloxone, ondansetron, ondansetron, promethazine, simethicone, Flushing PICC Protocol **AND** sodium chloride 0.9% **AND** sodium chloride 0.9%       Assessment/Plan:  Gastric outlet obstruction s/t tumor s/p Whipples 3/27/23  Adenocarcinoma of the duodenum  obstructive jaundice, s/p prior percutaneous drain  Acute on chronic hyponatremia  Acute Hyperkalemia-improved   Anemia of chronic disease  Recent Enterococcus faecium bacteremia    Postop day 2  Surgical oncology following.  Follow recs   Labs from this morning showed wbc 12.8, hb stable at 11,   Na 126 K 5 repeat 5.3,  bicarb 18, BUN 27.4, creatinine 0.81 alkaline phosphatase 565, AST 50, ALT 71  His HB stable,reactive leukocytosis improving,his LFTs improving but Na is dropping suspecting excess ADH release from surgery/Pain  Will send for urine studies  Monitor K,given mild hyperkalemia,he would benefit from shifters, unable to give lokelma(gut not ready yet)  Pt still has campos, discuss with surg onc  Monitor Na, pt on LR ,will stop LR as anticipating to start po diet today  PO Diet per surgery ,follow nutrition recs  Encourage incentive spirometry   PT/OT  Pain control   Case discussed with the case management Home with  vs rehab pending progress.  Labs in the a.m.    VTE prophylaxis:  Lovenox    Patient condition:  Guarded    Anticipated discharge and Disposition:   To be decided,likely needs rehab      _____________________________________________________________________    Nutrition Status:    Radiology:  X-Ray Abdomen AP 1 View  Narrative: EXAMINATION:  XR ABDOMEN AP 1  VIEW    CLINICAL HISTORY:  incorrect count;    COMPARISON:  None    FINDINGS:  Two frontal images of the abdomen.  There is a right upper quadrant drain.  There is a Paul catheter.  Additional catheter overlies the abdomen.  There is a surgical clip in the expected area of the gallbladder fossa.  There are skin staples.  No curvilinear foreign body suspicious for suture needle is seen.  Impression: As above.    Electronically signed by: Quincy Roberts  Date:    03/27/2023  Time:    15:23      Ashwini Mcmanus MD   03/29/2023

## 2023-03-29 NOTE — PT/OT/SLP EVAL
Occupational Therapy   Evaluation    Name: Quincy Triplett  MRN: [id number]  Admitting Diagnosis: <principal problem not specified>  Recent Surgery: Procedure(s) (LRB):  WHIPPLE PROCEDURE (N/A)  LYMPHADENECTOMY  EXPLORATION, COMMON BILE DUCT  BIOPSY, LIVER  CHOLECYSTECTOMY 2 Days Post-Op    Recommendations:     Discharge Recommendations:    Discharge Equipment Recommendations:  none  Barriers to discharge:  None    Assessment:     Quincy Triplett is a 82 y.o. male with a medical diagnosis of duodenal CA, jaundice, s/p whipple, cathi.  He presents with some generalized weakness and decreased functional independence from baseline where pt was living alone and able to perform BADL's/mobility independently. Performance deficits affecting function: weakness, impaired endurance, impaired self care skills, impaired functional mobility.      Rehab Prognosis: Good; patient would benefit from acute skilled OT services to address these deficits and reach maximum level of function.       Plan:     Patient to be seen 3 x/week, 5 x/week to address the above listed problems via self-care/home management, therapeutic activities, therapeutic exercises  Plan of Care Expires: 04/12/23  Plan of Care Reviewed with: patient, family    Subjective     Chief Complaint: general weakness  Patient/Family Comments/goals: to go to rehab    Occupational Profile:  Living Environment: lives in  home, has walk in shower and tub with bench  Previous level of function: independent with ADL's  Roles and Routines: none stated  Equipment Used at Home: none (pt has walker, bedside commode, bath bench- was not using equipment PTA)  Assistance upon Discharge: yes, family per pt    Pain/Comfort:  Pain Rating 1: 0/10    Patients cultural, spiritual, Shinto conflicts given the current situation:      Objective:     Communicated with: nsroya prior to session.  Patient found supine with PICC line, MADISYN drain, campos catheter, hemovac upon OT entry to  room.    General Precautions: Standard, fall  Orthopedic Precautions:    Braces:    Respiratory Status: Room air    Occupational Performance:    Bed Mobility:    Patient completed Supine to Sit with minimum assistance    Functional Mobility/Transfers:  Patient completed Sit <> Stand Transfer with minimum assistance  with  hand-held assist   Functional Mobility: able to take steps along EOB and march in place with HHA/min assist without AD    Activities of Daily Living:  Donned slippers EOB with min assist      Cognitive/Visual Perceptual:  intact    Physical Exam:  Wfl MARGOT's    AMPA 6 Click ADL:  [facility] Total Score:      Treatment & Education:  Educated on plan of care, importance of mobility/OOB and position changes, progression of activity, role and goals of OT.     Patient left supine with all lines intact, call button in reach, and ex-wife present    GOALS:   Multidisciplinary Problems       Occupational Therapy Goals          Problem: Occupational Therapy    Goal Priority Disciplines Outcome Interventions   Occupational Therapy Goal     OT, PT/OT Ongoing, Progressing    Description: Goals to be met by: 4/12/23     Patient will increase functional independence with ADLs by performing:    LE Dressing with Modified [place].  Grooming while standing with Modified [place].  Toileting from toilet with Modified [place] for hygiene and clothing management.   Toilet transfer to toilet with Modified [place].                         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, unspecified extremity     Coronary artery disease     Dyslipidemia     Hypertension          Past Surgical History:   Procedure Laterality Date    AMPUTATION Right     Right arm    CAROTID STENT      CHOLECYSTECTOMY  3/27/2023    Procedure: CHOLECYSTECTOMY;  Surgeon: Abdirahman Brown MD;  Location: [facility];  Service: Oncology;;    EGD, WITH  HEMORRHAGE CONTROL  1/19/2023    Procedure: EGD,WITH HEMORRHAGE CONTROL;  Surgeon: Ranjeet Perez MD;  Location: [facility] OR;  Service: Gastroenterology;;  [employer] HemeSpray    ERCP N/A 12/21/2022    Procedure: ERCP;  Surgeon: Sushil Anderson MD;  Location: [facility] ENDOSCOPY;  Service: Gastroenterology;  Laterality: N/A;  food in abdomen    ERCP, WITH BIOPSY  12/21/2022    Procedure: ERCP, WITH BIOPSY;  Surgeon: Sushil Anderson MD;  Location: [facility] ENDOSCOPY;  Service: Gastroenterology;;    ESOPHAGOGASTRODUODENOSCOPY N/A 1/19/2023    Procedure: EGD;  Surgeon: Ranjeet Perez MD;  Location: [facility] OR;  Service: Gastroenterology;  Laterality: N/A;    EXPLORATION OF COMMON BILE DUCT  3/27/2023    Procedure: EXPLORATION, COMMON BILE DUCT;  Surgeon: Abdirahman Brown MD;  Location: [facility] OR;  Service: Oncology;;    LEFT HEART CATHETERIZATION      LIVER BIOPSY  3/27/2023    Procedure: BIOPSY, LIVER;  Surgeon: Abdirahman Brown MD;  Location: [facility] OR;  Service: Oncology;;    LYMPHADENECTOMY  3/27/2023    Procedure: LYMPHADENECTOMY;  Surgeon: Abdirahman Brown MD;  Location: [facility];  Service: Oncology;;  Portal/celiac lymphadenectomy    TONSILLECTOMY      WHIPPLE PROCEDURE N/A 3/27/2023    Procedure: WHIPPLE PROCEDURE;  Surgeon: Abdirahman Brown MD;  Location: [facility] OR;  Service: Oncology;  Laterality: N/A;       Time Tracking:     OT Date of Treatment: 03/29/23  OT Start Time: 1510  OT Stop Time: 1530  OT Total Time (min): 20 min    Billable Minutes:Evaluation mod complexity, 20 min    3/29/2023

## 2023-03-29 NOTE — PLAN OF CARE
Problem: Occupational Therapy  Goal: Occupational Therapy Goal  Description: Goals to be met by: 4/12/23     Patient will increase functional independence with ADLs by performing:    LE Dressing with Modified [place].  Grooming while standing with Modified [place].  Toileting from toilet with Modified [place] for hygiene and clothing management.   Toilet transfer to toilet with Modified [place].    Outcome: Ongoing, Progressing

## 2023-03-29 NOTE — PROGRESS NOTES
SURG ONC POD 2    PT TELLS ME HE IS NOT HAVING ANY PAIN  REPORTS VOMITED SMALL AMOUNT LAST NIGHT  COMPLAINS OF SOME NAUSEA  NO FEVER OR CHILLS  FAMILY AT BEDSIDE    ABD SOFT, SOME DISTENTION  PREVENA WELL SECURED  DRAIN SEROSANG, AMYLASE 400    VSS; NO FEVER  WBC 32053   11/33  LYTES REVIEWED  K BETTER AT 5.0    PT WORKED WITH PT YESTERDAY  WAS UP IN CHAIR    PLAN  ICE CHIPS, SIPS OF WATER TIL NAUSEA RESOLVED  CONTINUE TO MOBILIZE

## 2023-03-29 NOTE — PT/OT/SLP PROGRESS
Physical Therapy         Treatment        Quincy Triplett   MRN: [id number]     PT Received On: 03/29/23  PT Start Time: 0837     PT Stop Time: 0900    PT Total Time (min): 23 min       Billable Minutes:  Therapeutic Activity 23  Total Minutes: 23    Treatment Type: Treatment  PT/PTA: PTA     Number of PTA visits since last PT visit: 1       General Precautions: Standard, fall  Orthopedic Precautions: Orthopedic Precautions : N/A   Braces:           Subjective:  Communicated with NSG prior to session.    Pain/Comfort  Pain Rating 1: 0/10  Pain Addressed 1: Reposition, Distraction    Objective:  Patient found in bed with HOB elevated, with Patient found with: PICC line, campos catheter, hemovac, MADISYN drain    Functional Mobility:  Bed Mobility:   Supine to sit: Moderate Assistance   Sit to supine: Activity did not occur   Rolling: Activity did not occur   Scooting: Minimal Assistance    Balance:   Static Sit: GOOD: Takes MODERATE challenges from all directions  Dynamic Sit:  FAIR+: Maintains balance through MINIMAL excursions of active trunk motion  Static Stand: FAIR: Maintains without assist but unable to take challenges  Dynamic stand: POOR+: Needs MIN (minimal ) assist during gait    Transfer Training:  Sit to stand:Minimal Assistance and Moderate Assistance with HHA. 1 trial from EOB and 2 trials from bedside chair. Pt avried min-modA.   Bed <> Chair:  Step Transfer with Minimal Assistance with HHA. Pt T/F EOB>bedside chair.     Pregait:  Pt able to take 4 steps fwd/bwd from bedside chair HHA/Judd.       Activity Tolerance:  Treatment limited secondary to medical complications (nausea) and generalized weakness    Patient left up in chair with all lines intact and call button in reach.    Assessment:  Quincy Triplett is a 82 y.o. male with a medical diagnosis of adenocarcinoma of duodenum, gastric outlet obstruction s/t tumor, obstructive jaundice s/p percutaneous drain, severe metabolic acidosis.  pt with hx of R  arm amputation below elbow. He presents with the following impairments/functional limitations: weakness, impaired endurance, impaired self care skills, impaired functional mobility, gait instability, impaired balance.    Pt requires max coaxing to push himself during tx session. Pt only wanting to T/F to chair. Therapist able to convince pt to perform pregait activity.   Pt also wanting family member to assist him instead of therapist. Therapist educating pt that she is present to assist pt and she is trained in mobility. Pt not very receptive until family member told him to let therapist assist.    Rehab potential is good.    Activity tolerance: Good    Discharge recommendations: Discharge Facility/Level of Care Needs: home with home health, home health PT, rehabilitation facility (pending pt progress with PT. if pt were to d/c home he would required / assistance at this time.)     Equipment recommendations:       GOALS:   Multidisciplinary Problems       Physical Therapy Goals          Problem: Physical Therapy    Goal Priority Disciplines Outcome Goal Variances Interventions   Physical Therapy Goal     PT, PT/OT Ongoing, Progressing     Description: Goals to be met by: 23     Patient will increase functional independence with mobility by performin. Supine to sit with [place]  2. Sit to stand transfer with [place]  3. Bed to chair transfer with [place] using No Assistive Device  4. Gait  x 500 feet with [place] using No Assistive Device.                          PLAN:    Patient to be seen 5 x/week  to address the above listed problems via gait training, therapeutic activities, therapeutic exercises  Plan of Care expires: 23  Plan of Care reviewed with: patient, family         3/29/2023

## 2023-03-30 LAB
ALBUMIN SERPL-MCNC: 2 G/DL (ref 3.4–4.8)
ALBUMIN/GLOB SERPL: 0.6 RATIO (ref 1.1–2)
ALP SERPL-CCNC: 612 UNIT/L (ref 40–150)
ALT SERPL-CCNC: 53 UNIT/L (ref 0–55)
APTT PPP: 31.6 SECONDS (ref 23.2–33.7)
AST SERPL-CCNC: 36 UNIT/L (ref 5–34)
BASOPHILS # BLD AUTO: 0.04 X10(3)/MCL (ref 0–0.2)
BASOPHILS NFR BLD AUTO: 0.5 %
BILIRUBIN DIRECT+TOT PNL SERPL-MCNC: 3 MG/DL
BUN SERPL-MCNC: 26.6 MG/DL (ref 8.4–25.7)
CALCIUM SERPL-MCNC: 9.1 MG/DL (ref 8.8–10)
CHLORIDE SERPL-SCNC: 100 MMOL/L (ref 98–107)
CO2 SERPL-SCNC: 21 MMOL/L (ref 23–31)
CREAT SERPL-MCNC: 0.79 MG/DL (ref 0.73–1.18)
EOSINOPHIL # BLD AUTO: 0.1 X10(3)/MCL (ref 0–0.9)
EOSINOPHIL NFR BLD AUTO: 1.4 %
ERYTHROCYTE [DISTWIDTH] IN BLOOD BY AUTOMATED COUNT: 15.6 % (ref 11.5–17)
GFR SERPLBLD CREATININE-BSD FMLA CKD-EPI: >60 MLS/MIN/1.73/M2
GLOBULIN SER-MCNC: 3.4 GM/DL (ref 2.4–3.5)
GLUCOSE SERPL-MCNC: 144 MG/DL (ref 82–115)
HCT VFR BLD AUTO: 30.8 % (ref 42–52)
HGB BLD-MCNC: 10.1 G/DL (ref 14–18)
IMM GRANULOCYTES # BLD AUTO: 0.08 X10(3)/MCL (ref 0–0.04)
IMM GRANULOCYTES NFR BLD AUTO: 1.1 %
INR BLD: 1.16 (ref 0–1.3)
LYMPHOCYTES # BLD AUTO: 1.18 X10(3)/MCL (ref 0.6–4.6)
LYMPHOCYTES NFR BLD AUTO: 16.2 %
MAGNESIUM SERPL-MCNC: 1.8 MG/DL (ref 1.6–2.6)
MCH RBC QN AUTO: 29.3 PG (ref 27–31)
MCHC RBC AUTO-ENTMCNC: 32.8 G/DL (ref 33–36)
MCV RBC AUTO: 89.3 FL (ref 80–94)
MONOCYTES # BLD AUTO: 0.62 X10(3)/MCL (ref 0.1–1.3)
MONOCYTES NFR BLD AUTO: 8.5 %
NEUTROPHILS # BLD AUTO: 5.28 X10(3)/MCL (ref 2.1–9.2)
NEUTROPHILS NFR BLD AUTO: 72.3 %
NRBC BLD AUTO-RTO: 0 %
PHOSPHATE SERPL-MCNC: 2 MG/DL (ref 2.3–4.7)
PLATELET # BLD AUTO: 274 X10(3)/MCL (ref 130–400)
PMV BLD AUTO: 9.4 FL (ref 7.4–10.4)
POCT GLUCOSE: 147 MG/DL (ref 70–110)
POCT GLUCOSE: 185 MG/DL (ref 70–110)
POTASSIUM SERPL-SCNC: 4.2 MMOL/L (ref 3.5–5.1)
PROT SERPL-MCNC: 5.4 GM/DL (ref 5.8–7.6)
PROTHROMBIN TIME: 14.7 SECONDS (ref 12.5–14.5)
PSYCHE PATHOLOGY RESULT: NORMAL
RBC # BLD AUTO: 3.45 X10(6)/MCL (ref 4.7–6.1)
SODIUM SERPL-SCNC: 129 MMOL/L (ref 136–145)
WBC # SPEC AUTO: 7.3 X10(3)/MCL (ref 4.5–11.5)

## 2023-03-30 PROCEDURE — 83735 ASSAY OF MAGNESIUM: CPT | Performed by: INTERNAL MEDICINE

## 2023-03-30 PROCEDURE — A4217 STERILE WATER/SALINE, 500 ML: HCPCS | Performed by: SURGERY

## 2023-03-30 PROCEDURE — 85730 THROMBOPLASTIN TIME PARTIAL: CPT | Performed by: NURSE PRACTITIONER

## 2023-03-30 PROCEDURE — 25000003 PHARM REV CODE 250: Performed by: SURGERY

## 2023-03-30 PROCEDURE — 25000003 PHARM REV CODE 250: Performed by: INTERNAL MEDICINE

## 2023-03-30 PROCEDURE — 97530 THERAPEUTIC ACTIVITIES: CPT | Mod: CO

## 2023-03-30 PROCEDURE — 63600175 PHARM REV CODE 636 W HCPCS: Performed by: SURGERY

## 2023-03-30 PROCEDURE — 85610 PROTHROMBIN TIME: CPT | Performed by: NURSE PRACTITIONER

## 2023-03-30 PROCEDURE — 85025 COMPLETE CBC W/AUTO DIFF WBC: CPT | Performed by: NURSE PRACTITIONER

## 2023-03-30 PROCEDURE — 25000003 PHARM REV CODE 250: Performed by: NURSE PRACTITIONER

## 2023-03-30 PROCEDURE — 11000001 HC ACUTE MED/SURG PRIVATE ROOM

## 2023-03-30 PROCEDURE — 63600175 PHARM REV CODE 636 W HCPCS: Performed by: NURSE PRACTITIONER

## 2023-03-30 PROCEDURE — 97116 GAIT TRAINING THERAPY: CPT | Mod: CQ

## 2023-03-30 PROCEDURE — A4216 STERILE WATER/SALINE, 10 ML: HCPCS | Performed by: SURGERY

## 2023-03-30 PROCEDURE — 84100 ASSAY OF PHOSPHORUS: CPT | Performed by: INTERNAL MEDICINE

## 2023-03-30 PROCEDURE — 80053 COMPREHEN METABOLIC PANEL: CPT | Performed by: INTERNAL MEDICINE

## 2023-03-30 RX ADMIN — METOCLOPRAMIDE 10 MG: 5 INJECTION, SOLUTION INTRAMUSCULAR; INTRAVENOUS at 08:03

## 2023-03-30 RX ADMIN — FINASTERIDE 5 MG: 5 TABLET, FILM COATED ORAL at 08:03

## 2023-03-30 RX ADMIN — ALVIMOPAN 12 MG: 12 CAPSULE ORAL at 08:03

## 2023-03-30 RX ADMIN — ENOXAPARIN SODIUM 30 MG: 30 INJECTION SUBCUTANEOUS at 08:03

## 2023-03-30 RX ADMIN — Medication: at 08:03

## 2023-03-30 RX ADMIN — Medication 400 MG: at 08:03

## 2023-03-30 RX ADMIN — ALPRAZOLAM 0.5 MG: 0.5 TABLET ORAL at 04:03

## 2023-03-30 RX ADMIN — PANTOPRAZOLE SODIUM 40 MG: 40 TABLET, DELAYED RELEASE ORAL at 08:03

## 2023-03-30 RX ADMIN — SODIUM CHLORIDE, PRESERVATIVE FREE 10 ML: 5 INJECTION INTRAVENOUS at 05:03

## 2023-03-30 RX ADMIN — METOCLOPRAMIDE 10 MG: 5 INJECTION, SOLUTION INTRAMUSCULAR; INTRAVENOUS at 02:03

## 2023-03-30 RX ADMIN — TAMSULOSIN HYDROCHLORIDE 0.4 MG: 0.4 CAPSULE ORAL at 08:03

## 2023-03-30 RX ADMIN — SODIUM CHLORIDE, PRESERVATIVE FREE 10 ML: 5 INJECTION INTRAVENOUS at 04:03

## 2023-03-30 RX ADMIN — VASOPRESSIN: 20 INJECTION, SOLUTION INTRAVENOUS at 10:03

## 2023-03-30 NOTE — PLAN OF CARE
Per IVORY Bonner NP, pt will be ready for LTAC placement next week. Family is in agreement for [facility]. Referral sent via care port.

## 2023-03-30 NOTE — PRE ADMISSION SCREENING
"[facility]    Pre-Admission Patient Screening                    Pre-Screen type:  SNF    Facility Status: Accept     Referring Physician:  Dr. 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 Work up in ED noted patient afebrile, HDS. Labs notable for ALP 1544, Total bilirubin 2.9, AST 40, ALT 65, potassium 5.2, bicarb 9, BUN 23, Cr 1.5.  Staging CT C/A/P from 3/15 noting developing gastric outlet obstruction, secondary to a 3.2 cm long "apple-core" lesion in the 3rd portion of the duodenum, at the level of the ampulla; however there was no evidence of distant metastatic disease.  Of notes patient recently admitted to hospital from 3/2-3/10, being treated for enterococcus faecium bacteremia, planned for antibiotics through 3/18.  Plan was to have subsequent follow-up with Surgical " Oncology thereafter to discuss possible Whipple procedure.  Patient was admitted to hospital medicine services for management.     Surgical Oncology consulted given GOO, planning for Whipple on Mon 3/27.  In the interim patient will be optimized medially for surgery.  Continued on liquids, dietary protein supplementation added.  Also started on TPN in the interim. Continued to work with physical therapy.   On 03/27/2023, Patient underwent pancreatico duodenectomy portal/celiac lymphadenectomy, common bile duct exploration, falciform ligament pedicle flap, wedge resection of segment 4 liver lesion, cholecystectomy under GA.  Estimated blood loss 100 cc.     Today  Seen examined this morning.  Doing better and no nausea tolerating his clears.  Waiting to be seen by surgery for this morning.  He ambulated a little bit with therapy in the [place].  Pain is also improving.     ASSESSMENT/PLAN   Adenocarcinoma of the duodenum/ampulla  Gastric outlet obstruction-status post Whipple's March 27  Enterococcus faecium bacteremia-cleared March 4/treated     History of:  Adenocarcinoma of ampulla  Obstructive jaundice 2/2 duodenal mass-status post biliary drain        Surgical oncology following.  Drains, diet advancement per surgery discretion.  Continue TPN until further notice   Remove Godwin catheter if okay with surgery team   Continue PT and OT.  Encouraged to ambulate.  WBC 7.3 improved and resolved, sodium 129 also improved.  Will continue to monitor.     3/30/23  SURG ONC POD 3     PT UP IN CHAIR  STATES BEEN WALKING  FEELING STRONGER  NAUSEA RESOLVED  TOLERATING CLEARS     ABD SOFT  INCISION PREVENA WELL SECURED  DRAINS SEROSANG     VSS; NO FEVER  WBC 7000  HH 10/30  K NORMAL TODAY AT 4.2  NA IMPROVED   UO MORE THAN ADEQUATE     PLAN  FULLS OK  DRAIN AMYLASE IN AM  DC GODWIN TOMORROW    4/4/23 SURG ONC     NO ACUTE ISSUES  PT UP IN CHAIR  NO PAIN REPORTED  TOLERATING DIET, APPEARS TO BE IMPROVING     VSS; NO FEVER      ABD SOFT  INCISION HEALING WELL  NO LEAKAGE FROM DRAIN SITE     PATH BACK; REVIEWED; DISCUSSED WITH PT        PLAN  WORKING ON PLACEMENT   WILL CONSULT MED ONC WHILE HERE AS PT WILL BE GOING TO REHAB AFTER DISCHARGE AND WANT TO MAKE SURE FOLLOW UP WITH MED ONC IS NOT DELAYED    Assessment/Plan:      Stage IIIB Ampulla of Vater Adenocarcinoma, intestinal type  - s/p resection 3/27/23. Will plan for adjuvant 5FU/Capecitabine based therapy once he has recovered from surgery. Consideration for chemo/RT afterwards pending response  - Send pathology for NGS testing     I will arrange for him to follow up with me in the outpatient setting in 4 weeks to arrange further adjuvant therapy     Thank you for your consult.      Elizabeth K Lejeune, MD  Hematology/Oncology  [facility] - Oncology [facility] - Transitional Care Unit   Physician Certification of Need for Skilled Services        Admission Certification               Admit Date: 4/6/2023       Physician Certifying Statement of Skilled Service requiring Inpatient Services: Requires continued skilled nursing and/or rehab services       Patient Traveled outside of the [place] in the last 3 months? no     Patient discharged from this LTAC to SNF within the last 45 days? no    Patient discharged from this LTAC to Rehab within the last 27 days? no    Prior residence: home    Prior Post-Acute Services: N/A     Allergies: Review of patient's allergies indicates:  No Known Allergies    Has patient received the current influenza vaccine (Oct 1 - March 31)? Unknown     Has patient received PPD skin test prior to admit? No    Code Status: Partial Code    Orientation: Time, Place, Person, and Events    Speech: normal     Vital Signs:     View Graph      04/05 0700   04/06 0659 04/06 0700   04/06 0933  Most Recent     Temp (°F) 97.7-98.6 97.4  97.4 (36.3)  04/06 0753   Pulse 74-88 87  87  04/06 0753   Resp 16-19 18  18  04/06  0835   //74 Important  126/66  126/66  04/06 0753   MAP (mmHg) 87-97 86  86  04/06 0753   SpO2 (%) 97-98 100  100  04/06 0753     VITAL SIGNS (MOST RECENT):  Temp: 99.2 °F (37.3 °C) (03/30/23 0814)  Pulse: 88 (03/30/23 0814)  Resp: 18 (03/30/23 0328)  BP: 125/78 (03/30/23 0814)  SpO2: 97 % (03/30/23 0814) VITAL SIGNS (24 HOUR RANGE):  Temp:  [97.9 °F (36.6 °C)-99.2 °F (37.3 °C)] 99.2 °F (37.3 °C)  Pulse:  [67-95] 88  Resp:  [16-18] 18  SpO2:  [96 %-98 %] 97 %  BP: (123-144)/(74-86) 125/78       Date     Blood Pressure     Pulse     Respiratory Rate     O2 Saturation     Temperature         Bowel/Bladder: continent of bladder and continent of bowel    Dialysis: N/A    PICC Double Lumen 03/21/23 1109 left basilic (Active)   Line Necessity Review Medication caustic to vasculature 03/29/23 0900   Site Assessment No drainage;No redness;No swelling;No warmth 03/30/23 0800   Extremity Assessment Distal to IV No swelling;No warmth;No redness;No abnormal discoloration 03/30/23 0800   Line Securement Device Secured with sutureless device 03/30/23 0800   Dressing Type Central line dressing 03/30/23 0800   Dressing Status Clean;Dry;Intact 03/30/23 0800   Dressing Intervention Integrity maintained 03/30/23 0800   Date on Dressing 03/29/23 03/29/23 1016   Dressing Due to be Changed 04/05/23 03/29/23 1016   Distal Patency/Care flushed w/o difficulty;blood return present;intermittent infusion cap changed;normal saline locked 03/29/23 1016   Proximal 1 Patency/Care flushed w/o difficulty;blood return present;intermittent infusion cap changed;normal saline locked 03/29/23 1016   Number of days: 9            Closed/Suction Drain 03/27/23 1528 Left Abdomen Bulb 19 Fr. (Active)   Site Description Unable to view 03/30/23 0800   Dressing Type Gauze 03/30/23 0800   Dressing Status Clean;Dry;Intact 03/30/23 0800   Dressing Intervention Integrity maintained 03/30/23 0800   Drainage Sanguineous 03/30/23 0800   Status To bulb  "suction 23 08   Output (mL) 100 mL 23 1455   Number of days: 2            Biliary Tube 23 1130 RLQ (Active)   Site Description Unable to view 23 08   Dressing Status Old drainage 23 08   Drainage Color Green 23 08   Drain Status Clamped 23 08   Tube Output(mL)(Include Discarded Residual) 0 mL 23 1455   Number of days: 13            Urethral Catheter 23 1130 (Active)   Site Assessment Clean;Intact;Dry 23 08   Collection Container Standard drainage bag 23 08   Securement Method secured to top of thigh w/ adhesive device 23 08   Catheter Care Performed yes 23 08   Reason for Continuing Urinary Catheterization Post operative 23 08   CAUTI Prevention Bundle Securement Device in place with 1" slack;Intact seal between catheter & drainage tubing;Drainage bag/urimeter off the floor;Sheeting clip in use;No dependent loops or kinks;Drainage bag/urimeter not overfilled (<2/3 full);Drainage bag/urimeter below bladder 23 08   Output (mL) 600 mL 23 1455   Number of days: 13       CBGs/Accuchecks: Yes     Precautions: Fall and pod 3 abdominal surgical inc with wound and MADISYN drain     Restraints: No     Isolation Precautions: N/A       Facility-Administered Medications as of 3/30/2023   Medication Dose Route Frequency Provider Last Rate Last Admin    0.9%  NaCl infusion (for blood administration)   Intravenous Q24H PRN Abdirahman Brown MD        [] acetaminophen 1,000 mg/100 mL (10 mg/mL) injection 1,000 mg  1,000 mg Intravenous Q8H KELVIN Busch   Stopped at 23 0557    acetaminophen tablet 650 mg  650 mg Oral Q6H PRN JF MendezP-BC   650 mg at 23    [COMPLETED] ALPRAZolam tablet 0.25 mg  0.25 mg Oral Once JACKLYN MendezCNP-BC   0.25 mg at 23 0433    ALPRAZolam tablet 0.5 mg  0.5 mg Oral TID PRN Jaron Macdonald MD   0.5 mg at 23    alvimopan capsule 12 " mg  12 mg Oral BID KELVIN Busch   12 mg at 23 0850    [] amino acid 5% in 15% dextrose (CLINIMIX-E) solution (1L provides 50gm AA, 150gm CHO (510 kcal/L dextrose), Na 35, K 30, Mg 5, Ca 4.5, Acetate 80, Cl 39, Phos 15) (710 total kcal/L)   Intravenous Continuous Abdirahman Brown MD 75 mL/hr at 23 1940 New Bag at 23 194    [] amino acid 5% in 15% dextrose (CLINIMIX-E) solution (1L provides 50gm AA, 150gm CHO (510 kcal/L dextrose), Na 35, K 30, Mg 5, Ca 4.5, Acetate 80, Cl 39, Phos 15) (710 total kcal/L)   Intravenous Continuous Peter Garrett MD   Stopped at 23    [] amino acid 5% in 15% dextrose (CLINIMIX-E) solution (1L provides 50gm AA, 150gm CHO (510 kcal/L dextrose), Na 35, K 30, Mg 5, Ca 4.5, Acetate 80, Cl 39, Phos 15) (710 total kcal/L)   Intravenous Continuous Peter Garrett MD   Stopped at 23 2133    [] amino acid 5% in 15% dextrose (CLINIMIX-E) solution (1L provides 50gm AA, 150gm CHO (510 kcal/L dextrose), Na 35, K 30, Mg 5, Ca 4.5, Acetate 80, Cl 39, Phos 15) (710 total kcal/L)   Intravenous Continuous Ashwini Mcmanus MD   Stopped at 23 2128    [] amino acid 5% in 15% dextrose (CLINIMIX-E) solution (1L provides 50gm AA, 150gm CHO (510 kcal/L dextrose), Na 35, K 30, Mg 5, Ca 4.5, Acetate 80, Cl 39, Phos 15) (710 total kcal/L)   Intravenous Continuous Ashwini Mcmanus MD   Stopped at 23 2322    dextrose 10% bolus 125 mL 125 mL  12.5 g Intravenous PRN KELVIN Busch        dextrose 10% bolus 125 mL 125 mL  12.5 g Intravenous PRN Ashwini Mcmanus MD        dextrose 10% bolus 250 mL 250 mL  25 g Intravenous PRN KELVIN Busch        dextrose 10% bolus 250 mL 250 mL  25 g Intravenous PRN Ashwini Mcmanus MD   Stopped at 23 1344    diphenhydrAMINE injection 12.5 mg  12.5 mg Intravenous Q4H PRN KELVIN Busch        enoxaparin injection 30 mg  30 mg Subcutaneous Q12H KELVIN Busch   30 mg at 23  0850    [COMPLETED] fat emulsion 20% infusion 250 mL  250 mL Intravenous Twice Weekly Abdirahman Brown MD   Stopped at 03/22/23 1025    [COMPLETED] fat emulsion 20% infusion 250 mL  250 mL Intravenous Every Tues, Fri Abdirahman Brown MD   Stopped at 03/25/23 0928    [COMPLETED] fat emulsion 20% infusion 250 mL  250 mL Intravenous Every Mon, Wed, Fri Abdirahman Brown MD   Stopped at 03/30/23 0936    [START ON 3/31/2023] fat emulsion 20% infusion 250 mL  250 mL Intravenous Once Abdirahman Brown MD        finasteride tablet 5 mg  5 mg Oral Daily Jaron Macdonald MD   5 mg at 03/29/23 2136    glucagon (human recombinant) injection 1 mg  1 mg Intramuscular PRN KELVIN Busch        glucose chewable tablet 16 g  16 g Oral PRN KELVIN Busch        glucose chewable tablet 24 g  24 g Oral PRN KELVIN Busch        HYDROmorphone PCA syringe 6 mg/30 mL (0.2 mg/mL) NS   Intravenous Continuous KELVIN Busch   New Syringe/Bag at 03/29/23 1100    [COMPLETED] hydrOXYzine injection 50 mg  50 mg Intramuscular ED 1 Time Aleksandra Valdez MD   50 mg at 03/17/23 0211    hydrOXYzine injection 50 mg  50 mg Intramuscular Nightly PRN Peter Garrett MD   50 mg at 03/19/23 2110    insulin aspart U-100 injection 0-5 Units  0-5 Units Subcutaneous QID (AC + HS) PRN KELVIN Busch        [COMPLETED] insulin regular injection 5 Units 0.05 mL  5 Units Intravenous Once Ashwini Mcmanus MD   5 Units at 03/28/23 1350    [COMPLETED] lactated ringers bolus 1,000 mL  1,000 mL Intravenous ED 1 Time Aleksandra Valdez MD   Stopped at 03/17/23 0149    magnesium oxide tablet 400 mg  400 mg Oral BID Peter Garrett MD   400 mg at 03/30/23 0850    [COMPLETED] magnesium sulfate 2g in water 50mL IVPB (premix)  2 g Intravenous Once Peter Garrett MD   Stopped at 03/19/23 1330    melatonin tablet 6 mg  6 mg Oral Nightly PRN ISAI Mendez   6 mg at 03/26/23 2059    metoclopramide HCl injection 10 mg  10 mg Intravenous TID Maria Luisa Bonner, KELVIN   10  mg at 23 1436    [COMPLETED] midazolam (VERSED) 1 mg/mL injection        2 mg at 23 0842    [] mupirocin 2 % ointment   Nasal BID Ashwini Mcmanus MD   Given at 23 2044    naloxone 0.4 mg/mL injection 0.02 mg  0.02 mg Intravenous PRN KELVIN Busch        [COMPLETED] ondansetron injection 4 mg  4 mg Intravenous ED 1 Time Aleksandra Valdez MD        ondansetron injection 4 mg  4 mg Intravenous Q4H PRN ISAI Mendez   4 mg at 23 1939    ondansetron injection 8 mg  8 mg Intravenous Q8H PRN KELVIN Busch   8 mg at 23 0856    pantoprazole EC tablet 40 mg  40 mg Oral Daily Jaron Macdonald MD   40 mg at 23 0850    [COMPLETED] polyethylene glycol packet 17 g  17 g Oral Once Ashwini Mcmanus MD   17 g at 23 0538    [COMPLETED] potassium bicarbonate disintegrating tablet 50 mEq  50 mEq Oral Once Peter Garrett MD   50 mEq at 23 1202    promethazine tablet 25 mg  25 mg Oral Q6H PRN KELVIN Busch   25 mg at 23 0139    simethicone chewable tablet 80 mg  1 tablet Oral QID PRN ISAI Mendez        sodium chloride 0.9% flush 10 mL  10 mL Intravenous Q6H Abdirahman Brown MD   10 mL at 23 0500    And    sodium chloride 0.9% flush 10 mL  10 mL Intravenous PRN Abdirahman Brown MD        tamsulosin 24 hr capsule 0.4 mg  0.4 mg Oral Daily Jaron Macdonald MD   0.4 mg at 23 0850    TPN ADULT CENTRAL LINE CUSTOM   Intravenous Continuous Abdirahman Brown MD 75 mL/hr at 23 2209 New Bag at 23 2209    TPN ADULT CENTRAL LINE CUSTOM   Intravenous Continuous Abdirahman Brown MD        zinc oxide-cod liver oil 40 % paste   Topical (Top) BID Lee Montaño MD   Given at 23 0850     Outpatient Medications as of 3/30/2023   Medication Sig Dispense Refill    [] ampicillin (PRINCIPEN) 500 MG capsule Take 1 capsule (500 mg total) by mouth 2 (two) times a day. for 8 days 16 capsule 0    ergocalciferol  "(ERGOCALCIFEROL) 50,000 unit Cap Take 1 capsule (50,000 Units total) by mouth every 7 days. for 12 doses 4 capsule 2    [] ondansetron (ZOFRAN-ODT) 4 MG TbDL Take 1 tablet (4 mg total) by mouth every 6 (six) hours as needed (nausea). 28 tablet 0    pantoprazole (PROTONIX) 40 MG tablet Take 1 tablet (40 mg total) by mouth once daily. 30 tablet 0    tamsulosin (FLOMAX) 0.4 mg Cap Take by mouth once daily.          Cardiovascular:    Cardiovascular Review: Within definable limits (WDL)    Telemetry: Yes    Rhythm:  SR    AICD: No      Respiratory:    Oxygen: N/A    CPT/Frequency: N/A    Incentive Spirometer/Frequency: N/A    CPAP/Settings: N/A    BiPAP/Settings: N/A    Oxygen Saturation: N/A    Suction Frequency: N/A      Vent Settings:   Mode:   Rate:   TV:   FIO2:   PEEP:   PS:   iTime:    Trial/HS Settings:   Mode:   Rate:   TV:   FIO2:   PEEP:   PS:       Nutrition:      Ht Readings from Last 3 Encounters:   23 5' 9.02" (1.753 m)   23 5' 9" (1.753 m)   02/15/23 5' 9" (1.753 m)     Wt Readings from Last 1 Encounters:   23 1244 65 kg (143 lb 4.8 oz)   23 1135 65 kg (143 lb 4.8 oz)   23 0452 73.5 kg (162 lb)       Feeding Status:   Current Diet: CLEAR LIQUIDS   Supplements: BOOST BREEZE , TPN INFUSION    Tube Feeding: N/A   Flushes: N/A  Nutrition Recommendation/Prescription      Advance diet as tolerated per MD. Goal Diet: Low Residue.  Continue Boost Breeze while on Clear liquid diet. Will provide 250 lyndsey, 9 gm pro per serving. Once diet advanced, change supplement to Chocolate Boost plus per pt request.  TPN recs until able to advance diet and pt able to have good tolerance of solids:  AA15% 600 mL (90 gm), D70 494 mL (346 gm), @ 75 mL/hr with IVPB IL 20% 250 mL 3x/weel. This will provide:  1750 kcal (100% est needs)  90 gm protein (100% est needs)  1800 mL fluid (103% est needs)  346 gm Dextrose   4. Electrolytes per pharmacy       Integumentary:    Integumentary: Surgical " Incisions              Negative Pressure Wound Therapy  03/27/23 1528 (Active)   03/27/23 1528   Side:    Orientation:    Location: Abdomen   Additional Comments:    Removal Indication and Assessment:    Location:    SDO Location:    NPWT Type Incision Management 03/30/23 0800   Therapy Setting NPWT Continuous therapy 03/30/23 0800   Therapy Interventions NPWT Seal intact 03/30/23 0800   General Output (mL) 0 03/30/23 1455   Number of days: 3            Altered Skin Integrity 03/17/23 1130 Coccyx #1 Other (comment) Intact skin with non-blanchable redness of localized area (Active)   03/17/23 1130   Altered Skin Integrity Present on Admission - Did Patient arrive to the hospital with altered skin?: yes   Side:    Orientation:    Location: Coccyx   Wound Number: #1   Is this injury device related?: No   Primary Wound Type: Other   Description of Altered Skin Integrity: Intact skin with non-blanchable redness of localized area   Ankle-Brachial Index:    Pulses:    Removal Indication and Assessment:    Wound Outcome:    (Retired) Wound Length (cm):    (Retired) Wound Width (cm):    (Retired) Depth (cm):    Wound Description (Comments):    Removal Indications:    Wound Image   03/30/23 1157   Description of Altered Skin Integrity Partial thickness tissue loss. Shallow open ulcer with a red or pink wound bed, without slough. Intact or Open/Ruptured Serum-filled blister. 03/30/23 0800   Dressing Appearance Dry;Intact;Clean 03/30/23 1157   Drainage Amount None 03/30/23 1157   Appearance [place] 03/30/23 1157   Periwound Area Intact 03/30/23 1157   Wound Edges Defined 03/22/23 2000   Care Cleansed with:;Sterile normal saline 03/30/23 1157   Dressing Applied 03/30/23 1157   Number of days: 13            Incision/Site 03/27/23 1413 Abdomen (Active)   03/27/23 1413   Present Prior to Hospital Arrival?:    Side:    Location: Abdomen   Orientation:    Incision Type:    Closure Method:    Additional Comments:    Removal Indication  and Assessment:    Wound Outcome:    Removal Indications:    Incision WDL WDL 03/30/23 0800   Dressing Appearance Dry;Intact;Clean 03/30/23 0800   Drainage Amount None 03/30/23 0800   Appearance Dressing in place, unable to visualize 03/30/23 0800   Periwound Area Intact;Dry 03/30/23 0800   Dressing Foam;Transparent film 03/30/23 0800   Wound Pouch Intact 03/27/23 1528   Number of days: 3         Musculoskeletal:    Transfer assist: Minimal Assistance    Weight Bearing Status: As Tolerated    Comments: N/A    ADL Assist: Minimal Assistance and Moderate Assistance    Special Equipment: N/A    Radiology:    Radiology (Last 168 hours)               03/30 1132 CARDIAC MONITORING STRIPS     03/30 0801 CARDIAC MONITORING STRIPS     03/30 0359 CARDIAC MONITORING STRIPS     03/30 0359 CARDIAC MONITORING STRIPS     03/30 0359 CARDIAC MONITORING STRIPS     03/29 1549 CARDIAC MONITORING STRIPS     03/29 1241 CARDIAC MONITORING STRIPS     03/29 0725 CARDIAC MONITORING STRIPS     03/29 0251 CARDIAC MONITORING STRIPS     03/28 2307 CARDIAC MONITORING STRIPS     03/28 1854 CARDIAC MONITORING STRIPS     03/27 1509 X-Ray Abdomen AP 1 View                X-Ray Abdomen AP 1 View  Narrative: EXAMINATION:  XR ABDOMEN AP 1 VIEW    CLINICAL HISTORY:  incorrect count;    COMPARISON:  None    FINDINGS:  Two frontal images of the abdomen.  There is a right upper quadrant drain.  There is a Paul catheter.  Additional catheter overlies the abdomen.  There is a surgical clip in the expected area of the gallbladder fossa.  There are skin staples.  No curvilinear foreign body suspicious for suture needle is seen.  Impression: As above.    Electronically signed by: Quincy Roberts  Date:    03/27/2023  Time:    15:23      Lab/Cultures:    Blood Urea Nitrogen   Date Value Ref Range Status   03/30/2023 26.6 (H) 8.4 - 25.7 mg/dL Final   03/29/2023 28.1 (H) 8.4 - 25.7 mg/dL Final     Creatinine   Date Value Ref Range Status   03/30/2023 0.79 0.73 - 1.18  mg/dL Final   03/29/2023 0.83 0.73 - 1.18 mg/dL Final     WBC   Date Value Ref Range Status   03/30/2023 7.3 4.5 - 11.5 x10(3)/mcL Final   03/29/2023 12.8 (H) 4.5 - 11.5 x10(3)/mcL Final      CULTURE, BLOOD ([facility])   Date Value Ref Range Status   03/04/2023 No Growth at 5 days  Final     Urine Culture   Date Value Ref Range Status   03/04/2023 No Growth  Final     No results for input(s): PH, PCO2, PO2, HCO3, POCSATURATED, BE in the last 72 hours.

## 2023-03-30 NOTE — PROGRESS NOTES
"[facility]  HOSPITAL MEDICINE  PROGRESS NOTE        CHIEF COMPLAINT   Hospital follow up    HOSPITAL COURSE   82 y.o. 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 Work up in ED noted patient afebrile, HDS. Labs notable for ALP 1544, Total bilirubin 2.9, AST 40, ALT 65, potassium 5.2, bicarb 9, BUN 23, Cr 1.5.  Staging CT C/A/P from 3/15 noting developing gastric outlet obstruction, secondary to a 3.2 cm long "apple-core" lesion in the 3rd portion of the duodenum, at the level of the ampulla; however there was no evidence of distant metastatic disease.  Of notes patient recently admitted to hospital from 3/2-3/10, being treated for enterococcus faecium bacteremia, planned for antibiotics through 3/18.  Plan was to have subsequent follow-up with Surgical Oncology thereafter to discuss possible Whipple procedure.  Patient was admitted to hospital medicine services for management.     Surgical Oncology consulted given GOO, planning for Whipple on Mon 3/27.  In the interim patient will be optimized medially for surgery.  Continued on liquids, dietary protein supplementation added.  Also started on TPN in the interim. Continued to work with physical therapy.   On 03/27/2023, Patient underwent pancreatico duodenectomy portal/celiac lymphadenectomy, common bile duct exploration, falciform ligament pedicle flap, wedge resection of segment 4 liver lesion, cholecystectomy under GA.  Estimated blood loss 100 cc.    Today  Seen examined this morning.  Doing better and no nausea tolerating his clears.  Waiting to be seen by surgery for this morning.  He ambulated a little bit with therapy in the [facility]ana.  Pain is also " improving.        OBJECTIVE/PHYSICAL EXAM     VITAL SIGNS (MOST RECENT):  Temp: 99.2 °F (37.3 °C) (03/30/23 0814)  Pulse: 88 (03/30/23 0814)  Resp: 18 (03/30/23 0328)  BP: 125/78 (03/30/23 0814)  SpO2: 97 % (03/30/23 0814) VITAL SIGNS (24 HOUR RANGE):  Temp:  [97.9 °F (36.6 °C)-99.2 °F (37.3 °C)] 99.2 °F (37.3 °C)  Pulse:  [67-95] 88  Resp:  [16-18] 18  SpO2:  [96 %-98 %] 97 %  BP: (123-144)/(74-86) 125/78   GENERAL: In no acute distress, afebrile  HEENT:  CHEST: Clear to auscultation bilaterally  HEART: S1, S2, no appreciable murmur  ABDOMEN: Soft, upper abdominal wound VAC, left lower quadrant drain, expected tenderness postop  MSK: Warm, no lower extremity edema, no clubbing or cyanosis  NEUROLOGIC: Alert and oriented x4, moving all extremities with good strength   INTEGUMENTARY:  PSYCHIATRY:        ASSESSMENT/PLAN   Adenocarcinoma of the duodenum/ampulla  Gastric outlet obstruction-status post Whipple's March 27  Enterococcus faecium bacteremia-cleared March 4/treated    History of:  Adenocarcinoma of ampulla  Obstructive jaundice 2/2 duodenal mass-status post biliary drain      Surgical oncology following.  Drains, diet advancement per surgery discretion.  Continue TPN until further notice   Remove Paul catheter if okay with surgery team   Continue PT and OT.  Encouraged to ambulate.  WBC 7.3 improved and resolved, sodium 129 also improved.  Will continue to monitor.    DVT prophylaxis:  Lovenox 30 b.i.d.    Anticipated discharge and disposition:   __________________________________________________________________________    LABS/MICRO/MEDS/DIAGNOSTICS       LABS  Recent Labs     03/30/23  0523   *   K 4.2   CHLORIDE 100   CO2 21*   BUN 26.6*   CREATININE 0.79   GLUCOSE 144*   CALCIUM 9.1   ALKPHOS 612*   AST 36*   ALT 53   ALBUMIN 2.0*     Recent Labs     03/30/23  0523   WBC 7.3   RBC 3.45*   HCT 30.8*   MCV 89.3          MICROBIOLOGY  Microbiology Results (last 7 days)       ** No results found  for the last 168 hours. **               MEDICATIONS   alvimopan  12 mg Oral BID    enoxaparin  30 mg Subcutaneous Q12H    fat emulsion 20%  250 mL Intravenous Every Mon, Wed, Fri    finasteride  5 mg Oral Daily    magnesium oxide  400 mg Oral BID    metoclopramide HCl  10 mg Intravenous TID    pantoprazole  40 mg Oral Daily    sodium chloride 0.9%  10 mL Intravenous Q6H    tamsulosin  0.4 mg Oral Daily    zinc oxide-cod liver oil   Topical (Top) BID         INFUSIONS   hydromorphone in 0.9 % NaCl 6 mg/30 ml      TPN ADULT CENTRAL LINE CUSTOM 75 mL/hr at 03/29/23 2209          DIAGNOSTIC TESTS  X-Ray Abdomen AP 1 View   Final Result      As above.         Electronically signed by: Quincy Roberts   Date:    03/27/2023   Time:    15:23      X-Ray Chest 1 View   Final Result      Optimal placement of the PICC line.         Electronically signed by: Shant Sequeira   Date:    03/21/2023   Time:    11:07           EF   Date Value Ref Range Status   03/04/2023 60 % Final              Case related differential diagnoses have been reviewed; assessment and plan has been documented. I have personally reviewed the labs and test results that are currently available; I have reviewed the patients medication list. I have reviewed the consulting providers recommendations. I have reviewed or attempted to review medical records based upon their availability.  All of the patient's and/or family's questions have been addressed and answered to the best of my ability.  I will continue to monitor closely and make adjustments to medical management as needed.  This document was created using M*Modal Fluency Direct.  Transcription errors may have been made.  Please contact me if any questions may rise regarding documentation to clarify transcription.        Ja Eduardo MD   03/30/2023   Internal Medicine

## 2023-03-30 NOTE — PROGRESS NOTES
SURG ONC POD 3    PT UP IN CHAIR  STATES BEEN WALKING  FEELING STRONGER  NAUSEA RESOLVED  TOLERATING CLEARS    ABD SOFT  INCISION PREVENA WELL SECURED  DRAINS SEROSANG    VSS; NO FEVER  WBC 7000  HH 10/30  K NORMAL TODAY AT 4.2  NA IMPROVED   UO MORE THAN ADEQUATE    PLAN  FULLS OK  DRAIN AMYLASE IN AM  JOSEFINA GODWIN TOMORROW

## 2023-03-30 NOTE — PT/OT/SLP PROGRESS
Physical Therapy         Treatment        Quincy Triplett   MRN: [id number]     PT Received On: 03/30/23  PT Start Time: 0856     PT Stop Time: 0913    PT Total Time (min): 17 min       Billable Minutes:  Gait [id number] TherAct 3  Total Minutes: 17    Treatment Type: Treatment  PT/PTA: PTA     Number of PTA visits since last PT visit: 2       General Precautions: Standard, fall  Orthopedic Precautions: Orthopedic Precautions : N/A   Braces:           Subjective:  Communicated with NSG prior to session.    Pain/Comfort  Pain Rating 1: 0/10    Objective:  Patient found in bed with HOB elevated, with Patient found with: PICC line, MADISYN drain, campos catheter, hemovac    Functional Mobility:  Bed Mobility:   Supine to sit: Moderate Assistance   Sit to supine: Activity did not occur   Rolling: Activity did not occur   Scooting: Minimal Assistance    Balance:   Static Sit: GOOD+: Takes MAXIMAL challenges from all directions.    Dynamic Sit:  FAIR+: Maintains balance through MINIMAL excursions of active trunk motion  Static Stand: FAIR: Maintains without assist but unable to take challenges  Dynamic stand: POOR+: Needs MIN (minimal ) assist during gait    Transfer Training:  Sit to stand:Minimal Assistance with No Assistive Device . 3 trials from bedside chair. Pt required vcs for proper hand placement.     Gait Training:  Pt amb 40ft HHA/Judd. Pt very unsteady and required vcs to slow tarik to ensure safety. Pt with step-through gait pattern.     Activity Tolerance:  Patient limited by fatigue and generalized weakness    Patient left up in chair with all lines intact and call button in reach.    Assessment:  Quincy Triplett is a 82 y.o. male with a medical diagnosis of adenocarcinoma of duodenum, gastric outlet obstruction s/t tumor, obstructive jaundice s/p percutaneous drain, severe metabolic acidosis.  pt with hx of R arm amputation below elbow. He presents with the following impairments/functional limitations:  weakness, impaired endurance, impaired self care skills, impaired functional mobility, gait instability, impaired balance.    Pt progressing well with PT. Pt would benefit from further rehab therapy services to increase overall independence level and assist in getting pt closer to PLOF.      Rehab potential is excellent.    Activity tolerance: Excellent    Discharge recommendations: Discharge Facility/Level of Care Needs: rehabilitation facility, home with home health     Equipment recommendations: Equipment Needed After Discharge: none     GOALS:   Multidisciplinary Problems       Physical Therapy Goals          Problem: Physical Therapy    Goal Priority Disciplines Outcome Goal Variances Interventions   Physical Therapy Goal     PT, PT/OT Ongoing, Progressing     Description: Goals to be met by: 23     Patient will increase functional independence with mobility by performin. Supine to sit with Belle  2. Sit to stand transfer with Belle  3. Bed to chair transfer with Belle using No Assistive Device  4. Gait  x 500 feet with Belle using No Assistive Device.                          PLAN:    Patient to be seen 5 x/week  to address the above listed problems via gait training, therapeutic activities, therapeutic exercises  Plan of Care expires: 23  Plan of Care reviewed with: patient, family         3/30/2023

## 2023-03-30 NOTE — PROGRESS NOTES
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, unspecified extremity     Coronary artery disease     Dyslipidemia     Hypertension        Social History     Socioeconomic History    Marital status:    Tobacco Use    Smoking status: Never    Smokeless tobacco: Never   Substance and Sexual Activity    Alcohol use: Never    Drug use: Never     Social Determinants of Health     Food Insecurity: Unknown    Worried About Running Out of Food in the Last Year: Never true   Transportation Needs: Unknown    Lack of Transportation (Medical): No   Housing Stability: Unknown    Unable to Pay for Housing in the Last Year: No       Precautions:     Allergies as of 03/16/2023    (No Known Allergies)       WOC Assessment Details/Treatment        03/30/23 1157        Altered Skin Integrity 03/17/23 1130 Coccyx #1 Other (comment) Intact skin with non-blanchable redness of localized area   Date First Assessed/Time First Assessed: 03/17/23 1130   Altered Skin Integrity Present on Admission - Did Patient arrive to the hospital with altered skin?: yes  Location: Coccyx  Wound Number: #1  Is this injury device related?: No  Primary Wound Ty...   Wound Image    Dressing Appearance Dry;Intact;Clean   Drainage Amount None   Appearance [place]   Periwound Area Intact   Care Cleansed with:;Sterile normal saline   Dressing Applied  (desitin)     WOCN Follow up. Continue with current treatment recommendations. Sacrum: Cleanse with NS. Apply desitin. Cover with Abd pad. Secure with medipore tape. Change BID and PRN. Nursing to continue with preventative measures turning every two hours, wedge, no adult briefs while in bed and floating heels. On a BETSY mattress. Will follow up.      03/30/2023

## 2023-03-30 NOTE — PT/OT/SLP PROGRESS
Occupational Therapy  Treatment    Quincy Tirplett   MRN: [id number]   Admitting Diagnosis: <principal problem not specified>    OT Date of Treatment: 03/30/23   OT Start Time: 1132  OT Stop Time: 1142  OT Total Time (min): 10 min     Billable Minutes:  Therapeutic Activity 10  Total Minutes: 10     OT/IZAIAH: IZAIAH     Number of IZAIAH visits since last OT visit: 1    General Precautions: Standard, fall (RUE amputee)  Orthopedic Precautions:    Braces:      Spiritual, Cultural Beliefs, Baptist Practices, Values that Affect Care: no    Subjective:  CNA reporting pt ready to get BTB.            Objective:  Patient found with: PICC line, MADISYN drain, campos catheter, hemovac    Functional Mobility:  Bed Mobility:   Supine to sit: Activity did not occur   Sit to supine: Minimal Assistance   Rolling: Activity did not occur   Scooting: Activity did not occur    Transfer Training:  HHA with min A stand step t/f. Cues for safety with lines       Balance:   Static Sit: FAIR+: Able to take MINIMAL challenges from all directions  Dynamic Sit:  FAIR+: Maintains balance through MINIMAL excursions of active trunk motion  Static Stand: FAIR+: Takes MINIMAL challenges from all directions  Dynamic stand: POOR+: Needs MIN (minimal ) assist during gait    Additional Treatment:  Attempted to perform ADLs, however, pt requesting to return to bed 2/2 pain.     Patient left HOB elevated with all lines intact and call button in reach    ASSESSMENT:  Quincy Triplett is a 82 y.o. male with a medical diagnosis of intractable vomiting and chest pain. Pt progressing well towards goals. Pt would benefit from IPR to increase strength and endurance necessary to return to PLOF and decrease fall risk.     Rehab potential is good    Activity tolerance: Good    Discharge recommendations: rehabilitation facility, other (see comments), home with home health (if home, requires 24h care)     Equipment recommendations:       GOALS:   Multidisciplinary Problems        Occupational Therapy Goals          Problem: Occupational Therapy    Goal Priority Disciplines Outcome Interventions   Occupational Therapy Goal     OT, PT/OT Ongoing, Progressing    Description: Goals to be met by: 4/12/23     Patient will increase functional independence with ADLs by performing:    LE Dressing with Modified [place].  Grooming while standing with Modified [place].  Toileting from toilet with Modified [place] for hygiene and clothing management.   Toilet transfer to toilet with Modified [place].                         Plan:  Patient to be seen 3 x/week, 5 x/week to address the above listed problems via self-care/home management, therapeutic activities, therapeutic exercises  Plan of Care expires: 04/12/23  Plan of Care reviewed with: patient         03/30/2023

## 2023-03-31 LAB
ALBUMIN SERPL-MCNC: 1.8 G/DL (ref 3.4–4.8)
ALBUMIN/GLOB SERPL: 0.7 RATIO (ref 1.1–2)
ALP SERPL-CCNC: 760 UNIT/L (ref 40–150)
ALT SERPL-CCNC: 48 UNIT/L (ref 0–55)
APTT PPP: 33.6 SECONDS (ref 23.2–33.7)
AST SERPL-CCNC: 38 UNIT/L (ref 5–34)
BASOPHILS # BLD AUTO: 0.04 X10(3)/MCL (ref 0–0.2)
BASOPHILS NFR BLD AUTO: 0.6 %
BILIRUBIN DIRECT+TOT PNL SERPL-MCNC: 2.8 MG/DL
BUN SERPL-MCNC: 23.6 MG/DL (ref 8.4–25.7)
CALCIUM SERPL-MCNC: 8.1 MG/DL (ref 8.8–10)
CHLORIDE SERPL-SCNC: 101 MMOL/L (ref 98–107)
CO2 SERPL-SCNC: 23 MMOL/L (ref 23–31)
CREAT SERPL-MCNC: 0.63 MG/DL (ref 0.73–1.18)
EOSINOPHIL # BLD AUTO: 0.06 X10(3)/MCL (ref 0–0.9)
EOSINOPHIL NFR BLD AUTO: 0.9 %
ERYTHROCYTE [DISTWIDTH] IN BLOOD BY AUTOMATED COUNT: 15.7 % (ref 11.5–17)
GFR SERPLBLD CREATININE-BSD FMLA CKD-EPI: >60 MLS/MIN/1.73/M2
GLOBULIN SER-MCNC: 2.5 GM/DL (ref 2.4–3.5)
GLUCOSE SERPL-MCNC: 139 MG/DL (ref 82–115)
HCT VFR BLD AUTO: 27.3 % (ref 42–52)
HGB BLD-MCNC: 9 G/DL (ref 14–18)
IMM GRANULOCYTES # BLD AUTO: 0.07 X10(3)/MCL (ref 0–0.04)
IMM GRANULOCYTES NFR BLD AUTO: 1.1 %
INR BLD: 1.09 (ref 0–1.3)
LYMPHOCYTES # BLD AUTO: 1.13 X10(3)/MCL (ref 0.6–4.6)
LYMPHOCYTES NFR BLD AUTO: 17.7 %
MAGNESIUM SERPL-MCNC: 1.7 MG/DL (ref 1.6–2.6)
MCH RBC QN AUTO: 28.8 PG (ref 27–31)
MCHC RBC AUTO-ENTMCNC: 33 G/DL (ref 33–36)
MCV RBC AUTO: 87.2 FL (ref 80–94)
MONOCYTES # BLD AUTO: 0.66 X10(3)/MCL (ref 0.1–1.3)
MONOCYTES NFR BLD AUTO: 10.3 %
NEUTROPHILS # BLD AUTO: 4.43 X10(3)/MCL (ref 2.1–9.2)
NEUTROPHILS NFR BLD AUTO: 69.4 %
NRBC BLD AUTO-RTO: 0 %
PHOSPHATE SERPL-MCNC: 1.6 MG/DL (ref 2.3–4.7)
PLATELET # BLD AUTO: 295 X10(3)/MCL (ref 130–400)
PMV BLD AUTO: 9.5 FL (ref 7.4–10.4)
POCT GLUCOSE: 138 MG/DL (ref 70–110)
POCT GLUCOSE: 141 MG/DL (ref 70–110)
POCT GLUCOSE: 151 MG/DL (ref 70–110)
POCT GLUCOSE: 157 MG/DL (ref 70–110)
POTASSIUM SERPL-SCNC: 3.8 MMOL/L (ref 3.5–5.1)
PROT SERPL-MCNC: 4.3 GM/DL (ref 5.8–7.6)
PROTHROMBIN TIME: 14 SECONDS (ref 12.5–14.5)
RBC # BLD AUTO: 3.13 X10(6)/MCL (ref 4.7–6.1)
SODIUM SERPL-SCNC: 131 MMOL/L (ref 136–145)
WBC # SPEC AUTO: 6.4 X10(3)/MCL (ref 4.5–11.5)

## 2023-03-31 PROCEDURE — B4185 PARENTERAL SOL 10 GM LIPIDS: HCPCS | Performed by: SURGERY

## 2023-03-31 PROCEDURE — A4216 STERILE WATER/SALINE, 10 ML: HCPCS | Performed by: SURGERY

## 2023-03-31 PROCEDURE — 97110 THERAPEUTIC EXERCISES: CPT | Mod: CQ

## 2023-03-31 PROCEDURE — 25000003 PHARM REV CODE 250: Performed by: INTERNAL MEDICINE

## 2023-03-31 PROCEDURE — 97535 SELF CARE MNGMENT TRAINING: CPT | Mod: CO

## 2023-03-31 PROCEDURE — 25000003 PHARM REV CODE 250: Performed by: SURGERY

## 2023-03-31 PROCEDURE — A4217 STERILE WATER/SALINE, 500 ML: HCPCS | Performed by: INTERNAL MEDICINE

## 2023-03-31 PROCEDURE — 51798 US URINE CAPACITY MEASURE: CPT

## 2023-03-31 PROCEDURE — 85025 COMPLETE CBC W/AUTO DIFF WBC: CPT | Performed by: NURSE PRACTITIONER

## 2023-03-31 PROCEDURE — 85730 THROMBOPLASTIN TIME PARTIAL: CPT | Performed by: NURSE PRACTITIONER

## 2023-03-31 PROCEDURE — 63600175 PHARM REV CODE 636 W HCPCS: Performed by: NURSE PRACTITIONER

## 2023-03-31 PROCEDURE — 25000003 PHARM REV CODE 250: Performed by: NURSE PRACTITIONER

## 2023-03-31 PROCEDURE — 97116 GAIT TRAINING THERAPY: CPT | Mod: CQ

## 2023-03-31 PROCEDURE — 85610 PROTHROMBIN TIME: CPT | Performed by: NURSE PRACTITIONER

## 2023-03-31 PROCEDURE — 63600175 PHARM REV CODE 636 W HCPCS: Performed by: INTERNAL MEDICINE

## 2023-03-31 PROCEDURE — 11000001 HC ACUTE MED/SURG PRIVATE ROOM

## 2023-03-31 PROCEDURE — 97530 THERAPEUTIC ACTIVITIES: CPT | Mod: CO

## 2023-03-31 PROCEDURE — 84100 ASSAY OF PHOSPHORUS: CPT | Performed by: SURGERY

## 2023-03-31 PROCEDURE — 80053 COMPREHEN METABOLIC PANEL: CPT | Performed by: NURSE PRACTITIONER

## 2023-03-31 PROCEDURE — 83735 ASSAY OF MAGNESIUM: CPT | Performed by: NURSE PRACTITIONER

## 2023-03-31 RX ORDER — HYDROCODONE BITARTRATE AND ACETAMINOPHEN 7.5; 325 MG/1; MG/1
1 TABLET ORAL EVERY 6 HOURS PRN
Status: DISCONTINUED | OUTPATIENT
Start: 2023-03-31 | End: 2023-04-06 | Stop reason: HOSPADM

## 2023-03-31 RX ORDER — SODIUM,POTASSIUM PHOSPHATES 280-250MG
1 POWDER IN PACKET (EA) ORAL
Status: COMPLETED | OUTPATIENT
Start: 2023-03-31 | End: 2023-03-31

## 2023-03-31 RX ADMIN — METOCLOPRAMIDE 10 MG: 5 INJECTION, SOLUTION INTRAMUSCULAR; INTRAVENOUS at 09:03

## 2023-03-31 RX ADMIN — Medication 400 MG: at 09:03

## 2023-03-31 RX ADMIN — ENOXAPARIN SODIUM 30 MG: 30 INJECTION SUBCUTANEOUS at 09:03

## 2023-03-31 RX ADMIN — ALPRAZOLAM 0.5 MG: 0.5 TABLET ORAL at 11:03

## 2023-03-31 RX ADMIN — VASOPRESSIN: 20 INJECTION, SOLUTION INTRAVENOUS at 11:03

## 2023-03-31 RX ADMIN — ALVIMOPAN 12 MG: 12 CAPSULE ORAL at 09:03

## 2023-03-31 RX ADMIN — POTASSIUM & SODIUM PHOSPHATES POWDER PACK 280-160-250 MG 1 PACKET: 280-160-250 PACK at 12:03

## 2023-03-31 RX ADMIN — I.V. FAT EMULSION 250 ML: 20 EMULSION INTRAVENOUS at 09:03

## 2023-03-31 RX ADMIN — HYDROCODONE BITARTRATE AND ACETAMINOPHEN 1 TABLET: 7.5; 325 TABLET ORAL at 04:03

## 2023-03-31 RX ADMIN — FINASTERIDE 5 MG: 5 TABLET, FILM COATED ORAL at 09:03

## 2023-03-31 RX ADMIN — SODIUM CHLORIDE, PRESERVATIVE FREE 10 ML: 5 INJECTION INTRAVENOUS at 12:03

## 2023-03-31 RX ADMIN — Medication: at 09:03

## 2023-03-31 RX ADMIN — HYDROCODONE BITARTRATE AND ACETAMINOPHEN 1 TABLET: 7.5; 325 TABLET ORAL at 09:03

## 2023-03-31 RX ADMIN — POTASSIUM PHOSPHATE, MONOBASIC AND POTASSIUM PHOSPHATE, DIBASIC 30 MMOL: 224; 236 INJECTION, SOLUTION, CONCENTRATE INTRAVENOUS at 12:03

## 2023-03-31 RX ADMIN — METOCLOPRAMIDE 10 MG: 5 INJECTION, SOLUTION INTRAMUSCULAR; INTRAVENOUS at 04:03

## 2023-03-31 RX ADMIN — Medication: at 02:03

## 2023-03-31 RX ADMIN — TAMSULOSIN HYDROCHLORIDE 0.4 MG: 0.4 CAPSULE ORAL at 09:03

## 2023-03-31 RX ADMIN — PANTOPRAZOLE SODIUM 40 MG: 40 TABLET, DELAYED RELEASE ORAL at 09:03

## 2023-03-31 RX ADMIN — POTASSIUM & SODIUM PHOSPHATES POWDER PACK 280-160-250 MG 1 PACKET: 280-160-250 PACK at 06:03

## 2023-03-31 RX ADMIN — SODIUM CHLORIDE, PRESERVATIVE FREE 10 ML: 5 INJECTION INTRAVENOUS at 06:03

## 2023-03-31 NOTE — PROGRESS NOTES
SURG ONC POD 4    NO ACUTE ISSUES OR EVENTS  RESTING COMFORTABLY IN BED  REPORTS STILL WALKING AND MOVING  HAD BM THIS AM  TOLERATING FULLS, NOT LARGE AMOUNTS    ABD SOFT  PREVENA SECURED  DRAIN SEROSANG,   DRAIN AMYLASE THIS AM PENDING    VSS; NO FEVER  WBC 6000   9/27  UO MORE THAN ADEQUATE    PLAN  CPM  REMAIN ON FULLS, WILL GO SLOW  CONTINUE TPN  DC GODWIN  POSSIBLY DC PCA TOMORROW  CASE MANAGEMENT WORKING ON PLACEMENT HOPEFULLY EARLY NEXT WEEK

## 2023-03-31 NOTE — PT/OT/SLP PROGRESS
Physical Therapy         Treatment        Quincy Triplett   MRN: [id number]     PT Received On: 03/31/23  PT Start Time: 0902     PT Stop Time: 0928    PT Total Time (min): 26 min       Billable Minutes:  Gait [id number] TherEx 10  Total Minutes: 26    Treatment Type: Treatment  PT/PTA: PTA     Number of PTA visits since last PT visit: 3       General Precautions: Standard, fall  Orthopedic Precautions: Orthopedic Precautions : N/A   Braces:           Subjective:  Communicated with NSG prior to session.    Pain/Comfort  Pain Rating 1: 0/10    Objective:  Patient found in bed with HOB elevated, with Patient found with: PICC line, peripheral IV, hemovac, campos catheter    Functional Mobility:  Bed Mobility:   Supine to sit: Moderate Assistance   Sit to supine: Activity did not occur   Rolling: Activity did not occur   Scooting: Minimal Assistance    Balance:   Static Sit: GOOD+: Takes MAXIMAL challenges from all directions.    Dynamic Sit:  FAIR+: Maintains balance through MINIMAL excursions of active trunk motion  Static Stand: FAIR: Maintains without assist but unable to take challenges  Dynamic stand: POOR+: Needs MIN (minimal ) assist during gait    Transfer Training:  Sit to stand:Minimal Assistance with No Assistive Device . 1 trial from EOB    Gait Training:  Pt amb 70ft HHA/Judd. Pt very unsteady and required vcs to slow tarik to ensure safety. Pt requiring constant steadying assistance. Pt with step-to gait pattern.       BLE AROM:  Ankle pumps, knee flex/ext, alt marching, hip add/abd. 15 reps BLE.       Activity Tolerance:  Patient limited by fatigue and generalized weakness    Patient left up in chair with all lines intact and call button in reach.    Assessment:  Quincy Triplett is a 82 y.o. male with a medical diagnosis of adenocarcinoma of duodenum, gastric outlet obstruction s/t tumor, obstructive jaundice s/p percutaneous drain, severe metabolic acidosis.  pt with hx of R arm amputation below elbow.  He presents with the following impairments/functional limitations: weakness, impaired endurance, impaired self care skills, impaired functional mobility, gait instability, impaired balance.    Pt progressing well with PT. Pt would benefit from further rehab/LTACH therapy services to increase overall independence level and assist in getting pt closer to PLOF.      Rehab potential is excellent.    Activity tolerance: Excellent    Discharge recommendations: Discharge Facility/Level of Care Needs: rehabilitation facility, LTACH (long-term acute care hospital)     Equipment recommendations: Equipment Needed After Discharge: none     GOALS:   Multidisciplinary Problems       Physical Therapy Goals          Problem: Physical Therapy    Goal Priority Disciplines Outcome Goal Variances Interventions   Physical Therapy Goal     PT, PT/OT Ongoing, Progressing     Description: Goals to be met by: 23     Patient will increase functional independence with mobility by performin. Supine to sit with Archer  2. Sit to stand transfer with Archer  3. Bed to chair transfer with Archer using No Assistive Device  4. Gait  x 500 feet with Archer using No Assistive Device.                          PLAN:    Patient to be seen 5 x/week  to address the above listed problems via gait training, therapeutic activities, therapeutic exercises  Plan of Care expires: 23  Plan of Care reviewed with: patient, daughter         3/31/2023

## 2023-03-31 NOTE — PROGRESS NOTES
Inpatient Nutrition Assessment    Admit Date: 3/16/2023   Total duration of encounter: 15 days     Nutrition Recommendation/Prescription     Advance diet as tolerated per MD. Goal Diet: Low Residue.  Boost Plus for additional nourishment; will provide an additional 360 kcal and 14 gm protein per container.   Continue current TPN until pt able to have good tolerance of solids:  AA15% 600 mL (90 gm), D70 494 mL (346 gm), @ 75 mL/hr with IVPB IL 20% 250 mL 2x/week (2/2 national shortage). This will provide:  1679 kcal (96% est needs)  90 gm protein (100% est needs)  1800 mL fluid (103% est needs)  346 gm Dextrose   4. Electrolytes per pharmacy. Replete phos as medically feasible.     Communication of Recommendations: reviewed with nurse, reviewed with patient, and reviewed with pharmacy    Nutrition Assessment     Malnutrition Assessment/Nutrition-Focused Physical Exam    Malnutrition in the context of acute illness or injury  Degree of Malnutrition: non-severe (moderate) malnutrition  Energy Intake: </= 50% of estimated energy requirement for >/= 5 days  Interpretation of Weight Loss: >5% in 1 month  Body Fat:moderate depletion  Area of Body Fat Loss: orbital region  and upper arm region - triceps / biceps  Muscle Mass Loss: moderate depletion  Area of Muscle Mass Loss: temple region - temporalis muscle and clavicle bone region - pectoralis major, deltoid, trapezius muscles  Fluid Accumulation: does not meet criteria  Edema: does not meet criteria   Reduced  Strength: unable to obtain  A minimum of two characteristics is recommended for diagnosis of either severe or non-severe malnutrition.    Chart Review    Reason Seen: malnutrition screening tool (MST), physician consult for initiate TPN; sacral wound, and follow-up    Malnutrition Screening Tool Results   Have you recently lost weight without trying?: Yes: 34 lbs or more  Have you been eating poorly because of a decreased appetite?: Yes   MST Score: 5      Diagnosis:  Acute kidney injury due to intravascular volume depletion secondary to nausea and vomiting   Acute complicated bacterial cystitis, present on admission   Normocytic anemia  Hyponatremia  Transaminitis hyperbilirubinemia   Fatigue (Hx small intestine CA awaiting surgical tx, increased weakness started yesterday)    Relevant Medical History: hypertension, hyperlipidemia, coronary disease status post stent, peripheral arterial disease, adenocarcinoma of duodenum, BPH with chronic indwelling Paul catheter    Nutrition-Related Medications: TPN, IL, Reglan, SSI, Protonix, Kphos   Calorie Containing IV Medications: no significant kcals from medications at this time    Nutrition-Related Labs:  3/18: H/H-7.4/21.8, Na-133, K-3.2, Crea-1.25, Ca-8.1, Alk phos-891  3/21/2023: H/H 7.7/24.1, Ca 8.5, Alk Phos 864, Alb 2.0, Total Bili 1.8, AST 44, Gluc 99  3/23/2023: Na 135, Ca 8.6, Alk Phose 844, Alb 1.7, Total Bili 2.7, AST 64, ALT 65, Gluc 113  3/27/2023: H/H 8.1/25.3, Na 134, Cl 109, Crea 0.71, Alk Phos 1026, alb 2.0, Total Bili 3.5, AST 56, ALT 63, Gluc 86  3/29/23: Na 126, Glu 111, GFR>60  3/31/23: Na 131, Glu 139, GFR>60, phos 1.6    Diet/PN Order: TPN ADULT CENTRAL LINE CUSTOM  Diet full liquid  TPN ADULT CENTRAL LINE CUSTOM  Oral Supplement Order: Boost Breeze  Tube Feeding Order: none  Appetite/Oral Intake: poor/0-25% of meals  Factors Affecting Nutritional Intake: altered gastrointestinal function, clear liquid diet, and nausea  Food/Baptism/Cultural Preferences: none reported  Food Allergies: no known food allergies    Skin Integrity: incision, drain/device(s)  Wound(s):   sacral reddness    Comments    3/18/23: Pt feeling better this morning. Admitted with N/V. But is now able to tolerate liquids today. Dr Garrett added Boost Breeze with meals. Pt is to stay on liquids for now. Pt scheduled for whipple on 3/27/23; pending surgery eval while in hospital. Pt with physical wasting noted along with wt  "loss. If pt to remain on clear liquids > 7 days, will need TPN.     3/21/2023: Pt reports drinking/eating >75% PO intake of meals. Pt receives Boost Breeze BID. Pt denies N/V. Provided TPN recommendations per consult. Will monitor for refeeding syndrome. Last BM documented as 3/19/2023.    3/23/2023: Pt reports good appetite/PO intake. Implemented food preferences. Pt receives Boost Breeze BID. Pt on Clinimix-E @ 75 mL/hr + IL 2 times weekly. Pt reported N/V yesterday but none today. Pt anticipating bowel resection Monday. Palliative following. Will monitor.    3/27/2023: Pt NPO for WHIPPLE procedure, per FNP note, pt will regroup after discharge to assess overall health to determine when to reschedule WHIPPLE procedure. TPN bag hanging at time of visit, pt not in room. Will monitor.    3/29/23: Spoke with RN who reports that pt's TPN was d/c'ed yesterday when potassium was running high. Pt was on Clinimix E TPN; pt is now just on clears but unable to take in much 2/2 nausea.     3/31/23: Pt now on custom TPN; diet advanced to full liquids but pt not taking in much 2/2 early satiety. Noted low phos.     Anthropometrics    Height: 5' 9.02" (175.3 cm)    Last Weight: 65 kg (143 lb 4.8 oz) (23 1244) Weight Method: Standard Scale  BMI (Calculated): 21.2  BMI Classification: underweight (BMI less than 22 if >65 years of age)        Ideal Body Weight (IBW), Male: 160.12 lb     % Ideal Body Weight, Male (lb): 89.5 %                 Usual Body Weight (UBW), k.7 kg  % Usual Body Weight: 90.85  % Weight Change From Usual Weight: -9.35 %  Usual Weight Provided By: EMR weight history    Wt Readings from Last 5 Encounters:   23 65 kg (143 lb 4.8 oz)   23 67.3 kg (148 lb 5.9 oz)   02/15/23 71.7 kg (158 lb)   23 70.3 kg (155 lb)   23 69.3 kg (152 lb 12.5 oz)     Weight Change(s) Since Admission:  Admit Weight: 73.5 kg (162 lb) (23 0452)  65kg; 9% wt loss x1 month. Rt arm amputation noted. "   3/18/2023: 65 kg  3/29/23-3/31/23: no new wt noted     Estimated Needs    Weight Used For Calorie Calculations: 65 kg (143 lb 4.8 oz)  Energy Calorie Requirements (kcal): 1750 kcal (MSJxSF1.3)  Energy Need Method: [place]-St Jeor  Weight Used For Protein Calculations: 65 kg (143 lb 4.8 oz)  Protein Requirements: 90gm (kgx1.4)  Fluid Requirements (mL): 1750mL (1mL/kcal)  Temp: 98.1 °F (36.7 °C)       Enteral Nutrition    Patient not receiving enteral nutrition at this time.    Parenteral Nutrition    Standard Formula: not applicable  Custom Formula:  600 ml 15% amino acids and 494 ml 70% dextrose  Additives: multivitamin with vitamin K and trace elements (Zn, Cu, Mn, Se)  Rate/Volume: 75 mL/hr  Lipids: 250 ml 20% IV lipid emulsion twice weekly  Total Nutrition Provided by Parenteral Nutrition:  Calories Provided  1679 kcal/d, 96% needs   Protein Provided  90 g/d, 100% needs   Dextrose Provided  346 g/d, GIR 3.7 mg CHO/kg/min   Fluid Provided  1800 ml/d, 103% needs          Evaluation of Received Nutrient Intake    Calories: meeting estimated needs  Protein: meeting estimated needs    Patient Education    Not applicable.    Nutrition Diagnosis     PES: Malnutrition related to cancer as evidenced by 9% wt loss x1 month, fat and muscle wasting, poor diet tolerance/intake > 5 days. (continues)    Interventions/Goals     Intervention(s): general/healthful diet, modified composition of parenteral nutrition, commercial beverage, and collaboration with other providers  Goal: Meet greater than 75% of nutritional needs by follow-up. (goal met)    Monitoring & Evaluation     Dietitian will monitor energy intake and electrolyte/renal panel.  Nutrition Risk/Follow-Up: high (follow-up in 1-4 days)   Please consult if re-assessment needed sooner.

## 2023-03-31 NOTE — PROGRESS NOTES
"[facility]  [facility]  PROGRESS NOTE        CHIEF COMPLAINT   Hospital follow up    HOSPITAL COURSE   82 y.o. 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 Work up in ED noted patient afebrile, HDS. Labs notable for ALP 1544, Total bilirubin 2.9, AST 40, ALT 65, potassium 5.2, bicarb 9, BUN 23, Cr 1.5.  Staging CT C/A/P from 3/15 noting developing gastric outlet obstruction, secondary to a 3.2 cm long "apple-core" lesion in the 3rd portion of the duodenum, at the level of the ampulla; however there was no evidence of distant metastatic disease.  Of notes patient recently admitted to hospital from 3/2-3/10, being treated for enterococcus faecium bacteremia, planned for antibiotics through 3/18.  Plan was to have subsequent follow-up with Surgical Oncology thereafter to discuss possible Whipple procedure.  Patient was admitted to hospital medicine services for management.     Surgical Oncology consulted given GOO, planning for Whipple on Mon 3/27.  In the interim patient will be optimized medially for surgery.  Continued on liquids, dietary protein supplementation added.  Also started on TPN in the interim. Continued to work with physical therapy.   On 03/27/2023, Patient underwent pancreatico duodenectomy portal/celiac lymphadenectomy, common bile duct exploration, falciform ligament pedicle flap, wedge resection of segment 4 liver lesion, cholecystectomy under GA.  Estimated blood loss 100 cc.    Today  Seen examined this morning.  Spoke to nurse practitioner Maria Luisa this morning with Surgical Oncology.  They would like for him to continue his IV TPN given his poor nutritional status prior to surgery.  Discussed with the " patient and family member at the bedside.  Otherwise he is doing well and no complaints this morning.  Advancing his diet as well per Surgical Oncology.        OBJECTIVE/PHYSICAL EXAM     VITAL SIGNS (MOST RECENT):  Temp: 98.6 °F (37 °C) (03/31/23 0404)  Pulse: 78 (03/31/23 0404)  Resp: 16 (03/31/23 0239)  BP: 131/73 (03/31/23 0404)  SpO2: 99 % (03/31/23 0404) VITAL SIGNS (24 HOUR RANGE):  Temp:  [98.1 °F (36.7 °C)-99.5 °F (37.5 °C)] 98.6 °F (37 °C)  Pulse:  [78-91] 78  Resp:  [16-20] 16  SpO2:  [98 %-99 %] 99 %  BP: (113-141)/(60-77) 131/73   GENERAL: In no acute distress, afebrile  HEENT:  CHEST: Clear to auscultation bilaterally  HEART: S1, S2, no appreciable murmur  ABDOMEN: Soft, upper abdominal wound VAC, left lower quadrant drain, expected tenderness postop  MSK: Warm, no lower extremity edema, no clubbing or cyanosis  NEUROLOGIC: Alert and oriented x4, moving all extremities with good strength   INTEGUMENTARY:  PSYCHIATRY:        ASSESSMENT/PLAN   Adenocarcinoma of the duodenum/ampulla  Gastric outlet obstruction-status post Whipple's March 27  Enterococcus faecium bacteremia-cleared March 4/treated    History of:  Adenocarcinoma of ampulla  Obstructive jaundice 2/2 duodenal mass-status post biliary drain      Surgical oncology following.  Drains, diet advancement per surgery discretion.  Continue TPN   Continue PT and OT.  Encouraged to ambulate.  Noted labs phosphorus of 1.6, being replaced with IV and oral.  TPN may need to get adjusted to have more phosphorus.  Alkaline phosphatase 760.    DVT prophylaxis:  Lovenox 30 b.i.d.    Anticipated discharge and disposition:   __________________________________________________________________________    LABS/MICRO/MEDS/DIAGNOSTICS       LABS  Recent Labs     03/31/23  0507   *   K 3.8   CHLORIDE 101   CO2 23   BUN 23.6   CREATININE 0.63*   GLUCOSE 139*   CALCIUM 8.1*   ALKPHOS 760*   AST 38*   ALT 48   ALBUMIN 1.8*       Recent Labs     03/31/23  0507   WBC  6.4   RBC 3.13*   HCT 27.3*   MCV 87.2            MICROBIOLOGY  Microbiology Results (last 7 days)       ** No results found for the last 168 hours. **               MEDICATIONS   alvimopan  12 mg Oral BID    enoxaparin  30 mg Subcutaneous Q12H    fat emulsion 20%  250 mL Intravenous Once    finasteride  5 mg Oral Daily    magnesium oxide  400 mg Oral BID    metoclopramide HCl  10 mg Intravenous TID    pantoprazole  40 mg Oral Daily    potassium phosphate IVPB  30 mmol Intravenous Once    potassium, sodium phosphates  1 packet Oral Q6H    sodium chloride 0.9%  10 mL Intravenous Q6H    tamsulosin  0.4 mg Oral Daily    zinc oxide-cod liver oil   Topical (Top) BID         INFUSIONS   hydromorphone in 0.9 % NaCl 6 mg/30 ml      TPN ADULT CENTRAL LINE CUSTOM 75 mL/hr at 03/30/23 2249    TPN ADULT CENTRAL LINE CUSTOM            DIAGNOSTIC TESTS  X-Ray Abdomen AP 1 View   Final Result      As above.         Electronically signed by: Quincy Roberts   Date:    03/27/2023   Time:    15:23      X-Ray Chest 1 View   Final Result      Optimal placement of the PICC line.         Electronically signed by: Shant Sequeira   Date:    03/21/2023   Time:    11:07           EF   Date Value Ref Range Status   03/04/2023 60 % Final              Case related differential diagnoses have been reviewed; assessment and plan has been documented. I have personally reviewed the labs and test results that are currently available; I have reviewed the patients medication list. I have reviewed the consulting providers recommendations. I have reviewed or attempted to review medical records based upon their availability.  All of the patient's and/or family's questions have been addressed and answered to the best of my ability.  I will continue to monitor closely and make adjustments to medical management as needed.  This document was created using M*Modal Fluency Direct.  Transcription errors may have been made.  Please contact me if any questions  may rise regarding documentation to clarify transcription.        Ja Eduardo MD   03/31/2023   Internal Medicine

## 2023-03-31 NOTE — PT/OT/SLP PROGRESS
Occupational Therapy  Treatment    Quincy Triplett   MRN: [id number]   Admitting Diagnosis: <principal problem not specified>    OT Date of Treatment: 03/31/23   OT Start Time: 1403  OT Stop Time: 1427  OT Total Time (min): 24 min     Billable Minutes:  Self Care/Home Management 12 and Therapeutic Activity 12  Total Minutes: 24     OT/IZAIAH: IZAIAH     Number of IZAIAH visits since last OT visit: 2    General Precautions: Standard, fall (hx of RUE amputee)  Orthopedic Precautions:    Braces:      Spiritual, Cultural Beliefs, Restorationist Practices, Values that Affect Care: no    Subjective:  Mod encouragement to participate in OT session.     Pain/Comfort  Location 1: abdomen  Pain Addressed 1: Reposition    Objective:  Patient found with: PICC line, peripheral IV, hemovac    Functional Mobility:  Bed Mobility:   Supine to sit: Moderate Assistance   Sit to supine: Activity did not occur   Rolling: Activity did not occur   Scooting: Minimal Assistance    Transfer Training:  Per pt request, pt performed functional mobility in hallway prior to returning to room for toilet training. Performed task with CGA.     Grooming:  Hand hygiene while standing at sink with CGA.     LE Dressing:  Min A to don slippers.    Toilet Training:  CGA functional mobility to toilet. Min A for clothing management, SBA for pericare in sitting. Unable to void.     Balance:   Static Sit: FAIR+: Able to take MINIMAL challenges from all directions  Dynamic Sit:  FAIR+: Maintains balance through MINIMAL excursions of active trunk motion  Static Stand: POOR+: Needs MINIMAL assist to maintain  Dynamic stand: FAIR: Needs CONTACT GUARD during gait    Additional Treatment:      Patient left up in chair with all lines intact, call button in reach, and ex-spouse present    ASSESSMENT:  Pt is progressing well, but limited by pain and endurance. Pt would benefit greatly from IPR to increase strength and endurance necessary in order to return to PLOF. Pt currently  needs assistance with all ADLs and balance .    Rehab potential is good    Activity tolerance: Good    Discharge recommendations: rehabilitation facility, LTACH (long-term acute care hospital)     Equipment recommendations:       GOALS:   Multidisciplinary Problems       Occupational Therapy Goals          Problem: Occupational Therapy    Goal Priority Disciplines Outcome Interventions   Occupational Therapy Goal     OT, PT/OT Ongoing, Progressing    Description: Goals to be met by: 4/12/23     Patient will increase functional independence with ADLs by performing:    LE Dressing with Modified [place].  Grooming while standing with Modified [place].  Toileting from toilet with Modified [place] for hygiene and clothing management.   Toilet transfer to toilet with Modified [place].                         Plan:  Patient to be seen 3 x/week, 5 x/week to address the above listed problems via self-care/home management, therapeutic activities, therapeutic exercises  Plan of Care expires: 04/12/23  Plan of Care reviewed with: patient, spouse         03/31/2023

## 2023-04-01 LAB
ALBUMIN SERPL-MCNC: 1.8 G/DL (ref 3.4–4.8)
ALBUMIN/GLOB SERPL: 0.7 RATIO (ref 1.1–2)
ALP SERPL-CCNC: 714 UNIT/L (ref 40–150)
ALT SERPL-CCNC: 48 UNIT/L (ref 0–55)
AMYLASE FLD-CCNC: 10 U/L
APTT PPP: 34.7 SECONDS (ref 23.2–33.7)
AST SERPL-CCNC: 36 UNIT/L (ref 5–34)
BASOPHILS # BLD AUTO: 0.04 X10(3)/MCL (ref 0–0.2)
BASOPHILS NFR BLD AUTO: 0.5 %
BILIRUBIN DIRECT+TOT PNL SERPL-MCNC: 2 MG/DL
BUN SERPL-MCNC: 18.8 MG/DL (ref 8.4–25.7)
CALCIUM SERPL-MCNC: 8.2 MG/DL (ref 8.8–10)
CHLORIDE SERPL-SCNC: 103 MMOL/L (ref 98–107)
CO2 SERPL-SCNC: 26 MMOL/L (ref 23–31)
CREAT SERPL-MCNC: 0.63 MG/DL (ref 0.73–1.18)
EOSINOPHIL # BLD AUTO: 0.22 X10(3)/MCL (ref 0–0.9)
EOSINOPHIL NFR BLD AUTO: 2.8 %
ERYTHROCYTE [DISTWIDTH] IN BLOOD BY AUTOMATED COUNT: 15.7 % (ref 11.5–17)
GFR SERPLBLD CREATININE-BSD FMLA CKD-EPI: >60 MLS/MIN/1.73/M2
GLOBULIN SER-MCNC: 2.5 GM/DL (ref 2.4–3.5)
GLUCOSE SERPL-MCNC: 140 MG/DL (ref 82–115)
HCT VFR BLD AUTO: 27.7 % (ref 42–52)
HGB BLD-MCNC: 9 G/DL (ref 14–18)
IMM GRANULOCYTES # BLD AUTO: 0.11 X10(3)/MCL (ref 0–0.04)
IMM GRANULOCYTES NFR BLD AUTO: 1.4 %
INR BLD: 1.06 (ref 0–1.3)
LYMPHOCYTES # BLD AUTO: 1.35 X10(3)/MCL (ref 0.6–4.6)
LYMPHOCYTES NFR BLD AUTO: 17.3 %
MAGNESIUM SERPL-MCNC: 1.7 MG/DL (ref 1.6–2.6)
MCH RBC QN AUTO: 28.8 PG (ref 27–31)
MCHC RBC AUTO-ENTMCNC: 32.5 G/DL (ref 33–36)
MCV RBC AUTO: 88.5 FL (ref 80–94)
MONOCYTES # BLD AUTO: 0.63 X10(3)/MCL (ref 0.1–1.3)
MONOCYTES NFR BLD AUTO: 8.1 %
NEUTROPHILS # BLD AUTO: 5.47 X10(3)/MCL (ref 2.1–9.2)
NEUTROPHILS NFR BLD AUTO: 69.9 %
NRBC BLD AUTO-RTO: 0 %
PHOSPHATE SERPL-MCNC: 3 MG/DL (ref 2.3–4.7)
PLATELET # BLD AUTO: 322 X10(3)/MCL (ref 130–400)
PMV BLD AUTO: 9.6 FL (ref 7.4–10.4)
POCT GLUCOSE: 103 MG/DL (ref 70–110)
POCT GLUCOSE: 117 MG/DL (ref 70–110)
POCT GLUCOSE: 153 MG/DL (ref 70–110)
POTASSIUM SERPL-SCNC: 3.5 MMOL/L (ref 3.5–5.1)
PROT SERPL-MCNC: 4.3 GM/DL (ref 5.8–7.6)
PROTHROMBIN TIME: 13.7 SECONDS (ref 12.5–14.5)
RBC # BLD AUTO: 3.13 X10(6)/MCL (ref 4.7–6.1)
SODIUM SERPL-SCNC: 137 MMOL/L (ref 136–145)
WBC # SPEC AUTO: 7.8 X10(3)/MCL (ref 4.5–11.5)

## 2023-04-01 PROCEDURE — 25000003 PHARM REV CODE 250: Performed by: SURGERY

## 2023-04-01 PROCEDURE — 63600175 PHARM REV CODE 636 W HCPCS: Performed by: INTERNAL MEDICINE

## 2023-04-01 PROCEDURE — 85610 PROTHROMBIN TIME: CPT | Performed by: NURSE PRACTITIONER

## 2023-04-01 PROCEDURE — 84100 ASSAY OF PHOSPHORUS: CPT | Performed by: SURGERY

## 2023-04-01 PROCEDURE — 25000003 PHARM REV CODE 250: Performed by: INTERNAL MEDICINE

## 2023-04-01 PROCEDURE — A4216 STERILE WATER/SALINE, 10 ML: HCPCS | Performed by: SURGERY

## 2023-04-01 PROCEDURE — 85025 COMPLETE CBC W/AUTO DIFF WBC: CPT | Performed by: NURSE PRACTITIONER

## 2023-04-01 PROCEDURE — 85730 THROMBOPLASTIN TIME PARTIAL: CPT | Performed by: NURSE PRACTITIONER

## 2023-04-01 PROCEDURE — 83735 ASSAY OF MAGNESIUM: CPT | Performed by: NURSE PRACTITIONER

## 2023-04-01 PROCEDURE — 25000003 PHARM REV CODE 250: Performed by: NURSE PRACTITIONER

## 2023-04-01 PROCEDURE — 63600175 PHARM REV CODE 636 W HCPCS: Performed by: NURSE PRACTITIONER

## 2023-04-01 PROCEDURE — 80053 COMPREHEN METABOLIC PANEL: CPT | Performed by: NURSE PRACTITIONER

## 2023-04-01 PROCEDURE — 82150 ASSAY OF AMYLASE: CPT | Performed by: SURGERY

## 2023-04-01 PROCEDURE — 11000001 HC ACUTE MED/SURG PRIVATE ROOM

## 2023-04-01 RX ADMIN — ALPRAZOLAM 0.5 MG: 0.5 TABLET ORAL at 09:04

## 2023-04-01 RX ADMIN — HYDROCODONE BITARTRATE AND ACETAMINOPHEN 1 TABLET: 7.5; 325 TABLET ORAL at 09:04

## 2023-04-01 RX ADMIN — PANTOPRAZOLE SODIUM 40 MG: 40 TABLET, DELAYED RELEASE ORAL at 09:04

## 2023-04-01 RX ADMIN — ENOXAPARIN SODIUM 30 MG: 30 INJECTION SUBCUTANEOUS at 09:04

## 2023-04-01 RX ADMIN — Medication 400 MG: at 09:04

## 2023-04-01 RX ADMIN — SODIUM CHLORIDE, PRESERVATIVE FREE 10 ML: 5 INJECTION INTRAVENOUS at 01:04

## 2023-04-01 RX ADMIN — Medication: at 09:04

## 2023-04-01 RX ADMIN — ALVIMOPAN 12 MG: 12 CAPSULE ORAL at 09:04

## 2023-04-01 RX ADMIN — FINASTERIDE 5 MG: 5 TABLET, FILM COATED ORAL at 09:04

## 2023-04-01 RX ADMIN — METOCLOPRAMIDE 10 MG: 5 INJECTION, SOLUTION INTRAMUSCULAR; INTRAVENOUS at 09:04

## 2023-04-01 RX ADMIN — TAMSULOSIN HYDROCHLORIDE 0.4 MG: 0.4 CAPSULE ORAL at 09:04

## 2023-04-01 RX ADMIN — VASOPRESSIN: 20 INJECTION, SOLUTION INTRAVENOUS at 10:04

## 2023-04-01 RX ADMIN — METOCLOPRAMIDE 10 MG: 5 INJECTION, SOLUTION INTRAMUSCULAR; INTRAVENOUS at 03:04

## 2023-04-01 NOTE — PT/OT/SLP PROGRESS
Physical Therapy      Patient Name:  Quincy Triplett   MRN:  [id number]    Pt refused tx session stating that he already got up in the AM. Pt requested for therapy to return tomorrow.

## 2023-04-01 NOTE — PROGRESS NOTES
"[facility]  HOSPITAL MEDICINE  PROGRESS NOTE        CHIEF COMPLAINT   Hospital follow up    HOSPITAL COURSE   82 y.o. 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 Work up in ED noted patient afebrile, HDS. Labs notable for ALP 1544, Total bilirubin 2.9, AST 40, ALT 65, potassium 5.2, bicarb 9, BUN 23, Cr 1.5.  Staging CT C/A/P from 3/15 noting developing gastric outlet obstruction, secondary to a 3.2 cm long "apple-core" lesion in the 3rd portion of the duodenum, at the level of the ampulla; however there was no evidence of distant metastatic disease.  Of notes patient recently admitted to hospital from 3/2-3/10, being treated for enterococcus faecium bacteremia, planned for antibiotics through 3/18.  Plan was to have subsequent follow-up with Surgical Oncology thereafter to discuss possible Whipple procedure.  Patient was admitted to hospital medicine services for management.     Surgical Oncology consulted given GOO, planning for Whipple on Mon 3/27.  In the interim patient will be optimized medially for surgery.  Continued on liquids, dietary protein supplementation added.  Also started on TPN in the interim. Continued to work with physical therapy.   On 03/27/2023, Patient underwent pancreatico duodenectomy portal/celiac lymphadenectomy, common bile duct exploration, falciform ligament pedicle flap, wedge resection of segment 4 liver lesion, cholecystectomy under GA.  Estimated blood loss 100 cc.    Today  Seen examined this morning.  Discussed with Dr. Brown this morning and he would like to discontinue TPN however patient would like to keep it going.  We will start to wean the TPN down.  He only drank his boost and " states he does not really have much of an appetite yet.        OBJECTIVE/PHYSICAL EXAM     VITAL SIGNS (MOST RECENT):  Temp: 98.7 °F (37.1 °C) (04/01/23 0726)  Pulse: 64 (04/01/23 0726)  Resp: 19 (04/01/23 0439)  BP: (!) 141/80 (04/01/23 0726)  SpO2: 99 % (04/01/23 0726) VITAL SIGNS (24 HOUR RANGE):  Temp:  [97.5 °F (36.4 °C)-98.7 °F (37.1 °C)] 98.7 °F (37.1 °C)  Pulse:  [64-83] 64  Resp:  [16-19] 19  SpO2:  [98 %-100 %] 99 %  BP: (120-144)/(65-80) 141/80   GENERAL: In no acute distress, afebrile  HEENT:  CHEST: Clear to auscultation bilaterally  HEART: S1, S2, no appreciable murmur  ABDOMEN: Soft, upper abdominal wound VAC, left lower quadrant drain, expected tenderness postop  MSK: Warm, no lower extremity edema, no clubbing or cyanosis  NEUROLOGIC: Alert and oriented x4, moving all extremities with good strength   INTEGUMENTARY:  PSYCHIATRY:        ASSESSMENT/PLAN   Adenocarcinoma of the duodenum/ampulla  Gastric outlet obstruction-status post Whipple's March 27  Enterococcus faecium bacteremia-cleared March 4/treated    History of:  Adenocarcinoma of ampulla  Obstructive jaundice 2/2 duodenal mass-status post biliary drain      Surgical oncology following.  Drains, diet advancement per surgery discretion.  Continue TPN, start weaning rate to discontinue over the next few days.    Continue PT and OT.  Encouraged to ambulate.  Labs reviewed, continuing to improve, alkaline phosphatase 714, sodium 137, hemoglobin 9.0.  Pending LTAC disposition versus TCU.  Case management currently working on LTAC.    DVT prophylaxis:  Lovenox 30 b.i.d.    Anticipated discharge and disposition:   __________________________________________________________________________    LABS/MICRO/MEDS/DIAGNOSTICS       LABS  Recent Labs     04/01/23  0637      K 3.5   CHLORIDE 103   CO2 26   BUN 18.8   CREATININE 0.63*   GLUCOSE 140*   CALCIUM 8.2*   ALKPHOS 714*   AST 36*   ALT 48   ALBUMIN 1.8*       Recent Labs     04/01/23  0637   WBC  7.8   RBC 3.13*   HCT 27.7*   MCV 88.5            MICROBIOLOGY  Microbiology Results (last 7 days)       ** No results found for the last 168 hours. **               MEDICATIONS   alvimopan  12 mg Oral BID    enoxaparin  30 mg Subcutaneous Q12H    fat emulsion 20%  250 mL Intravenous Once    finasteride  5 mg Oral Daily    magnesium oxide  400 mg Oral BID    metoclopramide HCl  10 mg Intravenous TID    pantoprazole  40 mg Oral Daily    sodium chloride 0.9%  10 mL Intravenous Q6H    tamsulosin  0.4 mg Oral Daily    zinc oxide-cod liver oil   Topical (Top) BID         INFUSIONS           DIAGNOSTIC TESTS  X-Ray Abdomen AP 1 View   Final Result      As above.         Electronically signed by: Quincy Roberts   Date:    03/27/2023   Time:    15:23      X-Ray Chest 1 View   Final Result      Optimal placement of the PICC line.         Electronically signed by: Shant Sequeira   Date:    03/21/2023   Time:    11:07           EF   Date Value Ref Range Status   03/04/2023 60 % Final              Case related differential diagnoses have been reviewed; assessment and plan has been documented. I have personally reviewed the labs and test results that are currently available; I have reviewed the patients medication list. I have reviewed the consulting providers recommendations. I have reviewed or attempted to review medical records based upon their availability.  All of the patient's and/or family's questions have been addressed and answered to the best of my ability.  I will continue to monitor closely and make adjustments to medical management as needed.  This document was created using M*Modal Fluency Direct.  Transcription errors may have been made.  Please contact me if any questions may rise regarding documentation to clarify transcription.        Ja Eduardo MD   04/01/2023   Internal Medicine

## 2023-04-01 NOTE — PROGRESS NOTES
Doing well, tolerating a diet without difficulty, having bowel function   Drain output appears benign   Patient required Paul catheter replacement, has had issues with this before, normally sees  Afebrile, vital signs stable  Urine output adequate  No apparent distress  Regular rate and rhythm, clear to auscultation bilaterally  Abdomen is soft, nondistended, incisions are clean dry and intact  Extremities without edema, SCDs in place     Labs reviewed    Postoperative day 4.  Whipple procedure for duodenal adenocarcinoma with obstruction    Doing well, advance diet as tolerated, add nutritional supplements, encourage oral intake   Urinary retention, chronic issue, consult his urology team for recommendations and management  DC TPN, discussed with medical team and they agree  DC daily labs    Abdirahman Brown MD  Surgical Oncology  Complex General, Gastrointestinal and Hepatobiliary Surgery

## 2023-04-02 LAB
ALBUMIN SERPL-MCNC: 1.7 G/DL (ref 3.4–4.8)
ALBUMIN/GLOB SERPL: 0.7 RATIO (ref 1.1–2)
ALP SERPL-CCNC: 755 UNIT/L (ref 40–150)
ALT SERPL-CCNC: 52 UNIT/L (ref 0–55)
AST SERPL-CCNC: 40 UNIT/L (ref 5–34)
BILIRUBIN DIRECT+TOT PNL SERPL-MCNC: 1.9 MG/DL
BUN SERPL-MCNC: 15.9 MG/DL (ref 8.4–25.7)
CALCIUM SERPL-MCNC: 8.1 MG/DL (ref 8.8–10)
CHLORIDE SERPL-SCNC: 102 MMOL/L (ref 98–107)
CO2 SERPL-SCNC: 27 MMOL/L (ref 23–31)
CREAT SERPL-MCNC: 0.59 MG/DL (ref 0.73–1.18)
GFR SERPLBLD CREATININE-BSD FMLA CKD-EPI: >60 MLS/MIN/1.73/M2
GLOBULIN SER-MCNC: 2.5 GM/DL (ref 2.4–3.5)
GLUCOSE SERPL-MCNC: 132 MG/DL (ref 82–115)
MAGNESIUM SERPL-MCNC: 1.6 MG/DL (ref 1.6–2.6)
PHOSPHATE SERPL-MCNC: 2.9 MG/DL (ref 2.3–4.7)
POCT GLUCOSE: 113 MG/DL (ref 70–110)
POCT GLUCOSE: 119 MG/DL (ref 70–110)
POCT GLUCOSE: 169 MG/DL (ref 70–110)
POTASSIUM SERPL-SCNC: 3.3 MMOL/L (ref 3.5–5.1)
PROT SERPL-MCNC: 4.2 GM/DL (ref 5.8–7.6)
SODIUM SERPL-SCNC: 134 MMOL/L (ref 136–145)

## 2023-04-02 PROCEDURE — 63600175 PHARM REV CODE 636 W HCPCS: Performed by: NURSE PRACTITIONER

## 2023-04-02 PROCEDURE — A4216 STERILE WATER/SALINE, 10 ML: HCPCS | Performed by: SURGERY

## 2023-04-02 PROCEDURE — 25000003 PHARM REV CODE 250: Performed by: NURSE PRACTITIONER

## 2023-04-02 PROCEDURE — 63600175 PHARM REV CODE 636 W HCPCS: Performed by: INTERNAL MEDICINE

## 2023-04-02 PROCEDURE — 25000003 PHARM REV CODE 250: Performed by: INTERNAL MEDICINE

## 2023-04-02 PROCEDURE — 83735 ASSAY OF MAGNESIUM: CPT | Performed by: INTERNAL MEDICINE

## 2023-04-02 PROCEDURE — 25000003 PHARM REV CODE 250: Performed by: SURGERY

## 2023-04-02 PROCEDURE — 11000001 HC ACUTE MED/SURG PRIVATE ROOM

## 2023-04-02 PROCEDURE — 84100 ASSAY OF PHOSPHORUS: CPT | Performed by: INTERNAL MEDICINE

## 2023-04-02 PROCEDURE — 80053 COMPREHEN METABOLIC PANEL: CPT | Performed by: INTERNAL MEDICINE

## 2023-04-02 RX ORDER — POTASSIUM CHLORIDE 14.9 MG/ML
20 INJECTION INTRAVENOUS ONCE
Status: COMPLETED | OUTPATIENT
Start: 2023-04-02 | End: 2023-04-02

## 2023-04-02 RX ADMIN — Medication 6 MG: at 08:04

## 2023-04-02 RX ADMIN — Medication 400 MG: at 08:04

## 2023-04-02 RX ADMIN — ALPRAZOLAM 0.5 MG: 0.5 TABLET ORAL at 08:04

## 2023-04-02 RX ADMIN — METOCLOPRAMIDE 10 MG: 5 INJECTION, SOLUTION INTRAMUSCULAR; INTRAVENOUS at 02:04

## 2023-04-02 RX ADMIN — ALVIMOPAN 12 MG: 12 CAPSULE ORAL at 09:04

## 2023-04-02 RX ADMIN — ENOXAPARIN SODIUM 30 MG: 30 INJECTION SUBCUTANEOUS at 09:04

## 2023-04-02 RX ADMIN — PANTOPRAZOLE SODIUM 40 MG: 40 TABLET, DELAYED RELEASE ORAL at 09:04

## 2023-04-02 RX ADMIN — POTASSIUM CHLORIDE 20 MEQ: 14.9 INJECTION, SOLUTION INTRAVENOUS at 09:04

## 2023-04-02 RX ADMIN — Medication 400 MG: at 09:04

## 2023-04-02 RX ADMIN — HYDROCODONE BITARTRATE AND ACETAMINOPHEN 1 TABLET: 7.5; 325 TABLET ORAL at 09:04

## 2023-04-02 RX ADMIN — SODIUM CHLORIDE, PRESERVATIVE FREE 10 ML: 5 INJECTION INTRAVENOUS at 01:04

## 2023-04-02 RX ADMIN — METOCLOPRAMIDE 10 MG: 5 INJECTION, SOLUTION INTRAMUSCULAR; INTRAVENOUS at 08:04

## 2023-04-02 RX ADMIN — ALVIMOPAN 12 MG: 12 CAPSULE ORAL at 08:04

## 2023-04-02 RX ADMIN — TAMSULOSIN HYDROCHLORIDE 0.4 MG: 0.4 CAPSULE ORAL at 09:04

## 2023-04-02 RX ADMIN — Medication: at 09:04

## 2023-04-02 RX ADMIN — FINASTERIDE 5 MG: 5 TABLET, FILM COATED ORAL at 08:04

## 2023-04-02 RX ADMIN — SODIUM CHLORIDE, PRESERVATIVE FREE 10 ML: 5 INJECTION INTRAVENOUS at 05:04

## 2023-04-02 RX ADMIN — ENOXAPARIN SODIUM 30 MG: 30 INJECTION SUBCUTANEOUS at 08:04

## 2023-04-02 RX ADMIN — METOCLOPRAMIDE 10 MG: 5 INJECTION, SOLUTION INTRAMUSCULAR; INTRAVENOUS at 09:04

## 2023-04-02 RX ADMIN — HYDROCODONE BITARTRATE AND ACETAMINOPHEN 1 TABLET: 7.5; 325 TABLET ORAL at 08:04

## 2023-04-02 NOTE — CONSULTS
UROLOGY CONSULT NOTE    Quincy Triplett 1940  [id number]  4/1/2023    HPI:   POD #4 from [place]  This is a consult for urinary retention.  Mr. Triplett is a very nice man who was a patient of Dr. Negron.  He has been in retention, and had a catheter since December.  He has failed at least 1 voiding trial in the interim.  He has been worked up for possible procedure to relieve obstruction, I do not have his office chart and available to see where we stand with respect to workup.  However I do not expect him to void this admission.  His catheter needs to stay in for now and he is to follow up with Dr. Negron upon discharge.      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, unspecified extremity     Coronary artery disease     Dyslipidemia     Hypertension        Past Surgical History:   Procedure Laterality Date    AMPUTATION Right     Right arm    CAROTID STENT      CHOLECYSTECTOMY  3/27/2023    Procedure: CHOLECYSTECTOMY;  Surgeon: Abdirahman Brown MD;  Location: [facility];  Service: Oncology;;    EGD, WITH HEMORRHAGE CONTROL  1/19/2023    Procedure: EGD,WITH HEMORRHAGE CONTROL;  Surgeon: Ranjeet Perez MD;  Location: [facility];  Service: Gastroenterology;;  NextPowder HemeSpray    ERCP N/A 12/21/2022    Procedure: ERCP;  Surgeon: Sushil Anderson MD;  Location: [facility] ENDOSCOPY;  Service: Gastroenterology;  Laterality: N/A;  food in abdomen    ERCP, WITH BIOPSY  12/21/2022    Procedure: ERCP, WITH BIOPSY;  Surgeon: Sushil Anderson MD;  Location: [facility] ENDOSCOPY;  Service: Gastroenterology;;    ESOPHAGOGASTRODUODENOSCOPY N/A 1/19/2023    Procedure: EGD;  Surgeon: Ranjeet Perez MD;  Location: [facility] OR;  Service: Gastroenterology;  Laterality: N/A;    EXPLORATION OF COMMON BILE DUCT  3/27/2023    Procedure: EXPLORATION, COMMON BILE DUCT;  Surgeon: Abdirahman Brown MD;  Location: [facility] OR;  Service: Oncology;;    LEFT HEART  CATHETERIZATION      LIVER BIOPSY  3/27/2023    Procedure: BIOPSY, LIVER;  Surgeon: Abdirahman Brown MD;  Location: [facility] OR;  Service: Oncology;;    LYMPHADENECTOMY  3/27/2023    Procedure: LYMPHADENECTOMY;  Surgeon: Abdirahman Brown MD;  Location: [facility] OR;  Service: Oncology;;  Portal/celiac lymphadenectomy    TONSILLECTOMY      WHIPPLE PROCEDURE N/A 3/27/2023    Procedure: WHIPPLE PROCEDURE;  Surgeon: Abdirahman Brown MD;  Location: [facility] OR;  Service: Oncology;  Laterality: N/A;       Current Facility-Administered Medications   Medication Dose Route Frequency Provider Last Rate Last Admin    0.9%  NaCl infusion (for blood administration)   Intravenous Q24H PRN Abdirahman Brown MD        acetaminophen tablet 650 mg  650 mg Oral Q6H PRN SERA MendezBC   650 mg at 03/26/23 2059    ALPRAZolam tablet 0.5 mg  0.5 mg Oral TID PRN Jaron Macdonald MD   0.5 mg at 03/31/23 2307    alvimopan capsule 12 mg  12 mg Oral BID Maria Luisa Bonner, HAMILTONP   12 mg at 04/01/23 0913    dextrose 10% bolus 125 mL 125 mL  12.5 g Intravenous PRN Maria Luisa Bonner, HAMILTONP        dextrose 10% bolus 125 mL 125 mL  12.5 g Intravenous PRN Ashwini Mcmanus MD        dextrose 10% bolus 250 mL 250 mL  25 g Intravenous PRN Maria Luisa Bonner, HAMILTONP        dextrose 10% bolus 250 mL 250 mL  25 g Intravenous PRN Ashwini Mcmanus MD   Stopped at 03/28/23 1344    diphenhydrAMINE injection 12.5 mg  12.5 mg Intravenous Q4H PRN Maria Luisa Bonner, HAMILTONP        enoxaparin injection 30 mg  30 mg Subcutaneous Q12H HAMILTON BuschP   30 mg at 04/01/23 0913    finasteride tablet 5 mg  5 mg Oral Daily Jaron Macdonald MD   5 mg at 03/31/23 2138    glucagon (human recombinant) injection 1 mg  1 mg Intramuscular PRN Maria Luisa Bonner, HAMILTONP        glucose chewable tablet 16 g  16 g Oral PRN Maria Luisa Bonner, HAMILTONP        glucose chewable tablet 24 g  24 g Oral PRN Maria Luisa Bonner, HAMILTONP        HYDROcodone-acetaminophen 7.5-325 mg per tablet 1 tablet  1 tablet Oral Q6H PRN Abdirahman Brown MD   1 tablet  at 04/01/23 0950    hydrOXYzine injection 50 mg  50 mg Intramuscular Nightly PRN Peter Garrett MD   50 mg at 03/19/23 2110    insulin aspart U-100 injection 0-5 Units  0-5 Units Subcutaneous QID (AC + HS) PRN KELVIN Busch        magnesium oxide tablet 400 mg  400 mg Oral BID Peter Garrett MD   400 mg at 04/01/23 0913    melatonin tablet 6 mg  6 mg Oral Nightly PRN ALICE Mendez-BC   6 mg at 03/26/23 2059    metoclopramide HCl injection 10 mg  10 mg Intravenous TID KELVIN Busch   10 mg at 04/01/23 1555    naloxone 0.4 mg/mL injection 0.02 mg  0.02 mg Intravenous PRN KELVIN Busch        ondansetron injection 4 mg  4 mg Intravenous Q4H PRN ALICE Mendez-BC   4 mg at 03/28/23 1939    ondansetron injection 8 mg  8 mg Intravenous Q8H PRN KELVIN Busch   8 mg at 03/28/23 0856    pantoprazole EC tablet 40 mg  40 mg Oral Daily Jaron Macdonald MD   40 mg at 04/01/23 0913    promethazine tablet 25 mg  25 mg Oral Q6H PRN KELVIN Busch   25 mg at 03/29/23 0139    simethicone chewable tablet 80 mg  1 tablet Oral QID PRN ALICE Mendez-BC        sodium chloride 0.9% flush 10 mL  10 mL Intravenous Q6H Abdirahman Brown MD   10 mL at 04/01/23 0120    And    sodium chloride 0.9% flush 10 mL  10 mL Intravenous PRN Abdirahman Brown MD        tamsulosin 24 hr capsule 0.4 mg  0.4 mg Oral Daily Jaron Macdonald MD   0.4 mg at 04/01/23 0913    TPN ADULT CENTRAL LINE CUSTOM   Intravenous Continuous Ashwini Mcmanus MD        zinc oxide-cod liver oil 40 % paste   Topical (Top) BID Lee Montaño MD   Given at 04/01/23 0913       Recent Results (from the past 24 hour(s))   POCT glucose    Collection Time: 03/31/23  9:37 PM   Result Value Ref Range    POCT Glucose 138 (H) 70 - 110 mg/dL   Amylase, Body Fluid    Collection Time: 04/01/23  4:00 AM   Result Value Ref Range    Amylase Body Fluid 10 U/L   Comprehensive metabolic panel    Collection Time: 04/01/23  6:37 AM   Result Value  Ref Range    Sodium Level 137 136 - 145 mmol/L    Potassium Level 3.5 3.5 - 5.1 mmol/L    Chloride 103 98 - 107 mmol/L    Carbon Dioxide 26 23 - 31 mmol/L    Glucose Level 140 (H) 82 - 115 mg/dL    Blood Urea Nitrogen 18.8 8.4 - 25.7 mg/dL    Creatinine 0.63 (L) 0.73 - 1.18 mg/dL    Calcium Level Total 8.2 (L) 8.8 - 10.0 mg/dL    Protein Total 4.3 (L) 5.8 - 7.6 gm/dL    Albumin Level 1.8 (L) 3.4 - 4.8 g/dL    Globulin 2.5 2.4 - 3.5 gm/dL    Albumin/Globulin Ratio 0.7 (L) 1.1 - 2.0 ratio    Bilirubin Total 2.0 (H) <=1.5 mg/dL    Alkaline Phosphatase 714 (H) 40 - 150 unit/L    Alanine Aminotransferase 48 0 - 55 unit/L    Aspartate Aminotransferase 36 (H) 5 - 34 unit/L    eGFR >60 mls/min/1.73/m2   Magnesium    Collection Time: 04/01/23  6:37 AM   Result Value Ref Range    Magnesium Level 1.70 1.60 - 2.60 mg/dL   Protime-INR    Collection Time: 04/01/23  6:37 AM   Result Value Ref Range    PT 13.7 12.5 - 14.5 seconds    INR 1.06 0.00 - 1.30   APTT    Collection Time: 04/01/23  6:37 AM   Result Value Ref Range    PTT 34.7 (H) 23.2 - 33.7 seconds   Phosphorus    Collection Time: 04/01/23  6:37 AM   Result Value Ref Range    Phosphorus Level 3.0 2.3 - 4.7 mg/dL   CBC with Differential    Collection Time: 04/01/23  6:37 AM   Result Value Ref Range    WBC 7.8 4.5 - 11.5 x10(3)/mcL    RBC 3.13 (L) 4.70 - 6.10 x10(6)/mcL    Hgb 9.0 (L) 14.0 - 18.0 g/dL    Hct 27.7 (L) 42.0 - 52.0 %    MCV 88.5 80.0 - 94.0 fL    MCH 28.8 27.0 - 31.0 pg    MCHC 32.5 (L) 33.0 - 36.0 g/dL    RDW 15.7 11.5 - 17.0 %    Platelet 322 130 - 400 x10(3)/mcL    MPV 9.6 7.4 - 10.4 fL    Neut % 69.9 %    Lymph % 17.3 %    Mono % 8.1 %    Eos % 2.8 %    Basophil % 0.5 %    Lymph # 1.35 0.6 - 4.6 x10(3)/mcL    Neut # 5.47 2.1 - 9.2 x10(3)/mcL    Mono # 0.63 0.1 - 1.3 x10(3)/mcL    Eos # 0.22 0 - 0.9 x10(3)/mcL    Baso # 0.04 0 - 0.2 x10(3)/mcL    IG# 0.11 (H) 0 - 0.04 x10(3)/mcL    IG% 1.4 %    NRBC% 0.0 %   POCT glucose    Collection Time: 04/01/23 12:32 PM    Result Value Ref Range    POCT Glucose 153 (H) 70 - 110 mg/dL   POCT glucose    Collection Time: 04/01/23  3:45 PM   Result Value Ref Range    POCT Glucose 103 70 - 110 mg/dL         Chart reviewed and patient examined.    PE:  Paul catheter  In place draining clear urine external genitalia normal    Impresssion/Plan       Urinary retention chronic  Workup in progress  Patient is to follow up with Dr. Negron upon discharge.      Orlando Macdonald MD

## 2023-04-02 NOTE — PROGRESS NOTES
"[facility]  HOSPITAL MEDICINE  PROGRESS NOTE        CHIEF COMPLAINT   Hospital follow up    HOSPITAL COURSE   82 y.o. 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 Work up in ED noted patient afebrile, HDS. Labs notable for ALP 1544, Total bilirubin 2.9, AST 40, ALT 65, potassium 5.2, bicarb 9, BUN 23, Cr 1.5.  Staging CT C/A/P from 3/15 noting developing gastric outlet obstruction, secondary to a 3.2 cm long "apple-core" lesion in the 3rd portion of the duodenum, at the level of the ampulla; however there was no evidence of distant metastatic disease.  Of notes patient recently admitted to hospital from 3/2-3/10, being treated for enterococcus faecium bacteremia, planned for antibiotics through 3/18.  Plan was to have subsequent follow-up with Surgical Oncology thereafter to discuss possible Whipple procedure.  Patient was admitted to hospital medicine services for management.     Surgical Oncology consulted given GOO, planning for Whipple on Mon 3/27.  In the interim patient will be optimized medially for surgery.  Continued on liquids, dietary protein supplementation added.  Also started on TPN in the interim. Continued to work with physical therapy.   On 03/27/2023, Patient underwent pancreatico duodenectomy portal/celiac lymphadenectomy, common bile duct exploration, falciform ligament pedicle flap, wedge resection of segment 4 liver lesion, cholecystectomy under GA.  Estimated blood loss 100 cc.    Today  Seen examined this morning.  His only complaint this morning was that he should not be taken off of TPN.  Discuss trying to increase his oral intake and we will slowly wean him off of the TPN.  Does not have much of an " appetite.        OBJECTIVE/PHYSICAL EXAM     VITAL SIGNS (MOST RECENT):  Temp: 98.3 °F (36.8 °C) (04/02/23 0805)  Pulse: 84 (04/02/23 0805)  Resp: 18 (04/02/23 0043)  BP: (!) 145/89 (04/02/23 0805)  SpO2: 98 % (04/02/23 0805) VITAL SIGNS (24 HOUR RANGE):  Temp:  [97.9 °F (36.6 °C)-98.5 °F (36.9 °C)] 98.3 °F (36.8 °C)  Pulse:  [64-84] 84  Resp:  [18] 18  SpO2:  [97 %-99 %] 98 %  BP: (128-157)/(72-93) 145/89   GENERAL: In no acute distress, afebrile  HEENT:  CHEST: Clear to auscultation bilaterally  HEART: S1, S2, no appreciable murmur  ABDOMEN: Soft, upper abdominal wound VAC, left lower quadrant drain, expected tenderness postop  MSK: Warm, no lower extremity edema, no clubbing or cyanosis  NEUROLOGIC: Alert and oriented x4, moving all extremities with good strength   INTEGUMENTARY:  PSYCHIATRY:        ASSESSMENT/PLAN   Adenocarcinoma of the duodenum/ampulla  Gastric outlet obstruction-status post Whipple's March 27  Enterococcus faecium bacteremia-cleared March 4/treated    History of:  Adenocarcinoma of ampulla  Obstructive jaundice 2/2 duodenal mass-status post biliary drain      Surgical oncology following.  Okay to wean TPN over the next few days.  Continue TPN, weaning rate to discontinue over the next few days.  Boost plus supplement.  Continue PT and OT.  Encouraged to ambulate.  Case management currently working on LTAC.  Discuss with Dr. Brown he is okay with discontinuing TPN or changing to PPN.  Would like to avoid daily labs.    DVT prophylaxis:  Lovenox 30 b.i.d.    Anticipated discharge and disposition:   __________________________________________________________________________    LABS/MICRO/MEDS/DIAGNOSTICS       LABS  Recent Labs     04/02/23  0421   *   K 3.3*   CHLORIDE 102   CO2 27   BUN 15.9   CREATININE 0.59*   GLUCOSE 132*   CALCIUM 8.1*   ALKPHOS 755*   AST 40*   ALT 52   ALBUMIN 1.7*       Recent Labs     04/01/23  0637   WBC 7.8   RBC 3.13*   HCT 27.7*   MCV 88.5             MICROBIOLOGY  Microbiology Results (last 7 days)       ** No results found for the last 168 hours. **               MEDICATIONS   alvimopan  12 mg Oral BID    enoxaparin  30 mg Subcutaneous Q12H    finasteride  5 mg Oral Daily    magnesium oxide  400 mg Oral BID    metoclopramide HCl  10 mg Intravenous TID    pantoprazole  40 mg Oral Daily    sodium chloride 0.9%  10 mL Intravenous Q6H    tamsulosin  0.4 mg Oral Daily    zinc oxide-cod liver oil   Topical (Top) BID         INFUSIONS   TPN ADULT CENTRAL LINE CUSTOM 45 mL/hr at 04/01/23 2224            DIAGNOSTIC TESTS  X-Ray Abdomen AP 1 View   Final Result      As above.         Electronically signed by: Quincy Roberts   Date:    03/27/2023   Time:    15:23      X-Ray Chest 1 View   Final Result      Optimal placement of the PICC line.         Electronically signed by: Shant Sequeira   Date:    03/21/2023   Time:    11:07           EF   Date Value Ref Range Status   03/04/2023 60 % Final              Case related differential diagnoses have been reviewed; assessment and plan has been documented. I have personally reviewed the labs and test results that are currently available; I have reviewed the patients medication list. I have reviewed the consulting providers recommendations. I have reviewed or attempted to review medical records based upon their availability.  All of the patient's and/or family's questions have been addressed and answered to the best of my ability.  I will continue to monitor closely and make adjustments to medical management as needed.  This document was created using M*Modal Fluency Direct.  Transcription errors may have been made.  Please contact me if any questions may rise regarding documentation to clarify transcription.        Ja Eduardo MD   04/02/2023   Internal Medicine

## 2023-04-02 NOTE — PROGRESS NOTES
Patient without complaint, no nausea vomiting   Afebrile, vital signs stable  Urine output adequate  No apparent distress  Regular rate and rhythm, clear to auscultation bilaterally  Abdomen is soft, nondistended, incisions are clean dry and intact  Extremities without edema, SCDs in place     Labs reviewed    Advance diet as tolerated, strongly encouraged to drink supplements   DC PPN as this may decrease appetite and has dubious benefits  Placement this coming week    Abdirahman Brown MD  Surgical Oncology  Complex General, Gastrointestinal and Hepatobiliary Surgery

## 2023-04-03 LAB
ALBUMIN SERPL-MCNC: 1.7 G/DL (ref 3.4–4.8)
ALBUMIN/GLOB SERPL: 0.7 RATIO (ref 1.1–2)
ALP SERPL-CCNC: 754 UNIT/L (ref 40–150)
ALT SERPL-CCNC: 53 UNIT/L (ref 0–55)
AST SERPL-CCNC: 41 UNIT/L (ref 5–34)
BILIRUBIN DIRECT+TOT PNL SERPL-MCNC: 1.7 MG/DL
BUN SERPL-MCNC: 15.8 MG/DL (ref 8.4–25.7)
CALCIUM SERPL-MCNC: 8.1 MG/DL (ref 8.8–10)
CHLORIDE SERPL-SCNC: 103 MMOL/L (ref 98–107)
CO2 SERPL-SCNC: 26 MMOL/L (ref 23–31)
CREAT SERPL-MCNC: 0.58 MG/DL (ref 0.73–1.18)
GFR SERPLBLD CREATININE-BSD FMLA CKD-EPI: >60 MLS/MIN/1.73/M2
GLOBULIN SER-MCNC: 2.6 GM/DL (ref 2.4–3.5)
GLUCOSE SERPL-MCNC: 84 MG/DL (ref 82–115)
GLUCOSE SERPL-MCNC: 93 MG/DL (ref 70–110)
MAGNESIUM SERPL-MCNC: 1.5 MG/DL (ref 1.6–2.6)
PHOSPHATE SERPL-MCNC: 2.9 MG/DL (ref 2.3–4.7)
POCT GLUCOSE: 106 MG/DL (ref 70–110)
POCT GLUCOSE: 87 MG/DL (ref 70–110)
POCT GLUCOSE: 94 MG/DL (ref 70–110)
POTASSIUM SERPL-SCNC: 3.6 MMOL/L (ref 3.5–5.1)
PROT SERPL-MCNC: 4.3 GM/DL (ref 5.8–7.6)
SODIUM SERPL-SCNC: 135 MMOL/L (ref 136–145)

## 2023-04-03 PROCEDURE — A4216 STERILE WATER/SALINE, 10 ML: HCPCS | Performed by: SURGERY

## 2023-04-03 PROCEDURE — 25000003 PHARM REV CODE 250: Performed by: NURSE PRACTITIONER

## 2023-04-03 PROCEDURE — 80053 COMPREHEN METABOLIC PANEL: CPT | Performed by: INTERNAL MEDICINE

## 2023-04-03 PROCEDURE — 25000003 PHARM REV CODE 250: Performed by: INTERNAL MEDICINE

## 2023-04-03 PROCEDURE — 11000001 HC ACUTE MED/SURG PRIVATE ROOM

## 2023-04-03 PROCEDURE — 97116 GAIT TRAINING THERAPY: CPT | Mod: CQ

## 2023-04-03 PROCEDURE — 83735 ASSAY OF MAGNESIUM: CPT | Performed by: INTERNAL MEDICINE

## 2023-04-03 PROCEDURE — 97535 SELF CARE MNGMENT TRAINING: CPT | Mod: CQ

## 2023-04-03 PROCEDURE — 63600175 PHARM REV CODE 636 W HCPCS: Performed by: NURSE PRACTITIONER

## 2023-04-03 PROCEDURE — 84100 ASSAY OF PHOSPHORUS: CPT | Performed by: INTERNAL MEDICINE

## 2023-04-03 PROCEDURE — 63600175 PHARM REV CODE 636 W HCPCS: Performed by: INTERNAL MEDICINE

## 2023-04-03 PROCEDURE — 25000003 PHARM REV CODE 250: Performed by: SURGERY

## 2023-04-03 RX ORDER — DRONABINOL 2.5 MG/1
5 CAPSULE ORAL
Status: DISCONTINUED | OUTPATIENT
Start: 2023-04-03 | End: 2023-04-06 | Stop reason: HOSPADM

## 2023-04-03 RX ORDER — METOCLOPRAMIDE HYDROCHLORIDE 5 MG/ML
10 INJECTION INTRAMUSCULAR; INTRAVENOUS EVERY 6 HOURS PRN
Status: DISCONTINUED | OUTPATIENT
Start: 2023-04-03 | End: 2023-04-06 | Stop reason: HOSPADM

## 2023-04-03 RX ADMIN — ENOXAPARIN SODIUM 30 MG: 30 INJECTION SUBCUTANEOUS at 08:04

## 2023-04-03 RX ADMIN — ALVIMOPAN 12 MG: 12 CAPSULE ORAL at 09:04

## 2023-04-03 RX ADMIN — PANTOPRAZOLE SODIUM 40 MG: 40 TABLET, DELAYED RELEASE ORAL at 08:04

## 2023-04-03 RX ADMIN — METOCLOPRAMIDE 10 MG: 5 INJECTION, SOLUTION INTRAMUSCULAR; INTRAVENOUS at 08:04

## 2023-04-03 RX ADMIN — SODIUM CHLORIDE, PRESERVATIVE FREE 10 ML: 5 INJECTION INTRAVENOUS at 06:04

## 2023-04-03 RX ADMIN — Medication 400 MG: at 09:04

## 2023-04-03 RX ADMIN — Medication: at 09:04

## 2023-04-03 RX ADMIN — SODIUM CHLORIDE, PRESERVATIVE FREE 10 ML: 5 INJECTION INTRAVENOUS at 12:04

## 2023-04-03 RX ADMIN — HYDROCODONE BITARTRATE AND ACETAMINOPHEN 1 TABLET: 7.5; 325 TABLET ORAL at 10:04

## 2023-04-03 RX ADMIN — ENOXAPARIN SODIUM 30 MG: 30 INJECTION SUBCUTANEOUS at 09:04

## 2023-04-03 RX ADMIN — DRONABINOL 5 MG: 2.5 CAPSULE ORAL at 03:04

## 2023-04-03 RX ADMIN — Medication 6 MG: at 09:04

## 2023-04-03 RX ADMIN — HYDROCODONE BITARTRATE AND ACETAMINOPHEN 1 TABLET: 7.5; 325 TABLET ORAL at 08:04

## 2023-04-03 RX ADMIN — FINASTERIDE 5 MG: 5 TABLET, FILM COATED ORAL at 09:04

## 2023-04-03 RX ADMIN — DRONABINOL 5 MG: 2.5 CAPSULE ORAL at 08:04

## 2023-04-03 RX ADMIN — SODIUM CHLORIDE, PRESERVATIVE FREE 10 ML: 5 INJECTION INTRAVENOUS at 05:04

## 2023-04-03 RX ADMIN — ALPRAZOLAM 0.5 MG: 0.5 TABLET ORAL at 09:04

## 2023-04-03 RX ADMIN — ALVIMOPAN 12 MG: 12 CAPSULE ORAL at 08:04

## 2023-04-03 RX ADMIN — Medication 400 MG: at 08:04

## 2023-04-03 RX ADMIN — TAMSULOSIN HYDROCHLORIDE 0.4 MG: 0.4 CAPSULE ORAL at 08:04

## 2023-04-03 NOTE — PT/OT/SLP PROGRESS
1st attempt to see pt was at 0826 where pt was on toilet. Conf had with pt brother-in-law about d/c planning. PTA educated family member on her d/c recs and explained how pt was mobilizing. Family member states family all in agreement for pt to be placed. Time spent: [id number].    2nd attempt at 0919 where pt now back in bed and declined to mobilize at this time 2/2 just having walked in the [place] with brother-in-law. PT to f/u as schedule allows.

## 2023-04-03 NOTE — PROGRESS NOTES
SURG ONC    PT RESTING  TELLS ME HE IS DOING WELL  TOLERATING LOW FAT DIET, EATING SOME  NO NAUSEA OR VOMITING  NO FEVER OR CHILLS    ABD SOFT  PREVENA WELL SECURED    VSS; NO FEVER    HE IS MOBILZING SOME WITH ASSISTANCE  DENIES PAIN    PLAN  DC PREVENA  PLACEMENT PER CASE MANAGEMENT

## 2023-04-03 NOTE — PT/OT/SLP PROGRESS
Physical Therapy         Treatment        Quincy Triplett   MRN: [id number]     PT Received On: 04/03/23  PT Start Time: 1338     PT Stop Time: 1351    PT Total Time (min): 13 min       Billable Minutes:  Gait [id number] TherEx 3  Total Minutes: 13    Treatment Type: Treatment  PT/PTA: PTA     Number of PTA visits since last PT visit: 4       General Precautions: Standard, fall  Orthopedic Precautions: Orthopedic Precautions : N/A   Braces:           Subjective:  Communicated with NSG prior to session.    Pain/Comfort  Pain Rating 1: 0/10    Objective:  Patient found in bed with HOB elevated, with Patient found with: PICC line, campos catheter    Functional Mobility:  Bed Mobility:   Supine to sit: Moderate Assistance   Sit to supine: Activity did not occur   Rolling: Activity did not occur   Scooting: Contact Guard Assistance    Balance:   Static Sit: GOOD+: Takes MAXIMAL challenges from all directions.    Dynamic Sit:  GOOD-: Incosistently Maintains balance through MODERATE excursions of active trunk movement,     Static Stand: FAIR: Maintains without assist but unable to take challenges  Dynamic stand: POOR+: Needs MIN (minimal ) assist during gait    Transfer Training:  Sit to stand:Minimal Assistance with No Assistive Device . 1 trial from EOB    Gait Training:  Pt amb 110ft HHA/Judd. Pt unsteady but had no major LOB.     Additional Treatment:  BLE AROM: ankle pumps, knee flex/ext, alt marching, hip add/abd. 15 reps BLE.     Activity Tolerance:  Patient limited by fatigue    Patient left up in chair with call button in reach.    Assessment:  Quincy Triplett is a 82 y.o. male with a medical diagnosis of adenocarcinoma of duodenum, gastric outlet obstruction s/t tumor, obstructive jaundice s/p percutaneous drain, severe metabolic acidosis.  pt with hx of R arm amputation below elbow. He presents with the following impairments/functional limitations: weakness, impaired endurance, impaired self care skills, impaired  functional mobility, gait instability, impaired balance.     Pt progressing well with PT. Pt would benefit from further TCU/LTACH therapy services to increase overall independence level and assist in getting pt closer to PLOF.      Rehab potential is excellent.    Activity tolerance: Excellent    Discharge recommendations: Discharge Facility/Level of Care Needs: LTACH (long-term acute care hospital) (TCU)     Equipment recommendations: Equipment Needed After Discharge: to be determined by next level of care     GOALS:   Multidisciplinary Problems       Physical Therapy Goals          Problem: Physical Therapy    Goal Priority Disciplines Outcome Goal Variances Interventions   Physical Therapy Goal     PT, PT/OT Ongoing, Progressing     Description: Goals to be met by: 23     Patient will increase functional independence with mobility by performin. Supine to sit with [place]  2. Sit to stand transfer with [place]  3. Bed to chair transfer with [place] using No Assistive Device  4. Gait  x 500 feet with [place] using No Assistive Device.                          PLAN:    Patient to be seen 5 x/week  to address the above listed problems via gait training, therapeutic activities, therapeutic exercises  Plan of Care expires: 23  Plan of Care reviewed with: patient, family         4/3/2023

## 2023-04-03 NOTE — PROGRESS NOTES
"[facility]  HOSPITAL MEDICINE  PROGRESS NOTE        CHIEF COMPLAINT   Hospital follow up    HOSPITAL COURSE   82 y.o. 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 Work up in ED noted patient afebrile, HDS. Labs notable for ALP 1544, Total bilirubin 2.9, AST 40, ALT 65, potassium 5.2, bicarb 9, BUN 23, Cr 1.5.  Staging CT C/A/P from 3/15 noting developing gastric outlet obstruction, secondary to a 3.2 cm long "apple-core" lesion in the 3rd portion of the duodenum, at the level of the ampulla; however there was no evidence of distant metastatic disease.  Of notes patient recently admitted to hospital from 3/2-3/10, being treated for enterococcus faecium bacteremia, planned for antibiotics through 3/18.  Plan was to have subsequent follow-up with Surgical Oncology thereafter to discuss possible Whipple procedure.  Patient was admitted to hospital medicine services for management.     Surgical Oncology consulted given GOO, planning for Whipple on Mon 3/27.  In the interim patient will be optimized medially for surgery.  Continued on liquids, dietary protein supplementation added.  Also started on TPN in the interim. Continued to work with physical therapy.   On 03/27/2023, Patient underwent pancreatico duodenectomy portal/celiac lymphadenectomy, common bile duct exploration, falciform ligament pedicle flap, wedge resection of segment 4 liver lesion, cholecystectomy under GA.  Estimated blood loss 100 cc.    Today  Seen examined this morning.  Not eating much but is drinking his boost supplement.  Discussed increased his oral intake.  TPN was d/paulette yesterday.  Spoke with Ashleigh PHAN to look into TCU.         OBJECTIVE/PHYSICAL EXAM "     VITAL SIGNS (MOST RECENT):  Temp: 97.8 °F (36.6 °C) (04/03/23 0755)  Pulse: 77 (04/03/23 0755)  Resp: 18 (04/03/23 0326)  BP: 136/74 (04/03/23 0755)  SpO2: 97 % (04/03/23 0755) VITAL SIGNS (24 HOUR RANGE):  Temp:  [97.8 °F (36.6 °C)-99 °F (37.2 °C)] 97.8 °F (36.6 °C)  Pulse:  [73-90] 77  Resp:  [18] 18  SpO2:  [97 %-99 %] 97 %  BP: (132-147)/(62-77) 136/74   GENERAL: In no acute distress, afebrile  HEENT:  CHEST: Clear to auscultation bilaterally  HEART: S1, S2, no appreciable murmur  ABDOMEN: Soft, upper abdominal wound VAC, left lower quadrant drain, expected tenderness postop  MSK: Warm, no lower extremity edema, no clubbing or cyanosis  NEUROLOGIC: Alert and oriented x4, moving all extremities with good strength   INTEGUMENTARY:  PSYCHIATRY:        ASSESSMENT/PLAN   Adenocarcinoma of the duodenum/ampulla  Gastric outlet obstruction-status post Whipple's March 27  Enterococcus faecium bacteremia-cleared March 4/treated    History of:  Adenocarcinoma of ampulla  Obstructive jaundice 2/2 duodenal mass-status post biliary drain      Surgical oncology following.    Boost plus supplement.  Start Marinol BID.  Continue PT and OT.  Encouraged to ambulate.  Case management following.  Start look into TCU    DVT prophylaxis:  Lovenox 30 b.i.d.    Anticipated discharge and disposition:   __________________________________________________________________________    LABS/MICRO/MEDS/DIAGNOSTICS       LABS  Recent Labs     04/03/23  0340   *   K 3.6   CHLORIDE 103   CO2 26   BUN 15.8   CREATININE 0.58*   GLUCOSE 84   CALCIUM 8.1*   ALKPHOS 754*   AST 41*   ALT 53   ALBUMIN 1.7*       Recent Labs     04/01/23  0637   WBC 7.8   RBC 3.13*   HCT 27.7*   MCV 88.5            MICROBIOLOGY  Microbiology Results (last 7 days)       ** No results found for the last 168 hours. **               MEDICATIONS   alvimopan  12 mg Oral BID    enoxaparin  30 mg Subcutaneous Q12H    finasteride  5 mg Oral Daily    magnesium oxide   400 mg Oral BID    metoclopramide HCl  10 mg Intravenous TID    pantoprazole  40 mg Oral Daily    sodium chloride 0.9%  10 mL Intravenous Q6H    tamsulosin  0.4 mg Oral Daily    zinc oxide-cod liver oil   Topical (Top) BID         INFUSIONS             DIAGNOSTIC TESTS  X-Ray Abdomen AP 1 View   Final Result      As above.         Electronically signed by: Quincy Roberts   Date:    03/27/2023   Time:    15:23      X-Ray Chest 1 View   Final Result      Optimal placement of the PICC line.         Electronically signed by: Shant Sequeira   Date:    03/21/2023   Time:    11:07           EF   Date Value Ref Range Status   03/04/2023 60 % Final              Case related differential diagnoses have been reviewed; assessment and plan has been documented. I have personally reviewed the labs and test results that are currently available; I have reviewed the patients medication list. I have reviewed the consulting providers recommendations. I have reviewed or attempted to review medical records based upon their availability.  All of the patient's and/or family's questions have been addressed and answered to the best of my ability.  I will continue to monitor closely and make adjustments to medical management as needed.  This document was created using M*Modal Fluency Direct.  Transcription errors may have been made.  Please contact me if any questions may rise regarding documentation to clarify transcription.        Ja Eduardo MD   04/03/2023   Internal Medicine

## 2023-04-04 LAB
POCT GLUCOSE: 100 MG/DL (ref 70–110)
POCT GLUCOSE: 132 MG/DL (ref 70–110)
POCT GLUCOSE: 133 MG/DL (ref 70–110)
POCT GLUCOSE: 92 MG/DL (ref 70–110)
POCT GLUCOSE: 93 MG/DL (ref 70–110)

## 2023-04-04 PROCEDURE — 25000003 PHARM REV CODE 250: Performed by: NURSE PRACTITIONER

## 2023-04-04 PROCEDURE — A4216 STERILE WATER/SALINE, 10 ML: HCPCS | Performed by: SURGERY

## 2023-04-04 PROCEDURE — 25000003 PHARM REV CODE 250: Performed by: INTERNAL MEDICINE

## 2023-04-04 PROCEDURE — 11000001 HC ACUTE MED/SURG PRIVATE ROOM

## 2023-04-04 PROCEDURE — 25000003 PHARM REV CODE 250: Performed by: SURGERY

## 2023-04-04 PROCEDURE — 63600175 PHARM REV CODE 636 W HCPCS: Performed by: INTERNAL MEDICINE

## 2023-04-04 PROCEDURE — 63600175 PHARM REV CODE 636 W HCPCS: Performed by: NURSE PRACTITIONER

## 2023-04-04 PROCEDURE — 97530 THERAPEUTIC ACTIVITIES: CPT | Mod: CO

## 2023-04-04 RX ORDER — ENOXAPARIN SODIUM 100 MG/ML
40 INJECTION SUBCUTANEOUS EVERY 24 HOURS
Status: DISCONTINUED | OUTPATIENT
Start: 2023-04-05 | End: 2023-04-06 | Stop reason: HOSPADM

## 2023-04-04 RX ADMIN — TAMSULOSIN HYDROCHLORIDE 0.4 MG: 0.4 CAPSULE ORAL at 08:04

## 2023-04-04 RX ADMIN — DRONABINOL 5 MG: 2.5 CAPSULE ORAL at 04:04

## 2023-04-04 RX ADMIN — Medication 6 MG: at 09:04

## 2023-04-04 RX ADMIN — Medication: at 08:04

## 2023-04-04 RX ADMIN — SODIUM CHLORIDE, PRESERVATIVE FREE 10 ML: 5 INJECTION INTRAVENOUS at 06:04

## 2023-04-04 RX ADMIN — ALPRAZOLAM 0.5 MG: 0.5 TABLET ORAL at 09:04

## 2023-04-04 RX ADMIN — Medication: at 09:04

## 2023-04-04 RX ADMIN — Medication 400 MG: at 09:04

## 2023-04-04 RX ADMIN — ALVIMOPAN 12 MG: 12 CAPSULE ORAL at 09:04

## 2023-04-04 RX ADMIN — DRONABINOL 5 MG: 2.5 CAPSULE ORAL at 06:04

## 2023-04-04 RX ADMIN — Medication 400 MG: at 08:04

## 2023-04-04 RX ADMIN — PANTOPRAZOLE SODIUM 40 MG: 40 TABLET, DELAYED RELEASE ORAL at 08:04

## 2023-04-04 RX ADMIN — HYDROCODONE BITARTRATE AND ACETAMINOPHEN 1 TABLET: 7.5; 325 TABLET ORAL at 09:04

## 2023-04-04 RX ADMIN — SODIUM CHLORIDE, PRESERVATIVE FREE 10 ML: 5 INJECTION INTRAVENOUS at 12:04

## 2023-04-04 RX ADMIN — FINASTERIDE 5 MG: 5 TABLET, FILM COATED ORAL at 09:04

## 2023-04-04 RX ADMIN — ALVIMOPAN 12 MG: 12 CAPSULE ORAL at 08:04

## 2023-04-04 RX ADMIN — ENOXAPARIN SODIUM 30 MG: 30 INJECTION SUBCUTANEOUS at 08:04

## 2023-04-04 NOTE — PROGRESS NOTES
SURG ONC    NO ACUTE ISSUES  PT UP IN CHAIR  NO PAIN REPORTED  TOLERATING DIET, APPEARS TO BE IMPROVING    VSS; NO FEVER    ABD SOFT  INCISION HEALING WELL  NO LEAKAGE FROM DRAIN SITE    PATH BACK; REVIEWED; DISCUSSED WITH PT      PLAN  WORKING ON PLACEMENT   WILL CONSULT MED ONC WHILE HERE AS PT WILL BE GOING TO REHAB AFTER DISCHARGE AND WANT TO MAKE SURE FOLLOW UP WITH MED ONC IS NOT DELAYED

## 2023-04-04 NOTE — PROGRESS NOTES
"[facility]  [facility] MEDIC[facility]  PROGRESS NOTE        CHIEF COMPLAINT   Hospital follow up    HOSPITAL COURSE   82 y.o. 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 Work up in ED noted patient afebrile, HDS. Labs notable for ALP 1544, Total bilirubin 2.9, AST 40, ALT 65, potassium 5.2, bicarb 9, BUN 23, Cr 1.5.  Staging CT C/A/P from 3/15 noting developing gastric outlet obstruction, secondary to a 3.2 cm long "apple-core" lesion in the 3rd portion of the duodenum, at the level of the ampulla; however there was no evidence of distant metastatic disease.  Of notes patient recently admitted to hospital from 3/2-3/10, being treated for enterococcus faecium bacteremia, planned for antibiotics through 3/18.  Plan was to have subsequent follow-up with Surgical Oncology thereafter to discuss possible Whipple procedure.  Patient was admitted to hospital medicine services for management.     Surgical Oncology consulted given GOO, planning for Whipple on Mon 3/27.  In the interim patient will be optimized medially for surgery.  Continued on liquids, dietary protein supplementation added.  Also started on TPN in the interim. Continued to work with physical therapy.   On 03/27/2023, Patient underwent pancreatico duodenectomy portal/celiac lymphadenectomy, common bile duct exploration, falciform ligament pedicle flap, wedge resection of segment 4 liver lesion, cholecystectomy under GA.  Estimated blood loss 100 cc.    Today  Seen examined this morning.  Appetite is better after addition of Marinol.  Ambulating with his family member.  Waiting for placement.  Medical oncology has also been consulted by Surgical " Oncology.        OBJECTIVE/PHYSICAL EXAM     VITAL SIGNS (MOST RECENT):  Temp: 97.7 °F (36.5 °C) (04/04/23 0741)  Pulse: 84 (04/04/23 0741)  Resp: 19 (04/04/23 0515)  BP: 119/67 (04/04/23 0741)  SpO2: 98 % (04/04/23 0741) VITAL SIGNS (24 HOUR RANGE):  Temp:  [97.5 °F (36.4 °C)-98.4 °F (36.9 °C)] 97.7 °F (36.5 °C)  Pulse:  [83-91] 84  Resp:  [16-19] 19  SpO2:  [96 %-99 %] 98 %  BP: (105-128)/(66-77) 119/67   GENERAL: In no acute distress, afebrile  HEENT:  CHEST: Clear to auscultation bilaterally  HEART: S1, S2, no appreciable murmur  ABDOMEN: Soft, upper abdominal incision stapled, left lower quadrant drain, expected tenderness postop  MSK: Warm, no lower extremity edema, no clubbing or cyanosis  NEUROLOGIC: Alert and oriented x4, moving all extremities with good strength   INTEGUMENTARY:  PSYCHIATRY:        ASSESSMENT/PLAN   Adenocarcinoma of the duodenum/ampulla  Gastric outlet obstruction-status post Whipple's March 27  Enterococcus faecium bacteremia-cleared March 4/treated    History of:  Adenocarcinoma of ampulla  Obstructive jaundice 2/2 duodenal mass-status post biliary drain      Surgical oncology following.    Medical oncology consulted  Boost supplement.  Marinol BID.  Continue PT and OT.  Encouraged to ambulate.  Case management following.  Pending placement to TCU.    DVT prophylaxis:  Lovenox 40    Anticipated discharge and disposition:   __________________________________________________________________________    LABS/MICRO/MEDS/DIAGNOSTICS       LABS  Recent Labs     04/03/23  0340   *   K 3.6   CHLORIDE 103   CO2 26   BUN 15.8   CREATININE 0.58*   GLUCOSE 84   CALCIUM 8.1*   ALKPHOS 754*   AST 41*   ALT 53   ALBUMIN 1.7*       No results for input(s): WBC, RBC, HCT, MCV, PLT in the last 72 hours.    Invalid input(s): HG      MICROBIOLOGY  Microbiology Results (last 7 days)       ** No results found for the last 168 hours. **               MEDICATIONS   alvimopan  12 mg Oral BID    dronabinoL   5 mg Oral BID AC    enoxaparin  30 mg Subcutaneous Q12H    finasteride  5 mg Oral Daily    magnesium oxide  400 mg Oral BID    pantoprazole  40 mg Oral Daily    sodium chloride 0.9%  10 mL Intravenous Q6H    tamsulosin  0.4 mg Oral Daily    zinc oxide-cod liver oil   Topical (Top) BID         INFUSIONS             DIAGNOSTIC TESTS  X-Ray Abdomen AP 1 View   Final Result      As above.         Electronically signed by: Quincy Roberts   Date:    03/27/2023   Time:    15:23      X-Ray Chest 1 View   Final Result      Optimal placement of the PICC line.         Electronically signed by: Shant Sequeira   Date:    03/21/2023   Time:    11:07           EF   Date Value Ref Range Status   03/04/2023 60 % Final              Case related differential diagnoses have been reviewed; assessment and plan has been documented. I have personally reviewed the labs and test results that are currently available; I have reviewed the patients medication list. I have reviewed the consulting providers recommendations. I have reviewed or attempted to review medical records based upon their availability.  All of the patient's and/or family's questions have been addressed and answered to the best of my ability.  I will continue to monitor closely and make adjustments to medical management as needed.  This document was created using M*Modal Fluency Direct.  Transcription errors may have been made.  Please contact me if any questions may rise regarding documentation to clarify transcription.        Ja Eduardo MD   04/04/2023   Internal Medicine

## 2023-04-04 NOTE — PROGRESS NOTES
Inpatient Nutrition Assessment    Admit Date: 3/16/2023   Total duration of encounter: 19 days     Nutrition Recommendation/Prescription     Continue current diet as tolerated.   Boost Breeze TID for additional nourishment; provides 250 kcal and 9 gm protein per container. Pt prefers this over Boost Plus.   Continue appetite stimulant as medically feasible.     Communication of Recommendations: reviewed with patient    Nutrition Assessment     Malnutrition Assessment/Nutrition-Focused Physical Exam    Malnutrition in the context of acute illness or injury  Degree of Malnutrition: non-severe (moderate) malnutrition  Energy Intake: </= 50% of estimated energy requirement for >/= 5 days  Interpretation of Weight Loss: >5% in 1 month  Body Fat:moderate depletion  Area of Body Fat Loss: orbital region  and upper arm region - triceps / biceps  Muscle Mass Loss: moderate depletion  Area of Muscle Mass Loss: temple region - temporalis muscle and clavicle bone region - pectoralis major, deltoid, trapezius muscles  Fluid Accumulation: does not meet criteria  Edema: does not meet criteria   Reduced  Strength: unable to obtain  A minimum of two characteristics is recommended for diagnosis of either severe or non-severe malnutrition.    Chart Review    Reason Seen: malnutrition screening tool (MST), physician consult for initiate TPN; sacral wound, and follow-up    Malnutrition Screening Tool Results   Have you recently lost weight without trying?: Yes: 34 lbs or more  Have you been eating poorly because of a decreased appetite?: Yes   MST Score: 5     Diagnosis:  Acute kidney injury due to intravascular volume depletion secondary to nausea and vomiting   Acute complicated bacterial cystitis, present on admission   Normocytic anemia  Hyponatremia  Transaminitis hyperbilirubinemia   Fatigue (Hx small intestine CA awaiting surgical tx, increased weakness started yesterday)    Relevant Medical History: hypertension,  hyperlipidemia, coronary disease status post stent, peripheral arterial disease, adenocarcinoma of duodenum, BPH with chronic indwelling Paul catheter    Nutrition-Related Medications: dronabinol, Reglan PRN, SSI  Calorie Containing IV Medications: no significant kcals from medications at this time    Nutrition-Related Labs:  3/18: H/H-7.4/21.8, Na-133, K-3.2, Crea-1.25, Ca-8.1, Alk phos-891  3/21/2023: H/H 7.7/24.1, Ca 8.5, Alk Phos 864, Alb 2.0, Total Bili 1.8, AST 44, Gluc 99  3/23/2023: Na 135, Ca 8.6, Alk Phose 844, Alb 1.7, Total Bili 2.7, AST 64, ALT 65, Gluc 113  3/27/2023: H/H 8.1/25.3, Na 134, Cl 109, Crea 0.71, Alk Phos 1026, alb 2.0, Total Bili 3.5, AST 56, ALT 63, Gluc 86  3/29/23: Na 126, Glu 111, GFR>60  3/31/23: Na 131, Glu 139, GFR>60, phos 1.6  4/3/23: Na 135, Glu 84, GFR>60, Mag 1.5    Diet/PN Order: Diet low fiber/residue  Oral Supplement Order: Boost Breeze and Boost Plus  Tube Feeding Order: none  Appetite/Oral Intake: fair/25-50% of meals  Factors Affecting Nutritional Intake: altered gastrointestinal function, clear liquid diet, and nausea  Food/Yarsani/Cultural Preferences: none reported  Food Allergies: no known food allergies    Skin Integrity: drain/device(s), incision  Wound(s):   sacral reddness    Comments    3/18/23: Pt feeling better this morning. Admitted with N/V. But is now able to tolerate liquids today. Dr Garrett added Boost Breeze with meals. Pt is to stay on liquids for now. Pt scheduled for whipple on 3/27/23; pending surgery eval while in hospital. Pt with physical wasting noted along with wt loss. If pt to remain on clear liquids > 7 days, will need TPN.     3/21/2023: Pt reports drinking/eating >75% PO intake of meals. Pt receives Boost Breeze BID. Pt denies N/V. Provided TPN recommendations per consult. Will monitor for refeeding syndrome. Last BM documented as 3/19/2023.    3/23/2023: Pt reports good appetite/PO intake. Implemented food preferences. Pt receives Boost  "Breeze BID. Pt on Clinimix-E @ 75 mL/hr + IL 2 times weekly. Pt reported N/V yesterday but none today. Pt anticipating bowel resection Monday. Palliative following. Will monitor.    3/27/2023: Pt NPO for WHIPPLE procedure, per FNP note, pt will regroup after discharge to assess overall health to determine when to reschedule WHIPPLE procedure. TPN bag hanging at time of visit, pt not in room. Will monitor.    3/29/23: Spoke with RN who reports that pt's TPN was d/c'ed yesterday when potassium was running high. Pt was on Clinimix E TPN; pt is now just on clears but unable to take in much 2/2 nausea.     3/31/23: Pt now on custom TPN; diet advanced to full liquids but pt not taking in much 2/2 early satiety. Noted low phos.     4/3/23: Pt reports tolerating solids well; eating ~ half of most meals; states that he likes the Boost Breeze but would like to cancel the Boost Plus order. Pt is no longer on TPN;  noted appetite stimulant started.     Anthropometrics    Height: 5' 9.02" (175.3 cm)    Last Weight: 65 kg (143 lb 4.8 oz) (23 1244) Weight Method: Standard Scale  BMI (Calculated): 21.2  BMI Classification: underweight (BMI less than 22 if >65 years of age)        Ideal Body Weight (IBW), Male: 160.12 lb     % Ideal Body Weight, Male (lb): 89.5 %                 Usual Body Weight (UBW), k.7 kg  % Usual Body Weight: 90.85  % Weight Change From Usual Weight: -9.35 %  Usual Weight Provided By: EMR weight history    Wt Readings from Last 5 Encounters:   23 65 kg (143 lb 4.8 oz)   23 67.3 kg (148 lb 5.9 oz)   02/15/23 71.7 kg (158 lb)   23 70.3 kg (155 lb)   23 69.3 kg (152 lb 12.5 oz)     Weight Change(s) Since Admission:  Admit Weight: 73.5 kg (162 lb) (23 0452)  65kg; 9% wt loss x1 month. Rt arm amputation noted.   3/18/2023: 65 kg  3/29/23-4/3/23: no new wt noted     Estimated Needs    Weight Used For Calorie Calculations: 65 kg (143 lb 4.8 oz)  Energy Calorie Requirements " (kcal): 1750 kcal (MSJxSF1.3)  Energy Need Method: [place]-St Jeor  Weight Used For Protein Calculations: 65 kg (143 lb 4.8 oz)  Protein Requirements: 90gm (kgx1.4)  Fluid Requirements (mL): 1750mL (1mL/kcal)  Temp: 97.8 °F (36.6 °C)       Enteral Nutrition    Patient not receiving enteral nutrition at this time.    Parenteral Nutrition    Patient not receiving parenteral nutrition at this time.       Evaluation of Received Nutrient Intake    Calories: not meeting estimated needs  Protein: not meeting estimated needs    Patient Education    Not applicable.    Nutrition Diagnosis     PES: Malnutrition related to cancer as evidenced by 9% wt loss x1 month, fat and muscle wasting, poor diet tolerance/intake > 5 days. (continues)    Interventions/Goals     Intervention(s): general/healthful diet, modified composition of parenteral nutrition, commercial beverage, and collaboration with other providers  Goal: Meet greater than 75% of nutritional needs by follow-up. (goal progressing)    Monitoring & Evaluation     Dietitian will monitor energy intake and electrolyte/renal panel.  Nutrition Risk/Follow-Up: high (follow-up in 1-4 days)   Please consult if re-assessment needed sooner.

## 2023-04-04 NOTE — PLAN OF CARE
Problem: Adult Inpatient Plan of Care  Goal: Plan of Care Review  Outcome: Ongoing, Progressing  Goal: Patient-Specific Goal (Individualized)  Outcome: Ongoing, Progressing  Goal: Absence of Hospital-Acquired Illness or Injury  Outcome: Ongoing, Progressing  Goal: Optimal Comfort and Wellbeing  Outcome: Ongoing, Progressing  Goal: Readiness for Transition of Care  Outcome: Ongoing, Progressing     Problem: Adjustment to Illness (Sepsis/Septic Shock)  Goal: Optimal Coping  Outcome: Ongoing, Progressing     Problem: Bleeding (Sepsis/Septic Shock)  Goal: Absence of Bleeding  Outcome: Ongoing, Progressing     Problem: Glycemic Control Impaired (Sepsis/Septic Shock)  Goal: Blood Glucose Level Within Desired Range  Outcome: Ongoing, Progressing     Problem: Infection Progression (Sepsis/Septic Shock)  Goal: Absence of Infection Signs and Symptoms  Outcome: Ongoing, Progressing     Problem: Nutrition Impaired (Sepsis/Septic Shock)  Goal: Optimal Nutrition Intake  Outcome: Ongoing, Progressing     Problem: Fluid and Electrolyte Imbalance (Acute Kidney Injury/Impairment)  Goal: Fluid and Electrolyte Balance  Outcome: Ongoing, Progressing     Problem: Oral Intake Inadequate (Acute Kidney Injury/Impairment)  Goal: Optimal Nutrition Intake  Outcome: Ongoing, Progressing     Problem: Renal Function Impairment (Acute Kidney Injury/Impairment)  Goal: Effective Renal Function  Outcome: Ongoing, Progressing     Problem: Coping Ineffective  Goal: Effective Coping  Outcome: Ongoing, Progressing     Problem: Skin Injury Risk Increased  Goal: Skin Health and Integrity  Outcome: Ongoing, Progressing     Problem: Impaired Wound Healing  Goal: Optimal Wound Healing  Outcome: Ongoing, Progressing     Problem: Infection  Goal: Absence of Infection Signs and Symptoms  Outcome: Ongoing, Progressing     Problem: Fall Injury Risk  Goal: Absence of Fall and Fall-Related Injury  Outcome: Ongoing, Progressing     Problem: Pain Acute  Goal:  Acceptable Pain Control and Functional Ability  Outcome: Ongoing, Progressing

## 2023-04-04 NOTE — PT/OT/SLP PROGRESS
Attempted to see pt for PT tx. Pt UIC and requesting PT come back this afternoon 2/2 him having just mobilized with OT. PT to f/u as schedule allows.

## 2023-04-04 NOTE — PT/OT/SLP PROGRESS
"Occupational Therapy  Treatment    Quincy Triplett   MRN: [id number]   Admitting Diagnosis: <principal problem not specified>    OT Date of Treatment: 04/04/23   OT Start Time: 0906  OT Stop Time: 0914  OT Total Time (min): 8 min     Billable Minutes:  Therapeutic Activity 8  Total Minutes: 8     OT/IZAIAH: IZAIAH     Number of IZAIAH visits since last OT visit: 3    General Precautions: Standard, fall  Orthopedic Precautions:    Braces:      Spiritual, Cultural Beliefs, Mormon Practices, Values that Affect Care: no    Subjective:  UIC, flat affect and appearing sad. Reports having some "bad news," but agreeable to short OT session.     Pain/Comfort  Location - Orientation 1: generalized    Objective:  Patient found with: PICC line, campos catheter    Functional Mobility:    UE Dressing:  Mod A to don robe    therAct:  Requesting functional mobility in hallway, CGA with slight swaying but no major LOB.     Balance:   Static Sit: GOOD-: Takes MODERATE challenges from all directions but inconsistently  Dynamic Sit:  GOOD-: Incosistently Maintains balance through MODERATE excursions of active trunk movement,     Static Stand: GOOD-: Takes MODERATE challenges from all directions inconsistently  Dynamic stand: FAIR: Needs CONTACT GUARD during gait    Additional Treatment:      Patient left up in chair with all lines intact and call button in reach    ASSESSMENT:  Lethargic and appearing sad, impacting performance with OT session. Continues to demo deficits in ADLs, requiring assistance for safety. Pt is high risk for readmission and fall risk 2/2 poor safety awareness. Limited help at home as well. Would benefit from TCU v. Swing bed to improve safety awareness, balance, and endurance prior to returning home.     Rehab potential is good    Activity tolerance: Good    Discharge recommendations: other (see comments) (TCU vs. swing bed)     Equipment recommendations:       GOALS:   Multidisciplinary Problems       Occupational " Therapy Goals          Problem: Occupational Therapy    Goal Priority Disciplines Outcome Interventions   Occupational Therapy Goal     OT, PT/OT Ongoing, Progressing    Description: Goals to be met by: 4/12/23     Patient will increase functional independence with ADLs by performing:    LE Dressing with Modified [place].  Grooming while standing with Modified [place].  Toileting from toilet with Modified [place] for hygiene and clothing management.   Toilet transfer to toilet with Modified [place].                         Plan:  Patient to be seen 3 x/week, 5 x/week to address the above listed problems via self-care/home management, therapeutic activities, therapeutic exercises  Plan of Care expires: 04/12/23  Plan of Care reviewed with: patient         04/04/2023

## 2023-04-04 NOTE — PT/OT/SLP PROGRESS
Attempted to see pt again this afternoon for PT tx. Pt appearing very somber declining to get OOB at this time. Pt stated he got news he has cancer today and just doesn't feel like doing anything this afternoon. PT to f/u as schedule allows.

## 2023-04-05 LAB
POCT GLUCOSE: 101 MG/DL (ref 70–110)
POCT GLUCOSE: 108 MG/DL (ref 70–110)
POCT GLUCOSE: 139 MG/DL (ref 70–110)
POCT GLUCOSE: 99 MG/DL (ref 70–110)

## 2023-04-05 PROCEDURE — 99223 PR INITIAL HOSPITAL CARE,LEVL III: ICD-10-PCS | Mod: ,,, | Performed by: STUDENT IN AN ORGANIZED HEALTH CARE EDUCATION/TRAINING PROGRAM

## 2023-04-05 PROCEDURE — 63600175 PHARM REV CODE 636 W HCPCS: Performed by: INTERNAL MEDICINE

## 2023-04-05 PROCEDURE — 25000003 PHARM REV CODE 250: Performed by: INTERNAL MEDICINE

## 2023-04-05 PROCEDURE — 97116 GAIT TRAINING THERAPY: CPT | Mod: CQ

## 2023-04-05 PROCEDURE — 99223 1ST HOSP IP/OBS HIGH 75: CPT | Mod: ,,, | Performed by: STUDENT IN AN ORGANIZED HEALTH CARE EDUCATION/TRAINING PROGRAM

## 2023-04-05 PROCEDURE — 25000003 PHARM REV CODE 250: Performed by: SURGERY

## 2023-04-05 PROCEDURE — 25000003 PHARM REV CODE 250: Performed by: NURSE PRACTITIONER

## 2023-04-05 PROCEDURE — 11000001 HC ACUTE MED/SURG PRIVATE ROOM

## 2023-04-05 RX ADMIN — DRONABINOL 5 MG: 2.5 CAPSULE ORAL at 04:04

## 2023-04-05 RX ADMIN — ALPRAZOLAM 0.5 MG: 0.5 TABLET ORAL at 08:04

## 2023-04-05 RX ADMIN — Medication: at 09:04

## 2023-04-05 RX ADMIN — Medication: at 08:04

## 2023-04-05 RX ADMIN — HYDROCODONE BITARTRATE AND ACETAMINOPHEN 1 TABLET: 7.5; 325 TABLET ORAL at 08:04

## 2023-04-05 RX ADMIN — Medication 6 MG: at 08:04

## 2023-04-05 RX ADMIN — DRONABINOL 5 MG: 2.5 CAPSULE ORAL at 06:04

## 2023-04-05 RX ADMIN — Medication 400 MG: at 08:04

## 2023-04-05 RX ADMIN — TAMSULOSIN HYDROCHLORIDE 0.4 MG: 0.4 CAPSULE ORAL at 08:04

## 2023-04-05 RX ADMIN — ENOXAPARIN SODIUM 40 MG: 40 INJECTION SUBCUTANEOUS at 04:04

## 2023-04-05 RX ADMIN — ALVIMOPAN 12 MG: 12 CAPSULE ORAL at 08:04

## 2023-04-05 RX ADMIN — PANTOPRAZOLE SODIUM 40 MG: 40 TABLET, DELAYED RELEASE ORAL at 08:04

## 2023-04-05 RX ADMIN — FINASTERIDE 5 MG: 5 TABLET, FILM COATED ORAL at 08:04

## 2023-04-05 NOTE — PROGRESS NOTES
SURG ONC    PT SLEEPING  AROUSES EASILY  TELLS ME HE IS NOT IN ANY PAIN    ABD SOFT  INCISION INTACT, HEALING WELL    VSS; NO FEVER    PLAN  POSSIBLE PLACEMENT TODAY

## 2023-04-05 NOTE — PT/OT/SLP PROGRESS
Initially agreeable to OT session, explained scope of OT. Declining ADLs, requesting to walk later. Declined sitting UIC, returned to bed. PTA notified of pt's request. Will f/u tomorrow.

## 2023-04-05 NOTE — PROGRESS NOTES
"[facility]  HOSPITAL MEDICINE  PROGRESS NOTE        CHIEF COMPLAINT   Hospital follow up    HOSPITAL COURSE   82 y.o. 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 Work up in ED noted patient afebrile, HDS. Labs notable for ALP 1544, Total bilirubin 2.9, AST 40, ALT 65, potassium 5.2, bicarb 9, BUN 23, Cr 1.5.  Staging CT C/A/P from 3/15 noting developing gastric outlet obstruction, secondary to a 3.2 cm long "apple-core" lesion in the 3rd portion of the duodenum, at the level of the ampulla; however there was no evidence of distant metastatic disease.  Of notes patient recently admitted to hospital from 3/2-3/10, being treated for enterococcus faecium bacteremia, planned for antibiotics through 3/18.  Plan was to have subsequent follow-up with Surgical Oncology thereafter to discuss possible Whipple procedure.  Patient was admitted to hospital medicine services for management.     Surgical Oncology consulted given GOO, planning for Whipple on Mon 3/27.  In the interim patient will be optimized medially for surgery.  Continued on liquids, dietary protein supplementation added.  Also started on TPN in the interim. Continued to work with physical therapy.   On 03/27/2023, Patient underwent pancreatico duodenectomy portal/celiac lymphadenectomy, common bile duct exploration, falciform ligament pedicle flap, wedge resection of segment 4 liver lesion, cholecystectomy under GA.  Estimated blood loss 100 cc.  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    Today  Seen examined this morning. "  Medical oncology saw him this morning and discussed chemotherapy couple weeks down the line.  Otherwise not much of an appetite still not eating much.        OBJECTIVE/PHYSICAL EXAM     VITAL SIGNS (MOST RECENT):  Temp: 98.1 °F (36.7 °C) (04/05/23 0352)  Pulse: 68 (04/05/23 0352)  Resp: 18 (04/05/23 0352)  BP: 133/71 (04/05/23 0352)  SpO2: 97 % (04/05/23 0352) VITAL SIGNS (24 HOUR RANGE):  Temp:  [97.8 °F (36.6 °C)-98.8 °F (37.1 °C)] 98.1 °F (36.7 °C)  Pulse:  [68-92] 68  Resp:  [18] 18  SpO2:  [97 %-99 %] 97 %  BP: (103-170)/(64-93) 133/71   GENERAL: In no acute distress, afebrile  HEENT:  CHEST: Clear to auscultation bilaterally  HEART: S1, S2, no appreciable murmur  ABDOMEN: Soft, upper abdominal incision stapled, left lower quadrant drain, expected tenderness postop  MSK: Warm, no lower extremity edema, no clubbing or cyanosis  NEUROLOGIC: Alert and oriented x4, moving all extremities with good strength   INTEGUMENTARY:  PSYCHIATRY:        ASSESSMENT/PLAN   Adenocarcinoma of the duodenum/ampulla  Gastric outlet obstruction-status post Whipple's March 27  Enterococcus faecium bacteremia-cleared March 4/treated    History of:  Adenocarcinoma of ampulla  Obstructive jaundice 2/2 duodenal mass-status post biliary drain      Surgical oncology following.    Medical oncology recommended follow-up in 4 weeks to discuss chemotherapy/radiation therapy.  Boost supplement.  Marinol BID.  Start Remeron.  Okay to bring outside food if that will help with his intake.   Continue PT and OT.  Encouraged to ambulate.  Case management following.  Pending placement to TCU.    DVT prophylaxis:  Lovenox 40    Anticipated discharge and disposition:   __________________________________________________________________________    LABS/MICRO/MEDS/DIAGNOSTICS       LABS  Recent Labs     04/03/23  0340   *   K 3.6   CHLORIDE 103   CO2 26   BUN 15.8   CREATININE 0.58*   GLUCOSE 84   CALCIUM 8.1*   ALKPHOS 754*   AST 41*   ALT 53    ALBUMIN 1.7*       No results for input(s): WBC, RBC, HCT, MCV, PLT in the last 72 hours.    Invalid input(s): HG      MICROBIOLOGY  Microbiology Results (last 7 days)       ** No results found for the last 168 hours. **               MEDICATIONS   alvimopan  12 mg Oral BID    dronabinoL  5 mg Oral BID AC    enoxaparin  40 mg Subcutaneous Daily    finasteride  5 mg Oral Daily    magnesium oxide  400 mg Oral BID    pantoprazole  40 mg Oral Daily    tamsulosin  0.4 mg Oral Daily    zinc oxide-cod liver oil   Topical (Top) BID         INFUSIONS             DIAGNOSTIC TESTS  X-Ray Abdomen AP 1 View   Final Result      As above.         Electronically signed by: Quincy Roberts   Date:    03/27/2023   Time:    15:23      X-Ray Chest 1 View   Final Result      Optimal placement of the PICC line.         Electronically signed by: Shant Sequeira   Date:    03/21/2023   Time:    11:07           EF   Date Value Ref Range Status   03/04/2023 60 % Final              Case related differential diagnoses have been reviewed; assessment and plan has been documented. I have personally reviewed the labs and test results that are currently available; I have reviewed the patients medication list. I have reviewed the consulting providers recommendations. I have reviewed or attempted to review medical records based upon their availability.  All of the patient's and/or family's questions have been addressed and answered to the best of my ability.  I will continue to monitor closely and make adjustments to medical management as needed.  This document was created using M*Modal Fluency Direct.  Transcription errors may have been made.  Please contact me if any questions may rise regarding documentation to clarify transcription.        Ja Eduardo MD   04/05/2023   Internal Medicine

## 2023-04-05 NOTE — PROGRESS NOTES
[facility] - 8th Floor Med Surg  Wound Care    Patient Name:  Quincy Triplett   MRN:  [id number]  Date: 4/5/2023  Diagnosis: <principal problem not specified>    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, unspecified extremity     Coronary artery disease     Dyslipidemia     Hypertension        Social History     Socioeconomic History    Marital status:    Tobacco Use    Smoking status: Never    Smokeless tobacco: Never   Substance and Sexual Activity    Alcohol use: Never    Drug use: Never     Social Determinants of Health     Food Insecurity: Unknown    Worried About Running Out of Food in the Last Year: Never true   Transportation Needs: Unknown    Lack of Transportation (Medical): No   Housing Stability: Unknown    Unable to Pay for Housing in the Last Year: No       Precautions:     Allergies as of 03/16/2023    (No Known Allergies)       WO Assessment Details/Treatment     WOCN Follow up. Continue with current treatment recommendations. Sacrum: Cleanse with NS. Apply desitin. Cover with Abd pad. Secure with medipore tape. Change BID and PRN. Nursing to continue with preventative measures turning every two hours, wedge, no adult briefs while in bed and floating heels. On a BETSY mattress. Will follow up.     04/05/2023

## 2023-04-05 NOTE — CONSULTS
[facility] - Oncology Acute  Hematology/Oncology  Consult Note    Patient Name: Quincy Triplett  MRN: [id number]  Admission Date: 3/16/2023  Hospital Length of Stay: 17 days  Attending Provider: Ashwini Mcmanus MD  Consulting Provider: Elizabeth K Lejeune, MD  Principal Problem:<principal problem not specified>    Inpatient consult to Hematology/Oncology  Consult performed by: Elizabeth Kennedy Lejeune, MD  Consult ordered by: KELVIN Busch      Subjective:     HPI:   81yo M who is currently POD 9 from surgical resection of Adenocarcinoma of the Ampulla of Vater, Intenstinal type. He initialy presented in late 2022 with jaundice and significant weight loss. Imaging with evidence of biliary dilation and circumferential thickening of the 2nd portion of the duodenum and intra/extraheptic biliary dilation. 12/21/22 Endoscopy with ERCP showed evidence of a malignant appearing mass at the ampulla, pathology consistent with poorly differentiated adenocarcinoma. He was evaluated by surgical oncology, Dr. Brown in December, but then had episode of biliary obstruction with PTC catheter placement and urosepsis that left him too deconditioned for surgical intervention. He then improved with PT to the point he was able to undergo whipple + pancreaticoduodenectomy on 3/27/23. 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 Tumor invasion into pancreas and periduodenal tissue. + LVI. + PNI. Surgical margins negative. 14/31 lymph nodes were positive for malignancy. Final staging pT3B N2.  He is planning to go to rehab in the post op setting to regain more of his functional status. Medical oncology has been consulted to discuss diagnosis and adjuvant therapy.     I spoke with Mr. Triplett, his brother in law, and his son about his diagnosis, staging, and overall prognosis. I discussed the need to completely heal from surgery prior to starting  chemotherapy. I explained he had different chemotherapy regimens available and that the final decision would be made once he recovers from rehab and we can attain his most accurate functional status in the outpatient setting. All questions were answered at the time of the visit.       Oncology Treatment Plan:     Medications:  Continuous Infusions:    Scheduled Meds:   alvimopan  12 mg Oral BID    dronabinoL  5 mg Oral BID AC    enoxaparin  40 mg Subcutaneous Daily    finasteride  5 mg Oral Daily    magnesium oxide  400 mg Oral BID    pantoprazole  40 mg Oral Daily    tamsulosin  0.4 mg Oral Daily    zinc oxide-cod liver oil   Topical (Top) BID     PRN Meds:sodium chloride, acetaminophen, ALPRAZolam, dextrose 10%, dextrose 10%, dextrose 10%, dextrose 10%, diphenhydrAMINE, glucagon (human recombinant), glucose, glucose, HYDROcodone-acetaminophen, hydrOXYzine, insulin aspart U-100, melatonin, metoclopramide HCl, naloxone, ondansetron, ondansetron, promethazine, simethicone     Review of patient's allergies indicates:  No Known Allergies     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;  Service: Oncology;;    EGD, WITH HEMORRHAGE CONTROL  1/19/2023    Procedure: EGD,WITH HEMORRHAGE CONTROL;  Surgeon: Ranjeet Perez MD;  Location: [facility] OR;  Service: Gastroenterology;;  Quincy Goyal    ERCP N/A 12/21/2022    Procedure: ERCP;  Surgeon: Sushil Anderson MD;  Location: [facility] ENDOSCOPY;  Service: Gastroenterology;  Laterality: N/A;  food in abdomen    ERCP, WITH BIOPSY  12/21/2022    Procedure: ERCP, WITH BIOPSY;  Surgeon: Sushil  MD Monica;  Location: [facility] ENDOSCOPY;  Service: Gastroenterology;;    ESOPHAGOGASTRODUODENOSCOPY N/A 1/19/2023    Procedure: EGD;  Surgeon: Ranjeet Perez MD;  Location: [facility] OR;  Service: Gastroenterology;  Laterality: N/A;    EXPLORATION OF COMMON BILE DUCT  3/27/2023    Procedure: EXPLORATION, COMMON BILE DUCT;  Surgeon: Abdirahman Brown MD;  Location: [facility] OR;  Service: Oncology;;    LEFT HEART CATHETERIZATION      LIVER BIOPSY  3/27/2023    Procedure: BIOPSY, LIVER;  Surgeon: Abdirahman Brown MD;  Location: [facility] OR;  Service: Oncology;;    LYMPHADENECTOMY  3/27/2023    Procedure: LYMPHADENECTOMY;  Surgeon: Abdirahman Brown MD;  Location: [facility] OR;  Service: Oncology;;  Portal/celiac lymphadenectomy    TONSILLECTOMY      WHIPPLE PROCEDURE N/A 3/27/2023    Procedure: WHIPPLE PROCEDURE;  Surgeon: Abdirahman Brown MD;  Location: [facility];  Service: Oncology;  Laterality: N/A;     Family History    None       Tobacco Use    Smoking status: Never    Smokeless tobacco: Never   Substance and Sexual Activity    Alcohol use: Never    Drug use: Never    Sexual activity: Not on file       Review of Systems   Constitutional:  Positive for activity change, fatigue and unexpected weight change. Negative for fever.   HENT:  Negative for sore throat.    Eyes:  Negative for visual disturbance.   Respiratory:  Negative for shortness of breath.    Cardiovascular:  Negative for chest pain.   Gastrointestinal:  Positive for abdominal distention and abdominal pain.   Endocrine: Negative for polyuria.   Genitourinary:  Negative for dysuria.   Musculoskeletal:  Negative for arthralgias.   Skin:  Negative for rash.   Neurological:  Negative for headaches.   Hematological:  Negative for adenopathy. Does not bruise/bleed easily.   Objective:     Vital Signs (Most Recent):  Temp: 98.1 °F (36.7 °C) (04/05/23 0352)  Pulse: 68 (04/05/23 0352)  Resp: 18 (04/05/23 0352)  BP: 133/71 (04/05/23 0352)  SpO2: 97 % (04/05/23 0352) Vital Signs (24h  Range):  Temp:  [97.8 °F (36.6 °C)-98.8 °F (37.1 °C)] 98.1 °F (36.7 °C)  Pulse:  [68-92] 68  Resp:  [18] 18  SpO2:  [97 %-99 %] 97 %  BP: (103-170)/(64-93) 133/71     Weight: 65 kg (143 lb 4.8 oz)  Body mass index is 21.15 kg/m².  Body surface area is 1.78 meters squared.      Intake/Output Summary (Last 24 hours) at 4/5/2023 0758  Last data filed at 4/5/2023 0600  Gross per 24 hour   Intake 900 ml   Output 1300 ml   Net -400 ml       Physical Exam  Constitutional:       General: He is not in acute distress.     Appearance: He is ill-appearing.   HENT:      Head: Normocephalic and atraumatic.      Nose: Nose normal.      Mouth/Throat:      Mouth: Mucous membranes are moist.      Pharynx: Oropharynx is clear.   Eyes:      Extraocular Movements: Extraocular movements intact.      Conjunctiva/sclera: Conjunctivae normal.      Pupils: Pupils are equal, round, and reactive to light.   Cardiovascular:      Rate and Rhythm: Normal rate and regular rhythm.      Pulses: Normal pulses.      Heart sounds: Normal heart sounds. No murmur heard.  Pulmonary:      Effort: Pulmonary effort is normal. No respiratory distress.      Breath sounds: Normal breath sounds.   Abdominal:      General: There is distension.      Palpations: Abdomen is soft.      Tenderness: There is abdominal tenderness.      Comments: Surgical drains in place. Incision with appropriate healing changes   Musculoskeletal:         General: Normal range of motion.      Cervical back: Normal range of motion and neck supple.      Right lower leg: No edema.      Left lower leg: No edema.   Lymphadenopathy:      Cervical: No cervical adenopathy.   Skin:     General: Skin is warm and dry.   Neurological:      Mental Status: He is alert and oriented to person, place, and time.       Significant Labs:   Component      Latest Ref Rng & Units 4/1/2023             WBC      4.5 - 11.5 x10(3)/mcL 7.8   RBC      4.70 - 6.10 x10(6)/mcL 3.13 (L)   Hemoglobin      14.0 - 18.0  "g/dL 9.0 (L)   Hematocrit      42.0 - 52.0 % 27.7 (L)   MCV      80.0 - 94.0 fL 88.5   MCH      27.0 - 31.0 pg 28.8   MCHC      33.0 - 36.0 g/dL 32.5 (L)   RDW      11.5 - 17.0 % 15.7   Platelets      130 - 400 x10(3)/mcL 322   MPV      7.4 - 10.4 fL 9.6     Component      Latest Ref Rng & Units 4/3/2023             Sodium      136 - 145 mmol/L 135 (L)   Potassium      3.5 - 5.1 mmol/L 3.6   Chloride      98 - 107 mmol/L 103   CO2      23 - 31 mmol/L 26   Glucose      82 - 115 mg/dL 84   BUN      8.4 - 25.7 mg/dL 15.8   Creatinine      0.73 - 1.18 mg/dL 0.58 (L)   Calcium      8.8 - 10.0 mg/dL 8.1 (L)   PROTEIN TOTAL      5.8 - 7.6 gm/dL 4.3 (L)   Albumin      3.4 - 4.8 g/dL 1.7 (L)   Globulin, Total      2.4 - 3.5 gm/dL 2.6   Albumin/Globulin Ratio      1.1 - 2.0 ratio 0.7 (L)   BILIRUBIN TOTAL      <=1.5 mg/dL 1.7 (H)   Alkaline Phosphatase      40 - 150 unit/L 754 (H)   ALT      0 - 55 unit/L 53   AST      5 - 34 unit/L 41 (H)   eGFR      mls/min/1.73/m2 >60     Diagnostic Results:    3/2/23 CT CAP  Impression:  1. Slight interval retraction of the percutaneous biliary stent which is now coiled slightly more distally in the right lobe of the liver.  There is however slightly improved dilatation of the extrahepatic bile ducts.  2. Known ampullary/duodenal mass poorly evaluated on this noncontrast CT.  No bowel obstruction.  3. Bladder wall appears mildly thickened which may be related to under distension.  Correlate with urinalysis.    3/15/23 CT Chest  Impression:  No evidence of metastatic disease in the thorax.    3/15/23 CT A/P  Impression:  Developing gastric outlet obstruction, secondary to a 3.2 cm long "apple-core" lesion in the 3rd portion of the duodenum at the level of the ampulla, presumably representing a primary adenocarcinoma.  No CT evidence of distant metastasis.    Assessment/Plan:     Stage IIIB Ampulla of Vater Adenocarcinoma, intestinal type  - s/p resection 3/27/23. Will plan for adjuvant " 5FU/Capecitabine based therapy once he has recovered from surgery. Consideration for chemo/RT afterwards pending response  - Send pathology for NGS testing    I will arrange for him to follow up with me in the outpatient setting in 4 weeks to arrange further adjuvant therapy    Thank you for your consult.     Elizabeth K Lejeune, MD  Hematology/Oncology  [facility] - Oncology Acute

## 2023-04-05 NOTE — PT/OT/SLP PROGRESS
Physical Therapy         Treatment        Quincy Triplett   MRN: [id number]     PT Received On: 04/05/23  PT Start Time: 1110     PT Stop Time: 1119    PT Total Time (min): 9 min       Billable Minutes:  Gait Training9  Total Minutes: 9    Treatment Type: Treatment  PT/PTA: PTA     Number of PTA visits since last PT visit: 5       General Precautions: Standard, fall  Orthopedic Precautions: Orthopedic Precautions : N/A   Braces:           Subjective:  Communicated with NSG prior to session.    Pain/Comfort  Pain Rating 1: 0/10    Objective:  Patient found in bed with HOB elevated, with Patient found with: PICC line, campos catheter    Functional Mobility:  Bed Mobility:   Supine to sit: Minimal Assistance   Sit to supine: Activity did not occur   Rolling: Activity did not occur   Scooting: Standby Assistance    Balance:   Static Sit: GOOD+: Takes MAXIMAL challenges from all directions.    Dynamic Sit:  GOOD-: Incosistently Maintains balance through MODERATE excursions of active trunk movement,     Static Stand: FAIR: Maintains without assist but unable to take challenges  Dynamic stand: POOR+: Needs MIN (minimal ) assist during gait    Transfer Training:  Sit to stand:Minimal Assistance with No Assistive Device . 1 trial from EOB    Gait Training:  Pt amb 90ft with no AD CGA-Judd. Pt unsteady but had no major LOB.       Activity Tolerance:  Patient limited by fatigue    Patient left up in chair with call button in reach.    Assessment:  Quincy Triplett is a 82 y.o. male with a medical diagnosis of adenocarcinoma of duodenum, gastric outlet obstruction s/t tumor, obstructive jaundice s/p percutaneous drain, severe metabolic acidosis.  pt with hx of R arm amputation below elbow. He presents with the following impairments/functional limitations: weakness, impaired endurance, impaired self care skills, impaired functional mobility, gait instability, impaired balance.     Pt progressing well with PT. Pt would benefit from  "further TCU therapy services to increase overall independence level and assist in getting pt closer to PLOF.  pt in middle of eating lunch and only agreeable for "quick walk."     Rehab potential is excellent.    Activity tolerance: Excellent    Discharge recommendations: Discharge Facility/Level of Care Needs: other (see comments), nursing facility, skilled (TCU)     Equipment recommendations: Equipment Needed After Discharge: to be determined by next level of care     GOALS:   Multidisciplinary Problems       Physical Therapy Goals          Problem: Physical Therapy    Goal Priority Disciplines Outcome Goal Variances Interventions   Physical Therapy Goal     PT, PT/OT Ongoing, Progressing     Description: Goals to be met by: 23     Patient will increase functional independence with mobility by performin. Supine to sit with [place]  2. Sit to stand transfer with [place]  3. Bed to chair transfer with [place] using No Assistive Device  4. Gait  x 500 feet with [place] using No Assistive Device.                          PLAN:    Patient to be seen 5 x/week  to address the above listed problems via gait training, therapeutic activities, therapeutic exercises  Plan of Care expires: 23  Plan of Care reviewed with: patient         2023    "

## 2023-04-06 VITALS
RESPIRATION RATE: 18 BRPM | DIASTOLIC BLOOD PRESSURE: 61 MMHG | TEMPERATURE: 98 F | HEART RATE: 76 BPM | OXYGEN SATURATION: 98 % | BODY MASS INDEX: 21.22 KG/M2 | HEIGHT: 69 IN | WEIGHT: 143.31 LBS | SYSTOLIC BLOOD PRESSURE: 107 MMHG

## 2023-04-06 PROBLEM — Z90.49 H/O WHIPPLE PROCEDURE: Status: ACTIVE | Noted: 2023-04-06

## 2023-04-06 PROBLEM — Z90.410 H/O WHIPPLE PROCEDURE: Status: ACTIVE | Noted: 2023-04-06

## 2023-04-06 LAB
POCT GLUCOSE: 99 MG/DL (ref 70–110)
SARS-COV-2 RDRP RESP QL NAA+PROBE: NEGATIVE

## 2023-04-06 PROCEDURE — 25000003 PHARM REV CODE 250: Performed by: SURGERY

## 2023-04-06 PROCEDURE — 25000003 PHARM REV CODE 250: Performed by: INTERNAL MEDICINE

## 2023-04-06 PROCEDURE — 25000003 PHARM REV CODE 250: Performed by: NURSE PRACTITIONER

## 2023-04-06 PROCEDURE — 87635 SARS-COV-2 COVID-19 AMP PRB: CPT | Performed by: INTERNAL MEDICINE

## 2023-04-06 PROCEDURE — 63600175 PHARM REV CODE 636 W HCPCS: Performed by: INTERNAL MEDICINE

## 2023-04-06 PROCEDURE — 97164 PT RE-EVAL EST PLAN CARE: CPT

## 2023-04-06 RX ORDER — HYDROCODONE BITARTRATE AND ACETAMINOPHEN 7.5; 325 MG/1; MG/1
1 TABLET ORAL EVERY 6 HOURS PRN
Status: CANCELLED | OUTPATIENT
Start: 2023-04-06

## 2023-04-06 RX ORDER — ALPRAZOLAM 0.5 MG/1
0.5 TABLET ORAL 3 TIMES DAILY PRN
Status: CANCELLED | OUTPATIENT
Start: 2023-04-06

## 2023-04-06 RX ORDER — ENOXAPARIN SODIUM 100 MG/ML
40 INJECTION SUBCUTANEOUS EVERY 24 HOURS
Status: CANCELLED | OUTPATIENT
Start: 2023-04-06

## 2023-04-06 RX ORDER — TAMSULOSIN HYDROCHLORIDE 0.4 MG/1
0.4 CAPSULE ORAL DAILY
Status: CANCELLED | OUTPATIENT
Start: 2023-04-07

## 2023-04-06 RX ORDER — LANOLIN ALCOHOL/MO/W.PET/CERES
400 CREAM (GRAM) TOPICAL 2 TIMES DAILY
Status: CANCELLED | OUTPATIENT
Start: 2023-04-06

## 2023-04-06 RX ORDER — DRONABINOL 2.5 MG/1
5 CAPSULE ORAL
Status: CANCELLED | OUTPATIENT
Start: 2023-04-06

## 2023-04-06 RX ORDER — FINASTERIDE 5 MG/1
5 TABLET, FILM COATED ORAL DAILY
Status: CANCELLED | OUTPATIENT
Start: 2023-04-06

## 2023-04-06 RX ADMIN — Medication: at 08:04

## 2023-04-06 RX ADMIN — PANTOPRAZOLE SODIUM 40 MG: 40 TABLET, DELAYED RELEASE ORAL at 08:04

## 2023-04-06 RX ADMIN — PROMETHAZINE HYDROCHLORIDE 25 MG: 25 TABLET ORAL at 08:04

## 2023-04-06 RX ADMIN — HYDROCODONE BITARTRATE AND ACETAMINOPHEN 1 TABLET: 7.5; 325 TABLET ORAL at 08:04

## 2023-04-06 RX ADMIN — Medication 400 MG: at 08:04

## 2023-04-06 RX ADMIN — TAMSULOSIN HYDROCHLORIDE 0.4 MG: 0.4 CAPSULE ORAL at 08:04

## 2023-04-06 RX ADMIN — DRONABINOL 5 MG: 2.5 CAPSULE ORAL at 06:04

## 2023-04-06 NOTE — NURSING
Resting in bed awake and alert. C/O pain 6/10. Nightly meds and PRN pain med administered without difficulty. Paul catheter draining clear, yellow urine to gravity. Biliary drain in place, clamped, scant amt of serosanguineous drainage noted to drsg. Surgical incision to right upper quad, staples intact, edges approximated. Gauze drsg to left abd drain site c/d/I.  Denies further needs at this time. Daughter at bedside. Safety maintained. Monitoring ongoing.

## 2023-04-06 NOTE — DISCHARGE SUMMARY
"[facility]  Hospital Medicine Discharge Summary    Admit Date: 3/16/2023  Discharge Date and Time: 4/6/20231:06 PM  Admitting Physician:  Team  Discharging Physician: Patrice Lang MD.  Primary Care Physician: Reji Waters Jr, MD  Consults: Surgical oncology     Discharge Diagnoses:  Adenocarcinoma of the duodenum/ampulla  Gastric outlet obstruction-status post Whipple's March 27  Enterococcus faecium bacteremia-cleared March 4/treated     History of:  Adenocarcinoma of ampulla  Obstructive jaundice 2/2 duodenal mass-status post biliary drain       Hospital Course:   82 y.o. white male with a history that includes recently diagnosed adenocarcinoma of ampulla vater with resulting obstructive jaundice requiring biliary drain placement, presented back to [facility] on 3/16 with vomiting x 1 day, associated with inability to maintain oral intake, as well as chills, weakness and fatigue. Work up in ED noted patient afebrile, HDS. Labs notable for ALP 1544, Total bilirubin 2.9, AST 40, ALT 65, potassium 5.2, bicarb 9, BUN 23, Cr 1.5.  Staging CT C/A/P from 3/15 noting developing gastric outlet obstruction, secondary to a 3.2 cm long "apple-core" lesion in the 3rd portion of the duodenum, at the level of the ampulla; however there was no evidence of distant metastatic disease.  Of notes patient recently admitted to hospital from 3/2-3/10, being treated for enterococcus faecium bacteremia, planned for antibiotics through 3/18.  Plan was to have subsequent follow-up with Surgical Oncology thereafter to discuss possible Whipple procedure.  Patient was admitted to hospital medicine services for management.     Surgical Oncology consulted given GOO, planning for Whipple on Mon 3/27.  In the interim patient will be optimized medially for surgery.  Continued on liquids, dietary protein supplementation added.  Also started on TPN in the interim. Continued to work with physical therapy.   On " 03/27/2023, Patient underwent pancreatico duodenectomy portal/celiac lymphadenectomy, common bile duct exploration, falciform ligament pedicle flap, wedge resection of segment 4 liver lesion, cholecystectomy under GA.  Estimated blood loss 100 cc.  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  Medical oncology recommended follow-up in 4 weeks to discuss chemotherapy/radiation therapy. He was accepted to TCU and was discharged in a stable condition.   He was advised f/u in surgical oncology clinic in 2 weeks.   Pt was seen and examined on the day of discharge  Vitals:  VITAL SIGNS: 24 HRS MIN & MAX LAST   Temp  Min: 97.4 °F (36.3 °C)  Max: 98.6 °F (37 °C) 98.1 °F (36.7 °C)   BP  Min: 107/61  Max: 143/74 107/61   Pulse  Min: 74  Max: 88  76   Resp  Min: 16  Max: 19 18   SpO2  Min: 97 %  Max: 100 % 98 %       Physical Exam:  Heart RRR  Lungs clear   Abdomen soft and non tender   Neuro: No FND      Procedures Performed: No admission procedures for hospital encounter.     Significant Diagnostic Studies: See Full reports for all details    Recent Labs   Lab 03/31/23  0507 04/01/23  0637   WBC 6.4 7.8   RBC 3.13* 3.13*   HGB 9.0* 9.0*   HCT 27.3* 27.7*   MCV 87.2 88.5   MCH 28.8 28.8   MCHC 33.0 32.5*   RDW 15.7 15.7    322   MPV 9.5 9.6       Recent Labs   Lab 04/01/23  0637 04/02/23  0421 04/03/23  0340    134* 135*   K 3.5 3.3* 3.6   CO2 26 27 26   BUN 18.8 15.9 15.8   CREATININE 0.63* 0.59* 0.58*   CALCIUM 8.2* 8.1* 8.1*   MG 1.70 1.60 1.50*   ALBUMIN 1.8* 1.7* 1.7*   ALKPHOS 714* 755* 754*   ALT 48 52 53   AST 36* 40* 41*   BILITOT 2.0* 1.9* 1.7*        Microbiology Results (last 7 days)       ** No results found for the last 168 hours. **             X-Ray Abdomen AP 1 View  Narrative: EXAMINATION:  XR ABDOMEN AP 1 VIEW    CLINICAL HISTORY:  incorrect  count;    COMPARISON:  None    FINDINGS:  Two frontal images of the abdomen.  There is a right upper quadrant drain.  There is a Paul catheter.  Additional catheter overlies the abdomen.  There is a surgical clip in the expected area of the gallbladder fossa.  There are skin staples.  No curvilinear foreign body suspicious for suture needle is seen.  Impression: As above.    Electronically signed by: Quincy Roberts  Date:    03/27/2023  Time:    15:23         Medication List        ASK your doctor about these medications      ampicillin 500 MG capsule  Commonly known as: PRINCIPEN  Take 1 capsule (500 mg total) by mouth 2 (two) times a day. for 8 days  Ask about: Should I take this medication?     ergocalciferol 50,000 unit Cap  Commonly known as: ERGOCALCIFEROL  Take 1 capsule (50,000 Units total) by mouth every 7 days. for 12 doses     finasteride 5 mg tablet  Commonly known as: PROSCAR  Take 1 tablet (5 mg total) by mouth once daily.     ondansetron 4 MG Tbdl  Commonly known as: ZOFRAN-ODT  Take 1 tablet (4 mg total) by mouth every 6 (six) hours as needed (nausea).  Ask about: Should I take this medication?     pantoprazole 40 MG tablet  Commonly known as: PROTONIX  Take 1 tablet (40 mg total) by mouth once daily.     sodium bicarbonate 650 MG tablet  Take 1 tablet (650 mg total) by mouth 3 (three) times daily. for 5 days     tamsulosin 0.4 mg Cap  Commonly known as: FLOMAX               Explained in detail to the patient about the discharge plan, medications, and follow-up visits. Pt understands and agrees with the treatment plan  Discharge Disposition: TCU  Discharged Condition: stable  Diet-   Dietary Orders (From admission, onward)       Start     Ordered    04/04/23 1626  Dietary nutrition supplements Boost Plus Chocolate, Boost Plus Vanilla; All Meals  Continuous        Question Answer Comment   Select PO Supplement: Boost Plus Chocolate    Select PO Supplement: Boost Plus Vanilla    Frequency: All Meals         04/04/23 1625    04/04/23 1359  Dietary nutrition supplements Boost Breeze Wild Berry; All Meals  Continuous        Question Answer Comment   Select PO Supplement: Boost Breeze Wild Osei    Frequency: All Meals        04/04/23 1359    04/01/23 0846  Diet low fiber/residue  Diet effective now         04/01/23 0846                   Medications Per DC med rec  Activities as tolerated   Follow-up Information       KELVIN Busch Follow up on 4/18/2023.    Specialty: Nurse Practitioner  Why: @10:45am  Contact information:  [address]  [address]  [place] [address]  [phone]                           For further questions contact hospitalist office    Discharge time 33 minutes    For worsening symptoms, chest pain, shortness of breath, increased abdominal pain, high grade fever, stroke or stroke like symptoms, immediately go to the nearest Emergency Room or call 911 as soon as possible.      Patrice Menezes M.D, on 4/6/2023. at 1:06 PM.

## 2023-04-06 NOTE — NURSING
Report called to TCU, all questions answered. NAD noted. Will transport with [facility] via [facility].

## 2023-04-06 NOTE — PLAN OF CARE
Problem: Adult Inpatient Plan of Care  Goal: Plan of Care Review  4/6/2023 0029 by Joanie Hollis RN  Outcome: Ongoing, Progressing  4/6/2023 0029 by Joanie Hollis RN  Outcome: Ongoing, Progressing  Goal: Patient-Specific Goal (Individualized)  4/6/2023 0029 by Joanie Hollis RN  Outcome: Ongoing, Progressing  4/6/2023 0029 by Joanie Hollis, RN  Outcome: Ongoing, Progressing  Goal: Absence of Hospital-Acquired Illness or Injury  4/6/2023 0029 by Joanie Hollis RN  Outcome: Ongoing, Progressing  4/6/2023 0029 by Joanie Hollis RN  Outcome: Ongoing, Progressing  Goal: Optimal Comfort and Wellbeing  4/6/2023 0029 by Joanie Hollis RN  Outcome: Ongoing, Progressing  4/6/2023 0029 by Joanie Hollis, RN  Outcome: Ongoing, Progressing  Goal: Readiness for Transition of Care  4/6/2023 0029 by Joanie Hollis RN  Outcome: Ongoing, Progressing  4/6/2023 0029 by Joanie Hollis RN  Outcome: Ongoing, Progressing     Problem: Glycemic Control Impaired (Sepsis/Septic Shock)  Goal: Blood Glucose Level Within Desired Range  4/6/2023 0029 by Joanie Hollis RN  Outcome: Ongoing, Progressing  4/6/2023 0029 by Joanie Hollis, RN  Outcome: Ongoing, Progressing     Problem: Nutrition Impaired (Sepsis/Septic Shock)  Goal: Optimal Nutrition Intake  4/6/2023 0029 by Joanie Hollis RN  Outcome: Ongoing, Progressing  4/6/2023 0029 by Joanie Hollis, RN  Outcome: Ongoing, Progressing     Problem: Skin Injury Risk Increased  Goal: Skin Health and Integrity  4/6/2023 0029 by Joanie Hollis, RN  Outcome: Ongoing, Progressing  4/6/2023 0029 by Joanie Hollis, RN  Outcome: Ongoing, Progressing     Problem: Impaired Wound Healing  Goal: Optimal Wound Healing  4/6/2023 0029 by Joanie Hollis, PRAMOD  Outcome: Ongoing, Progressing  4/6/2023 0029 by Joanie Hollis RN  Outcome: Ongoing, Progressing     Problem: Fall Injury Risk  Goal: Absence of Fall and Fall-Related Injury  4/6/2023 0029 by Joanie Hollis RN  Outcome: Ongoing,  Progressing  4/6/2023 0029 by Joanie Hollis RN  Outcome: Ongoing, Progressing     Problem: Pain Acute  Goal: Acceptable Pain Control and Functional Ability  4/6/2023 0029 by Joanie Hollis RN  Outcome: Ongoing, Progressing  4/6/2023 0029 by Joanie Hollis RN  Outcome: Ongoing, Progressing

## 2023-04-06 NOTE — PT/OT/SLP RE-EVAL
Physical Therapy Re-evaluation    Patient Name:  Quincy Triplett   MRN:  [id number]    Recommendations:     Discharge Recommendations: nursing facility, skilled  Discharge Equipment Recommendations: to be determined by next level of care   Barriers to discharge: None    Assessment:     Quincy Triplett is a 82 y.o. male admitted with a medical diagnosis of s/p Whipple procedure.  He presents with the following impairments/functional limitations: weakness, impaired endurance, impaired self care skills, impaired functional mobility, gait instability, pain. Pt with flat affect but agreeable to mobility. He is anticipated to discharge to TCU this afternoon.     Rehab Prognosis:  Good; patient would benefit from acute skilled PT services to address these deficits and reach maximum level of function.      Recent Surgery: Procedure(s) (LRB):  WHIPPLE PROCEDURE (N/A)  LYMPHADENECTOMY  EXPLORATION, COMMON BILE DUCT  BIOPSY, LIVER  CHOLECYSTECTOMY 10 Days Post-Op    Plan:     During this hospitalization, patient to be seen 5 x/week to address the above listed problems via gait training, therapeutic activities, therapeutic exercises  Plan of Care Expires:  04/20/23  Plan of Care Reviewed with: patient    Subjective     Communicated with NSG prior to session.  Patient found reclined in chair with campos catheter upon PT entry to room, agreeable to evaluation.      Chief Complaint: pain  Patient comments/goals: return to PLOF  Pain/Comfort:       Patients cultural, spiritual, Pentec[employer]al conflicts given the current situation: no      Objective:     Patient found with: campos catheter     General Precautions: Standard, fall  Orthopedic Precautions: N/A  Braces: N/A  Respiratory Status: Room air    Exams:  RLE ROM: WFL  RLE Strength: WFL  LLE ROM: WFL  LLE Strength: WFL    Functional Mobility:  Transfers:     Sit to Stand:  CGA  Bed to Chair: CGA  Gait  Pt amb with HHA x 100ft moderate sway.    Patient left reclined in chair with all  lines intact, call button in reach, and NSG notified.    GOALS:   Multidisciplinary Problems       Physical Therapy Goals          Problem: Physical Therapy    Goal Priority Disciplines Outcome Goal Variances Interventions   Physical Therapy Goal     PT, PT/OT Ongoing, Progressing     Description: Goals to be met by: 23     Patient will increase functional independence with mobility by performin. Supine to sit with [place]  2. Sit to stand transfer with [place]  3. Bed to chair transfer with [place] using No Assistive Device  4. Gait  x 500 feet with [place] using No Assistive Device.                          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, unspecified extremity     Coronary artery disease     Dyslipidemia     Hypertension        Past Surgical History:   Procedure Laterality Date    AMPUTATION Right     Right arm    CAROTID STENT      CHOLECYSTECTOMY  3/27/2023    Procedure: CHOLECYSTECTOMY;  Surgeon: Abdirahman Brown MD;  Location: [facility];  Service: Oncology;;    EGD, WITH HEMORRHAGE CONTROL  2023    Procedure: EGD,WITH HEMORRHAGE CONTROL;  Surgeon: Ranjeet Perez MD;  Location: [facility];  Service: Gastroenterology;;  [facility]    ERCP N/A 2022    Procedure: ERCP;  Surgeon: Sushil Anderson MD;  Location: [facility] ENDOSCOPY;  Service: Gastroenterology;  Laterality: N/A;  food in abdomen    ERCP, WITH BIOPSY  2022    Procedure: ERCP, WITH BIOPSY;  Surgeon: Sushil Anderson MD;  Location: [facility] ENDOSCOPY;  Service: Gastroenterology;;    ESOPHAGOGASTRODUODENOSCOPY N/A 2023    Procedure: EGD;  Surgeon: Ranjeet Perez MD;  Location: [facility];  Service: Gastroenterology;  Laterality: N/A;    EXPLORATION OF COMMON BILE DUCT  3/27/2023    Procedure: EXPLORATION, COMMON BILE DUCT;  Surgeon: Abdirahman Brown MD;  Location: [facility];  Service: Oncology;;     LEFT HEART CATHETERIZATION      LIVER BIOPSY  3/27/2023    Procedure: BIOPSY, LIVER;  Surgeon: Abdirahman Brown MD;  Location: [facility] OR;  Service: Oncology;;    LYMPHADENECTOMY  3/27/2023    Procedure: LYMPHADENECTOMY;  Surgeon: Abdirahman Brown MD;  Location: [facility] OR;  Service: Oncology;;  Portal/celiac lymphadenectomy    TONSILLECTOMY      WHIPPLE PROCEDURE N/A 3/27/2023    Procedure: WHIPPLE PROCEDURE;  Surgeon: Abdirahman Brown MD;  Location: [facility] OR;  Service: Oncology;  Laterality: N/A;       Time Tracking:     PT Received On: 04/06/23  PT Start Time: 1002     PT Stop Time: 1020  PT Total Time (min): 18 min     Billable Minutes: Re-eval 18      04/06/2023

## 2023-04-06 NOTE — PROGRESS NOTES
SURG ONC    PT LYING IN CHAIR  SPIRITS HAVE BEEN DOWN SINCE PATH REPORT GIVEN  SUPPORT AND ENCOURAGEMENT GIVEN  HE IS APPRECIATIVE AS ALWAYS    AM TOLD THERE IS BED AVAILABLE FOR HIM TODAY  INCISION HEALING WELL  NO SIGNS OF INFECTION    VSS; NO FEVER    PLAN  REMOVE EVERY OTHER STAPLE  OK TO TRANSFER OUT  RU OFFICE 2 WEEKS

## 2023-04-07 ENCOUNTER — APPOINTMENT (OUTPATIENT)
Dept: RADIOLOGY | Facility: HOSPITAL | Age: 83
End: 2023-04-07
Payer: MEDICARE

## 2023-04-07 PROCEDURE — 71250 CT THORAX DX C-: CPT | Mod: TC

## 2023-04-24 ENCOUNTER — TELEPHONE (OUTPATIENT)
Dept: HEMATOLOGY/ONCOLOGY | Facility: CLINIC | Age: 83
End: 2023-04-24
Payer: MEDICARE

## 2023-04-24 DIAGNOSIS — C17.0 DUODENAL CANCER: Primary | Chronic | ICD-10-CM

## 2023-04-25 ENCOUNTER — TELEPHONE (OUTPATIENT)
Dept: HEMATOLOGY/ONCOLOGY | Facility: CLINIC | Age: 83
End: 2023-04-25
Payer: MEDICARE

## 2023-04-25 DIAGNOSIS — C17.0 DUODENAL CANCER: Primary | Chronic | ICD-10-CM

## 2023-04-25 NOTE — TELEPHONE ENCOUNTER
Spoke to daughter - advised we would see her father on tomorrow. She verbalized understanding and I told her scheduling will call her with that new appointment time.   Called [facility] - spoke to Delroy Pena CM - Advised of the new appointment and told her I would follow up with the time once scheduled.

## 2023-04-26 ENCOUNTER — LAB VISIT (OUTPATIENT)
Dept: LAB | Facility: HOSPITAL | Age: 83
End: 2023-04-26
Attending: INTERNAL MEDICINE
Payer: MEDICARE

## 2023-04-26 ENCOUNTER — OFFICE VISIT (OUTPATIENT)
Dept: SURGICAL ONCOLOGY | Facility: CLINIC | Age: 83
End: 2023-04-26
Payer: MEDICARE

## 2023-04-26 ENCOUNTER — OFFICE VISIT (OUTPATIENT)
Dept: HEMATOLOGY/ONCOLOGY | Facility: CLINIC | Age: 83
End: 2023-04-26
Payer: MEDICARE

## 2023-04-26 VITALS
SYSTOLIC BLOOD PRESSURE: 102 MMHG | HEART RATE: 81 BPM | TEMPERATURE: 98 F | DIASTOLIC BLOOD PRESSURE: 79 MMHG | BODY MASS INDEX: 21.21 KG/M2 | OXYGEN SATURATION: 95 % | RESPIRATION RATE: 16 BRPM | WEIGHT: 143.19 LBS | HEIGHT: 69 IN

## 2023-04-26 DIAGNOSIS — C17.0 DUODENAL CANCER: Primary | ICD-10-CM

## 2023-04-26 DIAGNOSIS — C17.0 DUODENAL CANCER: ICD-10-CM

## 2023-04-26 DIAGNOSIS — Z90.410 H/O WHIPPLE PROCEDURE: ICD-10-CM

## 2023-04-26 DIAGNOSIS — Z90.49 H/O WHIPPLE PROCEDURE: ICD-10-CM

## 2023-04-26 LAB
ALBUMIN SERPL-MCNC: 1.9 G/DL (ref 3.4–4.8)
ALBUMIN/GLOB SERPL: 0.4 RATIO (ref 1.1–2)
ALP SERPL-CCNC: 1196 UNIT/L (ref 40–150)
ALT SERPL-CCNC: 76 UNIT/L (ref 0–55)
AST SERPL-CCNC: 62 UNIT/L (ref 5–34)
BASOPHILS # BLD AUTO: 0.06 X10(3)/MCL (ref 0–0.2)
BASOPHILS NFR BLD AUTO: 0.4 %
BILIRUBIN DIRECT+TOT PNL SERPL-MCNC: 0.8 MG/DL
BUN SERPL-MCNC: 26.8 MG/DL (ref 8.4–25.7)
CALCIUM SERPL-MCNC: 9.1 MG/DL (ref 8.8–10)
CHLORIDE SERPL-SCNC: 99 MMOL/L (ref 98–107)
CO2 SERPL-SCNC: 22 MMOL/L (ref 23–31)
CREAT SERPL-MCNC: 0.71 MG/DL (ref 0.73–1.18)
EOSINOPHIL # BLD AUTO: 0.35 X10(3)/MCL (ref 0–0.9)
EOSINOPHIL NFR BLD AUTO: 2.5 %
ERYTHROCYTE [DISTWIDTH] IN BLOOD BY AUTOMATED COUNT: 15 % (ref 11.5–17)
GFR SERPLBLD CREATININE-BSD FMLA CKD-EPI: >60 MLS/MIN/1.73/M2
GLOBULIN SER-MCNC: 4.5 GM/DL (ref 2.4–3.5)
GLUCOSE SERPL-MCNC: 108 MG/DL (ref 82–115)
HCT VFR BLD AUTO: 25.9 % (ref 42–52)
HGB BLD-MCNC: 8.2 G/DL (ref 14–18)
IMM GRANULOCYTES # BLD AUTO: 0.08 X10(3)/MCL (ref 0–0.04)
IMM GRANULOCYTES NFR BLD AUTO: 0.6 %
LYMPHOCYTES # BLD AUTO: 1.66 X10(3)/MCL (ref 0.6–4.6)
LYMPHOCYTES NFR BLD AUTO: 11.7 %
MCH RBC QN AUTO: 27.7 PG (ref 27–31)
MCHC RBC AUTO-ENTMCNC: 31.7 G/DL (ref 33–36)
MCV RBC AUTO: 87.5 FL (ref 80–94)
MONOCYTES # BLD AUTO: 1.41 X10(3)/MCL (ref 0.1–1.3)
MONOCYTES NFR BLD AUTO: 9.9 %
NEUTROPHILS # BLD AUTO: 10.66 X10(3)/MCL (ref 2.1–9.2)
NEUTROPHILS NFR BLD AUTO: 74.9 %
PLATELET # BLD AUTO: 497 X10(3)/MCL (ref 130–400)
PMV BLD AUTO: 8.7 FL (ref 7.4–10.4)
POTASSIUM SERPL-SCNC: 5.1 MMOL/L (ref 3.5–5.1)
PROT SERPL-MCNC: 6.4 GM/DL (ref 5.8–7.6)
RBC # BLD AUTO: 2.96 X10(6)/MCL (ref 4.7–6.1)
SODIUM SERPL-SCNC: 130 MMOL/L (ref 136–145)
WBC # SPEC AUTO: 14.2 X10(3)/MCL (ref 4.5–11.5)

## 2023-04-26 PROCEDURE — 3078F DIAST BP <80 MM HG: CPT | Mod: CPTII,S$GLB,, | Performed by: STUDENT IN AN ORGANIZED HEALTH CARE EDUCATION/TRAINING PROGRAM

## 2023-04-26 PROCEDURE — 1160F PR REVIEW ALL MEDS BY PRESCRIBER/CLIN PHARMACIST DOCUMENTED: ICD-10-PCS | Mod: CPTII,S$GLB,, | Performed by: STUDENT IN AN ORGANIZED HEALTH CARE EDUCATION/TRAINING PROGRAM

## 2023-04-26 PROCEDURE — 1159F PR MEDICATION LIST DOCUMENTED IN MEDICAL RECORD: ICD-10-PCS | Mod: CPTII,S$GLB,, | Performed by: STUDENT IN AN ORGANIZED HEALTH CARE EDUCATION/TRAINING PROGRAM

## 2023-04-26 PROCEDURE — 3078F PR MOST RECENT DIASTOLIC BLOOD PRESSURE < 80 MM HG: ICD-10-PCS | Mod: CPTII,S$GLB,, | Performed by: STUDENT IN AN ORGANIZED HEALTH CARE EDUCATION/TRAINING PROGRAM

## 2023-04-26 PROCEDURE — 1159F MED LIST DOCD IN RCRD: CPT | Mod: CPTII,S$GLB,, | Performed by: STUDENT IN AN ORGANIZED HEALTH CARE EDUCATION/TRAINING PROGRAM

## 2023-04-26 PROCEDURE — 36415 COLL VENOUS BLD VENIPUNCTURE: CPT

## 2023-04-26 PROCEDURE — 1125F AMNT PAIN NOTED PAIN PRSNT: CPT | Mod: CPTII,S$GLB,, | Performed by: STUDENT IN AN ORGANIZED HEALTH CARE EDUCATION/TRAINING PROGRAM

## 2023-04-26 PROCEDURE — 99999 PR PBB SHADOW E&M-EST. PATIENT-LVL V: ICD-10-PCS | Mod: PBBFAC,,, | Performed by: STUDENT IN AN ORGANIZED HEALTH CARE EDUCATION/TRAINING PROGRAM

## 2023-04-26 PROCEDURE — 3074F SYST BP LT 130 MM HG: CPT | Mod: CPTII,S$GLB,, | Performed by: STUDENT IN AN ORGANIZED HEALTH CARE EDUCATION/TRAINING PROGRAM

## 2023-04-26 PROCEDURE — 99215 PR OFFICE/OUTPT VISIT, EST, LEVL V, 40-54 MIN: ICD-10-PCS | Mod: S$GLB,,, | Performed by: STUDENT IN AN ORGANIZED HEALTH CARE EDUCATION/TRAINING PROGRAM

## 2023-04-26 PROCEDURE — 1160F RVW MEDS BY RX/DR IN RCRD: CPT | Mod: CPTII,S$GLB,, | Performed by: STUDENT IN AN ORGANIZED HEALTH CARE EDUCATION/TRAINING PROGRAM

## 2023-04-26 PROCEDURE — 99215 OFFICE O/P EST HI 40 MIN: CPT | Mod: S$GLB,,, | Performed by: STUDENT IN AN ORGANIZED HEALTH CARE EDUCATION/TRAINING PROGRAM

## 2023-04-26 PROCEDURE — 99024 POSTOP FOLLOW-UP VISIT: CPT | Mod: S$GLB,,, | Performed by: SURGERY

## 2023-04-26 PROCEDURE — 1125F PR PAIN SEVERITY QUANTIFIED, PAIN PRESENT: ICD-10-PCS | Mod: CPTII,S$GLB,, | Performed by: STUDENT IN AN ORGANIZED HEALTH CARE EDUCATION/TRAINING PROGRAM

## 2023-04-26 PROCEDURE — 85025 COMPLETE CBC W/AUTO DIFF WBC: CPT

## 2023-04-26 PROCEDURE — 99999 PR PBB SHADOW E&M-EST. PATIENT-LVL I: ICD-10-PCS | Mod: PBBFAC,,, | Performed by: SURGERY

## 2023-04-26 PROCEDURE — 99024 PR POST-OP FOLLOW-UP VISIT: ICD-10-PCS | Mod: S$GLB,,, | Performed by: SURGERY

## 2023-04-26 PROCEDURE — 99999 PR PBB SHADOW E&M-EST. PATIENT-LVL V: CPT | Mod: PBBFAC,,, | Performed by: STUDENT IN AN ORGANIZED HEALTH CARE EDUCATION/TRAINING PROGRAM

## 2023-04-26 PROCEDURE — 3074F PR MOST RECENT SYSTOLIC BLOOD PRESSURE < 130 MM HG: ICD-10-PCS | Mod: CPTII,S$GLB,, | Performed by: STUDENT IN AN ORGANIZED HEALTH CARE EDUCATION/TRAINING PROGRAM

## 2023-04-26 PROCEDURE — 80053 COMPREHEN METABOLIC PANEL: CPT

## 2023-04-26 PROCEDURE — 99999 PR PBB SHADOW E&M-EST. PATIENT-LVL I: CPT | Mod: PBBFAC,,, | Performed by: SURGERY

## 2023-04-26 NOTE — PROGRESS NOTES
Subjective:       Patient ID: Quincy Triplett is a 82 y.o. male.    Chief Complaint: Hospital Follow Up    Diagnosis: Ampulla of Vater - Adenocarcinoma, intestinal type    Current Treatment: None    Treatment History:   Whipple - Dr. Brown - 3/27/23    HPI:   83yo M who presents to medical oncology in April '23 for evaluation of Adenocarcinoma of the Ampulla of Vater, Intenstinal type. He initialy presented in late 2022 with jaundice and significant weight loss. Imaging with evidence of biliary dilation and circumferential thickening of the 2nd portion of the duodenum and intra/extraheptic biliary dilation. 12/21/22 Endoscopy with ERCP showed evidence of a malignant appearing mass at the ampulla, pathology consistent with poorly differentiated adenocarcinoma. He was evaluated by surgical oncology, Dr. Brown in December, but then had episode of biliary obstruction with PTC catheter placement and urosepsis that left him too deconditioned for surgical intervention. He then improved with PT to the point he was able to undergo whipple + pancreaticoduodenectomy on 3/27/23. 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 Tumor invasion into pancreas and periduodenal tissue. + LVI. + PNI. Surgical margins negative. 14/31 lymph nodes were positive for malignancy. Final staging pT3B N2.  After his resection he went to rehab for 3 weeks which he is currently experiencing.      Interval History  Patient presents for 3 week follow up after hospital discharge. He is currently participating in rehab.   Having some nausea and vomiting. Some component of anticipatory nausea as well.   Still having a great deal of weakness and fatigue. Doesn't feel like he is recovering as quickly as he has previously done.   Abdominal pain minimal.     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,  unspecified extremity     Coronary artery disease     Dyslipidemia     Hypertension       Past Surgical History:   Procedure Laterality Date    AMPUTATION Right     Right arm    CAROTID STENT      CHOLECYSTECTOMY  3/27/2023    Procedure: CHOLECYSTECTOMY;  Surgeon: Abdirahman Brown MD;  Location: [facility];  Service: Oncology;;    EGD, WITH HEMORRHAGE CONTROL  1/19/2023    Procedure: EGD,WITH HEMORRHAGE CONTROL;  Surgeon: Ranjeet Perez MD;  Location: [facility] OR;  Service: Gastroenterology;;  [employer]    ERCP N/A 12/21/2022    Procedure: ERCP;  Surgeon: Sushil Anderson MD;  Location: [facility] ENDOSCOPY;  Service: Gastroenterology;  Laterality: N/A;  food in abdomen    ERCP, WITH BIOPSY  12/21/2022    Procedure: ERCP, WITH BIOPSY;  Surgeon: Sushil Anderson MD;  Location: [facility] ENDOSCOPY;  Service: Gastroenterology;;    ESOPHAGOGASTRODUODENOSCOPY N/A 1/19/2023    Procedure: EGD;  Surgeon: Ranjeet Perez MD;  Location: [facility];  Service: Gastroenterology;  Laterality: N/A;    EXPLORATION OF COMMON BILE DUCT  3/27/2023    Procedure: EXPLORATION, COMMON BILE DUCT;  Surgeon: Abdirahman Brown MD;  Location: [facility] OR;  Service: Oncology;;    LEFT HEART CATHETERIZATION      LIVER BIOPSY  3/27/2023    Procedure: BIOPSY, LIVER;  Surgeon: Abdirahman Brown MD;  Location: [facility] OR;  Service: Oncology;;    LYMPHADENECTOMY  3/27/2023    Procedure: LYMPHADENECTOMY;  Surgeon: Abdirahman Brown MD;  Location: [facility] OR;  Service: Oncology;;  Portal/celiac lymphadenectomy    TONSILLECTOMY      WHIPPLE PROCEDURE N/A 3/27/2023    Procedure: WHIPPLE PROCEDURE;  Surgeon: Abdirahman Brown MD;  Location: [facility];  Service: Oncology;  Laterality: N/A;     Social History     Socioeconomic History    Marital status:    Tobacco Use    Smoking status: Never    Smokeless tobacco: Never   Substance and Sexual Activity    Alcohol use: Never    Drug use: Never     Social Determinants of Health     Food Insecurity: Unknown    Worried  About Running Out of Food in the Last Year: Never true   Transportation Needs: Unknown    Lack of Transportation (Medical): No   Social Connections: Unknown    Marital Status:    Housing Stability: Unknown    Unable to Pay for Housing in the Last Year: No      History reviewed. No pertinent family history.   Review of patient's allergies indicates:  No Known Allergies   Review of Systems   Constitutional:  Positive for activity change and appetite change. Negative for chills, fatigue and fever.   HENT:  Negative for sore throat.    Eyes:  Negative for visual disturbance.   Respiratory:  Negative for cough and shortness of breath.    Cardiovascular:  Negative for chest pain.   Gastrointestinal:  Positive for nausea and vomiting. Negative for abdominal pain, constipation and diarrhea.   Endocrine: Negative for polyuria.   Genitourinary:  Negative for dysuria.   Musculoskeletal:  Negative for back pain.   Integumentary:  Negative for rash.   Allergic/Immunologic: Negative for frequent infections.   Neurological:  Negative for weakness and headaches.   Hematological:  Negative for adenopathy. Does not bruise/bleed easily.       Objective:      Vitals:    04/26/23 0849   BP: 102/79   Pulse: 81   Resp: 16   Temp: 97.7 °F (36.5 °C)       Physical Exam  Constitutional:       General: He is not in acute distress.     Appearance: He is ill-appearing.   HENT:      Head: Normocephalic and atraumatic.      Nose: Nose normal.      Mouth/Throat:      Mouth: Mucous membranes are moist.      Pharynx: Oropharynx is clear.   Eyes:      Extraocular Movements: Extraocular movements intact.      Conjunctiva/sclera: Conjunctivae normal.      Pupils: Pupils are equal, round, and reactive to light.   Cardiovascular:      Rate and Rhythm: Normal rate and regular rhythm.      Pulses: Normal pulses.      Heart sounds: Normal heart sounds. No murmur heard.  Pulmonary:      Effort: No respiratory distress.      Breath sounds: Normal  "breath sounds.   Abdominal:      General: There is no distension.      Palpations: Abdomen is soft.      Tenderness: There is abdominal tenderness.   Genitourinary:     Comments: Paul catheter in place  Musculoskeletal:         General: Normal range of motion.      Cervical back: Normal range of motion and neck supple.      Right lower leg: No edema.      Left lower leg: No edema.   Lymphadenopathy:      Cervical: No cervical adenopathy.   Skin:     General: Skin is warm and dry.   Neurological:      Mental Status: He is alert and oriented to person, place, and time.      Motor: Weakness present.       LABS AND IMAGING REVIEWED IN [facility]    3/2/23 CT CAP  Impression:  1. Slight interval retraction of the percutaneous biliary stent which is now coiled slightly more distally in the right lobe of the liver.  There is however slightly improved dilatation of the extrahepatic bile ducts.  2. Known ampullary/duodenal mass poorly evaluated on this noncontrast CT.  No bowel obstruction.  3. Bladder wall appears mildly thickened which may be related to under distension.  Correlate with urinalysis.     3/15/23 CT Chest  Impression:  No evidence of metastatic disease in the thorax.     3/15/23 CT A/P  Impression:  Developing gastric outlet obstruction, secondary to a 3.2 cm long "apple-core" lesion in the 3rd portion of the duodenum at the level of the ampulla, presumably representing a primary adenocarcinoma.  No CT evidence of distant metastasis.    Assessment:   Stage IIIB Ampulla of Vater Adenocarcinoma, intestinal type  - s/p resection 3/27/23. Will plan for adjuvant Capecitabine based therapy once he has recovered from surgery. Consideration for chemo/RT afterwards pending response        Plan:       - Reviewed pathology and treatment plan with patient and family in detail  - Currently not recovered enough to proceed with chemotherapy at this time  - RTC 3 weeks for MD visit, labs same day      Elizabeth Kennedy LeJeune, " MD  Hematology/Oncology   [facility]

## 2023-04-26 NOTE — PROGRESS NOTES
Patient returns for postoperative visit he remains in rehab  He still has some symptoms of delayed gastric emptying with occasional nausea and vomiting but is tolerating 3 boost nutritional shakes daily along with a small amount of food    On exam his abdomen is soft nontender nondistended, his incision is healing without complication    He visited with Medical Oncology today regarding his pathology and they are planning oral Xeloda when his functional status improves further.    He was counseled on dietary strategies for delayed gastric emptying and to continue to consume supplemental shakes 3 times daily along with whatever he can tolerate orally otherwise.    Refer back to urology for indwelling catheter    Return to clinic in 2 weeks

## 2023-04-27 ENCOUNTER — DOCUMENTATION ONLY (OUTPATIENT)
Dept: SURGICAL ONCOLOGY | Facility: CLINIC | Age: 83
End: 2023-04-27
Payer: MEDICARE

## 2023-04-27 NOTE — PROGRESS NOTES
04/27/23  Spoke w/ Dr. Negron's nurse. Patient is scheduled w/ Katerine the NP on 05/16/23 @10:30 w/ a 10:15 arrival.   Notified patients daughter Ilene about appointment time. She voiced understanding and stated one of her other family members will be going with him to this appt. cpl

## 2023-05-03 LAB
FLUAV AG UPPER RESP QL IA.RAPID: NOT DETECTED
FLUBV AG UPPER RESP QL IA.RAPID: NOT DETECTED
SARS-COV-2 RNA RESP QL NAA+PROBE: NOT DETECTED

## 2023-05-03 NOTE — SEDATION DOCUMENTATION
NEUROLOGY PATIENT PRE-VISIT PLANNING     Patient was NOT contacted to complete PVP.  Note: Patient will not be contacted if there is no indication to call.     Patient Appointment is scheduled as: Established Patient     Is visit type and length scheduled correctly? Yes    [employer] Patient is checked in Patient Demographics? Yes    3.   Is referral attached to visit? Yes    4. Were records received from referring provider? Yes    4. Patient was contacted to have someone accompany them to visit.     5. Is this appointment scheduled as a Hospital Follow-Up?  No    6. Does the patient require any pre procedure or post procedure follow up? No    7. If any orders were placed at last visit or intended to be done for this visit do we have Results/Consult Notes? No  Labs - Labs were not ordered at last office visit.  Imaging - Imaging was not ordered at last office visit.  Referrals - No referrals were ordered at last office visit.  Note: If patient appointment is for lab or imaging review and patient did not complete the studies, check with provider if OK to reschedule patient until completed.    8. If patient appointment is for Botox - is order pended for provider? N/A    Wire advanced into biliary tree under fluoro

## 2023-05-10 ENCOUNTER — OFFICE VISIT (OUTPATIENT)
Dept: SURGICAL ONCOLOGY | Facility: CLINIC | Age: 83
End: 2023-05-10
Payer: MEDICARE

## 2023-05-10 DIAGNOSIS — C24.1 MALIGNANT NEOPLASM OF AMPULLA OF VATER: Primary | ICD-10-CM

## 2023-05-10 PROCEDURE — 99024 PR POST-OP FOLLOW-UP VISIT: ICD-10-PCS | Mod: S$GLB,,, | Performed by: SURGERY

## 2023-05-10 PROCEDURE — 99024 POSTOP FOLLOW-UP VISIT: CPT | Mod: S$GLB,,, | Performed by: SURGERY

## 2023-05-10 NOTE — PROGRESS NOTES
Patient returns for postoperative visit he remains in rehab  His symptoms of delayed gastric emptying have finally resolved and he is starting to gain some weight  On exam his abdomen is soft nontender nondistended, his incision is healing without complication    He visited with Medical Oncology today regarding his pathology and they are planning oral Xeloda when his functional status improves further.    He was counseled on dietary strategies status post Whipple    He is seeing Urology next week for his chronic urinary retention and indwelling catheter    Return to clinic in 4 weeks

## 2023-05-18 NOTE — PROGRESS NOTES
Subjective:       Patient ID: Quincy Triplett is a 82 y.o. male.    Chief Complaint: Follow Up    Diagnosis: Ampulla of Vater - Adenocarcinoma, intestinal type    Current Treatment: None    Treatment History:   Whipple - Dr. Brown - 3/27/23    HPI:   81yo M who presents to medical oncology in April '23 for evaluation of Adenocarcinoma of the Ampulla of Vater, Intenstinal type. He initialy presented in late 2022 with jaundice and significant weight loss. Imaging with evidence of biliary dilation and circumferential thickening of the 2nd portion of the duodenum and intra/extraheptic biliary dilation. 12/21/22 Endoscopy with ERCP showed evidence of a malignant appearing mass at the ampulla, pathology consistent with poorly differentiated adenocarcinoma. He was evaluated by surgical oncology, Dr. Brown in December, but then had episode of biliary obstruction with PTC catheter placement and urosepsis that left him too deconditioned for surgical intervention. He then improved with PT to the point he was able to undergo whipple + pancreaticoduodenectomy on 3/27/23. 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 Tumor invasion into pancreas and periduodenal tissue. + LVI. + PNI. Surgical margins negative. 14/31 lymph nodes were positive for malignancy. Final staging pT3B N2.  After his resection he went to rehab for 3 weeks and was then discharged home where he continues to heal and recover.      Interval History  Patient presents to clinic for 3 week MD follow up appointment and labs to discuss treatment plan moving forward.   He has been living at home for the last 3 weeks since discharge from rehab. Still requires some assistance with showering, getting dressed, and other ADLs. He is starting to ambulate without assistance more around the house.   Has chronic indwelling catheter, followed by Dr. Negron and needs TURBT but being held due to potential  for chemotherapy      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, unspecified extremity     Coronary artery disease     Dyslipidemia     Hypertension       Past Surgical History:   Procedure Laterality Date    AMPUTATION Right     Right arm    CAROTID STENT      CHOLECYSTECTOMY  3/27/2023    Procedure: CHOLECYSTECTOMY;  Surgeon: Abdirahman Brown MD;  Location: [facility] OR;  Service: Oncology;;    EGD, WITH HEMORRHAGE CONTROL  1/19/2023    Procedure: EGD,WITH HEMORRHAGE CONTROL;  Surgeon: Ranjeet Perez MD;  Location: [facility] OR;  Service: Gastroenterology;;  NextPowder HemeSpray    ERCP N/A 12/21/2022    Procedure: ERCP;  Surgeon: Sushil Anderson MD;  Location: [facility] ENDOSCOPY;  Service: Gastroenterology;  Laterality: N/A;  food in abdomen    ERCP, WITH BIOPSY  12/21/2022    Procedure: ERCP, WITH BIOPSY;  Surgeon: Sushil Anderson MD;  Location: [facility] ENDOSCOPY;  Service: Gastroenterology;;    ESOPHAGOGASTRODUODENOSCOPY N/A 1/19/2023    Procedure: EGD;  Surgeon: Ranjeet Perez MD;  Location: [facility] OR;  Service: Gastroenterology;  Laterality: N/A;    EXPLORATION OF COMMON BILE DUCT  3/27/2023    Procedure: EXPLORATION, COMMON BILE DUCT;  Surgeon: Abdirahman Brown MD;  Location: [facility] OR;  Service: Oncology;;    LEFT HEART CATHETERIZATION      LIVER BIOPSY  3/27/2023    Procedure: BIOPSY, LIVER;  Surgeon: Abdirahman Brown MD;  Location: [facility] OR;  Service: Oncology;;    LYMPHADENECTOMY  3/27/2023    Procedure: LYMPHADENECTOMY;  Surgeon: Abdirahman Brown MD;  Location: [facility] OR;  Service: Oncology;;  Portal/celiac lymphadenectomy    TONSILLECTOMY      WHIPPLE PROCEDURE N/A 3/27/2023    Procedure: WHIPPLE PROCEDURE;  Surgeon: Abdirahman Brown MD;  Location: [facility] OR;  Service: Oncology;  Laterality: N/A;     Social History     Socioeconomic History    Marital status:    Tobacco Use    Smoking status: Never    Smokeless tobacco: Never    Substance and Sexual Activity    Alcohol use: Never    Drug use: Never     Social Determinants of Health     Food Insecurity: Unknown    Worried About Running Out of Food in the Last Year: Never true   Transportation Needs: Unknown    Lack of Transportation (Medical): No   Social Connections: Unknown    Marital Status:    Housing Stability: Unknown    Unable to Pay for Housing in the Last Year: No      History reviewed. No pertinent family history.   Review of patient's allergies indicates:  No Known Allergies   Review of Systems   Constitutional:  Positive for activity change and appetite change. Negative for chills, fatigue and fever.   HENT:  Negative for sore throat.    Eyes:  Negative for visual disturbance.   Respiratory:  Negative for cough and shortness of breath.    Cardiovascular:  Negative for chest pain.   Gastrointestinal:  Positive for nausea and vomiting. Negative for abdominal pain, constipation and diarrhea.   Endocrine: Negative for polyuria.   Genitourinary:  Negative for dysuria.   Musculoskeletal:  Negative for back pain.   Integumentary:  Negative for rash.   Allergic/Immunologic: Negative for frequent infections.   Neurological:  Negative for weakness and headaches.   Hematological:  Negative for adenopathy. Does not bruise/bleed easily.       Objective:      Vitals:    05/22/23 1132   BP: (!) 161/70   Pulse: 63   Resp: 16   Temp: 98 °F (36.7 °C)         Physical Exam  Constitutional:       General: He is not in acute distress.     Appearance: He is ill-appearing.   HENT:      Head: Normocephalic and atraumatic.      Nose: Nose normal.      Mouth/Throat:      Mouth: Mucous membranes are moist.      Pharynx: Oropharynx is clear.   Eyes:      Extraocular Movements: Extraocular movements intact.      Conjunctiva/sclera: Conjunctivae normal.      Pupils: Pupils are equal, round, and reactive to light.   Cardiovascular:      Rate and Rhythm: Normal rate and regular rhythm.      Pulses:  "Normal pulses.      Heart sounds: Normal heart sounds. No murmur heard.  Pulmonary:      Effort: No respiratory distress.      Breath sounds: Normal breath sounds.   Abdominal:      General: There is no distension.      Palpations: Abdomen is soft.      Tenderness: There is abdominal tenderness.   Genitourinary:     Comments: Paul catheter in place  Musculoskeletal:         General: Normal range of motion.      Cervical back: Normal range of motion and neck supple.      Right lower leg: No edema.      Left lower leg: No edema.   Lymphadenopathy:      Cervical: No cervical adenopathy.   Skin:     General: Skin is warm and dry.   Neurological:      Mental Status: He is alert and oriented to person, place, and time.      Motor: Weakness present.       LABS AND IMAGING REVIEWED IN [facility]    3/2/23 CT CAP  Impression:  1. Slight interval retraction of the percutaneous biliary stent which is now coiled slightly more distally in the right lobe of the liver.  There is however slightly improved dilatation of the extrahepatic bile ducts.  2. Known ampullary/duodenal mass poorly evaluated on this noncontrast CT.  No bowel obstruction.  3. Bladder wall appears mildly thickened which may be related to under distension.  Correlate with urinalysis.     3/15/23 CT Chest  Impression:  No evidence of metastatic disease in the thorax.     3/15/23 CT A/P  Impression:  Developing gastric outlet obstruction, secondary to a 3.2 cm long "apple-core" lesion in the 3rd portion of the duodenum at the level of the ampulla, presumably representing a primary adenocarcinoma.  No CT evidence of distant metastasis.    4/7/23 CT Chest  Impression:  Layering pleural effusions with adjacent compressive atelectasis versus pneumonia    4/7/23 CT Abdomen/Pelvis  Impression:   1. Postoperative changes of the abdomen with small amount of mesenteric edema and ascites.  No organized fluid collection or free air.  2. Mildly dilated loops of small bowel are " nonspecific and may represent an ileus.  3. Large right and small left pleural effusions with basilar subsegmental atelectasis    Assessment:   Stage IIIB Ampulla of Vater Adenocarcinoma, intestinal type  - s/p resection 3/27/23. Will plan for adjuvant Capecitabine based therapy once he has recovered from surgery. Consideration for chemo/RT afterwards pending response        Plan:       - Discussed case with Dr. Negron with urology. Will hold on TURBT for now, has f/u with him in August   - While he has made improvements, I still don't think he is functional enough for treatment. Discussed increasing activity as much as he can at home.   - RTC 2 weeks for MD visit, labs same day for evaluation to start Xeloda at that time.       Elizabeth Kennedy LeJeune, MD  Hematology/Oncology   [facility]

## 2023-05-22 ENCOUNTER — LAB VISIT (OUTPATIENT)
Dept: LAB | Facility: HOSPITAL | Age: 83
End: 2023-05-22
Attending: STUDENT IN AN ORGANIZED HEALTH CARE EDUCATION/TRAINING PROGRAM
Payer: MEDICARE

## 2023-05-22 ENCOUNTER — OFFICE VISIT (OUTPATIENT)
Dept: HEMATOLOGY/ONCOLOGY | Facility: CLINIC | Age: 83
End: 2023-05-22
Payer: MEDICARE

## 2023-05-22 VITALS
DIASTOLIC BLOOD PRESSURE: 70 MMHG | OXYGEN SATURATION: 99 % | WEIGHT: 143.13 LBS | SYSTOLIC BLOOD PRESSURE: 161 MMHG | HEIGHT: 69 IN | HEART RATE: 63 BPM | BODY MASS INDEX: 21.2 KG/M2 | RESPIRATION RATE: 16 BRPM | TEMPERATURE: 98 F

## 2023-05-22 DIAGNOSIS — C17.0 DUODENAL CANCER: Primary | ICD-10-CM

## 2023-05-22 DIAGNOSIS — Z90.49 H/O WHIPPLE PROCEDURE: ICD-10-CM

## 2023-05-22 DIAGNOSIS — Z90.410 H/O WHIPPLE PROCEDURE: ICD-10-CM

## 2023-05-22 DIAGNOSIS — C17.0 DUODENAL CANCER: ICD-10-CM

## 2023-05-22 LAB
ALBUMIN SERPL-MCNC: 2.2 G/DL (ref 3.4–4.8)
ALBUMIN/GLOB SERPL: 0.5 RATIO (ref 1.1–2)
ALP SERPL-CCNC: 424 UNIT/L (ref 40–150)
ALT SERPL-CCNC: 21 UNIT/L (ref 0–55)
AST SERPL-CCNC: 25 UNIT/L (ref 5–34)
BASOPHILS # BLD AUTO: 0.04 X10(3)/MCL
BASOPHILS NFR BLD AUTO: 0.5 %
BILIRUBIN DIRECT+TOT PNL SERPL-MCNC: 0.4 MG/DL
BUN SERPL-MCNC: 12.4 MG/DL (ref 8.4–25.7)
CALCIUM SERPL-MCNC: 8.6 MG/DL (ref 8.8–10)
CHLORIDE SERPL-SCNC: 103 MMOL/L (ref 98–107)
CO2 SERPL-SCNC: 28 MMOL/L (ref 23–31)
CREAT SERPL-MCNC: 0.71 MG/DL (ref 0.73–1.18)
EOSINOPHIL # BLD AUTO: 0.23 X10(3)/MCL (ref 0–0.9)
EOSINOPHIL NFR BLD AUTO: 3.1 %
ERYTHROCYTE [DISTWIDTH] IN BLOOD BY AUTOMATED COUNT: 15.5 % (ref 11.5–17)
FERRITIN SERPL-MCNC: 259.05 NG/ML (ref 21.81–274.66)
FOLATE SERPL-MCNC: 12.6 NG/ML (ref 7–31.4)
GFR SERPLBLD CREATININE-BSD FMLA CKD-EPI: >60 MLS/MIN/1.73/M2
GLOBULIN SER-MCNC: 4.1 GM/DL (ref 2.4–3.5)
GLUCOSE SERPL-MCNC: 97 MG/DL (ref 82–115)
HCT VFR BLD AUTO: 28 % (ref 42–52)
HGB BLD-MCNC: 8.3 G/DL (ref 14–18)
IMM GRANULOCYTES # BLD AUTO: 0.02 X10(3)/MCL (ref 0–0.04)
IMM GRANULOCYTES NFR BLD AUTO: 0.3 %
IRON SATN MFR SERPL: 15 % (ref 20–50)
IRON SERPL-MCNC: 19 UG/DL (ref 65–175)
LYMPHOCYTES # BLD AUTO: 1.67 X10(3)/MCL (ref 0.6–4.6)
LYMPHOCYTES NFR BLD AUTO: 22.4 %
MAGNESIUM SERPL-MCNC: 1.9 MG/DL (ref 1.6–2.6)
MCH RBC QN AUTO: 26.3 PG (ref 27–31)
MCHC RBC AUTO-ENTMCNC: 29.6 G/DL (ref 33–36)
MCV RBC AUTO: 88.9 FL (ref 80–94)
MONOCYTES # BLD AUTO: 0.57 X10(3)/MCL (ref 0.1–1.3)
MONOCYTES NFR BLD AUTO: 7.6 %
NEUTROPHILS # BLD AUTO: 4.93 X10(3)/MCL (ref 2.1–9.2)
NEUTROPHILS NFR BLD AUTO: 66.1 %
PHOSPHATE SERPL-MCNC: 3.4 MG/DL (ref 2.3–4.7)
PLATELET # BLD AUTO: 327 X10(3)/MCL (ref 130–400)
PMV BLD AUTO: 9.2 FL (ref 7.4–10.4)
POTASSIUM SERPL-SCNC: 4 MMOL/L (ref 3.5–5.1)
PROT SERPL-MCNC: 6.3 GM/DL (ref 5.8–7.6)
RBC # BLD AUTO: 3.15 X10(6)/MCL (ref 4.7–6.1)
SODIUM SERPL-SCNC: 140 MMOL/L (ref 136–145)
TIBC SERPL-MCNC: 104 UG/DL (ref 69–240)
TIBC SERPL-MCNC: 123 UG/DL (ref 250–450)
TRANSFERRIN SERPL-MCNC: 117 MG/DL
VIT B12 SERPL-MCNC: 471 PG/ML (ref 213–816)
WBC # SPEC AUTO: 7.46 X10(3)/MCL (ref 4.5–11.5)

## 2023-05-22 PROCEDURE — 3288F FALL RISK ASSESSMENT DOCD: CPT | Mod: CPTII,S$GLB,, | Performed by: STUDENT IN AN ORGANIZED HEALTH CARE EDUCATION/TRAINING PROGRAM

## 2023-05-22 PROCEDURE — 3078F DIAST BP <80 MM HG: CPT | Mod: CPTII,S$GLB,, | Performed by: STUDENT IN AN ORGANIZED HEALTH CARE EDUCATION/TRAINING PROGRAM

## 2023-05-22 PROCEDURE — 1160F RVW MEDS BY RX/DR IN RCRD: CPT | Mod: CPTII,S$GLB,, | Performed by: STUDENT IN AN ORGANIZED HEALTH CARE EDUCATION/TRAINING PROGRAM

## 2023-05-22 PROCEDURE — 99999 PR PBB SHADOW E&M-EST. PATIENT-LVL IV: CPT | Mod: PBBFAC,,, | Performed by: STUDENT IN AN ORGANIZED HEALTH CARE EDUCATION/TRAINING PROGRAM

## 2023-05-22 PROCEDURE — 1101F PT FALLS ASSESS-DOCD LE1/YR: CPT | Mod: CPTII,S$GLB,, | Performed by: STUDENT IN AN ORGANIZED HEALTH CARE EDUCATION/TRAINING PROGRAM

## 2023-05-22 PROCEDURE — 82607 VITAMIN B-12: CPT

## 2023-05-22 PROCEDURE — 80053 COMPREHEN METABOLIC PANEL: CPT

## 2023-05-22 PROCEDURE — 84100 ASSAY OF PHOSPHORUS: CPT

## 2023-05-22 PROCEDURE — 82746 ASSAY OF FOLIC ACID SERUM: CPT

## 2023-05-22 PROCEDURE — 1101F PR PT FALLS ASSESS DOC 0-1 FALLS W/OUT INJ PAST YR: ICD-10-PCS | Mod: CPTII,S$GLB,, | Performed by: STUDENT IN AN ORGANIZED HEALTH CARE EDUCATION/TRAINING PROGRAM

## 2023-05-22 PROCEDURE — 99215 OFFICE O/P EST HI 40 MIN: CPT | Mod: S$GLB,,, | Performed by: STUDENT IN AN ORGANIZED HEALTH CARE EDUCATION/TRAINING PROGRAM

## 2023-05-22 PROCEDURE — 99214 OFFICE O/P EST MOD 30 MIN: CPT | Performed by: STUDENT IN AN ORGANIZED HEALTH CARE EDUCATION/TRAINING PROGRAM

## 2023-05-22 PROCEDURE — 83550 IRON BINDING TEST: CPT

## 2023-05-22 PROCEDURE — 3077F PR MOST RECENT SYSTOLIC BLOOD PRESSURE >= 140 MM HG: ICD-10-PCS | Mod: CPTII,S$GLB,, | Performed by: STUDENT IN AN ORGANIZED HEALTH CARE EDUCATION/TRAINING PROGRAM

## 2023-05-22 PROCEDURE — 3077F SYST BP >= 140 MM HG: CPT | Mod: CPTII,S$GLB,, | Performed by: STUDENT IN AN ORGANIZED HEALTH CARE EDUCATION/TRAINING PROGRAM

## 2023-05-22 PROCEDURE — 99999 PR PBB SHADOW E&M-EST. PATIENT-LVL IV: ICD-10-PCS | Mod: PBBFAC,,, | Performed by: STUDENT IN AN ORGANIZED HEALTH CARE EDUCATION/TRAINING PROGRAM

## 2023-05-22 PROCEDURE — 1159F MED LIST DOCD IN RCRD: CPT | Mod: CPTII,S$GLB,, | Performed by: STUDENT IN AN ORGANIZED HEALTH CARE EDUCATION/TRAINING PROGRAM

## 2023-05-22 PROCEDURE — 85025 COMPLETE CBC W/AUTO DIFF WBC: CPT

## 2023-05-22 PROCEDURE — 99215 PR OFFICE/OUTPT VISIT, EST, LEVL V, 40-54 MIN: ICD-10-PCS | Mod: S$GLB,,, | Performed by: STUDENT IN AN ORGANIZED HEALTH CARE EDUCATION/TRAINING PROGRAM

## 2023-05-22 PROCEDURE — 1126F AMNT PAIN NOTED NONE PRSNT: CPT | Mod: CPTII,S$GLB,, | Performed by: STUDENT IN AN ORGANIZED HEALTH CARE EDUCATION/TRAINING PROGRAM

## 2023-05-22 PROCEDURE — 82728 ASSAY OF FERRITIN: CPT

## 2023-05-22 PROCEDURE — 1126F PR PAIN SEVERITY QUANTIFIED, NO PAIN PRESENT: ICD-10-PCS | Mod: CPTII,S$GLB,, | Performed by: STUDENT IN AN ORGANIZED HEALTH CARE EDUCATION/TRAINING PROGRAM

## 2023-05-22 PROCEDURE — 1160F PR REVIEW ALL MEDS BY PRESCRIBER/CLIN PHARMACIST DOCUMENTED: ICD-10-PCS | Mod: CPTII,S$GLB,, | Performed by: STUDENT IN AN ORGANIZED HEALTH CARE EDUCATION/TRAINING PROGRAM

## 2023-05-22 PROCEDURE — 3078F PR MOST RECENT DIASTOLIC BLOOD PRESSURE < 80 MM HG: ICD-10-PCS | Mod: CPTII,S$GLB,, | Performed by: STUDENT IN AN ORGANIZED HEALTH CARE EDUCATION/TRAINING PROGRAM

## 2023-05-22 PROCEDURE — 1159F PR MEDICATION LIST DOCUMENTED IN MEDICAL RECORD: ICD-10-PCS | Mod: CPTII,S$GLB,, | Performed by: STUDENT IN AN ORGANIZED HEALTH CARE EDUCATION/TRAINING PROGRAM

## 2023-05-22 PROCEDURE — 83735 ASSAY OF MAGNESIUM: CPT

## 2023-05-22 PROCEDURE — 36415 COLL VENOUS BLD VENIPUNCTURE: CPT

## 2023-05-22 PROCEDURE — 3288F PR FALLS RISK ASSESSMENT DOCUMENTED: ICD-10-PCS | Mod: CPTII,S$GLB,, | Performed by: STUDENT IN AN ORGANIZED HEALTH CARE EDUCATION/TRAINING PROGRAM

## 2023-05-22 NOTE — PROVIDER PROGRESS NOTES - EMERGENCY DEPT.
Encounter Date: 1/11/2023    ED Physician Progress Notes        Physician Note:   Called patient about false positive covid results from Jan 22, no medical questions from patient or daughter

## 2023-06-01 NOTE — PROGRESS NOTES
Subjective:       Patient ID: Quincy Triplett is a 82 y.o. male.    Chief Complaint: Follow Up    Diagnosis: Ampulla of Vater - Adenocarcinoma, intestinal type    Current Treatment: None    Treatment History:   Whipple - Dr. Brown - 3/27/23    HPI:   83yo M who presented to medical oncology in April '23 for evaluation of Adenocarcinoma of the Ampulla of Vater, Intenstinal type. He initialy presented in late 2022 with jaundice and significant weight loss. Imaging with evidence of biliary dilation and circumferential thickening of the 2nd portion of the duodenum and intra/extraheptic biliary dilation. 12/21/22 Endoscopy with ERCP showed evidence of a malignant appearing mass at the ampulla, pathology consistent with poorly differentiated adenocarcinoma. He was evaluated by surgical oncology, Dr. Brown in December, but then had episode of biliary obstruction with PTC catheter placement and urosepsis that left him too deconditioned for surgical intervention. He then improved with PT to the point he was able to undergo whipple + pancreaticoduodenectomy on 3/27/23. 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 Tumor invasion into pancreas and periduodenal tissue. + LVI. + PNI. Surgical margins negative. 14/31 lymph nodes were positive for malignancy. Final staging pT3B N2.  After his resection he went to rehab for 3 weeks and was then discharged home where he continues to heal and recover.      Interval History:  Patient presents to clinic for 2 week MD follow up appointment and labs to discuss treatment plan.   He continues to improve from a functional stand point. Able to perform ADLs without assistance, moving around house on his own. Ambulating more.   He has lost 4lbs in the last several weeks despite good PO intake. Met with nutrition, working on supplements.   He denies any abdominal pain, fevers, chills, NS. Able to ambulate on his own today,  no longer requiring a wheelchair  Has home PT  Discussed initiating xeloda in the adjuvant setting, he is agreeable to proceed.       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, unspecified extremity     Coronary artery disease     Dyslipidemia     Hypertension       Past Surgical History:   Procedure Laterality Date    AMPUTATION Right     Right arm    CAROTID STENT      CHOLECYSTECTOMY  3/27/2023    Procedure: CHOLECYSTECTOMY;  Surgeon: Abdirahman Brown MD;  Location: [facility] OR;  Service: Oncology;;    EGD, WITH HEMORRHAGE CONTROL  1/19/2023    Procedure: EGD,WITH HEMORRHAGE CONTROL;  Surgeon: Ranjeet Perez MD;  Location: [facility] OR;  Service: Gastroenterology;;  NextPowder HemeSpray    ERCP N/A 12/21/2022    Procedure: ERCP;  Surgeon: Sushil Anderson MD;  Location: [facility] ENDOSCOPY;  Service: Gastroenterology;  Laterality: N/A;  food in abdomen    ERCP, WITH BIOPSY  12/21/2022    Procedure: ERCP, WITH BIOPSY;  Surgeon: Sushil Anderson MD;  Location: [facility] ENDOSCOPY;  Service: Gastroenterology;;    ESOPHAGOGASTRODUODENOSCOPY N/A 1/19/2023    Procedure: EGD;  Surgeon: Ranjeet Perez MD;  Location: [facility] OR;  Service: Gastroenterology;  Laterality: N/A;    EXPLORATION OF COMMON BILE DUCT  3/27/2023    Procedure: EXPLORATION, COMMON BILE DUCT;  Surgeon: Abdirahman Brown MD;  Location: [facility] OR;  Service: Oncology;;    LEFT HEART CATHETERIZATION      LIVER BIOPSY  3/27/2023    Procedure: BIOPSY, LIVER;  Surgeon: Abdirahman Brown MD;  Location: [facility] OR;  Service: Oncology;;    LYMPHADENECTOMY  3/27/2023    Procedure: LYMPHADENECTOMY;  Surgeon: Abdirahman Brown MD;  Location: [facility] OR;  Service: Oncology;;  Portal/celiac lymphadenectomy    TONSILLECTOMY      WHIPPLE PROCEDURE N/A 3/27/2023    Procedure: WHIPPLE PROCEDURE;  Surgeon: Abdirahman Brown MD;  Location: [facility] OR;  Service: Oncology;  Laterality: N/A;     Social History      Socioeconomic History    Marital status:    Tobacco Use    Smoking status: Never    Smokeless tobacco: Never   Substance and Sexual Activity    Alcohol use: Never    Drug use: Never     Social Determinants of Health     Food Insecurity: Unknown    Worried About Running Out of Food in the Last Year: Never true   Transportation Needs: Unknown    Lack of Transportation (Medical): No   Social Connections: Unknown    Marital Status:    Housing Stability: Unknown    Unable to Pay for Housing in the Last Year: No      History reviewed. No pertinent family history.   Review of patient's allergies indicates:  No Known Allergies   Review of Systems   Constitutional:  Positive for activity change. Negative for chills, fatigue and fever.   HENT:  Negative for sore throat.    Eyes:  Negative for visual disturbance.   Respiratory:  Negative for cough and shortness of breath.    Cardiovascular:  Negative for chest pain.   Gastrointestinal:  Negative for abdominal pain, constipation and diarrhea.   Endocrine: Negative for polyuria.   Genitourinary:  Negative for dysuria.   Musculoskeletal:  Negative for back pain.   Integumentary:  Negative for rash.   Allergic/Immunologic: Negative for frequent infections.   Neurological:  Negative for weakness and headaches.   Hematological:  Negative for adenopathy. Does not bruise/bleed easily.       Objective:      Vitals:    06/07/23 1038   BP: (!) 161/86   Pulse: 69   Resp: 18   Temp: 97.8 °F (36.6 °C)       Physical Exam  Constitutional:       General: He is not in acute distress.     Appearance: Normal appearance.   HENT:      Head: Normocephalic and atraumatic.      Nose: Nose normal.      Mouth/Throat:      Mouth: Mucous membranes are moist.      Pharynx: Oropharynx is clear.   Eyes:      Extraocular Movements: Extraocular movements intact.      Conjunctiva/sclera: Conjunctivae normal.      Pupils: Pupils are equal, round, and reactive to light.   Cardiovascular:       "Rate and Rhythm: Normal rate and regular rhythm.      Pulses: Normal pulses.      Heart sounds: Normal heart sounds. No murmur heard.  Pulmonary:      Effort: No respiratory distress.      Breath sounds: Normal breath sounds.   Abdominal:      General: There is no distension.      Palpations: Abdomen is soft.   Genitourinary:     Comments: Paul catheter in place  Musculoskeletal:         General: Normal range of motion.      Cervical back: Normal range of motion and neck supple.      Right lower leg: No edema.      Left lower leg: No edema.   Lymphadenopathy:      Cervical: No cervical adenopathy.   Skin:     General: Skin is warm and dry.   Neurological:      Mental Status: He is alert and oriented to person, place, and time.      Motor: Weakness present.       LABS AND IMAGING REVIEWED IN [facility]    3/2/23 CT CAP  Impression:  1. Slight interval retraction of the percutaneous biliary stent which is now coiled slightly more distally in the right lobe of the liver.  There is however slightly improved dilatation of the extrahepatic bile ducts.  2. Known ampullary/duodenal mass poorly evaluated on this noncontrast CT.  No bowel obstruction.  3. Bladder wall appears mildly thickened which may be related to under distension.  Correlate with urinalysis.     3/15/23 CT Chest  Impression:  No evidence of metastatic disease in the thorax.     3/15/23 CT A/P  Impression:  Developing gastric outlet obstruction, secondary to a 3.2 cm long "apple-core" lesion in the 3rd portion of the duodenum at the level of the ampulla, presumably representing a primary adenocarcinoma.  No CT evidence of distant metastasis.    4/7/23 CT Chest  Impression:  Layering pleural effusions with adjacent compressive atelectasis versus pneumonia    4/7/23 CT Abdomen/Pelvis  Impression:   1. Postoperative changes of the abdomen with small amount of mesenteric edema and ascites.  No organized fluid collection or free air.  2. Mildly dilated loops of small " bowel are nonspecific and may represent an ileus.  3. Large right and small left pleural effusions with basilar subsegmental atelectasis    Assessment:   Stage IIIB Ampulla of Vater Adenocarcinoma, intestinal type  - s/p resection 3/27/23. Will plan for adjuvant Capecitabine based therapy. Consideration for chemo/RT afterwards pending response      Plan:     - Toxicity and patient performance reviewed, okay to proceed with Xeloda 1500mg BID 14days Q21 cycle for 6 months  - Chemo education and nutrition ordered  - Okay to proceed once he gets chemotherapy, knows to call when he starts. C1 will be 1000mg BID, increase if tolerates for C2 to full dose.   - RTC 2 weeks for NP visit for toxicity check, labs same day - CBC, CMP      Elizabeth Kennedy LeJeune, MD  Hematology/Oncology   [facility]

## 2023-06-05 PROBLEM — N17.9 AKI (ACUTE KIDNEY INJURY): Status: RESOLVED | Noted: 2023-02-10 | Resolved: 2023-06-05

## 2023-06-05 PROBLEM — A41.9 SEPSIS: Status: RESOLVED | Noted: 2023-03-03 | Resolved: 2023-06-05

## 2023-06-06 ENCOUNTER — OFFICE VISIT (OUTPATIENT)
Dept: SURGICAL ONCOLOGY | Facility: CLINIC | Age: 83
End: 2023-06-06
Payer: MEDICARE

## 2023-06-06 DIAGNOSIS — C25.9 MALIGNANT NEOPLASM OF PANCREAS, UNSPECIFIED LOCATION OF MALIGNANCY: Primary | ICD-10-CM

## 2023-06-06 PROCEDURE — 99024 POSTOP FOLLOW-UP VISIT: CPT | Mod: S$GLB,,, | Performed by: NURSE PRACTITIONER

## 2023-06-06 PROCEDURE — 99024 PR POST-OP FOLLOW-UP VISIT: ICD-10-PCS | Mod: S$GLB,,, | Performed by: NURSE PRACTITIONER

## 2023-06-06 NOTE — PROGRESS NOTES
OFFICE FOLLOW UP  10 WEEKS POST OP [facility]    PT TELLS ME HE IS NOW HOME FROM REHAB  STATES HE IS DOING VERY WELL  EATING WELL  HAVING BMS  NO COMPLAINTS OF PAIN  NO FEVER OR CHILLS  TELLS ME HE IS STARTING CHEMOTHERAPY TOMORROW    ABD SOFT  ALL INCISIONS HEALED    STILL WITH INDWELLING GODWIN CATH  HAS SEEN UROLOGY DR KING  DECISION MADE TO KEEP GODWIN DURING INITIATION OF CHEMOTHERAPY    I AM VERY PLEASED WITH HOW FAR MR [facility] HAS COME  HE IS SO APPRECIATIVE AND MOTIVATED TO BEAT HIS CANCER  ENCOURAGEMENT GIVEN    RTC 3 MONTHS

## 2023-06-07 ENCOUNTER — CLINICAL SUPPORT (OUTPATIENT)
Dept: HEMATOLOGY/ONCOLOGY | Facility: CLINIC | Age: 83
End: 2023-06-07
Payer: MEDICARE

## 2023-06-07 ENCOUNTER — OFFICE VISIT (OUTPATIENT)
Dept: HEMATOLOGY/ONCOLOGY | Facility: CLINIC | Age: 83
End: 2023-06-07
Payer: MEDICARE

## 2023-06-07 VITALS
BODY MASS INDEX: 20.68 KG/M2 | WEIGHT: 139.63 LBS | TEMPERATURE: 98 F | HEART RATE: 69 BPM | DIASTOLIC BLOOD PRESSURE: 86 MMHG | HEIGHT: 69 IN | SYSTOLIC BLOOD PRESSURE: 161 MMHG | RESPIRATION RATE: 18 BRPM | OXYGEN SATURATION: 98 %

## 2023-06-07 DIAGNOSIS — C17.0 DUODENAL CANCER: Primary | ICD-10-CM

## 2023-06-07 DIAGNOSIS — Z51.11 ENCOUNTER FOR CHEMOTHERAPY MANAGEMENT: ICD-10-CM

## 2023-06-07 DIAGNOSIS — C17.0 DUODENAL CANCER: ICD-10-CM

## 2023-06-07 PROCEDURE — 1160F RVW MEDS BY RX/DR IN RCRD: CPT | Mod: CPTII,S$GLB,, | Performed by: STUDENT IN AN ORGANIZED HEALTH CARE EDUCATION/TRAINING PROGRAM

## 2023-06-07 PROCEDURE — 3288F PR FALLS RISK ASSESSMENT DOCUMENTED: ICD-10-PCS | Mod: CPTII,S$GLB,, | Performed by: STUDENT IN AN ORGANIZED HEALTH CARE EDUCATION/TRAINING PROGRAM

## 2023-06-07 PROCEDURE — 99999 PR PBB SHADOW E&M-EST. PATIENT-LVL V: ICD-10-PCS | Mod: PBBFAC,,, | Performed by: STUDENT IN AN ORGANIZED HEALTH CARE EDUCATION/TRAINING PROGRAM

## 2023-06-07 PROCEDURE — 99999 PR PBB SHADOW E&M-EST. PATIENT-LVL V: CPT | Mod: PBBFAC,,, | Performed by: STUDENT IN AN ORGANIZED HEALTH CARE EDUCATION/TRAINING PROGRAM

## 2023-06-07 PROCEDURE — 1101F PT FALLS ASSESS-DOCD LE1/YR: CPT | Mod: CPTII,S$GLB,, | Performed by: STUDENT IN AN ORGANIZED HEALTH CARE EDUCATION/TRAINING PROGRAM

## 2023-06-07 PROCEDURE — 3288F FALL RISK ASSESSMENT DOCD: CPT | Mod: CPTII,S$GLB,, | Performed by: STUDENT IN AN ORGANIZED HEALTH CARE EDUCATION/TRAINING PROGRAM

## 2023-06-07 PROCEDURE — 99215 OFFICE O/P EST HI 40 MIN: CPT | Mod: S$GLB,,, | Performed by: STUDENT IN AN ORGANIZED HEALTH CARE EDUCATION/TRAINING PROGRAM

## 2023-06-07 PROCEDURE — 3079F DIAST BP 80-89 MM HG: CPT | Mod: CPTII,S$GLB,, | Performed by: STUDENT IN AN ORGANIZED HEALTH CARE EDUCATION/TRAINING PROGRAM

## 2023-06-07 PROCEDURE — 99999 PR PBB SHADOW E&M-EST. PATIENT-LVL I: ICD-10-PCS | Mod: PBBFAC,,,

## 2023-06-07 PROCEDURE — 1159F MED LIST DOCD IN RCRD: CPT | Mod: CPTII,S$GLB,, | Performed by: STUDENT IN AN ORGANIZED HEALTH CARE EDUCATION/TRAINING PROGRAM

## 2023-06-07 PROCEDURE — 1160F PR REVIEW ALL MEDS BY PRESCRIBER/CLIN PHARMACIST DOCUMENTED: ICD-10-PCS | Mod: CPTII,S$GLB,, | Performed by: STUDENT IN AN ORGANIZED HEALTH CARE EDUCATION/TRAINING PROGRAM

## 2023-06-07 PROCEDURE — 99215 PR OFFICE/OUTPT VISIT, EST, LEVL V, 40-54 MIN: ICD-10-PCS | Mod: S$GLB,,, | Performed by: STUDENT IN AN ORGANIZED HEALTH CARE EDUCATION/TRAINING PROGRAM

## 2023-06-07 PROCEDURE — 3077F SYST BP >= 140 MM HG: CPT | Mod: CPTII,S$GLB,, | Performed by: STUDENT IN AN ORGANIZED HEALTH CARE EDUCATION/TRAINING PROGRAM

## 2023-06-07 PROCEDURE — 99999 PR PBB SHADOW E&M-EST. PATIENT-LVL I: CPT | Mod: PBBFAC,,,

## 2023-06-07 PROCEDURE — 3079F PR MOST RECENT DIASTOLIC BLOOD PRESSURE 80-89 MM HG: ICD-10-PCS | Mod: CPTII,S$GLB,, | Performed by: STUDENT IN AN ORGANIZED HEALTH CARE EDUCATION/TRAINING PROGRAM

## 2023-06-07 PROCEDURE — 1101F PR PT FALLS ASSESS DOC 0-1 FALLS W/OUT INJ PAST YR: ICD-10-PCS | Mod: CPTII,S$GLB,, | Performed by: STUDENT IN AN ORGANIZED HEALTH CARE EDUCATION/TRAINING PROGRAM

## 2023-06-07 PROCEDURE — 3077F PR MOST RECENT SYSTOLIC BLOOD PRESSURE >= 140 MM HG: ICD-10-PCS | Mod: CPTII,S$GLB,, | Performed by: STUDENT IN AN ORGANIZED HEALTH CARE EDUCATION/TRAINING PROGRAM

## 2023-06-07 PROCEDURE — 1159F PR MEDICATION LIST DOCUMENTED IN MEDICAL RECORD: ICD-10-PCS | Mod: CPTII,S$GLB,, | Performed by: STUDENT IN AN ORGANIZED HEALTH CARE EDUCATION/TRAINING PROGRAM

## 2023-06-07 RX ORDER — CAPECITABINE 500 MG/1
1500 TABLET, FILM COATED ORAL 2 TIMES DAILY
Qty: 126 TABLET | Refills: 5 | Status: ACTIVE | OUTPATIENT
Start: 2023-06-07 | End: 2023-10-18

## 2023-06-07 NOTE — PROGRESS NOTES
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 Small frequent meals, high kcal/high protein  2. Refer to [facility] for Ensure Enlive 2 cases 2 months  3. RD to continue monitoring. Pt may benefit from pancreatic enzymes if wt loss continues    Nutrition Assessment    6/7/23: This is a 82 y.o.male with a medical diagnosis of adenocarcinoma, ampulla of Vater. Reports good appetite and po intake, no c/o n/v/c/d, although he does note that portions have decreased since Whipple in March which is to be expected. He has lost ~20-25# in the past 2-3 months. He does drink Boost at home.     Nutrition Factors Affecting Intake  none identified    PMHx: CAD, dyslipidemia, HTN, cathi    Allergies: Patient has no known allergies.    Current Medications:    Current Outpatient Medications:     ALPRAZolam (XANAX) 0.25 MG tablet, Take 1 tablet (0.25 mg total) by mouth 3 (three) times daily as needed for Anxiety., Disp: 12 tablet, Rfl: 0    busPIRone (BUSPAR) 5 MG Tab, Take 1 tablet (5 mg total) by mouth 3 (three) times daily., Disp: 90 tablet, Rfl: 0    capecitabine (XELODA) 500 MG Tab, Take 3 tablets (1,500 mg total) by mouth 2 (two) times daily Take as directed days 1-14 of each 21 day cycle. Take with water within 30 minutes after a meal.., Disp: 126 tablet, Rfl: 5    carvediloL (COREG) 3.125 MG tablet, Take 1 tablet (3.125 mg total) by mouth 2 (two) times daily., Disp: 60 tablet, Rfl: 0    docusate sodium (COLACE) 100 MG capsule, Take 1 capsule (100 mg total) by mouth 2 (two) times daily., Disp: 30 capsule, Rfl: 0    finasteride (PROSCAR) 5 mg tablet, Take 1 tablet (5 mg total) by mouth once daily., Disp: 30 tablet, Rfl: 0     "furosemide (LASIX) 20 MG tablet, Take 1 tablet (20 mg total) by mouth once daily., Disp: 30 tablet, Rfl: 0    HYDROcodone-acetaminophen (NORCO) 7.5-325 mg per tablet, Take 1 tablet by mouth every 6 (six) hours as needed for Pain., Disp: 12 tablet, Rfl: 0    pantoprazole (PROTONIX) 40 MG tablet, Take 1 tablet (40 mg total) by mouth once daily., Disp: 30 tablet, Rfl: 0    potassium chloride SA (KLOR-CON) 10 MEQ TbSR, Take 1 tablet (10 mEq total) by mouth once daily., Disp: 30 tablet, Rfl: 0    tamsulosin (FLOMAX) 0.4 mg Cap, Take 1 capsule (0.4 mg total) by mouth every evening., Disp: 30 capsule, Rfl: 0    Labs: 6/7/23 alk phos 413 (H), AST 35 (H)    Anthropometrics    Height:   Ht Readings from Last 1 Encounters:   06/07/23 5' 9" (1.753 m)      Weight:   Wt Readings from Last 5 Encounters:   06/07/23 63.3 kg (139 lb 9.6 oz)   05/22/23 64.9 kg (143 lb 1.6 oz)   04/24/23 67.7 kg (149 lb 4 oz)   04/26/23 65 kg (143 lb 3.2 oz)   03/18/23 65 kg (143 lb 4.8 oz)        Usual Body Weight: 72.7 kg (160 lb)  % Weight Change: -13.1% in 3 months    BMI: 20.6 (normal)    Ideal Weight: 72.7 kg (160 lb)  % Ideal Weight: 87%    Malnutrition in the context of acute illness or injury  Degree of Malnutrition: non-severe (moderate) malnutrition  Energy Intake: does not meet criteria  Interpretation of Weight Loss: >7.5% in 3 months  Body Fat:moderate depletion  Area of Body Fat Loss: orbital region , upper arm region - triceps / biceps, and thoracic and lumbar region - ribs, lower back, midaxillary line  Muscle Mass Loss: moderate depletion  Area of Muscle Mass Loss: temple region - temporalis muscle, clavicle bone region - pectoralis major, deltoid, trapezius muscles, clavicle and acromion bone region - deltoid muscle, scapular bone region - trapezius, supraspinus, infraspinus muscles, dorsal hand - interosseous musle, patellar region - quadricep muscle, anterior thigh region - quadriceps muscles, and posterior calf region - " gastrocnemius muscle  Fluid Accumulation: does not meet criteria  Edema: no edema present   Reduced  Strength: unable to obtain  A minimum of two characteristics is recommended for diagnosis of either severe or non-severe malnutrition.    Estimated Needs (using CBW 63.3 kg)  2192 kcal/day  [place] St. Jeor x 1.1 activity factor x 1.5 stress factor  95 g protein/day 1.5 g/kg CBW  2192 mL fluid/day 1 mL/kcal    Nutrition Diagnosis    PES: Malnutrition related to adenocarcinoma as evidenced by >7.5% wt loss 3 months, moderate muscle/fat wasting. (new)    Nutrition Risk  moderate    Nutrition Intervention    Interventions(treatment strategy):  Modified composition of meals / snacks, Commercial beverage, and Collaboration with other providers      Nutrition Monitoring and Evaluation    Monitor: food and beverage intake, weight change, and electrolyte/renal panel    Follow up at next provider visit.        Marcela Bonner, MS, RD, , LDN

## 2023-06-08 ENCOUNTER — TELEPHONE (OUTPATIENT)
Dept: PHARMACY | Facility: CLINIC | Age: 83
End: 2023-06-08
Payer: MEDICARE

## 2023-06-08 ENCOUNTER — SPECIALTY PHARMACY (OUTPATIENT)
Dept: PHARMACY | Facility: CLINIC | Age: 83
End: 2023-06-08
Payer: MEDICARE

## 2023-06-08 NOTE — TELEPHONE ENCOUNTER
Cece, this is Rose Noonan, clinical pharmacist with [facility] that is part of your care team.  We have begun working on your prescription that your doctor has sent us. Our next steps include:     Working with your insurance company to obtain approval for your medication  Working with you to ensure your medication is affordable     We will be calling you along the way with updates on your medication but if you have any concerns or receive information that you would like to discuss please reach us at [phone].    Welcome call outcome: Patient/caregiver reached

## 2023-06-09 ENCOUNTER — SPECIALTY PHARMACY (OUTPATIENT)
Dept: PHARMACY | Facility: CLINIC | Age: 83
End: 2023-06-09
Payer: MEDICARE

## 2023-06-09 DIAGNOSIS — C17.0 DUODENAL CANCER: Primary | Chronic | ICD-10-CM

## 2023-06-09 NOTE — PROGRESS NOTES
Subjective:       Patient ID: Quincy Triplett is a 82 y.o. male.    Chief Complaint: Follow Up    Diagnosis: Ampulla of Vater - Adenocarcinoma, intestinal type    Current Treatment: None    Treatment History:   Whipple - Dr. Brown - 3/27/23    HPI:   83yo M who presented to medical oncology in April '23 for evaluation of Adenocarcinoma of the Ampulla of Vater, Intenstinal type. He initialy presented in late 2022 with jaundice and significant weight loss. Imaging with evidence of biliary dilation and circumferential thickening of the 2nd portion of the duodenum and intra/extraheptic biliary dilation. 12/21/22 Endoscopy with ERCP showed evidence of a malignant appearing mass at the ampulla, pathology consistent with poorly differentiated adenocarcinoma. He was evaluated by surgical oncology, Dr. Brown in December, but then had episode of biliary obstruction with PTC catheter placement and urosepsis that left him too deconditioned for surgical intervention. He then improved with PT to the point he was able to undergo whipple + pancreaticoduodenectomy on 3/27/23. 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 Tumor invasion into pancreas and periduodenal tissue. + LVI. + PNI. Surgical margins negative. 14/31 lymph nodes were positive for malignancy. Final staging pT3B N2.  After his resection he went to rehab for 3 weeks and was then discharged home where he continues to heal and recover.      Interval History:  Patient presents to clinic for chemo education for Xeloda. He has no complaints at this time.     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, unspecified extremity     Coronary artery disease     Dyslipidemia     Hypertension       Past Surgical History:   Procedure Laterality Date    AMPUTATION Right     Right arm    CAROTID STENT      CHOLECYSTECTOMY  3/27/2023     Procedure: CHOLECYSTECTOMY;  Surgeon: Abdirahman Brown MD;  Location: [facility] OR;  Service: Oncology;;    EGD, WITH HEMORRHAGE CONTROL  1/19/2023    Procedure: EGD,WITH HEMORRHAGE CONTROL;  Surgeon: Ranjeet Perez MD;  Location: [facility] OR;  Service: Gastroenterology;;  NextPowder HemeSpray    ERCP N/A 12/21/2022    Procedure: ERCP;  Surgeon: Sushil Anderson MD;  Location: [facility] ENDOSCOPY;  Service: Gastroenterology;  Laterality: N/A;  food in abdomen    ERCP, WITH BIOPSY  12/21/2022    Procedure: ERCP, WITH BIOPSY;  Surgeon: Sushil Anderson MD;  Location: [facility] ENDOSCOPY;  Service: Gastroenterology;;    ESOPHAGOGASTRODUODENOSCOPY N/A 1/19/2023    Procedure: EGD;  Surgeon: Ranjeet Perez MD;  Location: [facility] OR;  Service: Gastroenterology;  Laterality: N/A;    EXPLORATION OF COMMON BILE DUCT  3/27/2023    Procedure: EXPLORATION, COMMON BILE DUCT;  Surgeon: Abdirahman Brown MD;  Location: [facility] OR;  Service: Oncology;;    LEFT HEART CATHETERIZATION      LIVER BIOPSY  3/27/2023    Procedure: BIOPSY, LIVER;  Surgeon: Abdirahman Brown MD;  Location: [facility] OR;  Service: Oncology;;    LYMPHADENECTOMY  3/27/2023    Procedure: LYMPHADENECTOMY;  Surgeon: Abdirahman Brown MD;  Location: [facility] OR;  Service: Oncology;;  Portal/celiac lymphadenectomy    TONSILLECTOMY      WHIPPLE PROCEDURE N/A 3/27/2023    Procedure: WHIPPLE PROCEDURE;  Surgeon: Abdirahman Brown MD;  Location: [facility] OR;  Service: Oncology;  Laterality: N/A;     Social History     Socioeconomic History    Marital status:    Tobacco Use    Smoking status: Never    Smokeless tobacco: Never   Substance and Sexual Activity    Alcohol use: Never    Drug use: Never     Social Determinants of Health     Food Insecurity: Unknown    Worried About Running Out of Food in the Last Year: Never true   Transportation Needs: Unknown    Lack of Transportation (Medical): No   Social Connections: Unknown    Marital Status:    Housing Stability: Unknown    Unable to  Pay for Housing in the Last Year: No      No family history on file.   Review of patient's allergies indicates:  No Known Allergies   Review of Systems   Constitutional:  Positive for activity change. Negative for chills, fatigue and fever.   HENT:  Negative for sore throat.    Eyes:  Negative for visual disturbance.   Respiratory:  Positive for shortness of breath. Negative for cough.    Cardiovascular:  Negative for chest pain.   Gastrointestinal:  Negative for abdominal pain, constipation and diarrhea.   Endocrine: Negative for polyuria.   Genitourinary:  Negative for dysuria.   Musculoskeletal:  Negative for back pain.   Integumentary:  Negative for rash.   Allergic/Immunologic: Negative for frequent infections.   Neurological:  Negative for weakness and headaches.   Hematological:  Negative for adenopathy. Does not bruise/bleed easily.       Objective:      Vitals:    06/12/23 1257   BP: (!) 173/88   Pulse: 65   Resp: 18   Temp: 97.8 °F (36.6 °C)           Physical Exam  Constitutional:       General: He is not in acute distress.     Appearance: Normal appearance.   HENT:      Head: Normocephalic and atraumatic.      Nose: Nose normal.      Mouth/Throat:      Mouth: Mucous membranes are moist.      Pharynx: Oropharynx is clear.   Eyes:      Extraocular Movements: Extraocular movements intact.      Conjunctiva/sclera: Conjunctivae normal.      Pupils: Pupils are equal, round, and reactive to light.   Cardiovascular:      Rate and Rhythm: Normal rate and regular rhythm.      Pulses: Normal pulses.      Heart sounds: Normal heart sounds. No murmur heard.  Pulmonary:      Effort: No respiratory distress.      Breath sounds: Normal breath sounds.   Abdominal:      General: There is no distension.      Palpations: Abdomen is soft.   Genitourinary:     Comments: Paul catheter in place  Musculoskeletal:         General: Normal range of motion.      Cervical back: Normal range of motion and neck supple.      Right  "lower leg: No edema.      Left lower leg: No edema.   Lymphadenopathy:      Cervical: No cervical adenopathy.   Skin:     General: Skin is warm and dry.   Neurological:      Mental Status: He is alert and oriented to person, place, and time.      Motor: Weakness present.       LABS AND IMAGING REVIEWED IN [facility]    3/2/23 CT CAP  Impression:  1. Slight interval retraction of the percutaneous biliary stent which is now coiled slightly more distally in the right lobe of the liver.  There is however slightly improved dilatation of the extrahepatic bile ducts.  2. Known ampullary/duodenal mass poorly evaluated on this noncontrast CT.  No bowel obstruction.  3. Bladder wall appears mildly thickened which may be related to under distension.  Correlate with urinalysis.     3/15/23 CT Chest  Impression:  No evidence of metastatic disease in the thorax.     3/15/23 CT A/P  Impression:  Developing gastric outlet obstruction, secondary to a 3.2 cm long "apple-core" lesion in the 3rd portion of the duodenum at the level of the ampulla, presumably representing a primary adenocarcinoma.  No CT evidence of distant metastasis.    4/7/23 CT Chest  Impression:  Layering pleural effusions with adjacent compressive atelectasis versus pneumonia    4/7/23 CT Abdomen/Pelvis  Impression:   1. Postoperative changes of the abdomen with small amount of mesenteric edema and ascites.  No organized fluid collection or free air.  2. Mildly dilated loops of small bowel are nonspecific and may represent an ileus.  3. Large right and small left pleural effusions with basilar subsegmental atelectasis    Assessment:   Stage IIIB Ampulla of Vater Adenocarcinoma, intestinal type  - s/p resection 3/27/23. Will plan for adjuvant Capecitabine based therapy. Consideration for chemo/RT afterwards pending response      Plan:   Discussion  A total of 60 minutes were spent in counseling today, in which 100% were face-to-face.  Discussed cancer diagnosis " chemotherapy regimen, prognosis, side effects and toxicities.  A handout of each chemotherapeutic agent in the regimen was provided and reviewed in detail.     The following side effects were discussed but not limited to:   Low blood counts, risk of infection, bleeding (spontaneous from nose, mouth, stool or urine).   GI side effects including weight changes, changes in appetite, altered sense of taste, stomatitis, nausea, vomiting, diarrhea, constipation, and heartburn.  Neurological side effects including the risk of peripheral neuropathy, either temporary or permanent.  Potential for skin, hair, and nail changes.  Hand foot syndrome  Worsening kidney function  Hair loss    Discussed anti-emetic protocol and bowel regimen protocol.  Discussed avoiding extreme temperatures  Office contact information given including after hours number.  Discussed there is an oncologist on call 24/7, 365 days including weekends.  Provided primary nurse's information.  Questions answered to their satisfaction.    Chemotherapy consent was reviewed and signed. Copy to be scanned into the chart.        - Toxicity and patient performance reviewed, okay to proceed with Xeloda 1500mg BID 14days Q 21 cycle for 6 months  - Okay to proceed 6/15. Medication expected to be delivered 6/14. C1 will be 1000mg BID, increase if tolerates for C2   - RTC 2 weeks for MD visit for toxicity check, labs same day - CBC, CMP    XIOMARA Rodarte  Hematology/Oncology   Cancer Center [facility]

## 2023-06-09 NOTE — TELEPHONE ENCOUNTER
Outgoing call to patient regarding initial consultation for capecitabine. Patient has chemo education appt on 6/12 and would like a call back after that appt. Will pend call to 6/12 after chemo education appt

## 2023-06-12 ENCOUNTER — CLINICAL SUPPORT (OUTPATIENT)
Dept: HEMATOLOGY/ONCOLOGY | Facility: CLINIC | Age: 83
End: 2023-06-12
Payer: MEDICARE

## 2023-06-12 ENCOUNTER — OFFICE VISIT (OUTPATIENT)
Dept: HEMATOLOGY/ONCOLOGY | Facility: CLINIC | Age: 83
End: 2023-06-12
Payer: MEDICARE

## 2023-06-12 VITALS
OXYGEN SATURATION: 98 % | DIASTOLIC BLOOD PRESSURE: 88 MMHG | HEIGHT: 69 IN | TEMPERATURE: 98 F | WEIGHT: 140.19 LBS | HEART RATE: 65 BPM | RESPIRATION RATE: 18 BRPM | BODY MASS INDEX: 20.76 KG/M2 | SYSTOLIC BLOOD PRESSURE: 173 MMHG

## 2023-06-12 DIAGNOSIS — C17.0 DUODENAL CANCER: Primary | ICD-10-CM

## 2023-06-12 DIAGNOSIS — Z71.89 ENCOUNTER FOR MEDICATION REVIEW AND COUNSELING: ICD-10-CM

## 2023-06-12 DIAGNOSIS — C17.0 DUODENAL CANCER: ICD-10-CM

## 2023-06-12 PROCEDURE — 3079F DIAST BP 80-89 MM HG: CPT | Mod: CPTII,S$GLB,,

## 2023-06-12 PROCEDURE — 99999 PR PBB SHADOW E&M-EST. PATIENT-LVL IV: CPT | Mod: PBBFAC,,,

## 2023-06-12 PROCEDURE — 3288F FALL RISK ASSESSMENT DOCD: CPT | Mod: CPTII,S$GLB,,

## 2023-06-12 PROCEDURE — 99999 PR PBB SHADOW E&M-EST. PATIENT-LVL IV: ICD-10-PCS | Mod: PBBFAC,,,

## 2023-06-12 PROCEDURE — 1101F PR PT FALLS ASSESS DOC 0-1 FALLS W/OUT INJ PAST YR: ICD-10-PCS | Mod: CPTII,S$GLB,,

## 2023-06-12 PROCEDURE — 3079F PR MOST RECENT DIASTOLIC BLOOD PRESSURE 80-89 MM HG: ICD-10-PCS | Mod: CPTII,S$GLB,,

## 2023-06-12 PROCEDURE — 3077F PR MOST RECENT SYSTOLIC BLOOD PRESSURE >= 140 MM HG: ICD-10-PCS | Mod: CPTII,S$GLB,,

## 2023-06-12 PROCEDURE — 1159F MED LIST DOCD IN RCRD: CPT | Mod: CPTII,S$GLB,,

## 2023-06-12 PROCEDURE — 99215 OFFICE O/P EST HI 40 MIN: CPT | Mod: S$GLB,,,

## 2023-06-12 PROCEDURE — 99215 PR OFFICE/OUTPT VISIT, EST, LEVL V, 40-54 MIN: ICD-10-PCS | Mod: S$GLB,,,

## 2023-06-12 PROCEDURE — 3077F SYST BP >= 140 MM HG: CPT | Mod: CPTII,S$GLB,,

## 2023-06-12 PROCEDURE — 1160F PR REVIEW ALL MEDS BY PRESCRIBER/CLIN PHARMACIST DOCUMENTED: ICD-10-PCS | Mod: CPTII,S$GLB,,

## 2023-06-12 PROCEDURE — 1159F PR MEDICATION LIST DOCUMENTED IN MEDICAL RECORD: ICD-10-PCS | Mod: CPTII,S$GLB,,

## 2023-06-12 PROCEDURE — 1101F PT FALLS ASSESS-DOCD LE1/YR: CPT | Mod: CPTII,S$GLB,,

## 2023-06-12 PROCEDURE — 1160F RVW MEDS BY RX/DR IN RCRD: CPT | Mod: CPTII,S$GLB,,

## 2023-06-12 PROCEDURE — 3288F PR FALLS RISK ASSESSMENT DOCUMENTED: ICD-10-PCS | Mod: CPTII,S$GLB,,

## 2023-06-12 RX ORDER — ONDANSETRON HYDROCHLORIDE 8 MG/1
8 TABLET, FILM COATED ORAL EVERY 12 HOURS PRN
Qty: 30 TABLET | Refills: 2 | Status: SHIPPED | OUTPATIENT
Start: 2023-06-12 | End: 2023-07-20 | Stop reason: SDUPTHER

## 2023-06-12 NOTE — PROGRESS NOTES
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 Small frequent meals, high kcal/high protein  2. Refer to [facility] for Ensure Enlive 2 cases 2 months  3. RD to continue monitoring. Pt may benefit from pancreatic enzymes if wt loss continues     Nutrition Assessment    6/7/23: This is a 82 y.o.male with a medical diagnosis of adenocarcinoma, ampulla of Vater. Reports good appetite and po intake, no c/o n/v/c/d, although he does note that portions have decreased since Whipple in March which is to be expected. He has lost ~20-25# in the past 2-3 months. He does drink Boost at home.     6/12/23: Pt reports good appetite although it has not been the same since Whipple, no GI complaints, wt stable compared to last visit, chewing/swallowing well, eats a lot of canned chicken a la stephen soup (canned), drinks 2 Boosts per day, Atkins bars sometimes. Discussed highg kcal/high protein foods, po intake encouraged.      Nutrition Factors Affecting Intake  none identified    PMHx: CAD, dyslipidemia, HTN, cathi    Allergies: Patient has no known allergies.    Current Medications:    Current Outpatient Medications:     ALPRAZolam (XANAX) 0.25 MG tablet, Take 1 tablet (0.25 mg total) by mouth 3 (three) times daily as needed for Anxiety., Disp: 12 tablet, Rfl: 0    busPIRone (BUSPAR) 5 MG Tab, Take 1 tablet (5 mg total) by mouth 3 (three) times daily., Disp: 90 tablet, Rfl: 0    capecitabine (XELODA) 500 MG Tab, Take 3 tablets (1,500 mg total) by mouth 2 (two) times daily as directed days 1-14 of each 21 day cycle. Take with water within 30 minutes after a meal., Disp: 126 tablet, Rfl: 5    carvediloL (COREG) 3.125 MG tablet, Take 1  "tablet (3.125 mg total) by mouth 2 (two) times daily., Disp: 60 tablet, Rfl: 0    docusate sodium (COLACE) 100 MG capsule, Take 1 capsule (100 mg total) by mouth 2 (two) times daily., Disp: 30 capsule, Rfl: 0    finasteride (PROSCAR) 5 mg tablet, Take 1 tablet (5 mg total) by mouth once daily., Disp: 30 tablet, Rfl: 0    furosemide (LASIX) 20 MG tablet, Take 1 tablet (20 mg total) by mouth once daily., Disp: 30 tablet, Rfl: 0    HYDROcodone-acetaminophen (NORCO) 7.5-325 mg per tablet, Take 1 tablet by mouth every 6 (six) hours as needed for Pain., Disp: 12 tablet, Rfl: 0    ondansetron (ZOFRAN) 8 MG tablet, Take 1 tablet (8 mg total) by mouth every 12 (twelve) hours as needed for Nausea., Disp: 30 tablet, Rfl: 2    pantoprazole (PROTONIX) 40 MG tablet, Take 1 tablet (40 mg total) by mouth once daily., Disp: 30 tablet, Rfl: 0    potassium chloride SA (KLOR-CON) 10 MEQ TbSR, Take 1 tablet (10 mEq total) by mouth once daily., Disp: 30 tablet, Rfl: 0    tamsulosin (FLOMAX) 0.4 mg Cap, Take 1 capsule (0.4 mg total) by mouth every evening., Disp: 30 capsule, Rfl: 0    Labs:   6/12: no new labs  6/7/23 alk phos 413 (H), AST 35 (H)    Anthropometrics    Height:   Ht Readings from Last 1 Encounters:   06/12/23 5' 9" (1.753 m)      Weight:   Wt Readings from Last 5 Encounters:   06/12/23 63.6 kg (140 lb 3.2 oz)   06/07/23 63.3 kg (139 lb 9.6 oz)   05/22/23 64.9 kg (143 lb 1.6 oz)   04/24/23 67.7 kg (149 lb 4 oz)   04/26/23 65 kg (143 lb 3.2 oz)        Usual Body Weight: 72.7 kg (160 lb)  % Weight Change: -13.1% in 3 months    BMI: 20.6 (normal)    Ideal Weight: 72.7 kg (160 lb)  % Ideal Weight: 87%    Malnutrition in the context of acute illness or injury  Degree of Malnutrition: non-severe (moderate) malnutrition  Energy Intake: does not meet criteria  Interpretation of Weight Loss: >7.5% in 3 months  Body Fat:moderate depletion  Area of Body Fat Loss: orbital region , upper arm region - triceps / biceps, and thoracic and " lumbar region - ribs, lower back, midaxillary line  Muscle Mass Loss: moderate depletion  Area of Muscle Mass Loss: temple region - temporalis muscle, clavicle bone region - pectoralis major, deltoid, trapezius muscles, clavicle and acromion bone region - deltoid muscle, scapular bone region - trapezius, supraspinus, infraspinus muscles, dorsal hand - interosseous musle, patellar region - quadricep muscle, anterior thigh region - quadriceps muscles, and posterior calf region - gastrocnemius muscle  Fluid Accumulation: does not meet criteria  Edema: no edema present   Reduced  Strength: unable to obtain  A minimum of two characteristics is recommended for diagnosis of either severe or non-severe malnutrition.    Estimated Needs (using CBW 63.3 kg)  2192 kcal/day              [place] St. Jeor x 1.1 activity factor x 1.5 stress factor  95 g protein/day          1.5 g/kg CBW  2192 mL fluid/day       1 mL/kcal     Nutrition Diagnosis    PES: Malnutrition related to adenocarcinoma as evidenced by >7.5% wt loss 3 months, moderate muscle/fat wasting. (new)    Nutrition Risk  moderate     Nutrition Intervention    Interventions(treatment strategy):  Modified composition of meals / snacks, Commercial beverage, and Collaboration with other providers        Nutrition Monitoring and Evaluation    Monitor: food and beverage intake, weight change, and electrolyte/renal panel     Follow up at next provider visit.          Jose Luis Cevallos RD, LDN

## 2023-06-12 NOTE — NURSING
I met with Mr. Triplett and his daughter, Ilene, for his patient education. I introduced myself, explained my role and discussed his emotional well being. He doesn't stay alone and appreciates every one going to his home. He feels like he is getting stronger each day and hopes to eventually get to stay alone. They will reach out if any needs arise.

## 2023-06-12 NOTE — TELEPHONE ENCOUNTER
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 male, who is followed by the specialty pharmacy service for management and education.    Recent Encounters       Date Type Provider Description    06/09/2023 Specialty Pharmacy Rose Noonan PharmD Initial Clinical Assessment    06/08/2023 Specialty Pharmacy Rose Noonan PharmD Referral Authorization            Current Outpatient Medications   Medication Sig    ALPRAZolam (XANAX) 0.25 MG tablet Take 1 tablet (0.25 mg total) by mouth 3 (three) times daily as needed for Anxiety.    busPIRone (BUSPAR) 5 MG Tab Take 1 tablet (5 mg total) by mouth 3 (three) times daily.    capecitabine (XELODA) 500 MG Tab Take 3 tablets (1,500 mg total) by mouth 2 (two) times daily as directed days 1-14 of each 21 day cycle. Take with water within 30 minutes after a meal.    carvediloL (COREG) 3.125 MG tablet Take 1 tablet (3.125 mg total) by mouth 2 (two) times daily.    docusate sodium (COLACE) 100 MG capsule Take 1 capsule (100 mg total) by mouth 2 (two) times daily.    finasteride (PROSCAR) 5 mg tablet Take 1 tablet (5 mg total) by mouth once daily.    furosemide (LASIX) 20 MG tablet Take 1 tablet (20 mg total) by mouth once daily.    HYDROcodone-acetaminophen (NORCO) 7.5-325 mg per tablet Take 1 tablet by mouth every 6 (six) hours as needed for Pain.    pantoprazole (PROTONIX) 40 MG tablet Take 1 tablet (40 mg total) by mouth once daily.    potassium chloride SA (KLOR-CON) 10 MEQ TbSR Take 1 tablet (10 mEq total) by mouth once daily.    tamsulosin (FLOMAX) 0.4 mg Cap Take 1 capsule (0.4 mg total) by mouth every evening.   Last reviewed on 6/12/2023  2:06 PM by Rose Noonan PharmD    Review of patient's  allergies indicates:  No Known AllergiesLast reviewed on  6/12/2023 2:06 PM by Rose Noonan    Drug Interactions    Drug interactions evaluated: yes  Clinically relevant drug interactions identified: yes   Interactions list: Cat C: capecitabine and pantoprazole - Inhibitors of the Proton Pump (PPIs and PCABs) may diminish the therapeutic effect of Capecitabine     Drug management plan: Cat C: capecitabine and pantoprazole - Consider the need for a proton pump inhibitor (PPI) or potassium-competitive acid blocker (PCAB) in patients receiving capecitabine. If combined, monitor closely for any evidence of reduced capecitabine effectiveness with use of this combination   Provided the patient with educational material regarding drug interactions: yes         Adverse Effects    *All other systems reviewed and are negative       Assessment Questions - Documented Responses      Flowsheet Row Most Recent Value   Assessment    Medication Reconciliation completed for patient No   During the past 4 weeks, has patient missed any activities due to condition or medication? No   During the past 4 weeks, did patient have any of the following urgent care visits? None   Goals of Therapy Status Discussed (new start)   Status of the patients ability to self-administer: Is Able   All education points have been covered with patient? No, patient declined- printed education provided   Welcome packet contents reviewed and discussed with patient? Yes   Assesment completed? Yes   Plan Therapy being initiated   Do you need to open a clinical intervention (i-vent)? No   Do you want to schedule first shipment? Yes   Medication #1 Assessment Info    Patient status New medication, New to [facility]   Is this medication appropriate for the patient? Yes   Is this medication effective? Not yet started          Refill Questions - Documented Responses      Flowsheet Row Most Recent Value   Patient Availability and HIPAA Verification    Does patient want to  "proceed with activity? Yes   HIPAA/medical authority confirmed? Yes   Relationship to patient of person spoken to? Self   Refill Screening Questions    When does the patient need to receive the medication? 06/15/23   Refill Delivery Questions    How will the patient receive the medication? MEDRx   When does the patient need to receive the medication? 06/15/23   Shipping Address Home   Address in [facility] confirmed and updated if neccessary? Yes   Expected Copay ($) 0   Is the patient able to afford the medication copay? Yes   Payment Method zero copay   Days supply of Refill 21   Supplies needed? No supplies needed   Refill activity completed? Yes   Refill activity plan Refill scheduled   Shipment/Pickup Date: 06/13/23            Objective    He has a past medical history of Atherosclerosis of native arteries of extremities with intermittent claudication, unspecified extremity, Coronary artery disease, Dyslipidemia, and Hypertension.    Tried/failed medications: none    BP Readings from Last 4 Encounters:   06/12/23 (!) 173/88   06/07/23 (!) 161/86   05/22/23 (!) 161/70   04/28/23 110/65     Ht Readings from Last 4 Encounters:   06/12/23 5' 9" (1.753 m)   06/07/23 5' 9" (1.753 m)   05/22/23 5' 9" (1.753 m)   04/06/23 5' 9" (1.753 m)     Wt Readings from Last 4 Encounters:   06/12/23 63.6 kg (140 lb 3.2 oz)   06/07/23 63.3 kg (139 lb 9.6 oz)   05/22/23 64.9 kg (143 lb 1.6 oz)   04/24/23 67.7 kg (149 lb 4 oz)     Recent Labs   Lab Result Units 06/07/23  1016 05/22/23  1112 04/26/23  0827 04/24/23  0526   RBC x10(6)/mcL 3.77 L 3.15 L 2.96 L 2.81 L   Hgb g/dL 9.9 L 8.3 L 8.2 L 7.8 L   Hct % 33.8 L 28.0 L 25.9 L 24.5 L   WBC x10(3)/mcL 5.79 7.46 14.2 H 11.8 H   Platelet x10(3)/mcL 279 327 497 H 514 H   Sodium Level mmol/L 138 140 130 L 134 L   Potassium Level mmol/L 4.1 4.0 5.1 4.7   Blood Urea Nitrogen mg/dL 14.0 12.4 26.8 H 24.1   Creatinine mg/dL 0.76 0.71 L 0.71 L 0.68 L   Calcium Level Total mg/dL 9.0 8.6 L " 9.1 9.5   Albumin Level g/dL 2.5 L 2.2 L 1.9 L 1.7 L   Bilirubin Total mg/dL 0.4 0.4 0.8 0.6   Alkaline Phosphatase unit/L 413 H 424 H 1,196 H 1,034 H   Aspartate Aminotransferase unit/L 35 H 25 62 H 42 H   Alanine Aminotransferase unit/L 20 21 76 H 47     The goals of cancer treatment include:  Achieving remission of cancer, if possible  Reducing tumor size and spread of cancer, if remission is not possible  Minimizing pain and symptoms of the cancer  Preventing infection and other complications of treatment  Promoting adequate nutrition  Encouraging proper hydration  Improving or maintaining quality of life  Maintaining optimal therapy adherence  Minimizing and managing side effects    Goals of Therapy Status: Discussed (new start)    Assessment/Plan  Patient plans to start therapy on 06/15/23      Indication, dosage, appropriateness, effectiveness, safety and convenience of his specialty medication(s) were reviewed today.     Patient Education   Pharmacist offer to  patient was declined. Printed educational materials will be provided with medication.  Patient did accept verbal education on the following topics:  · Proper use, timely administration, and missed dose management  · New or changed medications, including prescribe and over the counter medications and supplements  · Storage, safe handling, and disposal    Patient will start Cycle 1 on 6/15 at a reduced dose of 1000 mg BID to establish tolerability    Tasks added this encounter   No tasks added.   Tasks due within next 3 months   6/12/2023 - Initial Clinical Assessment/Patient Education (180 Day Reassessment)  6/12/2023 - Set up Initial Fill     Rose Noonan, PharmD  [facility]  [address] [place] [id number]  Phone: [phone]  Fax: [phone]

## 2023-06-13 ENCOUNTER — HOSPITAL ENCOUNTER (OUTPATIENT)
Dept: RADIOLOGY | Facility: HOSPITAL | Age: 83
Discharge: HOME | End: 2023-06-13
Attending: STUDENT IN AN ORGANIZED HEALTH CARE EDUCATION/TRAINING PROGRAM
Payer: MEDICARE

## 2023-06-13 DIAGNOSIS — C17.0 DUODENAL CANCER: ICD-10-CM

## 2023-06-13 PROCEDURE — 71260 CT THORAX DX C+: CPT | Mod: TC

## 2023-06-13 PROCEDURE — 25500020 PHARM REV CODE 255: Performed by: STUDENT IN AN ORGANIZED HEALTH CARE EDUCATION/TRAINING PROGRAM

## 2023-06-13 PROCEDURE — 74177 CT ABD & PELVIS W/CONTRAST: CPT | Mod: TC

## 2023-06-13 RX ADMIN — DIATRIZOATE MEGLUMINE AND DIATRIZOATE SODIUM 30 ML: 660; 100 LIQUID ORAL; RECTAL at 03:06

## 2023-06-13 RX ADMIN — IOPAMIDOL 100 ML: 755 INJECTION, SOLUTION INTRAVENOUS at 04:06

## 2023-06-19 ENCOUNTER — HOSPITAL ENCOUNTER (OUTPATIENT)
Dept: RADIOLOGY | Facility: HOSPITAL | Age: 83
Discharge: HOME | End: 2023-06-19
Attending: INTERNAL MEDICINE
Payer: MEDICARE

## 2023-06-19 DIAGNOSIS — I20.89 ANGINA DECUBITUS: ICD-10-CM

## 2023-06-19 DIAGNOSIS — R06.02 SHORTNESS OF BREATH: ICD-10-CM

## 2023-06-19 DIAGNOSIS — I25.10 CORONARY ATHEROSCLEROSIS OF NATIVE CORONARY ARTERY: ICD-10-CM

## 2023-06-19 DIAGNOSIS — R06.09 DYSPNEA ON EXERTION: ICD-10-CM

## 2023-06-19 PROCEDURE — 71046 X-RAY EXAM CHEST 2 VIEWS: CPT | Mod: TC

## 2023-06-20 NOTE — PROGRESS NOTES
Subjective:       Patient ID: Quincy Triplett is a 82 y.o. male.    Chief Complaint: Follow Up    Diagnosis: Ampulla of Vater - Adenocarcinoma, intestinal type    Current Treatment:   Xeloda 1000mg BID Days 1-14 of 21 day cycle    Treatment History:   Whipple - Dr. Brown - 3/27/23    HPI:   81yo M who presented to medical oncology in April '23 for evaluation of Adenocarcinoma of the Ampulla of Vater, Intenstinal type. He initialy presented in late 2022 with jaundice and significant weight loss. Imaging with evidence of biliary dilation and circumferential thickening of the 2nd portion of the duodenum and intra/extraheptic biliary dilation. 12/21/22 Endoscopy with ERCP showed evidence of a malignant appearing mass at the ampulla, pathology consistent with poorly differentiated adenocarcinoma. He was evaluated by surgical oncology, Dr. Brown in December, but then had episode of biliary obstruction with PTC catheter placement and urosepsis that left him too deconditioned for surgical intervention. He then improved with PT to the point he was able to undergo whipple + pancreaticoduodenectomy on 3/27/23. 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 Tumor invasion into pancreas and periduodenal tissue. + LVI. + PNI. Surgical margins negative. 14/31 lymph nodes were positive for malignancy. Final staging pT3B N2.  After his resection he went to rehab and was able to recover enough to begin adjuvant therapy in June '23 with Xeloda, for a planned 6 months in the adjuvant setting.      Interval History:  Patient presents to clinic for 1 week follow up with NP for toxicity review.   He started taking Xeloda on 6/15 and tolerating well.   He is on C1 day 7 of 14.    He denies nausea/vomiting, diarrhea, bruising or bleeding, any rash, swelling, redness, or blisters to hands or feet.   He has lost 5 pounds since last visit. He says he has issues with taste;  otherwise eating and drinking ok. He has protein drinks in between meals.     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, unspecified extremity     Coronary artery disease     Dyslipidemia     Hypertension       Past Surgical History:   Procedure Laterality Date    AMPUTATION Right     Right arm    CAROTID STENT      CHOLECYSTECTOMY  3/27/2023    Procedure: CHOLECYSTECTOMY;  Surgeon: Abdirahman Brown MD;  Location: [facility] OR;  Service: Oncology;;    EGD, WITH HEMORRHAGE CONTROL  1/19/2023    Procedure: EGD,WITH HEMORRHAGE CONTROL;  Surgeon: Ranjeet Perez MD;  Location: [facility] OR;  Service: Gastroenterology;;  NextPowder HemeSpray    ERCP N/A 12/21/2022    Procedure: ERCP;  Surgeon: Sushil Anderson MD;  Location: [facility] ENDOSCOPY;  Service: Gastroenterology;  Laterality: N/A;  food in abdomen    ERCP, WITH BIOPSY  12/21/2022    Procedure: ERCP, WITH BIOPSY;  Surgeon: Sushil Anderson MD;  Location: [facility] ENDOSCOPY;  Service: Gastroenterology;;    ESOPHAGOGASTRODUODENOSCOPY N/A 1/19/2023    Procedure: EGD;  Surgeon: Ranjeet Perez MD;  Location: [facility] OR;  Service: Gastroenterology;  Laterality: N/A;    EXPLORATION OF COMMON BILE DUCT  3/27/2023    Procedure: EXPLORATION, COMMON BILE DUCT;  Surgeon: Abdirahman Brown MD;  Location: [facility] OR;  Service: Oncology;;    LEFT HEART CATHETERIZATION      LIVER BIOPSY  3/27/2023    Procedure: BIOPSY, LIVER;  Surgeon: Abdirahman Brown MD;  Location: [facility] OR;  Service: Oncology;;    LYMPHADENECTOMY  3/27/2023    Procedure: LYMPHADENECTOMY;  Surgeon: Abdirahman Brown MD;  Location: [facility] OR;  Service: Oncology;;  Portal/celiac lymphadenectomy    TONSILLECTOMY      WHIPPLE PROCEDURE N/A 3/27/2023    Procedure: WHIPPLE PROCEDURE;  Surgeon: Abdirahman Brown MD;  Location: [facility] OR;  Service: Oncology;  Laterality: N/A;     Social History     Socioeconomic History    Marital status:    Tobacco Use     Smoking status: Never    Smokeless tobacco: Never   Substance and Sexual Activity    Alcohol use: Never    Drug use: Never     Social Determinants of Health     Food Insecurity: Unknown    Worried About Running Out of Food in the Last Year: Never true   Transportation Needs: Unknown    Lack of Transportation (Medical): No   Social Connections: Unknown    Marital Status:    Housing Stability: Unknown    Unable to Pay for Housing in the Last Year: No      No family history on file.   Review of patient's allergies indicates:  No Known Allergies   Review of Systems   Constitutional:  Positive for activity change. Negative for chills, fatigue and fever.   HENT:  Negative for sore throat.    Eyes:  Negative for visual disturbance.   Respiratory:  Negative for cough.    Cardiovascular:  Negative for chest pain.   Gastrointestinal:  Negative for abdominal pain, constipation and diarrhea.   Endocrine: Negative for polyuria.   Genitourinary:  Negative for dysuria.   Musculoskeletal:  Negative for back pain.   Integumentary:  Negative for rash.   Allergic/Immunologic: Negative for frequent infections.   Neurological:  Negative for weakness and headaches.   Hematological:  Negative for adenopathy. Does not bruise/bleed easily.       Objective:      Vitals:    06/21/23 1100   BP: (!) 148/82   Pulse: 62   Temp: 98.3 °F (36.8 °C)     Physical Exam  Constitutional:       General: He is not in acute distress.     Appearance: Normal appearance.   HENT:      Head: Normocephalic and atraumatic.      Nose: Nose normal.      Mouth/Throat:      Mouth: Mucous membranes are moist.      Pharynx: Oropharynx is clear.   Eyes:      Extraocular Movements: Extraocular movements intact.      Conjunctiva/sclera: Conjunctivae normal.      Pupils: Pupils are equal, round, and reactive to light.   Cardiovascular:      Rate and Rhythm: Normal rate and regular rhythm.      Pulses: Normal pulses.      Heart sounds: Normal heart sounds. No  "murmur heard.  Pulmonary:      Effort: No respiratory distress.      Breath sounds: Normal breath sounds.   Abdominal:      General: There is no distension.      Palpations: Abdomen is soft.   Genitourinary:     Comments: Paul catheter in place  Musculoskeletal:         General: Normal range of motion.      Cervical back: Normal range of motion and neck supple.      Right lower leg: No edema.      Left lower leg: No edema.   Lymphadenopathy:      Cervical: No cervical adenopathy.   Skin:     General: Skin is warm and dry.   Neurological:      Mental Status: He is alert and oriented to person, place, and time.      Motor: Weakness present.       LABS AND IMAGING REVIEWED IN [facility]    3/2/23 CT CAP  Impression:  1. Slight interval retraction of the percutaneous biliary stent which is now coiled slightly more distally in the right lobe of the liver.  There is however slightly improved dilatation of the extrahepatic bile ducts.  2. Known ampullary/duodenal mass poorly evaluated on this noncontrast CT.  No bowel obstruction.  3. Bladder wall appears mildly thickened which may be related to under distension.  Correlate with urinalysis.     3/15/23 CT Chest  Impression:  No evidence of metastatic disease in the thorax.     3/15/23 CT A/P  Impression:  Developing gastric outlet obstruction, secondary to a 3.2 cm long "apple-core" lesion in the 3rd portion of the duodenum at the level of the ampulla, presumably representing a primary adenocarcinoma.  No CT evidence of distant metastasis.    4/7/23 CT Chest  Impression:  Layering pleural effusions with adjacent compressive atelectasis versus pneumonia    4/7/23 CT Abdomen/Pelvis  Impression:   1. Postoperative changes of the abdomen with small amount of mesenteric edema and ascites.  No organized fluid collection or free air.  2. Mildly dilated loops of small bowel are nonspecific and may represent an ileus.  3. Large right and small left pleural effusions with basilar " subsegmental atelectasis    Assessment:   Stage IIIB Ampulla of Vater Adenocarcinoma, intestinal type  - s/p resection 3/27/23. WContinue adjuvant Capecitabine based therapy. Consideration for chemo/RT afterwards pending response    Plan:       - Toxicity and patient performance reviewed, okay to continue with Xeloda 1500mg BID 14days Q 21 cycle for 6 months  - Continue C1 Xeloda 1000mg po BID x 14 days until 6/28; then 1 week hold until 7/5  - Will plan for dose escalation to 1500mg with C2 pending toxicity with C1  - RTC 2 weeks for NP visit for toxicity check, labs same day - CBC, CMP    Anna Beckford, FNP-C  Hematology/Oncology   [facility]

## 2023-06-21 ENCOUNTER — OFFICE VISIT (OUTPATIENT)
Dept: HEMATOLOGY/ONCOLOGY | Facility: CLINIC | Age: 83
End: 2023-06-21
Payer: MEDICARE

## 2023-06-21 VITALS
DIASTOLIC BLOOD PRESSURE: 82 MMHG | WEIGHT: 135.69 LBS | HEART RATE: 62 BPM | BODY MASS INDEX: 20.04 KG/M2 | SYSTOLIC BLOOD PRESSURE: 148 MMHG | TEMPERATURE: 98 F | OXYGEN SATURATION: 99 %

## 2023-06-21 DIAGNOSIS — C17.0 DUODENAL CANCER: Primary | ICD-10-CM

## 2023-06-21 PROCEDURE — 3288F FALL RISK ASSESSMENT DOCD: CPT | Mod: CPTII,S$GLB,,

## 2023-06-21 PROCEDURE — 1126F PR PAIN SEVERITY QUANTIFIED, NO PAIN PRESENT: ICD-10-PCS | Mod: CPTII,S$GLB,,

## 2023-06-21 PROCEDURE — 3077F SYST BP >= 140 MM HG: CPT | Mod: CPTII,S$GLB,,

## 2023-06-21 PROCEDURE — 3079F PR MOST RECENT DIASTOLIC BLOOD PRESSURE 80-89 MM HG: ICD-10-PCS | Mod: CPTII,S$GLB,,

## 2023-06-21 PROCEDURE — 3288F PR FALLS RISK ASSESSMENT DOCUMENTED: ICD-10-PCS | Mod: CPTII,S$GLB,,

## 2023-06-21 PROCEDURE — 1101F PT FALLS ASSESS-DOCD LE1/YR: CPT | Mod: CPTII,S$GLB,,

## 2023-06-21 PROCEDURE — 1160F PR REVIEW ALL MEDS BY PRESCRIBER/CLIN PHARMACIST DOCUMENTED: ICD-10-PCS | Mod: CPTII,S$GLB,,

## 2023-06-21 PROCEDURE — 1160F RVW MEDS BY RX/DR IN RCRD: CPT | Mod: CPTII,S$GLB,,

## 2023-06-21 PROCEDURE — 1101F PR PT FALLS ASSESS DOC 0-1 FALLS W/OUT INJ PAST YR: ICD-10-PCS | Mod: CPTII,S$GLB,,

## 2023-06-21 PROCEDURE — 99999 PR PBB SHADOW E&M-EST. PATIENT-LVL III: CPT | Mod: PBBFAC,,,

## 2023-06-21 PROCEDURE — 3079F DIAST BP 80-89 MM HG: CPT | Mod: CPTII,S$GLB,,

## 2023-06-21 PROCEDURE — 1159F MED LIST DOCD IN RCRD: CPT | Mod: CPTII,S$GLB,,

## 2023-06-21 PROCEDURE — 3077F PR MOST RECENT SYSTOLIC BLOOD PRESSURE >= 140 MM HG: ICD-10-PCS | Mod: CPTII,S$GLB,,

## 2023-06-21 PROCEDURE — 99999 PR PBB SHADOW E&M-EST. PATIENT-LVL III: ICD-10-PCS | Mod: PBBFAC,,,

## 2023-06-21 PROCEDURE — 99215 OFFICE O/P EST HI 40 MIN: CPT | Mod: S$GLB,,,

## 2023-06-21 PROCEDURE — 1126F AMNT PAIN NOTED NONE PRSNT: CPT | Mod: CPTII,S$GLB,,

## 2023-06-21 PROCEDURE — 1159F PR MEDICATION LIST DOCUMENTED IN MEDICAL RECORD: ICD-10-PCS | Mod: CPTII,S$GLB,,

## 2023-06-21 PROCEDURE — 99215 PR OFFICE/OUTPT VISIT, EST, LEVL V, 40-54 MIN: ICD-10-PCS | Mod: S$GLB,,,

## 2023-06-27 ENCOUNTER — PATIENT MESSAGE (OUTPATIENT)
Dept: PHARMACY | Facility: CLINIC | Age: 83
End: 2023-06-27
Payer: MEDICARE

## 2023-06-30 ENCOUNTER — SPECIALTY PHARMACY (OUTPATIENT)
Dept: PHARMACY | Facility: CLINIC | Age: 83
End: 2023-06-30
Payer: MEDICARE

## 2023-06-30 NOTE — TELEPHONE ENCOUNTER
Specialty Pharmacy - Refill Coordination    Specialty Medication Orders Linked to Encounter      Flowsheet Row Most Recent Value   Medication #1 capecitabine (XELODA) 500 MG Tab (Order#[id number], Rx#[id number])            Refill Questions - Documented Responses      Flowsheet Row Most Recent Value   Patient Availability and HIPAA Verification    Does patient want to proceed with activity? Yes   HIPAA/medical authority confirmed? Yes   Relationship to patient of person spoken to? Self   Refill Screening Questions    Changes to allergies? No   Changes to medications? No   New conditions since last clinic visit? No   Unplanned office visit, urgent care, ED, or hospital admission in the last 4 weeks? No   How does patient/caregiver feel medication is working? Good   Financial problems or insurance changes? No   How many doses of your specialty medications were missed in the last 4 weeks? 0   Would patient like to speak to a pharmacist? No   When does the patient need to receive the medication? 07/05/23   Refill Delivery Questions    How will the patient receive the medication? MEDRx   When does the patient need to receive the medication? 07/05/23   Shipping Address Home   Address in [facility] confirmed and updated if neccessary? Yes   Expected Copay ($) 0   Is the patient able to afford the medication copay? Yes   Payment Method zero copay   Days supply of Refill 21   Supplies needed? No supplies needed   Refill activity completed? Yes   Refill activity plan Refill scheduled   Shipment/Pickup Date: 07/03/23            Current Outpatient Medications   Medication Sig    ALPRAZolam (XANAX) 0.25 MG tablet Take 1 tablet (0.25 mg total) by mouth 3 (three) times daily as needed for Anxiety.    busPIRone (BUSPAR) 5 MG Tab Take 1 tablet (5 mg total) by mouth 3 (three) times daily.    capecitabine (XELODA) 500 MG Tab Take 3 tablets (1,500 mg total) by mouth 2 (two) times daily as directed days 1-14 of each 21 day cycle.  Take with water within 30 minutes after a meal.    carvediloL (COREG) 3.125 MG tablet Take 1 tablet (3.125 mg total) by mouth 2 (two) times daily.    docusate sodium (COLACE) 100 MG capsule Take 1 capsule (100 mg total) by mouth 2 (two) times daily.    finasteride (PROSCAR) 5 mg tablet Take 1 tablet (5 mg total) by mouth once daily.    furosemide (LASIX) 20 MG tablet Take 1 tablet (20 mg total) by mouth once daily. (Patient taking differently: Take 40 mg by mouth once daily.)    HYDROcodone-acetaminophen (NORCO) 7.5-325 mg per tablet Take 1 tablet by mouth every 6 (six) hours as needed for Pain.    ondansetron (ZOFRAN) 8 MG tablet Take 1 tablet (8 mg total) by mouth every 12 (twelve) hours as needed for Nausea.    pantoprazole (PROTONIX) 40 MG tablet Take 1 tablet (40 mg total) by mouth once daily.    potassium chloride SA (KLOR-CON) 10 MEQ TbSR Take 1 tablet (10 mEq total) by mouth once daily. (Patient not taking: Reported on 6/21/2023)    tamsulosin (FLOMAX) 0.4 mg Cap Take 1 capsule (0.4 mg total) by mouth every evening.   Last reviewed on 6/21/2023  1:15 PM by KELVIN Rodarte    Review of patient's allergies indicates:  No Known Allergies Last reviewed on  6/21/2023 1:15 PM by Anna Beckford      Tasks added this encounter   No tasks added.   Tasks due within next 3 months   6/30/2023 - Refill Coordination Outreach (1 time occurrence)     Jennifer Corea, PharmD  [facility]  [address] [place] [id number]  Phone: [phone]  Fax: [phone]

## 2023-07-03 NOTE — PROGRESS NOTES
Subjective:       Patient ID: Quincy Triplett is a 82 y.o. male.    Chief Complaint: Follow Up    Diagnosis: Ampulla of Vater - Adenocarcinoma, intestinal type    Current Treatment:   Xeloda 1500 mg BID Days 1-14 of 21 day cycle 7/6    Treatment History:   Whipple - Dr. Brown - 3/27/23  Xeloda 1000mg BID Days 1-14 of 21 day cycle 6/15-6/28    HPI:   81yo M who presented to medical oncology in April '23 for evaluation of Adenocarcinoma of the Ampulla of Vater, Intenstinal type. He initialy presented in late 2022 with jaundice and significant weight loss. Imaging with evidence of biliary dilation and circumferential thickening of the 2nd portion of the duodenum and intra/extraheptic biliary dilation. 12/21/22 Endoscopy with ERCP showed evidence of a malignant appearing mass at the ampulla, pathology consistent with poorly differentiated adenocarcinoma. He was evaluated by surgical oncology, Dr. Brown in December, but then had episode of biliary obstruction with PTC catheter placement and urosepsis that left him too deconditioned for surgical intervention. He then improved with PT to the point he was able to undergo whipple + pancreaticoduodenectomy on 3/27/23. 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 Tumor invasion into pancreas and periduodenal tissue. + LVI. + PNI. Surgical margins negative. 14/31 lymph nodes were positive for malignancy. Final staging pT3B N2.  After his resection he went to rehab and was able to recover enough to begin adjuvant therapy in June '23 with Xeloda, for a planned 6 months in the adjuvant setting.      Interval History:  Patient presents to clinic for 2 week follow up with NP for toxicity review.   He started taking Xeloda on 6/15 and tolerated well.   He is currently on 1 week hold and due to start C2 tomorrow.  He understands for C2 he is to increase to 3 tablets bid. He says he recently had blood in his  urine and was treated for UTI but still has concern of urine color. Explained his urine is dark patsy color due to his antibiotics.  He had constipation that has resolved with 1x dose of magnesium citrate but thinks it may be recurring.  He denies nausea/vomiting, diarrhea, bruising or any rash, swelling, redness, or blisters to hands or feet.   His weight remains stable at 135.  He says he has issues with taste; otherwise eating and drinking ok. He has protein drinks in between meals.     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, unspecified extremity     Coronary artery disease     Dyslipidemia     Hypertension       Past Surgical History:   Procedure Laterality Date    AMPUTATION Right     Right arm    CAROTID STENT      CHOLECYSTECTOMY  3/27/2023    Procedure: CHOLECYSTECTOMY;  Surgeon: Abdirahman Brown MD;  Location: [facility];  Service: Oncology;;    EGD, WITH HEMORRHAGE CONTROL  1/19/2023    Procedure: EGD,WITH HEMORRHAGE CONTROL;  Surgeon: Ranjeet Perez MD;  Location: [facility];  Service: Gastroenterology;;  [place] HemeSpracarmel    ERCP N/A 12/21/2022    Procedure: ERCP;  Surgeon: Sushil Anderson MD;  Location: [facility] ENDOSCOPY;  Service: Gastroenterology;  Laterality: N/A;  food in abdomen    ERCP, WITH BIOPSY  12/21/2022    Procedure: ERCP, WITH BIOPSY;  Surgeon: Sushil Anderson MD;  Location: [facility] ENDOSCOPY;  Service: Gastroenterology;;    ESOPHAGOGASTRODUODENOSCOPY N/A 1/19/2023    Procedure: EGD;  Surgeon: Ranjeet Perez MD;  Location: [facility];  Service: Gastroenterology;  Laterality: N/A;    EXPLORATION OF COMMON BILE DUCT  3/27/2023    Procedure: EXPLORATION, COMMON BILE DUCT;  Surgeon: Abdirahman Brown MD;  Location: [facility] OR;  Service: Oncology;;    LEFT HEART CATHETERIZATION      LIVER BIOPSY  3/27/2023    Procedure: BIOPSY, LIVER;  Surgeon: Abdirahman Brown MD;  Location: [facility] OR;  Service: Oncology;;     LYMPHADENECTOMY  3/27/2023    Procedure: LYMPHADENECTOMY;  Surgeon: Abdirahman Brown MD;  Location: [facility] OR;  Service: Oncology;;  Portal/celiac lymphadenectomy    TONSILLECTOMY      WHIPPLE PROCEDURE N/A 3/27/2023    Procedure: WHIPPLE PROCEDURE;  Surgeon: Abdirahman Brown MD;  Location: [facility] OR;  Service: Oncology;  Laterality: N/A;     Social History     Socioeconomic History    Marital status:    Tobacco Use    Smoking status: Never    Smokeless tobacco: Never   Substance and Sexual Activity    Alcohol use: Never    Drug use: Never     Social Determinants of Health     Food Insecurity: Unknown    Worried About Running Out of Food in the Last Year: Never true   Transportation Needs: Unknown    Lack of Transportation (Medical): No   Social Connections: Unknown    Marital Status:    Housing Stability: Unknown    Unable to Pay for Housing in the Last Year: No      No family history on file.   Review of patient's allergies indicates:  No Known Allergies   Review of Systems   Constitutional:  Positive for activity change. Negative for chills, fatigue and fever.   HENT:  Negative for sore throat.    Eyes:  Negative for visual disturbance.   Respiratory:  Negative for cough.    Cardiovascular:  Negative for chest pain.   Gastrointestinal:  Negative for abdominal pain, constipation and diarrhea.   Endocrine: Negative for polyuria.   Genitourinary:  Negative for dysuria.   Musculoskeletal:  Negative for back pain.   Integumentary:  Negative for rash.   Allergic/Immunologic: Negative for frequent infections.   Neurological:  Negative for weakness and headaches.   Hematological:  Negative for adenopathy. Does not bruise/bleed easily.       Objective:      Vitals:    07/05/23 0957   BP: 104/64   Pulse: 65   Resp: 18   Temp: 97.5 °F (36.4 °C)         Physical Exam  Constitutional:       General: He is not in acute distress.     Appearance: Normal appearance.   HENT:      Head: Normocephalic and atraumatic.       "Nose: Nose normal.      Mouth/Throat:      Mouth: Mucous membranes are moist.      Pharynx: Oropharynx is clear.   Eyes:      Extraocular Movements: Extraocular movements intact.      Conjunctiva/sclera: Conjunctivae normal.      Pupils: Pupils are equal, round, and reactive to light.   Cardiovascular:      Rate and Rhythm: Normal rate and regular rhythm.      Pulses: Normal pulses.      Heart sounds: Normal heart sounds. No murmur heard.  Pulmonary:      Effort: No respiratory distress.      Breath sounds: Normal breath sounds.   Abdominal:      General: There is no distension.      Palpations: Abdomen is soft.   Genitourinary:     Comments: Paul catheter in place with sediment noted  Musculoskeletal:         General: Normal range of motion.      Cervical back: Normal range of motion and neck supple.      Right lower leg: No edema.      Left lower leg: No edema.   Lymphadenopathy:      Cervical: No cervical adenopathy.   Skin:     General: Skin is warm and dry.   Neurological:      Mental Status: He is alert and oriented to person, place, and time.      Motor: Weakness present.       LABS AND IMAGING REVIEWED IN [facility]    3/2/23 CT CAP  Impression:  1. Slight interval retraction of the percutaneous biliary stent which is now coiled slightly more distally in the right lobe of the liver.  There is however slightly improved dilatation of the extrahepatic bile ducts.  2. Known ampullary/duodenal mass poorly evaluated on this noncontrast CT.  No bowel obstruction.  3. Bladder wall appears mildly thickened which may be related to under distension.  Correlate with urinalysis.     3/15/23 CT Chest  Impression:  No evidence of metastatic disease in the thorax.     3/15/23 CT A/P  Impression:  Developing gastric outlet obstruction, secondary to a 3.2 cm long "apple-core" lesion in the 3rd portion of the duodenum at the level of the ampulla, presumably representing a primary adenocarcinoma.  No CT evidence of distant " metastasis.    4/7/23 CT Chest  Impression:  Layering pleural effusions with adjacent compressive atelectasis versus pneumonia    4/7/23 CT Abdomen/Pelvis  Impression:   1. Postoperative changes of the abdomen with small amount of mesenteric edema and ascites.  No organized fluid collection or free air.  2. Mildly dilated loops of small bowel are nonspecific and may represent an ileus.  3. Large right and small left pleural effusions with basilar subsegmental atelectasis    Assessment:   Stage IIIB Ampulla of Vater Adenocarcinoma, intestinal type  - s/p resection 3/27/23. WContinue adjuvant Capecitabine based therapy. Consideration for chemo/RT afterwards pending response    Plan:       - Toxicity and patient performance reviewed, okay to proceed with C2 of Xeloda at 1500mg BID 14days Q 21 cycle   - Miralax once daily for constipation  -Continue daily stool softeners  -Follow up with Urology as scheduled  - RTC 3 weeks with MD visit for toxicity check, labs same day - CBC, CMP    HAMILTON RodartePUJANITA  Hematology/Oncology   [facility]

## 2023-07-05 ENCOUNTER — OFFICE VISIT (OUTPATIENT)
Dept: HEMATOLOGY/ONCOLOGY | Facility: CLINIC | Age: 83
End: 2023-07-05
Payer: MEDICARE

## 2023-07-05 ENCOUNTER — LAB VISIT (OUTPATIENT)
Dept: LAB | Facility: HOSPITAL | Age: 83
End: 2023-07-05
Payer: MEDICARE

## 2023-07-05 VITALS
OXYGEN SATURATION: 100 % | TEMPERATURE: 98 F | SYSTOLIC BLOOD PRESSURE: 104 MMHG | DIASTOLIC BLOOD PRESSURE: 64 MMHG | WEIGHT: 135 LBS | HEART RATE: 65 BPM | HEIGHT: 69 IN | RESPIRATION RATE: 18 BRPM | BODY MASS INDEX: 19.99 KG/M2

## 2023-07-05 DIAGNOSIS — C17.0 DUODENAL CANCER: Primary | ICD-10-CM

## 2023-07-05 DIAGNOSIS — K59.00 CONSTIPATION, UNSPECIFIED CONSTIPATION TYPE: ICD-10-CM

## 2023-07-05 DIAGNOSIS — C17.0 DUODENAL CANCER: ICD-10-CM

## 2023-07-05 LAB
ALBUMIN SERPL-MCNC: 2.9 G/DL (ref 3.4–4.8)
ALBUMIN/GLOB SERPL: 0.7 RATIO (ref 1.1–2)
ALP SERPL-CCNC: 273 UNIT/L (ref 40–150)
ALT SERPL-CCNC: 21 UNIT/L (ref 0–55)
AST SERPL-CCNC: 31 UNIT/L (ref 5–34)
BASOPHILS # BLD AUTO: 0.05 X10(3)/MCL
BASOPHILS NFR BLD AUTO: 0.7 %
BILIRUBIN DIRECT+TOT PNL SERPL-MCNC: 0.4 MG/DL
BUN SERPL-MCNC: 23.1 MG/DL (ref 8.4–25.7)
CALCIUM SERPL-MCNC: 8.9 MG/DL (ref 8.8–10)
CHLORIDE SERPL-SCNC: 105 MMOL/L (ref 98–107)
CO2 SERPL-SCNC: 23 MMOL/L (ref 23–31)
CREAT SERPL-MCNC: 1.24 MG/DL (ref 0.73–1.18)
EOSINOPHIL # BLD AUTO: 0.22 X10(3)/MCL (ref 0–0.9)
EOSINOPHIL NFR BLD AUTO: 3 %
ERYTHROCYTE [DISTWIDTH] IN BLOOD BY AUTOMATED COUNT: 18.9 % (ref 11.5–17)
GFR SERPLBLD CREATININE-BSD FMLA CKD-EPI: 58 MLS/MIN/1.73/M2
GLOBULIN SER-MCNC: 4 GM/DL (ref 2.4–3.5)
GLUCOSE SERPL-MCNC: 132 MG/DL (ref 82–115)
HCT VFR BLD AUTO: 33.7 % (ref 42–52)
HGB BLD-MCNC: 10.3 G/DL (ref 14–18)
IMM GRANULOCYTES # BLD AUTO: 0.01 X10(3)/MCL (ref 0–0.04)
IMM GRANULOCYTES NFR BLD AUTO: 0.1 %
LYMPHOCYTES # BLD AUTO: 2 X10(3)/MCL (ref 0.6–4.6)
LYMPHOCYTES NFR BLD AUTO: 27.2 %
MCH RBC QN AUTO: 27.2 PG (ref 27–31)
MCHC RBC AUTO-ENTMCNC: 30.6 G/DL (ref 33–36)
MCV RBC AUTO: 89.2 FL (ref 80–94)
MONOCYTES # BLD AUTO: 0.46 X10(3)/MCL (ref 0.1–1.3)
MONOCYTES NFR BLD AUTO: 6.3 %
NEUTROPHILS # BLD AUTO: 4.6 X10(3)/MCL (ref 2.1–9.2)
NEUTROPHILS NFR BLD AUTO: 62.7 %
PLATELET # BLD AUTO: 234 X10(3)/MCL (ref 130–400)
PMV BLD AUTO: 8.9 FL (ref 7.4–10.4)
POTASSIUM SERPL-SCNC: 4.7 MMOL/L (ref 3.5–5.1)
PROT SERPL-MCNC: 6.9 GM/DL (ref 5.8–7.6)
RBC # BLD AUTO: 3.78 X10(6)/MCL (ref 4.7–6.1)
SODIUM SERPL-SCNC: 137 MMOL/L (ref 136–145)
WBC # SPEC AUTO: 7.34 X10(3)/MCL (ref 4.5–11.5)

## 2023-07-05 PROCEDURE — 1126F PR PAIN SEVERITY QUANTIFIED, NO PAIN PRESENT: ICD-10-PCS | Mod: CPTII,S$GLB,,

## 2023-07-05 PROCEDURE — 80053 COMPREHEN METABOLIC PANEL: CPT

## 2023-07-05 PROCEDURE — 3078F PR MOST RECENT DIASTOLIC BLOOD PRESSURE < 80 MM HG: ICD-10-PCS | Mod: CPTII,S$GLB,,

## 2023-07-05 PROCEDURE — 1159F PR MEDICATION LIST DOCUMENTED IN MEDICAL RECORD: ICD-10-PCS | Mod: CPTII,S$GLB,,

## 2023-07-05 PROCEDURE — 99215 PR OFFICE/OUTPT VISIT, EST, LEVL V, 40-54 MIN: ICD-10-PCS | Mod: S$GLB,,,

## 2023-07-05 PROCEDURE — 99215 OFFICE O/P EST HI 40 MIN: CPT | Mod: S$GLB,,,

## 2023-07-05 PROCEDURE — 3074F SYST BP LT 130 MM HG: CPT | Mod: CPTII,S$GLB,,

## 2023-07-05 PROCEDURE — 99999 PR PBB SHADOW E&M-EST. PATIENT-LVL IV: CPT | Mod: PBBFAC,,,

## 2023-07-05 PROCEDURE — 85025 COMPLETE CBC W/AUTO DIFF WBC: CPT

## 2023-07-05 PROCEDURE — 1159F MED LIST DOCD IN RCRD: CPT | Mod: CPTII,S$GLB,,

## 2023-07-05 PROCEDURE — 1160F PR REVIEW ALL MEDS BY PRESCRIBER/CLIN PHARMACIST DOCUMENTED: ICD-10-PCS | Mod: CPTII,S$GLB,,

## 2023-07-05 PROCEDURE — 36415 COLL VENOUS BLD VENIPUNCTURE: CPT

## 2023-07-05 PROCEDURE — 99999 PR PBB SHADOW E&M-EST. PATIENT-LVL IV: ICD-10-PCS | Mod: PBBFAC,,,

## 2023-07-05 PROCEDURE — 1126F AMNT PAIN NOTED NONE PRSNT: CPT | Mod: CPTII,S$GLB,,

## 2023-07-05 PROCEDURE — 1160F RVW MEDS BY RX/DR IN RCRD: CPT | Mod: CPTII,S$GLB,,

## 2023-07-05 PROCEDURE — 3074F PR MOST RECENT SYSTOLIC BLOOD PRESSURE < 130 MM HG: ICD-10-PCS | Mod: CPTII,S$GLB,,

## 2023-07-05 PROCEDURE — 3078F DIAST BP <80 MM HG: CPT | Mod: CPTII,S$GLB,,

## 2023-07-17 ENCOUNTER — SPECIALTY PHARMACY (OUTPATIENT)
Dept: PHARMACY | Facility: CLINIC | Age: 83
End: 2023-07-17
Payer: MEDICARE

## 2023-07-17 DIAGNOSIS — C17.0 DUODENAL CANCER: Primary | Chronic | ICD-10-CM

## 2023-07-17 NOTE — TELEPHONE ENCOUNTER
Outgoing call regarding Xeloda refill, pt daughter states pt does not start off week until the week of 7/24. Informed daughter will follow up on 7/24 to schedule refill and delivery.

## 2023-07-19 ENCOUNTER — LAB REQUISITION (OUTPATIENT)
Dept: LAB | Facility: HOSPITAL | Age: 83
End: 2023-07-19
Payer: MEDICARE

## 2023-07-19 DIAGNOSIS — N39.0 URINARY TRACT INFECTION, SITE NOT SPECIFIED: ICD-10-CM

## 2023-07-19 LAB
APPEARANCE UR: CLEAR
BACTERIA #/AREA URNS AUTO: ABNORMAL /HPF
BILIRUB UR QL STRIP.AUTO: NEGATIVE
COLOR UR: YELLOW
GLUCOSE UR QL STRIP.AUTO: NEGATIVE
KETONES UR QL STRIP.AUTO: NEGATIVE
LEUKOCYTE ESTERASE UR QL STRIP.AUTO: ABNORMAL
NITRITE UR QL STRIP.AUTO: NEGATIVE
PH UR STRIP.AUTO: 8 [PH]
PROT UR QL STRIP.AUTO: NEGATIVE
RBC #/AREA URNS AUTO: ABNORMAL /HPF
RBC UR QL AUTO: NEGATIVE
SP GR UR STRIP.AUTO: 1.01 (ref 1–1.03)
SQUAMOUS #/AREA URNS AUTO: <5 /HPF
UROBILINOGEN UR STRIP-ACNC: 0.2
WBC #/AREA URNS AUTO: <5 /HPF

## 2023-07-19 PROCEDURE — 81001 URINALYSIS AUTO W/SCOPE: CPT | Performed by: INTERNAL MEDICINE

## 2023-07-19 PROCEDURE — 87088 URINE BACTERIA CULTURE: CPT | Performed by: INTERNAL MEDICINE

## 2023-07-19 PROCEDURE — 87077 CULTURE AEROBIC IDENTIFY: CPT | Performed by: INTERNAL MEDICINE

## 2023-07-20 DIAGNOSIS — C17.0 DUODENAL CANCER: ICD-10-CM

## 2023-07-20 RX ORDER — ONDANSETRON HYDROCHLORIDE 8 MG/1
8 TABLET, FILM COATED ORAL EVERY 12 HOURS PRN
Qty: 30 TABLET | Refills: 2 | Status: SHIPPED | OUTPATIENT
Start: 2023-07-20 | End: 2023-11-01 | Stop reason: SDUPTHER

## 2023-07-21 LAB — BACTERIA UR CULT: ABNORMAL

## 2023-07-21 NOTE — PROGRESS NOTES
Subjective:       Patient ID: Quincy Triplett is a 82 y.o. male.    Chief Complaint: Follow Up    Diagnosis: Ampulla of Vater - Adenocarcinoma, intestinal type    Current Treatment:   Xeloda 1500 mg BID Days 1-14 of 21 day cycle 7/6    Treatment History:   Whipple - Dr. Brown - 3/27/23  Xeloda 1000mg BID Days 1-14 of 21 day cycle 6/15-6/28    HPI:   83yo M who presented to medical oncology in April '23 for evaluation of Adenocarcinoma of the Ampulla of Vater, Intenstinal type. He initialy presented in late 2022 with jaundice and significant weight loss. Imaging with evidence of biliary dilation and circumferential thickening of the 2nd portion of the duodenum and intra/extraheptic biliary dilation. 12/21/22 Endoscopy with ERCP showed evidence of a malignant appearing mass at the ampulla, pathology consistent with poorly differentiated adenocarcinoma. He was evaluated by surgical oncology, Dr. Brown in December, but then had episode of biliary obstruction with PTC catheter placement and urosepsis that left him too deconditioned for surgical intervention. He then improved with PT to the point he was able to undergo whipple + pancreaticoduodenectomy on 3/27/23. 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 Tumor invasion into pancreas and periduodenal tissue. + LVI. + PNI. Surgical margins negative. 14/31 lymph nodes were positive for malignancy. Final staging pT3B N2.  After his resection he went to rehab and was able to recover enough to begin adjuvant therapy in June '23 with Xeloda, for a planned 6 months in the adjuvant setting.      Interval History:  Patient presents to clinic for 3 week MD follow up appointment and labs for toxicity check.   Recently had a stomach virus with diarrhea twice daily while off xeloda for several days. Now improving.   He reports no issues with eating and drinking except for some changes in taste.   Otherwise,  he has no further complaints or concerns.   Denies any rashes, palmar/plantar erythema, infections, fevers, chills, NS.   Gained 2lbs. Since last visit. Doesn't notice any difference between 2pills BID and recent increase to 3pills BID of Xeloda.       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, unspecified extremity     Coronary artery disease     Dyslipidemia     Hypertension       Past Surgical History:   Procedure Laterality Date    AMPUTATION Right     Right arm    CAROTID STENT      CHOLECYSTECTOMY  3/27/2023    Procedure: CHOLECYSTECTOMY;  Surgeon: Abdirahman Brown MD;  Location: [facility];  Service: Oncology;;    EGD, WITH HEMORRHAGE CONTROL  1/19/2023    Procedure: EGD,WITH HEMORRHAGE CONTROL;  Surgeon: Ranjeet Perez MD;  Location: [facility];  Service: Gastroenterology;;  Next[employer] HemeSpray    ERCP N/A 12/21/2022    Procedure: ERCP;  Surgeon: Sushil Anderson MD;  Location: [facility] ENDOSCOPY;  Service: Gastroenterology;  Laterality: N/A;  food in abdomen    ERCP, WITH BIOPSY  12/21/2022    Procedure: ERCP, WITH BIOPSY;  Surgeon: Sushil Anderson MD;  Location: [facility] ENDOSCOPY;  Service: Gastroenterology;;    ESOPHAGOGASTRODUODENOSCOPY N/A 1/19/2023    Procedure: EGD;  Surgeon: Ranjeet Perez MD;  Location: [facility];  Service: Gastroenterology;  Laterality: N/A;    EXPLORATION OF COMMON BILE DUCT  3/27/2023    Procedure: EXPLORATION, COMMON BILE DUCT;  Surgeon: Abdirahman Brown MD;  Location: [facility] OR;  Service: Oncology;;    LEFT HEART CATHETERIZATION      LIVER BIOPSY  3/27/2023    Procedure: BIOPSY, LIVER;  Surgeon: Abdirahman Brown MD;  Location: [facility] OR;  Service: Oncology;;    LYMPHADENECTOMY  3/27/2023    Procedure: LYMPHADENECTOMY;  Surgeon: Abdirahman Brown MD;  Location: [facility] OR;  Service: Oncology;;  Portal/celiac lymphadenectomy    TONSILLECTOMY      WHIPPLE PROCEDURE N/A 3/27/2023    Procedure: WHIPPLE PROCEDURE;   Surgeon: Abdirahman Brown MD;  Location: [facility];  Service: Oncology;  Laterality: N/A;     Social History     Socioeconomic History    Marital status:    Tobacco Use    Smoking status: Never    Smokeless tobacco: Never   Substance and Sexual Activity    Alcohol use: Never    Drug use: Never     Social Determinants of Health     Food Insecurity: Unknown    Worried About Running Out of Food in the Last Year: Never true   Transportation Needs: Unknown    Lack of Transportation (Medical): No   Social Connections: Unknown    Marital Status:    Housing Stability: Unknown    Unable to Pay for Housing in the Last Year: No      History reviewed. No pertinent family history.   Review of patient's allergies indicates:  No Known Allergies   Review of Systems   Constitutional:  Positive for activity change. Negative for chills, fatigue and fever.   HENT:  Negative for sore throat.    Eyes:  Negative for visual disturbance.   Respiratory:  Negative for cough.    Cardiovascular:  Negative for chest pain.   Gastrointestinal:  Negative for abdominal pain, constipation and diarrhea.   Endocrine: Negative for polyuria.   Genitourinary:  Negative for dysuria.   Musculoskeletal:  Negative for back pain.   Integumentary:  Negative for rash.   Allergic/Immunologic: Negative for frequent infections.   Neurological:  Negative for weakness and headaches.   Hematological:  Negative for adenopathy. Does not bruise/bleed easily.       Objective:      Vitals:    07/26/23 0947   BP: 106/63   Pulse: 70   Resp: 18   Temp: 97.6 °F (36.4 °C)           Physical Exam  Constitutional:       General: He is not in acute distress.     Appearance: Normal appearance.   HENT:      Head: Normocephalic and atraumatic.      Nose: Nose normal.      Mouth/Throat:      Mouth: Mucous membranes are moist.      Pharynx: Oropharynx is clear.   Eyes:      Extraocular Movements: Extraocular movements intact.      Conjunctiva/sclera: Conjunctivae normal.  "     Pupils: Pupils are equal, round, and reactive to light.   Cardiovascular:      Rate and Rhythm: Normal rate and regular rhythm.      Pulses: Normal pulses.      Heart sounds: Normal heart sounds. No murmur heard.  Pulmonary:      Effort: No respiratory distress.      Breath sounds: Normal breath sounds.   Abdominal:      General: There is no distension.      Palpations: Abdomen is soft.   Genitourinary:     Comments: Paul catheter in place with sediment noted  Musculoskeletal:         General: Normal range of motion.      Cervical back: Normal range of motion and neck supple.      Right lower leg: No edema.      Left lower leg: No edema.   Lymphadenopathy:      Cervical: No cervical adenopathy.   Skin:     General: Skin is warm and dry.   Neurological:      Mental Status: He is alert and oriented to person, place, and time.      Motor: Weakness present.       LABS AND IMAGING REVIEWED IN [facility]    3/2/23 CT CAP  Impression:  1. Slight interval retraction of the percutaneous biliary stent which is now coiled slightly more distally in the right lobe of the liver.  There is however slightly improved dilatation of the extrahepatic bile ducts.  2. Known ampullary/duodenal mass poorly evaluated on this noncontrast CT.  No bowel obstruction.  3. Bladder wall appears mildly thickened which may be related to under distension.  Correlate with urinalysis.     3/15/23 CT Chest  Impression:  No evidence of metastatic disease in the thorax.     3/15/23 CT A/P  Impression:  Developing gastric outlet obstruction, secondary to a 3.2 cm long "apple-core" lesion in the 3rd portion of the duodenum at the level of the ampulla, presumably representing a primary adenocarcinoma.  No CT evidence of distant metastasis.    4/7/23 CT Chest  Impression:  Layering pleural effusions with adjacent compressive atelectasis versus pneumonia    4/7/23 CT Abdomen/Pelvis  Impression:   1. Postoperative changes of the abdomen with small amount of " mesenteric edema and ascites.  No organized fluid collection or free air.  2. Mildly dilated loops of small bowel are nonspecific and may represent an ileus.  3. Large right and small left pleural effusions with basilar subsegmental atelectasis    Assessment:   Stage IIIB Ampulla of Vater Adenocarcinoma, intestinal type  - s/p resection 3/27/23. WContinue adjuvant Capecitabine based therapy. Consideration for chemo/RT afterwards pending response    Plan:       - Toxicity and patient performance reviewed, okay to proceed with C3 of Xeloda at 1500mg BID 14days Q 21 cycle   - Miralax once daily for constipation  - Continue daily stool softeners  - Follow up with Urology as scheduled  - RTC 3 weeks with NP visit for toxicity check, labs same day - CBC, CMP prior to C4.   - Plan for repeat CT CAP just prior to C5 for treatment response assessment  - IVL Monday and Thursdays for fatigue and dehydration while on chemotherapy    Elizabeth Lejeune, MD  Hematology/Oncology   [facility]

## 2023-07-25 NOTE — TELEPHONE ENCOUNTER
Specialty Pharmacy - Refill Coordination    Specialty Medication Orders Linked to Encounter      Flowsheet Row Most Recent Value   Medication #1 capecitabine (XELODA) 500 MG Tab (Order#[id number], Rx#[id number])        Next cycle starts 7/27    Refill Questions - Documented Responses      Flowsheet Row Most Recent Value   Patient Availability and HIPAA Verification    Does patient want to proceed with activity? Yes   HIPAA/medical authority confirmed? Yes   Relationship to patient of person spoken to? Child   Refill Screening Questions    Changes to allergies? No   Changes to medications? No   New conditions since last clinic visit? No   Unplanned office visit, urgent care, ED, or hospital admission in the last 4 weeks? No   How does patient/caregiver feel medication is working? Good   Financial problems or insurance changes? No   How many doses of your specialty medications were missed in the last 4 weeks? 0   Would patient like to speak to a pharmacist? No   When does the patient need to receive the medication? 07/27/23   Refill Delivery Questions    How will the patient receive the medication? [facility]   When does the patient need to receive the medication? 07/27/23   Shipping Address Home   Address in [facility] confirmed and updated if neccessary? Yes   Expected Copay ($) 0   Is the patient able to afford the medication copay? Yes   Payment Method zero copay   Days supply of Refill 21   Supplies needed? No supplies needed   Refill activity completed? Yes   Refill activity plan Refill scheduled   Shipment/Pickup Date: 07/25/23            Current Outpatient Medications   Medication Sig    ALPRAZolam (XANAX) 0.25 MG tablet Take 1 tablet (0.25 mg total) by mouth 3 (three) times daily as needed for Anxiety.    busPIRone (BUSPAR) 5 MG Tab Take 1 tablet (5 mg total) by mouth 3 (three) times daily.    capecitabine (XELODA) 500 MG Tab Take 3 tablets (1,500 mg total) by mouth 2 (two) times daily as directed days 1-14  of each 21 day cycle. Take with water within 30 minutes after a meal.    carvediloL (COREG) 3.125 MG tablet Take 1 tablet (3.125 mg total) by mouth 2 (two) times daily.    docusate sodium (COLACE) 100 MG capsule Take 1 capsule (100 mg total) by mouth 2 (two) times daily.    finasteride (PROSCAR) 5 mg tablet Take 1 tablet (5 mg total) by mouth once daily.    furosemide (LASIX) 20 MG tablet Take 1 tablet (20 mg total) by mouth once daily. (Patient taking differently: Take 40 mg by mouth once daily.)    HYDROcodone-acetaminophen (NORCO) 7.5-325 mg per tablet Take 1 tablet by mouth every 6 (six) hours as needed for Pain.    ondansetron (ZOFRAN) 8 MG tablet Take 1 tablet (8 mg total) by mouth every 12 (twelve) hours as needed for Nausea.    pantoprazole (PROTONIX) 40 MG tablet Take 1 tablet (40 mg total) by mouth once daily.    potassium chloride SA (KLOR-CON) 10 MEQ TbSR Take 1 tablet (10 mEq total) by mouth once daily.    tamsulosin (FLOMAX) 0.4 mg Cap Take 1 capsule (0.4 mg total) by mouth every evening.   Last reviewed on 7/25/2023  9:21 AM by Rose Noonan PharmD    Review of patient's allergies indicates:  No Known Allergies Last reviewed on  7/25/2023 9:21 AM by Rose Noonan      Tasks added this encounter   No tasks added.   Tasks due within next 3 months   7/24/2023 - Refill Coordination Outreach (1 time occurrence)     Rose Noonan PharmD  [facility]  [address] [place] [id number]  Phone: [phone]  Fax: [phone]

## 2023-07-26 ENCOUNTER — TELEPHONE (OUTPATIENT)
Dept: HEMATOLOGY/ONCOLOGY | Facility: CLINIC | Age: 83
End: 2023-07-26

## 2023-07-26 ENCOUNTER — OFFICE VISIT (OUTPATIENT)
Dept: HEMATOLOGY/ONCOLOGY | Facility: CLINIC | Age: 83
End: 2023-07-26
Payer: MEDICARE

## 2023-07-26 VITALS
HEART RATE: 70 BPM | HEIGHT: 69 IN | OXYGEN SATURATION: 100 % | SYSTOLIC BLOOD PRESSURE: 106 MMHG | TEMPERATURE: 98 F | BODY MASS INDEX: 20.32 KG/M2 | DIASTOLIC BLOOD PRESSURE: 63 MMHG | RESPIRATION RATE: 18 BRPM | WEIGHT: 137.19 LBS

## 2023-07-26 DIAGNOSIS — C17.0 DUODENAL CANCER: Primary | ICD-10-CM

## 2023-07-26 PROCEDURE — 99215 OFFICE O/P EST HI 40 MIN: CPT | Mod: S$GLB,,, | Performed by: STUDENT IN AN ORGANIZED HEALTH CARE EDUCATION/TRAINING PROGRAM

## 2023-07-26 PROCEDURE — 3074F PR MOST RECENT SYSTOLIC BLOOD PRESSURE < 130 MM HG: ICD-10-PCS | Mod: CPTII,S$GLB,, | Performed by: STUDENT IN AN ORGANIZED HEALTH CARE EDUCATION/TRAINING PROGRAM

## 2023-07-26 PROCEDURE — 3078F DIAST BP <80 MM HG: CPT | Mod: CPTII,S$GLB,, | Performed by: STUDENT IN AN ORGANIZED HEALTH CARE EDUCATION/TRAINING PROGRAM

## 2023-07-26 PROCEDURE — 99999 PR PBB SHADOW E&M-EST. PATIENT-LVL IV: CPT | Mod: PBBFAC,,, | Performed by: STUDENT IN AN ORGANIZED HEALTH CARE EDUCATION/TRAINING PROGRAM

## 2023-07-26 PROCEDURE — 1160F PR REVIEW ALL MEDS BY PRESCRIBER/CLIN PHARMACIST DOCUMENTED: ICD-10-PCS | Mod: CPTII,S$GLB,, | Performed by: STUDENT IN AN ORGANIZED HEALTH CARE EDUCATION/TRAINING PROGRAM

## 2023-07-26 PROCEDURE — 99999 PR PBB SHADOW E&M-EST. PATIENT-LVL IV: ICD-10-PCS | Mod: PBBFAC,,, | Performed by: STUDENT IN AN ORGANIZED HEALTH CARE EDUCATION/TRAINING PROGRAM

## 2023-07-26 PROCEDURE — 1126F AMNT PAIN NOTED NONE PRSNT: CPT | Mod: CPTII,S$GLB,, | Performed by: STUDENT IN AN ORGANIZED HEALTH CARE EDUCATION/TRAINING PROGRAM

## 2023-07-26 PROCEDURE — 99215 PR OFFICE/OUTPT VISIT, EST, LEVL V, 40-54 MIN: ICD-10-PCS | Mod: S$GLB,,, | Performed by: STUDENT IN AN ORGANIZED HEALTH CARE EDUCATION/TRAINING PROGRAM

## 2023-07-26 PROCEDURE — 1160F RVW MEDS BY RX/DR IN RCRD: CPT | Mod: CPTII,S$GLB,, | Performed by: STUDENT IN AN ORGANIZED HEALTH CARE EDUCATION/TRAINING PROGRAM

## 2023-07-26 PROCEDURE — 1126F PR PAIN SEVERITY QUANTIFIED, NO PAIN PRESENT: ICD-10-PCS | Mod: CPTII,S$GLB,, | Performed by: STUDENT IN AN ORGANIZED HEALTH CARE EDUCATION/TRAINING PROGRAM

## 2023-07-26 PROCEDURE — 3074F SYST BP LT 130 MM HG: CPT | Mod: CPTII,S$GLB,, | Performed by: STUDENT IN AN ORGANIZED HEALTH CARE EDUCATION/TRAINING PROGRAM

## 2023-07-26 PROCEDURE — 1159F PR MEDICATION LIST DOCUMENTED IN MEDICAL RECORD: ICD-10-PCS | Mod: CPTII,S$GLB,, | Performed by: STUDENT IN AN ORGANIZED HEALTH CARE EDUCATION/TRAINING PROGRAM

## 2023-07-26 PROCEDURE — 1159F MED LIST DOCD IN RCRD: CPT | Mod: CPTII,S$GLB,, | Performed by: STUDENT IN AN ORGANIZED HEALTH CARE EDUCATION/TRAINING PROGRAM

## 2023-07-26 PROCEDURE — 3078F PR MOST RECENT DIASTOLIC BLOOD PRESSURE < 80 MM HG: ICD-10-PCS | Mod: CPTII,S$GLB,, | Performed by: STUDENT IN AN ORGANIZED HEALTH CARE EDUCATION/TRAINING PROGRAM

## 2023-07-26 RX ORDER — SODIUM CHLORIDE 0.9 % (FLUSH) 0.9 %
10 SYRINGE (ML) INJECTION
Status: CANCELLED | OUTPATIENT
Start: 2023-08-07

## 2023-07-26 RX ORDER — SODIUM CHLORIDE 0.9 % (FLUSH) 0.9 %
10 SYRINGE (ML) INJECTION
Status: CANCELLED | OUTPATIENT
Start: 2023-08-03

## 2023-07-26 RX ORDER — HEPARIN 100 UNIT/ML
500 SYRINGE INTRAVENOUS
Status: CANCELLED | OUTPATIENT
Start: 2023-08-07

## 2023-07-26 RX ORDER — SODIUM CHLORIDE 0.9 % (FLUSH) 0.9 %
10 SYRINGE (ML) INJECTION
Status: CANCELLED | OUTPATIENT
Start: 2023-08-14

## 2023-07-26 RX ORDER — HEPARIN 100 UNIT/ML
500 SYRINGE INTRAVENOUS
Status: CANCELLED | OUTPATIENT
Start: 2023-08-14

## 2023-07-26 RX ORDER — HEPARIN 100 UNIT/ML
500 SYRINGE INTRAVENOUS
Status: CANCELLED | OUTPATIENT
Start: 2023-08-10

## 2023-07-26 RX ORDER — SODIUM CHLORIDE 0.9 % (FLUSH) 0.9 %
10 SYRINGE (ML) INJECTION
Status: CANCELLED | OUTPATIENT
Start: 2023-07-31

## 2023-07-26 RX ORDER — SODIUM CHLORIDE 0.9 % (FLUSH) 0.9 %
10 SYRINGE (ML) INJECTION
Status: CANCELLED | OUTPATIENT
Start: 2023-07-27

## 2023-07-26 RX ORDER — HEPARIN 100 UNIT/ML
500 SYRINGE INTRAVENOUS
Status: CANCELLED | OUTPATIENT
Start: 2023-08-03

## 2023-07-26 RX ORDER — HEPARIN 100 UNIT/ML
500 SYRINGE INTRAVENOUS
Status: CANCELLED | OUTPATIENT
Start: 2023-07-27

## 2023-07-26 RX ORDER — HEPARIN 100 UNIT/ML
500 SYRINGE INTRAVENOUS
Status: CANCELLED | OUTPATIENT
Start: 2023-07-31

## 2023-07-26 RX ORDER — SODIUM CHLORIDE 0.9 % (FLUSH) 0.9 %
10 SYRINGE (ML) INJECTION
Status: CANCELLED | OUTPATIENT
Start: 2023-08-10

## 2023-07-27 ENCOUNTER — INFUSION (OUTPATIENT)
Dept: INFUSION THERAPY | Facility: HOSPITAL | Age: 83
End: 2023-07-27
Attending: STUDENT IN AN ORGANIZED HEALTH CARE EDUCATION/TRAINING PROGRAM
Payer: MEDICARE

## 2023-07-27 VITALS
DIASTOLIC BLOOD PRESSURE: 67 MMHG | TEMPERATURE: 98 F | HEART RATE: 67 BPM | SYSTOLIC BLOOD PRESSURE: 121 MMHG | OXYGEN SATURATION: 100 %

## 2023-07-27 DIAGNOSIS — I95.1 ORTHOSTATIC HYPOTENSION: Primary | ICD-10-CM

## 2023-07-27 DIAGNOSIS — C17.0 DUODENAL CANCER: ICD-10-CM

## 2023-07-27 PROCEDURE — 96360 HYDRATION IV INFUSION INIT: CPT

## 2023-07-27 PROCEDURE — 25000003 PHARM REV CODE 250: Performed by: STUDENT IN AN ORGANIZED HEALTH CARE EDUCATION/TRAINING PROGRAM

## 2023-07-27 PROCEDURE — A4216 STERILE WATER/SALINE, 10 ML: HCPCS | Performed by: STUDENT IN AN ORGANIZED HEALTH CARE EDUCATION/TRAINING PROGRAM

## 2023-07-27 RX ORDER — SODIUM CHLORIDE 0.9 % (FLUSH) 0.9 %
10 SYRINGE (ML) INJECTION
Status: DISCONTINUED | OUTPATIENT
Start: 2023-07-27 | End: 2023-07-27 | Stop reason: HOSPADM

## 2023-07-27 RX ORDER — HEPARIN 100 UNIT/ML
500 SYRINGE INTRAVENOUS
Status: DISCONTINUED | OUTPATIENT
Start: 2023-07-27 | End: 2023-07-27 | Stop reason: HOSPADM

## 2023-07-27 RX ADMIN — SODIUM CHLORIDE 1000 ML: 9 INJECTION, SOLUTION INTRAVENOUS at 01:07

## 2023-07-27 RX ADMIN — SODIUM CHLORIDE, PRESERVATIVE FREE 10 ML: 5 INJECTION INTRAVENOUS at 02:07

## 2023-07-31 ENCOUNTER — INFUSION (OUTPATIENT)
Dept: INFUSION THERAPY | Facility: HOSPITAL | Age: 83
End: 2023-07-31
Attending: STUDENT IN AN ORGANIZED HEALTH CARE EDUCATION/TRAINING PROGRAM
Payer: MEDICARE

## 2023-07-31 VITALS
HEIGHT: 69 IN | SYSTOLIC BLOOD PRESSURE: 143 MMHG | DIASTOLIC BLOOD PRESSURE: 72 MMHG | WEIGHT: 137.13 LBS | RESPIRATION RATE: 18 BRPM | HEART RATE: 60 BPM | BODY MASS INDEX: 20.31 KG/M2

## 2023-07-31 DIAGNOSIS — I95.1 ORTHOSTATIC HYPOTENSION: Primary | ICD-10-CM

## 2023-07-31 DIAGNOSIS — C17.0 DUODENAL CANCER: ICD-10-CM

## 2023-07-31 PROCEDURE — 96360 HYDRATION IV INFUSION INIT: CPT

## 2023-07-31 PROCEDURE — 96361 HYDRATE IV INFUSION ADD-ON: CPT

## 2023-07-31 PROCEDURE — 25000003 PHARM REV CODE 250: Performed by: STUDENT IN AN ORGANIZED HEALTH CARE EDUCATION/TRAINING PROGRAM

## 2023-07-31 RX ORDER — SODIUM CHLORIDE 0.9 % (FLUSH) 0.9 %
10 SYRINGE (ML) INJECTION
Status: DISCONTINUED | OUTPATIENT
Start: 2023-07-31 | End: 2023-07-31 | Stop reason: HOSPADM

## 2023-07-31 RX ORDER — HEPARIN 100 UNIT/ML
500 SYRINGE INTRAVENOUS
Status: DISCONTINUED | OUTPATIENT
Start: 2023-07-31 | End: 2023-07-31 | Stop reason: HOSPADM

## 2023-07-31 RX ADMIN — SODIUM CHLORIDE 1000 ML: 9 INJECTION, SOLUTION INTRAVENOUS at 11:07

## 2023-08-03 ENCOUNTER — INFUSION (OUTPATIENT)
Dept: INFUSION THERAPY | Facility: HOSPITAL | Age: 83
End: 2023-08-03
Attending: STUDENT IN AN ORGANIZED HEALTH CARE EDUCATION/TRAINING PROGRAM
Payer: MEDICARE

## 2023-08-03 VITALS
BODY MASS INDEX: 20.29 KG/M2 | SYSTOLIC BLOOD PRESSURE: 157 MMHG | RESPIRATION RATE: 16 BRPM | TEMPERATURE: 98 F | WEIGHT: 137 LBS | HEART RATE: 64 BPM | HEIGHT: 69 IN | OXYGEN SATURATION: 100 % | DIASTOLIC BLOOD PRESSURE: 73 MMHG

## 2023-08-03 DIAGNOSIS — I95.1 ORTHOSTATIC HYPOTENSION: Primary | ICD-10-CM

## 2023-08-03 DIAGNOSIS — C17.0 DUODENAL CANCER: ICD-10-CM

## 2023-08-03 PROCEDURE — 96360 HYDRATION IV INFUSION INIT: CPT

## 2023-08-03 PROCEDURE — 25000003 PHARM REV CODE 250: Performed by: STUDENT IN AN ORGANIZED HEALTH CARE EDUCATION/TRAINING PROGRAM

## 2023-08-03 RX ORDER — HEPARIN 100 UNIT/ML
500 SYRINGE INTRAVENOUS
Status: DISCONTINUED | OUTPATIENT
Start: 2023-08-03 | End: 2023-08-03 | Stop reason: HOSPADM

## 2023-08-03 RX ORDER — SODIUM CHLORIDE 0.9 % (FLUSH) 0.9 %
10 SYRINGE (ML) INJECTION
Status: DISCONTINUED | OUTPATIENT
Start: 2023-08-03 | End: 2023-08-03 | Stop reason: HOSPADM

## 2023-08-03 RX ADMIN — SODIUM CHLORIDE 1000 ML: 9 INJECTION, SOLUTION INTRAVENOUS at 10:08

## 2023-08-03 NOTE — PLAN OF CARE
Normal saline 1 liter given.  Tolerated well.  Discharged to home with next appointment on 08-.  He is aware of plan of care.

## 2023-08-07 ENCOUNTER — INFUSION (OUTPATIENT)
Dept: INFUSION THERAPY | Facility: HOSPITAL | Age: 83
End: 2023-08-07
Attending: STUDENT IN AN ORGANIZED HEALTH CARE EDUCATION/TRAINING PROGRAM
Payer: MEDICARE

## 2023-08-07 ENCOUNTER — TELEPHONE (OUTPATIENT)
Dept: HEMATOLOGY/ONCOLOGY | Facility: CLINIC | Age: 83
End: 2023-08-07
Payer: MEDICARE

## 2023-08-07 VITALS
SYSTOLIC BLOOD PRESSURE: 133 MMHG | OXYGEN SATURATION: 100 % | WEIGHT: 136.88 LBS | DIASTOLIC BLOOD PRESSURE: 81 MMHG | HEART RATE: 71 BPM | RESPIRATION RATE: 18 BRPM | TEMPERATURE: 98 F | HEIGHT: 69 IN | BODY MASS INDEX: 20.27 KG/M2

## 2023-08-07 DIAGNOSIS — I95.1 ORTHOSTATIC HYPOTENSION: Primary | ICD-10-CM

## 2023-08-07 DIAGNOSIS — C17.0 DUODENAL CANCER: ICD-10-CM

## 2023-08-07 DIAGNOSIS — T45.1X5A CHEMOTHERAPY INDUCED DIARRHEA: Primary | ICD-10-CM

## 2023-08-07 DIAGNOSIS — K52.1 CHEMOTHERAPY INDUCED DIARRHEA: Primary | ICD-10-CM

## 2023-08-07 PROCEDURE — 96375 TX/PRO/DX INJ NEW DRUG ADDON: CPT

## 2023-08-07 PROCEDURE — 96374 THER/PROPH/DIAG INJ IV PUSH: CPT

## 2023-08-07 PROCEDURE — 25000003 PHARM REV CODE 250: Performed by: STUDENT IN AN ORGANIZED HEALTH CARE EDUCATION/TRAINING PROGRAM

## 2023-08-07 PROCEDURE — 63600175 PHARM REV CODE 636 W HCPCS

## 2023-08-07 PROCEDURE — 25000003 PHARM REV CODE 250

## 2023-08-07 PROCEDURE — 96361 HYDRATE IV INFUSION ADD-ON: CPT

## 2023-08-07 RX ORDER — HEPARIN 100 UNIT/ML
500 SYRINGE INTRAVENOUS
Status: DISCONTINUED | OUTPATIENT
Start: 2023-08-07 | End: 2023-08-07 | Stop reason: HOSPADM

## 2023-08-07 RX ORDER — DIPHENOXYLATE HYDROCHLORIDE AND ATROPINE SULFATE 2.5; .025 MG/1; MG/1
1 TABLET ORAL 4 TIMES DAILY PRN
Qty: 30 TABLET | Refills: 1 | Status: SHIPPED | OUTPATIENT
Start: 2023-08-07 | End: 2023-10-02 | Stop reason: SDUPTHER

## 2023-08-07 RX ORDER — ONDANSETRON 2 MG/ML
8 INJECTION INTRAMUSCULAR; INTRAVENOUS
Status: COMPLETED | OUTPATIENT
Start: 2023-08-07 | End: 2023-08-07

## 2023-08-07 RX ORDER — FAMOTIDINE 10 MG/ML
20 INJECTION INTRAVENOUS
Status: COMPLETED | OUTPATIENT
Start: 2023-08-07 | End: 2023-08-07

## 2023-08-07 RX ORDER — SODIUM CHLORIDE 0.9 % (FLUSH) 0.9 %
10 SYRINGE (ML) INJECTION
Status: DISCONTINUED | OUTPATIENT
Start: 2023-08-07 | End: 2023-08-07 | Stop reason: HOSPADM

## 2023-08-07 RX ADMIN — ONDANSETRON 8 MG: 2 INJECTION INTRAMUSCULAR; INTRAVENOUS at 11:08

## 2023-08-07 RX ADMIN — FAMOTIDINE 20 MG: 10 INJECTION, SOLUTION INTRAVENOUS at 11:08

## 2023-08-07 RX ADMIN — SODIUM CHLORIDE 1000 ML: 9 INJECTION, SOLUTION INTRAVENOUS at 11:08

## 2023-08-07 NOTE — TELEPHONE ENCOUNTER
Will send Rx for Lomotil to his pharmacy today. He can use as an alternative if Imodium not resolving diarrhea.

## 2023-08-07 NOTE — NURSING
Pt arrived to infusion at 1112 for scheduled IVF. Pt very weak, wheelchair assistance needed. Pt began vomiting once in infusion chair. Anna, NP notified, arrived to infusion to assess pt at 1130, ordered 8mg Zofran IVP and 20mg Pepcid IVP. Pt received both medications and completed 1 L NS. Pt stated he felt much better post hydration and meds.     Rx for Lomotil already sent to Pharmacy earlier this AM after daughter contact phone nurse. Pt verbalized understanding to call if diarrhea and/or vomiting persist despite medications. Pt discharged off unit via wheelchair with RN in stable condition, future appts given.

## 2023-08-07 NOTE — TELEPHONE ENCOUNTER
Patient's daughter is stating that he started with diarrhea again. Had 4 watery stools yesterday. She has been giving him immodium, but requesting prescription for something stronger to have on hand. He is currently on D12 of xeloda. Comes in today @ 11:00 for IV hydration. Please advise.

## 2023-08-08 ENCOUNTER — TELEPHONE (OUTPATIENT)
Dept: HEMATOLOGY/ONCOLOGY | Facility: CLINIC | Age: 83
End: 2023-08-08
Payer: MEDICARE

## 2023-08-08 DIAGNOSIS — R11.0 NAUSEA: Primary | ICD-10-CM

## 2023-08-08 DIAGNOSIS — R19.7 ACUTE DIARRHEA: ICD-10-CM

## 2023-08-08 RX ORDER — PROCHLORPERAZINE MALEATE 10 MG
10 TABLET ORAL 4 TIMES DAILY PRN
Qty: 30 TABLET | Refills: 1 | Status: SHIPPED | OUTPATIENT
Start: 2023-08-08 | End: 2024-08-07

## 2023-08-08 NOTE — TELEPHONE ENCOUNTER
Spoke with both patient and his wife. He is requesting IV hydration through his home health. States the lomotil that was prescribed yesterday has not helped with the diarrhea. He is alternating with immodium. Also, c/o nausea. Only has zofran on hand. Please advise.

## 2023-08-09 ENCOUNTER — TELEPHONE (OUTPATIENT)
Dept: HEMATOLOGY/ONCOLOGY | Facility: CLINIC | Age: 83
End: 2023-08-09
Payer: MEDICARE

## 2023-08-09 NOTE — TELEPHONE ENCOUNTER
Patient calling to check status of home health orders. He was requesting IV hydration through [facility].

## 2023-08-10 ENCOUNTER — INFUSION (OUTPATIENT)
Dept: INFUSION THERAPY | Facility: HOSPITAL | Age: 83
End: 2023-08-10
Attending: STUDENT IN AN ORGANIZED HEALTH CARE EDUCATION/TRAINING PROGRAM
Payer: MEDICARE

## 2023-08-10 VITALS
TEMPERATURE: 99 F | SYSTOLIC BLOOD PRESSURE: 114 MMHG | OXYGEN SATURATION: 97 % | HEART RATE: 78 BPM | DIASTOLIC BLOOD PRESSURE: 74 MMHG

## 2023-08-10 DIAGNOSIS — C17.0 DUODENAL CANCER: ICD-10-CM

## 2023-08-10 DIAGNOSIS — I95.1 ORTHOSTATIC HYPOTENSION: Primary | ICD-10-CM

## 2023-08-10 LAB
ROTA NEG CONTROL (OHS): NEGATIVE
ROTA POS CONTROL (OHS): POSITIVE
ROTAVIRUS ANTIGEN STOOL (OHS): NEGATIVE

## 2023-08-10 PROCEDURE — 96360 HYDRATION IV INFUSION INIT: CPT

## 2023-08-10 PROCEDURE — 25000003 PHARM REV CODE 250: Performed by: STUDENT IN AN ORGANIZED HEALTH CARE EDUCATION/TRAINING PROGRAM

## 2023-08-10 RX ORDER — HEPARIN 100 UNIT/ML
500 SYRINGE INTRAVENOUS
Status: DISCONTINUED | OUTPATIENT
Start: 2023-08-10 | End: 2023-08-10 | Stop reason: HOSPADM

## 2023-08-10 RX ORDER — SODIUM CHLORIDE 0.9 % (FLUSH) 0.9 %
10 SYRINGE (ML) INJECTION
Status: DISCONTINUED | OUTPATIENT
Start: 2023-08-10 | End: 2023-08-10 | Stop reason: HOSPADM

## 2023-08-10 RX ADMIN — SODIUM CHLORIDE 1000 ML: 9 INJECTION, SOLUTION INTRAVENOUS at 11:08

## 2023-08-11 ENCOUNTER — PATIENT MESSAGE (OUTPATIENT)
Dept: PHARMACY | Facility: CLINIC | Age: 83
End: 2023-08-11
Payer: MEDICARE

## 2023-08-14 ENCOUNTER — SPECIALTY PHARMACY (OUTPATIENT)
Dept: PHARMACY | Facility: CLINIC | Age: 83
End: 2023-08-14
Payer: MEDICARE

## 2023-08-14 ENCOUNTER — INFUSION (OUTPATIENT)
Dept: INFUSION THERAPY | Facility: HOSPITAL | Age: 83
End: 2023-08-14
Attending: STUDENT IN AN ORGANIZED HEALTH CARE EDUCATION/TRAINING PROGRAM
Payer: MEDICARE

## 2023-08-14 VITALS — SYSTOLIC BLOOD PRESSURE: 125 MMHG | HEART RATE: 79 BPM | DIASTOLIC BLOOD PRESSURE: 80 MMHG

## 2023-08-14 DIAGNOSIS — C17.0 DUODENAL CANCER: ICD-10-CM

## 2023-08-14 DIAGNOSIS — C17.0 DUODENAL CANCER: Primary | ICD-10-CM

## 2023-08-14 DIAGNOSIS — I95.1 ORTHOSTATIC HYPOTENSION: Primary | ICD-10-CM

## 2023-08-14 LAB
CALPROTECTIN STL-MCNT: 77.1 MCG/G
ELASTASE PANC STL-MCNT: <40 MCG/G
HADV DNA SPEC QL NAA+PROBE: NEGATIVE
SPECIMEN SOURCE: NORMAL

## 2023-08-14 PROCEDURE — 96361 HYDRATE IV INFUSION ADD-ON: CPT

## 2023-08-14 PROCEDURE — 25000003 PHARM REV CODE 250: Performed by: STUDENT IN AN ORGANIZED HEALTH CARE EDUCATION/TRAINING PROGRAM

## 2023-08-14 PROCEDURE — 96360 HYDRATION IV INFUSION INIT: CPT

## 2023-08-14 RX ORDER — SODIUM CHLORIDE 0.9 % (FLUSH) 0.9 %
10 SYRINGE (ML) INJECTION
Status: DISCONTINUED | OUTPATIENT
Start: 2023-08-14 | End: 2023-08-14 | Stop reason: HOSPADM

## 2023-08-14 RX ORDER — HEPARIN 100 UNIT/ML
500 SYRINGE INTRAVENOUS
Status: DISCONTINUED | OUTPATIENT
Start: 2023-08-14 | End: 2023-08-14 | Stop reason: HOSPADM

## 2023-08-14 RX ADMIN — SODIUM CHLORIDE 1000 ML: 9 INJECTION, SOLUTION INTRAVENOUS at 10:08

## 2023-08-14 NOTE — TELEPHONE ENCOUNTER
Specialty Pharmacy - Refill Coordination    Specialty Medication Orders Linked to Encounter      Flowsheet Row Most Recent Value   Medication #1 capecitabine (XELODA) 500 MG Tab (Order#[id number], Rx#[id number])        Cycle starts 8/17    Refill Questions - Documented Responses      Flowsheet Row Most Recent Value   Patient Availability and HIPAA Verification    Does patient want to proceed with activity? Yes   HIPAA/medical authority confirmed? Yes   Relationship to patient of person spoken to? Child   Refill Screening Questions    Changes to allergies? No   Changes to medications? No   New conditions since last clinic visit? No   Unplanned office visit, urgent care, ED, or hospital admission in the last 4 weeks? No   How does patient/caregiver feel medication is working? Good   Financial problems or insurance changes? No   How many doses of your specialty medications were missed in the last 4 weeks? 0   Would patient like to speak to a pharmacist? No   When does the patient need to receive the medication? 08/17/23   Refill Delivery Questions    How will the patient receive the medication? MED[facility]   When does the patient need to receive the medication? 08/17/23   Shipping Address Home   Address in [facility] confirmed and updated if neccessary? Yes   Expected Copay ($) 0   Is the patient able to afford the medication copay? Yes   Payment Method zero copay   Days supply of Refill 21   Supplies needed? No supplies needed   Refill activity completed? Yes   Refill activity plan Refill scheduled   Shipment/Pickup Date: 08/17/23            Current Outpatient Medications   Medication Sig    ALPRAZolam (XANAX) 0.25 MG tablet Take 1 tablet (0.25 mg total) by mouth 3 (three) times daily as needed for Anxiety.    busPIRone (BUSPAR) 5 MG Tab Take 1 tablet (5 mg total) by mouth 3 (three) times daily.    capecitabine (XELODA) 500 MG Tab Take 3 tablets (1,500 mg total) by mouth 2 (two) times daily as directed days 1-14 of  each 21 day cycle. Take with water within 30 minutes after a meal.    carvediloL (COREG) 3.125 MG tablet Take 1 tablet (3.125 mg total) by mouth 2 (two) times daily.    diphenoxylate-atropine 2.5-0.025 mg (LOMOTIL) 2.5-0.025 mg per tablet Take 1 tablet by mouth 4 (four) times daily as needed for Diarrhea.    docusate sodium (COLACE) 100 MG capsule Take 1 capsule (100 mg total) by mouth 2 (two) times daily.    finasteride (PROSCAR) 5 mg tablet Take 1 tablet (5 mg total) by mouth once daily.    furosemide (LASIX) 20 MG tablet Take 1 tablet (20 mg total) by mouth once daily. (Patient taking differently: Take 40 mg by mouth once daily.)    HYDROcodone-acetaminophen (NORCO) 7.5-325 mg per tablet Take 1 tablet by mouth every 6 (six) hours as needed for Pain.    ondansetron (ZOFRAN) 8 MG tablet Take 1 tablet (8 mg total) by mouth every 12 (twelve) hours as needed for Nausea.    pantoprazole (PROTONIX) 40 MG tablet Take 1 tablet (40 mg total) by mouth once daily.    potassium chloride SA (KLOR-CON) 10 MEQ TbSR Take 1 tablet (10 mEq total) by mouth once daily.    prochlorperazine (COMPAZINE) 10 MG tablet Take 1 tablet (10 mg total) by mouth 4 (four) times daily as needed (nausea).    tamsulosin (FLOMAX) 0.4 mg Cap Take 1 capsule (0.4 mg total) by mouth every evening.   Last reviewed on 8/14/2023 11:11 AM by Rose Noonan, Genna    Review of patient's allergies indicates:  No Known Allergies Last reviewed on  8/14/2023 11:11 AM by Rose Noonan      Tasks added this encounter   No tasks added.   Tasks due within next 3 months   8/14/2023 - Refill Coordination Outreach (1 time occurrence)     Rose Noonan PharmD  [facility]  [address] [place] [id number]  Phone: [phone]  Fax: [phone]

## 2023-08-16 ENCOUNTER — OFFICE VISIT (OUTPATIENT)
Dept: HEMATOLOGY/ONCOLOGY | Facility: CLINIC | Age: 83
End: 2023-08-16
Payer: MEDICARE

## 2023-08-16 VITALS
HEART RATE: 80 BPM | OXYGEN SATURATION: 100 % | BODY MASS INDEX: 20.07 KG/M2 | WEIGHT: 135.5 LBS | HEIGHT: 69 IN | DIASTOLIC BLOOD PRESSURE: 77 MMHG | SYSTOLIC BLOOD PRESSURE: 108 MMHG | TEMPERATURE: 98 F | RESPIRATION RATE: 18 BRPM

## 2023-08-16 DIAGNOSIS — T45.1X5A CHEMOTHERAPY INDUCED DIARRHEA: ICD-10-CM

## 2023-08-16 DIAGNOSIS — C17.0 DUODENAL CANCER: Primary | ICD-10-CM

## 2023-08-16 DIAGNOSIS — K52.1 CHEMOTHERAPY INDUCED DIARRHEA: ICD-10-CM

## 2023-08-16 PROCEDURE — 3074F PR MOST RECENT SYSTOLIC BLOOD PRESSURE < 130 MM HG: ICD-10-PCS | Mod: CPTII,S$GLB,,

## 2023-08-16 PROCEDURE — 3078F PR MOST RECENT DIASTOLIC BLOOD PRESSURE < 80 MM HG: ICD-10-PCS | Mod: CPTII,S$GLB,,

## 2023-08-16 PROCEDURE — 99999 PR PBB SHADOW E&M-EST. PATIENT-LVL IV: CPT | Mod: PBBFAC,,,

## 2023-08-16 PROCEDURE — 1126F AMNT PAIN NOTED NONE PRSNT: CPT | Mod: CPTII,S$GLB,,

## 2023-08-16 PROCEDURE — 99215 OFFICE O/P EST HI 40 MIN: CPT | Mod: S$GLB,,,

## 2023-08-16 PROCEDURE — 3078F DIAST BP <80 MM HG: CPT | Mod: CPTII,S$GLB,,

## 2023-08-16 PROCEDURE — 3074F SYST BP LT 130 MM HG: CPT | Mod: CPTII,S$GLB,,

## 2023-08-16 PROCEDURE — 1126F PR PAIN SEVERITY QUANTIFIED, NO PAIN PRESENT: ICD-10-PCS | Mod: CPTII,S$GLB,,

## 2023-08-16 PROCEDURE — 1160F RVW MEDS BY RX/DR IN RCRD: CPT | Mod: CPTII,S$GLB,,

## 2023-08-16 PROCEDURE — 99999 PR PBB SHADOW E&M-EST. PATIENT-LVL IV: ICD-10-PCS | Mod: PBBFAC,,,

## 2023-08-16 PROCEDURE — 1159F MED LIST DOCD IN RCRD: CPT | Mod: CPTII,S$GLB,,

## 2023-08-16 PROCEDURE — 1159F PR MEDICATION LIST DOCUMENTED IN MEDICAL RECORD: ICD-10-PCS | Mod: CPTII,S$GLB,,

## 2023-08-16 PROCEDURE — 99215 PR OFFICE/OUTPT VISIT, EST, LEVL V, 40-54 MIN: ICD-10-PCS | Mod: S$GLB,,,

## 2023-08-16 PROCEDURE — 1160F PR REVIEW ALL MEDS BY PRESCRIBER/CLIN PHARMACIST DOCUMENTED: ICD-10-PCS | Mod: CPTII,S$GLB,,

## 2023-08-16 RX ORDER — SODIUM CHLORIDE 0.9 % (FLUSH) 0.9 %
10 SYRINGE (ML) INJECTION
Status: CANCELLED | OUTPATIENT
Start: 2023-08-17

## 2023-08-16 RX ORDER — HEPARIN 100 UNIT/ML
500 SYRINGE INTRAVENOUS
Status: CANCELLED | OUTPATIENT
Start: 2023-08-17

## 2023-08-16 NOTE — PROGRESS NOTES
Subjective:       Patient ID: Quincy Triplett is a 82 y.o. male.    Chief Complaint: Follow Up    Diagnosis: Ampulla of Vater - Adenocarcinoma, intestinal type    Current Treatment:   Dose reduced Xeloda back 1000mg BID Days 1-14 of 21 day cycle 8/17    Treatment History:   Whipple - Dr. Brown - 3/27/23  Xeloda 1000mg BID Days 1-14 of 21 day cycle 6/15-6/28  3.   Xeloda 1500 mg BID Days 1-14 of 21 day cycle 7/6- 8/9  HPI:   83yo M who presented to medical oncology in April '23 for evaluation of Adenocarcinoma of the Ampulla of Vater, Intenstinal type. He initialy presented in late 2022 with jaundice and significant weight loss. Imaging with evidence of biliary dilation and circumferential thickening of the 2nd portion of the duodenum and intra/extraheptic biliary dilation. 12/21/22 Endoscopy with ERCP showed evidence of a malignant appearing mass at the ampulla, pathology consistent with poorly differentiated adenocarcinoma. He was evaluated by surgical oncology, Dr. Brown in December, but then had episode of biliary obstruction with PTC catheter placement and urosepsis that left him too deconditioned for surgical intervention. He then improved with PT to the point he was able to undergo whipple + pancreaticoduodenectomy on 3/27/23. 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 Tumor invasion into pancreas and periduodenal tissue. + LVI. + PNI. Surgical margins negative. 14/31 lymph nodes were positive for malignancy. Final staging pT3B N2.  After his resection he went to rehab and was able to recover enough to begin adjuvant therapy in June '23 with Xeloda, for a planned 6 months in the adjuvant setting.      Interval History:  Patient presents to clinic for 3 week NP follow up appointment and labs for toxicity check. He has been have diarrhea for the past 3 weeks that has resolved. He ended his last cycle of Xeloda 2 days early due to   significant diarrhea. He is not able to eat and drink. He has been receiving hydration fluids for the past 2 weeks.  Overall, says he feels much better. Stool studies negative thus far. Has lost 1 pound since his last visit. Denies rash to hands or feet. Denies fever, chills, or abd pain.         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, unspecified extremity     Coronary artery disease     Dyslipidemia     Hypertension       Past Surgical History:   Procedure Laterality Date    AMPUTATION Right     Right arm    CAROTID STENT      CHOLECYSTECTOMY  3/27/2023    Procedure: CHOLECYSTECTOMY;  Surgeon: Abdirahman Brown MD;  Location: [facility];  Service: Oncology;;    EGD, WITH HEMORRHAGE CONTROL  1/19/2023    Procedure: EGD,WITH HEMORRHAGE CONTROL;  Surgeon: Ranjeet Perez MD;  Location: [facility];  Service: Gastroenterology;;  NextPowder HemeSpray    ERCP N/A 12/21/2022    Procedure: ERCP;  Surgeon: Sushil Anderson MD;  Location: [facility] ENDOSCOPY;  Service: Gastroenterology;  Laterality: N/A;  food in abdomen    ERCP, WITH BIOPSY  12/21/2022    Procedure: ERCP, WITH BIOPSY;  Surgeon: Sushil Anderson MD;  Location: [facility] ENDOSCOPY;  Service: Gastroenterology;;    ESOPHAGOGASTRODUODENOSCOPY N/A 1/19/2023    Procedure: EGD;  Surgeon: Ranjeet Perez MD;  Location: [facility];  Service: Gastroenterology;  Laterality: N/A;    EXPLORATION OF COMMON BILE DUCT  3/27/2023    Procedure: EXPLORATION, COMMON BILE DUCT;  Surgeon: Abdirahman Brown MD;  Location: [facility];  Service: Oncology;;    LEFT HEART CATHETERIZATION      LIVER BIOPSY  3/27/2023    Procedure: BIOPSY, LIVER;  Surgeon: Abdirahman Brown MD;  Location: [facility];  Service: Oncology;;    LYMPHADENECTOMY  3/27/2023    Procedure: LYMPHADENECTOMY;  Surgeon: Abdirahman Brown MD;  Location: [facility];  Service: Oncology;;  Portal/celiac lymphadenectomy    TONSILLECTOMY      WHIPPLE PROCEDURE  N/A 3/27/2023    Procedure: WHIPPLE PROCEDURE;  Surgeon: Abdirahman Brown MD;  Location: [facility] OR;  Service: Oncology;  Laterality: N/A;     Social History     Socioeconomic History    Marital status:    Tobacco Use    Smoking status: Never    Smokeless tobacco: Never   Substance and Sexual Activity    Alcohol use: Never    Drug use: Never     Social Determinants of Health     Food Insecurity: Unknown (1/12/2023)    Hunger Vital Sign     Worried About Running Out of Food in the Last Year: Never true   Transportation Needs: Unknown (1/12/2023)    PRAPARE - Transportation     Lack of Transportation (Medical): No   Social Connections: Unknown (4/7/2023)    Social Connection and Isolation Panel [NHANES]     Marital Status:    Housing Stability: Unknown (1/12/2023)    Housing Stability Vital Sign     Unable to Pay for Housing in the Last Year: No      No family history on file.   Review of patient's allergies indicates:  No Known Allergies   Review of Systems   Constitutional:  Positive for activity change. Negative for chills, fatigue and fever.   HENT:  Negative for sore throat.    Eyes:  Negative for visual disturbance.   Respiratory:  Negative for cough.    Cardiovascular:  Negative for chest pain.   Gastrointestinal:  Negative for abdominal pain, constipation and diarrhea.   Endocrine: Negative for polyuria.   Genitourinary:  Negative for dysuria.   Musculoskeletal:  Negative for back pain.   Integumentary:  Negative for rash.   Allergic/Immunologic: Negative for frequent infections.   Neurological:  Negative for weakness and headaches.   Hematological:  Negative for adenopathy. Does not bruise/bleed easily.         Objective:      Vitals:    08/16/23 1334   BP: 108/77   Pulse: 80   Resp: 18   Temp: 97.5 °F (36.4 °C)       Physical Exam  Constitutional:       General: He is not in acute distress.     Appearance: Normal appearance.   HENT:      Head: Normocephalic and atraumatic.      Nose: Nose normal.  "     Mouth/Throat:      Mouth: Mucous membranes are moist.      Pharynx: Oropharynx is clear.   Eyes:      Extraocular Movements: Extraocular movements intact.      Conjunctiva/sclera: Conjunctivae normal.      Pupils: Pupils are equal, round, and reactive to light.   Cardiovascular:      Rate and Rhythm: Normal rate and regular rhythm.      Pulses: Normal pulses.      Heart sounds: Normal heart sounds. No murmur heard.  Pulmonary:      Effort: No respiratory distress.      Breath sounds: Normal breath sounds.   Abdominal:      General: There is no distension.      Palpations: Abdomen is soft.   Genitourinary:     Comments: Paul catheter in place with sediment noted  Musculoskeletal:         General: Normal range of motion.      Cervical back: Normal range of motion and neck supple.      Right lower leg: No edema.      Left lower leg: No edema.   Lymphadenopathy:      Cervical: No cervical adenopathy.   Skin:     General: Skin is warm and dry.   Neurological:      Mental Status: He is alert and oriented to person, place, and time.      Motor: Weakness present.         LABS AND IMAGING REVIEWED IN [facility]    3/2/23 CT CAP  Impression:  1. Slight interval retraction of the percutaneous biliary stent which is now coiled slightly more distally in the right lobe of the liver.  There is however slightly improved dilatation of the extrahepatic bile ducts.  2. Known ampullary/duodenal mass poorly evaluated on this noncontrast CT.  No bowel obstruction.  3. Bladder wall appears mildly thickened which may be related to under distension.  Correlate with urinalysis.     3/15/23 CT Chest  Impression:  No evidence of metastatic disease in the thorax.     3/15/23 CT A/P  Impression:  Developing gastric outlet obstruction, secondary to a 3.2 cm long "apple-core" lesion in the 3rd portion of the duodenum at the level of the ampulla, presumably representing a primary adenocarcinoma.  No CT evidence of distant metastasis.    4/7/23 CT " Chest  Impression:  Layering pleural effusions with adjacent compressive atelectasis versus pneumonia    4/7/23 CT Abdomen/Pelvis  Impression:   1. Postoperative changes of the abdomen with small amount of mesenteric edema and ascites.  No organized fluid collection or free air.  2. Mildly dilated loops of small bowel are nonspecific and may represent an ileus.  3. Large right and small left pleural effusions with basilar subsegmental atelectasis    Assessment:   Stage IIIB Ampulla of Vater Adenocarcinoma, intestinal type  - s/p resection 3/27/23. WContinue adjuvant Capecitabine based therapy. Consideration for chemo/RT afterwards pending response    Plan:       - Toxicity and patient performance reviewed, okay to proceed with C4 of Xeloda at dose reduction 1000mg BID 14days Q 21 cycle   - Imodium or Lomotil for diarrhea PRN  - Follow up with Urology as scheduled  - RTC 3 weeks with NP visit for toxicity check, labs same day - CBC, CMP prior to C5.   - Plan for repeat CT CAP just prior to C5 for treatment response assessment  - IVL on Thursday 8/17; then will reassess if needed once Xeloda restarted    Anna Beckford FNP-C  Hematology/Oncology   [facility]

## 2023-08-17 ENCOUNTER — INFUSION (OUTPATIENT)
Dept: INFUSION THERAPY | Facility: HOSPITAL | Age: 83
End: 2023-08-17
Attending: STUDENT IN AN ORGANIZED HEALTH CARE EDUCATION/TRAINING PROGRAM
Payer: MEDICARE

## 2023-08-17 VITALS
OXYGEN SATURATION: 99 % | TEMPERATURE: 98 F | RESPIRATION RATE: 16 BRPM | DIASTOLIC BLOOD PRESSURE: 66 MMHG | HEART RATE: 68 BPM | SYSTOLIC BLOOD PRESSURE: 130 MMHG

## 2023-08-17 DIAGNOSIS — C17.0 DUODENAL CANCER: ICD-10-CM

## 2023-08-17 DIAGNOSIS — I95.1 ORTHOSTATIC HYPOTENSION: Primary | ICD-10-CM

## 2023-08-17 PROCEDURE — 96361 HYDRATE IV INFUSION ADD-ON: CPT

## 2023-08-17 PROCEDURE — 25000003 PHARM REV CODE 250

## 2023-08-17 PROCEDURE — 96360 HYDRATION IV INFUSION INIT: CPT

## 2023-08-17 RX ORDER — SODIUM CHLORIDE 0.9 % (FLUSH) 0.9 %
10 SYRINGE (ML) INJECTION
Status: DISCONTINUED | OUTPATIENT
Start: 2023-08-17 | End: 2023-08-17 | Stop reason: HOSPADM

## 2023-08-17 RX ORDER — HEPARIN 100 UNIT/ML
500 SYRINGE INTRAVENOUS
Status: DISCONTINUED | OUTPATIENT
Start: 2023-08-17 | End: 2023-08-17 | Stop reason: HOSPADM

## 2023-08-17 RX ADMIN — SODIUM CHLORIDE 1000 ML: 9 INJECTION, SOLUTION INTRAVENOUS at 09:08

## 2023-08-18 LAB — O+P STL MICRO: NORMAL

## 2023-08-19 ENCOUNTER — PATIENT MESSAGE (OUTPATIENT)
Dept: HEMATOLOGY/ONCOLOGY | Facility: CLINIC | Age: 83
End: 2023-08-19
Payer: MEDICARE

## 2023-08-21 ENCOUNTER — PATIENT MESSAGE (OUTPATIENT)
Dept: HEMATOLOGY/ONCOLOGY | Facility: CLINIC | Age: 83
End: 2023-08-21
Payer: MEDICARE

## 2023-08-29 ENCOUNTER — PATIENT MESSAGE (OUTPATIENT)
Dept: HEMATOLOGY/ONCOLOGY | Facility: CLINIC | Age: 83
End: 2023-08-29
Payer: MEDICARE

## 2023-09-01 ENCOUNTER — HOSPITAL ENCOUNTER (OUTPATIENT)
Dept: RADIOLOGY | Facility: HOSPITAL | Age: 83
Discharge: HOME | End: 2023-09-01
Attending: STUDENT IN AN ORGANIZED HEALTH CARE EDUCATION/TRAINING PROGRAM
Payer: MEDICARE

## 2023-09-01 DIAGNOSIS — C17.0 DUODENAL CANCER: ICD-10-CM

## 2023-09-01 PROCEDURE — 74177 CT ABD & PELVIS W/CONTRAST: CPT | Mod: TC

## 2023-09-01 PROCEDURE — 71260 CT THORAX DX C+: CPT | Mod: TC

## 2023-09-01 PROCEDURE — 25500020 PHARM REV CODE 255: Performed by: STUDENT IN AN ORGANIZED HEALTH CARE EDUCATION/TRAINING PROGRAM

## 2023-09-01 RX ADMIN — IOPAMIDOL 100 ML: 755 INJECTION, SOLUTION INTRAVENOUS at 07:09

## 2023-09-01 RX ADMIN — DIATRIZOATE MEGLUMINE AND DIATRIZOATE SODIUM 30 ML: 660; 100 LIQUID ORAL; RECTAL at 07:09

## 2023-09-05 NOTE — PROGRESS NOTES
Subjective:       Patient ID: Quincy Triplett is a 82 y.o. male.    Chief Complaint: Follow Up    Diagnosis: Ampulla of Vater - Adenocarcinoma, intestinal type    Current Treatment:    Xeloda 3 tabs daily and 2 tabs q hs  Days 1-14 of 21 day cycle  9/7/23    Treatment History:   Whipple - Dr. Brown - 3/27/23  Xeloda 1000mg BID Days 1-14 of 21 day cycle 6/15-6/28  3.   Xeloda 1500 mg BID Days 1-14 of 21 day cycle 7/6- 8/9  4.    Xeloda 1000mg BID Days 1-14 of 21 day cycle 8/17/23-9/6/23  HPI:   81yo M who presented to medical oncology in April '23 for evaluation of Adenocarcinoma of the Ampulla of Vater, Intenstinal type. He initialy presented in late 2022 with jaundice and significant weight loss. Imaging with evidence of biliary dilation and circumferential thickening of the 2nd portion of the duodenum and intra/extraheptic biliary dilation. 12/21/22 Endoscopy with ERCP showed evidence of a malignant appearing mass at the ampulla, pathology consistent with poorly differentiated adenocarcinoma. He was evaluated by surgical oncology, Dr. Brown in December, but then had episode of biliary obstruction with PTC catheter placement and urosepsis that left him too deconditioned for surgical intervention. He then improved with PT to the point he was able to undergo whipple + pancreaticoduodenectomy on 3/27/23. 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 Tumor invasion into pancreas and periduodenal tissue. + LVI. + PNI. Surgical margins negative. 14/31 lymph nodes were positive for malignancy. Final staging pT3B N2.  After his resection he went to rehab and was able to recover enough to begin adjuvant therapy in June '23 with Xeloda, for a planned 6 months in the adjuvant setting.      Interval History:  Patient presents to clinic for 3 week NP follow up appointment and labs for toxicity check.  Overall he is feeling improved. C5 of Xeloda to start  tomorrow. Given excessive diarrhea Xeloda was dose reduced to 2 tabs BID. Diarrhea has since resolved.  Denies any fever, rash, mouth sores, abd pain or bleeeding.  Discussed scans are stable for now. Will continue Xeloda as planned. Appetite has improved. Able to eat and drink without any issues.  Otherwise, he has no complaints.         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, unspecified extremity     Coronary artery disease     Dyslipidemia     Hypertension       Past Surgical History:   Procedure Laterality Date    AMPUTATION Right     Right arm    CAROTID STENT      CHOLECYSTECTOMY  3/27/2023    Procedure: CHOLECYSTECTOMY;  Surgeon: Abdirahman Brown MD;  Location: [facility];  Service: Oncology;;    EGD, WITH HEMORRHAGE CONTROL  1/19/2023    Procedure: EGD,WITH HEMORRHAGE CONTROL;  Surgeon: Ranjeet Perez MD;  Location: [facility];  Service: Gastroenterology;;  NextPowder HemeSpray    ERCP N/A 12/21/2022    Procedure: ERCP;  Surgeon: Sushil Anderson MD;  Location: [facility] ENDOSCOPY;  Service: Gastroenterology;  Laterality: N/A;  food in abdomen    ERCP, WITH BIOPSY  12/21/2022    Procedure: ERCP, WITH BIOPSY;  Surgeon: Sushil Anderson MD;  Location: [facility] ENDOSCOPY;  Service: Gastroenterology;;    ESOPHAGOGASTRODUODENOSCOPY N/A 1/19/2023    Procedure: EGD;  Surgeon: Ranjeet Perez MD;  Location: [facility];  Service: Gastroenterology;  Laterality: N/A;    EXPLORATION OF COMMON BILE DUCT  3/27/2023    Procedure: EXPLORATION, COMMON BILE DUCT;  Surgeon: Abdirahman Brown MD;  Location: [facility];  Service: Oncology;;    LEFT HEART CATHETERIZATION      LIVER BIOPSY  3/27/2023    Procedure: BIOPSY, LIVER;  Surgeon: Abdirahman Brown MD;  Location: [facility] OR;  Service: Oncology;;    LYMPHADENECTOMY  3/27/2023    Procedure: LYMPHADENECTOMY;  Surgeon: Abdirahman Brown MD;  Location: [facility];  Service: Oncology;;  Portal/celiac lymphadenectomy     TONSILLECTOMY      WHIPPLE PROCEDURE N/A 3/27/2023    Procedure: WHIPPLE PROCEDURE;  Surgeon: Abdirahman Brown MD;  Location: [facility] OR;  Service: Oncology;  Laterality: N/A;     Social History     Socioeconomic History    Marital status:    Tobacco Use    Smoking status: Never    Smokeless tobacco: Never   Substance and Sexual Activity    Alcohol use: Never    Drug use: Never     Social Determinants of Health     Food Insecurity: Unknown (1/12/2023)    Hunger Vital Sign     Worried About Running Out of Food in the Last Year: Never true   Transportation Needs: Unknown (1/12/2023)    PRAPARE - Transportation     Lack of Transportation (Medical): No   Social Connections: Unknown (4/7/2023)    Social Connection and Isolation Panel [NHANES]     Marital Status:    Housing Stability: Unknown (1/12/2023)    Housing Stability Vital Sign     Unable to Pay for Housing in the Last Year: No      No family history on file.   Review of patient's allergies indicates:  No Known Allergies   Review of Systems   Constitutional:  Positive for activity change. Negative for chills, fatigue and fever.   HENT:  Negative for sore throat.    Eyes:  Negative for visual disturbance.   Respiratory:  Negative for cough.    Cardiovascular:  Negative for chest pain.   Gastrointestinal:  Negative for abdominal pain, constipation and diarrhea.   Endocrine: Negative for polyuria.   Genitourinary:  Negative for dysuria.   Musculoskeletal:  Negative for back pain.   Integumentary:  Negative for rash.   Allergic/Immunologic: Negative for frequent infections.   Neurological:  Negative for weakness and headaches.   Hematological:  Negative for adenopathy. Does not bruise/bleed easily.         Objective:      Vitals:    09/06/23 1305   BP: 110/72   Pulse: 78   Resp: 18   Temp: 97.7 °F (36.5 °C)       Physical Exam  Constitutional:       General: He is not in acute distress.     Appearance: Normal appearance.   HENT:      Head: Normocephalic  "and atraumatic.      Nose: Nose normal.      Mouth/Throat:      Mouth: Mucous membranes are moist.      Pharynx: Oropharynx is clear.   Eyes:      Extraocular Movements: Extraocular movements intact.      Conjunctiva/sclera: Conjunctivae normal.      Pupils: Pupils are equal, round, and reactive to light.   Cardiovascular:      Rate and Rhythm: Normal rate and regular rhythm.      Pulses: Normal pulses.      Heart sounds: Normal heart sounds. No murmur heard.  Pulmonary:      Effort: No respiratory distress.      Breath sounds: Normal breath sounds.   Abdominal:      General: There is no distension.      Palpations: Abdomen is soft.   Genitourinary:     Comments: Paul catheter in place with sediment noted  Musculoskeletal:         General: Normal range of motion.      Cervical back: Normal range of motion and neck supple.      Right lower leg: No edema.      Left lower leg: No edema.   Lymphadenopathy:      Cervical: No cervical adenopathy.   Skin:     General: Skin is warm and dry.   Neurological:      Mental Status: He is alert and oriented to person, place, and time.      Motor: Weakness present.         LABS AND IMAGING REVIEWED IN [facility]    3/2/23 CT CAP  Impression:  1. Slight interval retraction of the percutaneous biliary stent which is now coiled slightly more distally in the right lobe of the liver.  There is however slightly improved dilatation of the extrahepatic bile ducts.  2. Known ampullary/duodenal mass poorly evaluated on this noncontrast CT.  No bowel obstruction.  3. Bladder wall appears mildly thickened which may be related to under distension.  Correlate with urinalysis.     3/15/23 CT Chest  Impression:  No evidence of metastatic disease in the thorax.     3/15/23 CT A/P  Impression:  Developing gastric outlet obstruction, secondary to a 3.2 cm long "apple-core" lesion in the 3rd portion of the duodenum at the level of the ampulla, presumably representing a primary adenocarcinoma.  No CT " evidence of distant metastasis.    4/7/23 CT Chest  Impression:  Layering pleural effusions with adjacent compressive atelectasis versus pneumonia    4/7/23 CT Abdomen/Pelvis  Impression:   1. Postoperative changes of the abdomen with small amount of mesenteric edema and ascites.  No organized fluid collection or free air.  2. Mildly dilated loops of small bowel are nonspecific and may represent an ileus.  3. Large right and small left pleural effusions with basilar subsegmental atelectasis    9/1/2023 CT CAP  Impression:   No evidence of malignancy to the chest abdomen or pelvis.   Trace pleural fluid dependently on the right significantly reduced in the interval.  Persistent prominent pleural based scarring in dependent atelectasis/scarring.   Surgical changes as above.    Assessment:   Stage IIIB Ampulla of Vater Adenocarcinoma, intestinal type  - s/p resection 3/27/23. Continue adjuvant Capecitabine based therapy. Consideration for chemo/RT afterwards pending response    Plan:       - Toxicity and patient performance reviewed, okay to proceed with C5 of Xeloda; given diarrhea have resolved  will increase to 3 tabs q day and 2 tabs q hs x 14days Q 21 cycle   - Imodium or Lomotil for diarrhea PRN  - Follow up with Urology as scheduled  - RTC 3 weeks with NP visit for toxicity check, labs same day - CBC, CMP prior to C6.         Anna Beckford FNP-C  Hematology/Oncology   [facility]

## 2023-09-06 ENCOUNTER — OFFICE VISIT (OUTPATIENT)
Dept: HEMATOLOGY/ONCOLOGY | Facility: CLINIC | Age: 83
End: 2023-09-06
Payer: MEDICARE

## 2023-09-06 ENCOUNTER — TELEPHONE (OUTPATIENT)
Dept: HEMATOLOGY/ONCOLOGY | Facility: CLINIC | Age: 83
End: 2023-09-06
Payer: MEDICARE

## 2023-09-06 VITALS
TEMPERATURE: 98 F | HEART RATE: 78 BPM | WEIGHT: 128.88 LBS | RESPIRATION RATE: 18 BRPM | BODY MASS INDEX: 19.09 KG/M2 | OXYGEN SATURATION: 100 % | HEIGHT: 69 IN | DIASTOLIC BLOOD PRESSURE: 72 MMHG | SYSTOLIC BLOOD PRESSURE: 110 MMHG

## 2023-09-06 DIAGNOSIS — Z90.49 H/O WHIPPLE PROCEDURE: ICD-10-CM

## 2023-09-06 DIAGNOSIS — Z90.410 H/O WHIPPLE PROCEDURE: ICD-10-CM

## 2023-09-06 DIAGNOSIS — C17.0 DUODENAL CANCER: Primary | ICD-10-CM

## 2023-09-06 PROCEDURE — 1126F AMNT PAIN NOTED NONE PRSNT: CPT | Mod: CPTII,S$GLB,,

## 2023-09-06 PROCEDURE — 1160F PR REVIEW ALL MEDS BY PRESCRIBER/CLIN PHARMACIST DOCUMENTED: ICD-10-PCS | Mod: CPTII,S$GLB,,

## 2023-09-06 PROCEDURE — 99215 OFFICE O/P EST HI 40 MIN: CPT | Mod: S$GLB,,,

## 2023-09-06 PROCEDURE — 1126F PR PAIN SEVERITY QUANTIFIED, NO PAIN PRESENT: ICD-10-PCS | Mod: CPTII,S$GLB,,

## 2023-09-06 PROCEDURE — 3078F DIAST BP <80 MM HG: CPT | Mod: CPTII,S$GLB,,

## 2023-09-06 PROCEDURE — 1159F MED LIST DOCD IN RCRD: CPT | Mod: CPTII,S$GLB,,

## 2023-09-06 PROCEDURE — 1159F PR MEDICATION LIST DOCUMENTED IN MEDICAL RECORD: ICD-10-PCS | Mod: CPTII,S$GLB,,

## 2023-09-06 PROCEDURE — 99215 PR OFFICE/OUTPT VISIT, EST, LEVL V, 40-54 MIN: ICD-10-PCS | Mod: S$GLB,,,

## 2023-09-06 PROCEDURE — 99999 PR PBB SHADOW E&M-EST. PATIENT-LVL IV: CPT | Mod: PBBFAC,,,

## 2023-09-06 PROCEDURE — 3074F PR MOST RECENT SYSTOLIC BLOOD PRESSURE < 130 MM HG: ICD-10-PCS | Mod: CPTII,S$GLB,,

## 2023-09-06 PROCEDURE — 3078F PR MOST RECENT DIASTOLIC BLOOD PRESSURE < 80 MM HG: ICD-10-PCS | Mod: CPTII,S$GLB,,

## 2023-09-06 PROCEDURE — 3074F SYST BP LT 130 MM HG: CPT | Mod: CPTII,S$GLB,,

## 2023-09-06 PROCEDURE — 1160F RVW MEDS BY RX/DR IN RCRD: CPT | Mod: CPTII,S$GLB,,

## 2023-09-06 PROCEDURE — 99999 PR PBB SHADOW E&M-EST. PATIENT-LVL IV: ICD-10-PCS | Mod: PBBFAC,,,

## 2023-09-24 ENCOUNTER — PATIENT MESSAGE (OUTPATIENT)
Dept: HEMATOLOGY/ONCOLOGY | Facility: CLINIC | Age: 83
End: 2023-09-24
Payer: MEDICARE

## 2023-09-27 ENCOUNTER — OFFICE VISIT (OUTPATIENT)
Dept: HEMATOLOGY/ONCOLOGY | Facility: CLINIC | Age: 83
End: 2023-09-27
Payer: MEDICARE

## 2023-09-27 VITALS
WEIGHT: 131.69 LBS | BODY MASS INDEX: 19.5 KG/M2 | HEART RATE: 80 BPM | OXYGEN SATURATION: 99 % | HEIGHT: 69 IN | SYSTOLIC BLOOD PRESSURE: 103 MMHG | DIASTOLIC BLOOD PRESSURE: 62 MMHG | RESPIRATION RATE: 18 BRPM

## 2023-09-27 DIAGNOSIS — Z79.899 DRUG-INDUCED IMMUNODEFICIENCY: ICD-10-CM

## 2023-09-27 DIAGNOSIS — L27.1 HAND FOOT SYNDROME: ICD-10-CM

## 2023-09-27 DIAGNOSIS — D84.821 DRUG-INDUCED IMMUNODEFICIENCY: ICD-10-CM

## 2023-09-27 DIAGNOSIS — K52.1 CHEMOTHERAPY INDUCED DIARRHEA: ICD-10-CM

## 2023-09-27 DIAGNOSIS — C17.0 DUODENAL CANCER: Primary | ICD-10-CM

## 2023-09-27 DIAGNOSIS — C77.2 SECONDARY AND UNSPECIFIED MALIGNANT NEOPLASM OF INTRA-ABDOMINAL LYMPH NODES: ICD-10-CM

## 2023-09-27 DIAGNOSIS — T45.1X5A CHEMOTHERAPY INDUCED DIARRHEA: ICD-10-CM

## 2023-09-27 PROCEDURE — 1160F RVW MEDS BY RX/DR IN RCRD: CPT | Mod: CPTII,S$GLB,,

## 2023-09-27 PROCEDURE — 1159F PR MEDICATION LIST DOCUMENTED IN MEDICAL RECORD: ICD-10-PCS | Mod: CPTII,S$GLB,,

## 2023-09-27 PROCEDURE — 1126F PR PAIN SEVERITY QUANTIFIED, NO PAIN PRESENT: ICD-10-PCS | Mod: CPTII,S$GLB,,

## 2023-09-27 PROCEDURE — 99215 PR OFFICE/OUTPT VISIT, EST, LEVL V, 40-54 MIN: ICD-10-PCS | Mod: S$GLB,,,

## 2023-09-27 PROCEDURE — 3074F SYST BP LT 130 MM HG: CPT | Mod: CPTII,S$GLB,,

## 2023-09-27 PROCEDURE — 99999 PR PBB SHADOW E&M-EST. PATIENT-LVL IV: CPT | Mod: PBBFAC,,,

## 2023-09-27 PROCEDURE — 1126F AMNT PAIN NOTED NONE PRSNT: CPT | Mod: CPTII,S$GLB,,

## 2023-09-27 PROCEDURE — 1159F MED LIST DOCD IN RCRD: CPT | Mod: CPTII,S$GLB,,

## 2023-09-27 PROCEDURE — 3074F PR MOST RECENT SYSTOLIC BLOOD PRESSURE < 130 MM HG: ICD-10-PCS | Mod: CPTII,S$GLB,,

## 2023-09-27 PROCEDURE — 1160F PR REVIEW ALL MEDS BY PRESCRIBER/CLIN PHARMACIST DOCUMENTED: ICD-10-PCS | Mod: CPTII,S$GLB,,

## 2023-09-27 PROCEDURE — 3078F DIAST BP <80 MM HG: CPT | Mod: CPTII,S$GLB,,

## 2023-09-27 PROCEDURE — 99999 PR PBB SHADOW E&M-EST. PATIENT-LVL IV: ICD-10-PCS | Mod: PBBFAC,,,

## 2023-09-27 PROCEDURE — 99215 OFFICE O/P EST HI 40 MIN: CPT | Mod: S$GLB,,,

## 2023-09-27 PROCEDURE — 3078F PR MOST RECENT DIASTOLIC BLOOD PRESSURE < 80 MM HG: ICD-10-PCS | Mod: CPTII,S$GLB,,

## 2023-09-27 RX ORDER — SODIUM CHLORIDE 0.9 % (FLUSH) 0.9 %
10 SYRINGE (ML) INJECTION
Status: CANCELLED | OUTPATIENT
Start: 2023-09-29

## 2023-09-27 RX ORDER — HEPARIN 100 UNIT/ML
500 SYRINGE INTRAVENOUS
Status: CANCELLED | OUTPATIENT
Start: 2023-09-29

## 2023-09-27 NOTE — PROGRESS NOTES
Subjective:       Patient ID: Quincy Triplett is a 82 y.o. male.    Chief Complaint: Follow Up    Diagnosis: Ampulla of Vater - Adenocarcinoma, intestinal type    Current Treatment:    Xeloda 3 tabs daily and 2 tabs q hs  Days 1-14 of 21 day cycle  9/7/23    Treatment History:   Whipple - Dr. Brown - 3/27/23  Xeloda 1000mg BID Days 1-14 of 21 day cycle 6/15-6/28  3.   Xeloda 1500 mg BID Days 1-14 of 21 day cycle 7/6- 8/9  4.    Xeloda 1000mg BID Days 1-14 of 21 day cycle 8/17/23-9/6/23  HPI:   83yo M who presented to medical oncology in April '23 for evaluation of Adenocarcinoma of the Ampulla of Vater, Intenstinal type. He initialy presented in late 2022 with jaundice and significant weight loss. Imaging with evidence of biliary dilation and circumferential thickening of the 2nd portion of the duodenum and intra/extraheptic biliary dilation. 12/21/22 Endoscopy with ERCP showed evidence of a malignant appearing mass at the ampulla, pathology consistent with poorly differentiated adenocarcinoma. He was evaluated by surgical oncology, Dr. Brown in December, but then had episode of biliary obstruction with PTC catheter placement and urosepsis that left him too deconditioned for surgical intervention. He then improved with PT to the point he was able to undergo whipple + pancreaticoduodenectomy on 3/27/23. 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 Tumor invasion into pancreas and periduodenal tissue. + LVI. + PNI. Surgical margins negative. 14/31 lymph nodes were positive for malignancy. Final staging pT3B N2.  After his resection he went to rehab and was able to recover enough to begin adjuvant therapy in June '23 with Xeloda, for a planned 6 months in the adjuvant setting. Xeloda has been put on hold as of 9/28/23 secondary to hand foot syndrome.      Interval History:  Patient presents to clinic for 3 week NP follow up appointment and labs  for toxicity check.    He just recently completed C5 of Xeloda; however went beyond 14 days to a total of 19 days of Xeloda this cycle. He has been on hold since 9/24.  Today he complains of fatigue and diarrhea for 3 days that has now resolved with Lomotil. Hand and foot syndrome to left hand and bilateral feet. Has been applying topical steroids daily. Denies any skin breakdown or wounds; however confirm some cracks noted. Denies fever, chills, nausea, or abd pain. Gained 2 pounds.   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;  Service: Oncology;;    EGD, WITH HEMORRHAGE CONTROL  1/19/2023    Procedure: EGD,WITH HEMORRHAGE CONTROL;  Surgeon: Ranjeet Perez MD;  Location: [facility];  Service: Gastroenterology;;  Quincy Goyal    ERCP N/A 12/21/2022    Procedure: ERCP;  Surgeon: Sushil Anderson MD;  Location: [facility] ENDOSCOPY;  Service: Gastroenterology;  Laterality: N/A;  food in abdomen    ERCP, WITH BIOPSY  12/21/2022    Procedure: ERCP, WITH BIOPSY;  Surgeon: Sushil Anderson MD;  Location: [facility] ENDOSCOPY;  Service: Gastroenterology;;    ESOPHAGOGASTRODUODENOSCOPY N/A 1/19/2023    Procedure: EGD;  Surgeon: Ranjeet Perez MD;  Location: [facility];  Service: Gastroenterology;  Laterality: N/A;    EXPLORATION OF COMMON BILE DUCT  3/27/2023    Procedure: EXPLORATION, COMMON BILE DUCT;  Surgeon: Abdirahman Brown MD;  Location: [facility] OR;  Service: Oncology;;    LEFT HEART CATHETERIZATION      LIVER BIOPSY  3/27/2023    Procedure: BIOPSY, LIVER;  Surgeon: Abdirahman Brown MD;  Location: [facility]  OR;  Service: Oncology;;    LYMPHADENECTOMY  3/27/2023    Procedure: LYMPHADENECTOMY;  Surgeon: Abdirahman Brown MD;  Location: [facility] OR;  Service: Oncology;;  Portal/celiac lymphadenectomy    TONSILLECTOMY      WHIPPLE PROCEDURE N/A 3/27/2023    Procedure: WHIPPLE PROCEDURE;  Surgeon: Abdirahman Brown MD;  Location: [facility] OR;  Service: Oncology;  Laterality: N/A;     Social History     Socioeconomic History    Marital status:    Tobacco Use    Smoking status: Never    Smokeless tobacco: Never   Substance and Sexual Activity    Alcohol use: Never    Drug use: Never     Social Determinants of Health     Food Insecurity: Unknown (1/12/2023)    Hunger Vital Sign     Worried About Running Out of Food in the Last Year: Never true   Transportation Needs: Unknown (1/12/2023)    PRAPARE - Transportation     Lack of Transportation (Medical): No   Social Connections: Unknown (4/7/2023)    Social Connection and Isolation Panel [NHANES]     Marital Status:    Housing Stability: Unknown (1/12/2023)    Housing Stability Vital Sign     Unable to Pay for Housing in the Last Year: No      History reviewed. No pertinent family history.   Review of patient's allergies indicates:  No Known Allergies   Review of Systems   Constitutional:  Positive for activity change and fatigue. Negative for chills and fever.   HENT:  Negative for sore throat.    Eyes:  Negative for visual disturbance.   Respiratory:  Negative for cough.    Cardiovascular:  Negative for chest pain.   Gastrointestinal:  Positive for diarrhea. Negative for abdominal pain and constipation.   Endocrine: Negative for polyuria.   Genitourinary:  Negative for dysuria.   Musculoskeletal:  Negative for back pain.   Integumentary:  Negative for rash.        Left hand and bilateral feet with erythema and cracks   Allergic/Immunologic: Negative for frequent infections.   Neurological:  Negative for weakness and headaches.   Hematological:  Negative for adenopathy. Does  not bruise/bleed easily.         Objective:      Vitals:    09/27/23 1448   BP: 103/62   Pulse: 80   Resp: 18       Physical Exam  Constitutional:       General: He is not in acute distress.     Appearance: Normal appearance.   HENT:      Head: Normocephalic and atraumatic.      Nose: Nose normal.      Mouth/Throat:      Mouth: Mucous membranes are moist.      Pharynx: Oropharynx is clear.   Eyes:      Extraocular Movements: Extraocular movements intact.      Conjunctiva/sclera: Conjunctivae normal.      Pupils: Pupils are equal, round, and reactive to light.   Cardiovascular:      Rate and Rhythm: Normal rate and regular rhythm.      Pulses: Normal pulses.      Heart sounds: Normal heart sounds. No murmur heard.  Pulmonary:      Effort: No respiratory distress.      Breath sounds: Normal breath sounds.   Abdominal:      General: There is no distension.      Palpations: Abdomen is soft.   Genitourinary:     Comments: Paul catheter in place with sediment noted  Musculoskeletal:         General: Normal range of motion.      Cervical back: Normal range of motion and neck supple.      Right lower leg: No edema.      Left lower leg: No edema.   Lymphadenopathy:      Cervical: No cervical adenopathy.   Skin:     General: Skin is warm and dry.      Findings: Erythema (cracks to left hand and bilateral feet) present.   Neurological:      Mental Status: He is alert and oriented to person, place, and time.      Motor: Weakness present.         LABS AND IMAGING REVIEWED IN [facility]    3/2/23 CT CAP  Impression:  1. Slight interval retraction of the percutaneous biliary stent which is now coiled slightly more distally in the right lobe of the liver.  There is however slightly improved dilatation of the extrahepatic bile ducts.  2. Known ampullary/duodenal mass poorly evaluated on this noncontrast CT.  No bowel obstruction.  3. Bladder wall appears mildly thickened which may be related to under distension.  Correlate with  "urinalysis.     3/15/23 CT Chest  Impression:  No evidence of metastatic disease in the thorax.     3/15/23 CT A/P  Impression:  Developing gastric outlet obstruction, secondary to a 3.2 cm long "apple-core" lesion in the 3rd portion of the duodenum at the level of the ampulla, presumably representing a primary adenocarcinoma.  No CT evidence of distant metastasis.    4/7/23 CT Chest  Impression:  Layering pleural effusions with adjacent compressive atelectasis versus pneumonia    4/7/23 CT Abdomen/Pelvis  Impression:   1. Postoperative changes of the abdomen with small amount of mesenteric edema and ascites.  No organized fluid collection or free air.  2. Mildly dilated loops of small bowel are nonspecific and may represent an ileus.  3. Large right and small left pleural effusions with basilar subsegmental atelectasis    9/1/2023 CT CAP  Impression:   No evidence of malignancy to the chest abdomen or pelvis.   Trace pleural fluid dependently on the right significantly reduced in the interval.  Persistent prominent pleural based scarring in dependent atelectasis/scarring.   Surgical changes as above.    Assessment:   Stage IIIB Ampulla of Vater Adenocarcinoma, intestinal type  - s/p resection 3/27/23. Continue adjuvant Capecitabine based therapy. Consideration for chemo/RT afterwards pending response    Plan:       - Toxicity and patient performance reviewed; hold Xeloda for now; given hand foot syndrome; plan to follow up in 1 week to see if improved.   - Imodium or Lomotil for diarrhea PRN  - Continue Topical steroids to bilateral hands and feet  - RTC 1 weeks with NP visit for toxicity check, labs same day - CBC, CMP        Anna Beckford FNP-C  Hematology/Oncology   [facility]                      "

## 2023-09-28 ENCOUNTER — TELEPHONE (OUTPATIENT)
Dept: HEMATOLOGY/ONCOLOGY | Facility: CLINIC | Age: 83
End: 2023-09-28
Payer: MEDICARE

## 2023-09-28 PROBLEM — Z79.899 DRUG-INDUCED IMMUNODEFICIENCY: Status: ACTIVE | Noted: 2023-09-28

## 2023-09-28 PROBLEM — D84.821 DRUG-INDUCED IMMUNODEFICIENCY: Status: ACTIVE | Noted: 2023-09-28

## 2023-09-29 ENCOUNTER — INFUSION (OUTPATIENT)
Dept: INFUSION THERAPY | Facility: HOSPITAL | Age: 83
End: 2023-09-29
Attending: STUDENT IN AN ORGANIZED HEALTH CARE EDUCATION/TRAINING PROGRAM
Payer: MEDICARE

## 2023-09-29 VITALS
RESPIRATION RATE: 18 BRPM | TEMPERATURE: 98 F | DIASTOLIC BLOOD PRESSURE: 92 MMHG | HEIGHT: 69 IN | WEIGHT: 131.38 LBS | BODY MASS INDEX: 19.46 KG/M2 | HEART RATE: 82 BPM | SYSTOLIC BLOOD PRESSURE: 140 MMHG

## 2023-09-29 DIAGNOSIS — C17.0 DUODENAL CANCER: ICD-10-CM

## 2023-09-29 DIAGNOSIS — I95.1 ORTHOSTATIC HYPOTENSION: Primary | ICD-10-CM

## 2023-09-29 PROCEDURE — 96360 HYDRATION IV INFUSION INIT: CPT

## 2023-09-29 PROCEDURE — 25000003 PHARM REV CODE 250

## 2023-09-29 PROCEDURE — 96361 HYDRATE IV INFUSION ADD-ON: CPT

## 2023-09-29 RX ORDER — HEPARIN 100 UNIT/ML
500 SYRINGE INTRAVENOUS
Status: DISCONTINUED | OUTPATIENT
Start: 2023-09-29 | End: 2023-09-29 | Stop reason: HOSPADM

## 2023-09-29 RX ORDER — SODIUM CHLORIDE 0.9 % (FLUSH) 0.9 %
10 SYRINGE (ML) INJECTION
Status: DISCONTINUED | OUTPATIENT
Start: 2023-09-29 | End: 2023-09-29 | Stop reason: HOSPADM

## 2023-09-29 RX ADMIN — SODIUM CHLORIDE 1000 ML: 9 INJECTION, SOLUTION INTRAVENOUS at 09:09

## 2023-10-02 DIAGNOSIS — T45.1X5A CHEMOTHERAPY INDUCED DIARRHEA: ICD-10-CM

## 2023-10-02 DIAGNOSIS — K52.1 CHEMOTHERAPY INDUCED DIARRHEA: ICD-10-CM

## 2023-10-02 RX ORDER — DIPHENOXYLATE HYDROCHLORIDE AND ATROPINE SULFATE 2.5; .025 MG/1; MG/1
1 TABLET ORAL 4 TIMES DAILY PRN
Qty: 30 TABLET | Refills: 1 | Status: SHIPPED | OUTPATIENT
Start: 2023-10-02 | End: 2024-10-01

## 2023-10-03 NOTE — PROGRESS NOTES
Subjective:       Patient ID: Quincy Triplett is a 82 y.o. male.    Chief Complaint: Follow Up    Diagnosis: Ampulla of Vater - Adenocarcinoma, intestinal type    Current Treatment: None    Treatment History:   Whipple - Dr. Brown - 3/27/23  Xeloda 1000mg BID Days 1-14 of 21 day cycle 6/15-6/28  3.   Xeloda 1500 mg BID Days 1-14 of 21 day cycle 7/6- 8/9  4.    Xeloda 1000mg BID Days 1-14 of 21 day cycle 8/17/23-9/6/23  5.  Xeloda 3 tabs daily and 2 tabs q hs  Days 1-14 of 21 day cycle  9/7/23-9/27/23      HPI:   81yo M who presented to medical oncology in April '23 for evaluation of Adenocarcinoma of the Ampulla of Vater, Intenstinal type. He initialy presented in late 2022 with jaundice and significant weight loss. Imaging with evidence of biliary dilation and circumferential thickening of the 2nd portion of the duodenum and intra/extraheptic biliary dilation. 12/21/22 Endoscopy with ERCP showed evidence of a malignant appearing mass at the ampulla, pathology consistent with poorly differentiated adenocarcinoma. He was evaluated by surgical oncology, Dr. Brown in December, but then had episode of biliary obstruction with PTC catheter placement and urosepsis that left him too deconditioned for surgical intervention. He then improved with PT to the point he was able to undergo whipple + pancreaticoduodenectomy on 3/27/23. 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 Tumor invasion into pancreas and periduodenal tissue. + LVI. + PNI. Surgical margins negative. 14/31 lymph nodes were positive for malignancy. Final staging pT3B N2.  After his resection he went to rehab and was able to recover enough to begin adjuvant therapy in June '23 with Xeloda, for a planned 6 months in the adjuvant setting. Xeloda has been put on hold as of 9/28/23 secondary to hand foot syndrome.      Interval History:  Patient presents to clinic for 1 week NP follow up  appointment for toxicity check.  Xeloda have been on hold for 1 week. He says his left hand and feet have improved but erythema remains. Left hand somewhat painful. Had diarrhea that has since resolved.   Discussed 5FU as an alternative regimen due to side effects with Xeloda. Also discussed its side effects, needing medi port placement to go home with pump, and schedule requirements for successful medication delivery.  He is agreeable and agrees to proceed.   Otherwise he has no issues or concerns      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, unspecified extremity     Coronary artery disease     Dyslipidemia     Hypertension       Past Surgical History:   Procedure Laterality Date    AMPUTATION Right     Right arm    CAROTID STENT      CHOLECYSTECTOMY  3/27/2023    Procedure: CHOLECYSTECTOMY;  Surgeon: Abdirahman Brown MD;  Location: [facility];  Service: Oncology;;    EGD, WITH HEMORRHAGE CONTROL  1/19/2023    Procedure: EGD,WITH HEMORRHAGE CONTROL;  Surgeon: Ranjeet Perez MD;  Location: [facility];  Service: Gastroenterology;;  [employer] HemeSpray    ERCP N/A 12/21/2022    Procedure: ERCP;  Surgeon: Sushil Anderson MD;  Location: [facility] ENDOSCOPY;  Service: Gastroenterology;  Laterality: N/A;  food in abdomen    ERCP, WITH BIOPSY  12/21/2022    Procedure: ERCP, WITH BIOPSY;  Surgeon: Sushil Anderson MD;  Location: [facility] ENDOSCOPY;  Service: Gastroenterology;;    ESOPHAGOGASTRODUODENOSCOPY N/A 1/19/2023    Procedure: EGD;  Surgeon: Ranjeet Perez MD;  Location: [facility];  Service: Gastroenterology;  Laterality: N/A;    EXPLORATION OF COMMON BILE DUCT  3/27/2023    Procedure: EXPLORATION, COMMON BILE DUCT;  Surgeon: Abdirahman Brown MD;  Location: [facility];  Service: Oncology;;    LEFT HEART CATHETERIZATION      LIVER BIOPSY  3/27/2023    Procedure: BIOPSY, LIVER;  Surgeon: Abdirahman Brown MD;  Location: [facility];  Service:  Oncology;;    LYMPHADENECTOMY  3/27/2023    Procedure: LYMPHADENECTOMY;  Surgeon: Abdirahman Brown MD;  Location: [facility] OR;  Service: Oncology;;  Portal/celiac lymphadenectomy    TONSILLECTOMY      WHIPPLE PROCEDURE N/A 3/27/2023    Procedure: WHIPPLE PROCEDURE;  Surgeon: Abdirahman Brown MD;  Location: [facility] OR;  Service: Oncology;  Laterality: N/A;     Social History     Socioeconomic History    Marital status:    Tobacco Use    Smoking status: Never    Smokeless tobacco: Never   Substance and Sexual Activity    Alcohol use: Never    Drug use: Never     Social Determinants of Health     Food Insecurity: Unknown (1/12/2023)    Hunger Vital Sign     Worried About Running Out of Food in the Last Year: Never true   Transportation Needs: Unknown (1/12/2023)    PRAPARE - Transportation     Lack of Transportation (Medical): No   Social Connections: Unknown (4/7/2023)    Social Connection and Isolation Panel [NHANES]     Marital Status:    Housing Stability: Unknown (1/12/2023)    Housing Stability Vital Sign     Unable to Pay for Housing in the Last Year: No      No family history on file.   Review of patient's allergies indicates:  No Known Allergies   Review of Systems   Constitutional:  Positive for activity change and fatigue. Negative for chills and fever.   HENT:  Negative for sore throat.    Eyes:  Negative for visual disturbance.   Respiratory:  Negative for cough.    Cardiovascular:  Negative for chest pain.   Gastrointestinal:  Negative for abdominal pain and constipation.   Endocrine: Negative for polyuria.   Genitourinary:  Negative for dysuria.   Musculoskeletal:  Negative for back pain.   Integumentary:  Negative for rash.        Left hand and bilateral feet with erythema and cracks   Allergic/Immunologic: Negative for frequent infections.   Neurological:  Negative for weakness and headaches.   Hematological:  Negative for adenopathy. Does not bruise/bleed easily.         Objective:     Vitals:     10/04/23 1007   BP: (!) 157/85   Pulse: 74   Resp: 18   Temp: 97.5 °F (36.4 °C)         Physical Exam  Constitutional:       General: He is not in acute distress.     Appearance: Normal appearance.   HENT:      Head: Normocephalic and atraumatic.      Nose: Nose normal.      Mouth/Throat:      Mouth: Mucous membranes are moist.      Pharynx: Oropharynx is clear.   Eyes:      Extraocular Movements: Extraocular movements intact.      Conjunctiva/sclera: Conjunctivae normal.      Pupils: Pupils are equal, round, and reactive to light.   Cardiovascular:      Rate and Rhythm: Normal rate and regular rhythm.      Pulses: Normal pulses.      Heart sounds: Normal heart sounds. No murmur heard.  Pulmonary:      Effort: No respiratory distress.      Breath sounds: Normal breath sounds.   Abdominal:      General: There is no distension.      Palpations: Abdomen is soft.   Genitourinary:     Comments: Paul catheter in place with sediment noted  Musculoskeletal:         General: Normal range of motion.      Cervical back: Normal range of motion and neck supple.      Right lower leg: No edema.      Left lower leg: No edema.   Lymphadenopathy:      Cervical: No cervical adenopathy.   Skin:     General: Skin is warm and dry.      Findings: Erythema (cracks to left hand and bilateral feet) present.   Neurological:      Mental Status: He is alert and oriented to person, place, and time.      Motor: Weakness present.       LABS AND IMAGING REVIEWED IN [facility]    3/2/23 CT CAP  Impression:  1. Slight interval retraction of the percutaneous biliary stent which is now coiled slightly more distally in the right lobe of the liver.  There is however slightly improved dilatation of the extrahepatic bile ducts.  2. Known ampullary/duodenal mass poorly evaluated on this noncontrast CT.  No bowel obstruction.  3. Bladder wall appears mildly thickened which may be related to under distension.  Correlate with urinalysis.     3/15/23 CT  "Chest  Impression:  No evidence of metastatic disease in the thorax.     3/15/23 CT A/P  Impression:  Developing gastric outlet obstruction, secondary to a 3.2 cm long "apple-core" lesion in the 3rd portion of the duodenum at the level of the ampulla, presumably representing a primary adenocarcinoma.  No CT evidence of distant metastasis.    4/7/23 CT Chest  Impression:  Layering pleural effusions with adjacent compressive atelectasis versus pneumonia    4/7/23 CT Abdomen/Pelvis  Impression:   1. Postoperative changes of the abdomen with small amount of mesenteric edema and ascites.  No organized fluid collection or free air.  2. Mildly dilated loops of small bowel are nonspecific and may represent an ileus.  3. Large right and small left pleural effusions with basilar subsegmental atelectasis    9/1/2023 CT CAP  Impression:   No evidence of malignancy to the chest abdomen or pelvis.   Trace pleural fluid dependently on the right significantly reduced in the interval.  Persistent prominent pleural based scarring in dependent atelectasis/scarring.   Surgical changes as above.    Assessment:   Stage IIIB Ampulla of Vater Adenocarcinoma, intestinal type  - s/p resection 3/27/23. Discontinued adjuvant Capecitabine based therapy 9/27/2023 Plans to proceed with 5FU for the duration of treatment. Consideration for chemo/RT afterwards pending response     Plan:   - Discontinue Xeloda  - Plans to start 5 FU; given hand foot syndrome with Xeloda  - Refer to Dr Abdirahman Brown for medi port placement  - Continue Imodium or Lomotil for diarrhea PRN  - Chemo education and nutrition education  - Follow up with Urology as scheduled  - RTC 2 weeks with NP visit for toxicity check, labs same day - CBC, CMP        Anna Beckford FNP-C  Hematology/Oncology   [facility] Center [facility]                      "

## 2023-10-04 ENCOUNTER — LAB VISIT (OUTPATIENT)
Dept: LAB | Facility: HOSPITAL | Age: 83
End: 2023-10-04
Attending: INTERNAL MEDICINE
Payer: MEDICARE

## 2023-10-04 ENCOUNTER — OFFICE VISIT (OUTPATIENT)
Dept: HEMATOLOGY/ONCOLOGY | Facility: CLINIC | Age: 83
End: 2023-10-04
Payer: MEDICARE

## 2023-10-04 VITALS
HEIGHT: 69 IN | DIASTOLIC BLOOD PRESSURE: 85 MMHG | BODY MASS INDEX: 20.72 KG/M2 | OXYGEN SATURATION: 100 % | HEART RATE: 74 BPM | TEMPERATURE: 98 F | WEIGHT: 139.88 LBS | RESPIRATION RATE: 18 BRPM | SYSTOLIC BLOOD PRESSURE: 157 MMHG

## 2023-10-04 DIAGNOSIS — C17.0 DUODENAL CANCER: ICD-10-CM

## 2023-10-04 DIAGNOSIS — C17.0 DUODENAL CANCER: Primary | ICD-10-CM

## 2023-10-04 DIAGNOSIS — C77.2 SECONDARY AND UNSPECIFIED MALIGNANT NEOPLASM OF INTRA-ABDOMINAL LYMPH NODES: ICD-10-CM

## 2023-10-04 LAB
ALBUMIN SERPL-MCNC: 2.6 G/DL (ref 3.4–4.8)
ALBUMIN/GLOB SERPL: 1 RATIO (ref 1.1–2)
ALP SERPL-CCNC: 175 UNIT/L (ref 40–150)
ALT SERPL-CCNC: 22 UNIT/L (ref 0–55)
AST SERPL-CCNC: 29 UNIT/L (ref 5–34)
BASOPHILS # BLD AUTO: 0.04 X10(3)/MCL
BASOPHILS NFR BLD AUTO: 0.6 %
BILIRUB SERPL-MCNC: 0.6 MG/DL
BUN SERPL-MCNC: 12.6 MG/DL (ref 8.4–25.7)
CALCIUM SERPL-MCNC: 8.6 MG/DL (ref 8.8–10)
CHLORIDE SERPL-SCNC: 105 MMOL/L (ref 98–107)
CO2 SERPL-SCNC: 27 MMOL/L (ref 23–31)
CREAT SERPL-MCNC: 0.75 MG/DL (ref 0.73–1.18)
EOSINOPHIL # BLD AUTO: 0.07 X10(3)/MCL (ref 0–0.9)
EOSINOPHIL NFR BLD AUTO: 1 %
ERYTHROCYTE [DISTWIDTH] IN BLOOD BY AUTOMATED COUNT: 20.2 % (ref 11.5–17)
GFR SERPLBLD CREATININE-BSD FMLA CKD-EPI: >60 MLS/MIN/1.73/M2
GLOBULIN SER-MCNC: 2.7 GM/DL (ref 2.4–3.5)
GLUCOSE SERPL-MCNC: 99 MG/DL (ref 82–115)
HCT VFR BLD AUTO: 29.6 % (ref 42–52)
HGB BLD-MCNC: 9.7 G/DL (ref 14–18)
IMM GRANULOCYTES # BLD AUTO: 0.02 X10(3)/MCL (ref 0–0.04)
IMM GRANULOCYTES NFR BLD AUTO: 0.3 %
LYMPHOCYTES # BLD AUTO: 1.21 X10(3)/MCL (ref 0.6–4.6)
LYMPHOCYTES NFR BLD AUTO: 17 %
MCH RBC QN AUTO: 33.8 PG (ref 27–31)
MCHC RBC AUTO-ENTMCNC: 32.8 G/DL (ref 33–36)
MCV RBC AUTO: 103.1 FL (ref 80–94)
MONOCYTES # BLD AUTO: 0.32 X10(3)/MCL (ref 0.1–1.3)
MONOCYTES NFR BLD AUTO: 4.5 %
NEUTROPHILS # BLD AUTO: 5.47 X10(3)/MCL (ref 2.1–9.2)
NEUTROPHILS NFR BLD AUTO: 76.6 %
PLATELET # BLD AUTO: 185 X10(3)/MCL (ref 130–400)
PMV BLD AUTO: 9 FL (ref 7.4–10.4)
POTASSIUM SERPL-SCNC: 4.2 MMOL/L (ref 3.5–5.1)
PROT SERPL-MCNC: 5.3 GM/DL (ref 5.8–7.6)
RBC # BLD AUTO: 2.87 X10(6)/MCL (ref 4.7–6.1)
SODIUM SERPL-SCNC: 138 MMOL/L (ref 136–145)
WBC # SPEC AUTO: 7.13 X10(3)/MCL (ref 4.5–11.5)

## 2023-10-04 PROCEDURE — 3077F PR MOST RECENT SYSTOLIC BLOOD PRESSURE >= 140 MM HG: ICD-10-PCS | Mod: CPTII,S$GLB,,

## 2023-10-04 PROCEDURE — 85025 COMPLETE CBC W/AUTO DIFF WBC: CPT

## 2023-10-04 PROCEDURE — 99215 OFFICE O/P EST HI 40 MIN: CPT | Mod: S$GLB,,,

## 2023-10-04 PROCEDURE — 80053 COMPREHEN METABOLIC PANEL: CPT

## 2023-10-04 PROCEDURE — 1159F MED LIST DOCD IN RCRD: CPT | Mod: CPTII,S$GLB,,

## 2023-10-04 PROCEDURE — 3079F PR MOST RECENT DIASTOLIC BLOOD PRESSURE 80-89 MM HG: ICD-10-PCS | Mod: CPTII,S$GLB,,

## 2023-10-04 PROCEDURE — 1126F PR PAIN SEVERITY QUANTIFIED, NO PAIN PRESENT: ICD-10-PCS | Mod: CPTII,S$GLB,,

## 2023-10-04 PROCEDURE — 3077F SYST BP >= 140 MM HG: CPT | Mod: CPTII,S$GLB,,

## 2023-10-04 PROCEDURE — 99999 PR PBB SHADOW E&M-EST. PATIENT-LVL IV: ICD-10-PCS | Mod: PBBFAC,,,

## 2023-10-04 PROCEDURE — 36415 COLL VENOUS BLD VENIPUNCTURE: CPT

## 2023-10-04 PROCEDURE — 1160F PR REVIEW ALL MEDS BY PRESCRIBER/CLIN PHARMACIST DOCUMENTED: ICD-10-PCS | Mod: CPTII,S$GLB,,

## 2023-10-04 PROCEDURE — 99999 PR PBB SHADOW E&M-EST. PATIENT-LVL IV: CPT | Mod: PBBFAC,,,

## 2023-10-04 PROCEDURE — 1126F AMNT PAIN NOTED NONE PRSNT: CPT | Mod: CPTII,S$GLB,,

## 2023-10-04 PROCEDURE — 3079F DIAST BP 80-89 MM HG: CPT | Mod: CPTII,S$GLB,,

## 2023-10-04 PROCEDURE — 99215 PR OFFICE/OUTPT VISIT, EST, LEVL V, 40-54 MIN: ICD-10-PCS | Mod: S$GLB,,,

## 2023-10-04 PROCEDURE — 1160F RVW MEDS BY RX/DR IN RCRD: CPT | Mod: CPTII,S$GLB,,

## 2023-10-04 PROCEDURE — 1159F PR MEDICATION LIST DOCUMENTED IN MEDICAL RECORD: ICD-10-PCS | Mod: CPTII,S$GLB,,

## 2023-10-05 ENCOUNTER — TELEPHONE (OUTPATIENT)
Dept: HEMATOLOGY/ONCOLOGY | Facility: CLINIC | Age: 83
End: 2023-10-05
Payer: MEDICARE

## 2023-10-05 DIAGNOSIS — C17.0 DUODENAL CANCER: Primary | ICD-10-CM

## 2023-10-09 ENCOUNTER — LAB VISIT (OUTPATIENT)
Dept: LAB | Facility: HOSPITAL | Age: 83
End: 2023-10-09
Attending: INTERNAL MEDICINE
Payer: MEDICARE

## 2023-10-09 ENCOUNTER — PATIENT MESSAGE (OUTPATIENT)
Dept: HEMATOLOGY/ONCOLOGY | Facility: CLINIC | Age: 83
End: 2023-10-09
Payer: MEDICARE

## 2023-10-09 DIAGNOSIS — E78.2 MIXED HYPERLIPIDEMIA: Primary | ICD-10-CM

## 2023-10-09 LAB
ALBUMIN SERPL-MCNC: 3 G/DL (ref 3.4–4.8)
ALBUMIN/GLOB SERPL: 1 RATIO (ref 1.1–2)
ALP SERPL-CCNC: 299 UNIT/L (ref 40–150)
ALT SERPL-CCNC: 40 UNIT/L (ref 0–55)
AST SERPL-CCNC: 58 UNIT/L (ref 5–34)
BILIRUB SERPL-MCNC: 0.9 MG/DL
BUN SERPL-MCNC: 23.1 MG/DL (ref 8.4–25.7)
CALCIUM SERPL-MCNC: 9.1 MG/DL (ref 8.8–10)
CHLORIDE SERPL-SCNC: 105 MMOL/L (ref 98–107)
CHOLEST SERPL-MCNC: 129 MG/DL
CHOLEST/HDLC SERPL: 3 {RATIO} (ref 0–5)
CO2 SERPL-SCNC: 28 MMOL/L (ref 23–31)
CREAT SERPL-MCNC: 0.76 MG/DL (ref 0.73–1.18)
GFR SERPLBLD CREATININE-BSD FMLA CKD-EPI: >60 MLS/MIN/1.73/M2
GLOBULIN SER-MCNC: 3 GM/DL (ref 2.4–3.5)
GLUCOSE SERPL-MCNC: 93 MG/DL (ref 82–115)
HDLC SERPL-MCNC: 48 MG/DL (ref 35–60)
LDLC SERPL CALC-MCNC: 60 MG/DL (ref 50–140)
POTASSIUM SERPL-SCNC: 3.7 MMOL/L (ref 3.5–5.1)
PROT SERPL-MCNC: 6 GM/DL (ref 5.8–7.6)
SODIUM SERPL-SCNC: 141 MMOL/L (ref 136–145)
TRIGL SERPL-MCNC: 103 MG/DL (ref 34–140)
VLDLC SERPL CALC-MCNC: 21 MG/DL

## 2023-10-09 PROCEDURE — 80053 COMPREHEN METABOLIC PANEL: CPT

## 2023-10-09 PROCEDURE — 80061 LIPID PANEL: CPT

## 2023-10-09 PROCEDURE — 36415 COLL VENOUS BLD VENIPUNCTURE: CPT

## 2023-10-11 ENCOUNTER — OFFICE VISIT (OUTPATIENT)
Dept: SURGICAL ONCOLOGY | Facility: CLINIC | Age: 83
End: 2023-10-11
Payer: MEDICARE

## 2023-10-11 DIAGNOSIS — C17.0 DUODENAL CANCER: ICD-10-CM

## 2023-10-11 DIAGNOSIS — C17.0 DUODENAL CANCER: Primary | ICD-10-CM

## 2023-10-11 DIAGNOSIS — C77.2 SECONDARY AND UNSPECIFIED MALIGNANT NEOPLASM OF INTRA-ABDOMINAL LYMPH NODES: ICD-10-CM

## 2023-10-11 DIAGNOSIS — Z01.818 PREOP TESTING: ICD-10-CM

## 2023-10-11 PROCEDURE — 99213 PR OFFICE/OUTPT VISIT, EST, LEVL III, 20-29 MIN: ICD-10-PCS | Mod: S$GLB,,, | Performed by: NURSE PRACTITIONER

## 2023-10-11 PROCEDURE — 99999 PR PBB SHADOW E&M-EST. PATIENT-LVL I: ICD-10-PCS | Mod: PBBFAC,,, | Performed by: NURSE PRACTITIONER

## 2023-10-11 PROCEDURE — 99213 OFFICE O/P EST LOW 20 MIN: CPT | Mod: S$GLB,,, | Performed by: NURSE PRACTITIONER

## 2023-10-11 PROCEDURE — 99999 PR PBB SHADOW E&M-EST. PATIENT-LVL I: CPT | Mod: PBBFAC,,, | Performed by: NURSE PRACTITIONER

## 2023-10-11 NOTE — PROGRESS NOTES
PRE OPERATIVE EVALUATION  DX AMPULLARY ADENOCARCINOMA  S/P WHIPPLE  MEDIPORT REQUEST    PT LOOKS GOOD TODAY  TELLS ME HE IS DOING VERY WELL  SEES MED ONC DR LEJEUNE AND PT STATES PLAN IS FOR TREATMENT REQUIRING MEDIPORT INSERTION    NO CHANGES IN MEDICAL HX  NO CHANGES IN MEDICATION  NO NEW SURGICAL PROCEDURES  NOT ON BLOOD THINNERS    ROS: ALL NEGATIVE    PE  AWAKE, ALERT  SKIN WARM DRY  LUNGS CLEAR BILATERALLY  HEART RRR  ABD SOFT BENIGN  [facility]  NEURO INTACT    PLAN: MEDIPORT INSERTION PROCEDURE RISKS AND BENEFITS EXPLAINED IN DETAIL AND QUESTIONS ANSWERED; DATE OF SX 10/19/2023

## 2023-10-12 DIAGNOSIS — C17.0 DUODENAL CANCER: Primary | ICD-10-CM

## 2023-10-12 RX ORDER — ENOXAPARIN SODIUM 300 MG/3ML
30 INJECTION INTRAVENOUS; SUBCUTANEOUS EVERY 24 HOURS
Status: CANCELLED | OUTPATIENT
Start: 2023-10-12

## 2023-10-16 ENCOUNTER — PATIENT MESSAGE (OUTPATIENT)
Dept: ADMINISTRATIVE | Facility: OTHER | Age: 83
End: 2023-10-16
Payer: MEDICARE

## 2023-10-18 ENCOUNTER — PATIENT MESSAGE (OUTPATIENT)
Dept: ADMINISTRATIVE | Facility: OTHER | Age: 83
End: 2023-10-18
Payer: MEDICARE

## 2023-10-18 ENCOUNTER — OFFICE VISIT (OUTPATIENT)
Dept: HEMATOLOGY/ONCOLOGY | Facility: CLINIC | Age: 83
End: 2023-10-18
Payer: MEDICARE

## 2023-10-18 ENCOUNTER — LAB VISIT (OUTPATIENT)
Dept: LAB | Facility: HOSPITAL | Age: 83
End: 2023-10-18
Attending: INTERNAL MEDICINE
Payer: MEDICARE

## 2023-10-18 ENCOUNTER — ANESTHESIA EVENT (OUTPATIENT)
Dept: SURGERY | Facility: HOSPITAL | Age: 83
End: 2023-10-18
Payer: MEDICARE

## 2023-10-18 VITALS
SYSTOLIC BLOOD PRESSURE: 125 MMHG | WEIGHT: 132.88 LBS | HEIGHT: 69 IN | RESPIRATION RATE: 18 BRPM | TEMPERATURE: 98 F | HEART RATE: 76 BPM | DIASTOLIC BLOOD PRESSURE: 74 MMHG | OXYGEN SATURATION: 100 % | BODY MASS INDEX: 19.68 KG/M2

## 2023-10-18 DIAGNOSIS — C17.0 DUODENAL CANCER: ICD-10-CM

## 2023-10-18 DIAGNOSIS — C17.0 DUODENAL CANCER: Primary | ICD-10-CM

## 2023-10-18 DIAGNOSIS — Z90.49 H/O WHIPPLE PROCEDURE: ICD-10-CM

## 2023-10-18 DIAGNOSIS — Z90.410 H/O WHIPPLE PROCEDURE: ICD-10-CM

## 2023-10-18 DIAGNOSIS — C77.2 SECONDARY AND UNSPECIFIED MALIGNANT NEOPLASM OF INTRA-ABDOMINAL LYMPH NODES: ICD-10-CM

## 2023-10-18 DIAGNOSIS — Z79.899 DRUG-INDUCED IMMUNODEFICIENCY: ICD-10-CM

## 2023-10-18 DIAGNOSIS — D84.821 DRUG-INDUCED IMMUNODEFICIENCY: ICD-10-CM

## 2023-10-18 LAB
ALBUMIN SERPL-MCNC: 3.2 G/DL (ref 3.4–4.8)
ALBUMIN/GLOB SERPL: 1 RATIO (ref 1.1–2)
ALP SERPL-CCNC: 270 UNIT/L (ref 40–150)
ALT SERPL-CCNC: 38 UNIT/L (ref 0–55)
AST SERPL-CCNC: 47 UNIT/L (ref 5–34)
BASOPHILS # BLD AUTO: 0.04 X10(3)/MCL
BASOPHILS NFR BLD AUTO: 0.5 %
BILIRUB SERPL-MCNC: 0.7 MG/DL
BUN SERPL-MCNC: 23.3 MG/DL (ref 8.4–25.7)
CALCIUM SERPL-MCNC: 9.3 MG/DL (ref 8.8–10)
CHLORIDE SERPL-SCNC: 103 MMOL/L (ref 98–107)
CO2 SERPL-SCNC: 27 MMOL/L (ref 23–31)
CREAT SERPL-MCNC: 0.77 MG/DL (ref 0.73–1.18)
EOSINOPHIL # BLD AUTO: 0.33 X10(3)/MCL (ref 0–0.9)
EOSINOPHIL NFR BLD AUTO: 4.3 %
ERYTHROCYTE [DISTWIDTH] IN BLOOD BY AUTOMATED COUNT: 16 % (ref 11.5–17)
GFR SERPLBLD CREATININE-BSD FMLA CKD-EPI: >60 MLS/MIN/1.73/M2
GLOBULIN SER-MCNC: 3.1 GM/DL (ref 2.4–3.5)
GLUCOSE SERPL-MCNC: 97 MG/DL (ref 82–115)
HCT VFR BLD AUTO: 32.5 % (ref 42–52)
HGB BLD-MCNC: 10.2 G/DL (ref 14–18)
IMM GRANULOCYTES # BLD AUTO: 0.01 X10(3)/MCL (ref 0–0.04)
IMM GRANULOCYTES NFR BLD AUTO: 0.1 %
LYMPHOCYTES # BLD AUTO: 1.96 X10(3)/MCL (ref 0.6–4.6)
LYMPHOCYTES NFR BLD AUTO: 25.4 %
MCH RBC QN AUTO: 33.9 PG (ref 27–31)
MCHC RBC AUTO-ENTMCNC: 31.4 G/DL (ref 33–36)
MCV RBC AUTO: 108 FL (ref 80–94)
MONOCYTES # BLD AUTO: 0.56 X10(3)/MCL (ref 0.1–1.3)
MONOCYTES NFR BLD AUTO: 7.2 %
NEUTROPHILS # BLD AUTO: 4.83 X10(3)/MCL (ref 2.1–9.2)
NEUTROPHILS NFR BLD AUTO: 62.5 %
PLATELET # BLD AUTO: 266 X10(3)/MCL (ref 130–400)
PMV BLD AUTO: 8.9 FL (ref 7.4–10.4)
POTASSIUM SERPL-SCNC: 4.7 MMOL/L (ref 3.5–5.1)
PROT SERPL-MCNC: 6.3 GM/DL (ref 5.8–7.6)
RBC # BLD AUTO: 3.01 X10(6)/MCL (ref 4.7–6.1)
SODIUM SERPL-SCNC: 140 MMOL/L (ref 136–145)
WBC # SPEC AUTO: 7.73 X10(3)/MCL (ref 4.5–11.5)

## 2023-10-18 PROCEDURE — 3074F SYST BP LT 130 MM HG: CPT | Mod: CPTII,S$GLB,,

## 2023-10-18 PROCEDURE — 99999 PR PBB SHADOW E&M-EST. PATIENT-LVL IV: CPT | Mod: PBBFAC,,,

## 2023-10-18 PROCEDURE — 1160F RVW MEDS BY RX/DR IN RCRD: CPT | Mod: CPTII,S$GLB,,

## 2023-10-18 PROCEDURE — 1160F PR REVIEW ALL MEDS BY PRESCRIBER/CLIN PHARMACIST DOCUMENTED: ICD-10-PCS | Mod: CPTII,S$GLB,,

## 2023-10-18 PROCEDURE — 36415 COLL VENOUS BLD VENIPUNCTURE: CPT

## 2023-10-18 PROCEDURE — 99215 OFFICE O/P EST HI 40 MIN: CPT | Mod: S$GLB,,,

## 2023-10-18 PROCEDURE — 3074F PR MOST RECENT SYSTOLIC BLOOD PRESSURE < 130 MM HG: ICD-10-PCS | Mod: CPTII,S$GLB,,

## 2023-10-18 PROCEDURE — 3078F DIAST BP <80 MM HG: CPT | Mod: CPTII,S$GLB,,

## 2023-10-18 PROCEDURE — 1159F PR MEDICATION LIST DOCUMENTED IN MEDICAL RECORD: ICD-10-PCS | Mod: CPTII,S$GLB,,

## 2023-10-18 PROCEDURE — 1159F MED LIST DOCD IN RCRD: CPT | Mod: CPTII,S$GLB,,

## 2023-10-18 PROCEDURE — 80053 COMPREHEN METABOLIC PANEL: CPT

## 2023-10-18 PROCEDURE — 99215 PR OFFICE/OUTPT VISIT, EST, LEVL V, 40-54 MIN: ICD-10-PCS | Mod: S$GLB,,,

## 2023-10-18 PROCEDURE — 85025 COMPLETE CBC W/AUTO DIFF WBC: CPT

## 2023-10-18 PROCEDURE — 1126F AMNT PAIN NOTED NONE PRSNT: CPT | Mod: CPTII,S$GLB,,

## 2023-10-18 PROCEDURE — 1126F PR PAIN SEVERITY QUANTIFIED, NO PAIN PRESENT: ICD-10-PCS | Mod: CPTII,S$GLB,,

## 2023-10-18 PROCEDURE — 99999 PR PBB SHADOW E&M-EST. PATIENT-LVL IV: ICD-10-PCS | Mod: PBBFAC,,,

## 2023-10-18 PROCEDURE — 3078F PR MOST RECENT DIASTOLIC BLOOD PRESSURE < 80 MM HG: ICD-10-PCS | Mod: CPTII,S$GLB,,
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
## 2023-10-18 NOTE — ANESTHESIA PREPROCEDURE EVALUATION
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     I have reviewed the Nursing Notes. I have reviewed the NPO Status.   I have reviewed the Medications.     Review of Systems  Anesthesia Hx:  No problems with previous Anesthesia  Denies Family Hx of Anesthesia complications.   Denies Personal Hx of Anesthesia complications.   Social:  Non-Smoker    Cardiovascular:   Hypertension CAD   hyperlipidemia GONZALEZ, s/p stent   Pulmonary:  Pulmonary Normal    Endocrine:  Endocrine Normal        Physical Exam  General: Alert and Oriented    Airway:  Mallampati: II   Mouth Opening: Normal  TM Distance: Normal  Tongue: Normal  Neck ROM: Normal ROM    Dental:Missing several teeth, partial dentures removed  Chest/Lungs:  Normal Respiratory Rate    Heart:  Rate: Normal  Rhythm: Regular Rhythm        Anesthesia Plan  Type of Anesthesia, risks & benefits discussed:    Anesthesia Type: Gen Supraglottic Airway  Intra-op Monitoring Plan: Standard ASA Monitors  Post Op Pain Control Plan: IV/PO Opioids PRN and multimodal analgesia  Induction:  IV  Airway Plan: Direct, Post-Induction  Informed Consent: Informed consent signed with the Patient and all parties understand the risks and agree with anesthesia plan.  All questions answered. Patient  consented to blood products? No  ASA Score: 3  Day of Surgery Review of History & Physical: H&P Update referred to the surgeon/provider.    Ready For Surgery From Anesthesia Perspective.     .

## 2023-10-19 ENCOUNTER — ANESTHESIA (OUTPATIENT)
Dept: SURGERY | Facility: HOSPITAL | Age: 83
End: 2023-10-19
Payer: MEDICARE

## 2023-10-19 ENCOUNTER — HOSPITAL ENCOUNTER (OUTPATIENT)
Facility: HOSPITAL | Age: 83
Discharge: HOME | End: 2023-10-19
Attending: SURGERY | Admitting: SURGERY
Payer: MEDICARE

## 2023-10-19 ENCOUNTER — TELEPHONE (OUTPATIENT)
Dept: HEMATOLOGY/ONCOLOGY | Facility: CLINIC | Age: 83
End: 2023-10-19
Payer: MEDICARE

## 2023-10-19 DIAGNOSIS — C17.0 DUODENAL CANCER: ICD-10-CM

## 2023-10-19 PROCEDURE — 76937 US GUIDE VASCULAR ACCESS: CPT | Mod: 26,,, | Performed by: SURGERY

## 2023-10-19 PROCEDURE — 63600175 PHARM REV CODE 636 W HCPCS: Performed by: ANESTHESIOLOGY

## 2023-10-19 PROCEDURE — D9220A PRA ANESTHESIA: Mod: CRNA,,, | Performed by: NURSE ANESTHETIST, CERTIFIED REGISTERED

## 2023-10-19 PROCEDURE — C1788 PORT, INDWELLING, IMP: HCPCS | Performed by: SURGERY

## 2023-10-19 PROCEDURE — 37000009 HC ANESTHESIA EA ADD 15 MINS: Performed by: SURGERY

## 2023-10-19 PROCEDURE — D9220A PRA ANESTHESIA: ICD-10-PCS | Mod: CRNA,,, | Performed by: NURSE ANESTHETIST, CERTIFIED REGISTERED

## 2023-10-19 PROCEDURE — 77001 CHG FLUOROGUIDE CNTRL VEN ACCESS,PLACE,REPLACE,REMOVE: ICD-10-PCS | Mod: 26,,, | Performed by: SURGERY

## 2023-10-19 PROCEDURE — 77001 FLUOROGUIDE FOR VEIN DEVICE: CPT | Mod: 26,,, | Performed by: SURGERY

## 2023-10-19 PROCEDURE — 36000706: Performed by: SURGERY

## 2023-10-19 PROCEDURE — 37000008 HC ANESTHESIA 1ST 15 MINUTES: Performed by: SURGERY

## 2023-10-19 PROCEDURE — D9220A PRA ANESTHESIA: Mod: ANES,,, | Performed by: ANESTHESIOLOGY

## 2023-10-19 PROCEDURE — 36000707: Performed by: SURGERY

## 2023-10-19 PROCEDURE — 71000033 HC RECOVERY, INTIAL HOUR: Performed by: SURGERY

## 2023-10-19 PROCEDURE — 63600175 PHARM REV CODE 636 W HCPCS: Performed by: NURSE ANESTHETIST, CERTIFIED REGISTERED

## 2023-10-19 PROCEDURE — 25000003 PHARM REV CODE 250: Performed by: SURGERY

## 2023-10-19 PROCEDURE — 63600175 PHARM REV CODE 636 W HCPCS: Performed by: SURGERY

## 2023-10-19 PROCEDURE — D9220A PRA ANESTHESIA: ICD-10-PCS | Mod: ANES,,, | Performed by: ANESTHESIOLOGY

## 2023-10-19 PROCEDURE — A4216 STERILE WATER/SALINE, 10 ML: HCPCS | Performed by: SURGERY

## 2023-10-19 PROCEDURE — 76937 PR  US GUIDE, VASCULAR ACCESS: ICD-10-PCS | Mod: 26,,, | Performed by: SURGERY

## 2023-10-19 PROCEDURE — 36561 INSERT TUNNELED CV CATH: CPT | Mod: RT,,, | Performed by: SURGERY

## 2023-10-19 PROCEDURE — 36561 PR INSERT TUNNELED CV CATH WITH PORT: ICD-10-PCS | Mod: RT,,, | Performed by: SURGERY

## 2023-10-19 PROCEDURE — 71000015 HC POSTOP RECOV 1ST HR: Performed by: SURGERY

## 2023-10-19 PROCEDURE — 25000003 PHARM REV CODE 250: Performed by: NURSE ANESTHETIST, CERTIFIED REGISTERED

## 2023-10-19 DEVICE — PORT POWER CLEAR VIEW: Type: IMPLANTABLE DEVICE | Site: CHEST  WALL | Status: FUNCTIONAL

## 2023-10-19 RX ORDER — SODIUM CHLORIDE 9 MG/ML
INJECTION, SOLUTION INTRAMUSCULAR; INTRAVENOUS; SUBCUTANEOUS
Status: DISCONTINUED | OUTPATIENT
Start: 2023-10-19 | End: 2023-10-19 | Stop reason: HOSPADM

## 2023-10-19 RX ORDER — LIDOCAINE HYDROCHLORIDE 10 MG/ML
INJECTION, SOLUTION EPIDURAL; INFILTRATION; INTRACAUDAL; PERINEURAL
Status: DISCONTINUED | OUTPATIENT
Start: 2023-10-19 | End: 2023-10-19

## 2023-10-19 RX ORDER — CEFAZOLIN SODIUM 2 G/50ML
2 SOLUTION INTRAVENOUS
Status: COMPLETED | OUTPATIENT
Start: 2023-10-19 | End: 2023-10-19

## 2023-10-19 RX ORDER — SODIUM CHLORIDE, SODIUM LACTATE, POTASSIUM CHLORIDE, CALCIUM CHLORIDE 600; 310; 30; 20 MG/100ML; MG/100ML; MG/100ML; MG/100ML
INJECTION, SOLUTION INTRAVENOUS CONTINUOUS
Status: ACTIVE | OUTPATIENT
Start: 2023-10-19

## 2023-10-19 RX ORDER — EPHEDRINE SULFATE 50 MG/ML
INJECTION, SOLUTION INTRAVENOUS
Status: DISCONTINUED | OUTPATIENT
Start: 2023-10-19 | End: 2023-10-19

## 2023-10-19 RX ORDER — ENOXAPARIN SODIUM 100 MG/ML
30 INJECTION SUBCUTANEOUS
Status: DISCONTINUED | OUTPATIENT
Start: 2023-10-19 | End: 2023-10-19 | Stop reason: HOSPADM

## 2023-10-19 RX ORDER — SODIUM CHLORIDE 9 MG/ML
INJECTION, SOLUTION INTRAMUSCULAR; INTRAVENOUS; SUBCUTANEOUS
Status: DISCONTINUED
Start: 2023-10-19 | End: 2023-10-19 | Stop reason: HOSPADM

## 2023-10-19 RX ORDER — FENTANYL CITRATE 50 UG/ML
INJECTION, SOLUTION INTRAMUSCULAR; INTRAVENOUS
Status: DISCONTINUED | OUTPATIENT
Start: 2023-10-19 | End: 2023-10-19

## 2023-10-19 RX ORDER — HEPARIN SODIUM 5000 [USP'U]/ML
INJECTION, SOLUTION INTRAVENOUS; SUBCUTANEOUS
Status: DISCONTINUED
Start: 2023-10-19 | End: 2023-10-19 | Stop reason: HOSPADM

## 2023-10-19 RX ORDER — CEFAZOLIN SODIUM 1 G/3ML
INJECTION, POWDER, FOR SOLUTION INTRAMUSCULAR; INTRAVENOUS
Status: DISCONTINUED | OUTPATIENT
Start: 2023-10-19 | End: 2023-10-19 | Stop reason: HOSPADM

## 2023-10-19 RX ORDER — HEPARIN SODIUM 5000 [USP'U]/ML
INJECTION, SOLUTION INTRAVENOUS; SUBCUTANEOUS
Status: DISCONTINUED | OUTPATIENT
Start: 2023-10-19 | End: 2023-10-19 | Stop reason: HOSPADM

## 2023-10-19 RX ORDER — BUPIVACAINE HYDROCHLORIDE 5 MG/ML
INJECTION, SOLUTION EPIDURAL; INTRACAUDAL
Status: DISCONTINUED | OUTPATIENT
Start: 2023-10-19 | End: 2023-10-19 | Stop reason: HOSPADM

## 2023-10-19 RX ORDER — ONDANSETRON 2 MG/ML
4 INJECTION INTRAMUSCULAR; INTRAVENOUS ONCE AS NEEDED
Status: ACTIVE | OUTPATIENT
Start: 2023-10-19 | End: 2035-03-16

## 2023-10-19 RX ORDER — LIDOCAINE HYDROCHLORIDE 10 MG/ML
1 INJECTION, SOLUTION EPIDURAL; INFILTRATION; INTRACAUDAL; PERINEURAL ONCE
Status: ACTIVE | OUTPATIENT
Start: 2023-10-19

## 2023-10-19 RX ORDER — BUPIVACAINE HYDROCHLORIDE 5 MG/ML
INJECTION, SOLUTION EPIDURAL; INTRACAUDAL
Status: DISCONTINUED
Start: 2023-10-19 | End: 2023-10-19 | Stop reason: HOSPADM

## 2023-10-19 RX ORDER — PROPOFOL 10 MG/ML
VIAL (ML) INTRAVENOUS
Status: DISCONTINUED | OUTPATIENT
Start: 2023-10-19 | End: 2023-10-19

## 2023-10-19 RX ORDER — MIDAZOLAM HYDROCHLORIDE 1 MG/ML
1 INJECTION INTRAMUSCULAR; INTRAVENOUS ONCE AS NEEDED
Status: DISCONTINUED | OUTPATIENT
Start: 2023-10-19 | End: 2023-10-19

## 2023-10-19 RX ORDER — HYDROMORPHONE HYDROCHLORIDE 2 MG/ML
0.4 INJECTION, SOLUTION INTRAMUSCULAR; INTRAVENOUS; SUBCUTANEOUS EVERY 5 MIN PRN
Status: ACTIVE | OUTPATIENT
Start: 2023-10-19

## 2023-10-19 RX ORDER — ONDANSETRON HYDROCHLORIDE 2 MG/ML
INJECTION, SOLUTION INTRAMUSCULAR; INTRAVENOUS
Status: DISCONTINUED | OUTPATIENT
Start: 2023-10-19 | End: 2023-10-19

## 2023-10-19 RX ORDER — CEFAZOLIN SODIUM 1 G/3ML
INJECTION, POWDER, FOR SOLUTION INTRAMUSCULAR; INTRAVENOUS
Status: DISCONTINUED
Start: 2023-10-19 | End: 2023-10-19 | Stop reason: HOSPADM

## 2023-10-19 RX ADMIN — FENTANYL CITRATE 25 MCG: 50 INJECTION, SOLUTION INTRAMUSCULAR; INTRAVENOUS at 07:10

## 2023-10-19 RX ADMIN — EPHEDRINE SULFATE 10 MG: 50 INJECTION INTRAVENOUS at 07:10

## 2023-10-19 RX ADMIN — CEFAZOLIN SODIUM 2 G: 2 SOLUTION INTRAVENOUS at 07:10

## 2023-10-19 RX ADMIN — PROPOFOL 80 MG: 10 INJECTION, EMULSION INTRAVENOUS at 06:10

## 2023-10-19 RX ADMIN — SODIUM CHLORIDE, POTASSIUM CHLORIDE, SODIUM LACTATE AND CALCIUM CHLORIDE: 600; 310; 30; 20 INJECTION, SOLUTION INTRAVENOUS at 06:10

## 2023-10-19 RX ADMIN — FENTANYL CITRATE 50 MCG: 50 INJECTION, SOLUTION INTRAMUSCULAR; INTRAVENOUS at 06:10

## 2023-10-19 RX ADMIN — ONDANSETRON 4 MG: 2 INJECTION INTRAMUSCULAR; INTRAVENOUS at 07:10

## 2023-10-19 RX ADMIN — LIDOCAINE HYDROCHLORIDE 20 MG: 10 INJECTION, SOLUTION EPIDURAL; INFILTRATION; INTRACAUDAL; PERINEURAL at 06:10

## 2023-10-19 RX ADMIN — ENOXAPARIN SODIUM 30 MG: 30 INJECTION SUBCUTANEOUS at 06:10

## 2023-10-19 NOTE — CARE UPDATE
Patient awakened,hernandez,rejected oral airway, oriented/reassured him, denied c/o, exchanging well

## 2023-10-19 NOTE — ANESTHESIA POSTPROCEDURE EVALUATION
Anesthesia Post Evaluation    Patient: Quincy Triplett    Procedure(s) Performed: Procedure(s) (LRB):  PLACEMENT, MEDIPORT (N/A)    Final Anesthesia Type: general      Patient location during evaluation: OPS  Patient participation: Yes- Able to Participate  Level of consciousness: awake and oriented  Post-procedure vital signs: reviewed and stable  Pain management: adequate  Airway patency: patent    PONV status at discharge: No PONV  Anesthetic complications: no      Cardiovascular status: hemodynamically stable  Respiratory status: unassisted and spontaneous ventilation  Hydration status: euvolemic  Follow-up not needed.          Vitals Value Taken Time   /70 10/19/23 0852   Temp 36.6 °C (97.9 °F) 10/19/23 0800   Pulse 68 10/19/23 0852   Resp 16 10/19/23 0852   SpO2 98 % 10/19/23 0852         Event Time   Out of Recovery 08:03:00         Pain/Mariusz Score: Mariusz Score: 10 (10/19/2023  8:14 AM)

## 2023-10-19 NOTE — PLAN OF CARE
Patient ready for discharge.  Denies pain or N/V.  VSS. Discharged to home via wheelchair to private   Problem: Adult Inpatient Plan of Care  Goal: Plan of Care Review  10/19/2023 0907 by Cathy Sommer RN  Outcome: Met  10/19/2023 0815 by Cathy Sommer RN  Outcome: Ongoing, Progressing  Goal: Patient-Specific Goal (Individualized)  10/19/2023 0907 by Cathy Sommer RN  Outcome: Met  10/19/2023 0815 by Cathy Sommer RN  Outcome: Ongoing, Progressing  Goal: Absence of Hospital-Acquired Illness or Injury  10/19/2023 0907 by Cathy Sommer RN  Outcome: Met  10/19/2023 0815 by Cathy Sommer RN  Outcome: Ongoing, Progressing  Goal: Optimal Comfort and Wellbeing  10/19/2023 0907 by Cathy Sommer RN  Outcome: Met  10/19/2023 0815 by Cathy Sommer RN  Outcome: Ongoing, Progressing  Goal: Readiness for Transition of Care  10/19/2023 0907 by Cathy Sommer RN  Outcome: Met  10/19/2023 0815 by Cathy Sommer RN  Outcome: Ongoing, Progressing

## 2023-10-19 NOTE — OP NOTE
Date of Surgery: 10/19/2023    Surgeon:  Abdirahman Brown MD                                      Pre-op Diagnosis:  Ampullary adenocarcinoma    Post-op Diagnosis:  Same    Procedure:  Right internal jugular Mediport insertion with ultrasound and fluoroscopic guidance    Anesthesia:  Local/MAC    EBL:  Less than 15 cc    Specimens:  None    Findings:  8Fr PowerPort placed without difficulty, final fluoroscopy revealed the tip of the catheter within the superior vena cava, no evidence of pneumothorax or other complication.  The port flushed and aspirated freely.    Procedure in detail: After informed consent was obtained the patient was brought to the operating room and placed in the supine position.  Sedation was administered by anesthesia.  The upper chest and neck were prepped and draped in sterile fashion.  After infiltration with local anesthesia, under ultrasound guidance the right internal jugular vein was cannulated with a large-bore needle and a guidewire was placed under fluoroscopic guidance into the superior vena cava.  Incision was made and a pocket was created for the port over the pectoralis fascia.  A PowerPort was assembled and flushed it was soaked in antimicrobial solution, placed in the pocket and the catheter was tunneled to the wire insertion site without difficulty.  The catheter was cut to size using fluoroscopic guidance.  An introducer dilator was placed over the guidewire and the catheter was threaded through the peel-away introducer without difficulty.  Final fluoroscopy revealed the tip of the catheter within the superior vena cava, the port flushed and aspirated freely, final heparin solution was instilled.  There was no evidence of pneumothorax or other complication on final fluoroscopy.  The port pocket was irrigated with antimicrobial solution, meticulous hemostasis was achieved, it was closed in multiple layers with absorbable suture and sterile dressings were applied.  The patient  tolerated the procedure well.    Brief Discharge Note:    Outcome: Satisfactory  Disposition: Discharged home postoperatively in good condition  Followup: with oncology   Final Diagnosis same as postoperative diagnosis    Abdirahman Brown MD  Surgical Oncology  Complex General, Gastrointestinal and Hepatobiliary Surgery

## 2023-10-19 NOTE — DISCHARGE INSTRUCTIONS
Keep the dressing dry & in place for 2 days.  You can remove the dressing in 48 hours and shower.   No tub baths or swimming for 2 weeks.    May use an ice pack 2-3 times a day for 20 minutes at a time for swelling/pain.

## 2023-10-19 NOTE — ANESTHESIA PROCEDURE NOTES
Intubation    Date/Time: 10/19/2023 6:57 AM    Performed by: Alma Tello CRNA  Authorized by: Brijesh Macdonald MD    Intubation:     Induction:  Intravenous    Intubated:  Postinduction    Mask Ventilation:  Easy mask    Attempts:  1    Attempted By:  CRNA    Difficult Airway Encountered?: No      Airway Device:  Supraglottic airway/LMA    Airway Device Size:  4.0    Style/Cuff Inflation:  Cuffed (inflated to minimal occlusive pressure)    Inflation Amount (mL):  18    Placement Verified By:  Capnometry    Complicating Factors:  None    Findings Post-Intubation:  BS equal bilateral

## 2023-10-19 NOTE — PLAN OF CARE
Transported form PACU to Room 17 via bed.  VSS, no complaints.     Problem: Adult Inpatient Plan of Care  Goal: Plan of Care Review  Outcome: Ongoing, Progressing  Goal: Patient-Specific Goal (Individualized)  Outcome: Ongoing, Progressing  Goal: Absence of Hospital-Acquired Illness or Injury  Outcome: Ongoing, Progressing  Goal: Optimal Comfort and Wellbeing  Outcome: Ongoing, Progressing  Goal: Readiness for Transition of Care  Outcome: Ongoing, Progressing

## 2023-10-19 NOTE — CARE UPDATE
Received patient from the OR, he is asleep, oral airway in place, chin lift not necessary at present-nurse remains at bedside with constant assessment/observation, respirations full-regular-deep-clear,hob up 30 degrees.

## 2023-10-19 NOTE — TRANSFER OF CARE
Anesthesia Transfer of Care Note    Patient: Quincy Triplett    Procedure(s) Performed: Procedure(s) (LRB):  PLACEMENT, MEDIPORT (N/A)    Patient location: PACU    Anesthesia Type: general    Transport from OR: Transported from OR on room air with adequate spontaneous ventilation    Post pain: adequate analgesia    Post assessment: no apparent anesthetic complications and tolerated procedure well    Post vital signs: stable    Level of consciousness: sedated    Nausea/Vomiting: no nausea/vomiting    Complications: none    Transfer of care protocol was followed

## 2023-10-20 VITALS
HEIGHT: 69 IN | WEIGHT: 131.81 LBS | SYSTOLIC BLOOD PRESSURE: 147 MMHG | OXYGEN SATURATION: 92 % | BODY MASS INDEX: 19.52 KG/M2 | TEMPERATURE: 97 F | RESPIRATION RATE: 16 BRPM | HEART RATE: 71 BPM | DIASTOLIC BLOOD PRESSURE: 70 MMHG

## 2023-10-24 RX ORDER — DIPHENHYDRAMINE HYDROCHLORIDE 50 MG/ML
50 INJECTION INTRAMUSCULAR; INTRAVENOUS ONCE AS NEEDED
Status: CANCELLED | OUTPATIENT
Start: 2023-10-25

## 2023-10-24 RX ORDER — SODIUM CHLORIDE 0.9 % (FLUSH) 0.9 %
10 SYRINGE (ML) INJECTION
Status: CANCELLED | OUTPATIENT
Start: 2023-10-27

## 2023-10-24 RX ORDER — FLUOROURACIL 50 MG/ML
2400 INJECTION, SOLUTION INTRAVENOUS
Status: CANCELLED | OUTPATIENT
Start: 2023-10-25

## 2023-10-24 RX ORDER — FLUOROURACIL 50 MG/ML
400 INJECTION, SOLUTION INTRAVENOUS
Status: CANCELLED | OUTPATIENT
Start: 2023-10-25

## 2023-10-24 RX ORDER — ONDANSETRON 2 MG/ML
8 INJECTION INTRAMUSCULAR; INTRAVENOUS
Status: CANCELLED | OUTPATIENT
Start: 2023-10-25

## 2023-10-24 RX ORDER — SODIUM CHLORIDE 0.9 % (FLUSH) 0.9 %
10 SYRINGE (ML) INJECTION
Status: CANCELLED | OUTPATIENT
Start: 2023-10-25

## 2023-10-24 RX ORDER — EPINEPHRINE 0.3 MG/.3ML
0.3 INJECTION SUBCUTANEOUS ONCE AS NEEDED
Status: CANCELLED | OUTPATIENT
Start: 2023-10-25

## 2023-10-24 RX ORDER — HEPARIN 100 UNIT/ML
500 SYRINGE INTRAVENOUS
Status: CANCELLED | OUTPATIENT
Start: 2023-10-25

## 2023-10-24 RX ORDER — HEPARIN 100 UNIT/ML
500 SYRINGE INTRAVENOUS
Status: CANCELLED | OUTPATIENT
Start: 2023-10-27

## 2023-10-25 ENCOUNTER — INFUSION (OUTPATIENT)
Dept: INFUSION THERAPY | Facility: HOSPITAL | Age: 83
End: 2023-10-25
Attending: STUDENT IN AN ORGANIZED HEALTH CARE EDUCATION/TRAINING PROGRAM
Payer: MEDICARE

## 2023-10-25 VITALS
OXYGEN SATURATION: 100 % | TEMPERATURE: 98 F | HEIGHT: 69 IN | SYSTOLIC BLOOD PRESSURE: 120 MMHG | HEART RATE: 79 BPM | BODY MASS INDEX: 19.9 KG/M2 | WEIGHT: 134.38 LBS | DIASTOLIC BLOOD PRESSURE: 73 MMHG | RESPIRATION RATE: 18 BRPM

## 2023-10-25 DIAGNOSIS — C17.0 DUODENAL CANCER: Primary | ICD-10-CM

## 2023-10-25 PROCEDURE — 96409 CHEMO IV PUSH SNGL DRUG: CPT

## 2023-10-25 PROCEDURE — 96367 TX/PROPH/DG ADDL SEQ IV INF: CPT

## 2023-10-25 PROCEDURE — 96375 TX/PRO/DX INJ NEW DRUG ADDON: CPT

## 2023-10-25 PROCEDURE — 63600175 PHARM REV CODE 636 W HCPCS: Performed by: STUDENT IN AN ORGANIZED HEALTH CARE EDUCATION/TRAINING PROGRAM

## 2023-10-25 PROCEDURE — 25000003 PHARM REV CODE 250: Performed by: STUDENT IN AN ORGANIZED HEALTH CARE EDUCATION/TRAINING PROGRAM

## 2023-10-25 PROCEDURE — 96416 CHEMO PROLONG INFUSE W/PUMP: CPT

## 2023-10-25 RX ORDER — SODIUM CHLORIDE 0.9 % (FLUSH) 0.9 %
10 SYRINGE (ML) INJECTION
Status: DISCONTINUED | OUTPATIENT
Start: 2023-10-25 | End: 2023-10-25 | Stop reason: HOSPADM

## 2023-10-25 RX ORDER — DIPHENHYDRAMINE HYDROCHLORIDE 50 MG/ML
50 INJECTION INTRAMUSCULAR; INTRAVENOUS ONCE AS NEEDED
Status: DISCONTINUED | OUTPATIENT
Start: 2023-10-25 | End: 2023-10-25 | Stop reason: HOSPADM

## 2023-10-25 RX ORDER — EPINEPHRINE 0.3 MG/.3ML
0.3 INJECTION SUBCUTANEOUS ONCE AS NEEDED
Status: DISCONTINUED | OUTPATIENT
Start: 2023-10-25 | End: 2023-10-25 | Stop reason: HOSPADM

## 2023-10-25 RX ORDER — HEPARIN 100 UNIT/ML
500 SYRINGE INTRAVENOUS
Status: DISCONTINUED | OUTPATIENT
Start: 2023-10-25 | End: 2023-10-25 | Stop reason: HOSPADM

## 2023-10-25 RX ORDER — ONDANSETRON 2 MG/ML
8 INJECTION INTRAMUSCULAR; INTRAVENOUS
Status: COMPLETED | OUTPATIENT
Start: 2023-10-25 | End: 2023-10-25

## 2023-10-25 RX ORDER — FLUOROURACIL 50 MG/ML
400 INJECTION, SOLUTION INTRAVENOUS
Status: COMPLETED | OUTPATIENT
Start: 2023-10-25 | End: 2023-10-25

## 2023-10-25 RX ADMIN — ONDANSETRON 8 MG: 2 INJECTION INTRAMUSCULAR; INTRAVENOUS at 02:10

## 2023-10-25 RX ADMIN — FLUOROURACIL 4000 MG: 50 INJECTION, SOLUTION INTRAVENOUS at 03:10

## 2023-10-25 RX ADMIN — SODIUM CHLORIDE: 9 INJECTION, SOLUTION INTRAVENOUS at 02:10

## 2023-10-25 RX ADMIN — LEUCOVORIN CALCIUM 700 MG: 500 INJECTION, POWDER, LYOPHILIZED, FOR SOLUTION INTRAMUSCULAR; INTRAVENOUS at 02:10

## 2023-10-25 RX ADMIN — FLUOROURACIL 685 MG: 50 INJECTION, SOLUTION INTRAVENOUS at 03:10

## 2023-10-25 NOTE — PLAN OF CARE
C1 D1 5FU pump started. Tolerated well. Instructed to return Friday 10/27 at 1300. Verbalized understanding. Dc'd home in stable condition with pump infusing.

## 2023-10-27 ENCOUNTER — INFUSION (OUTPATIENT)
Dept: INFUSION THERAPY | Facility: HOSPITAL | Age: 83
End: 2023-10-27
Attending: STUDENT IN AN ORGANIZED HEALTH CARE EDUCATION/TRAINING PROGRAM
Payer: MEDICARE

## 2023-10-27 VITALS
TEMPERATURE: 98 F | OXYGEN SATURATION: 99 % | DIASTOLIC BLOOD PRESSURE: 63 MMHG | HEART RATE: 81 BPM | SYSTOLIC BLOOD PRESSURE: 102 MMHG | RESPIRATION RATE: 18 BRPM

## 2023-10-27 DIAGNOSIS — C17.0 DUODENAL CANCER: Primary | ICD-10-CM

## 2023-10-27 PROCEDURE — 63600175 PHARM REV CODE 636 W HCPCS: Performed by: STUDENT IN AN ORGANIZED HEALTH CARE EDUCATION/TRAINING PROGRAM

## 2023-10-27 RX ORDER — HEPARIN 100 UNIT/ML
500 SYRINGE INTRAVENOUS
Status: DISCONTINUED | OUTPATIENT
Start: 2023-10-27 | End: 2023-10-27 | Stop reason: HOSPADM

## 2023-10-27 RX ORDER — SODIUM CHLORIDE 0.9 % (FLUSH) 0.9 %
10 SYRINGE (ML) INJECTION
Status: DISCONTINUED | OUTPATIENT
Start: 2023-10-27 | End: 2023-10-27 | Stop reason: HOSPADM

## 2023-10-27 RX ADMIN — HEPARIN 500 UNITS: 100 SYRINGE at 01:10

## 2023-10-27 NOTE — PLAN OF CARE
C1 D3 Pump Dc'd with 100% given. Tolerated well. Next appt given to daughter. Dc'd home in stable condition.

## 2023-10-31 ENCOUNTER — OFFICE VISIT (OUTPATIENT)
Dept: SURGICAL ONCOLOGY | Facility: CLINIC | Age: 83
End: 2023-10-31
Payer: MEDICARE

## 2023-10-31 VITALS
HEIGHT: 69 IN | BODY MASS INDEX: 19.85 KG/M2 | DIASTOLIC BLOOD PRESSURE: 77 MMHG | SYSTOLIC BLOOD PRESSURE: 124 MMHG | HEART RATE: 76 BPM | WEIGHT: 134 LBS

## 2023-10-31 DIAGNOSIS — C17.0 DUODENAL CANCER: Primary | ICD-10-CM

## 2023-10-31 PROCEDURE — 3074F PR MOST RECENT SYSTOLIC BLOOD PRESSURE < 130 MM HG: ICD-10-PCS | Mod: CPTII,S$GLB,, | Performed by: SURGERY

## 2023-10-31 PROCEDURE — 99999 PR PBB SHADOW E&M-EST. PATIENT-LVL III: ICD-10-PCS | Mod: PBBFAC,,, | Performed by: SURGERY

## 2023-10-31 PROCEDURE — 3078F DIAST BP <80 MM HG: CPT | Mod: CPTII,S$GLB,, | Performed by: SURGERY

## 2023-10-31 PROCEDURE — 99999 PR PBB SHADOW E&M-EST. PATIENT-LVL III: CPT | Mod: PBBFAC,,, | Performed by: SURGERY

## 2023-10-31 PROCEDURE — 3288F PR FALLS RISK ASSESSMENT DOCUMENTED: ICD-10-PCS | Mod: CPTII,S$GLB,, | Performed by: SURGERY

## 2023-10-31 PROCEDURE — 1101F PT FALLS ASSESS-DOCD LE1/YR: CPT | Mod: CPTII,S$GLB,, | Performed by: SURGERY

## 2023-10-31 PROCEDURE — 1101F PR PT FALLS ASSESS DOC 0-1 FALLS W/OUT INJ PAST YR: ICD-10-PCS | Mod: CPTII,S$GLB,, | Performed by: SURGERY

## 2023-10-31 PROCEDURE — 99214 OFFICE O/P EST MOD 30 MIN: CPT | Mod: S$GLB,,, | Performed by: SURGERY

## 2023-10-31 PROCEDURE — 3288F FALL RISK ASSESSMENT DOCD: CPT | Mod: CPTII,S$GLB,, | Performed by: SURGERY

## 2023-10-31 PROCEDURE — 99214 PR OFFICE/OUTPT VISIT, EST, LEVL IV, 30-39 MIN: ICD-10-PCS | Mod: S$GLB,,, | Performed by: SURGERY

## 2023-10-31 PROCEDURE — 3074F SYST BP LT 130 MM HG: CPT | Mod: CPTII,S$GLB,, | Performed by: SURGERY

## 2023-10-31 PROCEDURE — 1126F AMNT PAIN NOTED NONE PRSNT: CPT | Mod: CPTII,S$GLB,, | Performed by: SURGERY

## 2023-10-31 PROCEDURE — 1126F PR PAIN SEVERITY QUANTIFIED, NO PAIN PRESENT: ICD-10-PCS | Mod: CPTII,S$GLB,, | Performed by: SURGERY

## 2023-10-31 PROCEDURE — 3078F PR MOST RECENT DIASTOLIC BLOOD PRESSURE < 80 MM HG: ICD-10-PCS | Mod: CPTII,S$GLB,, | Performed by: SURGERY

## 2023-11-01 ENCOUNTER — LAB VISIT (OUTPATIENT)
Dept: LAB | Facility: HOSPITAL | Age: 83
End: 2023-11-01
Attending: INTERNAL MEDICINE
Payer: MEDICARE

## 2023-11-01 ENCOUNTER — TELEPHONE (OUTPATIENT)
Dept: HEMATOLOGY/ONCOLOGY | Facility: CLINIC | Age: 83
End: 2023-11-01

## 2023-11-01 ENCOUNTER — OFFICE VISIT (OUTPATIENT)
Dept: HEMATOLOGY/ONCOLOGY | Facility: CLINIC | Age: 83
End: 2023-11-01
Payer: MEDICARE

## 2023-11-01 VITALS
SYSTOLIC BLOOD PRESSURE: 116 MMHG | WEIGHT: 134.31 LBS | HEART RATE: 77 BPM | TEMPERATURE: 98 F | HEIGHT: 69 IN | OXYGEN SATURATION: 100 % | DIASTOLIC BLOOD PRESSURE: 76 MMHG | BODY MASS INDEX: 19.89 KG/M2 | RESPIRATION RATE: 18 BRPM

## 2023-11-01 DIAGNOSIS — D84.821 DRUG-INDUCED IMMUNODEFICIENCY: ICD-10-CM

## 2023-11-01 DIAGNOSIS — C17.0 DUODENAL CANCER: ICD-10-CM

## 2023-11-01 DIAGNOSIS — Z79.899 DRUG-INDUCED IMMUNODEFICIENCY: ICD-10-CM

## 2023-11-01 DIAGNOSIS — C77.2 SECONDARY AND UNSPECIFIED MALIGNANT NEOPLASM OF INTRA-ABDOMINAL LYMPH NODES: Primary | ICD-10-CM

## 2023-11-01 LAB
ALBUMIN SERPL-MCNC: 3.5 G/DL (ref 3.4–4.8)
ALBUMIN/GLOB SERPL: 1 RATIO (ref 1.1–2)
ALP SERPL-CCNC: 271 UNIT/L (ref 40–150)
ALT SERPL-CCNC: 39 UNIT/L (ref 0–55)
AST SERPL-CCNC: 29 UNIT/L (ref 5–34)
BASOPHILS # BLD AUTO: 0.02 X10(3)/MCL
BASOPHILS NFR BLD AUTO: 0.4 %
BILIRUB SERPL-MCNC: 0.5 MG/DL
BUN SERPL-MCNC: 27.5 MG/DL (ref 8.4–25.7)
CALCIUM SERPL-MCNC: 9.7 MG/DL (ref 8.8–10)
CHLORIDE SERPL-SCNC: 102 MMOL/L (ref 98–107)
CO2 SERPL-SCNC: 27 MMOL/L (ref 23–31)
CREAT SERPL-MCNC: 1.13 MG/DL (ref 0.73–1.18)
EOSINOPHIL # BLD AUTO: 0.13 X10(3)/MCL (ref 0–0.9)
EOSINOPHIL NFR BLD AUTO: 2.3 %
ERYTHROCYTE [DISTWIDTH] IN BLOOD BY AUTOMATED COUNT: 13.8 % (ref 11.5–17)
GFR SERPLBLD CREATININE-BSD FMLA CKD-EPI: >60 MLS/MIN/1.73/M2
GLOBULIN SER-MCNC: 3.6 GM/DL (ref 2.4–3.5)
GLUCOSE SERPL-MCNC: 98 MG/DL (ref 82–115)
HCT VFR BLD AUTO: 34.7 % (ref 42–52)
HGB BLD-MCNC: 10.7 G/DL (ref 14–18)
IMM GRANULOCYTES # BLD AUTO: 0.02 X10(3)/MCL (ref 0–0.04)
IMM GRANULOCYTES NFR BLD AUTO: 0.4 %
LYMPHOCYTES # BLD AUTO: 2.16 X10(3)/MCL (ref 0.6–4.6)
LYMPHOCYTES NFR BLD AUTO: 38 %
MCH RBC QN AUTO: 33.6 PG (ref 27–31)
MCHC RBC AUTO-ENTMCNC: 30.8 G/DL (ref 33–36)
MCV RBC AUTO: 109.1 FL (ref 80–94)
MONOCYTES # BLD AUTO: 0.39 X10(3)/MCL (ref 0.1–1.3)
MONOCYTES NFR BLD AUTO: 6.9 %
NEUTROPHILS # BLD AUTO: 2.97 X10(3)/MCL (ref 2.1–9.2)
NEUTROPHILS NFR BLD AUTO: 52 %
PLATELET # BLD AUTO: 218 X10(3)/MCL (ref 130–400)
PMV BLD AUTO: 9.3 FL (ref 7.4–10.4)
POTASSIUM SERPL-SCNC: 5.3 MMOL/L (ref 3.5–5.1)
PROT SERPL-MCNC: 7.1 GM/DL (ref 5.8–7.6)
RBC # BLD AUTO: 3.18 X10(6)/MCL (ref 4.7–6.1)
SODIUM SERPL-SCNC: 136 MMOL/L (ref 136–145)
WBC # SPEC AUTO: 5.69 X10(3)/MCL (ref 4.5–11.5)

## 2023-11-01 PROCEDURE — 1126F AMNT PAIN NOTED NONE PRSNT: CPT | Mod: CPTII,S$GLB,,

## 2023-11-01 PROCEDURE — 99215 OFFICE O/P EST HI 40 MIN: CPT | Mod: S$GLB,,,

## 2023-11-01 PROCEDURE — 99999 PR PBB SHADOW E&M-EST. PATIENT-LVL IV: CPT | Mod: PBBFAC,,,

## 2023-11-01 PROCEDURE — 36415 COLL VENOUS BLD VENIPUNCTURE: CPT

## 2023-11-01 PROCEDURE — 99215 PR OFFICE/OUTPT VISIT, EST, LEVL V, 40-54 MIN: ICD-10-PCS | Mod: S$GLB,,,

## 2023-11-01 PROCEDURE — 80053 COMPREHEN METABOLIC PANEL: CPT

## 2023-11-01 PROCEDURE — 1159F MED LIST DOCD IN RCRD: CPT | Mod: CPTII,S$GLB,,

## 2023-11-01 PROCEDURE — 3074F SYST BP LT 130 MM HG: CPT | Mod: CPTII,S$GLB,,

## 2023-11-01 PROCEDURE — 1126F PR PAIN SEVERITY QUANTIFIED, NO PAIN PRESENT: ICD-10-PCS | Mod: CPTII,S$GLB,,

## 2023-11-01 PROCEDURE — 3078F PR MOST RECENT DIASTOLIC BLOOD PRESSURE < 80 MM HG: ICD-10-PCS | Mod: CPTII,S$GLB,,

## 2023-11-01 PROCEDURE — 1160F PR REVIEW ALL MEDS BY PRESCRIBER/CLIN PHARMACIST DOCUMENTED: ICD-10-PCS | Mod: CPTII,S$GLB,,

## 2023-11-01 PROCEDURE — 1160F RVW MEDS BY RX/DR IN RCRD: CPT | Mod: CPTII,S$GLB,,

## 2023-11-01 PROCEDURE — 3074F PR MOST RECENT SYSTOLIC BLOOD PRESSURE < 130 MM HG: ICD-10-PCS | Mod: CPTII,S$GLB,,

## 2023-11-01 PROCEDURE — 1159F PR MEDICATION LIST DOCUMENTED IN MEDICAL RECORD: ICD-10-PCS | Mod: CPTII,S$GLB,,

## 2023-11-01 PROCEDURE — 85025 COMPLETE CBC W/AUTO DIFF WBC: CPT

## 2023-11-01 PROCEDURE — 3078F DIAST BP <80 MM HG: CPT | Mod: CPTII,S$GLB,,

## 2023-11-01 PROCEDURE — 99999 PR PBB SHADOW E&M-EST. PATIENT-LVL IV: ICD-10-PCS | Mod: PBBFAC,,,

## 2023-11-01 RX ORDER — ONDANSETRON HYDROCHLORIDE 8 MG/1
8 TABLET, FILM COATED ORAL EVERY 12 HOURS PRN
Qty: 30 TABLET | Refills: 2 | Status: SHIPPED | OUTPATIENT
Start: 2023-11-01 | End: 2023-12-22 | Stop reason: SDUPTHER
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
## 2023-11-06 ENCOUNTER — OFFICE VISIT (OUTPATIENT)
Dept: HEMATOLOGY/ONCOLOGY | Facility: CLINIC | Age: 83
End: 2023-11-06
Payer: MEDICARE

## 2023-11-06 VITALS
DIASTOLIC BLOOD PRESSURE: 68 MMHG | TEMPERATURE: 98 F | SYSTOLIC BLOOD PRESSURE: 132 MMHG | HEIGHT: 69 IN | BODY MASS INDEX: 20.09 KG/M2 | RESPIRATION RATE: 18 BRPM | OXYGEN SATURATION: 100 % | WEIGHT: 135.63 LBS | HEART RATE: 80 BPM

## 2023-11-06 DIAGNOSIS — Z90.410 H/O WHIPPLE PROCEDURE: ICD-10-CM

## 2023-11-06 DIAGNOSIS — C77.2 SECONDARY AND UNSPECIFIED MALIGNANT NEOPLASM OF INTRA-ABDOMINAL LYMPH NODES: Primary | ICD-10-CM

## 2023-11-06 DIAGNOSIS — Z79.899 DRUG-INDUCED IMMUNODEFICIENCY: ICD-10-CM

## 2023-11-06 DIAGNOSIS — D84.821 DRUG-INDUCED IMMUNODEFICIENCY: ICD-10-CM

## 2023-11-06 DIAGNOSIS — Z90.49 H/O WHIPPLE PROCEDURE: ICD-10-CM

## 2023-11-06 PROCEDURE — 3075F PR MOST RECENT SYSTOLIC BLOOD PRESS GE 130-139MM HG: ICD-10-PCS | Mod: CPTII,S$GLB,,

## 2023-11-06 PROCEDURE — 1160F RVW MEDS BY RX/DR IN RCRD: CPT | Mod: CPTII,S$GLB,,

## 2023-11-06 PROCEDURE — 1159F MED LIST DOCD IN RCRD: CPT | Mod: CPTII,S$GLB,,

## 2023-11-06 PROCEDURE — 3078F PR MOST RECENT DIASTOLIC BLOOD PRESSURE < 80 MM HG: ICD-10-PCS | Mod: CPTII,S$GLB,,

## 2023-11-06 PROCEDURE — 1159F PR MEDICATION LIST DOCUMENTED IN MEDICAL RECORD: ICD-10-PCS | Mod: CPTII,S$GLB,,

## 2023-11-06 PROCEDURE — 1160F PR REVIEW ALL MEDS BY PRESCRIBER/CLIN PHARMACIST DOCUMENTED: ICD-10-PCS | Mod: CPTII,S$GLB,,

## 2023-11-06 PROCEDURE — 99999 PR PBB SHADOW E&M-EST. PATIENT-LVL IV: CPT | Mod: PBBFAC,,,

## 2023-11-06 PROCEDURE — 1126F PR PAIN SEVERITY QUANTIFIED, NO PAIN PRESENT: ICD-10-PCS | Mod: CPTII,S$GLB,,

## 2023-11-06 PROCEDURE — 99215 OFFICE O/P EST HI 40 MIN: CPT | Mod: S$GLB,,,

## 2023-11-06 PROCEDURE — 1126F AMNT PAIN NOTED NONE PRSNT: CPT | Mod: CPTII,S$GLB,,

## 2023-11-06 PROCEDURE — 3078F DIAST BP <80 MM HG: CPT | Mod: CPTII,S$GLB,,

## 2023-11-06 PROCEDURE — 99215 PR OFFICE/OUTPT VISIT, EST, LEVL V, 40-54 MIN: ICD-10-PCS | Mod: S$GLB,,,

## 2023-11-06 PROCEDURE — 99999 PR PBB SHADOW E&M-EST. PATIENT-LVL IV: ICD-10-PCS | Mod: PBBFAC,,,

## 2023-11-06 PROCEDURE — 3075F SYST BP GE 130 - 139MM HG: CPT | Mod: CPTII,S$GLB,,
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
## 2023-11-08 ENCOUNTER — PATIENT MESSAGE (OUTPATIENT)
Dept: HEMATOLOGY/ONCOLOGY | Facility: CLINIC | Age: 83
End: 2023-11-08
Payer: MEDICARE

## 2023-11-08 ENCOUNTER — INFUSION (OUTPATIENT)
Dept: INFUSION THERAPY | Facility: HOSPITAL | Age: 83
End: 2023-11-08
Attending: STUDENT IN AN ORGANIZED HEALTH CARE EDUCATION/TRAINING PROGRAM
Payer: MEDICARE

## 2023-11-08 VITALS
DIASTOLIC BLOOD PRESSURE: 56 MMHG | WEIGHT: 134.94 LBS | SYSTOLIC BLOOD PRESSURE: 111 MMHG | BODY MASS INDEX: 19.99 KG/M2 | HEIGHT: 69 IN | TEMPERATURE: 98 F | HEART RATE: 68 BPM | RESPIRATION RATE: 18 BRPM

## 2023-11-08 DIAGNOSIS — C17.0 DUODENAL CANCER: Primary | ICD-10-CM

## 2023-11-08 PROCEDURE — 96409 CHEMO IV PUSH SNGL DRUG: CPT

## 2023-11-08 PROCEDURE — 96375 TX/PRO/DX INJ NEW DRUG ADDON: CPT

## 2023-11-08 PROCEDURE — 25000003 PHARM REV CODE 250

## 2023-11-08 PROCEDURE — 63600175 PHARM REV CODE 636 W HCPCS

## 2023-11-08 PROCEDURE — 96416 CHEMO PROLONG INFUSE W/PUMP: CPT

## 2023-11-08 PROCEDURE — 96367 TX/PROPH/DG ADDL SEQ IV INF: CPT

## 2023-11-08 PROCEDURE — 96361 HYDRATE IV INFUSION ADD-ON: CPT

## 2023-11-08 RX ORDER — FLUOROURACIL 50 MG/ML
400 INJECTION, SOLUTION INTRAVENOUS
Status: CANCELLED | OUTPATIENT
Start: 2023-11-08

## 2023-11-08 RX ORDER — SODIUM CHLORIDE 0.9 % (FLUSH) 0.9 %
10 SYRINGE (ML) INJECTION
Status: CANCELLED | OUTPATIENT
Start: 2023-11-10

## 2023-11-08 RX ORDER — HEPARIN 100 UNIT/ML
500 SYRINGE INTRAVENOUS
Status: CANCELLED | OUTPATIENT
Start: 2023-11-08

## 2023-11-08 RX ORDER — SODIUM CHLORIDE 0.9 % (FLUSH) 0.9 %
10 SYRINGE (ML) INJECTION
Status: DISCONTINUED | OUTPATIENT
Start: 2023-11-08 | End: 2023-11-08 | Stop reason: HOSPADM

## 2023-11-08 RX ORDER — FLUOROURACIL 50 MG/ML
2400 INJECTION, SOLUTION INTRAVENOUS
Status: CANCELLED | OUTPATIENT
Start: 2023-11-08

## 2023-11-08 RX ORDER — DIPHENHYDRAMINE HYDROCHLORIDE 50 MG/ML
50 INJECTION INTRAMUSCULAR; INTRAVENOUS ONCE AS NEEDED
Status: CANCELLED | OUTPATIENT
Start: 2023-11-08

## 2023-11-08 RX ORDER — FLUOROURACIL 50 MG/ML
400 INJECTION, SOLUTION INTRAVENOUS
Status: COMPLETED | OUTPATIENT
Start: 2023-11-08 | End: 2023-11-08

## 2023-11-08 RX ORDER — ONDANSETRON 2 MG/ML
8 INJECTION INTRAMUSCULAR; INTRAVENOUS
Status: COMPLETED | OUTPATIENT
Start: 2023-11-08 | End: 2023-11-08

## 2023-11-08 RX ORDER — EPINEPHRINE 0.3 MG/.3ML
0.3 INJECTION SUBCUTANEOUS ONCE AS NEEDED
Status: CANCELLED | OUTPATIENT
Start: 2023-11-08

## 2023-11-08 RX ORDER — HEPARIN 100 UNIT/ML
500 SYRINGE INTRAVENOUS
Status: DISCONTINUED | OUTPATIENT
Start: 2023-11-08 | End: 2023-11-08 | Stop reason: HOSPADM

## 2023-11-08 RX ORDER — ONDANSETRON 2 MG/ML
8 INJECTION INTRAMUSCULAR; INTRAVENOUS
Status: CANCELLED | OUTPATIENT
Start: 2023-11-08

## 2023-11-08 RX ORDER — HEPARIN 100 UNIT/ML
500 SYRINGE INTRAVENOUS
Status: CANCELLED | OUTPATIENT
Start: 2023-11-10

## 2023-11-08 RX ORDER — EPINEPHRINE 0.3 MG/.3ML
0.3 INJECTION SUBCUTANEOUS ONCE AS NEEDED
Status: DISCONTINUED | OUTPATIENT
Start: 2023-11-08 | End: 2023-11-08 | Stop reason: HOSPADM

## 2023-11-08 RX ORDER — SODIUM CHLORIDE 0.9 % (FLUSH) 0.9 %
10 SYRINGE (ML) INJECTION
Status: CANCELLED | OUTPATIENT
Start: 2023-11-08

## 2023-11-08 RX ORDER — DIPHENHYDRAMINE HYDROCHLORIDE 50 MG/ML
50 INJECTION INTRAMUSCULAR; INTRAVENOUS ONCE AS NEEDED
Status: DISCONTINUED | OUTPATIENT
Start: 2023-11-08 | End: 2023-11-08 | Stop reason: HOSPADM

## 2023-11-08 RX ADMIN — FLUOROURACIL 4100 MG: 50 INJECTION, SOLUTION INTRAVENOUS at 03:11

## 2023-11-08 RX ADMIN — FLUOROURACIL 685 MG: 50 INJECTION, SOLUTION INTRAVENOUS at 02:11

## 2023-11-08 RX ADMIN — ONDANSETRON 8 MG: 2 INJECTION INTRAMUSCULAR; INTRAVENOUS at 02:11

## 2023-11-08 RX ADMIN — LEUCOVORIN CALCIUM 700 MG: 350 INJECTION, POWDER, LYOPHILIZED, FOR SOLUTION INTRAMUSCULAR; INTRAVENOUS at 02:11

## 2023-11-08 RX ADMIN — SODIUM CHLORIDE 1000 ML: 9 INJECTION, SOLUTION INTRAVENOUS at 02:11

## 2023-11-10 ENCOUNTER — INFUSION (OUTPATIENT)
Dept: INFUSION THERAPY | Facility: HOSPITAL | Age: 83
End: 2023-11-10
Attending: STUDENT IN AN ORGANIZED HEALTH CARE EDUCATION/TRAINING PROGRAM
Payer: MEDICARE

## 2023-11-10 DIAGNOSIS — C17.0 DUODENAL CANCER: Primary | ICD-10-CM

## 2023-11-10 RX ORDER — SODIUM CHLORIDE 0.9 % (FLUSH) 0.9 %
10 SYRINGE (ML) INJECTION
Status: DISCONTINUED | OUTPATIENT
Start: 2023-11-10 | End: 2023-11-10 | Stop reason: HOSPADM

## 2023-11-10 RX ORDER — HEPARIN 100 UNIT/ML
500 SYRINGE INTRAVENOUS
Status: DISCONTINUED | OUTPATIENT
Start: 2023-11-10 | End: 2023-11-10 | Stop reason: HOSPADM

## 2023-11-20 ENCOUNTER — OFFICE VISIT (OUTPATIENT)
Dept: HEMATOLOGY/ONCOLOGY | Facility: CLINIC | Age: 83
End: 2023-11-20
Payer: MEDICARE

## 2023-11-20 VITALS
WEIGHT: 136 LBS | BODY MASS INDEX: 20.08 KG/M2 | OXYGEN SATURATION: 100 % | DIASTOLIC BLOOD PRESSURE: 63 MMHG | HEART RATE: 76 BPM | TEMPERATURE: 98 F | SYSTOLIC BLOOD PRESSURE: 106 MMHG

## 2023-11-20 DIAGNOSIS — C77.2 SECONDARY AND UNSPECIFIED MALIGNANT NEOPLASM OF INTRA-ABDOMINAL LYMPH NODES: Primary | ICD-10-CM

## 2023-11-20 DIAGNOSIS — K59.00 CONSTIPATION, UNSPECIFIED CONSTIPATION TYPE: ICD-10-CM

## 2023-11-20 DIAGNOSIS — D84.821 DRUG-INDUCED IMMUNODEFICIENCY: ICD-10-CM

## 2023-11-20 DIAGNOSIS — Z79.899 DRUG-INDUCED IMMUNODEFICIENCY: ICD-10-CM

## 2023-11-20 PROCEDURE — 3074F SYST BP LT 130 MM HG: CPT | Mod: CPTII,S$GLB,,

## 2023-11-20 PROCEDURE — 3078F DIAST BP <80 MM HG: CPT | Mod: CPTII,S$GLB,,

## 2023-11-20 PROCEDURE — 3288F FALL RISK ASSESSMENT DOCD: CPT | Mod: CPTII,S$GLB,,

## 2023-11-20 PROCEDURE — 1126F PR PAIN SEVERITY QUANTIFIED, NO PAIN PRESENT: ICD-10-PCS | Mod: CPTII,S$GLB,,

## 2023-11-20 PROCEDURE — 99215 PR OFFICE/OUTPT VISIT, EST, LEVL V, 40-54 MIN: ICD-10-PCS | Mod: S$GLB,,,

## 2023-11-20 PROCEDURE — 3074F PR MOST RECENT SYSTOLIC BLOOD PRESSURE < 130 MM HG: ICD-10-PCS | Mod: CPTII,S$GLB,,

## 2023-11-20 PROCEDURE — 1160F PR REVIEW ALL MEDS BY PRESCRIBER/CLIN PHARMACIST DOCUMENTED: ICD-10-PCS | Mod: CPTII,S$GLB,,

## 2023-11-20 PROCEDURE — 1159F MED LIST DOCD IN RCRD: CPT | Mod: CPTII,S$GLB,,

## 2023-11-20 PROCEDURE — 3288F PR FALLS RISK ASSESSMENT DOCUMENTED: ICD-10-PCS | Mod: CPTII,S$GLB,,

## 2023-11-20 PROCEDURE — 99999 PR PBB SHADOW E&M-EST. PATIENT-LVL IV: CPT | Mod: PBBFAC,,,

## 2023-11-20 PROCEDURE — 1126F AMNT PAIN NOTED NONE PRSNT: CPT | Mod: CPTII,S$GLB,,

## 2023-11-20 PROCEDURE — 1160F RVW MEDS BY RX/DR IN RCRD: CPT | Mod: CPTII,S$GLB,,

## 2023-11-20 PROCEDURE — 1101F PR PT FALLS ASSESS DOC 0-1 FALLS W/OUT INJ PAST YR: ICD-10-PCS | Mod: CPTII,S$GLB,,

## 2023-11-20 PROCEDURE — 1101F PT FALLS ASSESS-DOCD LE1/YR: CPT | Mod: CPTII,S$GLB,,

## 2023-11-20 PROCEDURE — 99999 PR PBB SHADOW E&M-EST. PATIENT-LVL IV: ICD-10-PCS | Mod: PBBFAC,,,

## 2023-11-20 PROCEDURE — 3078F PR MOST RECENT DIASTOLIC BLOOD PRESSURE < 80 MM HG: ICD-10-PCS | Mod: CPTII,S$GLB,,

## 2023-11-20 PROCEDURE — 1159F PR MEDICATION LIST DOCUMENTED IN MEDICAL RECORD: ICD-10-PCS | Mod: CPTII,S$GLB,,

## 2023-11-20 PROCEDURE — 99215 OFFICE O/P EST HI 40 MIN: CPT | Mod: S$GLB,,,

## 2023-11-20 RX ORDER — HEPARIN 100 UNIT/ML
500 SYRINGE INTRAVENOUS
Status: CANCELLED | OUTPATIENT
Start: 2023-11-24

## 2023-11-20 RX ORDER — SODIUM CHLORIDE 0.9 % (FLUSH) 0.9 %
10 SYRINGE (ML) INJECTION
Status: CANCELLED | OUTPATIENT
Start: 2023-11-24
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
## 2023-11-22 ENCOUNTER — INFUSION (OUTPATIENT)
Dept: INFUSION THERAPY | Facility: HOSPITAL | Age: 83
End: 2023-11-22
Attending: STUDENT IN AN ORGANIZED HEALTH CARE EDUCATION/TRAINING PROGRAM
Payer: MEDICARE

## 2023-11-22 DIAGNOSIS — C17.0 DUODENAL CANCER: Primary | ICD-10-CM

## 2023-11-22 PROCEDURE — 96409 CHEMO IV PUSH SNGL DRUG: CPT

## 2023-11-22 PROCEDURE — 96361 HYDRATE IV INFUSION ADD-ON: CPT

## 2023-11-22 PROCEDURE — 96375 TX/PRO/DX INJ NEW DRUG ADDON: CPT

## 2023-11-22 PROCEDURE — 63600175 PHARM REV CODE 636 W HCPCS

## 2023-11-22 PROCEDURE — 96416 CHEMO PROLONG INFUSE W/PUMP: CPT

## 2023-11-22 PROCEDURE — 25000003 PHARM REV CODE 250

## 2023-11-22 PROCEDURE — 96367 TX/PROPH/DG ADDL SEQ IV INF: CPT

## 2023-11-22 RX ORDER — ONDANSETRON 2 MG/ML
8 INJECTION INTRAMUSCULAR; INTRAVENOUS
Status: COMPLETED | OUTPATIENT
Start: 2023-11-22 | End: 2023-11-22

## 2023-11-22 RX ORDER — SODIUM CHLORIDE 0.9 % (FLUSH) 0.9 %
10 SYRINGE (ML) INJECTION
Status: DISCONTINUED | OUTPATIENT
Start: 2023-11-22 | End: 2023-11-22 | Stop reason: HOSPADM

## 2023-11-22 RX ORDER — EPINEPHRINE 0.3 MG/.3ML
0.3 INJECTION SUBCUTANEOUS ONCE AS NEEDED
Status: DISCONTINUED | OUTPATIENT
Start: 2023-11-22 | End: 2023-11-22 | Stop reason: HOSPADM

## 2023-11-22 RX ORDER — DIPHENHYDRAMINE HYDROCHLORIDE 50 MG/ML
50 INJECTION INTRAMUSCULAR; INTRAVENOUS ONCE AS NEEDED
Status: DISCONTINUED | OUTPATIENT
Start: 2023-11-22 | End: 2023-11-22 | Stop reason: HOSPADM

## 2023-11-22 RX ORDER — HEPARIN 100 UNIT/ML
500 SYRINGE INTRAVENOUS
Status: DISCONTINUED | OUTPATIENT
Start: 2023-11-22 | End: 2023-11-22 | Stop reason: HOSPADM

## 2023-11-22 RX ORDER — FLUOROURACIL 50 MG/ML
400 INJECTION, SOLUTION INTRAVENOUS
Status: COMPLETED | OUTPATIENT
Start: 2023-11-22 | End: 2023-11-22

## 2023-11-22 RX ADMIN — FLUOROURACIL 4100 MG: 50 INJECTION, SOLUTION INTRAVENOUS at 03:11

## 2023-11-22 RX ADMIN — SODIUM CHLORIDE 1000 ML: 9 INJECTION, SOLUTION INTRAVENOUS at 02:11

## 2023-11-22 RX ADMIN — LEUCOVORIN CALCIUM 700 MG: 350 INJECTION, POWDER, LYOPHILIZED, FOR SOLUTION INTRAMUSCULAR; INTRAVENOUS at 02:11

## 2023-11-22 RX ADMIN — ONDANSETRON 8 MG: 2 INJECTION INTRAMUSCULAR; INTRAVENOUS at 02:11

## 2023-11-22 RX ADMIN — FLUOROURACIL 685 MG: 50 INJECTION, SOLUTION INTRAVENOUS at 03:11

## 2023-11-24 ENCOUNTER — INFUSION (OUTPATIENT)
Dept: INFUSION THERAPY | Facility: HOSPITAL | Age: 83
End: 2023-11-24
Attending: STUDENT IN AN ORGANIZED HEALTH CARE EDUCATION/TRAINING PROGRAM
Payer: MEDICARE

## 2023-11-24 VITALS
SYSTOLIC BLOOD PRESSURE: 112 MMHG | DIASTOLIC BLOOD PRESSURE: 65 MMHG | HEART RATE: 69 BPM | OXYGEN SATURATION: 100 % | TEMPERATURE: 97 F

## 2023-11-24 DIAGNOSIS — C17.0 DUODENAL CANCER: Primary | ICD-10-CM

## 2023-11-24 PROCEDURE — 63600175 PHARM REV CODE 636 W HCPCS

## 2023-11-24 RX ORDER — HEPARIN 100 UNIT/ML
500 SYRINGE INTRAVENOUS
Status: DISCONTINUED | OUTPATIENT
Start: 2023-11-24 | End: 2023-11-24 | Stop reason: HOSPADM

## 2023-11-24 RX ORDER — SODIUM CHLORIDE 0.9 % (FLUSH) 0.9 %
10 SYRINGE (ML) INJECTION
Status: DISCONTINUED | OUTPATIENT
Start: 2023-11-24 | End: 2023-11-24 | Stop reason: HOSPADM

## 2023-11-24 RX ADMIN — HEPARIN 500 UNITS: 100 SYRINGE at 01:11
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
## 2023-12-04 ENCOUNTER — OFFICE VISIT (OUTPATIENT)
Dept: HEMATOLOGY/ONCOLOGY | Facility: CLINIC | Age: 83
End: 2023-12-04
Payer: MEDICARE

## 2023-12-04 VITALS
RESPIRATION RATE: 14 BRPM | HEART RATE: 77 BPM | HEIGHT: 69 IN | TEMPERATURE: 98 F | BODY MASS INDEX: 20.4 KG/M2 | OXYGEN SATURATION: 100 % | DIASTOLIC BLOOD PRESSURE: 73 MMHG | WEIGHT: 137.69 LBS | SYSTOLIC BLOOD PRESSURE: 117 MMHG

## 2023-12-04 DIAGNOSIS — Z79.899 DRUG-INDUCED IMMUNODEFICIENCY: ICD-10-CM

## 2023-12-04 DIAGNOSIS — C17.0 DUODENAL CANCER: Primary | ICD-10-CM

## 2023-12-04 DIAGNOSIS — E53.8 B12 DEFICIENCY: ICD-10-CM

## 2023-12-04 DIAGNOSIS — D84.821 DRUG-INDUCED IMMUNODEFICIENCY: ICD-10-CM

## 2023-12-04 PROCEDURE — 99999 PR PBB SHADOW E&M-EST. PATIENT-LVL IV: ICD-10-PCS | Mod: PBBFAC,,, | Performed by: STUDENT IN AN ORGANIZED HEALTH CARE EDUCATION/TRAINING PROGRAM

## 2023-12-04 PROCEDURE — 1126F AMNT PAIN NOTED NONE PRSNT: CPT | Mod: CPTII,S$GLB,, | Performed by: STUDENT IN AN ORGANIZED HEALTH CARE EDUCATION/TRAINING PROGRAM

## 2023-12-04 PROCEDURE — 99215 OFFICE O/P EST HI 40 MIN: CPT | Mod: S$GLB,,, | Performed by: STUDENT IN AN ORGANIZED HEALTH CARE EDUCATION/TRAINING PROGRAM

## 2023-12-04 PROCEDURE — 3078F DIAST BP <80 MM HG: CPT | Mod: CPTII,S$GLB,, | Performed by: STUDENT IN AN ORGANIZED HEALTH CARE EDUCATION/TRAINING PROGRAM

## 2023-12-04 PROCEDURE — 3074F SYST BP LT 130 MM HG: CPT | Mod: CPTII,S$GLB,, | Performed by: STUDENT IN AN ORGANIZED HEALTH CARE EDUCATION/TRAINING PROGRAM

## 2023-12-04 PROCEDURE — 1159F PR MEDICATION LIST DOCUMENTED IN MEDICAL RECORD: ICD-10-PCS | Mod: CPTII,S$GLB,, | Performed by: STUDENT IN AN ORGANIZED HEALTH CARE EDUCATION/TRAINING PROGRAM

## 2023-12-04 PROCEDURE — 99999 PR PBB SHADOW E&M-EST. PATIENT-LVL IV: CPT | Mod: PBBFAC,,, | Performed by: STUDENT IN AN ORGANIZED HEALTH CARE EDUCATION/TRAINING PROGRAM

## 2023-12-04 PROCEDURE — 3078F PR MOST RECENT DIASTOLIC BLOOD PRESSURE < 80 MM HG: ICD-10-PCS | Mod: CPTII,S$GLB,, | Performed by: STUDENT IN AN ORGANIZED HEALTH CARE EDUCATION/TRAINING PROGRAM

## 2023-12-04 PROCEDURE — 99215 PR OFFICE/OUTPT VISIT, EST, LEVL V, 40-54 MIN: ICD-10-PCS | Mod: S$GLB,,, | Performed by: STUDENT IN AN ORGANIZED HEALTH CARE EDUCATION/TRAINING PROGRAM

## 2023-12-04 PROCEDURE — 1159F MED LIST DOCD IN RCRD: CPT | Mod: CPTII,S$GLB,, | Performed by: STUDENT IN AN ORGANIZED HEALTH CARE EDUCATION/TRAINING PROGRAM

## 2023-12-04 PROCEDURE — 1160F PR REVIEW ALL MEDS BY PRESCRIBER/CLIN PHARMACIST DOCUMENTED: ICD-10-PCS | Mod: CPTII,S$GLB,, | Performed by: STUDENT IN AN ORGANIZED HEALTH CARE EDUCATION/TRAINING PROGRAM

## 2023-12-04 PROCEDURE — 1160F RVW MEDS BY RX/DR IN RCRD: CPT | Mod: CPTII,S$GLB,, | Performed by: STUDENT IN AN ORGANIZED HEALTH CARE EDUCATION/TRAINING PROGRAM

## 2023-12-04 PROCEDURE — 3074F PR MOST RECENT SYSTOLIC BLOOD PRESSURE < 130 MM HG: ICD-10-PCS | Mod: CPTII,S$GLB,, | Performed by: STUDENT IN AN ORGANIZED HEALTH CARE EDUCATION/TRAINING PROGRAM

## 2023-12-04 PROCEDURE — 1126F PR PAIN SEVERITY QUANTIFIED, NO PAIN PRESENT: ICD-10-PCS | Mod: CPTII,S$GLB,, | Performed by: STUDENT IN AN ORGANIZED HEALTH CARE EDUCATION/TRAINING PROGRAM

## 2023-12-04 RX ORDER — ONDANSETRON 2 MG/ML
8 INJECTION INTRAMUSCULAR; INTRAVENOUS
Status: CANCELLED | OUTPATIENT
Start: 2023-12-06

## 2023-12-04 RX ORDER — HEPARIN 100 UNIT/ML
500 SYRINGE INTRAVENOUS
Status: CANCELLED | OUTPATIENT
Start: 2023-12-06

## 2023-12-04 RX ORDER — DIPHENHYDRAMINE HYDROCHLORIDE 50 MG/ML
50 INJECTION INTRAMUSCULAR; INTRAVENOUS ONCE AS NEEDED
Status: CANCELLED | OUTPATIENT
Start: 2023-12-06

## 2023-12-04 RX ORDER — FLUOROURACIL 50 MG/ML
2400 INJECTION, SOLUTION INTRAVENOUS
Status: CANCELLED | OUTPATIENT
Start: 2023-12-06

## 2023-12-04 RX ORDER — EPINEPHRINE 0.3 MG/.3ML
0.3 INJECTION SUBCUTANEOUS ONCE AS NEEDED
Status: CANCELLED | OUTPATIENT
Start: 2023-12-06

## 2023-12-04 RX ORDER — SODIUM CHLORIDE 0.9 % (FLUSH) 0.9 %
10 SYRINGE (ML) INJECTION
Status: CANCELLED | OUTPATIENT
Start: 2023-12-08

## 2023-12-04 RX ORDER — CYANOCOBALAMIN 1000 UG/ML
1000 INJECTION, SOLUTION INTRAMUSCULAR; SUBCUTANEOUS
Status: CANCELLED | OUTPATIENT
Start: 2023-12-11

## 2023-12-04 RX ORDER — HEPARIN 100 UNIT/ML
500 SYRINGE INTRAVENOUS
Status: CANCELLED | OUTPATIENT
Start: 2023-12-08

## 2023-12-04 RX ORDER — SODIUM CHLORIDE 0.9 % (FLUSH) 0.9 %
10 SYRINGE (ML) INJECTION
Status: CANCELLED | OUTPATIENT
Start: 2023-12-06

## 2023-12-04 RX ORDER — FLUOROURACIL 50 MG/ML
400 INJECTION, SOLUTION INTRAVENOUS
Status: CANCELLED | OUTPATIENT
Start: 2023-12-06

## 2023-12-05 ENCOUNTER — TELEPHONE (OUTPATIENT)
Dept: HEMATOLOGY/ONCOLOGY | Facility: CLINIC | Age: 83
End: 2023-12-05
Payer: MEDICARE

## 2023-12-06 ENCOUNTER — INFUSION (OUTPATIENT)
Dept: INFUSION THERAPY | Facility: HOSPITAL | Age: 83
End: 2023-12-06
Attending: STUDENT IN AN ORGANIZED HEALTH CARE EDUCATION/TRAINING PROGRAM
Payer: MEDICARE

## 2023-12-06 VITALS
RESPIRATION RATE: 18 BRPM | HEART RATE: 46 BPM | SYSTOLIC BLOOD PRESSURE: 97 MMHG | OXYGEN SATURATION: 98 % | DIASTOLIC BLOOD PRESSURE: 58 MMHG | TEMPERATURE: 98 F

## 2023-12-06 DIAGNOSIS — C17.0 DUODENAL CANCER: Primary | ICD-10-CM

## 2023-12-06 DIAGNOSIS — E53.8 B12 DEFICIENCY: ICD-10-CM

## 2023-12-06 PROCEDURE — 25000003 PHARM REV CODE 250: Performed by: STUDENT IN AN ORGANIZED HEALTH CARE EDUCATION/TRAINING PROGRAM

## 2023-12-06 PROCEDURE — 63600175 PHARM REV CODE 636 W HCPCS: Performed by: STUDENT IN AN ORGANIZED HEALTH CARE EDUCATION/TRAINING PROGRAM

## 2023-12-06 PROCEDURE — 96375 TX/PRO/DX INJ NEW DRUG ADDON: CPT

## 2023-12-06 PROCEDURE — 96416 CHEMO PROLONG INFUSE W/PUMP: CPT

## 2023-12-06 PROCEDURE — 96367 TX/PROPH/DG ADDL SEQ IV INF: CPT

## 2023-12-06 PROCEDURE — 96409 CHEMO IV PUSH SNGL DRUG: CPT

## 2023-12-06 RX ORDER — DIPHENHYDRAMINE HYDROCHLORIDE 50 MG/ML
50 INJECTION INTRAMUSCULAR; INTRAVENOUS ONCE AS NEEDED
Status: DISCONTINUED | OUTPATIENT
Start: 2023-12-06 | End: 2023-12-06 | Stop reason: HOSPADM

## 2023-12-06 RX ORDER — HEPARIN 100 UNIT/ML
500 SYRINGE INTRAVENOUS
Status: DISCONTINUED | OUTPATIENT
Start: 2023-12-06 | End: 2023-12-06 | Stop reason: HOSPADM

## 2023-12-06 RX ORDER — CYANOCOBALAMIN 1000 UG/ML
1000 INJECTION, SOLUTION INTRAMUSCULAR; SUBCUTANEOUS
Status: COMPLETED | OUTPATIENT
Start: 2023-12-06 | End: 2023-12-06

## 2023-12-06 RX ORDER — EPINEPHRINE 0.3 MG/.3ML
0.3 INJECTION SUBCUTANEOUS ONCE AS NEEDED
Status: DISCONTINUED | OUTPATIENT
Start: 2023-12-06 | End: 2023-12-06 | Stop reason: HOSPADM

## 2023-12-06 RX ORDER — SODIUM CHLORIDE 0.9 % (FLUSH) 0.9 %
10 SYRINGE (ML) INJECTION
Status: DISCONTINUED | OUTPATIENT
Start: 2023-12-06 | End: 2023-12-06 | Stop reason: HOSPADM

## 2023-12-06 RX ORDER — ONDANSETRON 2 MG/ML
8 INJECTION INTRAMUSCULAR; INTRAVENOUS
Status: COMPLETED | OUTPATIENT
Start: 2023-12-06 | End: 2023-12-06

## 2023-12-06 RX ORDER — FLUOROURACIL 50 MG/ML
400 INJECTION, SOLUTION INTRAVENOUS
Status: COMPLETED | OUTPATIENT
Start: 2023-12-06 | End: 2023-12-06

## 2023-12-06 RX ORDER — SODIUM CHLORIDE 0.9 % (FLUSH) 0.9 %
10 SYRINGE (ML) INJECTION
OUTPATIENT
Start: 2023-12-06

## 2023-12-06 RX ORDER — CYANOCOBALAMIN 1000 UG/ML
1000 INJECTION, SOLUTION INTRAMUSCULAR; SUBCUTANEOUS
Status: CANCELLED | OUTPATIENT
Start: 2023-12-11

## 2023-12-06 RX ORDER — HEPARIN 100 UNIT/ML
500 SYRINGE INTRAVENOUS
OUTPATIENT
Start: 2023-12-06

## 2023-12-06 RX ADMIN — FLUOROURACIL 4100 MG: 50 INJECTION, SOLUTION INTRAVENOUS at 02:12

## 2023-12-06 RX ADMIN — DEXTROSE 700 MG: 5 SOLUTION INTRAVENOUS at 01:12

## 2023-12-06 RX ADMIN — CYANOCOBALAMIN 1000 MCG: 1000 INJECTION, SOLUTION INTRAMUSCULAR at 02:12

## 2023-12-06 RX ADMIN — FLUOROURACIL 685 MG: 50 INJECTION, SOLUTION INTRAVENOUS at 02:12

## 2023-12-06 RX ADMIN — SODIUM CHLORIDE: 9 INJECTION, SOLUTION INTRAVENOUS at 01:12

## 2023-12-06 RX ADMIN — ONDANSETRON 8 MG: 2 INJECTION INTRAMUSCULAR; INTRAVENOUS at 01:12

## 2023-12-06 NOTE — PLAN OF CARE
Cycle 4 treatment given.  LV, 5fu, B12, pump.  Tolerated well.  Discharged to home, with copy of upcoming appointments.

## 2023-12-08 ENCOUNTER — INFUSION (OUTPATIENT)
Dept: INFUSION THERAPY | Facility: HOSPITAL | Age: 83
End: 2023-12-08
Attending: STUDENT IN AN ORGANIZED HEALTH CARE EDUCATION/TRAINING PROGRAM
Payer: MEDICARE

## 2023-12-08 VITALS — WEIGHT: 137 LBS | BODY MASS INDEX: 20.23 KG/M2

## 2023-12-08 DIAGNOSIS — C17.0 DUODENAL CANCER: Primary | ICD-10-CM

## 2023-12-08 RX ORDER — HEPARIN 100 UNIT/ML
500 SYRINGE INTRAVENOUS
Status: DISCONTINUED | OUTPATIENT
Start: 2023-12-08 | End: 2023-12-08 | Stop reason: HOSPADM

## 2023-12-08 RX ORDER — SODIUM CHLORIDE 0.9 % (FLUSH) 0.9 %
10 SYRINGE (ML) INJECTION
Status: DISCONTINUED | OUTPATIENT
Start: 2023-12-08 | End: 2023-12-08 | Stop reason: HOSPADM

## 2023-12-13 ENCOUNTER — INFUSION (OUTPATIENT)
Dept: INFUSION THERAPY | Facility: HOSPITAL | Age: 83
End: 2023-12-13
Attending: STUDENT IN AN ORGANIZED HEALTH CARE EDUCATION/TRAINING PROGRAM
Payer: MEDICARE

## 2023-12-13 DIAGNOSIS — E53.8 B12 DEFICIENCY: Primary | ICD-10-CM

## 2023-12-13 PROCEDURE — 63600175 PHARM REV CODE 636 W HCPCS: Performed by: STUDENT IN AN ORGANIZED HEALTH CARE EDUCATION/TRAINING PROGRAM

## 2023-12-13 PROCEDURE — 96372 THER/PROPH/DIAG INJ SC/IM: CPT

## 2023-12-13 RX ORDER — CYANOCOBALAMIN 1000 UG/ML
1000 INJECTION, SOLUTION INTRAMUSCULAR; SUBCUTANEOUS
Status: CANCELLED | OUTPATIENT
Start: 2023-12-18

## 2023-12-13 RX ORDER — CYANOCOBALAMIN 1000 UG/ML
1000 INJECTION, SOLUTION INTRAMUSCULAR; SUBCUTANEOUS
Status: COMPLETED | OUTPATIENT
Start: 2023-12-13 | End: 2023-12-13

## 2023-12-13 RX ADMIN — CYANOCOBALAMIN 1000 MCG: 1000 INJECTION, SOLUTION INTRAMUSCULAR; SUBCUTANEOUS at 08:12

## 2023-12-18 ENCOUNTER — OFFICE VISIT (OUTPATIENT)
Dept: HEMATOLOGY/ONCOLOGY | Facility: CLINIC | Age: 83
End: 2023-12-18
Payer: MEDICARE

## 2023-12-18 VITALS
WEIGHT: 141.13 LBS | HEIGHT: 69 IN | HEART RATE: 65 BPM | RESPIRATION RATE: 18 BRPM | DIASTOLIC BLOOD PRESSURE: 68 MMHG | OXYGEN SATURATION: 98 % | BODY MASS INDEX: 20.9 KG/M2 | TEMPERATURE: 98 F | SYSTOLIC BLOOD PRESSURE: 112 MMHG

## 2023-12-18 DIAGNOSIS — C77.2 SECONDARY AND UNSPECIFIED MALIGNANT NEOPLASM OF INTRA-ABDOMINAL LYMPH NODES: ICD-10-CM

## 2023-12-18 DIAGNOSIS — C17.0 DUODENAL CANCER: Primary | ICD-10-CM

## 2023-12-18 DIAGNOSIS — D84.821 DRUG-INDUCED IMMUNODEFICIENCY: ICD-10-CM

## 2023-12-18 DIAGNOSIS — Z79.899 DRUG-INDUCED IMMUNODEFICIENCY: ICD-10-CM

## 2023-12-18 PROCEDURE — 3078F DIAST BP <80 MM HG: CPT | Mod: CPTII,S$GLB,,

## 2023-12-18 PROCEDURE — 99215 PR OFFICE/OUTPT VISIT, EST, LEVL V, 40-54 MIN: ICD-10-PCS | Mod: S$GLB,,,

## 2023-12-18 PROCEDURE — 3074F SYST BP LT 130 MM HG: CPT | Mod: CPTII,S$GLB,,

## 2023-12-18 PROCEDURE — 1159F PR MEDICATION LIST DOCUMENTED IN MEDICAL RECORD: ICD-10-PCS | Mod: CPTII,S$GLB,,

## 2023-12-18 PROCEDURE — 1160F RVW MEDS BY RX/DR IN RCRD: CPT | Mod: CPTII,S$GLB,,

## 2023-12-18 PROCEDURE — 99999 PR PBB SHADOW E&M-EST. PATIENT-LVL IV: ICD-10-PCS | Mod: PBBFAC,,,

## 2023-12-18 PROCEDURE — 1126F AMNT PAIN NOTED NONE PRSNT: CPT | Mod: CPTII,S$GLB,,

## 2023-12-18 PROCEDURE — 1159F MED LIST DOCD IN RCRD: CPT | Mod: CPTII,S$GLB,,

## 2023-12-18 PROCEDURE — 3074F PR MOST RECENT SYSTOLIC BLOOD PRESSURE < 130 MM HG: ICD-10-PCS | Mod: CPTII,S$GLB,,

## 2023-12-18 PROCEDURE — 1160F PR REVIEW ALL MEDS BY PRESCRIBER/CLIN PHARMACIST DOCUMENTED: ICD-10-PCS | Mod: CPTII,S$GLB,,

## 2023-12-18 PROCEDURE — 99999 PR PBB SHADOW E&M-EST. PATIENT-LVL IV: CPT | Mod: PBBFAC,,,

## 2023-12-18 PROCEDURE — 99215 OFFICE O/P EST HI 40 MIN: CPT | Mod: S$GLB,,,

## 2023-12-18 PROCEDURE — 1126F PR PAIN SEVERITY QUANTIFIED, NO PAIN PRESENT: ICD-10-PCS | Mod: CPTII,S$GLB,,

## 2023-12-18 PROCEDURE — 3078F PR MOST RECENT DIASTOLIC BLOOD PRESSURE < 80 MM HG: ICD-10-PCS | Mod: CPTII,S$GLB,,

## 2023-12-18 RX ORDER — HEPARIN 100 UNIT/ML
500 SYRINGE INTRAVENOUS
Status: CANCELLED | OUTPATIENT
Start: 2023-12-22

## 2023-12-18 RX ORDER — SODIUM CHLORIDE 0.9 % (FLUSH) 0.9 %
10 SYRINGE (ML) INJECTION
Status: CANCELLED | OUTPATIENT
Start: 2023-12-22
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
## 2023-12-20 ENCOUNTER — INFUSION (OUTPATIENT)
Dept: INFUSION THERAPY | Facility: HOSPITAL | Age: 83
End: 2023-12-20
Attending: STUDENT IN AN ORGANIZED HEALTH CARE EDUCATION/TRAINING PROGRAM
Payer: MEDICARE

## 2023-12-20 VITALS
OXYGEN SATURATION: 98 % | HEART RATE: 69 BPM | BODY MASS INDEX: 21.38 KG/M2 | DIASTOLIC BLOOD PRESSURE: 63 MMHG | TEMPERATURE: 99 F | SYSTOLIC BLOOD PRESSURE: 108 MMHG | WEIGHT: 144.38 LBS | HEIGHT: 69 IN | RESPIRATION RATE: 18 BRPM

## 2023-12-20 DIAGNOSIS — C17.0 DUODENAL CANCER: Primary | ICD-10-CM

## 2023-12-20 DIAGNOSIS — E53.8 B12 DEFICIENCY: ICD-10-CM

## 2023-12-20 PROCEDURE — 25000003 PHARM REV CODE 250

## 2023-12-20 PROCEDURE — 63600175 PHARM REV CODE 636 W HCPCS: Performed by: STUDENT IN AN ORGANIZED HEALTH CARE EDUCATION/TRAINING PROGRAM

## 2023-12-20 PROCEDURE — 96372 THER/PROPH/DIAG INJ SC/IM: CPT | Mod: 59

## 2023-12-20 PROCEDURE — 96367 TX/PROPH/DG ADDL SEQ IV INF: CPT

## 2023-12-20 PROCEDURE — 96375 TX/PRO/DX INJ NEW DRUG ADDON: CPT

## 2023-12-20 PROCEDURE — 63600175 PHARM REV CODE 636 W HCPCS

## 2023-12-20 PROCEDURE — 96409 CHEMO IV PUSH SNGL DRUG: CPT

## 2023-12-20 PROCEDURE — 96416 CHEMO PROLONG INFUSE W/PUMP: CPT

## 2023-12-20 RX ORDER — DIPHENHYDRAMINE HYDROCHLORIDE 50 MG/ML
50 INJECTION INTRAMUSCULAR; INTRAVENOUS ONCE AS NEEDED
Status: DISCONTINUED | OUTPATIENT
Start: 2023-12-20 | End: 2023-12-20 | Stop reason: HOSPADM

## 2023-12-20 RX ORDER — CYANOCOBALAMIN 1000 UG/ML
1000 INJECTION, SOLUTION INTRAMUSCULAR; SUBCUTANEOUS
Status: CANCELLED | OUTPATIENT
Start: 2023-12-25

## 2023-12-20 RX ORDER — ONDANSETRON 2 MG/ML
8 INJECTION INTRAMUSCULAR; INTRAVENOUS
Status: COMPLETED | OUTPATIENT
Start: 2023-12-20 | End: 2023-12-20

## 2023-12-20 RX ORDER — CYANOCOBALAMIN 1000 UG/ML
1000 INJECTION, SOLUTION INTRAMUSCULAR; SUBCUTANEOUS
Status: COMPLETED | OUTPATIENT
Start: 2023-12-20 | End: 2023-12-20

## 2023-12-20 RX ORDER — FLUOROURACIL 50 MG/ML
400 INJECTION, SOLUTION INTRAVENOUS
Status: COMPLETED | OUTPATIENT
Start: 2023-12-20 | End: 2023-12-20

## 2023-12-20 RX ORDER — EPINEPHRINE 0.3 MG/.3ML
0.3 INJECTION SUBCUTANEOUS ONCE AS NEEDED
Status: DISCONTINUED | OUTPATIENT
Start: 2023-12-20 | End: 2023-12-20 | Stop reason: HOSPADM

## 2023-12-20 RX ORDER — HEPARIN 100 UNIT/ML
500 SYRINGE INTRAVENOUS
Status: DISCONTINUED | OUTPATIENT
Start: 2023-12-20 | End: 2023-12-20 | Stop reason: HOSPADM

## 2023-12-20 RX ORDER — SODIUM CHLORIDE 0.9 % (FLUSH) 0.9 %
10 SYRINGE (ML) INJECTION
Status: DISCONTINUED | OUTPATIENT
Start: 2023-12-20 | End: 2023-12-20 | Stop reason: HOSPADM

## 2023-12-20 RX ADMIN — FLUOROURACIL 685 MG: 50 INJECTION, SOLUTION INTRAVENOUS at 02:12

## 2023-12-20 RX ADMIN — LEUCOVORIN CALCIUM 700 MG: 350 INJECTION, POWDER, LYOPHILIZED, FOR SOLUTION INTRAMUSCULAR; INTRAVENOUS at 01:12

## 2023-12-20 RX ADMIN — FLUOROURACIL 4100 MG: 50 INJECTION, SOLUTION INTRAVENOUS at 02:12

## 2023-12-20 RX ADMIN — SODIUM CHLORIDE 1000 ML: 9 INJECTION, SOLUTION INTRAVENOUS at 01:12

## 2023-12-20 RX ADMIN — CYANOCOBALAMIN 1000 MCG: 1000 INJECTION, SOLUTION INTRAMUSCULAR at 02:12

## 2023-12-20 RX ADMIN — ONDANSETRON 8 MG: 2 INJECTION INTRAMUSCULAR; INTRAVENOUS at 01:12

## 2023-12-22 ENCOUNTER — INFUSION (OUTPATIENT)
Dept: INFUSION THERAPY | Facility: HOSPITAL | Age: 83
End: 2023-12-22
Attending: STUDENT IN AN ORGANIZED HEALTH CARE EDUCATION/TRAINING PROGRAM
Payer: MEDICARE

## 2023-12-22 VITALS
OXYGEN SATURATION: 98 % | SYSTOLIC BLOOD PRESSURE: 129 MMHG | RESPIRATION RATE: 18 BRPM | TEMPERATURE: 98 F | DIASTOLIC BLOOD PRESSURE: 66 MMHG | HEART RATE: 68 BPM

## 2023-12-22 DIAGNOSIS — C17.0 DUODENAL CANCER: ICD-10-CM

## 2023-12-22 DIAGNOSIS — C17.0 DUODENAL CANCER: Primary | ICD-10-CM

## 2023-12-22 PROCEDURE — 25000003 PHARM REV CODE 250

## 2023-12-22 PROCEDURE — A4216 STERILE WATER/SALINE, 10 ML: HCPCS

## 2023-12-22 PROCEDURE — 63600175 PHARM REV CODE 636 W HCPCS

## 2023-12-22 RX ORDER — SODIUM CHLORIDE 0.9 % (FLUSH) 0.9 %
10 SYRINGE (ML) INJECTION
Status: DISCONTINUED | OUTPATIENT
Start: 2023-12-22 | End: 2023-12-22 | Stop reason: HOSPADM

## 2023-12-22 RX ORDER — ONDANSETRON HYDROCHLORIDE 8 MG/1
8 TABLET, FILM COATED ORAL EVERY 12 HOURS PRN
Qty: 30 TABLET | Refills: 2 | Status: SHIPPED | OUTPATIENT
Start: 2023-12-22 | End: 2024-12-21

## 2023-12-22 RX ORDER — HEPARIN 100 UNIT/ML
500 SYRINGE INTRAVENOUS
Status: DISCONTINUED | OUTPATIENT
Start: 2023-12-22 | End: 2023-12-22 | Stop reason: HOSPADM

## 2023-12-22 RX ADMIN — Medication 10 ML: at 01:12

## 2023-12-22 RX ADMIN — HEPARIN 500 UNITS: 100 SYRINGE at 01:12

## 2023-12-27 ENCOUNTER — LAB REQUISITION (OUTPATIENT)
Dept: LAB | Facility: HOSPITAL | Age: 83
End: 2023-12-27
Payer: MEDICARE

## 2023-12-27 DIAGNOSIS — R30.0 DYSURIA: ICD-10-CM

## 2023-12-27 LAB
APPEARANCE UR: ABNORMAL
BACTERIA #/AREA URNS AUTO: ABNORMAL /HPF
BILIRUB UR QL STRIP.AUTO: NEGATIVE
COLOR UR AUTO: YELLOW
GLUCOSE UR QL STRIP.AUTO: NEGATIVE
KETONES UR QL STRIP.AUTO: NEGATIVE
LEUKOCYTE ESTERASE UR QL STRIP.AUTO: ABNORMAL
NITRITE UR QL STRIP.AUTO: NEGATIVE
PH UR STRIP.AUTO: 7 [PH]
PROT UR QL STRIP.AUTO: ABNORMAL
RBC #/AREA URNS AUTO: ABNORMAL /HPF
RBC UR QL AUTO: ABNORMAL
SP GR UR STRIP.AUTO: 1.02 (ref 1–1.03)
SQUAMOUS #/AREA URNS AUTO: ABNORMAL /HPF
UROBILINOGEN UR STRIP-ACNC: 0.2
WBC #/AREA URNS AUTO: ABNORMAL /HPF
YEAST URNS QL MICRO: ABNORMAL /HPF

## 2023-12-27 PROCEDURE — 81003 URINALYSIS AUTO W/O SCOPE: CPT | Performed by: UROLOGY

## 2023-12-27 PROCEDURE — 87086 URINE CULTURE/COLONY COUNT: CPT | Performed by: UROLOGY

## 2023-12-29 ENCOUNTER — TELEPHONE (OUTPATIENT)
Dept: HEMATOLOGY/ONCOLOGY | Facility: CLINIC | Age: 83
End: 2023-12-29
Payer: MEDICARE

## 2023-12-29 ENCOUNTER — INFUSION (OUTPATIENT)
Dept: INFUSION THERAPY | Facility: HOSPITAL | Age: 83
End: 2023-12-29
Attending: STUDENT IN AN ORGANIZED HEALTH CARE EDUCATION/TRAINING PROGRAM
Payer: MEDICARE

## 2023-12-29 VITALS
HEIGHT: 69 IN | TEMPERATURE: 99 F | WEIGHT: 144 LBS | RESPIRATION RATE: 18 BRPM | DIASTOLIC BLOOD PRESSURE: 80 MMHG | BODY MASS INDEX: 21.33 KG/M2 | HEART RATE: 74 BPM | OXYGEN SATURATION: 98 % | SYSTOLIC BLOOD PRESSURE: 157 MMHG

## 2023-12-29 DIAGNOSIS — E53.8 B12 DEFICIENCY: Primary | ICD-10-CM

## 2023-12-29 PROCEDURE — 96372 THER/PROPH/DIAG INJ SC/IM: CPT

## 2023-12-29 PROCEDURE — 63600175 PHARM REV CODE 636 W HCPCS: Performed by: STUDENT IN AN ORGANIZED HEALTH CARE EDUCATION/TRAINING PROGRAM

## 2023-12-29 RX ORDER — CYANOCOBALAMIN 1000 UG/ML
1000 INJECTION, SOLUTION INTRAMUSCULAR; SUBCUTANEOUS
Status: COMPLETED | OUTPATIENT
Start: 2023-12-29 | End: 2023-12-29

## 2023-12-29 RX ORDER — CYANOCOBALAMIN 1000 UG/ML
1000 INJECTION, SOLUTION INTRAMUSCULAR; SUBCUTANEOUS
Status: CANCELLED | OUTPATIENT
Start: 2024-01-01

## 2023-12-29 RX ADMIN — CYANOCOBALAMIN 1000 MCG: 1000 INJECTION, SOLUTION INTRAMUSCULAR at 10:12

## 2023-12-29 NOTE — TELEPHONE ENCOUNTER
Patients daughter, Trice, called stating the patient has a burning sensation when he urinates for the past week. The home health nurse took a urine sample, but the patients urologist, Dr. Sonido Negron, cannot take the sample from an outside facility. Patients daughter is wanting to know what should she do. Nancy advise.

## 2024-01-01 LAB
BACTERIA UR CULT: ABNORMAL

## 2024-01-03 ENCOUNTER — OFFICE VISIT (OUTPATIENT)
Dept: HEMATOLOGY/ONCOLOGY | Facility: CLINIC | Age: 84
End: 2024-01-03
Payer: MEDICARE

## 2024-01-03 ENCOUNTER — INFUSION (OUTPATIENT)
Dept: INFUSION THERAPY | Facility: HOSPITAL | Age: 84
End: 2024-01-03
Attending: STUDENT IN AN ORGANIZED HEALTH CARE EDUCATION/TRAINING PROGRAM
Payer: MEDICARE

## 2024-01-03 VITALS
RESPIRATION RATE: 16 BRPM | DIASTOLIC BLOOD PRESSURE: 85 MMHG | HEIGHT: 69 IN | SYSTOLIC BLOOD PRESSURE: 142 MMHG | WEIGHT: 144.13 LBS | BODY MASS INDEX: 21.35 KG/M2 | OXYGEN SATURATION: 100 % | TEMPERATURE: 97 F | HEART RATE: 70 BPM

## 2024-01-03 VITALS
OXYGEN SATURATION: 99 % | DIASTOLIC BLOOD PRESSURE: 72 MMHG | SYSTOLIC BLOOD PRESSURE: 171 MMHG | TEMPERATURE: 98 F | HEART RATE: 66 BPM

## 2024-01-03 DIAGNOSIS — Z79.899 DRUG-INDUCED IMMUNODEFICIENCY: ICD-10-CM

## 2024-01-03 DIAGNOSIS — C17.0 DUODENAL CANCER: Primary | ICD-10-CM

## 2024-01-03 DIAGNOSIS — D84.821 DRUG-INDUCED IMMUNODEFICIENCY: ICD-10-CM

## 2024-01-03 PROCEDURE — 99215 OFFICE O/P EST HI 40 MIN: CPT | Mod: S$GLB,,,

## 2024-01-03 PROCEDURE — 25000003 PHARM REV CODE 250

## 2024-01-03 PROCEDURE — 96411 CHEMO IV PUSH ADDL DRUG: CPT

## 2024-01-03 PROCEDURE — 1126F AMNT PAIN NOTED NONE PRSNT: CPT | Mod: CPTII,S$GLB,,

## 2024-01-03 PROCEDURE — 3079F DIAST BP 80-89 MM HG: CPT | Mod: CPTII,S$GLB,,

## 2024-01-03 PROCEDURE — 3077F SYST BP >= 140 MM HG: CPT | Mod: CPTII,S$GLB,,

## 2024-01-03 PROCEDURE — 96416 CHEMO PROLONG INFUSE W/PUMP: CPT

## 2024-01-03 PROCEDURE — 96409 CHEMO IV PUSH SNGL DRUG: CPT

## 2024-01-03 PROCEDURE — 1159F MED LIST DOCD IN RCRD: CPT | Mod: CPTII,S$GLB,,

## 2024-01-03 PROCEDURE — 63600175 PHARM REV CODE 636 W HCPCS

## 2024-01-03 PROCEDURE — 96367 TX/PROPH/DG ADDL SEQ IV INF: CPT

## 2024-01-03 PROCEDURE — 96375 TX/PRO/DX INJ NEW DRUG ADDON: CPT

## 2024-01-03 PROCEDURE — 99999 PR PBB SHADOW E&M-EST. PATIENT-LVL IV: CPT | Mod: PBBFAC,,,

## 2024-01-03 RX ORDER — FLUOROURACIL 50 MG/ML
2400 INJECTION, SOLUTION INTRAVENOUS
Status: CANCELLED | OUTPATIENT
Start: 2024-01-03

## 2024-01-03 RX ORDER — EPINEPHRINE 0.3 MG/.3ML
0.3 INJECTION SUBCUTANEOUS ONCE AS NEEDED
Status: DISCONTINUED | OUTPATIENT
Start: 2024-01-03 | End: 2024-01-03 | Stop reason: HOSPADM

## 2024-01-03 RX ORDER — ONDANSETRON 2 MG/ML
8 INJECTION INTRAMUSCULAR; INTRAVENOUS
Status: COMPLETED | OUTPATIENT
Start: 2024-01-03 | End: 2024-01-03

## 2024-01-03 RX ORDER — HEPARIN 100 UNIT/ML
500 SYRINGE INTRAVENOUS
Status: DISCONTINUED | OUTPATIENT
Start: 2024-01-03 | End: 2024-01-03 | Stop reason: HOSPADM

## 2024-01-03 RX ORDER — EPINEPHRINE 0.3 MG/.3ML
0.3 INJECTION SUBCUTANEOUS ONCE AS NEEDED
Status: CANCELLED | OUTPATIENT
Start: 2024-01-03

## 2024-01-03 RX ORDER — FLUOROURACIL 50 MG/ML
400 INJECTION, SOLUTION INTRAVENOUS
Status: CANCELLED | OUTPATIENT
Start: 2024-01-03

## 2024-01-03 RX ORDER — ONDANSETRON 2 MG/ML
8 INJECTION INTRAMUSCULAR; INTRAVENOUS
Status: CANCELLED | OUTPATIENT
Start: 2024-01-03

## 2024-01-03 RX ORDER — SODIUM CHLORIDE 0.9 % (FLUSH) 0.9 %
10 SYRINGE (ML) INJECTION
Status: CANCELLED | OUTPATIENT
Start: 2024-01-03

## 2024-01-03 RX ORDER — HEPARIN 100 UNIT/ML
500 SYRINGE INTRAVENOUS
Status: CANCELLED | OUTPATIENT
Start: 2024-01-05

## 2024-01-03 RX ORDER — SODIUM CHLORIDE 0.9 % (FLUSH) 0.9 %
10 SYRINGE (ML) INJECTION
Status: DISCONTINUED | OUTPATIENT
Start: 2024-01-03 | End: 2024-01-03 | Stop reason: HOSPADM

## 2024-01-03 RX ORDER — HEPARIN 100 UNIT/ML
500 SYRINGE INTRAVENOUS
Status: CANCELLED | OUTPATIENT
Start: 2024-01-03

## 2024-01-03 RX ORDER — DIPHENHYDRAMINE HYDROCHLORIDE 50 MG/ML
50 INJECTION, SOLUTION INTRAMUSCULAR; INTRAVENOUS ONCE AS NEEDED
Status: DISCONTINUED | OUTPATIENT
Start: 2024-01-03 | End: 2024-01-03 | Stop reason: HOSPADM

## 2024-01-03 RX ORDER — SODIUM CHLORIDE 0.9 % (FLUSH) 0.9 %
10 SYRINGE (ML) INJECTION
Status: CANCELLED | OUTPATIENT
Start: 2024-01-05

## 2024-01-03 RX ORDER — DIPHENHYDRAMINE HYDROCHLORIDE 50 MG/ML
50 INJECTION, SOLUTION INTRAMUSCULAR; INTRAVENOUS ONCE AS NEEDED
Status: CANCELLED | OUTPATIENT
Start: 2024-01-03

## 2024-01-03 RX ORDER — FLUOROURACIL 50 MG/ML
400 INJECTION, SOLUTION INTRAVENOUS
Status: COMPLETED | OUTPATIENT
Start: 2024-01-03 | End: 2024-01-03

## 2024-01-03 RX ADMIN — FLUOROURACIL 4000 MG: 50 INJECTION, SOLUTION INTRAVENOUS at 11:01

## 2024-01-03 RX ADMIN — LEUCOVORIN CALCIUM 700 MG: 200 INJECTION, POWDER, LYOPHILIZED, FOR SOLUTION INTRAMUSCULAR; INTRAVENOUS at 10:01

## 2024-01-03 RX ADMIN — FLUOROURACIL 685 MG: 50 INJECTION, SOLUTION INTRAVENOUS at 11:01

## 2024-01-03 RX ADMIN — ONDANSETRON 8 MG: 2 INJECTION INTRAMUSCULAR; INTRAVENOUS at 10:01
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
## 2024-01-05 ENCOUNTER — INFUSION (OUTPATIENT)
Dept: INFUSION THERAPY | Facility: HOSPITAL | Age: 84
End: 2024-01-05
Attending: STUDENT IN AN ORGANIZED HEALTH CARE EDUCATION/TRAINING PROGRAM
Payer: MEDICARE

## 2024-01-05 VITALS
OXYGEN SATURATION: 98 % | HEART RATE: 79 BPM | DIASTOLIC BLOOD PRESSURE: 73 MMHG | TEMPERATURE: 98 F | SYSTOLIC BLOOD PRESSURE: 153 MMHG | RESPIRATION RATE: 18 BRPM

## 2024-01-05 DIAGNOSIS — C17.0 DUODENAL CANCER: Primary | ICD-10-CM

## 2024-01-05 PROCEDURE — 25000003 PHARM REV CODE 250

## 2024-01-05 PROCEDURE — 63600175 PHARM REV CODE 636 W HCPCS

## 2024-01-05 PROCEDURE — A4216 STERILE WATER/SALINE, 10 ML: HCPCS

## 2024-01-05 RX ORDER — SODIUM CHLORIDE 0.9 % (FLUSH) 0.9 %
10 SYRINGE (ML) INJECTION
Status: DISCONTINUED | OUTPATIENT
Start: 2024-01-05 | End: 2024-01-05 | Stop reason: HOSPADM

## 2024-01-05 RX ORDER — HEPARIN 100 UNIT/ML
500 SYRINGE INTRAVENOUS
Status: DISCONTINUED | OUTPATIENT
Start: 2024-01-05 | End: 2024-01-05 | Stop reason: HOSPADM

## 2024-01-05 RX ADMIN — Medication 10 ML: at 09:01

## 2024-01-05 RX ADMIN — HEPARIN 500 UNITS: 100 SYRINGE at 09:01

## 2024-01-05 NOTE — PLAN OF CARE
PT ESTHER CHEMO WELL. DC CADD PUMP. NO PROB VOICED AT THIS TIME. TODAY WAS LAST TREATMENT. WILL FOLLOW UP WITH md AT THE END OF MONTH

## 2024-01-06 ENCOUNTER — PATIENT MESSAGE (OUTPATIENT)
Dept: HEMATOLOGY/ONCOLOGY | Facility: CLINIC | Age: 84
End: 2024-01-06
Payer: MEDICARE

## 2024-01-24 ENCOUNTER — HOSPITAL ENCOUNTER (OUTPATIENT)
Dept: RADIOLOGY | Facility: HOSPITAL | Age: 84
Discharge: HOME | End: 2024-01-24
Payer: MEDICARE

## 2024-01-24 DIAGNOSIS — C17.0 DUODENAL CANCER: ICD-10-CM

## 2024-01-24 PROCEDURE — 25500020 PHARM REV CODE 255

## 2024-01-24 PROCEDURE — 74177 CT ABD & PELVIS W/CONTRAST: CPT | Mod: TC

## 2024-01-24 RX ADMIN — IOPAMIDOL 100 ML: 755 INJECTION, SOLUTION INTRAVENOUS at 08:01

## 2024-01-24 RX ADMIN — DIATRIZOATE MEGLUMINE AND DIATRIZOATE SODIUM 30 ML: 660; 100 LIQUID ORAL; RECTAL at 08:01
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
## 2024-01-31 ENCOUNTER — PATIENT MESSAGE (OUTPATIENT)
Dept: HEMATOLOGY/ONCOLOGY | Facility: CLINIC | Age: 84
End: 2024-01-31

## 2024-01-31 ENCOUNTER — TELEPHONE (OUTPATIENT)
Dept: HEMATOLOGY/ONCOLOGY | Facility: CLINIC | Age: 84
End: 2024-01-31

## 2024-01-31 ENCOUNTER — OFFICE VISIT (OUTPATIENT)
Dept: HEMATOLOGY/ONCOLOGY | Facility: CLINIC | Age: 84
End: 2024-01-31
Payer: MEDICARE

## 2024-01-31 VITALS
RESPIRATION RATE: 14 BRPM | HEIGHT: 69 IN | BODY MASS INDEX: 20.71 KG/M2 | DIASTOLIC BLOOD PRESSURE: 76 MMHG | TEMPERATURE: 98 F | WEIGHT: 139.81 LBS | HEART RATE: 78 BPM | OXYGEN SATURATION: 97 % | SYSTOLIC BLOOD PRESSURE: 145 MMHG

## 2024-01-31 DIAGNOSIS — C17.0 DUODENAL CANCER: Primary | ICD-10-CM

## 2024-01-31 PROCEDURE — 99214 OFFICE O/P EST MOD 30 MIN: CPT | Mod: S$GLB,,, | Performed by: STUDENT IN AN ORGANIZED HEALTH CARE EDUCATION/TRAINING PROGRAM

## 2024-01-31 PROCEDURE — 3078F DIAST BP <80 MM HG: CPT | Mod: CPTII,S$GLB,, | Performed by: STUDENT IN AN ORGANIZED HEALTH CARE EDUCATION/TRAINING PROGRAM

## 2024-01-31 PROCEDURE — 1126F AMNT PAIN NOTED NONE PRSNT: CPT | Mod: CPTII,S$GLB,, | Performed by: STUDENT IN AN ORGANIZED HEALTH CARE EDUCATION/TRAINING PROGRAM

## 2024-01-31 PROCEDURE — 99999 PR PBB SHADOW E&M-EST. PATIENT-LVL IV: CPT | Mod: PBBFAC,,, | Performed by: STUDENT IN AN ORGANIZED HEALTH CARE EDUCATION/TRAINING PROGRAM

## 2024-01-31 PROCEDURE — 3077F SYST BP >= 140 MM HG: CPT | Mod: CPTII,S$GLB,, | Performed by: STUDENT IN AN ORGANIZED HEALTH CARE EDUCATION/TRAINING PROGRAM

## 2024-01-31 PROCEDURE — 1159F MED LIST DOCD IN RCRD: CPT | Mod: CPTII,S$GLB,, | Performed by: STUDENT IN AN ORGANIZED HEALTH CARE EDUCATION/TRAINING PROGRAM

## 2024-01-31 PROCEDURE — 1160F RVW MEDS BY RX/DR IN RCRD: CPT | Mod: CPTII,S$GLB,, | Performed by: STUDENT IN AN ORGANIZED HEALTH CARE EDUCATION/TRAINING PROGRAM

## 2024-03-04 ENCOUNTER — LAB VISIT (OUTPATIENT)
Dept: LAB | Facility: HOSPITAL | Age: 84
End: 2024-03-04
Attending: INTERNAL MEDICINE
Payer: MEDICARE

## 2024-03-04 DIAGNOSIS — R74.02 NONSPECIFIC ELEVATION OF LEVELS OF TRANSAMINASE OR LACTIC ACID DEHYDROGENASE (LDH): Primary | ICD-10-CM

## 2024-03-04 DIAGNOSIS — R74.01 NONSPECIFIC ELEVATION OF LEVELS OF TRANSAMINASE OR LACTIC ACID DEHYDROGENASE (LDH): Primary | ICD-10-CM

## 2024-03-04 LAB
ALBUMIN SERPL-MCNC: 3.3 G/DL (ref 3.4–4.8)
ALBUMIN/GLOB SERPL: 1 RATIO (ref 1.1–2)
ALP SERPL-CCNC: 477 UNIT/L (ref 40–150)
ALT SERPL-CCNC: 80 UNIT/L (ref 0–55)
AST SERPL-CCNC: 95 UNIT/L (ref 5–34)
BILIRUB SERPL-MCNC: 0.4 MG/DL
BUN SERPL-MCNC: 23.5 MG/DL (ref 8.4–25.7)
CALCIUM SERPL-MCNC: 9.3 MG/DL (ref 8.8–10)
CHLORIDE SERPL-SCNC: 106 MMOL/L (ref 98–107)
CO2 SERPL-SCNC: 29 MMOL/L (ref 23–31)
CREAT SERPL-MCNC: 0.92 MG/DL (ref 0.73–1.18)
GFR SERPLBLD CREATININE-BSD FMLA CKD-EPI: >60 MLS/MIN/1.73/M2
GLOBULIN SER-MCNC: 3.3 GM/DL (ref 2.4–3.5)
GLUCOSE SERPL-MCNC: 97 MG/DL (ref 82–115)
POTASSIUM SERPL-SCNC: 4.5 MMOL/L (ref 3.5–5.1)
PROT SERPL-MCNC: 6.6 GM/DL (ref 5.8–7.6)
SODIUM SERPL-SCNC: 143 MMOL/L (ref 136–145)

## 2024-03-04 PROCEDURE — 36415 COLL VENOUS BLD VENIPUNCTURE: CPT

## 2024-03-04 PROCEDURE — 80053 COMPREHEN METABOLIC PANEL: CPT

## 2024-03-22 ENCOUNTER — PATIENT MESSAGE (OUTPATIENT)
Dept: HEMATOLOGY/ONCOLOGY | Facility: CLINIC | Age: 84
End: 2024-03-22
Payer: MEDICARE

## 2024-03-23 ENCOUNTER — HOSPITAL ENCOUNTER (EMERGENCY)
Facility: HOSPITAL | Age: 84
Discharge: HOME | End: 2024-03-23
Attending: EMERGENCY MEDICINE
Payer: MEDICARE

## 2024-03-23 VITALS
OXYGEN SATURATION: 99 % | DIASTOLIC BLOOD PRESSURE: 77 MMHG | HEART RATE: 70 BPM | TEMPERATURE: 98 F | RESPIRATION RATE: 19 BRPM | SYSTOLIC BLOOD PRESSURE: 146 MMHG

## 2024-03-23 DIAGNOSIS — R33.9 URINARY RETENTION: Primary | ICD-10-CM

## 2024-03-23 LAB
APPEARANCE UR: CLEAR
BACTERIA #/AREA URNS AUTO: ABNORMAL /HPF
BILIRUB UR QL STRIP.AUTO: NEGATIVE
COLOR UR AUTO: ABNORMAL
GLUCOSE UR QL STRIP.AUTO: NORMAL
KETONES UR QL STRIP.AUTO: NEGATIVE
LEUKOCYTE ESTERASE UR QL STRIP.AUTO: 250
NITRITE UR QL STRIP.AUTO: NEGATIVE
PH UR STRIP.AUTO: 6.5 [PH]
PROT UR QL STRIP.AUTO: NEGATIVE
RBC #/AREA URNS AUTO: ABNORMAL /HPF
RBC UR QL AUTO: ABNORMAL
SP GR UR STRIP.AUTO: 1.01 (ref 1–1.03)
SQUAMOUS #/AREA URNS LPF: ABNORMAL /HPF
UROBILINOGEN UR STRIP-ACNC: NORMAL
WBC #/AREA URNS AUTO: ABNORMAL /HPF

## 2024-03-23 PROCEDURE — 87086 URINE CULTURE/COLONY COUNT: CPT | Performed by: NURSE PRACTITIONER

## 2024-03-23 PROCEDURE — 81001 URINALYSIS AUTO W/SCOPE: CPT | Performed by: NURSE PRACTITIONER

## 2024-03-23 PROCEDURE — 99282 EMERGENCY DEPT VISIT SF MDM: CPT
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
## 2024-03-23 NOTE — FIRST PROVIDER EVALUATION
Medical screening examination initiated.  I have conducted a focused provider triage encounter, findings are as follows:    Brief history of present illness:  84y/o M Presents to the ED with urinary retention. States abdomen swelling.     There were no vitals filed for this visit.    Pertinent physical exam:  AAA x 3    Brief workup plan:  Labs/UA    Preliminary workup initiated; this workup will be continued and followed by the physician or advanced practice provider that is assigned to the patient when roomed.

## 2024-03-25 LAB — BACTERIA UR CULT: ABNORMAL

## 2024-03-26 ENCOUNTER — LAB VISIT (OUTPATIENT)
Dept: LAB | Facility: HOSPITAL | Age: 84
End: 2024-03-26
Attending: STUDENT IN AN ORGANIZED HEALTH CARE EDUCATION/TRAINING PROGRAM
Payer: MEDICARE

## 2024-03-26 DIAGNOSIS — C17.0 DUODENAL CANCER: ICD-10-CM

## 2024-03-26 DIAGNOSIS — C17.0 DUODENAL CANCER: Primary | ICD-10-CM

## 2024-03-26 LAB
ALBUMIN SERPL-MCNC: 3.1 G/DL (ref 3.4–4.8)
ALBUMIN/GLOB SERPL: 0.9 RATIO (ref 1.1–2)
ALP SERPL-CCNC: 432 UNIT/L (ref 40–150)
ALT SERPL-CCNC: 100 UNIT/L (ref 0–55)
AST SERPL-CCNC: 123 UNIT/L (ref 5–34)
BASOPHILS # BLD AUTO: 0.04 X10(3)/MCL
BASOPHILS NFR BLD AUTO: 0.7 %
BILIRUB SERPL-MCNC: 0.5 MG/DL
BUN SERPL-MCNC: 26.5 MG/DL (ref 8.4–25.7)
CALCIUM SERPL-MCNC: 9.2 MG/DL (ref 8.8–10)
CEA SERPL-MCNC: 2.09 NG/ML (ref 0–3)
CHLORIDE SERPL-SCNC: 106 MMOL/L (ref 98–107)
CO2 SERPL-SCNC: 27 MMOL/L (ref 23–31)
CREAT SERPL-MCNC: 1.12 MG/DL (ref 0.73–1.18)
EOSINOPHIL # BLD AUTO: 0.2 X10(3)/MCL (ref 0–0.9)
EOSINOPHIL NFR BLD AUTO: 3.3 %
ERYTHROCYTE [DISTWIDTH] IN BLOOD BY AUTOMATED COUNT: 13.1 % (ref 11.5–17)
GFR SERPLBLD CREATININE-BSD FMLA CKD-EPI: >60 MLS/MIN/1.73/M2
GLOBULIN SER-MCNC: 3.3 GM/DL (ref 2.4–3.5)
GLUCOSE SERPL-MCNC: 96 MG/DL (ref 82–115)
HCT VFR BLD AUTO: 32.1 % (ref 42–52)
HGB BLD-MCNC: 10.4 G/DL (ref 14–18)
IMM GRANULOCYTES # BLD AUTO: 0.01 X10(3)/MCL (ref 0–0.04)
IMM GRANULOCYTES NFR BLD AUTO: 0.2 %
LYMPHOCYTES # BLD AUTO: 1.33 X10(3)/MCL (ref 0.6–4.6)
LYMPHOCYTES NFR BLD AUTO: 22.2 %
MCH RBC QN AUTO: 30.8 PG (ref 27–31)
MCHC RBC AUTO-ENTMCNC: 32.4 G/DL (ref 33–36)
MCV RBC AUTO: 95 FL (ref 80–94)
MONOCYTES # BLD AUTO: 0.53 X10(3)/MCL (ref 0.1–1.3)
MONOCYTES NFR BLD AUTO: 8.8 %
NEUTROPHILS # BLD AUTO: 3.89 X10(3)/MCL (ref 2.1–9.2)
NEUTROPHILS NFR BLD AUTO: 64.8 %
PLATELET # BLD AUTO: 166 X10(3)/MCL (ref 130–400)
PMV BLD AUTO: 9.3 FL (ref 7.4–10.4)
POTASSIUM SERPL-SCNC: 4.3 MMOL/L (ref 3.5–5.1)
PROT SERPL-MCNC: 6.4 GM/DL (ref 5.8–7.6)
RBC # BLD AUTO: 3.38 X10(6)/MCL (ref 4.7–6.1)
SODIUM SERPL-SCNC: 141 MMOL/L (ref 136–145)
WBC # SPEC AUTO: 6 X10(3)/MCL (ref 4.5–11.5)

## 2024-03-26 PROCEDURE — 85025 COMPLETE CBC W/AUTO DIFF WBC: CPT

## 2024-03-26 PROCEDURE — 80053 COMPREHEN METABOLIC PANEL: CPT

## 2024-03-26 PROCEDURE — 82378 CARCINOEMBRYONIC ANTIGEN: CPT

## 2024-03-26 PROCEDURE — 36415 COLL VENOUS BLD VENIPUNCTURE: CPT
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
## 2024-03-27 ENCOUNTER — TELEPHONE (OUTPATIENT)
Dept: HEMATOLOGY/ONCOLOGY | Facility: CLINIC | Age: 84
End: 2024-03-27

## 2024-03-27 ENCOUNTER — OFFICE VISIT (OUTPATIENT)
Dept: HEMATOLOGY/ONCOLOGY | Facility: CLINIC | Age: 84
End: 2024-03-27
Payer: MEDICARE

## 2024-03-27 VITALS
SYSTOLIC BLOOD PRESSURE: 100 MMHG | HEIGHT: 69 IN | WEIGHT: 141.31 LBS | RESPIRATION RATE: 14 BRPM | TEMPERATURE: 98 F | DIASTOLIC BLOOD PRESSURE: 63 MMHG | BODY MASS INDEX: 20.93 KG/M2 | HEART RATE: 73 BPM | OXYGEN SATURATION: 99 %

## 2024-03-27 DIAGNOSIS — R74.01 TRANSAMINITIS: ICD-10-CM

## 2024-03-27 DIAGNOSIS — C17.0 DUODENAL CANCER: Primary | ICD-10-CM

## 2024-03-27 PROCEDURE — 3074F SYST BP LT 130 MM HG: CPT | Mod: CPTII,S$GLB,, | Performed by: STUDENT IN AN ORGANIZED HEALTH CARE EDUCATION/TRAINING PROGRAM

## 2024-03-27 PROCEDURE — 1160F RVW MEDS BY RX/DR IN RCRD: CPT | Mod: CPTII,S$GLB,, | Performed by: STUDENT IN AN ORGANIZED HEALTH CARE EDUCATION/TRAINING PROGRAM

## 2024-03-27 PROCEDURE — 99999 PR PBB SHADOW E&M-EST. PATIENT-LVL IV: CPT | Mod: PBBFAC,,, | Performed by: STUDENT IN AN ORGANIZED HEALTH CARE EDUCATION/TRAINING PROGRAM

## 2024-03-27 PROCEDURE — 3078F DIAST BP <80 MM HG: CPT | Mod: CPTII,S$GLB,, | Performed by: STUDENT IN AN ORGANIZED HEALTH CARE EDUCATION/TRAINING PROGRAM

## 2024-03-27 PROCEDURE — 1126F AMNT PAIN NOTED NONE PRSNT: CPT | Mod: CPTII,S$GLB,, | Performed by: STUDENT IN AN ORGANIZED HEALTH CARE EDUCATION/TRAINING PROGRAM

## 2024-03-27 PROCEDURE — 1159F MED LIST DOCD IN RCRD: CPT | Mod: CPTII,S$GLB,, | Performed by: STUDENT IN AN ORGANIZED HEALTH CARE EDUCATION/TRAINING PROGRAM

## 2024-03-27 PROCEDURE — 99214 OFFICE O/P EST MOD 30 MIN: CPT | Mod: S$GLB,,, | Performed by: STUDENT IN AN ORGANIZED HEALTH CARE EDUCATION/TRAINING PROGRAM

## 2024-03-27 RX ORDER — SULFAMETHOXAZOLE AND TRIMETHOPRIM 800; 160 MG/1; MG/1
1 TABLET ORAL 2 TIMES DAILY
COMMUNITY
Start: 2024-03-26

## 2024-04-05 ENCOUNTER — TELEPHONE (OUTPATIENT)
Dept: HEMATOLOGY/ONCOLOGY | Facility: CLINIC | Age: 84
End: 2024-04-05
Payer: MEDICARE

## 2024-04-05 ENCOUNTER — LAB VISIT (OUTPATIENT)
Dept: LAB | Facility: HOSPITAL | Age: 84
End: 2024-04-05
Attending: STUDENT IN AN ORGANIZED HEALTH CARE EDUCATION/TRAINING PROGRAM
Payer: MEDICARE

## 2024-04-05 DIAGNOSIS — R74.01 TRANSAMINITIS: ICD-10-CM

## 2024-04-05 LAB
ALBUMIN SERPL-MCNC: 3.4 G/DL (ref 3.4–4.8)
ALBUMIN/GLOB SERPL: 1.1 RATIO (ref 1.1–2)
ALP SERPL-CCNC: 434 UNIT/L (ref 40–150)
ALT SERPL-CCNC: 70 UNIT/L (ref 0–55)
AST SERPL-CCNC: 70 UNIT/L (ref 5–34)
BILIRUB SERPL-MCNC: 0.4 MG/DL
BUN SERPL-MCNC: 40 MG/DL (ref 8.4–25.7)
CALCIUM SERPL-MCNC: 9.4 MG/DL (ref 8.8–10)
CHLORIDE SERPL-SCNC: 107 MMOL/L (ref 98–107)
CO2 SERPL-SCNC: 23 MMOL/L (ref 23–31)
CREAT SERPL-MCNC: 1.22 MG/DL (ref 0.73–1.18)
GFR SERPLBLD CREATININE-BSD FMLA CKD-EPI: 59 MLS/MIN/1.73/M2
GLOBULIN SER-MCNC: 3.2 GM/DL (ref 2.4–3.5)
GLUCOSE SERPL-MCNC: 152 MG/DL (ref 82–115)
POTASSIUM SERPL-SCNC: 4.7 MMOL/L (ref 3.5–5.1)
PROT SERPL-MCNC: 6.6 GM/DL (ref 5.8–7.6)
SODIUM SERPL-SCNC: 137 MMOL/L (ref 136–145)

## 2024-04-05 PROCEDURE — 80053 COMPREHEN METABOLIC PANEL: CPT

## 2024-04-05 PROCEDURE — 36415 COLL VENOUS BLD VENIPUNCTURE: CPT

## 2024-04-09 ENCOUNTER — HOSPITAL ENCOUNTER (OUTPATIENT)
Dept: RADIOLOGY | Facility: HOSPITAL | Age: 84
Discharge: HOME | End: 2024-04-09
Attending: STUDENT IN AN ORGANIZED HEALTH CARE EDUCATION/TRAINING PROGRAM
Payer: MEDICARE

## 2024-04-09 DIAGNOSIS — R74.01 TRANSAMINITIS: ICD-10-CM

## 2024-04-09 DIAGNOSIS — C17.0 DUODENAL CANCER: ICD-10-CM

## 2024-04-09 PROCEDURE — 74178 CT ABD&PLV WO CNTR FLWD CNTR: CPT | Mod: TC

## 2024-04-09 PROCEDURE — 25500020 PHARM REV CODE 255: Performed by: STUDENT IN AN ORGANIZED HEALTH CARE EDUCATION/TRAINING PROGRAM

## 2024-04-09 RX ADMIN — DIATRIZOATE MEGLUMINE AND DIATRIZOATE SODIUM 30 ML: 600; 100 SOLUTION ORAL; RECTAL at 02:04

## 2024-04-09 RX ADMIN — IOHEXOL 100 ML: 350 INJECTION, SOLUTION INTRAVENOUS at 03:04

## 2024-04-11 ENCOUNTER — PATIENT MESSAGE (OUTPATIENT)
Dept: HEMATOLOGY/ONCOLOGY | Facility: CLINIC | Age: 84
End: 2024-04-11
Payer: MEDICARE

## 2024-04-30 DIAGNOSIS — C17.0 DUODENAL CANCER: Primary | ICD-10-CM

## 2024-05-07 ENCOUNTER — OFFICE VISIT (OUTPATIENT)
Dept: SURGICAL ONCOLOGY | Facility: CLINIC | Age: 84
End: 2024-05-07
Payer: MEDICARE

## 2024-05-07 VITALS
HEART RATE: 71 BPM | WEIGHT: 140.19 LBS | SYSTOLIC BLOOD PRESSURE: 134 MMHG | DIASTOLIC BLOOD PRESSURE: 79 MMHG | BODY MASS INDEX: 20.76 KG/M2 | HEIGHT: 69 IN

## 2024-05-07 DIAGNOSIS — C17.0 DUODENAL CANCER: Primary | ICD-10-CM

## 2024-05-07 PROCEDURE — 3288F FALL RISK ASSESSMENT DOCD: CPT | Mod: CPTII,S$GLB,, | Performed by: SURGERY

## 2024-05-07 PROCEDURE — 99214 OFFICE O/P EST MOD 30 MIN: CPT | Mod: S$GLB,,, | Performed by: SURGERY

## 2024-05-07 PROCEDURE — 99999 PR PBB SHADOW E&M-EST. PATIENT-LVL III: CPT | Mod: PBBFAC,,, | Performed by: SURGERY

## 2024-05-07 PROCEDURE — 1159F MED LIST DOCD IN RCRD: CPT | Mod: CPTII,S$GLB,, | Performed by: SURGERY

## 2024-05-07 PROCEDURE — G2211 COMPLEX E/M VISIT ADD ON: HCPCS | Mod: S$GLB,,, | Performed by: SURGERY

## 2024-05-07 PROCEDURE — 3078F DIAST BP <80 MM HG: CPT | Mod: CPTII,S$GLB,, | Performed by: SURGERY

## 2024-05-07 PROCEDURE — 3075F SYST BP GE 130 - 139MM HG: CPT | Mod: CPTII,S$GLB,, | Performed by: SURGERY

## 2024-05-07 PROCEDURE — 1101F PT FALLS ASSESS-DOCD LE1/YR: CPT | Mod: CPTII,S$GLB,, | Performed by: SURGERY
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
## 2024-05-21 NOTE — PLAN OF CARE
Problem: Adult Inpatient Plan of Care  Goal: Plan of Care Review  Outcome: Ongoing, Progressing  Flowsheets (Taken 3/22/2023 1958)  Plan of Care Reviewed With:   patient   daughter  Goal: Patient-Specific Goal (Individualized)  Outcome: Ongoing, Progressing  Flowsheets (Taken 3/22/2023 1958)  Anxieties, Fears or Concerns: plan of treatment, surgery on monday  Individualized Care Needs: assist with adls, iv nutrition, daily labs  Goal: Absence of Hospital-Acquired Illness or Injury  Outcome: Ongoing, Progressing  Intervention: Identify and Manage Fall Risk  Flowsheets (Taken 3/22/2023 1958)  Safety Promotion/Fall Prevention:   assistive device/personal item within reach   Fall Risk reviewed with patient/family   Fall Risk signage in place   instructed to call staff for mobility  Intervention: Prevent Skin Injury  Flowsheets (Taken 3/22/2023 1958)  Body Position:   position changed independently   weight shifting  Skin Protection:   tubing/devices free from skin contact   mittens applied to hands  Intervention: Prevent and Manage VTE (Venous Thromboembolism) Risk  Flowsheets (Taken 3/22/2023 1958)  Activity Management: Rolling - L1  VTE Prevention/Management: (lovenox)   fluids promoted   intravenous hydration   other (see comments)  Range of Motion: active ROM (range of motion) encouraged  Intervention: Prevent Infection  Flowsheets (Taken 3/22/2023 1958)  Infection Prevention:   single patient room provided   hand hygiene promoted   rest/sleep promoted  Goal: Optimal Comfort and Wellbeing  Outcome: Ongoing, Progressing  Intervention: Monitor Pain and Promote Comfort  Flowsheets (Taken 3/22/2023 1958)  Pain Management Interventions:   medication offered   quiet environment facilitated   warm blanket provided  Intervention: Provide Person-Centered Care  Flowsheets (Taken 3/22/2023 1958)  Trust Relationship/Rapport:   care explained   questions answered   questions encouraged   choices provided   empathic listening  Patient : Samuel Slade Age: 59 year old Sex: male   MRN: [id number] Encounter Date: 5/21/2024      History     Chief Complaint   Patient presents with    Dental Pain     Pt to ED c.o a possible infected. Pt taking Augmentin since yesterday. Pt stating swelling is increasing and states underneath tongue.       58yo M PMHx asthma, GERD, DVT (on Xarelto) presents complaining of right lower tooth pain x 6 days, facial swelling x 3 days that increased today.  Patient reports painful swallowing and hoarse voice.  Patient saw his dentist and periodontist yesterday and was prescribed Augmentin and Norco.  Patient has taken 3 doses of Augmentin.  Patient has had nothing to eat today.  Denies fever, difficulty breathing, vomiting.        Allergies   Allergen Reactions    Seasonal Other (See Comments)     Congestion        Current Discharge Medication List        Prior to Admission Medications    Details   HYDROcodone-acetaminophen (NORCO) 5-325 MG per tablet Take 1 tablet by mouth every 6 hours as needed for Pain.      amoxicillin-clavulanate (AUGMENTIN) 875-125 MG per tablet Take 1 tablet by mouth in the morning and 1 tablet in the evening.      propRANolol (INDERAL) 20 MG tablet TAKE 1 TABLET BY MOUTH DAILY  Qty: 90 tablet, Refills: 1      emtricitabine-tenofovir ALAFENAMIDE (Descovy) 200-25 MG per tablet Take 1 tablet by mouth daily.  Qty: 90 tablet, Refills: 0      Xarelto 20 MG Tab TAKE 1 TABLET BY MOUTH DAILY  Qty: 90 tablet, Refills: 1      tadalafil (CIALIS) 20 MG tablet TAKE 1 TABLET BY MOUTH UP TO EVERY 48 HOURS AS NEEDED FOR ERECTILE DYSFUNCTION  Qty: 27 tablet, Refills: 3      doxycycline hyclate (VIBRAMYCIN) 100 MG capsule Take two capsules within 72 hours of potential STI exposure by mouth with 8 ounces of water.  Do not take more often than every 48 hours.  Follow up with provider for STD testing to confirm effective treatment of the potential infection.  Qty: 20 capsule, Refills: 1      pantoprazole (PROTONIX)  40 MG tablet TAKE 1 TABLET BY MOUTH DAILY  Qty: 90 tablet, Refills: 1      Choline Fenofibrate (Fenofibric Acid) 45 MG CAPSULE DELAYED RELEASE TAKE 1 CAPSULE BY MOUTH DAILY  Qty: 90 capsule, Refills: 1      finasteride (PROPECIA) 1 MG tablet TAKE 1 TABLET BY MOUTH DAILY  Qty: 90 tablet, Refills: 1      albuterol 108 (90 Base) MCG/ACT inhaler Inhale 2 puffs into the lungs every 4 hours as needed for Shortness of Breath or Wheezing.  Qty: 1 each, Refills: 0      lisinopril (ZESTRIL) 10 MG tablet TAKE ONE TABLET BY MOUTH DAILY  Qty: 90 tablet, Refills: 3      tadalafil (CIALIS) 5 MG tablet Take 5 mg by mouth daily.      Multiple Vitamins-Minerals (MULTIVITAMIN ADULT PO) Take 1 tablet by mouth daily.      Cholecalciferol (VITAMIN D PO) Take 1 capsule by mouth daily.             Past Medical History:   Diagnosis Date    Asthma (CMD)     Blood clot associated with vein wall inflammation     BPH (benign prostatic hyperplasia)     Chin laceration 2022    COVID-19 virus infection 2022    DVT, lower extremity  (CMD)     Erectile dysfunction     Fall (on)(from) sidewalk curb, initial encounter 2022    Gastroesophageal reflux disease     Gonococcal infection of pharynx 2022    History of traumatic brain injury 2018    Hx of craniotomy 2018    Pulmonary embolus  (CMD)     Sleep apnea     doesn't use CPAP    Subdural hemorrhage  (CMD)        Past Surgical History:   Procedure Laterality Date    CRANIOTOMY         Family History   Problem Relation Age of Onset    Hypertension Mother     Stroke Other     Clotting Disorder Other     Parkinsonism Other        Social History     Tobacco Use    Smoking status: Former     Current packs/day: 0.00     Types: Cigarettes     Quit date: 10/9/2022     Years since quittin.6    Smokeless tobacco: Never    Tobacco comments:     wears nicotine patch   Vaping Use    Vaping status: Former   Substance Use Topics    Alcohol use: Yes     Comment: occasional     provided   thoughts/feelings acknowledged   reassurance provided   emotional support provided      Drug use: Never       Review of Systems   Constitutional:  Negative for fever.   HENT:  Positive for dental problem (right lower), facial swelling (right), sore throat and voice change. Negative for trouble swallowing.    Respiratory:  Negative for shortness of breath.    Gastrointestinal:  Negative for vomiting.   Skin:  Negative for rash.   All other systems reviewed and are negative.      Physical Exam     ED Triage Vitals   ED Triage Vitals Group      Temp 05/21/24 0949 99.5 °F (37.5 °C)      Heart Rate 05/21/24 0949 65      Resp 05/21/24 0949 16      BP 05/21/24 0949 (!) 146/89      SpO2 05/21/24 0949 95 %      EtCO2 mmHg --       Height --       Weight 05/21/24 1023 175 lb (79.4 kg)      Weight Scale Used --       BMI (Calculated) --       IBW/kg (Calculated) --        Physical Exam  Vitals and nursing note reviewed.   Constitutional:       Appearance: He is not ill-appearing or toxic-appearing.   HENT:      Head:      Jaw: Tenderness (right) and swelling (right) present. No trismus.      Comments: Submandibular swelling     Mouth/Throat:      Mouth: Mucous membranes are moist. No oral lesions.      Dentition: No gingival swelling or dental abscesses.      Pharynx: Oropharynx is clear. Uvula midline. No pharyngeal swelling, oropharyngeal exudate or posterior oropharyngeal erythema.      Tonsils: No tonsillar abscesses.      Neck: Neck supple. No rigidity.   Pulmonary:      Effort: Pulmonary effort is normal.   Skin:     General: Skin is warm.   Neurological:      Mental Status: He is alert and oriented to person, place, and time.      Gait: Gait is intact.   Psychiatric:         Mood and Affect: Mood normal.         ED Course     Procedures    Lab Results     Results for orders placed or performed during the hospital encounter of 05/21/24   Comprehensive Metabolic Panel   Result Value Ref Range    Fasting Status      Sodium 138 135 - 145 mmol/L    Potassium 4.1 3.4 - 5.1 mmol/L    Chloride 103 97 - 110 mmol/L     Carbon Dioxide 25 21 - 32 mmol/L    Anion Gap 14 7 - 19 mmol/L    Glucose 121 (H) 70 - 99 mg/dL    BUN 16 6 - 20 mg/dL    Creatinine 1.08 0.67 - 1.17 mg/dL    Glomerular Filtration Rate 80 >=60    BUN/Cr 15 7 - 25    Calcium 9.6 8.4 - 10.2 mg/dL    Bilirubin, Total 1.4 (H) 0.2 - 1.0 mg/dL    GOT/AST 15 <=37 Units/L    GPT/ALT 17 <64 Units/L    Alkaline Phosphatase 50 45 - 117 Units/L    Albumin 3.8 3.6 - 5.1 g/dL    Protein, Total 7.7 6.4 - 8.2 g/dL    Globulin 3.9 2.0 - 4.0 g/dL    A/G Ratio 1.0 1.0 - 2.4   C Reactive Protein   Result Value Ref Range    C-Reactive Protein 108.1 (H) <10.0 mg/L   Sedimentation Rate   Result Value Ref Range    RBC Sedimentation Rate 27 (H) 0 - 20 mm/hr   CBC with Automated Differential (performable only)   Result Value Ref Range    WBC 11.5 (H) 4.2 - 11.0 K/mcL    RBC 4.52 4.50 - 5.90 mil/mcL    HGB 14.6 13.0 - 17.0 g/dL    HCT 42.6 39.0 - 51.0 %    MCV 94.2 78.0 - 100.0 fl    MCH 32.3 26.0 - 34.0 pg    MCHC 34.3 32.0 - 36.5 g/dL    RDW-CV 13.0 11.0 - 15.0 %    RDW-SD 44.7 39.0 - 50.0 fL     140 - 450 K/mcL    NRBC 0 <=0 /100 WBC    Neutrophil, Percent 71 %    Lymphocytes, Percent 20 %    Mono, Percent 8 %    Eosinophils, Percent 1 %    Basophils, Percent 0 %    Immature Granulocytes 0 %    Absolute Neutrophils 8.2 (H) 1.8 - 7.7 K/mcL    Absolute Lymphocytes 2.3 1.0 - 4.0 K/mcL    Absolute Monocytes 0.9 0.3 - 0.9 K/mcL    Absolute Eosinophils  0.1 0.0 - 0.5 K/mcL    Absolute Basophils 0.0 0.0 - 0.3 K/mcL    Absolute Immature Granulocytes 0.0 0.0 - 0.2 K/mcL   Lactic Acid Venous With Reflex   Result Value Ref Range    Lactate, Venous 0.8 0.0 - 2.0 mmol/L   Blood Culture    Specimen: Blood   Result Value Ref Range    Culture, Blood or Bone Marrow No Growth <24 hours    Blood Culture    Specimen: Blood   Result Value Ref Range    Culture, Blood or Bone Marrow No Growth <24 hours          Radiology Results     Imaging Results              CT NECK SOFT TISSUE W CONTRAST (Final  result)  Result time 05/21/24 11:23:53      Final result                   Impression:    1.   Thickening/edema of the right aspect of the epiglottis may suggest  epiglottitis. Fluid/stranding noted in the right paraglottic space with  slight effacement of the right parapharyngeal space.  2.   Fluid and stranding noted in the right submandibular space, right  sublingual region, and right submental space with asymmetric thickening of  the right platysma muscle and subcutaneous induration in the submental  space. Findings could be related to cellulitis. Additionally, there is  hyperenhancement of the right submandibular gland when comparison to the  contralateral side. Findings could reflect concomitant sialoadenitis or may  be reactive to adjacent cellulitis. No evidence for calculus within the  right submandibular duct or intraglandular ductal dilation.    MICHOACANO Singh notified at 11:22 a.m. on 5/21/2024 by Dr. ELIZABETH Lowe.    Electronically Signed by: VIK LOWE M.D.   Signed on: 5/21/2024 11:23 AM   Workstation ID: [id number]               Narrative:    EXAM:   CT NECK SOFT TISSUE W CONTRAST    CLINICAL INDICATION: Submental swelling.    COMPARISON: CT neck 9/18/2020    TECHNIQUE: Multiplanar CT of the neck soft tissues was performed after  intravenous administration of 80 mL of Omnipaque 350 contrast material.  Automated exposure control utilized.    FINDINGS:  LARYNX/PHARYNX: Nasopharynx and oropharynx are patent without asymmetric  soft tissue. The right aspect of the epiglottis appears thickened and  slightly edematous in appearance (series 2, image 119). Small amount of  fluid/edema is also noted within the right paraglottic soft tissues (series  2, image 113). Larynx and hypopharynx are otherwise patent without  asymmetric soft tissue. Proximal trachea and cervical esophagus are  unremarkable.    ORAL CAVITY: Evaluation of the oral cavity is limited due to streak  artifact from dental amalgam. Fluid is  noted within the right sublingual  space. Periapical lucency surrounding bilateral third maxillary molar  teeth.    GLANDS: Parotid and left submandibular glands demonstrate a normal  contrast-enhanced appearance without focal lesions. There is asymmetrically  increased enhancement within the right submandibular gland. No  calcifications are identified along the course of Wilbraham's or Stensen's  ducts. No focal abnormalities within the thyroid gland.    CERVICAL SOFT TISSUES: Few scattered small lymph nodes in the supra and  infrahyoid neck, none of which are enlarged by CT size criteria. No lymph  nodes with suspicious morphologic or enhancement characteristics  identified. Edema/fluid noted within the right submandibular space  extending into the right sublingual/submental space. Mild asymmetric  thickening of the right platysma muscle. Mild subcutaneous induration  within the submental space.    OTHER: Vessels appear grossly symmetric and unremarkable. No acute osseous  abnormality. Chronic degenerative changes of the cervical spine. Lung  apices are clear. Cerumen in the right external auditory canal. Trace  opacification of the right mastoid tip air cells.                                      ED Medication Orders (From admission, onward)      Ordered Start     Status Ordering Provider    05/21/24 1124 05/21/24 1125  cefTRIAXone (ROCEPHIN) syringe 2,000 mg  ONCE         Last MAR action: Given CHARY CHADWICK    05/21/24 1124 05/21/24 1125  dexAMETHasone (DECADRON) injection 10 mg  ONCE         Last MAR action: Given CHARY CHADWICK    05/21/24 1007 05/21/24 1008  ketorolac (TORADOL) injection 15 mg  ONCE         Last MAR action: Given MONO JETER            ED Course as of 05/21/24 1410   Tue May 21, 2024   1110 Patient ambulatory to FTE. [SL]   1124 Dr. Lowe, radiologist, called me reporting right-sided epiglottitis.  IV Rocephin and Decadron ordered. [SL]   1157 Sent PerfectServe message to Dr. Rush,  NASIR (ENT) who will consult and requests ENT cart at bedside and OMFS consult. [SL]   1159 Sent [employer]erve message to Dr. Snow (OMFS) who will consult and come see patient in ER. [SL]   1208 Discussed with ICU resident who discussed with ICU attending and will accept patient. Stepdown. [SL]   1230 ICU resident and attending saw patient. If OMFS and ENT do not recommend ICU, patient should be admitted to the floor tele. [SL]   1326 ENT and OMFS agree with tele floor obs admission. [SL]   1329 Sent PerfectServe message to Dr. Orr ([facility] [place] [facility] who will accept admission. [SL]      ED Course User Index  [SL] Wendy Herndon, MOHSEN       Medical Decision Making  58yo M PMHx asthma, GERD, DVT (on Xarelto) presents complaining of right lower tooth pain x 6 days, facial swelling x 3 days that increased today.  Patient reports painful swallowing and hoarse voice.  Patient saw his dentist and periodontist yesterday and was prescribed Augmentin and has taken 3 doses so far.  Denies fever, difficulty breathing, vomiting.  Afebrile.  Hoarse voice.  Tender swelling to the right jaw and submandibular region, no overlaying erythema. No visible dental abscess.  No trismus or stridor.  Patient tolerating secretions. WBC 11.8. CT reveals thickening/edema of the right aspect of the epiglottis may suggest epiglottitis. Fluid/stranding noted in the right paraglottic space with slight effacement of the right parapharyngeal space. Fluid and stranding noted in the right submandibular space, right sublingual region, and right submental space with asymmetric thickening of the right platysma muscle and subcutaneous induration in the submental space. Findings could be related to cellulitis. Additionally, there is hyperenhancement of the right submandibular gland when comparison to the contralateral side. Findings could reflect concomitant sialoadenitis or may be reactive to adjacent cellulitis. No evidence for calculus within the right  submandibular duct or intraglandular ductal dilation.  Results discussed with patient.  IV Toradol, Decadron, Rocephin given.  Patient admitted to [facility] hospitalist, telemetry with ENT and OMFS consults.        Clinical Impression     ED Diagnosis   1. Epiglottitis        2. Pain, dental            Disposition        Admit 5/21/2024  2:04 PM  Telemetry Bed?: Yes  Admitting Physician: REE LIU [[id number]]  Is this a telephone or verbal order?: This is a telephone order from the admitting physician       Wendy Herndon PA-C  05/21/24 1903

## 2024-07-02 ENCOUNTER — HOSPITAL ENCOUNTER (OUTPATIENT)
Dept: RADIOLOGY | Facility: HOSPITAL | Age: 84
Discharge: HOME | End: 2024-07-02
Attending: STUDENT IN AN ORGANIZED HEALTH CARE EDUCATION/TRAINING PROGRAM
Payer: MEDICARE

## 2024-07-02 DIAGNOSIS — C17.0 DUODENAL CANCER: ICD-10-CM

## 2024-07-02 PROCEDURE — 74177 CT ABD & PELVIS W/CONTRAST: CPT | Mod: TC

## 2024-07-02 PROCEDURE — 71260 CT THORAX DX C+: CPT | Mod: TC

## 2024-07-02 PROCEDURE — 25500020 PHARM REV CODE 255: Performed by: STUDENT IN AN ORGANIZED HEALTH CARE EDUCATION/TRAINING PROGRAM

## 2024-07-02 RX ADMIN — DIATRIZOATE MEGLUMINE AND DIATRIZOATE SODIUM 30 ML: 600; 100 SOLUTION ORAL; RECTAL at 07:07

## 2024-07-02 RX ADMIN — IOHEXOL 100 ML: 350 INJECTION, SOLUTION INTRAVENOUS at 08:07
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
## 2024-07-08 ENCOUNTER — OFFICE VISIT (OUTPATIENT)
Dept: HEMATOLOGY/ONCOLOGY | Facility: CLINIC | Age: 84
End: 2024-07-08
Payer: MEDICARE

## 2024-07-08 ENCOUNTER — TELEPHONE (OUTPATIENT)
Dept: HEMATOLOGY/ONCOLOGY | Facility: CLINIC | Age: 84
End: 2024-07-08

## 2024-07-08 ENCOUNTER — LAB VISIT (OUTPATIENT)
Dept: LAB | Facility: HOSPITAL | Age: 84
End: 2024-07-08
Attending: STUDENT IN AN ORGANIZED HEALTH CARE EDUCATION/TRAINING PROGRAM
Payer: MEDICARE

## 2024-07-08 VITALS
HEART RATE: 65 BPM | WEIGHT: 141 LBS | BODY MASS INDEX: 20.88 KG/M2 | SYSTOLIC BLOOD PRESSURE: 134 MMHG | HEIGHT: 69 IN | DIASTOLIC BLOOD PRESSURE: 69 MMHG | RESPIRATION RATE: 18 BRPM | OXYGEN SATURATION: 98 % | TEMPERATURE: 98 F

## 2024-07-08 DIAGNOSIS — C17.0 DUODENAL CANCER: Primary | ICD-10-CM

## 2024-07-08 DIAGNOSIS — C17.0 DUODENAL CANCER: ICD-10-CM

## 2024-07-08 DIAGNOSIS — R74.01 TRANSAMINITIS: ICD-10-CM

## 2024-07-08 LAB
ALBUMIN SERPL-MCNC: 3.6 G/DL (ref 3.4–4.8)
ALBUMIN/GLOB SERPL: 1.1 RATIO (ref 1.1–2)
ALP SERPL-CCNC: 229 UNIT/L (ref 40–150)
ALT SERPL-CCNC: 41 UNIT/L (ref 0–55)
ANION GAP SERPL CALC-SCNC: 7 MEQ/L
AST SERPL-CCNC: 40 UNIT/L (ref 5–34)
BASOPHILS # BLD AUTO: 0.03 X10(3)/MCL
BASOPHILS NFR BLD AUTO: 0.5 %
BILIRUB SERPL-MCNC: 0.4 MG/DL
BUN SERPL-MCNC: 32.8 MG/DL (ref 8.4–25.7)
CALCIUM SERPL-MCNC: 9.6 MG/DL (ref 8.8–10)
CEA SERPL-MCNC: 2.43 NG/ML (ref 0–3)
CHLORIDE SERPL-SCNC: 111 MMOL/L (ref 98–107)
CO2 SERPL-SCNC: 19 MMOL/L (ref 23–31)
CREAT SERPL-MCNC: 1.19 MG/DL (ref 0.73–1.18)
CREAT/UREA NIT SERPL: 28
EOSINOPHIL # BLD AUTO: 0.18 X10(3)/MCL (ref 0–0.9)
EOSINOPHIL NFR BLD AUTO: 2.9 %
ERYTHROCYTE [DISTWIDTH] IN BLOOD BY AUTOMATED COUNT: 13.6 % (ref 11.5–17)
GFR SERPLBLD CREATININE-BSD FMLA CKD-EPI: >60 ML/MIN/1.73/M2
GLOBULIN SER-MCNC: 3.2 GM/DL (ref 2.4–3.5)
GLUCOSE SERPL-MCNC: 137 MG/DL (ref 82–115)
HCT VFR BLD AUTO: 35.7 % (ref 42–52)
HGB BLD-MCNC: 11.6 G/DL (ref 14–18)
IMM GRANULOCYTES # BLD AUTO: 0.02 X10(3)/MCL (ref 0–0.04)
IMM GRANULOCYTES NFR BLD AUTO: 0.3 %
LYMPHOCYTES # BLD AUTO: 1.81 X10(3)/MCL (ref 0.6–4.6)
LYMPHOCYTES NFR BLD AUTO: 29.3 %
MCH RBC QN AUTO: 30.4 PG (ref 27–31)
MCHC RBC AUTO-ENTMCNC: 32.5 G/DL (ref 33–36)
MCV RBC AUTO: 93.7 FL (ref 80–94)
MONOCYTES # BLD AUTO: 0.48 X10(3)/MCL (ref 0.1–1.3)
MONOCYTES NFR BLD AUTO: 7.8 %
NEUTROPHILS # BLD AUTO: 3.65 X10(3)/MCL (ref 2.1–9.2)
NEUTROPHILS NFR BLD AUTO: 59.2 %
PLATELET # BLD AUTO: 191 X10(3)/MCL (ref 130–400)
PMV BLD AUTO: 9.4 FL (ref 7.4–10.4)
POTASSIUM SERPL-SCNC: 5.1 MMOL/L (ref 3.5–5.1)
PROT SERPL-MCNC: 6.8 GM/DL (ref 5.8–7.6)
RBC # BLD AUTO: 3.81 X10(6)/MCL (ref 4.7–6.1)
SODIUM SERPL-SCNC: 137 MMOL/L (ref 136–145)
WBC # BLD AUTO: 6.17 X10(3)/MCL (ref 4.5–11.5)

## 2024-07-08 PROCEDURE — 3078F DIAST BP <80 MM HG: CPT | Mod: CPTII,S$GLB,, | Performed by: STUDENT IN AN ORGANIZED HEALTH CARE EDUCATION/TRAINING PROGRAM

## 2024-07-08 PROCEDURE — 3075F SYST BP GE 130 - 139MM HG: CPT | Mod: CPTII,S$GLB,, | Performed by: STUDENT IN AN ORGANIZED HEALTH CARE EDUCATION/TRAINING PROGRAM

## 2024-07-08 PROCEDURE — 85025 COMPLETE CBC W/AUTO DIFF WBC: CPT

## 2024-07-08 PROCEDURE — 82378 CARCINOEMBRYONIC ANTIGEN: CPT

## 2024-07-08 PROCEDURE — 1159F MED LIST DOCD IN RCRD: CPT | Mod: CPTII,S$GLB,, | Performed by: STUDENT IN AN ORGANIZED HEALTH CARE EDUCATION/TRAINING PROGRAM

## 2024-07-08 PROCEDURE — 1160F RVW MEDS BY RX/DR IN RCRD: CPT | Mod: CPTII,S$GLB,, | Performed by: STUDENT IN AN ORGANIZED HEALTH CARE EDUCATION/TRAINING PROGRAM

## 2024-07-08 PROCEDURE — 36415 COLL VENOUS BLD VENIPUNCTURE: CPT

## 2024-07-08 PROCEDURE — 1126F AMNT PAIN NOTED NONE PRSNT: CPT | Mod: CPTII,S$GLB,, | Performed by: STUDENT IN AN ORGANIZED HEALTH CARE EDUCATION/TRAINING PROGRAM

## 2024-07-08 PROCEDURE — 99214 OFFICE O/P EST MOD 30 MIN: CPT | Mod: S$GLB,,, | Performed by: STUDENT IN AN ORGANIZED HEALTH CARE EDUCATION/TRAINING PROGRAM

## 2024-07-08 PROCEDURE — 99999 PR PBB SHADOW E&M-EST. PATIENT-LVL IV: CPT | Mod: PBBFAC,,, | Performed by: STUDENT IN AN ORGANIZED HEALTH CARE EDUCATION/TRAINING PROGRAM

## 2024-07-08 PROCEDURE — 80053 COMPREHEN METABOLIC PANEL: CPT

## 2024-07-23 ENCOUNTER — PATIENT MESSAGE (OUTPATIENT)
Dept: HEMATOLOGY/ONCOLOGY | Facility: CLINIC | Age: 84
End: 2024-07-23
Payer: MEDICARE

## 2024-10-02 ENCOUNTER — PATIENT MESSAGE (OUTPATIENT)
Dept: HEMATOLOGY/ONCOLOGY | Facility: CLINIC | Age: 84
End: 2024-10-02
Payer: MEDICARE

## 2024-10-09 ENCOUNTER — HOSPITAL ENCOUNTER (OUTPATIENT)
Dept: RADIOLOGY | Facility: HOSPITAL | Age: 84
Discharge: HOME | End: 2024-10-09
Attending: STUDENT IN AN ORGANIZED HEALTH CARE EDUCATION/TRAINING PROGRAM
Payer: MEDICARE

## 2024-10-09 DIAGNOSIS — C17.0 DUODENAL CANCER: ICD-10-CM

## 2024-10-09 PROCEDURE — 71260 CT THORAX DX C+: CPT | Mod: TC

## 2024-10-09 PROCEDURE — 74177 CT ABD & PELVIS W/CONTRAST: CPT | Mod: TC

## 2024-10-09 PROCEDURE — 25500020 PHARM REV CODE 255: Performed by: STUDENT IN AN ORGANIZED HEALTH CARE EDUCATION/TRAINING PROGRAM

## 2024-10-09 RX ORDER — DIATRIZOATE MEGLUMINE AND DIATRIZOATE SODIUM 660; 100 MG/ML; MG/ML
30 SOLUTION ORAL; RECTAL
Status: COMPLETED | OUTPATIENT
Start: 2024-10-09 | End: 2024-10-09

## 2024-10-09 RX ADMIN — DIATRIZOATE MEGLUMINE AND DIATRIZOATE SODIUM 30 ML: 660; 100 LIQUID ORAL; RECTAL at 12:10

## 2024-10-09 RX ADMIN — IOHEXOL 100 ML: 350 INJECTION, SOLUTION INTRAVENOUS at 12:10
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
## 2024-10-16 ENCOUNTER — LAB VISIT (OUTPATIENT)
Dept: LAB | Facility: HOSPITAL | Age: 84
End: 2024-10-16
Attending: STUDENT IN AN ORGANIZED HEALTH CARE EDUCATION/TRAINING PROGRAM
Payer: MEDICARE

## 2024-10-16 ENCOUNTER — OFFICE VISIT (OUTPATIENT)
Dept: HEMATOLOGY/ONCOLOGY | Facility: CLINIC | Age: 84
End: 2024-10-16
Payer: MEDICARE

## 2024-10-16 VITALS
WEIGHT: 145.69 LBS | SYSTOLIC BLOOD PRESSURE: 124 MMHG | RESPIRATION RATE: 18 BRPM | OXYGEN SATURATION: 100 % | BODY MASS INDEX: 21.58 KG/M2 | TEMPERATURE: 97 F | DIASTOLIC BLOOD PRESSURE: 68 MMHG | HEART RATE: 66 BPM | HEIGHT: 69 IN

## 2024-10-16 DIAGNOSIS — C17.0 DUODENAL CANCER: ICD-10-CM

## 2024-10-16 LAB
ALBUMIN SERPL-MCNC: 3.4 G/DL (ref 3.4–4.8)
ALBUMIN/GLOB SERPL: 1.1 RATIO (ref 1.1–2)
ALP SERPL-CCNC: 288 UNIT/L (ref 40–150)
ALT SERPL-CCNC: 60 UNIT/L (ref 0–55)
ANION GAP SERPL CALC-SCNC: 9 MEQ/L
AST SERPL-CCNC: 40 UNIT/L (ref 5–34)
BASOPHILS # BLD AUTO: 0.02 X10(3)/MCL
BASOPHILS NFR BLD AUTO: 0.4 %
BILIRUB SERPL-MCNC: 0.4 MG/DL
BUN SERPL-MCNC: 26.4 MG/DL (ref 8.4–25.7)
CALCIUM SERPL-MCNC: 9.2 MG/DL (ref 8.8–10)
CEA SERPL-MCNC: 2.06 NG/ML (ref 0–3)
CHLORIDE SERPL-SCNC: 110 MMOL/L (ref 98–107)
CO2 SERPL-SCNC: 21 MMOL/L (ref 23–31)
CREAT SERPL-MCNC: 0.84 MG/DL (ref 0.73–1.18)
CREAT/UREA NIT SERPL: 31
EOSINOPHIL # BLD AUTO: 0.2 X10(3)/MCL (ref 0–0.9)
EOSINOPHIL NFR BLD AUTO: 3.5 %
ERYTHROCYTE [DISTWIDTH] IN BLOOD BY AUTOMATED COUNT: 14.3 % (ref 11.5–17)
GFR SERPLBLD CREATININE-BSD FMLA CKD-EPI: >60 ML/MIN/1.73/M2
GLOBULIN SER-MCNC: 3.1 GM/DL (ref 2.4–3.5)
GLUCOSE SERPL-MCNC: 124 MG/DL (ref 82–115)
HCT VFR BLD AUTO: 34.4 % (ref 42–52)
HGB BLD-MCNC: 11.2 G/DL (ref 14–18)
IMM GRANULOCYTES # BLD AUTO: 0.02 X10(3)/MCL (ref 0–0.04)
IMM GRANULOCYTES NFR BLD AUTO: 0.4 %
LYMPHOCYTES # BLD AUTO: 1.74 X10(3)/MCL (ref 0.6–4.6)
LYMPHOCYTES NFR BLD AUTO: 30.5 %
MCH RBC QN AUTO: 30.4 PG (ref 27–31)
MCHC RBC AUTO-ENTMCNC: 32.6 G/DL (ref 33–36)
MCV RBC AUTO: 93.5 FL (ref 80–94)
MONOCYTES # BLD AUTO: 0.53 X10(3)/MCL (ref 0.1–1.3)
MONOCYTES NFR BLD AUTO: 9.3 %
NEUTROPHILS # BLD AUTO: 3.2 X10(3)/MCL (ref 2.1–9.2)
NEUTROPHILS NFR BLD AUTO: 55.9 %
PLATELET # BLD AUTO: 161 X10(3)/MCL (ref 130–400)
PMV BLD AUTO: 9.8 FL (ref 7.4–10.4)
POTASSIUM SERPL-SCNC: 4.5 MMOL/L (ref 3.5–5.1)
PROT SERPL-MCNC: 6.5 GM/DL (ref 5.8–7.6)
RBC # BLD AUTO: 3.68 X10(6)/MCL (ref 4.7–6.1)
SODIUM SERPL-SCNC: 140 MMOL/L (ref 136–145)
WBC # BLD AUTO: 5.71 X10(3)/MCL (ref 4.5–11.5)

## 2024-10-16 PROCEDURE — 36415 COLL VENOUS BLD VENIPUNCTURE: CPT

## 2024-10-16 PROCEDURE — 3078F DIAST BP <80 MM HG: CPT | Mod: CPTII,S$GLB,, | Performed by: STUDENT IN AN ORGANIZED HEALTH CARE EDUCATION/TRAINING PROGRAM

## 2024-10-16 PROCEDURE — 1160F RVW MEDS BY RX/DR IN RCRD: CPT | Mod: CPTII,S$GLB,, | Performed by: STUDENT IN AN ORGANIZED HEALTH CARE EDUCATION/TRAINING PROGRAM

## 2024-10-16 PROCEDURE — 1126F AMNT PAIN NOTED NONE PRSNT: CPT | Mod: CPTII,S$GLB,, | Performed by: STUDENT IN AN ORGANIZED HEALTH CARE EDUCATION/TRAINING PROGRAM

## 2024-10-16 PROCEDURE — 99214 OFFICE O/P EST MOD 30 MIN: CPT | Mod: S$GLB,,, | Performed by: STUDENT IN AN ORGANIZED HEALTH CARE EDUCATION/TRAINING PROGRAM

## 2024-10-16 PROCEDURE — 82378 CARCINOEMBRYONIC ANTIGEN: CPT

## 2024-10-16 PROCEDURE — 85025 COMPLETE CBC W/AUTO DIFF WBC: CPT

## 2024-10-16 PROCEDURE — 1159F MED LIST DOCD IN RCRD: CPT | Mod: CPTII,S$GLB,, | Performed by: STUDENT IN AN ORGANIZED HEALTH CARE EDUCATION/TRAINING PROGRAM

## 2024-10-16 PROCEDURE — 80053 COMPREHEN METABOLIC PANEL: CPT

## 2024-10-16 PROCEDURE — 99999 PR PBB SHADOW E&M-EST. PATIENT-LVL IV: CPT | Mod: PBBFAC,,, | Performed by: STUDENT IN AN ORGANIZED HEALTH CARE EDUCATION/TRAINING PROGRAM

## 2024-10-16 PROCEDURE — 3074F SYST BP LT 130 MM HG: CPT | Mod: CPTII,S$GLB,, | Performed by: STUDENT IN AN ORGANIZED HEALTH CARE EDUCATION/TRAINING PROGRAM

## 2024-12-12 NOTE — PLAN OF CARE
Left  to call [phone] and schedule a colonoscopy with Dr. Willis.   Patient has been accepted to TCU today by Dr Tyler.

## 2025-01-13 ENCOUNTER — HOSPITAL ENCOUNTER (OUTPATIENT)
Dept: RADIOLOGY | Facility: HOSPITAL | Age: 85
Discharge: HOME | End: 2025-01-13
Attending: STUDENT IN AN ORGANIZED HEALTH CARE EDUCATION/TRAINING PROGRAM
Payer: MEDICARE

## 2025-01-13 DIAGNOSIS — C17.0 DUODENAL CANCER: ICD-10-CM

## 2025-01-13 PROCEDURE — 25500020 PHARM REV CODE 255: Performed by: STUDENT IN AN ORGANIZED HEALTH CARE EDUCATION/TRAINING PROGRAM

## 2025-01-13 PROCEDURE — 71260 CT THORAX DX C+: CPT | Mod: TC

## 2025-01-13 RX ADMIN — IOHEXOL 100 ML: 350 INJECTION, SOLUTION INTRAVENOUS at 09:01
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
## 2025-01-28 ENCOUNTER — LAB VISIT (OUTPATIENT)
Dept: LAB | Facility: HOSPITAL | Age: 85
End: 2025-01-28
Attending: STUDENT IN AN ORGANIZED HEALTH CARE EDUCATION/TRAINING PROGRAM
Payer: MEDICARE

## 2025-01-28 ENCOUNTER — OFFICE VISIT (OUTPATIENT)
Dept: HEMATOLOGY/ONCOLOGY | Facility: CLINIC | Age: 85
End: 2025-01-28
Payer: MEDICARE

## 2025-01-28 VITALS
WEIGHT: 145.38 LBS | OXYGEN SATURATION: 98 % | SYSTOLIC BLOOD PRESSURE: 156 MMHG | RESPIRATION RATE: 18 BRPM | HEART RATE: 86 BPM | TEMPERATURE: 98 F | HEIGHT: 69 IN | DIASTOLIC BLOOD PRESSURE: 81 MMHG | BODY MASS INDEX: 21.53 KG/M2

## 2025-01-28 DIAGNOSIS — R74.01 TRANSAMINITIS: ICD-10-CM

## 2025-01-28 DIAGNOSIS — J90 PLEURAL EFFUSION, BILATERAL: ICD-10-CM

## 2025-01-28 DIAGNOSIS — C17.0 DUODENAL CANCER: ICD-10-CM

## 2025-01-28 DIAGNOSIS — C77.2 SECONDARY AND UNSPECIFIED MALIGNANT NEOPLASM OF INTRA-ABDOMINAL LYMPH NODES: Primary | ICD-10-CM

## 2025-01-28 LAB
ALBUMIN SERPL-MCNC: 3.1 G/DL (ref 3.4–4.8)
ALBUMIN/GLOB SERPL: 0.8 RATIO (ref 1.1–2)
ALP SERPL-CCNC: 386 UNIT/L (ref 40–150)
ALT SERPL-CCNC: 56 UNIT/L (ref 0–55)
ANION GAP SERPL CALC-SCNC: 6 MEQ/L
AST SERPL-CCNC: 39 UNIT/L (ref 5–34)
BASOPHILS # BLD AUTO: 0.03 X10(3)/MCL
BASOPHILS NFR BLD AUTO: 0.5 %
BILIRUB SERPL-MCNC: 0.5 MG/DL
BUN SERPL-MCNC: 26.9 MG/DL (ref 8.4–25.7)
CALCIUM SERPL-MCNC: 9.3 MG/DL (ref 8.8–10)
CEA SERPL-MCNC: 2.21 NG/ML (ref 0–3)
CHLORIDE SERPL-SCNC: 110 MMOL/L (ref 98–107)
CO2 SERPL-SCNC: 20 MMOL/L (ref 23–31)
CREAT SERPL-MCNC: 1.18 MG/DL (ref 0.72–1.25)
CREAT/UREA NIT SERPL: 23
EOSINOPHIL # BLD AUTO: 0.17 X10(3)/MCL (ref 0–0.9)
EOSINOPHIL NFR BLD AUTO: 2.6 %
ERYTHROCYTE [DISTWIDTH] IN BLOOD BY AUTOMATED COUNT: 13.9 % (ref 11.5–17)
GFR SERPLBLD CREATININE-BSD FMLA CKD-EPI: >60 ML/MIN/1.73/M2
GLOBULIN SER-MCNC: 3.7 GM/DL (ref 2.4–3.5)
GLUCOSE SERPL-MCNC: 156 MG/DL (ref 82–115)
HCT VFR BLD AUTO: 34.6 % (ref 42–52)
HGB BLD-MCNC: 11.8 G/DL (ref 14–18)
IMM GRANULOCYTES # BLD AUTO: 0.02 X10(3)/MCL (ref 0–0.04)
IMM GRANULOCYTES NFR BLD AUTO: 0.3 %
LYMPHOCYTES # BLD AUTO: 1.54 X10(3)/MCL (ref 0.6–4.6)
LYMPHOCYTES NFR BLD AUTO: 23.5 %
MCH RBC QN AUTO: 30.9 PG (ref 27–31)
MCHC RBC AUTO-ENTMCNC: 34.1 G/DL (ref 33–36)
MCV RBC AUTO: 90.6 FL (ref 80–94)
MONOCYTES # BLD AUTO: 0.5 X10(3)/MCL (ref 0.1–1.3)
MONOCYTES NFR BLD AUTO: 7.6 %
NEUTROPHILS # BLD AUTO: 4.3 X10(3)/MCL (ref 2.1–9.2)
NEUTROPHILS NFR BLD AUTO: 65.5 %
PLATELET # BLD AUTO: 210 X10(3)/MCL (ref 130–400)
PMV BLD AUTO: 9.2 FL (ref 7.4–10.4)
POTASSIUM SERPL-SCNC: 4.5 MMOL/L (ref 3.5–5.1)
PROT SERPL-MCNC: 6.8 GM/DL (ref 5.8–7.6)
RBC # BLD AUTO: 3.82 X10(6)/MCL (ref 4.7–6.1)
SODIUM SERPL-SCNC: 136 MMOL/L (ref 136–145)
WBC # BLD AUTO: 6.56 X10(3)/MCL (ref 4.5–11.5)

## 2025-01-28 PROCEDURE — 3079F DIAST BP 80-89 MM HG: CPT | Mod: CPTII,S$GLB,, | Performed by: STUDENT IN AN ORGANIZED HEALTH CARE EDUCATION/TRAINING PROGRAM

## 2025-01-28 PROCEDURE — 80053 COMPREHEN METABOLIC PANEL: CPT

## 2025-01-28 PROCEDURE — 1126F AMNT PAIN NOTED NONE PRSNT: CPT | Mod: CPTII,S$GLB,, | Performed by: STUDENT IN AN ORGANIZED HEALTH CARE EDUCATION/TRAINING PROGRAM

## 2025-01-28 PROCEDURE — G2211 COMPLEX E/M VISIT ADD ON: HCPCS | Mod: S$GLB,,, | Performed by: STUDENT IN AN ORGANIZED HEALTH CARE EDUCATION/TRAINING PROGRAM

## 2025-01-28 PROCEDURE — 3077F SYST BP >= 140 MM HG: CPT | Mod: CPTII,S$GLB,, | Performed by: STUDENT IN AN ORGANIZED HEALTH CARE EDUCATION/TRAINING PROGRAM

## 2025-01-28 PROCEDURE — 99999 PR PBB SHADOW E&M-EST. PATIENT-LVL IV: CPT | Mod: PBBFAC,,, | Performed by: STUDENT IN AN ORGANIZED HEALTH CARE EDUCATION/TRAINING PROGRAM

## 2025-01-28 PROCEDURE — 36415 COLL VENOUS BLD VENIPUNCTURE: CPT

## 2025-01-28 PROCEDURE — 1160F RVW MEDS BY RX/DR IN RCRD: CPT | Mod: CPTII,S$GLB,, | Performed by: STUDENT IN AN ORGANIZED HEALTH CARE EDUCATION/TRAINING PROGRAM

## 2025-01-28 PROCEDURE — 99214 OFFICE O/P EST MOD 30 MIN: CPT | Mod: S$GLB,,, | Performed by: STUDENT IN AN ORGANIZED HEALTH CARE EDUCATION/TRAINING PROGRAM

## 2025-01-28 PROCEDURE — 85025 COMPLETE CBC W/AUTO DIFF WBC: CPT

## 2025-01-28 PROCEDURE — 82378 CARCINOEMBRYONIC ANTIGEN: CPT

## 2025-01-28 PROCEDURE — 1159F MED LIST DOCD IN RCRD: CPT | Mod: CPTII,S$GLB,, | Performed by: STUDENT IN AN ORGANIZED HEALTH CARE EDUCATION/TRAINING PROGRAM

## 2025-02-27 ENCOUNTER — HOSPITAL ENCOUNTER (INPATIENT)
Facility: HOSPITAL | Age: 85
LOS: 4 days | Discharge: HOME | DRG: 188 | End: 2025-03-03
Attending: STUDENT IN AN ORGANIZED HEALTH CARE EDUCATION/TRAINING PROGRAM | Admitting: STUDENT IN AN ORGANIZED HEALTH CARE EDUCATION/TRAINING PROGRAM
Payer: MEDICARE

## 2025-02-27 DIAGNOSIS — R74.01 TRANSAMINITIS: ICD-10-CM

## 2025-02-27 DIAGNOSIS — J18.9 PNEUMONIA: ICD-10-CM

## 2025-02-27 DIAGNOSIS — R07.9 CHEST PAIN: ICD-10-CM

## 2025-02-27 DIAGNOSIS — C17.0 DUODENAL CANCER: Chronic | ICD-10-CM

## 2025-02-27 DIAGNOSIS — J90 PLEURAL EFFUSION, BILATERAL: Primary | ICD-10-CM

## 2025-02-27 PROBLEM — D64.9 ANEMIA: Status: ACTIVE | Noted: 2025-02-27

## 2025-02-27 LAB
ALBUMIN SERPL-MCNC: 2.7 G/DL (ref 3.4–4.8)
ALBUMIN/GLOB SERPL: 0.7 RATIO (ref 1.1–2)
ALP SERPL-CCNC: 592 UNIT/L (ref 40–150)
ALT SERPL-CCNC: 83 UNIT/L (ref 0–55)
ANION GAP SERPL CALC-SCNC: 10 MEQ/L
APICAL FOUR CHAMBER EJECTION FRACTION: 51 %
APICAL TWO CHAMBER EJECTION FRACTION: 63 %
AST SERPL-CCNC: 65 UNIT/L (ref 5–34)
AV INDEX (PROSTH): 0.61
AV MEAN GRADIENT: 4 MMHG
AV PEAK GRADIENT: 8 MMHG
AV VALVE AREA BY VELOCITY RATIO: 2.9 CM²
AV VALVE AREA: 2.8 CM²
AV VELOCITY RATIO: 0.64
B PERT.PT PRMT NPH QL NAA+NON-PROBE: NOT DETECTED
BASOPHILS # BLD AUTO: 0.04 X10(3)/MCL
BASOPHILS NFR BLD AUTO: 0.5 %
BILIRUB SERPL-MCNC: 0.6 MG/DL
BNP BLD-MCNC: 62.7 PG/ML
BSA FOR ECHO PROCEDURE: 1.78 M2
BUN SERPL-MCNC: 22 MG/DL (ref 8.4–25.7)
C PNEUM DNA NPH QL NAA+NON-PROBE: NOT DETECTED
CALCIUM SERPL-MCNC: 9.4 MG/DL (ref 8.8–10)
CHLORIDE SERPL-SCNC: 104 MMOL/L (ref 98–107)
CLARITY BODY FLUID (OLG): NORMAL
CO2 SERPL-SCNC: 24 MMOL/L (ref 23–31)
COLOR BODY FLUID (OLG): NORMAL
CREAT SERPL-MCNC: 0.91 MG/DL (ref 0.72–1.25)
CREAT/UREA NIT SERPL: 24
CV ECHO LV RWT: 0.43 CM
DOP CALC AO PEAK VEL: 1.4 M/S
DOP CALC AO VTI: 29.5 CM
DOP CALC LVOT AREA: 4.5 CM2
DOP CALC LVOT DIAMETER: 2.4 CM
DOP CALC LVOT PEAK VEL: 0.9 M/S
DOP CALC LVOT STROKE VOLUME: 81.4 CM3
DOP CALC MV VTI: 29.7 CM
DOP CALCLVOT PEAK VEL VTI: 18 CM
E WAVE DECELERATION TIME: 190 MSEC
E/A RATIO: 0.99
E/E' RATIO: 9 M/S
ECHO LV POSTERIOR WALL: 1 CM (ref 0.6–1.1)
EOSINOPHIL # BLD AUTO: 0.2 X10(3)/MCL (ref 0–0.9)
EOSINOPHIL NFR BLD AUTO: 2.5 %
ERYTHROCYTE [DISTWIDTH] IN BLOOD BY AUTOMATED COUNT: 13.5 % (ref 11.5–17)
FLUAV AG UPPER RESP QL IA.RAPID: NOT DETECTED
FLUBV AG UPPER RESP QL IA.RAPID: NOT DETECTED
FRACTIONAL SHORTENING: 28.3 % (ref 28–44)
GFR SERPLBLD CREATININE-BSD FMLA CKD-EPI: >60 ML/MIN/1.73/M2
GLOBULIN SER-MCNC: 3.9 GM/DL (ref 2.4–3.5)
GLUCOSE FLD-MCNC: 117 MG/DL
GLUCOSE SERPL-MCNC: 141 MG/DL (ref 82–115)
GRAM STN SPEC: NORMAL
GRAM STN SPEC: NORMAL
HADV DNA NPH QL NAA+NON-PROBE: NOT DETECTED
HCOV 229E RNA NPH QL NAA+NON-PROBE: NOT DETECTED
HCOV HKU1 RNA NPH QL NAA+NON-PROBE: NOT DETECTED
HCOV NL63 RNA NPH QL NAA+NON-PROBE: NOT DETECTED
HCOV OC43 RNA NPH QL NAA+NON-PROBE: NOT DETECTED
HCT VFR BLD AUTO: 35.1 % (ref 42–52)
HGB BLD-MCNC: 11.9 G/DL (ref 14–18)
HMPV RNA NPH QL NAA+NON-PROBE: NOT DETECTED
HPIV1 RNA NPH QL NAA+NON-PROBE: NOT DETECTED
HPIV2 RNA NPH QL NAA+NON-PROBE: NOT DETECTED
HPIV3 RNA NPH QL NAA+NON-PROBE: NOT DETECTED
HPIV4 RNA NPH QL NAA+NON-PROBE: NOT DETECTED
IMM GRANULOCYTES # BLD AUTO: 0.04 X10(3)/MCL (ref 0–0.04)
IMM GRANULOCYTES NFR BLD AUTO: 0.5 %
INR PPP: 1.1
INTERVENTRICULAR SEPTUM: 1 CM (ref 0.6–1.1)
LACTATE SERPL-SCNC: 1.8 MMOL/L (ref 0.5–2.2)
LDH FLD-CCNC: 227 U/L
LEFT ATRIUM AREA SYSTOLIC (APICAL 2 CHAMBER): 20 CM2
LEFT ATRIUM AREA SYSTOLIC (APICAL 4 CHAMBER): 20.9 CM2
LEFT ATRIUM SIZE: 3.1 CM
LEFT ATRIUM VOLUME INDEX MOD: 38 ML/M2
LEFT ATRIUM VOLUME MOD: 68 ML
LEFT INTERNAL DIMENSION IN SYSTOLE: 3.3 CM (ref 2.1–4)
LEFT VENTRICLE DIASTOLIC VOLUME INDEX: 53.89 ML/M2
LEFT VENTRICLE DIASTOLIC VOLUME: 97 ML
LEFT VENTRICLE END DIASTOLIC VOLUME APICAL 2 CHAMBER: 125 ML
LEFT VENTRICLE END DIASTOLIC VOLUME APICAL 4 CHAMBER: 69.2 ML
LEFT VENTRICLE END SYSTOLIC VOLUME APICAL 2 CHAMBER: 65.6 ML
LEFT VENTRICLE END SYSTOLIC VOLUME APICAL 4 CHAMBER: 69.3 ML
LEFT VENTRICLE MASS INDEX: 88.2 G/M2
LEFT VENTRICLE SYSTOLIC VOLUME INDEX: 24.4 ML/M2
LEFT VENTRICLE SYSTOLIC VOLUME: 44 ML
LEFT VENTRICULAR INTERNAL DIMENSION IN DIASTOLE: 4.6 CM (ref 3.5–6)
LEFT VENTRICULAR MASS: 158.8 G
LV LATERAL E/E' RATIO: 7.7 M/S
LV SEPTAL E/E' RATIO: 9.9 M/S
LVED V (TEICH): 97.3 ML
LVES V (TEICH): 44.1 ML
LVOT MG: 1 MMHG
LVOT MV: 0.54 CM/S
LYMPHOCYTES # BLD AUTO: 1.04 X10(3)/MCL (ref 0.6–4.6)
LYMPHOCYTES NFR BLD AUTO: 13 %
LYMPHOCYTES NFR FLD MANUAL: 14 %
M PNEUMO DNA NPH QL NAA+NON-PROBE: NOT DETECTED
MCH RBC QN AUTO: 30.4 PG (ref 27–31)
MCHC RBC AUTO-ENTMCNC: 33.9 G/DL (ref 33–36)
MCV RBC AUTO: 89.5 FL (ref 80–94)
MONOCYTE MAN % BF (OLG): 1 %
MONOCYTES # BLD AUTO: 0.64 X10(3)/MCL (ref 0.1–1.3)
MONOCYTES NFR BLD AUTO: 8 %
MRSA PCR SCRN (OHS): NOT DETECTED
MV MEAN GRADIENT: 1 MMHG
MV PEAK A VEL: 0.7 M/S
MV PEAK E VEL: 0.69 M/S
MV PEAK GRADIENT: 2 MMHG
MV VALVE AREA BY CONTINUITY EQUATION: 2.74 CM2
NEUTROPHILS # BLD AUTO: 6.07 X10(3)/MCL (ref 2.1–9.2)
NEUTROPHILS MAN % BF (OLG): 85 %
NEUTROPHILS NFR BLD AUTO: 75.5 %
NRBC BLD AUTO-RTO: 0 %
OHS CV RV/LV RATIO: 0.54 CM
OHS LV EJECTION FRACTION SIMPSONS BIPLANE MOD: 58 %
OHS QRS DURATION: 92 MS
OHS QTC CALCULATION: 398 MS
PH FLD: 7.7 [PH] (ref 5–10)
PISA TR MAX VEL: 2.7 M/S
PLATELET # BLD AUTO: 227 X10(3)/MCL (ref 130–400)
PMV BLD AUTO: 8.8 FL (ref 7.4–10.4)
POTASSIUM SERPL-SCNC: 4.4 MMOL/L (ref 3.5–5.1)
PROT FLD-MCNC: 3.9 GM/DL
PROT SERPL-MCNC: 6.6 GM/DL (ref 5.8–7.6)
PROTHROMBIN TIME: 14.2 SECONDS (ref 12.5–14.5)
RBC # BLD AUTO: 3.92 X10(6)/MCL (ref 4.7–6.1)
RIGHT VENTRICLE DIASTOLIC BASEL DIMENSION: 2.5 CM
RIGHT VENTRICULAR END-DIASTOLIC DIMENSION: 2.5 CM
RSV A 5' UTR RNA NPH QL NAA+PROBE: NOT DETECTED
RSV RNA NPH QL NAA+NON-PROBE: NOT DETECTED
RV+EV RNA NPH QL NAA+NON-PROBE: NOT DETECTED
SARS-COV-2 RNA RESP QL NAA+PROBE: NOT DETECTED
SODIUM SERPL-SCNC: 138 MMOL/L (ref 136–145)
TDI LATERAL: 0.09 M/S
TDI SEPTAL: 0.07 M/S
TDI: 0.08 M/S
TRICUSPID ANNULAR PLANE SYSTOLIC EXCURSION: 2.35 CM
TROPONIN I SERPL-MCNC: 0.01 NG/ML (ref 0–0.04)
TROPONIN I SERPL-MCNC: <0.01 NG/ML (ref 0–0.04)
TROPONIN I SERPL-MCNC: <0.01 NG/ML (ref 0–0.04)
TSH SERPL-ACNC: 1.12 UIU/ML (ref 0.35–4.94)
WBC # BLD AUTO: 8.03 X10(3)/MCL (ref 4.5–11.5)
WBC # FLD AUTO: 3885 /UL
Z-SCORE OF LEFT VENTRICULAR DIMENSION IN END DIASTOLE: -0.79
Z-SCORE OF LEFT VENTRICULAR DIMENSION IN END SYSTOLE: 0.56

## 2025-02-27 PROCEDURE — 99285 EMERGENCY DEPT VISIT HI MDM: CPT | Mod: 25

## 2025-02-27 PROCEDURE — 25000003 PHARM REV CODE 250: Performed by: STUDENT IN AN ORGANIZED HEALTH CARE EDUCATION/TRAINING PROGRAM

## 2025-02-27 PROCEDURE — 89051 BODY FLUID CELL COUNT: CPT | Performed by: HOSPITALIST

## 2025-02-27 PROCEDURE — 85025 COMPLETE CBC W/AUTO DIFF WBC: CPT | Performed by: STUDENT IN AN ORGANIZED HEALTH CARE EDUCATION/TRAINING PROGRAM

## 2025-02-27 PROCEDURE — 0W9B3ZZ DRAINAGE OF LEFT PLEURAL CAVITY, PERCUTANEOUS APPROACH: ICD-10-PCS | Performed by: HOSPITALIST

## 2025-02-27 PROCEDURE — 83880 ASSAY OF NATRIURETIC PEPTIDE: CPT | Performed by: STUDENT IN AN ORGANIZED HEALTH CARE EDUCATION/TRAINING PROGRAM

## 2025-02-27 PROCEDURE — 84157 ASSAY OF PROTEIN OTHER: CPT | Performed by: HOSPITALIST

## 2025-02-27 PROCEDURE — 93010 ELECTROCARDIOGRAM REPORT: CPT | Mod: ,,, | Performed by: INTERNAL MEDICINE

## 2025-02-27 PROCEDURE — 63600175 PHARM REV CODE 636 W HCPCS: Performed by: STUDENT IN AN ORGANIZED HEALTH CARE EDUCATION/TRAINING PROGRAM

## 2025-02-27 PROCEDURE — 88305 TISSUE EXAM BY PATHOLOGIST: CPT | Performed by: HOSPITALIST

## 2025-02-27 PROCEDURE — 84484 ASSAY OF TROPONIN QUANT: CPT | Performed by: STUDENT IN AN ORGANIZED HEALTH CARE EDUCATION/TRAINING PROGRAM

## 2025-02-27 PROCEDURE — 85610 PROTHROMBIN TIME: CPT | Performed by: STUDENT IN AN ORGANIZED HEALTH CARE EDUCATION/TRAINING PROGRAM

## 2025-02-27 PROCEDURE — 21400001 HC TELEMETRY ROOM

## 2025-02-27 PROCEDURE — 87070 CULTURE OTHR SPECIMN AEROBIC: CPT | Performed by: HOSPITALIST

## 2025-02-27 PROCEDURE — 25500020 PHARM REV CODE 255: Performed by: STUDENT IN AN ORGANIZED HEALTH CARE EDUCATION/TRAINING PROGRAM

## 2025-02-27 PROCEDURE — 80053 COMPREHEN METABOLIC PANEL: CPT | Performed by: STUDENT IN AN ORGANIZED HEALTH CARE EDUCATION/TRAINING PROGRAM

## 2025-02-27 PROCEDURE — 87040 BLOOD CULTURE FOR BACTERIA: CPT | Performed by: STUDENT IN AN ORGANIZED HEALTH CARE EDUCATION/TRAINING PROGRAM

## 2025-02-27 PROCEDURE — 87641 MR-STAPH DNA AMP PROBE: CPT | Performed by: STUDENT IN AN ORGANIZED HEALTH CARE EDUCATION/TRAINING PROGRAM

## 2025-02-27 PROCEDURE — 87205 SMEAR GRAM STAIN: CPT | Performed by: HOSPITALIST

## 2025-02-27 PROCEDURE — 99222 1ST HOSP IP/OBS MODERATE 55: CPT | Mod: ,,, | Performed by: INTERNAL MEDICINE

## 2025-02-27 PROCEDURE — 83615 LACTATE (LD) (LDH) ENZYME: CPT | Performed by: HOSPITALIST

## 2025-02-27 PROCEDURE — 84443 ASSAY THYROID STIM HORMONE: CPT | Performed by: STUDENT IN AN ORGANIZED HEALTH CARE EDUCATION/TRAINING PROGRAM

## 2025-02-27 PROCEDURE — 93005 ELECTROCARDIOGRAM TRACING: CPT

## 2025-02-27 PROCEDURE — 84484 ASSAY OF TROPONIN QUANT: CPT

## 2025-02-27 PROCEDURE — 83986 ASSAY PH BODY FLUID NOS: CPT | Performed by: HOSPITALIST

## 2025-02-27 PROCEDURE — 83605 ASSAY OF LACTIC ACID: CPT | Performed by: STUDENT IN AN ORGANIZED HEALTH CARE EDUCATION/TRAINING PROGRAM

## 2025-02-27 PROCEDURE — 88112 CYTOPATH CELL ENHANCE TECH: CPT

## 2025-02-27 PROCEDURE — 87632 RESP VIRUS 6-11 TARGETS: CPT | Performed by: STUDENT IN AN ORGANIZED HEALTH CARE EDUCATION/TRAINING PROGRAM

## 2025-02-27 PROCEDURE — 82945 GLUCOSE OTHER FLUID: CPT | Performed by: HOSPITALIST

## 2025-02-27 PROCEDURE — 36415 COLL VENOUS BLD VENIPUNCTURE: CPT

## 2025-02-27 PROCEDURE — 0241U COVID/RSV/FLU A&B PCR: CPT | Performed by: STUDENT IN AN ORGANIZED HEALTH CARE EDUCATION/TRAINING PROGRAM

## 2025-02-27 RX ORDER — ALUMINUM HYDROXIDE, MAGNESIUM HYDROXIDE, AND SIMETHICONE 1200; 120; 1200 MG/30ML; MG/30ML; MG/30ML
30 SUSPENSION ORAL 4 TIMES DAILY PRN
Status: DISCONTINUED | OUTPATIENT
Start: 2025-02-27 | End: 2025-03-03 | Stop reason: HOSPADM

## 2025-02-27 RX ORDER — BISACODYL 10 MG/1
10 SUPPOSITORY RECTAL DAILY PRN
Status: DISCONTINUED | OUTPATIENT
Start: 2025-02-27 | End: 2025-03-03 | Stop reason: HOSPADM

## 2025-02-27 RX ORDER — HYDROCODONE BITARTRATE AND ACETAMINOPHEN 7.5; 325 MG/1; MG/1
1 TABLET ORAL EVERY 6 HOURS PRN
Refills: 0 | Status: DISCONTINUED | OUTPATIENT
Start: 2025-02-27 | End: 2025-03-03 | Stop reason: HOSPADM

## 2025-02-27 RX ORDER — METOPROLOL TARTRATE 25 MG/1
25 TABLET, FILM COATED ORAL 2 TIMES DAILY
Status: DISCONTINUED | OUTPATIENT
Start: 2025-02-27 | End: 2025-03-03 | Stop reason: HOSPADM

## 2025-02-27 RX ORDER — TAMSULOSIN HYDROCHLORIDE 0.4 MG/1
0.4 CAPSULE ORAL NIGHTLY
Status: DISCONTINUED | OUTPATIENT
Start: 2025-02-27 | End: 2025-03-03 | Stop reason: HOSPADM

## 2025-02-27 RX ORDER — ONDANSETRON 4 MG/1
8 TABLET, ORALLY DISINTEGRATING ORAL EVERY 8 HOURS PRN
Status: DISCONTINUED | OUTPATIENT
Start: 2025-02-27 | End: 2025-03-03 | Stop reason: HOSPADM

## 2025-02-27 RX ORDER — NITROGLYCERIN 0.4 MG/1
0.4 TABLET SUBLINGUAL EVERY 5 MIN PRN
Status: DISCONTINUED | OUTPATIENT
Start: 2025-02-27 | End: 2025-03-03 | Stop reason: HOSPADM

## 2025-02-27 RX ORDER — TALC
9 POWDER (GRAM) TOPICAL NIGHTLY PRN
Status: DISCONTINUED | OUTPATIENT
Start: 2025-02-27 | End: 2025-03-03 | Stop reason: HOSPADM

## 2025-02-27 RX ORDER — NALOXONE HCL 0.4 MG/ML
0.02 VIAL (ML) INJECTION
Status: DISCONTINUED | OUTPATIENT
Start: 2025-02-27 | End: 2025-03-03 | Stop reason: HOSPADM

## 2025-02-27 RX ORDER — SODIUM CHLORIDE 0.9 % (FLUSH) 0.9 %
10 SYRINGE (ML) INJECTION
Status: DISCONTINUED | OUTPATIENT
Start: 2025-02-27 | End: 2025-03-03 | Stop reason: HOSPADM

## 2025-02-27 RX ORDER — ADHESIVE BANDAGE
30 BANDAGE TOPICAL ONCE
Status: COMPLETED | OUTPATIENT
Start: 2025-02-27 | End: 2025-02-27

## 2025-02-27 RX ORDER — IBUPROFEN 200 MG
16 TABLET ORAL
Status: DISCONTINUED | OUTPATIENT
Start: 2025-02-27 | End: 2025-03-03 | Stop reason: HOSPADM

## 2025-02-27 RX ORDER — IPRATROPIUM BROMIDE AND ALBUTEROL SULFATE 2.5; .5 MG/3ML; MG/3ML
3 SOLUTION RESPIRATORY (INHALATION) EVERY 6 HOURS PRN
Status: DISCONTINUED | OUTPATIENT
Start: 2025-02-27 | End: 2025-03-03 | Stop reason: HOSPADM

## 2025-02-27 RX ORDER — POLYETHYLENE GLYCOL 3350 17 G/17G
17 POWDER, FOR SOLUTION ORAL DAILY
Status: DISCONTINUED | OUTPATIENT
Start: 2025-02-27 | End: 2025-03-03 | Stop reason: HOSPADM

## 2025-02-27 RX ORDER — DOCUSATE SODIUM 100 MG/1
100 CAPSULE, LIQUID FILLED ORAL 2 TIMES DAILY
Status: DISCONTINUED | OUTPATIENT
Start: 2025-02-27 | End: 2025-03-03 | Stop reason: HOSPADM

## 2025-02-27 RX ORDER — ENOXAPARIN SODIUM 100 MG/ML
40 INJECTION SUBCUTANEOUS EVERY 24 HOURS
Status: DISCONTINUED | OUTPATIENT
Start: 2025-02-27 | End: 2025-03-03 | Stop reason: HOSPADM

## 2025-02-27 RX ORDER — CEFDINIR 300 MG/1
300 CAPSULE ORAL 2 TIMES DAILY
Status: ON HOLD | COMMUNITY
Start: 2025-02-24 | End: 2025-03-03 | Stop reason: HOSPADM

## 2025-02-27 RX ORDER — LISINOPRIL 10 MG/1
10 TABLET ORAL DAILY
COMMUNITY

## 2025-02-27 RX ORDER — MORPHINE SULFATE 4 MG/ML
2 INJECTION, SOLUTION INTRAMUSCULAR; INTRAVENOUS EVERY 6 HOURS PRN
Refills: 0 | Status: DISCONTINUED | OUTPATIENT
Start: 2025-02-27 | End: 2025-03-03 | Stop reason: HOSPADM

## 2025-02-27 RX ORDER — GLUCAGON 1 MG
1 KIT INJECTION
Status: DISCONTINUED | OUTPATIENT
Start: 2025-02-27 | End: 2025-03-03 | Stop reason: HOSPADM

## 2025-02-27 RX ORDER — CEFEPIME HYDROCHLORIDE 1 G/1
1 INJECTION, POWDER, FOR SOLUTION INTRAMUSCULAR; INTRAVENOUS
Status: DISCONTINUED | OUTPATIENT
Start: 2025-02-27 | End: 2025-02-27

## 2025-02-27 RX ORDER — ASPIRIN 325 MG
325 TABLET, DELAYED RELEASE (ENTERIC COATED) ORAL
Status: COMPLETED | OUTPATIENT
Start: 2025-02-27 | End: 2025-02-27

## 2025-02-27 RX ORDER — MUPIROCIN 20 MG/G
OINTMENT TOPICAL 2 TIMES DAILY
Status: DISCONTINUED | OUTPATIENT
Start: 2025-02-28 | End: 2025-03-03 | Stop reason: HOSPADM

## 2025-02-27 RX ORDER — ACETAMINOPHEN 325 MG/1
650 TABLET ORAL EVERY 4 HOURS PRN
Status: DISCONTINUED | OUTPATIENT
Start: 2025-02-27 | End: 2025-03-03 | Stop reason: HOSPADM

## 2025-02-27 RX ORDER — FINASTERIDE 5 MG/1
5 TABLET, FILM COATED ORAL DAILY
Status: DISCONTINUED | OUTPATIENT
Start: 2025-02-27 | End: 2025-03-03 | Stop reason: HOSPADM

## 2025-02-27 RX ORDER — IBUPROFEN 200 MG
24 TABLET ORAL
Status: DISCONTINUED | OUTPATIENT
Start: 2025-02-27 | End: 2025-03-03 | Stop reason: HOSPADM

## 2025-02-27 RX ORDER — ATORVASTATIN CALCIUM 40 MG/1
40 TABLET, FILM COATED ORAL DAILY
Status: DISCONTINUED | OUTPATIENT
Start: 2025-02-27 | End: 2025-03-03 | Stop reason: HOSPADM

## 2025-02-27 RX ORDER — POLYETHYLENE GLYCOL 3350 17 G/17G
17 POWDER, FOR SOLUTION ORAL 3 TIMES DAILY PRN
Status: DISCONTINUED | OUTPATIENT
Start: 2025-02-27 | End: 2025-02-27

## 2025-02-27 RX ORDER — SIMETHICONE 80 MG
1 TABLET,CHEWABLE ORAL 4 TIMES DAILY PRN
Status: DISCONTINUED | OUTPATIENT
Start: 2025-02-27 | End: 2025-03-03 | Stop reason: HOSPADM

## 2025-02-27 RX ORDER — ONDANSETRON HYDROCHLORIDE 2 MG/ML
4 INJECTION, SOLUTION INTRAVENOUS EVERY 8 HOURS PRN
Status: DISCONTINUED | OUTPATIENT
Start: 2025-02-27 | End: 2025-03-03 | Stop reason: HOSPADM

## 2025-02-27 RX ADMIN — DOCUSATE SODIUM 100 MG: 100 CAPSULE, LIQUID FILLED ORAL at 08:02

## 2025-02-27 RX ADMIN — METOPROLOL TARTRATE 25 MG: 25 TABLET, FILM COATED ORAL at 08:02

## 2025-02-27 RX ADMIN — ACETAMINOPHEN 650 MG: 325 TABLET, FILM COATED ORAL at 06:02

## 2025-02-27 RX ADMIN — IOHEXOL 65 ML: 350 INJECTION, SOLUTION INTRAVENOUS at 04:02

## 2025-02-27 RX ADMIN — FINASTERIDE 5 MG: 5 TABLET, FILM COATED ORAL at 08:02

## 2025-02-27 RX ADMIN — ATORVASTATIN CALCIUM 40 MG: 40 TABLET, FILM COATED ORAL at 09:02

## 2025-02-27 RX ADMIN — POLYETHYLENE GLYCOL 3350 17 G: 17 POWDER, FOR SOLUTION ORAL at 08:02

## 2025-02-27 RX ADMIN — ACETAMINOPHEN 325 MG: 325 TABLET, FILM COATED ORAL at 10:02

## 2025-02-27 RX ADMIN — METOPROLOL TARTRATE 25 MG: 25 TABLET, FILM COATED ORAL at 09:02

## 2025-02-27 RX ADMIN — CEFEPIME 1 G: 1 INJECTION, POWDER, FOR SOLUTION INTRAMUSCULAR; INTRAVENOUS at 06:02

## 2025-02-27 RX ADMIN — MAGNESIUM HYDROXIDE 2400 MG: 400 SUSPENSION ORAL at 06:02

## 2025-02-27 RX ADMIN — ENOXAPARIN SODIUM 40 MG: 40 INJECTION SUBCUTANEOUS at 04:02

## 2025-02-27 RX ADMIN — ASPIRIN 325 MG: 325 TABLET, COATED ORAL at 05:02

## 2025-02-27 RX ADMIN — SODIUM CHLORIDE, POTASSIUM CHLORIDE, SODIUM LACTATE AND CALCIUM CHLORIDE 1000 ML: 600; 310; 30; 20 INJECTION, SOLUTION INTRAVENOUS at 04:02

## 2025-02-27 RX ADMIN — TAMSULOSIN HYDROCHLORIDE 0.4 MG: 0.4 CAPSULE ORAL at 08:02

## 2025-02-27 RX ADMIN — VANCOMYCIN HYDROCHLORIDE 1500 MG: 1.5 INJECTION, POWDER, LYOPHILIZED, FOR SOLUTION INTRAVENOUS at 08:02
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
## 2025-02-27 NOTE — PLAN OF CARE
02/27/25 1440   Discharge Assessment   Assessment Type Discharge Planning Assessment   Confirmed/corrected address, phone number and insurance Yes   Confirmed Demographics Correct on Facesheet   Source of Information patient   When was your last doctors appointment? 08/12/24  (Dr Silverio)   Does patient/caregiver understand observation status Yes   Communicated SHAUN with patient/caregiver Yes   Reason For Admission bilat pleural effusions   People in Home spouse   Facility Arrived From: home   Do you expect to return to your current living situation? Yes   Do you have help at home or someone to help you manage your care at home? Yes   Who are your caregiver(s) and their phone number(s)? [employer]   Current cognitive status: Alert/Oriented   Walking or Climbing Stairs Difficulty no   Dressing/Bathing Difficulty no   Home Accessibility wheelchair accessible   Equipment Currently Used at Home none   Readmission within 30 days? No   Patient currently being followed by outpatient case management? No   Do you currently have service(s) that help you manage your care at home? No   Do you take prescription medications? Yes   Do you have any problems affording any of your prescribed medications? No   Is the patient taking medications as prescribed? yes   Who is going to help you get home at discharge? [facility]   How do you get to doctors appointments? car, drives self   Discharge Plan A Home   Discharge Plan B Home   DME Needed Upon Discharge  other (see comments)  (tbd)   Discharge Plan discussed with: Patient   Transition of Care Barriers None   Housing Stability   In the last 12 months, was there a time when you were not able to pay the mortgage or rent on time? N   At any time in the past 12 months, were you homeless or living in a shelter (including now)? N   Transportation Needs   Has the lack of transportation kept you from medical appointments, meetings, work or from getting things needed for daily living? No   Food  Insecurity   Within the past 12 months, you worried that your food would run out before you got the money to buy more. Never true   Within the past 12 months, the food you bought just didn't last and you didn't have money to get more. Never true   Utilities   In the past 12 months has the electric, gas, oil, or water company threatened to shut off services in your home? No     Pt and spouse live together. He does drive, no DME . He had [facility] in 2023 but not currently. Reports he is cancer free. Other needs TBD.

## 2025-02-27 NOTE — CONSULTS
Inpatient consult to Cardiology  Consult performed by: Jennifer Carbajal FNP  Consult ordered by: Reyes, Thairy G, DO  Reason for consult: CP        [facility]    Cardiology  Consult Note    Patient Name: Quincy Triplett  MRN: [id number]  Admission Date: 2/27/2025  Hospital Length of Stay: 0 days  Code Status: Full Code   Attending Provider: Maximo Otoole MD   Consulting Provider: KELVIN Carlton  Primary Care Physician: Reji Waters MD  Principal Problem:Pleural effusion, bilateral    Patient information was obtained from patient, past medical records, and ER records.     Subjective:     Reason for Consult: CP    HPI: Mr. Triplett is an 84 year old male who is known to [facility], Dr. Smith. He presents to the ER with complaints of left sided chest pain that radiated to his back 1 hour prior to arrival that improved with atiya seltzer. He reports that he has been having intermittent CP x 3 days. He reports associated symptoms of cough and cold symptoms since Monday but denies SOB, palpitations, nausea, vomiting, fever, or chills. He reports that his BP has been elevated the last few days. On arrival, he was found to be hypertensive (164/90). A CTA chest was obtained and negative for PE but demonstrated increase of pleural effusion on the left and consolidation in the LLL showing an interval increase in size (which may represent progression of PNA w/ neoplastic process not entirely excluded). He was admitted to hospital medicine and started on IV antibiotics. Oncology was consulted with recommendations of thoracentesis w/ possible bronch. An EKG was obtained and demonstrated SR w/ short DE and PVC's. [facility] has been consulted to further evaluate the patient's CP.       PMH: CAD, duodenal CA, HLD, HTN  PSH: right arm amputation, tonsillectomy, whipple, carotid stent, cholecystectomy, CABG, ERCP, eye surgery, liver biopsy, lymphadenectomy, whipple, tonsillectomy   Family History:  Mother - DM 2  Social History: Former tobacco use, denies alcohol or illicit drug use.     Previous Cardiac Diagnostics:   TTE (7.6.23):  The study quality is average.   The left ventricle is normal in size with mild to moderate, concentric left ventricular hypertrophy and normal left ventricular systolic function. The left ventricular ejection fraction is 55%. The left ventricle diastolic function is impaired (Grade I) with normal left atrial pressure. The left ventricular ejection fraction was obtained using a contrast (Optison) agent.   The aortic valve is tricuspid with mild calcification and adequate cuspal excursion.   Mild to moderate (1-2+) tricuspid and mild (1+) pulmonic regurgitation is present.  The estimated pulmonary artery systolic pressure is 35 mmHg assuming a right atrial pressure of 3 mmHg.     PET (12.15.22):  This is a normal perfusion study, no perfusion defects noted. There is no evidence of ischemia.   Small area of apical artifact noted.  This scan is suggestive of low risk for future cardiovascular events.   The left ventricular cavity is noted to be normal on the stress studies. The stress left ventricular ejection fraction was calculated to be 60% and left ventricular global function is normal. The rest left ventricular cavity is noted to be normal. The rest left ventricular ejection fraction was calculated to be 50% and rest left ventricular global function is normal.   When compared to the resting ejection fraction (50%), the stress ejection fraction (60%) has increased.   The study quality is excellent.   Myocardial blood flow reserve was not performed in this patient due to specific concerns that can affect accuracy.    Carotid US (11.30.22):  The study quality is average.   1-39% stenosis in the proximal right internal carotid artery based on Bluth Criteria.   1-39% stenosis in the proximal left internal carotid artery based on Bluth Criteria.   Antegrade right vertebral artery flow.    Antegrade left vertebral artery flow.     TTE (11.30.22):  The study quality is average.   The left ventricle is normal in size. Global left ventricular systolic function is normal. The left ventricular ejection fraction is 50%. The left ventricle diastolic function is impaired (Grade I) with normal left atrial pressure. Concentric left ventricular hypertrophy is present. It is mild.   Mild (1+) tricuspid regurgitation. Mild (1+) pulmonic regurgitation. Trace mitral regurgitation.   The estimated pulmonary artery systolic pressure is 21 mmHg assuming a right atrial pressure of 3 mmHg.       Review of patient's allergies indicates:  No Known Allergies  Current Facility-Administered Medications on File Prior to Encounter   Medication    lactated ringers infusion    LIDOcaine (PF) 10 mg/ml (1%) injection 10 mg    ondansetron injection 4 mg    [DISCONTINUED] HYDROmorphone (PF) injection 0.4 mg     Current Outpatient Medications on File Prior to Encounter   Medication Sig    cefdinir (OMNICEF) 300 MG capsule Take 300 mg by mouth 2 (two) times daily.    finasteride (PROSCAR) 5 mg tablet Take 1 tablet (5 mg total) by mouth once daily.    lisinopriL 10 MG tablet Take 10 mg by mouth once daily.    tamsulosin (FLOMAX) 0.4 mg Cap Take 1 capsule (0.4 mg total) by mouth every evening.    ALPRAZolam (XANAX) 0.25 MG tablet Take 1 tablet (0.25 mg total) by mouth 3 (three) times daily as needed for Anxiety.    busPIRone (BUSPAR) 5 MG Tab Take 1 tablet (5 mg total) by mouth 3 (three) times daily.    carvediloL (COREG) 3.125 MG tablet Take 1 tablet (3.125 mg total) by mouth 2 (two) times daily.    docusate sodium (COLACE) 100 MG capsule Take 1 capsule (100 mg total) by mouth 2 (two) times daily.    furosemide (LASIX) 20 MG tablet Take 1 tablet (20 mg total) by mouth once daily.    HYDROcodone-acetaminophen (NORCO) 7.5-325 mg per tablet Take 1 tablet by mouth every 6 (six) hours as needed for Pain.    pantoprazole (PROTONIX) 40 MG  tablet Take 1 tablet (40 mg total) by mouth once daily. (Patient taking differently: Take 40 mg by mouth once daily. Last taken 10/18/23)    potassium chloride SA (KLOR-CON) 10 MEQ TbSR Take 1 tablet (10 mEq total) by mouth once daily.    sulfamethoxazole-trimethoprim 800-160mg (BACTRIM DS) 800-160 mg Tab Take 1 tablet by mouth 2 (two) times daily.       Review of Systems   Respiratory:  Positive for cough. Negative for shortness of breath.    Cardiovascular:  Positive for chest pain. Negative for palpitations.   All other systems reviewed and are negative.      Objective:     Vital Signs (Most Recent):  Temp: 98.2 °F (36.8 °C) (02/27/25 0250)  Pulse: 79 (02/27/25 0730)  Resp: (!) 25 (02/27/25 0739)  BP: (!) 164/83 (02/27/25 0730)  SpO2: 97 % (02/27/25 0730) Vital Signs (24h Range):  Temp:  [98.2 °F (36.8 °C)] 98.2 °F (36.8 °C)  Pulse:  [73-79] 79  Resp:  [18-25] 25  SpO2:  [94 %-97 %] 97 %  BP: (154-164)/(83-90) 164/83   Weight: 65.8 kg (145 lb)  Body mass index is 21.41 kg/m².  SpO2: 97 %     No intake or output data in the 24 hours ending 02/27/25 0830  Lines/Drains/Airways       Central Venous Catheter Line  Duration                  PowerPort A Cath Single Lumen -- days              Peripheral Intravenous Line  Duration                  Peripheral IV - Single Lumen 02/27/25 0357 22 G Anterior;Left Forearm <1 day         Peripheral IV - Single Lumen 02/27/25 0420 18 G Left;Posterior Forearm <1 day                  Significant Labs:   Chemistries:   Recent Labs   Lab 02/27/25  0356      K 4.4      CO2 24   BUN 22.0   CREATININE 0.91   CALCIUM 9.4   BILITOT 0.6   ALKPHOS 592*   ALT 83*   AST 65*   GLUCOSE 141*   TROPONINI <0.010        CBC/Anemia Labs: Coags:    Recent Labs   Lab 02/27/25  0356   WBC 8.03   HGB 11.9*   HCT 35.1*      MCV 89.5   RDW 13.5    Recent Labs   Lab 02/27/25  0356   INR 1.1        Significant Imaging:  Imaging Results              CTA Chest Non-Coronary (PE Studies)  (Preliminary result)  Result time 02/27/25 05:21:06      Preliminary result by Adriel Worley Jr., MD (02/27/25 05:21:06)                   Narrative:    START OF REPORT:  Technique: CT Scan of the chest was performed with intravenous contrast with direct axial images as well as sagittal and coronal reconstruction images pulmonary embolus protocol.    Dosage Information: Automated Exposure Control was utilized.    Comparison: Comparison is done with study dated 2025-01-13 08:47:54.    Clinical History: Reports CP, suspected PE, high prob.    Findings:  Soft Tissues: Unremarkable.  Neck: A 1.2 cm left thyroid nodule is seen, Series 4 Image 11.  Heart: The heart appears unremarkable. Mild coronary artery calcification is seen.  Aorta: Mild stable aortic calcification is seen in the arch and descending thoracic aorta.  Pulmonary Arteries: No filling defects are seen in the pulmonary arteries to suggest pulmonary embolus to the sensitivity of the study.  Lungs: There is increase in streaky linear opacity is seen in both lungs with peripheral predominance consistent with nonspecific dependent changes scarring and subsegmental atelectasis. Compared to the prior study, the consolidation in the left lower lobe show interval increase in size measuring 6.4 x 5.3 in widest diameters. Areas of hazy and small consolidations are now seen in the inferior lingular segment of the left lung. This represent progression of pneumonia with a neoplastic process not entirely excluded.  Pleura: There is increase of pleural effusion in the left while the right remains relatively unchanged.  Bony Structures:  Spine: Severe multilevel stable spondylolytic changes are seen in the thoracic spine.  Ribs: No rib fractures are identified.  Abdomen: Stable appearing few liver granulomas are seen. Stable pneumobilia is still observed. Stable metallic focus is seen along the pancreatic duct, correlate clinically. Moderate stool is seen in the  visualized large bowels, may reflect some degree of constipation.      Impression:  1. No filling defects are seen in the pulmonary arteries to suggest pulmonary embolus to the sensitivity of the study.  2. Compared to the prior study, the consolidation in the left lower lobe show interval increase in size measuring 6.4 x 5.3 in widest diameters. Areas of hazy and small consolidations are now seen in the inferior lingular segment of the left lung. This represent progression of pneumonia with a neoplastic process not entirely excluded. Correlate with clinical and laboratory parameters as regards further evaluation and follow up'.  3. There is increase of pleural effusion in the left while the right remains relatively unchanged.  4. Details and other findings as discussed above.                                         X-Ray Chest AP Portable (In process)                   EKG:       Telemetry:  SR    Physical Exam  HENT:      Head: Normocephalic.      Nose: Nose normal.      Mouth/Throat:      Mouth: Mucous membranes are dry.   Eyes:      Extraocular Movements: Extraocular movements intact.   Cardiovascular:      Rate and Rhythm: Normal rate and regular rhythm.      Pulses: Normal pulses.      Heart sounds: Murmur heard.   Pulmonary:      Effort: Pulmonary effort is normal.      Breath sounds: Normal breath sounds.   Abdominal:      Palpations: Abdomen is soft.   Skin:     General: Skin is warm.   Neurological:      Mental Status: He is alert and oriented to person, place, and time.   Psychiatric:         Behavior: Behavior normal.         Home Medications:   Medications Ordered Prior to Encounter[1]  Current Schedule Inpatient Medications:   ceFEPime IV (PEDS and ADULTS)  1 g Intravenous Q8H    docusate sodium  100 mg Oral BID    enoxparin  40 mg Subcutaneous Daily    finasteride  5 mg Oral Daily    [START ON 2/28/2025] mupirocin   Nasal BID    polyethylene glycol  17 g Oral Daily    tamsulosin  0.4 mg Oral QHS     [START ON 2/28/2025] vancomycin 1,250 mg in D5W 250 mL IVPB (admixture device)  1,250 mg Intravenous Q24H    vancomycin 1,500 mg in D5W 250 mL IVPB (admixture device)  1,500 mg Intravenous Once     Continuous Infusions:    Assessment:   CP - Resolved  LLL Consolidation    - CTA Chest (2.27.25): 6.4 x 5.3 in widest diameter  Bilateral Pleural Effusions  HTN  HLD  GONZALEZ  CAD    - stent to LAD & CX (2015)  Adenocarcinoma of Ampulla of Vater    - s/p Whipple Procedure & Cholecystectomy    Plan:   Trend troponin until peak achieved.   Obtain echo.   Low suspicion of ACS.   He was loaded w/  mg x 1. Recommend starting ASA 81 mg daily due to hx of CAD.   Pulmonary consulted with plans for thoracentesis with possible bronchoscopy.   Will continue to follow.     Thank you for your consult.     Jennifer Carbajal, P  Cardiology  [facility]          [1]   Current Facility-Administered Medications on File Prior to Encounter   Medication Dose Route Frequency Provider Last Rate Last Admin    lactated ringers infusion   Intravenous Continuous Brijesh Macdonald MD 10 mL/hr at 10/19/23 0652 New Bag at 10/19/23 0652    LIDOcaine (PF) 10 mg/ml (1%) injection 10 mg  1 mL Intradermal Once Brijesh Macdonald MD        ondansetron injection 4 mg  4 mg Intravenous Once PRN Brijesh Macdonald MD        [DISCONTINUED] HYDROmorphone (PF) injection 0.4 mg  0.4 mg Intravenous Q5 Min PRN Brijesh Macdonald MD         Current Outpatient Medications on File Prior to Encounter   Medication Sig Dispense Refill    cefdinir (OMNICEF) 300 MG capsule Take 300 mg by mouth 2 (two) times daily.      finasteride (PROSCAR) 5 mg tablet Take 1 tablet (5 mg total) by mouth once daily. 30 tablet 0    lisinopriL 10 MG tablet Take 10 mg by mouth once daily.      tamsulosin (FLOMAX) 0.4 mg Cap Take 1 capsule (0.4 mg total) by mouth every evening. 30 capsule 0    ALPRAZolam (XANAX) 0.25 MG tablet Take 1 tablet (0.25 mg total) by mouth 3  (three) times daily as needed for Anxiety. 12 tablet 0    busPIRone (BUSPAR) 5 MG Tab Take 1 tablet (5 mg total) by mouth 3 (three) times daily. 90 tablet 0    carvediloL (COREG) 3.125 MG tablet Take 1 tablet (3.125 mg total) by mouth 2 (two) times daily. 60 tablet 0    docusate sodium (COLACE) 100 MG capsule Take 1 capsule (100 mg total) by mouth 2 (two) times daily. 30 capsule 0    furosemide (LASIX) 20 MG tablet Take 1 tablet (20 mg total) by mouth once daily. 30 tablet 0    HYDROcodone-acetaminophen (NORCO) 7.5-325 mg per tablet Take 1 tablet by mouth every 6 (six) hours as needed for Pain. 12 tablet 0    pantoprazole (PROTONIX) 40 MG tablet Take 1 tablet (40 mg total) by mouth once daily. (Patient taking differently: Take 40 mg by mouth once daily. Last taken 10/18/23) 30 tablet 0    potassium chloride SA (KLOR-CON) 10 MEQ TbSR Take 1 tablet (10 mEq total) by mouth once daily. 30 tablet 0    sulfamethoxazole-trimethoprim 800-160mg (BACTRIM DS) 800-160 mg Tab Take 1 tablet by mouth 2 (two) times daily.
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
## 2025-02-27 NOTE — PROCEDURES
[facility] - Emergency Dept    Left Ultrasound guided Thoracentesis Procedure Note         Date of Procedure: 2/27/2025    Procedure:  Left Ultrasound-guided diagnostic and therapeutic thoracentesis    Performed by: Pernell Pool MD    Pre-Procedure Diagnosis:  Left Pleural effusion    Post-Procedure Diagnosis:  Same    Anesthesia:  5 cc of 1% local lidocaine injected subcutaneously      Indication: The patient is a 84 y.o. male with left pleural effusion.    Consent: The risks, benefits, potential complications, treatment options and expected outcomes were discussed with the patient and/or family.  The possibilities of reaction to medication, pulmonary aspiration, perforation of an organ , bleeding, failure to diagnose a condition and creating a complication requiring transfusion or operation were discussed.  Verbal consent was granted.      Description of the Findings of the Procedure:  A Time Out was held and the above information confirmed.     With patient in sitting position, ultrasound guidance was used to identify a pocket of fluid felt amenable to aspiration.  The left posterior mid axillary area was sterilized with chlorhexidine and draped in sterile fashion.  5 cc of local lidocaine was injected subcutaneously.  A small incision was made with scalpel.  A 20 gauge thoracentesis needle was advanced with aspiration via 10 cc syringe.  When pleural fluid was obtained, an 8 [place] drainage catheter was advanced over the needle into the pleural space and the needle was removed.  The catheter was then connected to suction for fluid removal via Vacutainer.    Total of  900cc of fluid was removed.     Appearance of fluid was patsy     The patient recovered from the procedure and anesthesia in satisfactory condition.      Complications:  None    Estimated Blood Loss (EBL): Minimal    Specimens:To lab, micro and pathology     Pernell Pool MD  Pulmonary Critical Care Medicine  [facility] - Emergency Dept

## 2025-02-27 NOTE — PROGRESS NOTES
"Pharmacokinetic Initial Assessment: IV Vancomycin    Assessment/Plan:    Initiate intravenous vancomycin with loading dose of 1500 mg once followed by a maintenance dose of vancomycin 1250mg IV every 24 hours  Desired empiric serum trough concentration is 10 to 20 mcg/mL  Draw vancomycin trough level 60 min prior to third dose on 03/01 at approximately 0630.  Pharmacy will continue to follow and monitor vancomycin.      Please contact pharmacy at extension [phone] with any questions regarding this assessment.     Thank you for the consult,   Dottie Hyde       Patient brief summary:  Quincy Triplett is a 84 y.o. male initiated on antimicrobial therapy with IV Vancomycin for treatment of suspected lower respiratory infection    Drug Allergies:   Review of patient's allergies indicates:  No Known Allergies    Actual Body Weight:   65.8kg    Renal Function:   Estimated Creatinine Clearance: 57.2 mL/min (based on SCr of 0.91 mg/dL).,     Dialysis Method (if applicable):  N/A    CBC (last 72 hours):  Recent Labs   Lab Result Units 02/27/25  0356   WBC x10(3)/mcL 8.03   Hgb g/dL 11.9*   Hct % 35.1*   Platelet x10(3)/mcL 227   Mono % % 8.0   Eos % % 2.5   Basophil % % 0.5       Metabolic Panel (last 72 hours):  Recent Labs   Lab Result Units 02/27/25  0356   Sodium mmol/L 138   Potassium mmol/L 4.4   Chloride mmol/L 104   CO2 mmol/L 24   Glucose mg/dL 141*   Blood Urea Nitrogen mg/dL 22.0   Creatinine mg/dL 0.91   Albumin g/dL 2.7*   Bilirubin Total mg/dL 0.6   ALP unit/L 592*   AST unit/L 65*   ALT unit/L 83*       Drug levels (last 3 results):  No results for input(s): "VANCOMYCINRA", "VANCORANDOM", "VANCOMYCINPE", "VANCOPEAK", "VANCOMYCINTR", "VANCOTROUGH" in the last 72 hours.    Microbiologic Results:  Microbiology Results (last 7 days)       Procedure Component Value Units Date/Time    Respiratory Culture [[id number]]     Order Status: Sent Specimen: Sputum     Blood Culture #2 **CANNOT BE ORDERED STAT** [[id number]] " Collected: 02/27/25 0541    Order Status: Sent Specimen: Blood Updated: 02/27/25 0541    Blood Culture #1 **CANNOT BE ORDERED STAT** [[id number]] Collected: 02/27/25 0539    Order Status: Sent Specimen: Blood Updated: 02/27/25 0539

## 2025-02-28 LAB
ALBUMIN SERPL-MCNC: 2.5 G/DL (ref 3.4–4.8)
ALBUMIN/GLOB SERPL: 0.8 RATIO (ref 1.1–2)
ALP SERPL-CCNC: 532 UNIT/L (ref 40–150)
ALT SERPL-CCNC: 67 UNIT/L (ref 0–55)
ANION GAP SERPL CALC-SCNC: 6 MEQ/L
AST SERPL-CCNC: 43 UNIT/L (ref 5–34)
BASOPHILS # BLD AUTO: 0.03 X10(3)/MCL
BASOPHILS NFR BLD AUTO: 0.4 %
BILIRUB SERPL-MCNC: 0.8 MG/DL
BUN SERPL-MCNC: 21.8 MG/DL (ref 8.4–25.7)
CALCIUM SERPL-MCNC: 8.6 MG/DL (ref 8.8–10)
CHLORIDE SERPL-SCNC: 106 MMOL/L (ref 98–107)
CO2 SERPL-SCNC: 24 MMOL/L (ref 23–31)
CREAT SERPL-MCNC: 0.77 MG/DL (ref 0.72–1.25)
CREAT/UREA NIT SERPL: 28
EOSINOPHIL # BLD AUTO: 0.27 X10(3)/MCL (ref 0–0.9)
EOSINOPHIL NFR BLD AUTO: 4 %
ERYTHROCYTE [DISTWIDTH] IN BLOOD BY AUTOMATED COUNT: 13.6 % (ref 11.5–17)
GFR SERPLBLD CREATININE-BSD FMLA CKD-EPI: >60 ML/MIN/1.73/M2
GLOBULIN SER-MCNC: 3.2 GM/DL (ref 2.4–3.5)
GLUCOSE SERPL-MCNC: 112 MG/DL (ref 82–115)
HCT VFR BLD AUTO: 35.6 % (ref 42–52)
HGB BLD-MCNC: 11.7 G/DL (ref 14–18)
IMM GRANULOCYTES # BLD AUTO: 0.04 X10(3)/MCL (ref 0–0.04)
IMM GRANULOCYTES NFR BLD AUTO: 0.6 %
LYMPHOCYTES # BLD AUTO: 0.99 X10(3)/MCL (ref 0.6–4.6)
LYMPHOCYTES NFR BLD AUTO: 14.7 %
MAGNESIUM SERPL-MCNC: 2.2 MG/DL (ref 1.6–2.6)
MCH RBC QN AUTO: 30 PG (ref 27–31)
MCHC RBC AUTO-ENTMCNC: 32.9 G/DL (ref 33–36)
MCV RBC AUTO: 91.3 FL (ref 80–94)
MONOCYTES # BLD AUTO: 0.77 X10(3)/MCL (ref 0.1–1.3)
MONOCYTES NFR BLD AUTO: 11.4 %
NEUTROPHILS # BLD AUTO: 4.63 X10(3)/MCL (ref 2.1–9.2)
NEUTROPHILS NFR BLD AUTO: 68.9 %
NRBC BLD AUTO-RTO: 0 %
PLATELET # BLD AUTO: 225 X10(3)/MCL (ref 130–400)
PMV BLD AUTO: 9 FL (ref 7.4–10.4)
POTASSIUM SERPL-SCNC: 4.6 MMOL/L (ref 3.5–5.1)
PROT SERPL-MCNC: 5.7 GM/DL (ref 5.8–7.6)
PSYCHE PATHOLOGY RESULT: NORMAL
RBC # BLD AUTO: 3.9 X10(6)/MCL (ref 4.7–6.1)
SODIUM SERPL-SCNC: 136 MMOL/L (ref 136–145)
WBC # BLD AUTO: 6.73 X10(3)/MCL (ref 4.5–11.5)

## 2025-02-28 PROCEDURE — 25000003 PHARM REV CODE 250

## 2025-02-28 PROCEDURE — 25000003 PHARM REV CODE 250: Performed by: STUDENT IN AN ORGANIZED HEALTH CARE EDUCATION/TRAINING PROGRAM

## 2025-02-28 PROCEDURE — 63600175 PHARM REV CODE 636 W HCPCS: Performed by: STUDENT IN AN ORGANIZED HEALTH CARE EDUCATION/TRAINING PROGRAM

## 2025-02-28 PROCEDURE — 83735 ASSAY OF MAGNESIUM: CPT | Performed by: STUDENT IN AN ORGANIZED HEALTH CARE EDUCATION/TRAINING PROGRAM

## 2025-02-28 PROCEDURE — 80053 COMPREHEN METABOLIC PANEL: CPT | Performed by: STUDENT IN AN ORGANIZED HEALTH CARE EDUCATION/TRAINING PROGRAM

## 2025-02-28 PROCEDURE — 21400001 HC TELEMETRY ROOM

## 2025-02-28 PROCEDURE — 99232 SBSQ HOSP IP/OBS MODERATE 35: CPT | Mod: ,,, | Performed by: INTERNAL MEDICINE

## 2025-02-28 PROCEDURE — 85025 COMPLETE CBC W/AUTO DIFF WBC: CPT | Performed by: STUDENT IN AN ORGANIZED HEALTH CARE EDUCATION/TRAINING PROGRAM

## 2025-02-28 PROCEDURE — 36415 COLL VENOUS BLD VENIPUNCTURE: CPT | Performed by: STUDENT IN AN ORGANIZED HEALTH CARE EDUCATION/TRAINING PROGRAM

## 2025-02-28 RX ORDER — ASPIRIN 81 MG/1
81 TABLET ORAL DAILY
Status: DISCONTINUED | OUTPATIENT
Start: 2025-02-28 | End: 2025-03-03 | Stop reason: HOSPADM

## 2025-02-28 RX ADMIN — ONDANSETRON 8 MG: 4 TABLET, ORALLY DISINTEGRATING ORAL at 05:02

## 2025-02-28 RX ADMIN — METOPROLOL TARTRATE 25 MG: 25 TABLET, FILM COATED ORAL at 08:02

## 2025-02-28 RX ADMIN — ACETAMINOPHEN 650 MG: 325 TABLET, FILM COATED ORAL at 07:02

## 2025-02-28 RX ADMIN — ENOXAPARIN SODIUM 40 MG: 40 INJECTION SUBCUTANEOUS at 03:02

## 2025-02-28 RX ADMIN — ATORVASTATIN CALCIUM 40 MG: 40 TABLET, FILM COATED ORAL at 08:02

## 2025-02-28 RX ADMIN — DOCUSATE SODIUM 100 MG: 100 CAPSULE, LIQUID FILLED ORAL at 08:02

## 2025-02-28 RX ADMIN — TAMSULOSIN HYDROCHLORIDE 0.4 MG: 0.4 CAPSULE ORAL at 08:02

## 2025-02-28 RX ADMIN — ACETAMINOPHEN 650 MG: 325 TABLET, FILM COATED ORAL at 08:02

## 2025-02-28 RX ADMIN — POLYETHYLENE GLYCOL 3350 17 G: 17 POWDER, FOR SOLUTION ORAL at 08:02

## 2025-02-28 RX ADMIN — FINASTERIDE 5 MG: 5 TABLET, FILM COATED ORAL at 08:02

## 2025-02-28 RX ADMIN — MUPIROCIN: 20 OINTMENT TOPICAL at 08:02

## 2025-02-28 RX ADMIN — ASPIRIN 81 MG: 81 TABLET, COATED ORAL at 01:02
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
## 2025-02-28 NOTE — PROGRESS NOTES
[facility]    Cardiology  Progress Note    Patient Name: Quincy Triplett  MRN: [id number]  Admission Date: 2/27/2025  Hospital Length of Stay: 1 days  Code Status: Full Code   Attending Provider: Reyes, Thairy G, DO   Consulting Provider: KELVIN Carlton  Primary Care Physician: Reji Waters MD  Principal Problem:Pleural effusion, bilateral    Patient information was obtained from patient, past medical records, and ER records.     Subjective:     Reason for Consult: CP    HPI: Mr. Triplett is an 84 year old male who is known to [facility], Dr. Smith. He presents to the ER with complaints of left sided chest pain that radiated to his back 1 hour prior to arrival that improved with atiya seltzer. He reports that he has been having intermittent CP x 3 days. He reports associated symptoms of cough and cold symptoms since Monday but denies SOB, palpitations, nausea, vomiting, fever, or chills. He reports that his BP has been elevated the last few days. On arrival, he was found to be hypertensive (164/90). A CTA chest was obtained and negative for PE but demonstrated increase of pleural effusion on the left and consolidation in the LLL showing an interval increase in size (which may represent progression of PNA w/ neoplastic process not entirely excluded). He was admitted to hospital medicine and started on IV antibiotics. Oncology was consulted with recommendations of thoracentesis w/ possible bronch. An EKG was obtained and demonstrated SR w/ short MS and PVC's. [facility] has been consulted to further evaluate the patient's CP.     2.28.25: NAD. Denies current CP, SOB, or palps. Reports occasional CP with movement. LFT's improving.     PMH: CAD, duodenal CA, HLD, HTN  PSH: right arm amputation, tonsillectomy, whipple, carotid stent, cholecystectomy, CABG, ERCP, eye surgery, liver biopsy, lymphadenectomy, whipple, tonsillectomy   Family History: Mother - DM 2  Social History: Former  tobacco use, denies alcohol or illicit drug use.     Previous Cardiac Diagnostics:   TTE (2.27.25):  Left Ventricle: The left ventricle is normal in size. Normal wall thickness. There is normal systolic function with a visually estimated ejection fraction of 60 - 65%. Grade I diastolic dysfunction.  Right Ventricle: The right ventricle is normal in size. Systolic function is normal.  Left Atrium: mildly dilated  Tricuspid Valve: There is mild to moderate regurgitation.  Pericardium: There is no pericardial effusion.     TTE (7.6.23):  The study quality is average.   The left ventricle is normal in size with mild to moderate, concentric left ventricular hypertrophy and normal left ventricular systolic function. The left ventricular ejection fraction is 55%. The left ventricle diastolic function is impaired (Grade I) with normal left atrial pressure. The left ventricular ejection fraction was obtained using a contrast (Optison) agent.   The aortic valve is tricuspid with mild calcification and adequate cuspal excursion.   Mild to moderate (1-2+) tricuspid and mild (1+) pulmonic regurgitation is present.  The estimated pulmonary artery systolic pressure is 35 mmHg assuming a right atrial pressure of 3 mmHg.     PET (12.15.22):  This is a normal perfusion study, no perfusion defects noted. There is no evidence of ischemia.   Small area of apical artifact noted.  This scan is suggestive of low risk for future cardiovascular events.   The left ventricular cavity is noted to be normal on the stress studies. The stress left ventricular ejection fraction was calculated to be 60% and left ventricular global function is normal. The rest left ventricular cavity is noted to be normal. The rest left ventricular ejection fraction was calculated to be 50% and rest left ventricular global function is normal.   When compared to the resting ejection fraction (50%), the stress ejection fraction (60%) has increased.   The study quality  is excellent.   Myocardial blood flow reserve was not performed in this patient due to specific concerns that can affect accuracy.    Carotid US (11.30.22):  The study quality is average.   1-39% stenosis in the proximal right internal carotid artery based on Bluth Criteria.   1-39% stenosis in the proximal left internal carotid artery based on Bluth Criteria.   Antegrade right vertebral artery flow.   Antegrade left vertebral artery flow.     TTE (11.30.22):  The study quality is average.   The left ventricle is normal in size. Global left ventricular systolic function is normal. The left ventricular ejection fraction is 50%. The left ventricle diastolic function is impaired (Grade I) with normal left atrial pressure. Concentric left ventricular hypertrophy is present. It is mild.   Mild (1+) tricuspid regurgitation. Mild (1+) pulmonic regurgitation. Trace mitral regurgitation.   The estimated pulmonary artery systolic pressure is 21 mmHg assuming a right atrial pressure of 3 mmHg.       Review of patient's allergies indicates:  No Known Allergies  Current Facility-Administered Medications on File Prior to Encounter   Medication    lactated ringers infusion    LIDOcaine (PF) 10 mg/ml (1%) injection 10 mg    ondansetron injection 4 mg     Current Outpatient Medications on File Prior to Encounter   Medication Sig    cefdinir (OMNICEF) 300 MG capsule Take 300 mg by mouth 2 (two) times daily.    finasteride (PROSCAR) 5 mg tablet Take 1 tablet (5 mg total) by mouth once daily.    lisinopriL 10 MG tablet Take 10 mg by mouth once daily.    tamsulosin (FLOMAX) 0.4 mg Cap Take 1 capsule (0.4 mg total) by mouth every evening.    ALPRAZolam (XANAX) 0.25 MG tablet Take 1 tablet (0.25 mg total) by mouth 3 (three) times daily as needed for Anxiety.    busPIRone (BUSPAR) 5 MG Tab Take 1 tablet (5 mg total) by mouth 3 (three) times daily.    carvediloL (COREG) 3.125 MG tablet Take 1 tablet (3.125 mg total) by mouth 2 (two) times  daily.    docusate sodium (COLACE) 100 MG capsule Take 1 capsule (100 mg total) by mouth 2 (two) times daily.    furosemide (LASIX) 20 MG tablet Take 1 tablet (20 mg total) by mouth once daily.    HYDROcodone-acetaminophen (NORCO) 7.5-325 mg per tablet Take 1 tablet by mouth every 6 (six) hours as needed for Pain.    pantoprazole (PROTONIX) 40 MG tablet Take 1 tablet (40 mg total) by mouth once daily. (Patient taking differently: Take 40 mg by mouth once daily. Last taken 10/18/23)    potassium chloride SA (KLOR-CON) 10 MEQ TbSR Take 1 tablet (10 mEq total) by mouth once daily.    sulfamethoxazole-trimethoprim 800-160mg (BACTRIM DS) 800-160 mg Tab Take 1 tablet by mouth 2 (two) times daily.       Review of Systems   Respiratory:  Positive for cough. Negative for shortness of breath.    Cardiovascular:  Negative for chest pain (resolved) and palpitations.   All other systems reviewed and are negative.      Objective:     Vital Signs (Most Recent):  Temp: 98.7 °F (37.1 °C) (02/28/25 0729)  Pulse: 70 (02/28/25 0729)  Resp: 17 (02/28/25 0435)  BP: (!) 141/73 (02/28/25 0729)  SpO2: 96 % (02/28/25 0729) Vital Signs (24h Range):  Temp:  [98 °F (36.7 °C)-98.7 °F (37.1 °C)] 98.7 °F (37.1 °C)  Pulse:  [60-80] 70  Resp:  [17-30] 17  SpO2:  [95 %-99 %] 96 %  BP: (135-169)/(64-89) 141/73   Weight: 65.4 kg (144 lb 2.9 oz)  Body mass index is 21.29 kg/m².  SpO2: 96 %     No intake or output data in the 24 hours ending 02/28/25 0920  Lines/Drains/Airways       Central Venous Catheter Line  Duration                  PowerPort A Cath Single Lumen -- days              Peripheral Intravenous Line  Duration                  Peripheral IV - Single Lumen 02/27/25 0357 22 G Anterior;Left Forearm 1 day         Peripheral IV - Single Lumen 02/27/25 0420 18 G Left;Posterior Forearm 1 day                  Significant Labs:   Chemistries:   Recent Labs   Lab 02/27/25  0356 02/27/25  1358 02/27/25  1846 02/28/25  0250     --   --  136   K  4.4  --   --  4.6     --   --  106   CO2 24  --   --  24   BUN 22.0  --   --  21.8   CREATININE 0.91  --   --  0.77   CALCIUM 9.4  --   --  8.6*   BILITOT 0.6  --   --  0.8   ALKPHOS 592*  --   --  532*   ALT 83*  --   --  67*   AST 65*  --   --  43*   GLUCOSE 141*  --   --  112   MG  --   --   --  2.20   TROPONINI <0.010 <0.010 0.013  --         CBC/Anemia Labs: Coags:    Recent Labs   Lab 02/27/25  0356 02/28/25  0250   WBC 8.03 6.73   HGB 11.9* 11.7*   HCT 35.1* 35.6*    225   MCV 89.5 91.3   RDW 13.5 13.6    Recent Labs   Lab 02/27/25  0356   INR 1.1        Significant Imaging:  Imaging Results              US Non Rad Thoracentesis with Imaging, Aspiration Only (Final result)  Result time 02/27/25 09:51:13      Final result by Access, Silent  (02/27/25 09:51:13)                   Narrative:    See Provider Notes for results.     IMPRESSION: Please see provider notes for interpretation          This procedure was auto-finalized by: Virtual Radiologist                                     US Thyroid (Final result)  Result time 02/27/25 09:27:56      Final result by Quincy Roberts MD (02/27/25 09:27:56)                   Impression:      Left thyroid nodule meets criteria for outpatient FNA sampling if desired.      Electronically signed by: Quincy Roberts  Date:    02/27/2025  Time:    09:27               Narrative:    EXAMINATION:  US THYROID    CLINICAL HISTORY:  Chest pain.  Pneumonia.  Thyroid nodule.    COMPARISON:  None available.    FINDINGS:  Linear high resolution scanning of the thyroid gland with grayscale and color Doppler. The right lobe of the thyroid gland measures 3.5 x 1.7 x 1.5 cm and the left lobe 4.5 x 2.2 x 1.8 cm. The isthmus measures 2 mm in diameter.    There is a heterogeneous solid nodule left lower pole measuring up to about 2 cm.  A subcentimeter hypoechoic lesion in the right lobe is of very low suspicion.                                       CTA Chest  Non-Coronary (PE Studies) (Final result)  Result time 02/27/25 08:46:18      Final result by Shant Sequeira MD (02/27/25 08:46:18)                   Impression:    Impression:    1. No filling defects are seen in the pulmonary arteries to suggest pulmonary embolus to the sensitivity of the study.    2. Compared to the prior study, the consolidation in the left lower lobe show interval increase in size measuring 6.4 x 5.3 in widest diameters. Areas of hazy and small consolidations are now seen in the inferior lingular segment of the left lung. This represent progression of pneumonia with a neoplastic process not entirely excluded. Correlate with clinical and laboratory parameters as regards further evaluation and follow up'.    3. There is increase of pleural effusion in the left while the right remains relatively unchanged.    4. Details and other findings as discussed above.    No significant discrepancy with overnight report.      Electronically signed by: Shant Sequeira  Date:    02/27/2025  Time:    08:46               Narrative:      Technique: CT Scan of the chest was performed with intravenous contrast with direct axial images as well as sagittal and coronal reconstruction images pulmonary embolus protocol.    Dosage Information: Automated Exposure Control was utilized.   mGy cm.    Comparison: Comparison is done with study dated 2025-01-13 08:47:54.    Clinical History: Reports CP, suspected PE, high prob.    Findings:    Soft Tissues: Unremarkable.    Neck: A 1.2 cm left thyroid nodule is seen, Series 4 Image 11.    Heart: The heart appears unremarkable. Mild coronary artery calcification is seen.    Aorta: Mild stable aortic calcification is seen in the arch and descending thoracic aorta.    Pulmonary Arteries: No filling defects are seen in the pulmonary arteries to suggest pulmonary embolus to the sensitivity of the study.    Lungs: There is increase in streaky linear opacity is seen in both lungs  with peripheral predominance consistent with nonspecific dependent changes scarring and subsegmental atelectasis. Compared to the prior study, the consolidation in the left lower lobe show interval increase in size measuring 6.4 x 5.3 in widest diameters. Areas of hazy and small consolidations are now seen in the inferior lingular segment of the left lung. This represent progression of pneumonia with a neoplastic process not entirely excluded.    Pleura: There is increase of pleural effusion in the left while the right remains relatively unchanged.    Bony Structures:    Spine: Multilevel stable spondylolytic changes are seen in the thoracic spine.    Ribs: No rib fractures are identified.    Abdomen: Stable appearing few liver granulomas are seen. Stable pneumobilia is still observed. Stable metallic focus is seen along the pancreatic duct, correlate clinically. Moderate stool is seen in the visualized large bowels, may reflect some degree of constipation.                        Preliminary result by Adriel Worley Jr., MD (02/27/25 05:21:06)                   Impression:    1. No filling defects are seen in the pulmonary arteries to suggest pulmonary embolus to the sensitivity of the study.  2. Compared to the prior study, the consolidation in the left lower lobe show interval increase in size measuring 6.4 x 5.3 in widest diameters. Areas of hazy and small consolidations are now seen in the inferior lingular segment of the left lung. This represent progression of pneumonia with a neoplastic process not entirely excluded. Correlate with clinical and laboratory parameters as regards further evaluation and follow up'.  3. There is increase of pleural effusion in the left while the right remains relatively unchanged.  4. Details and other findings as discussed above.               Narrative:    START OF REPORT:  Technique: CT Scan of the chest was performed with intravenous contrast with direct axial images as  well as sagittal and coronal reconstruction images pulmonary embolus protocol.    Dosage Information: Automated Exposure Control was utilized.    Comparison: Comparison is done with study dated 2025-01-13 08:47:54.    Clinical History: Reports CP, suspected PE, high prob.    Findings:  Soft Tissues: Unremarkable.  Neck: A 1.2 cm left thyroid nodule is seen, Series 4 Image 11.  Heart: The heart appears unremarkable. Mild coronary artery calcification is seen.  Aorta: Mild stable aortic calcification is seen in the arch and descending thoracic aorta.  Pulmonary Arteries: No filling defects are seen in the pulmonary arteries to suggest pulmonary embolus to the sensitivity of the study.  Lungs: There is increase in streaky linear opacity is seen in both lungs with peripheral predominance consistent with nonspecific dependent changes scarring and subsegmental atelectasis. Compared to the prior study, the consolidation in the left lower lobe show interval increase in size measuring 6.4 x 5.3 in widest diameters. Areas of hazy and small consolidations are now seen in the inferior lingular segment of the left lung. This represent progression of pneumonia with a neoplastic process not entirely excluded.  Pleura: There is increase of pleural effusion in the left while the right remains relatively unchanged.  Bony Structures:  Spine: Severe multilevel stable spondylolytic changes are seen in the thoracic spine.  Ribs: No rib fractures are identified.  Abdomen: Stable appearing few liver granulomas are seen. Stable pneumobilia is still observed. Stable metallic focus is seen along the pancreatic duct, correlate clinically. Moderate stool is seen in the visualized large bowels, may reflect some degree of constipation.                                         X-Ray Chest AP Portable (Final result)  Result time 02/27/25 10:15:45      Final result by Nancy Redd MD (02/27/25 10:15:45)                   Impression:       Left pleural effusion with increasing left basilar opacity.      Electronically signed by: Nancy Redd  Date:    02/27/2025  Time:    10:15               Narrative:    EXAMINATION:  XR CHEST AP PORTABLE    CLINICAL HISTORY:  Chest pain, unspecified    TECHNIQUE:  Single frontal view of the chest was performed.    COMPARISON:  01/13/2025 CT chest abdomen pelvis.    FINDINGS:  LINES AND TUBES: Right internal jugular eveline catheter tip projects over the SVC. EKG/telemetry leads overlie the chest.    MEDIASTINUM AND RACHNA: Cardiac silhouette is enlarged.    LUNGS: Left basilar opacity.  There was a basilar opacity on prior CT exam, difficult to compare imaging modalities though it does appear progressed compared to  topogram.    PLEURA:Small left pleural effusion.No pneumothorax.    BONES: No acute osseous abnormality.                                    EKG:       Telemetry:  SR    Physical Exam  HENT:      Head: Normocephalic.      Nose: Nose normal.      Mouth/Throat:      Mouth: Mucous membranes are dry.   Eyes:      Extraocular Movements: Extraocular movements intact.   Cardiovascular:      Rate and Rhythm: Normal rate and regular rhythm.      Pulses: Normal pulses.      Heart sounds: Murmur heard.   Pulmonary:      Effort: Pulmonary effort is normal.      Breath sounds: Normal breath sounds.   Abdominal:      Palpations: Abdomen is soft.   Skin:     General: Skin is warm.   Neurological:      Mental Status: He is alert and oriented to person, place, and time.   Psychiatric:         Behavior: Behavior normal.         Home Medications:   Medications Ordered Prior to Encounter[1]  Current Schedule Inpatient Medications:   atorvastatin  40 mg Oral Daily    docusate sodium  100 mg Oral BID    enoxparin  40 mg Subcutaneous Daily    finasteride  5 mg Oral Daily    metoprolol tartrate  25 mg Oral BID    mupirocin   Nasal BID    polyethylene glycol  17 g Oral Daily    tamsulosin  0.4 mg Oral QHS     Continuous  Infusions:    Assessment:   CP - Appears Atypical   LLL Consolidation    - CTA Chest (2.27.25): 6.4 x 5.3 in widest diameter  Bilateral Pleural Effusions    - s/p thoracentesis w/ 900 mL fluid removal (2.27.25)   HTN  HLD  GONZALEZ  CAD    - stent to LAD & CX (2015)  VHD    - Mild to Mod TR (2.27.25)   Adenocarcinoma of Ampulla of Vater    - s/p Whipple Procedure & Cholecystectomy    Plan:   Echo reviewed. LVEF intact. No wall motion abnormalities noted.   Low suspicion of ACS.   Start ASA 81 mg daily.   No further workup needed at this time from cardiac standpoint.   F/u with Dr. Smith at discharge.     Will be available as needed.     Jennifer Carbajal, OMAR  Cardiology  [facility]          [1]   Current Facility-Administered Medications on File Prior to Encounter   Medication Dose Route Frequency Provider Last Rate Last Admin    lactated ringers infusion   Intravenous Continuous Brijesh Macdonald MD 10 mL/hr at 10/19/23 0652 New Bag at 10/19/23 0652    LIDOcaine (PF) 10 mg/ml (1%) injection 10 mg  1 mL Intradermal Once Brijesh Macdonald MD        ondansetron injection 4 mg  4 mg Intravenous Once PRN Brijesh Macdonald MD         Current Outpatient Medications on File Prior to Encounter   Medication Sig Dispense Refill    cefdinir (OMNICEF) 300 MG capsule Take 300 mg by mouth 2 (two) times daily.      finasteride (PROSCAR) 5 mg tablet Take 1 tablet (5 mg total) by mouth once daily. 30 tablet 0    lisinopriL 10 MG tablet Take 10 mg by mouth once daily.      tamsulosin (FLOMAX) 0.4 mg Cap Take 1 capsule (0.4 mg total) by mouth every evening. 30 capsule 0    ALPRAZolam (XANAX) 0.25 MG tablet Take 1 tablet (0.25 mg total) by mouth 3 (three) times daily as needed for Anxiety. 12 tablet 0    busPIRone (BUSPAR) 5 MG Tab Take 1 tablet (5 mg total) by mouth 3 (three) times daily. 90 tablet 0    carvediloL (COREG) 3.125 MG tablet Take 1 tablet (3.125 mg total) by mouth 2 (two) times daily. 60 tablet 0     docusate sodium (COLACE) 100 MG capsule Take 1 capsule (100 mg total) by mouth 2 (two) times daily. 30 capsule 0    furosemide (LASIX) 20 MG tablet Take 1 tablet (20 mg total) by mouth once daily. 30 tablet 0    HYDROcodone-acetaminophen (NORCO) 7.5-325 mg per tablet Take 1 tablet by mouth every 6 (six) hours as needed for Pain. 12 tablet 0    pantoprazole (PROTONIX) 40 MG tablet Take 1 tablet (40 mg total) by mouth once daily. (Patient taking differently: Take 40 mg by mouth once daily. Last taken 10/18/23) 30 tablet 0    potassium chloride SA (KLOR-CON) 10 MEQ TbSR Take 1 tablet (10 mEq total) by mouth once daily. 30 tablet 0    sulfamethoxazole-trimethoprim 800-160mg (BACTRIM DS) 800-160 mg Tab Take 1 tablet by mouth 2 (two) times daily.

## 2025-03-01 PROCEDURE — 25000003 PHARM REV CODE 250

## 2025-03-01 PROCEDURE — 99232 SBSQ HOSP IP/OBS MODERATE 35: CPT | Mod: ,,, | Performed by: INTERNAL MEDICINE

## 2025-03-01 PROCEDURE — 63600175 PHARM REV CODE 636 W HCPCS: Performed by: STUDENT IN AN ORGANIZED HEALTH CARE EDUCATION/TRAINING PROGRAM

## 2025-03-01 PROCEDURE — 99900035 HC TECH TIME PER 15 MIN (STAT)

## 2025-03-01 PROCEDURE — 21400001 HC TELEMETRY ROOM

## 2025-03-01 PROCEDURE — 94799 UNLISTED PULMONARY SVC/PX: CPT

## 2025-03-01 PROCEDURE — 25000003 PHARM REV CODE 250: Performed by: STUDENT IN AN ORGANIZED HEALTH CARE EDUCATION/TRAINING PROGRAM

## 2025-03-01 PROCEDURE — 87070 CULTURE OTHR SPECIMN AEROBIC: CPT | Performed by: STUDENT IN AN ORGANIZED HEALTH CARE EDUCATION/TRAINING PROGRAM

## 2025-03-01 RX ADMIN — ACETAMINOPHEN 650 MG: 325 TABLET, FILM COATED ORAL at 01:03

## 2025-03-01 RX ADMIN — ENOXAPARIN SODIUM 40 MG: 40 INJECTION SUBCUTANEOUS at 03:03

## 2025-03-01 RX ADMIN — FINASTERIDE 5 MG: 5 TABLET, FILM COATED ORAL at 08:03

## 2025-03-01 RX ADMIN — ASPIRIN 81 MG: 81 TABLET, COATED ORAL at 08:03

## 2025-03-01 RX ADMIN — MUPIROCIN: 20 OINTMENT TOPICAL at 08:03

## 2025-03-01 RX ADMIN — ATORVASTATIN CALCIUM 40 MG: 40 TABLET, FILM COATED ORAL at 08:03

## 2025-03-01 RX ADMIN — TAMSULOSIN HYDROCHLORIDE 0.4 MG: 0.4 CAPSULE ORAL at 08:03

## 2025-03-01 RX ADMIN — ACETAMINOPHEN 650 MG: 325 TABLET, FILM COATED ORAL at 08:03

## 2025-03-01 RX ADMIN — DOCUSATE SODIUM 100 MG: 100 CAPSULE, LIQUID FILLED ORAL at 09:03

## 2025-03-01 RX ADMIN — DOCUSATE SODIUM 100 MG: 100 CAPSULE, LIQUID FILLED ORAL at 08:03

## 2025-03-01 RX ADMIN — METOPROLOL TARTRATE 25 MG: 25 TABLET, FILM COATED ORAL at 08:03
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
## 2025-03-02 LAB
BACTERIA SPEC CULT: NORMAL
GRAM STN SPEC: NORMAL

## 2025-03-02 PROCEDURE — 94799 UNLISTED PULMONARY SVC/PX: CPT

## 2025-03-02 PROCEDURE — 25000003 PHARM REV CODE 250

## 2025-03-02 PROCEDURE — 94664 DEMO&/EVAL PT USE INHALER: CPT

## 2025-03-02 PROCEDURE — 63600175 PHARM REV CODE 636 W HCPCS: Performed by: STUDENT IN AN ORGANIZED HEALTH CARE EDUCATION/TRAINING PROGRAM

## 2025-03-02 PROCEDURE — 27000646 HC AEROBIKA DEVICE

## 2025-03-02 PROCEDURE — 25000003 PHARM REV CODE 250: Performed by: STUDENT IN AN ORGANIZED HEALTH CARE EDUCATION/TRAINING PROGRAM

## 2025-03-02 PROCEDURE — 99900035 HC TECH TIME PER 15 MIN (STAT)

## 2025-03-02 PROCEDURE — 21400001 HC TELEMETRY ROOM

## 2025-03-02 PROCEDURE — 99900031 HC PATIENT EDUCATION (STAT)

## 2025-03-02 RX ADMIN — MUPIROCIN: 20 OINTMENT TOPICAL at 08:03

## 2025-03-02 RX ADMIN — FINASTERIDE 5 MG: 5 TABLET, FILM COATED ORAL at 08:03

## 2025-03-02 RX ADMIN — METOPROLOL TARTRATE 25 MG: 25 TABLET, FILM COATED ORAL at 08:03

## 2025-03-02 RX ADMIN — ACETAMINOPHEN 650 MG: 325 TABLET, FILM COATED ORAL at 08:03

## 2025-03-02 RX ADMIN — ONDANSETRON 8 MG: 4 TABLET, ORALLY DISINTEGRATING ORAL at 02:03

## 2025-03-02 RX ADMIN — ASPIRIN 81 MG: 81 TABLET, COATED ORAL at 08:03

## 2025-03-02 RX ADMIN — TAMSULOSIN HYDROCHLORIDE 0.4 MG: 0.4 CAPSULE ORAL at 08:03

## 2025-03-02 RX ADMIN — ATORVASTATIN CALCIUM 40 MG: 40 TABLET, FILM COATED ORAL at 08:03

## 2025-03-02 RX ADMIN — ENOXAPARIN SODIUM 40 MG: 40 INJECTION SUBCUTANEOUS at 02:03

## 2025-03-02 RX ADMIN — DOCUSATE SODIUM 100 MG: 100 CAPSULE, LIQUID FILLED ORAL at 08:03
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
## 2025-03-03 VITALS
HEIGHT: 69 IN | TEMPERATURE: 97 F | WEIGHT: 144.19 LBS | SYSTOLIC BLOOD PRESSURE: 139 MMHG | BODY MASS INDEX: 21.36 KG/M2 | OXYGEN SATURATION: 96 % | DIASTOLIC BLOOD PRESSURE: 76 MMHG | RESPIRATION RATE: 18 BRPM | HEART RATE: 65 BPM

## 2025-03-03 PROCEDURE — 25000003 PHARM REV CODE 250

## 2025-03-03 PROCEDURE — 94664 DEMO&/EVAL PT USE INHALER: CPT

## 2025-03-03 PROCEDURE — 99900031 HC PATIENT EDUCATION (STAT)

## 2025-03-03 PROCEDURE — 99900035 HC TECH TIME PER 15 MIN (STAT)

## 2025-03-03 PROCEDURE — 99232 SBSQ HOSP IP/OBS MODERATE 35: CPT | Mod: ,,, | Performed by: INTERNAL MEDICINE

## 2025-03-03 PROCEDURE — 25000003 PHARM REV CODE 250: Performed by: STUDENT IN AN ORGANIZED HEALTH CARE EDUCATION/TRAINING PROGRAM

## 2025-03-03 PROCEDURE — 94799 UNLISTED PULMONARY SVC/PX: CPT

## 2025-03-03 RX ORDER — METOPROLOL TARTRATE 25 MG/1
25 TABLET, FILM COATED ORAL 2 TIMES DAILY
Qty: 60 TABLET | Refills: 0 | Status: SHIPPED | OUTPATIENT
Start: 2025-03-03 | End: 2026-03-03

## 2025-03-03 RX ADMIN — ATORVASTATIN CALCIUM 40 MG: 40 TABLET, FILM COATED ORAL at 08:03

## 2025-03-03 RX ADMIN — POLYETHYLENE GLYCOL 3350 17 G: 17 POWDER, FOR SOLUTION ORAL at 08:03

## 2025-03-03 RX ADMIN — ASPIRIN 81 MG: 81 TABLET, COATED ORAL at 08:03

## 2025-03-03 RX ADMIN — FINASTERIDE 5 MG: 5 TABLET, FILM COATED ORAL at 08:03

## 2025-03-03 RX ADMIN — DOCUSATE SODIUM 100 MG: 100 CAPSULE, LIQUID FILLED ORAL at 08:03

## 2025-03-03 RX ADMIN — METOPROLOL TARTRATE 25 MG: 25 TABLET, FILM COATED ORAL at 08:03
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
## 2025-03-04 LAB
BACTERIA BLD CULT: NORMAL
BACTERIA BLD CULT: NORMAL
BACTERIA FLD CULT: NORMAL
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
## 2025-03-06 ENCOUNTER — PATIENT OUTREACH (OUTPATIENT)
Dept: ADMINISTRATIVE | Facility: CLINIC | Age: 85
End: 2025-03-06
Payer: MEDICARE

## 2025-03-06 NOTE — PROGRESS NOTES
C3 nurse spoke with Quincy Triplett for a TCC post hospital discharge follow up call. The patient's daughter stated the patient has a scheduled HOSFU appointment with Reji Waters MD on 3/6/25 @ 9:15.

## 2025-03-12 ENCOUNTER — HOSPITAL ENCOUNTER (OUTPATIENT)
Dept: RADIOLOGY | Facility: HOSPITAL | Age: 85
Discharge: HOME | End: 2025-03-12
Attending: HOSPITALIST
Payer: MEDICARE

## 2025-03-12 DIAGNOSIS — J90 PLEURAL EFFUSION: ICD-10-CM

## 2025-03-12 PROCEDURE — 71046 X-RAY EXAM CHEST 2 VIEWS: CPT | Mod: TC

## 2025-03-21 DIAGNOSIS — C17.0 DUODENAL CANCER: Primary | ICD-10-CM

## 2025-03-26 ENCOUNTER — HOSPITAL ENCOUNTER (OUTPATIENT)
Dept: RADIOLOGY | Facility: HOSPITAL | Age: 85
Discharge: HOME | End: 2025-03-26
Attending: HOSPITALIST
Payer: MEDICARE

## 2025-03-26 DIAGNOSIS — J90 PLEURAL EFFUSION: ICD-10-CM

## 2025-03-26 PROCEDURE — 71046 X-RAY EXAM CHEST 2 VIEWS: CPT | Mod: TC
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
## 2025-03-28 ENCOUNTER — HOSPITAL ENCOUNTER (OUTPATIENT)
Dept: RADIOLOGY | Facility: HOSPITAL | Age: 85
Discharge: HOME | End: 2025-03-28
Attending: STUDENT IN AN ORGANIZED HEALTH CARE EDUCATION/TRAINING PROGRAM
Payer: MEDICARE

## 2025-03-28 DIAGNOSIS — C17.0 DUODENAL CANCER: ICD-10-CM

## 2025-03-28 PROCEDURE — 78815 PET IMAGE W/CT SKULL-THIGH: CPT | Mod: TC

## 2025-03-28 PROCEDURE — A9552 F18 FDG: HCPCS | Performed by: STUDENT IN AN ORGANIZED HEALTH CARE EDUCATION/TRAINING PROGRAM

## 2025-03-28 RX ORDER — FLUDEOXYGLUCOSE F18 500 MCI/ML
10 INJECTION INTRAVENOUS
Status: COMPLETED | OUTPATIENT
Start: 2025-03-28 | End: 2025-03-28

## 2025-03-28 RX ADMIN — FLUDEOXYGLUCOSE F-18 10.8 MILLICURIE: 500 INJECTION INTRAVENOUS at 12:03

## 2025-04-01 ENCOUNTER — LAB VISIT (OUTPATIENT)
Dept: LAB | Facility: HOSPITAL | Age: 85
End: 2025-04-01
Attending: STUDENT IN AN ORGANIZED HEALTH CARE EDUCATION/TRAINING PROGRAM
Payer: MEDICARE

## 2025-04-01 ENCOUNTER — OFFICE VISIT (OUTPATIENT)
Dept: HEMATOLOGY/ONCOLOGY | Facility: CLINIC | Age: 85
End: 2025-04-01
Payer: MEDICARE

## 2025-04-01 VITALS
RESPIRATION RATE: 18 BRPM | BODY MASS INDEX: 21.16 KG/M2 | HEIGHT: 69 IN | HEART RATE: 72 BPM | OXYGEN SATURATION: 97 % | TEMPERATURE: 98 F | WEIGHT: 142.88 LBS | DIASTOLIC BLOOD PRESSURE: 77 MMHG | SYSTOLIC BLOOD PRESSURE: 152 MMHG

## 2025-04-01 DIAGNOSIS — R74.01 TRANSAMINITIS: ICD-10-CM

## 2025-04-01 DIAGNOSIS — C17.0 DUODENAL CANCER: Primary | ICD-10-CM

## 2025-04-01 DIAGNOSIS — C17.0 DUODENAL CANCER: ICD-10-CM

## 2025-04-01 DIAGNOSIS — J90 RECURRENT LEFT PLEURAL EFFUSION: ICD-10-CM

## 2025-04-01 DIAGNOSIS — J90 PLEURAL EFFUSION ON LEFT: ICD-10-CM

## 2025-04-01 LAB
ALBUMIN SERPL-MCNC: 2.9 G/DL (ref 3.4–4.8)
ALBUMIN/GLOB SERPL: 0.7 RATIO (ref 1.1–2)
ALP SERPL-CCNC: 468 UNIT/L (ref 40–150)
ALT SERPL-CCNC: 59 UNIT/L (ref 0–55)
ANION GAP SERPL CALC-SCNC: 6 MEQ/L
AST SERPL-CCNC: 58 UNIT/L (ref 11–45)
BASOPHILS # BLD AUTO: 0.03 X10(3)/MCL
BASOPHILS NFR BLD AUTO: 0.5 %
BILIRUB SERPL-MCNC: 0.4 MG/DL
BUN SERPL-MCNC: 22.3 MG/DL (ref 8.4–25.7)
CALCIUM SERPL-MCNC: 9 MG/DL (ref 8.8–10)
CEA SERPL-MCNC: <1.73 NG/ML (ref 0–3)
CHLORIDE SERPL-SCNC: 106 MMOL/L (ref 98–107)
CO2 SERPL-SCNC: 25 MMOL/L (ref 23–31)
CREAT SERPL-MCNC: 0.88 MG/DL (ref 0.72–1.25)
CREAT/UREA NIT SERPL: 25
EOSINOPHIL # BLD AUTO: 0.26 X10(3)/MCL (ref 0–0.9)
EOSINOPHIL NFR BLD AUTO: 4.2 %
ERYTHROCYTE [DISTWIDTH] IN BLOOD BY AUTOMATED COUNT: 13.8 % (ref 11.5–17)
GFR SERPLBLD CREATININE-BSD FMLA CKD-EPI: >60 ML/MIN/1.73/M2
GLOBULIN SER-MCNC: 3.9 GM/DL (ref 2.4–3.5)
GLUCOSE SERPL-MCNC: 262 MG/DL (ref 82–115)
HCT VFR BLD AUTO: 36.2 % (ref 42–52)
HGB BLD-MCNC: 11.7 G/DL (ref 14–18)
IMM GRANULOCYTES # BLD AUTO: 0.02 X10(3)/MCL (ref 0–0.04)
IMM GRANULOCYTES NFR BLD AUTO: 0.3 %
LYMPHOCYTES # BLD AUTO: 1.29 X10(3)/MCL (ref 0.6–4.6)
LYMPHOCYTES NFR BLD AUTO: 20.6 %
MCH RBC QN AUTO: 29.5 PG (ref 27–31)
MCHC RBC AUTO-ENTMCNC: 32.3 G/DL (ref 33–36)
MCV RBC AUTO: 91.2 FL (ref 80–94)
MONOCYTES # BLD AUTO: 0.49 X10(3)/MCL (ref 0.1–1.3)
MONOCYTES NFR BLD AUTO: 7.8 %
NEUTROPHILS # BLD AUTO: 4.16 X10(3)/MCL (ref 2.1–9.2)
NEUTROPHILS NFR BLD AUTO: 66.6 %
PLATELET # BLD AUTO: 204 X10(3)/MCL (ref 130–400)
PMV BLD AUTO: 9.1 FL (ref 7.4–10.4)
POTASSIUM SERPL-SCNC: 4.5 MMOL/L (ref 3.5–5.1)
PROT SERPL-MCNC: 6.8 GM/DL (ref 5.8–7.6)
RBC # BLD AUTO: 3.97 X10(6)/MCL (ref 4.7–6.1)
SODIUM SERPL-SCNC: 137 MMOL/L (ref 136–145)
WBC # BLD AUTO: 6.25 X10(3)/MCL (ref 4.5–11.5)

## 2025-04-01 PROCEDURE — 1159F MED LIST DOCD IN RCRD: CPT | Mod: CPTII,S$GLB,, | Performed by: STUDENT IN AN ORGANIZED HEALTH CARE EDUCATION/TRAINING PROGRAM

## 2025-04-01 PROCEDURE — 3077F SYST BP >= 140 MM HG: CPT | Mod: CPTII,S$GLB,, | Performed by: STUDENT IN AN ORGANIZED HEALTH CARE EDUCATION/TRAINING PROGRAM

## 2025-04-01 PROCEDURE — 99999 PR PBB SHADOW E&M-EST. PATIENT-LVL IV: CPT | Mod: PBBFAC,,, | Performed by: STUDENT IN AN ORGANIZED HEALTH CARE EDUCATION/TRAINING PROGRAM

## 2025-04-01 PROCEDURE — 99214 OFFICE O/P EST MOD 30 MIN: CPT | Mod: S$GLB,,, | Performed by: STUDENT IN AN ORGANIZED HEALTH CARE EDUCATION/TRAINING PROGRAM

## 2025-04-01 PROCEDURE — 80053 COMPREHEN METABOLIC PANEL: CPT

## 2025-04-01 PROCEDURE — 82378 CARCINOEMBRYONIC ANTIGEN: CPT

## 2025-04-01 PROCEDURE — 85025 COMPLETE CBC W/AUTO DIFF WBC: CPT

## 2025-04-01 PROCEDURE — 36415 COLL VENOUS BLD VENIPUNCTURE: CPT

## 2025-04-01 PROCEDURE — 1126F AMNT PAIN NOTED NONE PRSNT: CPT | Mod: CPTII,S$GLB,, | Performed by: STUDENT IN AN ORGANIZED HEALTH CARE EDUCATION/TRAINING PROGRAM

## 2025-04-01 PROCEDURE — G2211 COMPLEX E/M VISIT ADD ON: HCPCS | Mod: S$GLB,,, | Performed by: STUDENT IN AN ORGANIZED HEALTH CARE EDUCATION/TRAINING PROGRAM

## 2025-04-01 PROCEDURE — 1160F RVW MEDS BY RX/DR IN RCRD: CPT | Mod: CPTII,S$GLB,, | Performed by: STUDENT IN AN ORGANIZED HEALTH CARE EDUCATION/TRAINING PROGRAM

## 2025-04-01 PROCEDURE — 3078F DIAST BP <80 MM HG: CPT | Mod: CPTII,S$GLB,, | Performed by: STUDENT IN AN ORGANIZED HEALTH CARE EDUCATION/TRAINING PROGRAM

## 2025-04-02 PROBLEM — J90 RECURRENT LEFT PLEURAL EFFUSION: Status: ACTIVE | Noted: 2025-04-02
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
## 2025-04-03 ENCOUNTER — HOSPITAL ENCOUNTER (OUTPATIENT)
Dept: INTERVENTIONAL RADIOLOGY/VASCULAR | Facility: HOSPITAL | Age: 85
Discharge: HOME | End: 2025-04-03
Attending: STUDENT IN AN ORGANIZED HEALTH CARE EDUCATION/TRAINING PROGRAM
Payer: MEDICARE

## 2025-04-03 DIAGNOSIS — J90 PLEURAL EFFUSION ON LEFT: ICD-10-CM

## 2025-04-03 DIAGNOSIS — C17.0 DUODENAL CANCER: ICD-10-CM

## 2025-04-03 LAB
CLARITY BODY FLUID (OLG): NORMAL
COLOR BODY FLUID (OLG): YELLOW
EOSINOPHIL NFR FLD MANUAL: 1 %
GLUCOSE FLD-MCNC: 125 MG/DL
LYMPHOCYTES NFR FLD MANUAL: 96 %
MONOCYTE MAN % BF (OLG): 3 %
PROT FLD-MCNC: 4.3 GM/DL
WBC # FLD AUTO: 1260 /UL

## 2025-04-03 PROCEDURE — 32555 ASPIRATE PLEURA W/ IMAGING: CPT

## 2025-04-03 PROCEDURE — 82945 GLUCOSE OTHER FLUID: CPT | Performed by: STUDENT IN AN ORGANIZED HEALTH CARE EDUCATION/TRAINING PROGRAM

## 2025-04-03 PROCEDURE — 89051 BODY FLUID CELL COUNT: CPT | Performed by: STUDENT IN AN ORGANIZED HEALTH CARE EDUCATION/TRAINING PROGRAM

## 2025-04-03 PROCEDURE — 84157 ASSAY OF PROTEIN OTHER: CPT | Performed by: STUDENT IN AN ORGANIZED HEALTH CARE EDUCATION/TRAINING PROGRAM

## 2025-04-03 NOTE — BRIEF OP NOTE
[facility]  Interventional Radiology  Radiology - Procedure Note    Procedure Performed by: Gunner Qiu MD  Date: 04/03/2025 Time: 8:08 AM    Procedure: US-guided thoracentesis    Pre-Op Diagnosis: pleural effusion  Post-Op Diagnosis/Findings: pleural effusion    Specimen/Tissue Removed: serous pleural fluid (see dictated imaging report for final volume)    Brief Details/Findings:  Informed consent and pre-procedure timeout accomplished, followed by prep/drape of the upper back in standard fashion.  Live ultrasound visualization used to guide tandem centesis needle-catheter set, accessing the left posterior pleural space, with spontaneous return of fluid. Needle then removed and catheter connected to tubing.    Complications: none immediately identified  Estimated Blood Loss: less than 1mL      Please refer to dedicated imaging procedure report for additional details and final volume of fluid removed.      GUNNER QIU MD  Radiology  [phone] // .[phone] // .[phone]

## 2025-04-07 ENCOUNTER — PATIENT MESSAGE (OUTPATIENT)
Dept: HEMATOLOGY/ONCOLOGY | Facility: CLINIC | Age: 85
End: 2025-04-07
Payer: MEDICARE

## 2025-04-09 ENCOUNTER — TELEPHONE (OUTPATIENT)
Dept: HEMATOLOGY/ONCOLOGY | Facility: HOSPITAL | Age: 85
End: 2025-04-09
Payer: MEDICARE

## 2025-04-09 NOTE — TELEPHONE ENCOUNTER
Oncology Care Update    Called and spoke with patient about pleural fluid testing confirming recurrence of his previous GI malignancy. Discussed that there was no role for further lung mass biopsy and we would discuss treatment options in clinic next week. He was understanding and had no further questions.     Dr. LeJeune  Hematology/Oncology
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
## 2025-04-14 ENCOUNTER — OFFICE VISIT (OUTPATIENT)
Dept: HEMATOLOGY/ONCOLOGY | Facility: CLINIC | Age: 85
End: 2025-04-14
Payer: MEDICARE

## 2025-04-14 ENCOUNTER — LAB VISIT (OUTPATIENT)
Dept: LAB | Facility: HOSPITAL | Age: 85
End: 2025-04-14
Attending: STUDENT IN AN ORGANIZED HEALTH CARE EDUCATION/TRAINING PROGRAM
Payer: MEDICARE

## 2025-04-14 ENCOUNTER — INFUSION (OUTPATIENT)
Dept: INFUSION THERAPY | Facility: HOSPITAL | Age: 85
End: 2025-04-14
Attending: STUDENT IN AN ORGANIZED HEALTH CARE EDUCATION/TRAINING PROGRAM
Payer: MEDICARE

## 2025-04-14 VITALS
DIASTOLIC BLOOD PRESSURE: 69 MMHG | BODY MASS INDEX: 20.57 KG/M2 | HEIGHT: 69 IN | SYSTOLIC BLOOD PRESSURE: 122 MMHG | HEART RATE: 66 BPM | RESPIRATION RATE: 18 BRPM | TEMPERATURE: 98 F | OXYGEN SATURATION: 96 % | WEIGHT: 138.88 LBS

## 2025-04-14 DIAGNOSIS — C77.2 SECONDARY AND UNSPECIFIED MALIGNANT NEOPLASM OF INTRA-ABDOMINAL LYMPH NODES: ICD-10-CM

## 2025-04-14 DIAGNOSIS — J90 RECURRENT LEFT PLEURAL EFFUSION: ICD-10-CM

## 2025-04-14 DIAGNOSIS — C17.0 DUODENAL CANCER: Primary | ICD-10-CM

## 2025-04-14 DIAGNOSIS — C17.0 DUODENAL CANCER: ICD-10-CM

## 2025-04-14 DIAGNOSIS — C78.00 MALIGNANT NEOPLASM METASTATIC TO LUNG, UNSPECIFIED LATERALITY: ICD-10-CM

## 2025-04-14 LAB
ALBUMIN SERPL-MCNC: 3 G/DL (ref 3.4–4.8)
ALBUMIN/GLOB SERPL: 0.7 RATIO (ref 1.1–2)
ALP SERPL-CCNC: 591 UNIT/L (ref 40–150)
ALT SERPL-CCNC: 61 UNIT/L (ref 0–55)
ANION GAP SERPL CALC-SCNC: 7 MEQ/L
AST SERPL-CCNC: 69 UNIT/L (ref 11–45)
BASOPHILS # BLD AUTO: 0.04 X10(3)/MCL
BASOPHILS NFR BLD AUTO: 0.6 %
BILIRUB SERPL-MCNC: 0.5 MG/DL
BUN SERPL-MCNC: 24.1 MG/DL (ref 8.4–25.7)
CALCIUM SERPL-MCNC: 9.4 MG/DL (ref 8.8–10)
CHLORIDE SERPL-SCNC: 102 MMOL/L (ref 98–107)
CO2 SERPL-SCNC: 27 MMOL/L (ref 23–31)
CREAT SERPL-MCNC: 0.92 MG/DL (ref 0.72–1.25)
CREAT/UREA NIT SERPL: 26
EOSINOPHIL # BLD AUTO: 0.37 X10(3)/MCL (ref 0–0.9)
EOSINOPHIL NFR BLD AUTO: 5.3 %
ERYTHROCYTE [DISTWIDTH] IN BLOOD BY AUTOMATED COUNT: 13.7 % (ref 11.5–17)
GFR SERPLBLD CREATININE-BSD FMLA CKD-EPI: >60 ML/MIN/1.73/M2
GLOBULIN SER-MCNC: 4.3 GM/DL (ref 2.4–3.5)
GLUCOSE SERPL-MCNC: 173 MG/DL (ref 82–115)
HCT VFR BLD AUTO: 38.4 % (ref 42–52)
HGB BLD-MCNC: 12.5 G/DL (ref 14–18)
IMM GRANULOCYTES # BLD AUTO: 0.03 X10(3)/MCL (ref 0–0.04)
IMM GRANULOCYTES NFR BLD AUTO: 0.4 %
LYMPHOCYTES # BLD AUTO: 1.42 X10(3)/MCL (ref 0.6–4.6)
LYMPHOCYTES NFR BLD AUTO: 20.2 %
MCH RBC QN AUTO: 30 PG (ref 27–31)
MCHC RBC AUTO-ENTMCNC: 32.6 G/DL (ref 33–36)
MCV RBC AUTO: 92.1 FL (ref 80–94)
MONOCYTES # BLD AUTO: 0.61 X10(3)/MCL (ref 0.1–1.3)
MONOCYTES NFR BLD AUTO: 8.7 %
NEUTROPHILS # BLD AUTO: 4.57 X10(3)/MCL (ref 2.1–9.2)
NEUTROPHILS NFR BLD AUTO: 64.8 %
PLATELET # BLD AUTO: 258 X10(3)/MCL (ref 130–400)
PMV BLD AUTO: 9 FL (ref 7.4–10.4)
POTASSIUM SERPL-SCNC: 4.7 MMOL/L (ref 3.5–5.1)
PROT SERPL-MCNC: 7.3 GM/DL (ref 5.8–7.6)
RBC # BLD AUTO: 4.17 X10(6)/MCL (ref 4.7–6.1)
SODIUM SERPL-SCNC: 136 MMOL/L (ref 136–145)
WBC # BLD AUTO: 7.04 X10(3)/MCL (ref 4.5–11.5)

## 2025-04-14 PROCEDURE — A4216 STERILE WATER/SALINE, 10 ML: HCPCS | Performed by: STUDENT IN AN ORGANIZED HEALTH CARE EDUCATION/TRAINING PROGRAM

## 2025-04-14 PROCEDURE — 3074F SYST BP LT 130 MM HG: CPT | Mod: CPTII,S$GLB,, | Performed by: STUDENT IN AN ORGANIZED HEALTH CARE EDUCATION/TRAINING PROGRAM

## 2025-04-14 PROCEDURE — 99999 PR PBB SHADOW E&M-EST. PATIENT-LVL IV: CPT | Mod: PBBFAC,,, | Performed by: STUDENT IN AN ORGANIZED HEALTH CARE EDUCATION/TRAINING PROGRAM

## 2025-04-14 PROCEDURE — 85025 COMPLETE CBC W/AUTO DIFF WBC: CPT

## 2025-04-14 PROCEDURE — 3078F DIAST BP <80 MM HG: CPT | Mod: CPTII,S$GLB,, | Performed by: STUDENT IN AN ORGANIZED HEALTH CARE EDUCATION/TRAINING PROGRAM

## 2025-04-14 PROCEDURE — 36415 COLL VENOUS BLD VENIPUNCTURE: CPT

## 2025-04-14 PROCEDURE — 25000003 PHARM REV CODE 250: Performed by: STUDENT IN AN ORGANIZED HEALTH CARE EDUCATION/TRAINING PROGRAM

## 2025-04-14 PROCEDURE — 1126F AMNT PAIN NOTED NONE PRSNT: CPT | Mod: CPTII,S$GLB,, | Performed by: STUDENT IN AN ORGANIZED HEALTH CARE EDUCATION/TRAINING PROGRAM

## 2025-04-14 PROCEDURE — 99215 OFFICE O/P EST HI 40 MIN: CPT | Mod: S$GLB,,, | Performed by: STUDENT IN AN ORGANIZED HEALTH CARE EDUCATION/TRAINING PROGRAM

## 2025-04-14 PROCEDURE — 80053 COMPREHEN METABOLIC PANEL: CPT

## 2025-04-14 PROCEDURE — 63600175 PHARM REV CODE 636 W HCPCS: Performed by: STUDENT IN AN ORGANIZED HEALTH CARE EDUCATION/TRAINING PROGRAM

## 2025-04-14 PROCEDURE — 1159F MED LIST DOCD IN RCRD: CPT | Mod: CPTII,S$GLB,, | Performed by: STUDENT IN AN ORGANIZED HEALTH CARE EDUCATION/TRAINING PROGRAM

## 2025-04-14 PROCEDURE — 96523 IRRIG DRUG DELIVERY DEVICE: CPT

## 2025-04-14 PROCEDURE — G2211 COMPLEX E/M VISIT ADD ON: HCPCS | Mod: S$GLB,,, | Performed by: STUDENT IN AN ORGANIZED HEALTH CARE EDUCATION/TRAINING PROGRAM

## 2025-04-14 RX ORDER — SODIUM CHLORIDE 0.9 % (FLUSH) 0.9 %
10 SYRINGE (ML) INJECTION
Status: DISCONTINUED | OUTPATIENT
Start: 2025-04-14 | End: 2025-04-14 | Stop reason: HOSPADM

## 2025-04-14 RX ORDER — HEPARIN 100 UNIT/ML
500 SYRINGE INTRAVENOUS
Status: DISCONTINUED | OUTPATIENT
Start: 2025-04-14 | End: 2025-04-14 | Stop reason: HOSPADM

## 2025-04-14 RX ORDER — HEPARIN 100 UNIT/ML
500 SYRINGE INTRAVENOUS
OUTPATIENT
Start: 2025-04-14

## 2025-04-14 RX ORDER — SODIUM CHLORIDE 0.9 % (FLUSH) 0.9 %
10 SYRINGE (ML) INJECTION
Status: CANCELLED | OUTPATIENT
Start: 2025-04-14

## 2025-04-14 RX ORDER — HEPARIN 100 UNIT/ML
500 SYRINGE INTRAVENOUS
Status: CANCELLED | OUTPATIENT
Start: 2025-04-14

## 2025-04-14 RX ORDER — SODIUM CHLORIDE 0.9 % (FLUSH) 0.9 %
10 SYRINGE (ML) INJECTION
OUTPATIENT
Start: 2025-04-14

## 2025-04-14 RX ADMIN — SODIUM CHLORIDE, PRESERVATIVE FREE 10 ML: 5 INJECTION INTRAVENOUS at 12:04

## 2025-04-14 RX ADMIN — HEPARIN 500 UNITS: 100 SYRINGE at 12:04
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
## 2025-04-15 ENCOUNTER — HOSPITAL ENCOUNTER (OUTPATIENT)
Dept: INTERVENTIONAL RADIOLOGY/VASCULAR | Facility: HOSPITAL | Age: 85
Discharge: HOME | End: 2025-04-15
Attending: STUDENT IN AN ORGANIZED HEALTH CARE EDUCATION/TRAINING PROGRAM
Payer: MEDICARE

## 2025-04-15 DIAGNOSIS — J90 RECURRENT LEFT PLEURAL EFFUSION: ICD-10-CM

## 2025-04-15 DIAGNOSIS — C17.0 DUODENAL CANCER: ICD-10-CM

## 2025-04-15 DIAGNOSIS — C77.2 SECONDARY AND UNSPECIFIED MALIGNANT NEOPLASM OF INTRA-ABDOMINAL LYMPH NODES: ICD-10-CM

## 2025-04-15 PROCEDURE — 32555 ASPIRATE PLEURA W/ IMAGING: CPT

## 2025-04-15 NOTE — DISCHARGE SUMMARY
Radiology Post-Procedure Note    Pre Op Diagnosis: Left Pleural Effusion    Post Op Diagnosis: Same    Secondary Diagnoses:   Problem List Items Addressed This Visit       Duodenal cancer (Chronic)    Relevant Orders    IR Thoracentesis with Imaging    [Metastasis to lung]    Secondary and unspecified malignant neoplasm of intra-abdominal lymph nodes    Relevant Orders    IR Thoracentesis with Imaging    Recurrent left pleural effusion    Relevant Orders    IR Thoracentesis with Imaging        Procedure: US Guided Left Thoracentesis    Procedure performed by: Galindo Fitzgerald MD    Assistant: None    Written Informed Consent Obtained: Yes    Specimen Removed: None    Estimated Blood Loss: <10 cc    Condition: Stable    Outcome: The patient tolerated the procedure well and was without complications.    For further details please see the imaging report associated.    Disposition: Home or Self Care    Follow Up: With primary care provider    Discharge Instructions:  No discharge procedures on file.       Time Spent On Discharge: 2 minutes

## 2025-04-15 NOTE — INTERVAL H&P NOTE
The patient has been examined and the H&P has been reviewed:    I concur with the findings and no changes have occurred since H&P was written. Quincy Triplett is a 84 y.o. male with Duodenal Cancer, Lung Mass, & Left Pleural Effusion who presents for Left Thoracentesis.           There are no hospital problems to display for this patient.

## 2025-04-21 ENCOUNTER — INFUSION (OUTPATIENT)
Dept: INFUSION THERAPY | Facility: HOSPITAL | Age: 85
End: 2025-04-21
Attending: STUDENT IN AN ORGANIZED HEALTH CARE EDUCATION/TRAINING PROGRAM
Payer: MEDICARE

## 2025-04-21 VITALS
BODY MASS INDEX: 20.12 KG/M2 | SYSTOLIC BLOOD PRESSURE: 162 MMHG | HEIGHT: 69 IN | HEART RATE: 69 BPM | DIASTOLIC BLOOD PRESSURE: 79 MMHG | WEIGHT: 135.88 LBS | TEMPERATURE: 98 F

## 2025-04-21 DIAGNOSIS — C78.00 MALIGNANT NEOPLASM METASTATIC TO LUNG, UNSPECIFIED LATERALITY: ICD-10-CM

## 2025-04-21 DIAGNOSIS — C17.0 DUODENAL CANCER: Primary | ICD-10-CM

## 2025-04-21 PROCEDURE — 63600175 PHARM REV CODE 636 W HCPCS: Performed by: STUDENT IN AN ORGANIZED HEALTH CARE EDUCATION/TRAINING PROGRAM

## 2025-04-21 PROCEDURE — 96415 CHEMO IV INFUSION ADDL HR: CPT

## 2025-04-21 PROCEDURE — 96413 CHEMO IV INFUSION 1 HR: CPT

## 2025-04-21 PROCEDURE — 96416 CHEMO PROLONG INFUSE W/PUMP: CPT

## 2025-04-21 PROCEDURE — 96368 THER/DIAG CONCURRENT INF: CPT

## 2025-04-21 PROCEDURE — 96367 TX/PROPH/DG ADDL SEQ IV INF: CPT

## 2025-04-21 PROCEDURE — 25000003 PHARM REV CODE 250: Performed by: STUDENT IN AN ORGANIZED HEALTH CARE EDUCATION/TRAINING PROGRAM

## 2025-04-21 RX ORDER — FLUOROURACIL 50 MG/ML
400 INJECTION, SOLUTION INTRAVENOUS
Status: CANCELLED | OUTPATIENT
Start: 2025-04-22

## 2025-04-21 RX ORDER — HEPARIN 100 UNIT/ML
500 SYRINGE INTRAVENOUS
Status: DISCONTINUED | OUTPATIENT
Start: 2025-04-21 | End: 2025-04-21 | Stop reason: HOSPADM

## 2025-04-21 RX ORDER — HEPARIN 100 UNIT/ML
500 SYRINGE INTRAVENOUS
Status: CANCELLED | OUTPATIENT
Start: 2025-04-22

## 2025-04-21 RX ORDER — SODIUM CHLORIDE 0.9 % (FLUSH) 0.9 %
10 SYRINGE (ML) INJECTION
Status: CANCELLED | OUTPATIENT
Start: 2025-04-24

## 2025-04-21 RX ORDER — SODIUM CHLORIDE 0.9 % (FLUSH) 0.9 %
10 SYRINGE (ML) INJECTION
Status: DISCONTINUED | OUTPATIENT
Start: 2025-04-21 | End: 2025-04-21 | Stop reason: HOSPADM

## 2025-04-21 RX ORDER — PROCHLORPERAZINE EDISYLATE 5 MG/ML
5 INJECTION INTRAMUSCULAR; INTRAVENOUS ONCE AS NEEDED
Status: DISCONTINUED | OUTPATIENT
Start: 2025-04-21 | End: 2025-04-21 | Stop reason: HOSPADM

## 2025-04-21 RX ORDER — HEPARIN 100 UNIT/ML
500 SYRINGE INTRAVENOUS
Status: CANCELLED | OUTPATIENT
Start: 2025-04-24

## 2025-04-21 RX ORDER — EPINEPHRINE 0.3 MG/.3ML
0.3 INJECTION SUBCUTANEOUS ONCE AS NEEDED
Status: CANCELLED | OUTPATIENT
Start: 2025-04-22

## 2025-04-21 RX ORDER — SODIUM CHLORIDE 0.9 % (FLUSH) 0.9 %
10 SYRINGE (ML) INJECTION
Status: CANCELLED | OUTPATIENT
Start: 2025-04-22

## 2025-04-21 RX ORDER — PROCHLORPERAZINE EDISYLATE 5 MG/ML
5 INJECTION INTRAMUSCULAR; INTRAVENOUS ONCE AS NEEDED
Status: CANCELLED | OUTPATIENT
Start: 2025-04-22

## 2025-04-21 RX ORDER — DIPHENHYDRAMINE HYDROCHLORIDE 50 MG/ML
50 INJECTION, SOLUTION INTRAMUSCULAR; INTRAVENOUS ONCE AS NEEDED
Status: CANCELLED | OUTPATIENT
Start: 2025-04-22

## 2025-04-21 RX ORDER — DIPHENHYDRAMINE HYDROCHLORIDE 50 MG/ML
50 INJECTION, SOLUTION INTRAMUSCULAR; INTRAVENOUS ONCE AS NEEDED
Status: DISCONTINUED | OUTPATIENT
Start: 2025-04-21 | End: 2025-04-21 | Stop reason: HOSPADM

## 2025-04-21 RX ORDER — EPINEPHRINE 0.3 MG/.3ML
0.3 INJECTION SUBCUTANEOUS ONCE AS NEEDED
Status: DISCONTINUED | OUTPATIENT
Start: 2025-04-21 | End: 2025-04-21 | Stop reason: HOSPADM

## 2025-04-21 RX ORDER — FLUOROURACIL 50 MG/ML
400 INJECTION, SOLUTION INTRAVENOUS
Status: DISCONTINUED | OUTPATIENT
Start: 2025-04-21 | End: 2025-04-21

## 2025-04-21 RX ORDER — PROCHLORPERAZINE EDISYLATE 5 MG/ML
5 INJECTION INTRAMUSCULAR; INTRAVENOUS ONCE AS NEEDED
Status: CANCELLED | OUTPATIENT
Start: 2025-04-24

## 2025-04-21 RX ORDER — PROCHLORPERAZINE MALEATE 5 MG
5 TABLET ORAL EVERY 6 HOURS PRN
Qty: 20 TABLET | Refills: 5 | Status: SHIPPED | OUTPATIENT
Start: 2025-04-21

## 2025-04-21 RX ORDER — OLANZAPINE 5 MG/1
5 TABLET, FILM COATED ORAL NIGHTLY
Qty: 6 TABLET | Refills: 11 | Status: SHIPPED | OUTPATIENT
Start: 2025-04-21

## 2025-04-21 RX ADMIN — FLUOROURACIL 4000 MG: 50 INJECTION, SOLUTION INTRAVENOUS at 11:04

## 2025-04-21 RX ADMIN — DEXAMETHASONE SODIUM PHOSPHATE 0.25 MG: 4 INJECTION, SOLUTION INTRA-ARTICULAR; INTRALESIONAL; INTRAMUSCULAR; INTRAVENOUS; SOFT TISSUE at 09:04

## 2025-04-21 RX ADMIN — SODIUM CHLORIDE: 9 INJECTION, SOLUTION INTRAVENOUS at 09:04

## 2025-04-21 RX ADMIN — DEXTROSE MONOHYDRATE 149 MG: 50 INJECTION, SOLUTION INTRAVENOUS at 09:04

## 2025-04-21 RX ADMIN — LEUCOVORIN CALCIUM 700 MG: 350 INJECTION, POWDER, LYOPHILIZED, FOR SOLUTION INTRAMUSCULAR; INTRAVENOUS at 09:04

## 2025-04-23 ENCOUNTER — INFUSION (OUTPATIENT)
Dept: INFUSION THERAPY | Facility: HOSPITAL | Age: 85
End: 2025-04-23
Attending: STUDENT IN AN ORGANIZED HEALTH CARE EDUCATION/TRAINING PROGRAM
Payer: MEDICARE

## 2025-04-23 VITALS — HEART RATE: 71 BPM | TEMPERATURE: 98 F | SYSTOLIC BLOOD PRESSURE: 142 MMHG | DIASTOLIC BLOOD PRESSURE: 65 MMHG

## 2025-04-23 DIAGNOSIS — C78.00 MALIGNANT NEOPLASM METASTATIC TO LUNG, UNSPECIFIED LATERALITY: ICD-10-CM

## 2025-04-23 DIAGNOSIS — C17.0 DUODENAL CANCER: Primary | ICD-10-CM

## 2025-04-23 PROCEDURE — 63600175 PHARM REV CODE 636 W HCPCS: Performed by: STUDENT IN AN ORGANIZED HEALTH CARE EDUCATION/TRAINING PROGRAM

## 2025-04-23 PROCEDURE — 25000003 PHARM REV CODE 250

## 2025-04-23 RX ORDER — PROCHLORPERAZINE EDISYLATE 5 MG/ML
5 INJECTION INTRAMUSCULAR; INTRAVENOUS ONCE AS NEEDED
Status: DISCONTINUED | OUTPATIENT
Start: 2025-04-23 | End: 2025-04-23 | Stop reason: HOSPADM

## 2025-04-23 RX ORDER — HEPARIN 100 UNIT/ML
500 SYRINGE INTRAVENOUS
Status: DISCONTINUED | OUTPATIENT
Start: 2025-04-23 | End: 2025-04-23 | Stop reason: HOSPADM

## 2025-04-23 RX ORDER — SODIUM CHLORIDE 0.9 % (FLUSH) 0.9 %
10 SYRINGE (ML) INJECTION
Status: DISCONTINUED | OUTPATIENT
Start: 2025-04-23 | End: 2025-04-23 | Stop reason: HOSPADM

## 2025-04-23 RX ADMIN — HEPARIN 500 UNITS: 100 SYRINGE at 11:04

## 2025-04-23 RX ADMIN — SODIUM CHLORIDE 500 ML: 9 INJECTION, SOLUTION INTRAVENOUS at 10:04
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
## 2025-04-30 ENCOUNTER — LAB VISIT (OUTPATIENT)
Dept: LAB | Facility: HOSPITAL | Age: 85
End: 2025-04-30
Attending: STUDENT IN AN ORGANIZED HEALTH CARE EDUCATION/TRAINING PROGRAM
Payer: MEDICARE

## 2025-04-30 ENCOUNTER — OFFICE VISIT (OUTPATIENT)
Dept: HEMATOLOGY/ONCOLOGY | Facility: CLINIC | Age: 85
End: 2025-04-30
Payer: MEDICARE

## 2025-04-30 VITALS
SYSTOLIC BLOOD PRESSURE: 126 MMHG | BODY MASS INDEX: 20.04 KG/M2 | RESPIRATION RATE: 18 BRPM | DIASTOLIC BLOOD PRESSURE: 73 MMHG | HEIGHT: 69 IN | OXYGEN SATURATION: 97 % | HEART RATE: 68 BPM | TEMPERATURE: 97 F | WEIGHT: 135.31 LBS

## 2025-04-30 DIAGNOSIS — C17.0 DUODENAL CANCER: ICD-10-CM

## 2025-04-30 DIAGNOSIS — C78.00 MALIGNANT NEOPLASM METASTATIC TO LUNG, UNSPECIFIED LATERALITY: ICD-10-CM

## 2025-04-30 DIAGNOSIS — Z79.899 DRUG-INDUCED IMMUNODEFICIENCY: Primary | ICD-10-CM

## 2025-04-30 DIAGNOSIS — J90 RECURRENT LEFT PLEURAL EFFUSION: ICD-10-CM

## 2025-04-30 DIAGNOSIS — C77.2 SECONDARY AND UNSPECIFIED MALIGNANT NEOPLASM OF INTRA-ABDOMINAL LYMPH NODES: ICD-10-CM

## 2025-04-30 DIAGNOSIS — D84.821 DRUG-INDUCED IMMUNODEFICIENCY: Primary | ICD-10-CM

## 2025-04-30 LAB
ALBUMIN SERPL-MCNC: 2.8 G/DL (ref 3.4–4.8)
ALBUMIN/GLOB SERPL: 0.7 RATIO (ref 1.1–2)
ALP SERPL-CCNC: 443 UNIT/L (ref 40–150)
ALT SERPL-CCNC: 36 UNIT/L (ref 0–55)
ANION GAP SERPL CALC-SCNC: 8 MEQ/L
AST SERPL-CCNC: 42 UNIT/L (ref 11–45)
BASOPHILS # BLD AUTO: 0.04 X10(3)/MCL
BASOPHILS NFR BLD AUTO: 0.5 %
BILIRUB SERPL-MCNC: 0.3 MG/DL
BUN SERPL-MCNC: 23.7 MG/DL (ref 8.4–25.7)
CALCIUM SERPL-MCNC: 9.1 MG/DL (ref 8.8–10)
CEA SERPL-MCNC: 2.1 NG/ML (ref 0–3)
CHLORIDE SERPL-SCNC: 104 MMOL/L (ref 98–107)
CO2 SERPL-SCNC: 24 MMOL/L (ref 23–31)
CREAT SERPL-MCNC: 0.89 MG/DL (ref 0.72–1.25)
CREAT/UREA NIT SERPL: 27
EOSINOPHIL # BLD AUTO: 0.37 X10(3)/MCL (ref 0–0.9)
EOSINOPHIL NFR BLD AUTO: 4.9 %
ERYTHROCYTE [DISTWIDTH] IN BLOOD BY AUTOMATED COUNT: 12.9 % (ref 11.5–17)
GFR SERPLBLD CREATININE-BSD FMLA CKD-EPI: >60 ML/MIN/1.73/M2
GLOBULIN SER-MCNC: 4.2 GM/DL (ref 2.4–3.5)
GLUCOSE SERPL-MCNC: 139 MG/DL (ref 82–115)
HCT VFR BLD AUTO: 34.9 % (ref 42–52)
HGB BLD-MCNC: 11.3 G/DL (ref 14–18)
IMM GRANULOCYTES # BLD AUTO: 0.03 X10(3)/MCL (ref 0–0.04)
IMM GRANULOCYTES NFR BLD AUTO: 0.4 %
LYMPHOCYTES # BLD AUTO: 1.23 X10(3)/MCL (ref 0.6–4.6)
LYMPHOCYTES NFR BLD AUTO: 16.2 %
MCH RBC QN AUTO: 29.5 PG (ref 27–31)
MCHC RBC AUTO-ENTMCNC: 32.4 G/DL (ref 33–36)
MCV RBC AUTO: 91.1 FL (ref 80–94)
MONOCYTES # BLD AUTO: 0.62 X10(3)/MCL (ref 0.1–1.3)
MONOCYTES NFR BLD AUTO: 8.2 %
NEUTROPHILS # BLD AUTO: 5.28 X10(3)/MCL (ref 2.1–9.2)
NEUTROPHILS NFR BLD AUTO: 69.8 %
PLATELET # BLD AUTO: 209 X10(3)/MCL (ref 130–400)
PMV BLD AUTO: 9.2 FL (ref 7.4–10.4)
POTASSIUM SERPL-SCNC: 4.7 MMOL/L (ref 3.5–5.1)
PROT SERPL-MCNC: 7 GM/DL (ref 5.8–7.6)
RBC # BLD AUTO: 3.83 X10(6)/MCL (ref 4.7–6.1)
SODIUM SERPL-SCNC: 136 MMOL/L (ref 136–145)
WBC # BLD AUTO: 7.57 X10(3)/MCL (ref 4.5–11.5)

## 2025-04-30 PROCEDURE — 80053 COMPREHEN METABOLIC PANEL: CPT

## 2025-04-30 PROCEDURE — 1159F MED LIST DOCD IN RCRD: CPT | Mod: CPTII,S$GLB,, | Performed by: STUDENT IN AN ORGANIZED HEALTH CARE EDUCATION/TRAINING PROGRAM

## 2025-04-30 PROCEDURE — 85025 COMPLETE CBC W/AUTO DIFF WBC: CPT

## 2025-04-30 PROCEDURE — 1160F RVW MEDS BY RX/DR IN RCRD: CPT | Mod: CPTII,S$GLB,, | Performed by: STUDENT IN AN ORGANIZED HEALTH CARE EDUCATION/TRAINING PROGRAM

## 2025-04-30 PROCEDURE — 99999 PR PBB SHADOW E&M-EST. PATIENT-LVL IV: CPT | Mod: PBBFAC,,, | Performed by: STUDENT IN AN ORGANIZED HEALTH CARE EDUCATION/TRAINING PROGRAM

## 2025-04-30 PROCEDURE — 3078F DIAST BP <80 MM HG: CPT | Mod: CPTII,S$GLB,, | Performed by: STUDENT IN AN ORGANIZED HEALTH CARE EDUCATION/TRAINING PROGRAM

## 2025-04-30 PROCEDURE — 1126F AMNT PAIN NOTED NONE PRSNT: CPT | Mod: CPTII,S$GLB,, | Performed by: STUDENT IN AN ORGANIZED HEALTH CARE EDUCATION/TRAINING PROGRAM

## 2025-04-30 PROCEDURE — G2211 COMPLEX E/M VISIT ADD ON: HCPCS | Mod: S$GLB,,, | Performed by: STUDENT IN AN ORGANIZED HEALTH CARE EDUCATION/TRAINING PROGRAM

## 2025-04-30 PROCEDURE — 3074F SYST BP LT 130 MM HG: CPT | Mod: CPTII,S$GLB,, | Performed by: STUDENT IN AN ORGANIZED HEALTH CARE EDUCATION/TRAINING PROGRAM

## 2025-04-30 PROCEDURE — 36415 COLL VENOUS BLD VENIPUNCTURE: CPT

## 2025-04-30 PROCEDURE — 99215 OFFICE O/P EST HI 40 MIN: CPT | Mod: S$GLB,,, | Performed by: STUDENT IN AN ORGANIZED HEALTH CARE EDUCATION/TRAINING PROGRAM

## 2025-04-30 PROCEDURE — 82378 CARCINOEMBRYONIC ANTIGEN: CPT

## 2025-05-01 ENCOUNTER — PATIENT MESSAGE (OUTPATIENT)
Dept: HEMATOLOGY/ONCOLOGY | Facility: CLINIC | Age: 85
End: 2025-05-01
Payer: MEDICARE

## 2025-05-01 RX ORDER — PROCHLORPERAZINE EDISYLATE 5 MG/ML
5 INJECTION INTRAMUSCULAR; INTRAVENOUS ONCE AS NEEDED
OUTPATIENT
Start: 2025-05-05

## 2025-05-01 RX ORDER — SODIUM CHLORIDE 0.9 % (FLUSH) 0.9 %
10 SYRINGE (ML) INJECTION
OUTPATIENT
Start: 2025-05-07

## 2025-05-01 RX ORDER — PROCHLORPERAZINE EDISYLATE 5 MG/ML
5 INJECTION INTRAMUSCULAR; INTRAVENOUS ONCE AS NEEDED
OUTPATIENT
Start: 2025-05-07

## 2025-05-01 RX ORDER — HEPARIN 100 UNIT/ML
500 SYRINGE INTRAVENOUS
OUTPATIENT
Start: 2025-05-07

## 2025-05-01 RX ORDER — DIPHENHYDRAMINE HYDROCHLORIDE 50 MG/ML
50 INJECTION, SOLUTION INTRAMUSCULAR; INTRAVENOUS ONCE AS NEEDED
OUTPATIENT
Start: 2025-05-05

## 2025-05-01 RX ORDER — HEPARIN 100 UNIT/ML
500 SYRINGE INTRAVENOUS
OUTPATIENT
Start: 2025-05-05

## 2025-05-01 RX ORDER — EPINEPHRINE 0.3 MG/.3ML
0.3 INJECTION SUBCUTANEOUS ONCE AS NEEDED
OUTPATIENT
Start: 2025-05-05

## 2025-05-01 RX ORDER — SODIUM CHLORIDE 0.9 % (FLUSH) 0.9 %
10 SYRINGE (ML) INJECTION
OUTPATIENT
Start: 2025-05-05

## 2025-05-05 ENCOUNTER — INFUSION (OUTPATIENT)
Dept: INFUSION THERAPY | Facility: HOSPITAL | Age: 85
End: 2025-05-05
Attending: STUDENT IN AN ORGANIZED HEALTH CARE EDUCATION/TRAINING PROGRAM
Payer: MEDICARE

## 2025-05-05 VITALS
WEIGHT: 135.5 LBS | HEART RATE: 67 BPM | TEMPERATURE: 98 F | HEIGHT: 69 IN | RESPIRATION RATE: 16 BRPM | SYSTOLIC BLOOD PRESSURE: 141 MMHG | DIASTOLIC BLOOD PRESSURE: 70 MMHG | BODY MASS INDEX: 20.07 KG/M2 | OXYGEN SATURATION: 97 %

## 2025-05-05 DIAGNOSIS — C17.0 DUODENAL CANCER: Primary | ICD-10-CM

## 2025-05-05 DIAGNOSIS — C78.00 MALIGNANT NEOPLASM METASTATIC TO LUNG, UNSPECIFIED LATERALITY: ICD-10-CM

## 2025-05-05 PROCEDURE — 96367 TX/PROPH/DG ADDL SEQ IV INF: CPT

## 2025-05-05 PROCEDURE — 25000003 PHARM REV CODE 250: Performed by: STUDENT IN AN ORGANIZED HEALTH CARE EDUCATION/TRAINING PROGRAM

## 2025-05-05 PROCEDURE — 96415 CHEMO IV INFUSION ADDL HR: CPT

## 2025-05-05 PROCEDURE — 96368 THER/DIAG CONCURRENT INF: CPT

## 2025-05-05 PROCEDURE — 63600175 PHARM REV CODE 636 W HCPCS: Performed by: STUDENT IN AN ORGANIZED HEALTH CARE EDUCATION/TRAINING PROGRAM

## 2025-05-05 PROCEDURE — 96413 CHEMO IV INFUSION 1 HR: CPT

## 2025-05-05 RX ORDER — SODIUM CHLORIDE 0.9 % (FLUSH) 0.9 %
10 SYRINGE (ML) INJECTION
Status: DISCONTINUED | OUTPATIENT
Start: 2025-05-05 | End: 2025-05-05 | Stop reason: HOSPADM

## 2025-05-05 RX ORDER — HEPARIN 100 UNIT/ML
500 SYRINGE INTRAVENOUS
Status: DISCONTINUED | OUTPATIENT
Start: 2025-05-05 | End: 2025-05-05 | Stop reason: HOSPADM

## 2025-05-05 RX ORDER — DIPHENHYDRAMINE HYDROCHLORIDE 50 MG/ML
50 INJECTION, SOLUTION INTRAMUSCULAR; INTRAVENOUS ONCE AS NEEDED
Status: DISCONTINUED | OUTPATIENT
Start: 2025-05-05 | End: 2025-05-05 | Stop reason: HOSPADM

## 2025-05-05 RX ORDER — PROCHLORPERAZINE EDISYLATE 5 MG/ML
5 INJECTION INTRAMUSCULAR; INTRAVENOUS ONCE AS NEEDED
Status: DISCONTINUED | OUTPATIENT
Start: 2025-05-05 | End: 2025-05-05 | Stop reason: HOSPADM

## 2025-05-05 RX ORDER — EPINEPHRINE 0.3 MG/.3ML
0.3 INJECTION SUBCUTANEOUS ONCE AS NEEDED
Status: DISCONTINUED | OUTPATIENT
Start: 2025-05-05 | End: 2025-05-05 | Stop reason: HOSPADM

## 2025-05-05 RX ADMIN — DEXAMETHASONE SODIUM PHOSPHATE 0.25 MG: 4 INJECTION, SOLUTION INTRA-ARTICULAR; INTRALESIONAL; INTRAMUSCULAR; INTRAVENOUS; SOFT TISSUE at 09:05

## 2025-05-05 RX ADMIN — OXALIPLATIN 149 MG: 5 INJECTION, SOLUTION INTRAVENOUS at 09:05

## 2025-05-05 RX ADMIN — SODIUM CHLORIDE: 9 INJECTION, SOLUTION INTRAVENOUS at 09:05

## 2025-05-05 RX ADMIN — LEUCOVORIN CALCIUM 700 MG: 350 INJECTION, POWDER, LYOPHILIZED, FOR SOLUTION INTRAMUSCULAR; INTRAVENOUS at 09:05

## 2025-05-05 RX ADMIN — FLUOROURACIL 4000 MG: 50 INJECTION, SOLUTION INTRAVENOUS at 11:05

## 2025-05-05 RX ADMIN — DEXTROSE MONOHYDRATE: 50 INJECTION, SOLUTION INTRAVENOUS at 09:05

## 2025-05-05 NOTE — NURSING
Pt tolerated treatment with no complaints. CADD pump  connected and infusing.  All connections secured with tape.   Next appt reviewed with pt.

## 2025-05-07 ENCOUNTER — INFUSION (OUTPATIENT)
Dept: INFUSION THERAPY | Facility: HOSPITAL | Age: 85
End: 2025-05-07
Attending: STUDENT IN AN ORGANIZED HEALTH CARE EDUCATION/TRAINING PROGRAM
Payer: MEDICARE

## 2025-05-07 VITALS
DIASTOLIC BLOOD PRESSURE: 84 MMHG | HEART RATE: 70 BPM | OXYGEN SATURATION: 97 % | SYSTOLIC BLOOD PRESSURE: 155 MMHG | RESPIRATION RATE: 16 BRPM | TEMPERATURE: 98 F

## 2025-05-07 DIAGNOSIS — C17.0 DUODENAL CANCER: Primary | ICD-10-CM

## 2025-05-07 DIAGNOSIS — C78.00 MALIGNANT NEOPLASM METASTATIC TO LUNG, UNSPECIFIED LATERALITY: ICD-10-CM

## 2025-05-07 PROCEDURE — A4216 STERILE WATER/SALINE, 10 ML: HCPCS | Performed by: STUDENT IN AN ORGANIZED HEALTH CARE EDUCATION/TRAINING PROGRAM

## 2025-05-07 PROCEDURE — 96360 HYDRATION IV INFUSION INIT: CPT

## 2025-05-07 PROCEDURE — 25000003 PHARM REV CODE 250: Performed by: STUDENT IN AN ORGANIZED HEALTH CARE EDUCATION/TRAINING PROGRAM

## 2025-05-07 PROCEDURE — 63600175 PHARM REV CODE 636 W HCPCS: Performed by: STUDENT IN AN ORGANIZED HEALTH CARE EDUCATION/TRAINING PROGRAM

## 2025-05-07 RX ORDER — PROCHLORPERAZINE EDISYLATE 5 MG/ML
5 INJECTION INTRAMUSCULAR; INTRAVENOUS ONCE AS NEEDED
Status: DISCONTINUED | OUTPATIENT
Start: 2025-05-07 | End: 2025-05-07 | Stop reason: HOSPADM

## 2025-05-07 RX ORDER — SODIUM CHLORIDE 0.9 % (FLUSH) 0.9 %
10 SYRINGE (ML) INJECTION
Status: DISCONTINUED | OUTPATIENT
Start: 2025-05-07 | End: 2025-05-07 | Stop reason: HOSPADM

## 2025-05-07 RX ORDER — HEPARIN 100 UNIT/ML
500 SYRINGE INTRAVENOUS
Status: DISCONTINUED | OUTPATIENT
Start: 2025-05-07 | End: 2025-05-07 | Stop reason: HOSPADM

## 2025-05-07 RX ADMIN — SODIUM CHLORIDE 1000 ML: 9 INJECTION, SOLUTION INTRAVENOUS at 09:05

## 2025-05-07 RX ADMIN — Medication 10 ML: at 11:05

## 2025-05-07 RX ADMIN — HEPARIN 500 UNITS: 100 SYRINGE at 11:05

## 2025-05-07 NOTE — NURSING
Pt here for CADD  pump d/c. Pump stopped and disconnected.  Existing dressing appeared clean and intact.   Needle D/C, site without redness, swelling, or drainage noted.  Dressing applied.  Patient tolerated well.     Pt instructed to report fever >100.4 to care team.  Stated understanding. Reviewed next appt.

## 2025-05-09 ENCOUNTER — HOSPITAL ENCOUNTER (OUTPATIENT)
Dept: RADIOLOGY | Facility: HOSPITAL | Age: 85
Discharge: HOME | End: 2025-05-09
Attending: STUDENT IN AN ORGANIZED HEALTH CARE EDUCATION/TRAINING PROGRAM
Payer: MEDICARE

## 2025-05-09 ENCOUNTER — HOSPITAL ENCOUNTER (OUTPATIENT)
Dept: INTERVENTIONAL RADIOLOGY/VASCULAR | Facility: HOSPITAL | Age: 85
Discharge: HOME | End: 2025-05-09
Attending: STUDENT IN AN ORGANIZED HEALTH CARE EDUCATION/TRAINING PROGRAM
Payer: MEDICARE

## 2025-05-09 DIAGNOSIS — J90 RECURRENT LEFT PLEURAL EFFUSION: ICD-10-CM

## 2025-05-09 PROCEDURE — 76604 US EXAM CHEST: CPT | Mod: TC

## 2025-05-09 NOTE — INTERVAL H&P NOTE
The patient has been examined and the H&P has been reviewed:    I concur with the findings and no changes have occurred since H&P was written. Quincy Triplett is a 84 y.o. male with Duodenal Cancer & Left Pleural Effusion who presents for Left Thoracentesis.           There are no hospital problems to display for this patient.

## 2025-05-13 ENCOUNTER — OFFICE VISIT (OUTPATIENT)
Dept: SURGICAL ONCOLOGY | Facility: CLINIC | Age: 85
End: 2025-05-13
Payer: MEDICARE

## 2025-05-13 VITALS
DIASTOLIC BLOOD PRESSURE: 63 MMHG | HEIGHT: 69 IN | OXYGEN SATURATION: 97 % | WEIGHT: 134 LBS | SYSTOLIC BLOOD PRESSURE: 121 MMHG | HEART RATE: 85 BPM | BODY MASS INDEX: 19.85 KG/M2

## 2025-05-13 DIAGNOSIS — C17.0 DUODENAL CANCER: Primary | ICD-10-CM

## 2025-05-13 PROCEDURE — 1159F MED LIST DOCD IN RCRD: CPT | Mod: CPTII,S$GLB,, | Performed by: SURGERY

## 2025-05-13 PROCEDURE — 3078F DIAST BP <80 MM HG: CPT | Mod: CPTII,S$GLB,, | Performed by: SURGERY

## 2025-05-13 PROCEDURE — 99999 PR PBB SHADOW E&M-EST. PATIENT-LVL IV: CPT | Mod: PBBFAC,,, | Performed by: SURGERY

## 2025-05-13 PROCEDURE — 3288F FALL RISK ASSESSMENT DOCD: CPT | Mod: CPTII,S$GLB,, | Performed by: SURGERY

## 2025-05-13 PROCEDURE — 1101F PT FALLS ASSESS-DOCD LE1/YR: CPT | Mod: CPTII,S$GLB,, | Performed by: SURGERY

## 2025-05-13 PROCEDURE — 99214 OFFICE O/P EST MOD 30 MIN: CPT | Mod: S$GLB,,, | Performed by: SURGERY

## 2025-05-13 PROCEDURE — 3074F SYST BP LT 130 MM HG: CPT | Mod: CPTII,S$GLB,, | Performed by: SURGERY

## 2025-05-13 PROCEDURE — 1126F AMNT PAIN NOTED NONE PRSNT: CPT | Mod: CPTII,S$GLB,, | Performed by: SURGERY

## 2025-05-13 PROCEDURE — G2211 COMPLEX E/M VISIT ADD ON: HCPCS | Mod: S$GLB,,, | Performed by: SURGERY
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
## 2025-05-14 ENCOUNTER — OFFICE VISIT (OUTPATIENT)
Dept: HEMATOLOGY/ONCOLOGY | Facility: CLINIC | Age: 85
End: 2025-05-14
Payer: MEDICARE

## 2025-05-14 ENCOUNTER — LAB VISIT (OUTPATIENT)
Dept: LAB | Facility: HOSPITAL | Age: 85
End: 2025-05-14
Attending: STUDENT IN AN ORGANIZED HEALTH CARE EDUCATION/TRAINING PROGRAM
Payer: MEDICARE

## 2025-05-14 VITALS
BODY MASS INDEX: 19.73 KG/M2 | TEMPERATURE: 98 F | RESPIRATION RATE: 18 BRPM | HEIGHT: 69 IN | OXYGEN SATURATION: 98 % | SYSTOLIC BLOOD PRESSURE: 118 MMHG | HEART RATE: 67 BPM | WEIGHT: 133.19 LBS | DIASTOLIC BLOOD PRESSURE: 71 MMHG

## 2025-05-14 DIAGNOSIS — J90 RECURRENT LEFT PLEURAL EFFUSION: ICD-10-CM

## 2025-05-14 DIAGNOSIS — C77.2 SECONDARY AND UNSPECIFIED MALIGNANT NEOPLASM OF INTRA-ABDOMINAL LYMPH NODES: ICD-10-CM

## 2025-05-14 DIAGNOSIS — C17.0 DUODENAL CANCER: ICD-10-CM

## 2025-05-14 DIAGNOSIS — C17.0 DUODENAL CANCER: Primary | ICD-10-CM

## 2025-05-14 LAB
ALBUMIN SERPL-MCNC: 3.1 G/DL (ref 3.4–4.8)
ALBUMIN/GLOB SERPL: 0.7 RATIO (ref 1.1–2)
ALP SERPL-CCNC: 361 UNIT/L (ref 40–150)
ALT SERPL-CCNC: 33 UNIT/L (ref 0–55)
ANION GAP SERPL CALC-SCNC: 7 MEQ/L
AST SERPL-CCNC: 37 UNIT/L (ref 11–45)
BASOPHILS # BLD AUTO: 0.05 X10(3)/MCL
BASOPHILS NFR BLD AUTO: 1 %
BILIRUB SERPL-MCNC: 0.4 MG/DL
BUN SERPL-MCNC: 26.8 MG/DL (ref 8.4–25.7)
CALCIUM SERPL-MCNC: 9.9 MG/DL (ref 8.8–10)
CHLORIDE SERPL-SCNC: 104 MMOL/L (ref 98–107)
CO2 SERPL-SCNC: 24 MMOL/L (ref 23–31)
CREAT SERPL-MCNC: 0.91 MG/DL (ref 0.72–1.25)
CREAT/UREA NIT SERPL: 29
EOSINOPHIL # BLD AUTO: 0.35 X10(3)/MCL (ref 0–0.9)
EOSINOPHIL NFR BLD AUTO: 7 %
ERYTHROCYTE [DISTWIDTH] IN BLOOD BY AUTOMATED COUNT: 13.4 % (ref 11.5–17)
GFR SERPLBLD CREATININE-BSD FMLA CKD-EPI: >60 ML/MIN/1.73/M2
GLOBULIN SER-MCNC: 4.4 GM/DL (ref 2.4–3.5)
GLUCOSE SERPL-MCNC: 122 MG/DL (ref 82–115)
HCT VFR BLD AUTO: 36 % (ref 42–52)
HGB BLD-MCNC: 11.4 G/DL (ref 14–18)
IMM GRANULOCYTES # BLD AUTO: 0.03 X10(3)/MCL (ref 0–0.04)
IMM GRANULOCYTES NFR BLD AUTO: 0.6 %
LYMPHOCYTES # BLD AUTO: 1.53 X10(3)/MCL (ref 0.6–4.6)
LYMPHOCYTES NFR BLD AUTO: 30.7 %
MCH RBC QN AUTO: 29.6 PG (ref 27–31)
MCHC RBC AUTO-ENTMCNC: 31.7 G/DL (ref 33–36)
MCV RBC AUTO: 93.5 FL (ref 80–94)
MONOCYTES # BLD AUTO: 0.54 X10(3)/MCL (ref 0.1–1.3)
MONOCYTES NFR BLD AUTO: 10.8 %
NEUTROPHILS # BLD AUTO: 2.48 X10(3)/MCL (ref 2.1–9.2)
NEUTROPHILS NFR BLD AUTO: 49.9 %
PLATELET # BLD AUTO: 152 X10(3)/MCL (ref 130–400)
PMV BLD AUTO: 10.1 FL (ref 7.4–10.4)
POTASSIUM SERPL-SCNC: 5.1 MMOL/L (ref 3.5–5.1)
PROT SERPL-MCNC: 7.5 GM/DL (ref 5.8–7.6)
PROT UR QL STRIP: NEGATIVE
RBC # BLD AUTO: 3.85 X10(6)/MCL (ref 4.7–6.1)
SODIUM SERPL-SCNC: 135 MMOL/L (ref 136–145)
WBC # BLD AUTO: 4.98 X10(3)/MCL (ref 4.5–11.5)

## 2025-05-14 PROCEDURE — 1159F MED LIST DOCD IN RCRD: CPT | Mod: CPTII,S$GLB,,

## 2025-05-14 PROCEDURE — 99999 PR PBB SHADOW E&M-EST. PATIENT-LVL IV: CPT | Mod: PBBFAC,,,

## 2025-05-14 PROCEDURE — G2211 COMPLEX E/M VISIT ADD ON: HCPCS | Mod: S$GLB,,,

## 2025-05-14 PROCEDURE — 99215 OFFICE O/P EST HI 40 MIN: CPT | Mod: S$GLB,,,

## 2025-05-14 PROCEDURE — 3078F DIAST BP <80 MM HG: CPT | Mod: CPTII,S$GLB,,

## 2025-05-14 PROCEDURE — 1160F RVW MEDS BY RX/DR IN RCRD: CPT | Mod: CPTII,S$GLB,,

## 2025-05-14 PROCEDURE — 36415 COLL VENOUS BLD VENIPUNCTURE: CPT

## 2025-05-14 PROCEDURE — 1126F AMNT PAIN NOTED NONE PRSNT: CPT | Mod: CPTII,S$GLB,,

## 2025-05-14 PROCEDURE — 81005 URINALYSIS: CPT

## 2025-05-14 PROCEDURE — 85025 COMPLETE CBC W/AUTO DIFF WBC: CPT

## 2025-05-14 PROCEDURE — 80053 COMPREHEN METABOLIC PANEL: CPT

## 2025-05-14 PROCEDURE — 3074F SYST BP LT 130 MM HG: CPT | Mod: CPTII,S$GLB,,

## 2025-05-14 RX ORDER — HEPARIN 100 UNIT/ML
500 SYRINGE INTRAVENOUS
Status: CANCELLED | OUTPATIENT
Start: 2025-05-21

## 2025-05-14 RX ORDER — PROCHLORPERAZINE EDISYLATE 5 MG/ML
5 INJECTION INTRAMUSCULAR; INTRAVENOUS ONCE AS NEEDED
Status: CANCELLED | OUTPATIENT
Start: 2025-05-21

## 2025-05-14 RX ORDER — SODIUM CHLORIDE 0.9 % (FLUSH) 0.9 %
10 SYRINGE (ML) INJECTION
Status: CANCELLED | OUTPATIENT
Start: 2025-05-21
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
## 2025-05-19 ENCOUNTER — INFUSION (OUTPATIENT)
Dept: INFUSION THERAPY | Facility: HOSPITAL | Age: 85
End: 2025-05-19
Attending: STUDENT IN AN ORGANIZED HEALTH CARE EDUCATION/TRAINING PROGRAM
Payer: MEDICARE

## 2025-05-19 VITALS
DIASTOLIC BLOOD PRESSURE: 68 MMHG | RESPIRATION RATE: 18 BRPM | HEART RATE: 54 BPM | OXYGEN SATURATION: 97 % | BODY MASS INDEX: 19.62 KG/M2 | TEMPERATURE: 97 F | WEIGHT: 132.5 LBS | HEIGHT: 69 IN | SYSTOLIC BLOOD PRESSURE: 126 MMHG

## 2025-05-19 DIAGNOSIS — C17.0 DUODENAL CANCER: Primary | ICD-10-CM

## 2025-05-19 DIAGNOSIS — C78.00 MALIGNANT NEOPLASM METASTATIC TO LUNG, UNSPECIFIED LATERALITY: ICD-10-CM

## 2025-05-19 PROCEDURE — 96416 CHEMO PROLONG INFUSE W/PUMP: CPT

## 2025-05-19 PROCEDURE — 96415 CHEMO IV INFUSION ADDL HR: CPT

## 2025-05-19 PROCEDURE — 96413 CHEMO IV INFUSION 1 HR: CPT

## 2025-05-19 PROCEDURE — A4216 STERILE WATER/SALINE, 10 ML: HCPCS

## 2025-05-19 PROCEDURE — 25000003 PHARM REV CODE 250

## 2025-05-19 PROCEDURE — 96368 THER/DIAG CONCURRENT INF: CPT

## 2025-05-19 PROCEDURE — 63600175 PHARM REV CODE 636 W HCPCS

## 2025-05-19 PROCEDURE — 96367 TX/PROPH/DG ADDL SEQ IV INF: CPT

## 2025-05-19 RX ORDER — SODIUM CHLORIDE 0.9 % (FLUSH) 0.9 %
10 SYRINGE (ML) INJECTION
Status: DISCONTINUED | OUTPATIENT
Start: 2025-05-19 | End: 2025-05-19 | Stop reason: HOSPADM

## 2025-05-19 RX ORDER — DIPHENHYDRAMINE HYDROCHLORIDE 50 MG/ML
50 INJECTION, SOLUTION INTRAMUSCULAR; INTRAVENOUS ONCE AS NEEDED
Status: DISCONTINUED | OUTPATIENT
Start: 2025-05-19 | End: 2025-05-19 | Stop reason: HOSPADM

## 2025-05-19 RX ORDER — PROCHLORPERAZINE EDISYLATE 5 MG/ML
5 INJECTION INTRAMUSCULAR; INTRAVENOUS ONCE AS NEEDED
Status: DISCONTINUED | OUTPATIENT
Start: 2025-05-19 | End: 2025-05-19 | Stop reason: HOSPADM

## 2025-05-19 RX ORDER — HEPARIN 100 UNIT/ML
500 SYRINGE INTRAVENOUS
Status: DISCONTINUED | OUTPATIENT
Start: 2025-05-19 | End: 2025-05-19 | Stop reason: HOSPADM

## 2025-05-19 RX ORDER — EPINEPHRINE 0.3 MG/.3ML
0.3 INJECTION SUBCUTANEOUS ONCE AS NEEDED
Status: DISCONTINUED | OUTPATIENT
Start: 2025-05-19 | End: 2025-05-19 | Stop reason: HOSPADM

## 2025-05-19 RX ADMIN — Medication 10 ML: at 11:05

## 2025-05-19 RX ADMIN — DEXTROSE MONOHYDRATE: 50 INJECTION, SOLUTION INTRAVENOUS at 08:05

## 2025-05-19 RX ADMIN — DEXAMETHASONE SODIUM PHOSPHATE 0.25 MG: 4 INJECTION, SOLUTION INTRA-ARTICULAR; INTRALESIONAL; INTRAMUSCULAR; INTRAVENOUS; SOFT TISSUE at 08:05

## 2025-05-19 RX ADMIN — FLUOROURACIL 4000 MG: 50 INJECTION, SOLUTION INTRAVENOUS at 11:05

## 2025-05-19 RX ADMIN — OXALIPLATIN 149 MG: 5 INJECTION, SOLUTION INTRAVENOUS at 09:05

## 2025-05-19 RX ADMIN — Medication 10 ML: at 08:05

## 2025-05-19 RX ADMIN — Medication 10 ML: at 09:05

## 2025-05-19 RX ADMIN — LEUCOVORIN CALCIUM 700 MG: 350 INJECTION, POWDER, LYOPHILIZED, FOR SOLUTION INTRAMUSCULAR; INTRAVENOUS at 09:05

## 2025-05-19 NOTE — NURSING
C3 D1 FOLFOX given, tolerated well. Pt discharged home in stable condition, aware of future appts. Pt will return on 5-21 at 9am for CADD pump disconnect + IVF.

## 2025-05-21 ENCOUNTER — INFUSION (OUTPATIENT)
Dept: INFUSION THERAPY | Facility: HOSPITAL | Age: 85
End: 2025-05-21
Attending: STUDENT IN AN ORGANIZED HEALTH CARE EDUCATION/TRAINING PROGRAM
Payer: MEDICARE

## 2025-05-21 VITALS
HEART RATE: 75 BPM | WEIGHT: 132.5 LBS | OXYGEN SATURATION: 97 % | DIASTOLIC BLOOD PRESSURE: 74 MMHG | RESPIRATION RATE: 20 BRPM | BODY MASS INDEX: 19.62 KG/M2 | HEIGHT: 69 IN | SYSTOLIC BLOOD PRESSURE: 130 MMHG

## 2025-05-21 DIAGNOSIS — C78.00 MALIGNANT NEOPLASM METASTATIC TO LUNG, UNSPECIFIED LATERALITY: ICD-10-CM

## 2025-05-21 DIAGNOSIS — C17.0 DUODENAL CANCER: Primary | ICD-10-CM

## 2025-05-21 PROCEDURE — 63600175 PHARM REV CODE 636 W HCPCS

## 2025-05-21 PROCEDURE — 96360 HYDRATION IV INFUSION INIT: CPT

## 2025-05-21 PROCEDURE — 25000003 PHARM REV CODE 250

## 2025-05-21 PROCEDURE — A4216 STERILE WATER/SALINE, 10 ML: HCPCS

## 2025-05-21 RX ORDER — PROCHLORPERAZINE EDISYLATE 5 MG/ML
5 INJECTION INTRAMUSCULAR; INTRAVENOUS ONCE AS NEEDED
Status: DISCONTINUED | OUTPATIENT
Start: 2025-05-21 | End: 2025-05-21 | Stop reason: HOSPADM

## 2025-05-21 RX ORDER — SODIUM CHLORIDE 0.9 % (FLUSH) 0.9 %
10 SYRINGE (ML) INJECTION
Status: DISCONTINUED | OUTPATIENT
Start: 2025-05-21 | End: 2025-05-21 | Stop reason: HOSPADM

## 2025-05-21 RX ORDER — HEPARIN 100 UNIT/ML
500 SYRINGE INTRAVENOUS
Status: DISCONTINUED | OUTPATIENT
Start: 2025-05-21 | End: 2025-05-21 | Stop reason: HOSPADM

## 2025-05-21 RX ADMIN — Medication 10 ML: at 09:05

## 2025-05-21 RX ADMIN — HEPARIN 500 UNITS: 100 SYRINGE at 10:05

## 2025-05-21 RX ADMIN — Medication 10 ML: at 10:05

## 2025-05-21 RX ADMIN — SODIUM CHLORIDE 1000 ML: 9 INJECTION, SOLUTION INTRAVENOUS at 09:05

## 2025-05-21 NOTE — NURSING
Pt here for C3 D3. CADD pump disconnected, 1L NS given, tolerated well. Pt discharged home in stable condition, future appts given.
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
## 2025-05-28 ENCOUNTER — LAB VISIT (OUTPATIENT)
Dept: LAB | Facility: HOSPITAL | Age: 85
End: 2025-05-28
Attending: STUDENT IN AN ORGANIZED HEALTH CARE EDUCATION/TRAINING PROGRAM
Payer: MEDICARE

## 2025-05-28 ENCOUNTER — OFFICE VISIT (OUTPATIENT)
Dept: HEMATOLOGY/ONCOLOGY | Facility: CLINIC | Age: 85
End: 2025-05-28
Payer: MEDICARE

## 2025-05-28 VITALS
OXYGEN SATURATION: 98 % | HEART RATE: 86 BPM | DIASTOLIC BLOOD PRESSURE: 69 MMHG | SYSTOLIC BLOOD PRESSURE: 124 MMHG | TEMPERATURE: 97 F | WEIGHT: 129.63 LBS | BODY MASS INDEX: 19.2 KG/M2 | RESPIRATION RATE: 18 BRPM | HEIGHT: 69 IN

## 2025-05-28 DIAGNOSIS — D84.821 DRUG-INDUCED IMMUNODEFICIENCY: Primary | ICD-10-CM

## 2025-05-28 DIAGNOSIS — Z79.899 DRUG-INDUCED IMMUNODEFICIENCY: Primary | ICD-10-CM

## 2025-05-28 DIAGNOSIS — C17.0 DUODENAL CANCER: ICD-10-CM

## 2025-05-28 DIAGNOSIS — J90 RECURRENT LEFT PLEURAL EFFUSION: ICD-10-CM

## 2025-05-28 DIAGNOSIS — C77.2 SECONDARY AND UNSPECIFIED MALIGNANT NEOPLASM OF INTRA-ABDOMINAL LYMPH NODES: ICD-10-CM

## 2025-05-28 LAB
ALBUMIN SERPL-MCNC: 3.1 G/DL (ref 3.4–4.8)
ALBUMIN/GLOB SERPL: 0.7 RATIO (ref 1.1–2)
ALP SERPL-CCNC: 266 UNIT/L (ref 40–150)
ALT SERPL-CCNC: 24 UNIT/L (ref 0–55)
ANION GAP SERPL CALC-SCNC: 8 MEQ/L
AST SERPL-CCNC: 29 UNIT/L (ref 11–45)
BASOPHILS # BLD AUTO: 0.03 X10(3)/MCL
BASOPHILS NFR BLD AUTO: 0.5 %
BILIRUB SERPL-MCNC: 0.5 MG/DL
BUN SERPL-MCNC: 23.2 MG/DL (ref 8.4–25.7)
CALCIUM SERPL-MCNC: 9.7 MG/DL (ref 8.8–10)
CHLORIDE SERPL-SCNC: 107 MMOL/L (ref 98–107)
CO2 SERPL-SCNC: 20 MMOL/L (ref 23–31)
CREAT SERPL-MCNC: 0.85 MG/DL (ref 0.72–1.25)
CREAT/UREA NIT SERPL: 27
EOSINOPHIL # BLD AUTO: 0.3 X10(3)/MCL (ref 0–0.9)
EOSINOPHIL NFR BLD AUTO: 5.3 %
ERYTHROCYTE [DISTWIDTH] IN BLOOD BY AUTOMATED COUNT: 13.8 % (ref 11.5–17)
GFR SERPLBLD CREATININE-BSD FMLA CKD-EPI: >60 ML/MIN/1.73/M2
GLOBULIN SER-MCNC: 4.4 GM/DL (ref 2.4–3.5)
GLUCOSE SERPL-MCNC: 162 MG/DL (ref 82–115)
HCT VFR BLD AUTO: 37 % (ref 42–52)
HGB BLD-MCNC: 12.1 G/DL (ref 14–18)
IMM GRANULOCYTES # BLD AUTO: 0.05 X10(3)/MCL (ref 0–0.04)
IMM GRANULOCYTES NFR BLD AUTO: 0.9 %
LYMPHOCYTES # BLD AUTO: 1.64 X10(3)/MCL (ref 0.6–4.6)
LYMPHOCYTES NFR BLD AUTO: 28.9 %
MCH RBC QN AUTO: 30 PG (ref 27–31)
MCHC RBC AUTO-ENTMCNC: 32.7 G/DL (ref 33–36)
MCV RBC AUTO: 91.6 FL (ref 80–94)
MONOCYTES # BLD AUTO: 0.58 X10(3)/MCL (ref 0.1–1.3)
MONOCYTES NFR BLD AUTO: 10.2 %
NEUTROPHILS # BLD AUTO: 3.08 X10(3)/MCL (ref 2.1–9.2)
NEUTROPHILS NFR BLD AUTO: 54.2 %
PLATELET # BLD AUTO: 183 X10(3)/MCL (ref 130–400)
PMV BLD AUTO: 9 FL (ref 7.4–10.4)
POTASSIUM SERPL-SCNC: 5 MMOL/L (ref 3.5–5.1)
PROT SERPL-MCNC: 7.5 GM/DL (ref 5.8–7.6)
PROT UR QL STRIP: ABNORMAL
RBC # BLD AUTO: 4.04 X10(6)/MCL (ref 4.7–6.1)
SODIUM SERPL-SCNC: 135 MMOL/L (ref 136–145)
WBC # BLD AUTO: 5.68 X10(3)/MCL (ref 4.5–11.5)

## 2025-05-28 PROCEDURE — 3078F DIAST BP <80 MM HG: CPT | Mod: CPTII,S$GLB,, | Performed by: STUDENT IN AN ORGANIZED HEALTH CARE EDUCATION/TRAINING PROGRAM

## 2025-05-28 PROCEDURE — 3074F SYST BP LT 130 MM HG: CPT | Mod: CPTII,S$GLB,, | Performed by: STUDENT IN AN ORGANIZED HEALTH CARE EDUCATION/TRAINING PROGRAM

## 2025-05-28 PROCEDURE — 99215 OFFICE O/P EST HI 40 MIN: CPT | Mod: S$GLB,,, | Performed by: STUDENT IN AN ORGANIZED HEALTH CARE EDUCATION/TRAINING PROGRAM

## 2025-05-28 PROCEDURE — 36415 COLL VENOUS BLD VENIPUNCTURE: CPT

## 2025-05-28 PROCEDURE — 1160F RVW MEDS BY RX/DR IN RCRD: CPT | Mod: CPTII,S$GLB,, | Performed by: STUDENT IN AN ORGANIZED HEALTH CARE EDUCATION/TRAINING PROGRAM

## 2025-05-28 PROCEDURE — 80053 COMPREHEN METABOLIC PANEL: CPT

## 2025-05-28 PROCEDURE — 1159F MED LIST DOCD IN RCRD: CPT | Mod: CPTII,S$GLB,, | Performed by: STUDENT IN AN ORGANIZED HEALTH CARE EDUCATION/TRAINING PROGRAM

## 2025-05-28 PROCEDURE — 85025 COMPLETE CBC W/AUTO DIFF WBC: CPT

## 2025-05-28 PROCEDURE — 1126F AMNT PAIN NOTED NONE PRSNT: CPT | Mod: CPTII,S$GLB,, | Performed by: STUDENT IN AN ORGANIZED HEALTH CARE EDUCATION/TRAINING PROGRAM

## 2025-05-28 PROCEDURE — G2211 COMPLEX E/M VISIT ADD ON: HCPCS | Mod: S$GLB,,, | Performed by: STUDENT IN AN ORGANIZED HEALTH CARE EDUCATION/TRAINING PROGRAM

## 2025-05-28 PROCEDURE — 99999 PR PBB SHADOW E&M-EST. PATIENT-LVL IV: CPT | Mod: PBBFAC,,, | Performed by: STUDENT IN AN ORGANIZED HEALTH CARE EDUCATION/TRAINING PROGRAM

## 2025-05-28 PROCEDURE — 81005 URINALYSIS: CPT

## 2025-05-28 RX ORDER — EPINEPHRINE 0.3 MG/.3ML
0.3 INJECTION SUBCUTANEOUS ONCE AS NEEDED
Status: CANCELLED | OUTPATIENT
Start: 2025-06-02

## 2025-05-28 RX ORDER — PROCHLORPERAZINE EDISYLATE 5 MG/ML
5 INJECTION INTRAMUSCULAR; INTRAVENOUS ONCE AS NEEDED
Status: CANCELLED | OUTPATIENT
Start: 2025-06-02

## 2025-05-28 RX ORDER — SODIUM CHLORIDE 0.9 % (FLUSH) 0.9 %
10 SYRINGE (ML) INJECTION
Status: CANCELLED | OUTPATIENT
Start: 2025-06-04

## 2025-05-28 RX ORDER — HEPARIN 100 UNIT/ML
500 SYRINGE INTRAVENOUS
Status: CANCELLED | OUTPATIENT
Start: 2025-06-04

## 2025-05-28 RX ORDER — DIPHENHYDRAMINE HYDROCHLORIDE 50 MG/ML
50 INJECTION, SOLUTION INTRAMUSCULAR; INTRAVENOUS ONCE AS NEEDED
Status: CANCELLED | OUTPATIENT
Start: 2025-06-02

## 2025-05-28 RX ORDER — PROCHLORPERAZINE EDISYLATE 5 MG/ML
5 INJECTION INTRAMUSCULAR; INTRAVENOUS ONCE AS NEEDED
Status: CANCELLED | OUTPATIENT
Start: 2025-06-04

## 2025-05-28 RX ORDER — HEPARIN 100 UNIT/ML
500 SYRINGE INTRAVENOUS
Status: CANCELLED | OUTPATIENT
Start: 2025-06-02

## 2025-05-28 RX ORDER — SODIUM CHLORIDE 0.9 % (FLUSH) 0.9 %
10 SYRINGE (ML) INJECTION
Status: CANCELLED | OUTPATIENT
Start: 2025-06-02

## 2025-05-29 RX ORDER — PROCHLORPERAZINE EDISYLATE 5 MG/ML
5 INJECTION INTRAMUSCULAR; INTRAVENOUS ONCE AS NEEDED
OUTPATIENT
Start: 2025-06-02

## 2025-05-29 RX ORDER — EPINEPHRINE 0.3 MG/.3ML
0.3 INJECTION SUBCUTANEOUS ONCE AS NEEDED
OUTPATIENT
Start: 2025-06-02

## 2025-05-29 RX ORDER — DIPHENHYDRAMINE HYDROCHLORIDE 50 MG/ML
50 INJECTION, SOLUTION INTRAMUSCULAR; INTRAVENOUS ONCE AS NEEDED
OUTPATIENT
Start: 2025-06-02

## 2025-05-29 RX ORDER — HEPARIN 100 UNIT/ML
500 SYRINGE INTRAVENOUS
OUTPATIENT
Start: 2025-06-02

## 2025-05-29 RX ORDER — SODIUM CHLORIDE 0.9 % (FLUSH) 0.9 %
10 SYRINGE (ML) INJECTION
OUTPATIENT
Start: 2025-06-02

## 2025-05-29 RX ORDER — SODIUM CHLORIDE 0.9 % (FLUSH) 0.9 %
10 SYRINGE (ML) INJECTION
OUTPATIENT
Start: 2025-06-04

## 2025-05-29 RX ORDER — HEPARIN 100 UNIT/ML
500 SYRINGE INTRAVENOUS
OUTPATIENT
Start: 2025-06-04

## 2025-05-29 RX ORDER — PROCHLORPERAZINE EDISYLATE 5 MG/ML
5 INJECTION INTRAMUSCULAR; INTRAVENOUS ONCE AS NEEDED
OUTPATIENT
Start: 2025-06-04

## 2025-06-02 ENCOUNTER — INFUSION (OUTPATIENT)
Dept: INFUSION THERAPY | Facility: HOSPITAL | Age: 85
End: 2025-06-02
Attending: STUDENT IN AN ORGANIZED HEALTH CARE EDUCATION/TRAINING PROGRAM
Payer: MEDICARE

## 2025-06-02 VITALS
SYSTOLIC BLOOD PRESSURE: 146 MMHG | DIASTOLIC BLOOD PRESSURE: 73 MMHG | OXYGEN SATURATION: 99 % | RESPIRATION RATE: 16 BRPM | TEMPERATURE: 98 F | HEART RATE: 52 BPM

## 2025-06-02 DIAGNOSIS — C78.00 MALIGNANT NEOPLASM METASTATIC TO LUNG, UNSPECIFIED LATERALITY: ICD-10-CM

## 2025-06-02 DIAGNOSIS — C17.0 DUODENAL CANCER: Primary | ICD-10-CM

## 2025-06-02 PROCEDURE — 96415 CHEMO IV INFUSION ADDL HR: CPT

## 2025-06-02 PROCEDURE — 96367 TX/PROPH/DG ADDL SEQ IV INF: CPT

## 2025-06-02 PROCEDURE — 63600175 PHARM REV CODE 636 W HCPCS: Mod: JZ,TB | Performed by: STUDENT IN AN ORGANIZED HEALTH CARE EDUCATION/TRAINING PROGRAM

## 2025-06-02 PROCEDURE — 96413 CHEMO IV INFUSION 1 HR: CPT

## 2025-06-02 PROCEDURE — 96416 CHEMO PROLONG INFUSE W/PUMP: CPT

## 2025-06-02 PROCEDURE — 25000003 PHARM REV CODE 250: Performed by: STUDENT IN AN ORGANIZED HEALTH CARE EDUCATION/TRAINING PROGRAM

## 2025-06-02 PROCEDURE — 96368 THER/DIAG CONCURRENT INF: CPT

## 2025-06-02 PROCEDURE — 96417 CHEMO IV INFUS EACH ADDL SEQ: CPT

## 2025-06-02 RX ORDER — PROCHLORPERAZINE EDISYLATE 5 MG/ML
5 INJECTION INTRAMUSCULAR; INTRAVENOUS ONCE AS NEEDED
Status: DISCONTINUED | OUTPATIENT
Start: 2025-06-02 | End: 2025-06-02 | Stop reason: HOSPADM

## 2025-06-02 RX ORDER — HEPARIN 100 UNIT/ML
500 SYRINGE INTRAVENOUS
Status: DISCONTINUED | OUTPATIENT
Start: 2025-06-02 | End: 2025-06-02 | Stop reason: HOSPADM

## 2025-06-02 RX ORDER — SODIUM CHLORIDE 0.9 % (FLUSH) 0.9 %
10 SYRINGE (ML) INJECTION
Status: DISCONTINUED | OUTPATIENT
Start: 2025-06-02 | End: 2025-06-02 | Stop reason: HOSPADM

## 2025-06-02 RX ORDER — DIPHENHYDRAMINE HYDROCHLORIDE 50 MG/ML
50 INJECTION, SOLUTION INTRAMUSCULAR; INTRAVENOUS ONCE AS NEEDED
Status: DISCONTINUED | OUTPATIENT
Start: 2025-06-02 | End: 2025-06-02 | Stop reason: HOSPADM

## 2025-06-02 RX ORDER — EPINEPHRINE 0.3 MG/.3ML
0.3 INJECTION SUBCUTANEOUS ONCE AS NEEDED
Status: DISCONTINUED | OUTPATIENT
Start: 2025-06-02 | End: 2025-06-02 | Stop reason: HOSPADM

## 2025-06-02 RX ADMIN — BEVACIZUMAB-AWWB 300 MG: 100 INJECTION, SOLUTION INTRAVENOUS at 11:06

## 2025-06-02 RX ADMIN — DEXTROSE MONOHYDRATE: 50 INJECTION, SOLUTION INTRAVENOUS at 12:06

## 2025-06-02 RX ADMIN — DEXAMETHASONE SODIUM PHOSPHATE 0.25 MG: 4 INJECTION, SOLUTION INTRA-ARTICULAR; INTRALESIONAL; INTRAMUSCULAR; INTRAVENOUS; SOFT TISSUE at 11:06

## 2025-06-02 RX ADMIN — LEUCOVORIN CALCIUM 700 MG: 350 INJECTION, POWDER, LYOPHILIZED, FOR SOLUTION INTRAMUSCULAR; INTRAVENOUS at 12:06

## 2025-06-02 RX ADMIN — FLUOROURACIL 4000 MG: 50 INJECTION, SOLUTION INTRAVENOUS at 02:06

## 2025-06-02 RX ADMIN — OXALIPLATIN 126 MG: 5 INJECTION, SOLUTION INTRAVENOUS at 12:06

## 2025-06-04 ENCOUNTER — INFUSION (OUTPATIENT)
Dept: INFUSION THERAPY | Facility: HOSPITAL | Age: 85
End: 2025-06-04
Attending: STUDENT IN AN ORGANIZED HEALTH CARE EDUCATION/TRAINING PROGRAM
Payer: MEDICARE

## 2025-06-04 VITALS
HEART RATE: 64 BPM | RESPIRATION RATE: 16 BRPM | TEMPERATURE: 99 F | DIASTOLIC BLOOD PRESSURE: 75 MMHG | OXYGEN SATURATION: 95 % | SYSTOLIC BLOOD PRESSURE: 171 MMHG

## 2025-06-04 DIAGNOSIS — C78.00 MALIGNANT NEOPLASM METASTATIC TO LUNG, UNSPECIFIED LATERALITY: ICD-10-CM

## 2025-06-04 DIAGNOSIS — C17.0 DUODENAL CANCER: Primary | ICD-10-CM

## 2025-06-04 PROCEDURE — 25000003 PHARM REV CODE 250: Performed by: STUDENT IN AN ORGANIZED HEALTH CARE EDUCATION/TRAINING PROGRAM

## 2025-06-04 PROCEDURE — 63600175 PHARM REV CODE 636 W HCPCS: Performed by: STUDENT IN AN ORGANIZED HEALTH CARE EDUCATION/TRAINING PROGRAM

## 2025-06-04 PROCEDURE — 96360 HYDRATION IV INFUSION INIT: CPT

## 2025-06-04 RX ORDER — SODIUM CHLORIDE 0.9 % (FLUSH) 0.9 %
10 SYRINGE (ML) INJECTION
Status: DISCONTINUED | OUTPATIENT
Start: 2025-06-04 | End: 2025-06-04 | Stop reason: HOSPADM

## 2025-06-04 RX ORDER — HEPARIN 100 UNIT/ML
500 SYRINGE INTRAVENOUS
Status: DISCONTINUED | OUTPATIENT
Start: 2025-06-04 | End: 2025-06-04 | Stop reason: HOSPADM

## 2025-06-04 RX ORDER — PROCHLORPERAZINE EDISYLATE 5 MG/ML
5 INJECTION INTRAMUSCULAR; INTRAVENOUS ONCE AS NEEDED
Status: DISCONTINUED | OUTPATIENT
Start: 2025-06-04 | End: 2025-06-04 | Stop reason: HOSPADM

## 2025-06-04 RX ADMIN — SODIUM CHLORIDE 1000 ML: 9 INJECTION, SOLUTION INTRAVENOUS at 12:06

## 2025-06-04 RX ADMIN — HEPARIN 500 UNITS: 100 SYRINGE at 01:06
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
## 2025-06-11 ENCOUNTER — LAB VISIT (OUTPATIENT)
Dept: LAB | Facility: HOSPITAL | Age: 85
End: 2025-06-11
Attending: STUDENT IN AN ORGANIZED HEALTH CARE EDUCATION/TRAINING PROGRAM
Payer: MEDICARE

## 2025-06-11 ENCOUNTER — OFFICE VISIT (OUTPATIENT)
Dept: HEMATOLOGY/ONCOLOGY | Facility: CLINIC | Age: 85
End: 2025-06-11
Payer: MEDICARE

## 2025-06-11 VITALS
RESPIRATION RATE: 20 BRPM | HEIGHT: 69 IN | DIASTOLIC BLOOD PRESSURE: 72 MMHG | OXYGEN SATURATION: 99 % | BODY MASS INDEX: 19.9 KG/M2 | TEMPERATURE: 97 F | WEIGHT: 134.38 LBS | SYSTOLIC BLOOD PRESSURE: 150 MMHG | HEART RATE: 52 BPM

## 2025-06-11 DIAGNOSIS — C77.2 SECONDARY AND UNSPECIFIED MALIGNANT NEOPLASM OF INTRA-ABDOMINAL LYMPH NODES: ICD-10-CM

## 2025-06-11 DIAGNOSIS — J90 RECURRENT LEFT PLEURAL EFFUSION: ICD-10-CM

## 2025-06-11 DIAGNOSIS — D84.821 DRUG-INDUCED IMMUNODEFICIENCY: Primary | ICD-10-CM

## 2025-06-11 DIAGNOSIS — C78.00 MALIGNANT NEOPLASM METASTATIC TO LUNG, UNSPECIFIED LATERALITY: ICD-10-CM

## 2025-06-11 DIAGNOSIS — Z79.899 DRUG-INDUCED IMMUNODEFICIENCY: Primary | ICD-10-CM

## 2025-06-11 DIAGNOSIS — C17.0 DUODENAL CANCER: ICD-10-CM

## 2025-06-11 LAB
ALBUMIN SERPL-MCNC: 2.7 G/DL (ref 3.4–4.8)
ALBUMIN/GLOB SERPL: 0.7 RATIO (ref 1.1–2)
ALP SERPL-CCNC: 233 UNIT/L (ref 40–150)
ALT SERPL-CCNC: 29 UNIT/L (ref 0–55)
ANION GAP SERPL CALC-SCNC: 8 MEQ/L
AST SERPL-CCNC: 36 UNIT/L (ref 11–45)
BASOPHILS # BLD AUTO: 0.02 X10(3)/MCL
BASOPHILS NFR BLD AUTO: 0.5 %
BILIRUB SERPL-MCNC: 0.3 MG/DL
BUN SERPL-MCNC: 23.4 MG/DL (ref 8.4–25.7)
CALCIUM SERPL-MCNC: 9 MG/DL (ref 8.8–10)
CHLORIDE SERPL-SCNC: 105 MMOL/L (ref 98–107)
CO2 SERPL-SCNC: 24 MMOL/L (ref 23–31)
CREAT SERPL-MCNC: 0.79 MG/DL (ref 0.72–1.25)
CREAT/UREA NIT SERPL: 30
EOSINOPHIL # BLD AUTO: 0.29 X10(3)/MCL (ref 0–0.9)
EOSINOPHIL NFR BLD AUTO: 6.5 %
ERYTHROCYTE [DISTWIDTH] IN BLOOD BY AUTOMATED COUNT: 14.7 % (ref 11.5–17)
GFR SERPLBLD CREATININE-BSD FMLA CKD-EPI: >60 ML/MIN/1.73/M2
GLOBULIN SER-MCNC: 3.9 GM/DL (ref 2.4–3.5)
GLUCOSE SERPL-MCNC: 128 MG/DL (ref 82–115)
HCT VFR BLD AUTO: 32.5 % (ref 42–52)
HGB BLD-MCNC: 10.7 G/DL (ref 14–18)
IMM GRANULOCYTES # BLD AUTO: 0.03 X10(3)/MCL (ref 0–0.04)
IMM GRANULOCYTES NFR BLD AUTO: 0.7 %
LYMPHOCYTES # BLD AUTO: 1.47 X10(3)/MCL (ref 0.6–4.6)
LYMPHOCYTES NFR BLD AUTO: 33.2 %
MCH RBC QN AUTO: 30.4 PG (ref 27–31)
MCHC RBC AUTO-ENTMCNC: 32.9 G/DL (ref 33–36)
MCV RBC AUTO: 92.3 FL (ref 80–94)
MONOCYTES # BLD AUTO: 0.44 X10(3)/MCL (ref 0.1–1.3)
MONOCYTES NFR BLD AUTO: 9.9 %
NEUTROPHILS # BLD AUTO: 2.18 X10(3)/MCL (ref 2.1–9.2)
NEUTROPHILS NFR BLD AUTO: 49.2 %
PLATELET # BLD AUTO: 149 X10(3)/MCL (ref 130–400)
PMV BLD AUTO: 9.2 FL (ref 7.4–10.4)
POTASSIUM SERPL-SCNC: 5 MMOL/L (ref 3.5–5.1)
PROT SERPL-MCNC: 6.6 GM/DL (ref 5.8–7.6)
PROT UR QL STRIP: ABNORMAL
RBC # BLD AUTO: 3.52 X10(6)/MCL (ref 4.7–6.1)
SODIUM SERPL-SCNC: 137 MMOL/L (ref 136–145)
WBC # BLD AUTO: 4.43 X10(3)/MCL (ref 4.5–11.5)

## 2025-06-11 PROCEDURE — 80053 COMPREHEN METABOLIC PANEL: CPT

## 2025-06-11 PROCEDURE — 1160F RVW MEDS BY RX/DR IN RCRD: CPT | Mod: CPTII,S$GLB,, | Performed by: STUDENT IN AN ORGANIZED HEALTH CARE EDUCATION/TRAINING PROGRAM

## 2025-06-11 PROCEDURE — 99215 OFFICE O/P EST HI 40 MIN: CPT | Mod: S$GLB,,, | Performed by: STUDENT IN AN ORGANIZED HEALTH CARE EDUCATION/TRAINING PROGRAM

## 2025-06-11 PROCEDURE — 85025 COMPLETE CBC W/AUTO DIFF WBC: CPT

## 2025-06-11 PROCEDURE — 3078F DIAST BP <80 MM HG: CPT | Mod: CPTII,S$GLB,, | Performed by: STUDENT IN AN ORGANIZED HEALTH CARE EDUCATION/TRAINING PROGRAM

## 2025-06-11 PROCEDURE — 3077F SYST BP >= 140 MM HG: CPT | Mod: CPTII,S$GLB,, | Performed by: STUDENT IN AN ORGANIZED HEALTH CARE EDUCATION/TRAINING PROGRAM

## 2025-06-11 PROCEDURE — 1126F AMNT PAIN NOTED NONE PRSNT: CPT | Mod: CPTII,S$GLB,, | Performed by: STUDENT IN AN ORGANIZED HEALTH CARE EDUCATION/TRAINING PROGRAM

## 2025-06-11 PROCEDURE — G2211 COMPLEX E/M VISIT ADD ON: HCPCS | Mod: S$GLB,,, | Performed by: STUDENT IN AN ORGANIZED HEALTH CARE EDUCATION/TRAINING PROGRAM

## 2025-06-11 PROCEDURE — 1159F MED LIST DOCD IN RCRD: CPT | Mod: CPTII,S$GLB,, | Performed by: STUDENT IN AN ORGANIZED HEALTH CARE EDUCATION/TRAINING PROGRAM

## 2025-06-11 PROCEDURE — 1101F PT FALLS ASSESS-DOCD LE1/YR: CPT | Mod: CPTII,S$GLB,, | Performed by: STUDENT IN AN ORGANIZED HEALTH CARE EDUCATION/TRAINING PROGRAM

## 2025-06-11 PROCEDURE — 3288F FALL RISK ASSESSMENT DOCD: CPT | Mod: CPTII,S$GLB,, | Performed by: STUDENT IN AN ORGANIZED HEALTH CARE EDUCATION/TRAINING PROGRAM

## 2025-06-11 PROCEDURE — 99999 PR PBB SHADOW E&M-EST. PATIENT-LVL IV: CPT | Mod: PBBFAC,,, | Performed by: STUDENT IN AN ORGANIZED HEALTH CARE EDUCATION/TRAINING PROGRAM

## 2025-06-11 PROCEDURE — 36415 COLL VENOUS BLD VENIPUNCTURE: CPT

## 2025-06-11 PROCEDURE — 81005 URINALYSIS: CPT

## 2025-06-11 RX ORDER — HEPARIN 100 UNIT/ML
500 SYRINGE INTRAVENOUS
OUTPATIENT
Start: 2025-06-18

## 2025-06-11 RX ORDER — PROCHLORPERAZINE EDISYLATE 5 MG/ML
5 INJECTION INTRAMUSCULAR; INTRAVENOUS ONCE AS NEEDED
OUTPATIENT
Start: 2025-06-18

## 2025-06-11 RX ORDER — PROCHLORPERAZINE EDISYLATE 5 MG/ML
5 INJECTION INTRAMUSCULAR; INTRAVENOUS ONCE AS NEEDED
OUTPATIENT
Start: 2025-06-16

## 2025-06-11 RX ORDER — DIPHENHYDRAMINE HYDROCHLORIDE 50 MG/ML
50 INJECTION, SOLUTION INTRAMUSCULAR; INTRAVENOUS ONCE AS NEEDED
OUTPATIENT
Start: 2025-06-16

## 2025-06-11 RX ORDER — HEPARIN 100 UNIT/ML
500 SYRINGE INTRAVENOUS
OUTPATIENT
Start: 2025-06-16

## 2025-06-11 RX ORDER — SODIUM CHLORIDE 0.9 % (FLUSH) 0.9 %
10 SYRINGE (ML) INJECTION
OUTPATIENT
Start: 2025-06-18

## 2025-06-11 RX ORDER — EPINEPHRINE 0.3 MG/.3ML
0.3 INJECTION SUBCUTANEOUS ONCE AS NEEDED
OUTPATIENT
Start: 2025-06-16

## 2025-06-11 RX ORDER — SODIUM CHLORIDE 0.9 % (FLUSH) 0.9 %
10 SYRINGE (ML) INJECTION
OUTPATIENT
Start: 2025-06-16

## 2025-06-16 ENCOUNTER — INFUSION (OUTPATIENT)
Dept: INFUSION THERAPY | Facility: HOSPITAL | Age: 85
End: 2025-06-16
Attending: STUDENT IN AN ORGANIZED HEALTH CARE EDUCATION/TRAINING PROGRAM
Payer: MEDICARE

## 2025-06-16 VITALS
HEIGHT: 69 IN | RESPIRATION RATE: 18 BRPM | OXYGEN SATURATION: 99 % | SYSTOLIC BLOOD PRESSURE: 192 MMHG | HEART RATE: 54 BPM | BODY MASS INDEX: 19.61 KG/M2 | WEIGHT: 132.38 LBS | DIASTOLIC BLOOD PRESSURE: 84 MMHG | TEMPERATURE: 97 F

## 2025-06-16 DIAGNOSIS — C78.00 MALIGNANT NEOPLASM METASTATIC TO LUNG, UNSPECIFIED LATERALITY: ICD-10-CM

## 2025-06-16 DIAGNOSIS — C17.0 DUODENAL CANCER: Primary | ICD-10-CM

## 2025-06-16 RX ORDER — HEPARIN 100 UNIT/ML
500 SYRINGE INTRAVENOUS
Status: CANCELLED | OUTPATIENT
Start: 2025-06-17

## 2025-06-16 RX ORDER — EPINEPHRINE 0.3 MG/.3ML
0.3 INJECTION SUBCUTANEOUS ONCE AS NEEDED
Status: CANCELLED | OUTPATIENT
Start: 2025-06-17 | End: 2036-11-12

## 2025-06-16 RX ORDER — HEPARIN 100 UNIT/ML
500 SYRINGE INTRAVENOUS
Status: DISCONTINUED | OUTPATIENT
Start: 2025-06-16 | End: 2025-06-16

## 2025-06-16 RX ORDER — DIPHENHYDRAMINE HYDROCHLORIDE 50 MG/ML
50 INJECTION, SOLUTION INTRAMUSCULAR; INTRAVENOUS ONCE AS NEEDED
Status: CANCELLED | OUTPATIENT
Start: 2025-06-17 | End: 2036-11-12

## 2025-06-16 RX ORDER — SODIUM CHLORIDE 0.9 % (FLUSH) 0.9 %
10 SYRINGE (ML) INJECTION
Status: CANCELLED | OUTPATIENT
Start: 2025-06-17

## 2025-06-16 RX ORDER — EPINEPHRINE 0.3 MG/.3ML
0.3 INJECTION SUBCUTANEOUS ONCE AS NEEDED
Status: DISCONTINUED | OUTPATIENT
Start: 2025-06-16 | End: 2025-06-16

## 2025-06-16 RX ORDER — PROCHLORPERAZINE EDISYLATE 5 MG/ML
5 INJECTION INTRAMUSCULAR; INTRAVENOUS ONCE AS NEEDED
Status: CANCELLED | OUTPATIENT
Start: 2025-06-17

## 2025-06-16 RX ORDER — DIPHENHYDRAMINE HYDROCHLORIDE 50 MG/ML
50 INJECTION, SOLUTION INTRAMUSCULAR; INTRAVENOUS ONCE AS NEEDED
Status: DISCONTINUED | OUTPATIENT
Start: 2025-06-16 | End: 2025-06-16

## 2025-06-16 RX ORDER — SODIUM CHLORIDE 0.9 % (FLUSH) 0.9 %
10 SYRINGE (ML) INJECTION
Status: DISCONTINUED | OUTPATIENT
Start: 2025-06-16 | End: 2025-06-16

## 2025-06-16 RX ORDER — PROCHLORPERAZINE EDISYLATE 5 MG/ML
5 INJECTION INTRAMUSCULAR; INTRAVENOUS ONCE AS NEEDED
Status: DISCONTINUED | OUTPATIENT
Start: 2025-06-16 | End: 2025-06-16

## 2025-06-16 NOTE — NURSING
"Patient arrived to facility blood pressure 183/68, recheck 10 minutes later blood pressure 203/88, Manual blood pressure 183/78. Notified Provider ask how patient is feeling & did he take blood pressures medication this morning and what's the name of medication. Patient states " I feel fine and took blood pressure medication around 8 am this morning, I don't remember the name of blood pressure medication. Okay to proceed per Dr. Lejeune. Release treatment plan to pharmacy.  Received a message from Domi Marcano stating "SBP > or = 180 is a hard stop for treatment. Can you recheck BP?" Added Krys to Chat with provider. Krys add message "The [facility] policy is that a SBP >/= 180 requires a hard stop and delaying treatment by at least 1 day. " Per Dr Lejeune "he can come back tomorrow. ask him to recheck his BP at home this afternoon and let us know what it is. " Inform patient and daughter Pau. Voiced understanding. Discharge home.   "

## 2025-06-17 ENCOUNTER — INFUSION (OUTPATIENT)
Dept: INFUSION THERAPY | Facility: HOSPITAL | Age: 85
End: 2025-06-17
Attending: STUDENT IN AN ORGANIZED HEALTH CARE EDUCATION/TRAINING PROGRAM
Payer: MEDICARE

## 2025-06-17 VITALS
OXYGEN SATURATION: 96 % | WEIGHT: 133.31 LBS | TEMPERATURE: 96 F | DIASTOLIC BLOOD PRESSURE: 76 MMHG | HEART RATE: 54 BPM | BODY MASS INDEX: 19.68 KG/M2 | SYSTOLIC BLOOD PRESSURE: 144 MMHG

## 2025-06-17 DIAGNOSIS — C78.00 MALIGNANT NEOPLASM METASTATIC TO LUNG, UNSPECIFIED LATERALITY: ICD-10-CM

## 2025-06-17 DIAGNOSIS — C17.0 DUODENAL CANCER: Primary | ICD-10-CM

## 2025-06-17 PROCEDURE — 96417 CHEMO IV INFUS EACH ADDL SEQ: CPT

## 2025-06-17 PROCEDURE — 96367 TX/PROPH/DG ADDL SEQ IV INF: CPT

## 2025-06-17 PROCEDURE — 96415 CHEMO IV INFUSION ADDL HR: CPT

## 2025-06-17 PROCEDURE — 63600175 PHARM REV CODE 636 W HCPCS: Performed by: STUDENT IN AN ORGANIZED HEALTH CARE EDUCATION/TRAINING PROGRAM

## 2025-06-17 PROCEDURE — 25000003 PHARM REV CODE 250: Performed by: STUDENT IN AN ORGANIZED HEALTH CARE EDUCATION/TRAINING PROGRAM

## 2025-06-17 PROCEDURE — 96368 THER/DIAG CONCURRENT INF: CPT

## 2025-06-17 PROCEDURE — 96416 CHEMO PROLONG INFUSE W/PUMP: CPT

## 2025-06-17 PROCEDURE — 96413 CHEMO IV INFUSION 1 HR: CPT

## 2025-06-17 RX ORDER — DIPHENHYDRAMINE HYDROCHLORIDE 50 MG/ML
50 INJECTION, SOLUTION INTRAMUSCULAR; INTRAVENOUS ONCE AS NEEDED
Status: DISCONTINUED | OUTPATIENT
Start: 2025-06-17 | End: 2025-06-17 | Stop reason: HOSPADM

## 2025-06-17 RX ORDER — PROCHLORPERAZINE EDISYLATE 5 MG/ML
5 INJECTION INTRAMUSCULAR; INTRAVENOUS ONCE AS NEEDED
Status: DISCONTINUED | OUTPATIENT
Start: 2025-06-17 | End: 2025-06-17 | Stop reason: HOSPADM

## 2025-06-17 RX ORDER — EPINEPHRINE 0.3 MG/.3ML
0.3 INJECTION SUBCUTANEOUS ONCE AS NEEDED
Status: DISCONTINUED | OUTPATIENT
Start: 2025-06-17 | End: 2025-06-17 | Stop reason: HOSPADM

## 2025-06-17 RX ORDER — HEPARIN 100 UNIT/ML
500 SYRINGE INTRAVENOUS
Status: DISCONTINUED | OUTPATIENT
Start: 2025-06-17 | End: 2025-06-17 | Stop reason: HOSPADM

## 2025-06-17 RX ORDER — SODIUM CHLORIDE 0.9 % (FLUSH) 0.9 %
10 SYRINGE (ML) INJECTION
Status: DISCONTINUED | OUTPATIENT
Start: 2025-06-17 | End: 2025-06-17 | Stop reason: HOSPADM

## 2025-06-17 RX ADMIN — OXALIPLATIN 112 MG: 5 INJECTION, SOLUTION INTRAVENOUS at 09:06

## 2025-06-17 RX ADMIN — LEUCOVORIN CALCIUM 700 MG: 350 INJECTION, POWDER, LYOPHILIZED, FOR SOLUTION INTRAMUSCULAR; INTRAVENOUS at 09:06

## 2025-06-17 RX ADMIN — FLUOROURACIL 4000 MG: 50 INJECTION, SOLUTION INTRAVENOUS at 12:06

## 2025-06-17 RX ADMIN — DEXTROSE MONOHYDRATE: 50 INJECTION, SOLUTION INTRAVENOUS at 09:06

## 2025-06-17 RX ADMIN — BEVACIZUMAB-AWWB 300 MG: 100 INJECTION, SOLUTION INTRAVENOUS at 09:06

## 2025-06-17 RX ADMIN — SODIUM CHLORIDE: 9 INJECTION, SOLUTION INTRAVENOUS at 08:06

## 2025-06-17 RX ADMIN — PALONOSETRON HYDROCHLORIDE 0.25 MG: 0.25 INJECTION, SOLUTION INTRAVENOUS at 09:06
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
## 2025-06-19 ENCOUNTER — INFUSION (OUTPATIENT)
Dept: INFUSION THERAPY | Facility: HOSPITAL | Age: 85
End: 2025-06-19
Attending: STUDENT IN AN ORGANIZED HEALTH CARE EDUCATION/TRAINING PROGRAM
Payer: MEDICARE

## 2025-06-19 VITALS
TEMPERATURE: 98 F | OXYGEN SATURATION: 98 % | WEIGHT: 133.31 LBS | HEIGHT: 69 IN | RESPIRATION RATE: 16 BRPM | BODY MASS INDEX: 19.75 KG/M2

## 2025-06-19 DIAGNOSIS — C78.00 MALIGNANT NEOPLASM METASTATIC TO LUNG, UNSPECIFIED LATERALITY: ICD-10-CM

## 2025-06-19 DIAGNOSIS — C17.0 DUODENAL CANCER: Primary | ICD-10-CM

## 2025-06-19 PROCEDURE — 96360 HYDRATION IV INFUSION INIT: CPT

## 2025-06-19 PROCEDURE — 25000003 PHARM REV CODE 250: Performed by: STUDENT IN AN ORGANIZED HEALTH CARE EDUCATION/TRAINING PROGRAM

## 2025-06-19 PROCEDURE — 63600175 PHARM REV CODE 636 W HCPCS: Performed by: STUDENT IN AN ORGANIZED HEALTH CARE EDUCATION/TRAINING PROGRAM

## 2025-06-19 RX ORDER — PROCHLORPERAZINE EDISYLATE 5 MG/ML
5 INJECTION INTRAMUSCULAR; INTRAVENOUS ONCE AS NEEDED
Status: DISCONTINUED | OUTPATIENT
Start: 2025-06-19 | End: 2025-06-19 | Stop reason: HOSPADM

## 2025-06-19 RX ORDER — HEPARIN 100 UNIT/ML
500 SYRINGE INTRAVENOUS
Status: DISCONTINUED | OUTPATIENT
Start: 2025-06-19 | End: 2025-06-19 | Stop reason: HOSPADM

## 2025-06-19 RX ORDER — SODIUM CHLORIDE 0.9 % (FLUSH) 0.9 %
10 SYRINGE (ML) INJECTION
Status: DISCONTINUED | OUTPATIENT
Start: 2025-06-19 | End: 2025-06-19 | Stop reason: HOSPADM

## 2025-06-19 RX ADMIN — HEPARIN 500 UNITS: 100 SYRINGE at 11:06

## 2025-06-19 RX ADMIN — SODIUM CHLORIDE 1000 ML: 9 INJECTION, SOLUTION INTRAVENOUS at 10:06

## 2025-06-23 ENCOUNTER — PATIENT MESSAGE (OUTPATIENT)
Dept: HEMATOLOGY/ONCOLOGY | Facility: CLINIC | Age: 85
End: 2025-06-23
Payer: MEDICARE

## 2025-06-25 ENCOUNTER — OFFICE VISIT (OUTPATIENT)
Dept: HEMATOLOGY/ONCOLOGY | Facility: CLINIC | Age: 85
End: 2025-06-25
Payer: MEDICARE

## 2025-06-25 ENCOUNTER — LAB VISIT (OUTPATIENT)
Dept: LAB | Facility: HOSPITAL | Age: 85
End: 2025-06-25
Attending: STUDENT IN AN ORGANIZED HEALTH CARE EDUCATION/TRAINING PROGRAM
Payer: MEDICARE

## 2025-06-25 VITALS
RESPIRATION RATE: 18 BRPM | SYSTOLIC BLOOD PRESSURE: 191 MMHG | TEMPERATURE: 98 F | OXYGEN SATURATION: 95 % | HEIGHT: 69 IN | DIASTOLIC BLOOD PRESSURE: 95 MMHG | HEART RATE: 78 BPM | WEIGHT: 134.5 LBS | BODY MASS INDEX: 19.92 KG/M2

## 2025-06-25 DIAGNOSIS — C77.2 SECONDARY AND UNSPECIFIED MALIGNANT NEOPLASM OF INTRA-ABDOMINAL LYMPH NODES: ICD-10-CM

## 2025-06-25 DIAGNOSIS — C17.0 DUODENAL CANCER: Primary | Chronic | ICD-10-CM

## 2025-06-25 DIAGNOSIS — C17.0 DUODENAL CANCER: ICD-10-CM

## 2025-06-25 DIAGNOSIS — D84.821 DRUG-INDUCED IMMUNODEFICIENCY: ICD-10-CM

## 2025-06-25 DIAGNOSIS — J90 RECURRENT LEFT PLEURAL EFFUSION: ICD-10-CM

## 2025-06-25 DIAGNOSIS — C78.00 MALIGNANT NEOPLASM METASTATIC TO LUNG, UNSPECIFIED LATERALITY: ICD-10-CM

## 2025-06-25 DIAGNOSIS — Z79.899 DRUG-INDUCED IMMUNODEFICIENCY: ICD-10-CM

## 2025-06-25 LAB
ALBUMIN SERPL-MCNC: 2.7 G/DL (ref 3.4–4.8)
ALBUMIN/GLOB SERPL: 0.8 RATIO (ref 1.1–2)
ALP SERPL-CCNC: 308 UNIT/L (ref 40–150)
ALT SERPL-CCNC: 43 UNIT/L (ref 0–55)
ANION GAP SERPL CALC-SCNC: 6 MEQ/L
AST SERPL-CCNC: 46 UNIT/L (ref 11–45)
BASOPHILS # BLD AUTO: 0.02 X10(3)/MCL
BASOPHILS NFR BLD AUTO: 0.4 %
BILIRUB SERPL-MCNC: 0.4 MG/DL
BUN SERPL-MCNC: 26.2 MG/DL (ref 8.4–25.7)
CALCIUM SERPL-MCNC: 8.9 MG/DL (ref 8.8–10)
CHLORIDE SERPL-SCNC: 104 MMOL/L (ref 98–107)
CO2 SERPL-SCNC: 30 MMOL/L (ref 23–31)
CREAT SERPL-MCNC: 0.74 MG/DL (ref 0.72–1.25)
CREAT/UREA NIT SERPL: 35
EOSINOPHIL # BLD AUTO: 0.27 X10(3)/MCL (ref 0–0.9)
EOSINOPHIL NFR BLD AUTO: 5.4 %
ERYTHROCYTE [DISTWIDTH] IN BLOOD BY AUTOMATED COUNT: 15.7 % (ref 11.5–17)
GFR SERPLBLD CREATININE-BSD FMLA CKD-EPI: >60 ML/MIN/1.73/M2
GLOBULIN SER-MCNC: 3.6 GM/DL (ref 2.4–3.5)
GLUCOSE SERPL-MCNC: 100 MG/DL (ref 82–115)
HCT VFR BLD AUTO: 34.2 % (ref 42–52)
HGB BLD-MCNC: 11.1 G/DL (ref 14–18)
IMM GRANULOCYTES # BLD AUTO: 0.04 X10(3)/MCL (ref 0–0.04)
IMM GRANULOCYTES NFR BLD AUTO: 0.8 %
LYMPHOCYTES # BLD AUTO: 1.62 X10(3)/MCL (ref 0.6–4.6)
LYMPHOCYTES NFR BLD AUTO: 32.2 %
MCH RBC QN AUTO: 30.6 PG (ref 27–31)
MCHC RBC AUTO-ENTMCNC: 32.5 G/DL (ref 33–36)
MCV RBC AUTO: 94.2 FL (ref 80–94)
MONOCYTES # BLD AUTO: 0.62 X10(3)/MCL (ref 0.1–1.3)
MONOCYTES NFR BLD AUTO: 12.3 %
NEUTROPHILS # BLD AUTO: 2.46 X10(3)/MCL (ref 2.1–9.2)
NEUTROPHILS NFR BLD AUTO: 48.9 %
PLATELET # BLD AUTO: 152 X10(3)/MCL (ref 130–400)
PMV BLD AUTO: 9.5 FL (ref 7.4–10.4)
POTASSIUM SERPL-SCNC: 4.9 MMOL/L (ref 3.5–5.1)
PROT SERPL-MCNC: 6.3 GM/DL (ref 5.8–7.6)
PROT UR QL STRIP: ABNORMAL
RBC # BLD AUTO: 3.63 X10(6)/MCL (ref 4.7–6.1)
SODIUM SERPL-SCNC: 140 MMOL/L (ref 136–145)
WBC # BLD AUTO: 5.03 X10(3)/MCL (ref 4.5–11.5)

## 2025-06-25 PROCEDURE — 85025 COMPLETE CBC W/AUTO DIFF WBC: CPT

## 2025-06-25 PROCEDURE — G2211 COMPLEX E/M VISIT ADD ON: HCPCS | Mod: S$GLB,,, | Performed by: STUDENT IN AN ORGANIZED HEALTH CARE EDUCATION/TRAINING PROGRAM

## 2025-06-25 PROCEDURE — 1159F MED LIST DOCD IN RCRD: CPT | Mod: CPTII,S$GLB,, | Performed by: STUDENT IN AN ORGANIZED HEALTH CARE EDUCATION/TRAINING PROGRAM

## 2025-06-25 PROCEDURE — 1126F AMNT PAIN NOTED NONE PRSNT: CPT | Mod: CPTII,S$GLB,, | Performed by: STUDENT IN AN ORGANIZED HEALTH CARE EDUCATION/TRAINING PROGRAM

## 2025-06-25 PROCEDURE — 36415 COLL VENOUS BLD VENIPUNCTURE: CPT

## 2025-06-25 PROCEDURE — 81005 URINALYSIS: CPT

## 2025-06-25 PROCEDURE — 99999 PR PBB SHADOW E&M-EST. PATIENT-LVL IV: CPT | Mod: PBBFAC,,, | Performed by: STUDENT IN AN ORGANIZED HEALTH CARE EDUCATION/TRAINING PROGRAM

## 2025-06-25 PROCEDURE — 3077F SYST BP >= 140 MM HG: CPT | Mod: CPTII,S$GLB,, | Performed by: STUDENT IN AN ORGANIZED HEALTH CARE EDUCATION/TRAINING PROGRAM

## 2025-06-25 PROCEDURE — 99215 OFFICE O/P EST HI 40 MIN: CPT | Mod: S$GLB,,, | Performed by: STUDENT IN AN ORGANIZED HEALTH CARE EDUCATION/TRAINING PROGRAM

## 2025-06-25 PROCEDURE — 1160F RVW MEDS BY RX/DR IN RCRD: CPT | Mod: CPTII,S$GLB,, | Performed by: STUDENT IN AN ORGANIZED HEALTH CARE EDUCATION/TRAINING PROGRAM

## 2025-06-25 PROCEDURE — 3080F DIAST BP >= 90 MM HG: CPT | Mod: CPTII,S$GLB,, | Performed by: STUDENT IN AN ORGANIZED HEALTH CARE EDUCATION/TRAINING PROGRAM

## 2025-06-25 PROCEDURE — 80053 COMPREHEN METABOLIC PANEL: CPT

## 2025-06-25 RX ORDER — PROCHLORPERAZINE EDISYLATE 5 MG/ML
5 INJECTION INTRAMUSCULAR; INTRAVENOUS ONCE AS NEEDED
OUTPATIENT
Start: 2025-06-30

## 2025-06-25 RX ORDER — HEPARIN 100 UNIT/ML
500 SYRINGE INTRAVENOUS
OUTPATIENT
Start: 2025-06-30

## 2025-06-25 RX ORDER — SODIUM CHLORIDE 0.9 % (FLUSH) 0.9 %
10 SYRINGE (ML) INJECTION
OUTPATIENT
Start: 2025-06-30

## 2025-06-25 RX ORDER — HEPARIN 100 UNIT/ML
500 SYRINGE INTRAVENOUS
OUTPATIENT
Start: 2025-07-02

## 2025-06-25 RX ORDER — EPINEPHRINE 0.3 MG/.3ML
0.3 INJECTION SUBCUTANEOUS ONCE AS NEEDED
OUTPATIENT
Start: 2025-06-30

## 2025-06-25 RX ORDER — PROCHLORPERAZINE EDISYLATE 5 MG/ML
5 INJECTION INTRAMUSCULAR; INTRAVENOUS ONCE AS NEEDED
OUTPATIENT
Start: 2025-07-02

## 2025-06-25 RX ORDER — DIPHENHYDRAMINE HYDROCHLORIDE 50 MG/ML
50 INJECTION, SOLUTION INTRAMUSCULAR; INTRAVENOUS ONCE AS NEEDED
OUTPATIENT
Start: 2025-06-30

## 2025-06-25 RX ORDER — SODIUM CHLORIDE 0.9 % (FLUSH) 0.9 %
10 SYRINGE (ML) INJECTION
OUTPATIENT
Start: 2025-07-02

## 2025-06-26 ENCOUNTER — PATIENT MESSAGE (OUTPATIENT)
Dept: HEMATOLOGY/ONCOLOGY | Facility: CLINIC | Age: 85
End: 2025-06-26
Payer: MEDICARE

## 2025-06-29 ENCOUNTER — PATIENT MESSAGE (OUTPATIENT)
Dept: HEMATOLOGY/ONCOLOGY | Facility: CLINIC | Age: 85
End: 2025-06-29
Payer: MEDICARE

## 2025-06-30 ENCOUNTER — INFUSION (OUTPATIENT)
Dept: INFUSION THERAPY | Facility: HOSPITAL | Age: 85
End: 2025-06-30
Attending: STUDENT IN AN ORGANIZED HEALTH CARE EDUCATION/TRAINING PROGRAM
Payer: MEDICARE

## 2025-06-30 VITALS
TEMPERATURE: 97 F | HEART RATE: 60 BPM | WEIGHT: 134 LBS | OXYGEN SATURATION: 97 % | DIASTOLIC BLOOD PRESSURE: 80 MMHG | HEIGHT: 69 IN | SYSTOLIC BLOOD PRESSURE: 202 MMHG | RESPIRATION RATE: 18 BRPM | BODY MASS INDEX: 19.85 KG/M2

## 2025-06-30 DIAGNOSIS — C78.00 MALIGNANT NEOPLASM METASTATIC TO LUNG, UNSPECIFIED LATERALITY: ICD-10-CM

## 2025-06-30 DIAGNOSIS — C17.0 DUODENAL CANCER: Primary | ICD-10-CM

## 2025-06-30 PROCEDURE — 96416 CHEMO PROLONG INFUSE W/PUMP: CPT

## 2025-06-30 PROCEDURE — A4216 STERILE WATER/SALINE, 10 ML: HCPCS | Performed by: STUDENT IN AN ORGANIZED HEALTH CARE EDUCATION/TRAINING PROGRAM

## 2025-06-30 PROCEDURE — 96368 THER/DIAG CONCURRENT INF: CPT

## 2025-06-30 PROCEDURE — 25000003 PHARM REV CODE 250: Performed by: STUDENT IN AN ORGANIZED HEALTH CARE EDUCATION/TRAINING PROGRAM

## 2025-06-30 PROCEDURE — 96413 CHEMO IV INFUSION 1 HR: CPT

## 2025-06-30 PROCEDURE — 96367 TX/PROPH/DG ADDL SEQ IV INF: CPT

## 2025-06-30 PROCEDURE — 63600175 PHARM REV CODE 636 W HCPCS: Performed by: STUDENT IN AN ORGANIZED HEALTH CARE EDUCATION/TRAINING PROGRAM

## 2025-06-30 PROCEDURE — 96415 CHEMO IV INFUSION ADDL HR: CPT

## 2025-06-30 RX ORDER — PROCHLORPERAZINE EDISYLATE 5 MG/ML
5 INJECTION INTRAMUSCULAR; INTRAVENOUS ONCE AS NEEDED
Status: DISCONTINUED | OUTPATIENT
Start: 2025-06-30 | End: 2025-06-30 | Stop reason: HOSPADM

## 2025-06-30 RX ORDER — SODIUM CHLORIDE 0.9 % (FLUSH) 0.9 %
10 SYRINGE (ML) INJECTION
Status: DISCONTINUED | OUTPATIENT
Start: 2025-06-30 | End: 2025-06-30 | Stop reason: HOSPADM

## 2025-06-30 RX ORDER — DIPHENHYDRAMINE HYDROCHLORIDE 50 MG/ML
50 INJECTION, SOLUTION INTRAMUSCULAR; INTRAVENOUS ONCE AS NEEDED
Status: DISCONTINUED | OUTPATIENT
Start: 2025-06-30 | End: 2025-06-30 | Stop reason: HOSPADM

## 2025-06-30 RX ORDER — HEPARIN 100 UNIT/ML
500 SYRINGE INTRAVENOUS
Status: DISCONTINUED | OUTPATIENT
Start: 2025-06-30 | End: 2025-06-30 | Stop reason: HOSPADM

## 2025-06-30 RX ORDER — EPINEPHRINE 0.3 MG/.3ML
0.3 INJECTION SUBCUTANEOUS ONCE AS NEEDED
Status: DISCONTINUED | OUTPATIENT
Start: 2025-06-30 | End: 2025-06-30 | Stop reason: HOSPADM

## 2025-06-30 RX ADMIN — OXALIPLATIN 112 MG: 5 INJECTION, SOLUTION INTRAVENOUS at 11:06

## 2025-06-30 RX ADMIN — Medication 10 ML: at 10:06

## 2025-06-30 RX ADMIN — FLUOROURACIL 4000 MG: 50 INJECTION, SOLUTION INTRAVENOUS at 02:06

## 2025-06-30 RX ADMIN — LEUCOVORIN CALCIUM 700 MG: 350 INJECTION, POWDER, LYOPHILIZED, FOR SOLUTION INTRAMUSCULAR; INTRAVENOUS at 11:06

## 2025-06-30 RX ADMIN — SODIUM CHLORIDE 0.25 MG: 9 INJECTION, SOLUTION INTRAVENOUS at 10:06

## 2025-06-30 RX ADMIN — DEXTROSE MONOHYDRATE: 50 INJECTION, SOLUTION INTRAVENOUS at 10:06

## 2025-06-30 RX ADMIN — Medication 10 ML: at 11:06

## 2025-06-30 RX ADMIN — Medication 10 ML: at 02:06

## 2025-06-30 NOTE — NURSING
Pt here for C6 D1 FOLFOX. Blood pressure elevated on arrival, pt states he took his BP meds this AM, reports systolic  at home prior to arrival. MD notified, states okay to proceed with treatment. Oxaliplatin started at 1127. At 1254, pt c/o SOB when lying flat, vitals obtained- O2 saturation 97%, /80. Pt in no acute distress. Infusions paused.       MD notified, states okay to finish treatment, SOB is likely due to Hypertension, pt has been working with Cardiology adjusting meds but pt may need to report to ED for BP control. Pt made aware of recommendations, verbalized understanding. Dr. LeJeune also spoke to daughter this AM regarding patient's SOB. Oxaliplatin and Leucovorin resumed, completed at 1343.     Pt states he will take Clonidine as soon as he gets home. Instructed pt to monitor BP closely, take Clonidine as directed, and report to ED if BP remains elevated despite medications. Pt verbalized understanding, discharged home in stable condition at 1405, aware of future appts. Pt will return on 7-1 at 11am for CADD pump disconnect.     Called pt at 1605, states systolic BP is 145 at home. Instructed pt to continue to monitor. Pt verbalized understanding.

## 2025-07-01 ENCOUNTER — PATIENT MESSAGE (OUTPATIENT)
Dept: HEMATOLOGY/ONCOLOGY | Facility: CLINIC | Age: 85
End: 2025-07-01
Payer: MEDICARE

## 2025-07-01 DIAGNOSIS — I65.23 BILATERAL CAROTID ARTERY OCCLUSION: Primary | ICD-10-CM

## 2025-07-01 DIAGNOSIS — R42 DIZZINESS AND GIDDINESS: ICD-10-CM

## 2025-07-01 DIAGNOSIS — I10 ESSENTIAL HYPERTENSION, MALIGNANT: ICD-10-CM

## 2025-07-02 ENCOUNTER — INFUSION (OUTPATIENT)
Dept: INFUSION THERAPY | Facility: HOSPITAL | Age: 85
End: 2025-07-02
Attending: STUDENT IN AN ORGANIZED HEALTH CARE EDUCATION/TRAINING PROGRAM
Payer: MEDICARE

## 2025-07-02 ENCOUNTER — HOSPITAL ENCOUNTER (OUTPATIENT)
Dept: RADIOLOGY | Facility: HOSPITAL | Age: 85
Discharge: HOME | End: 2025-07-02
Attending: INTERNAL MEDICINE
Payer: MEDICARE

## 2025-07-02 VITALS
BODY MASS INDEX: 19.85 KG/M2 | RESPIRATION RATE: 18 BRPM | DIASTOLIC BLOOD PRESSURE: 74 MMHG | TEMPERATURE: 98 F | HEIGHT: 69 IN | WEIGHT: 134 LBS | OXYGEN SATURATION: 98 % | HEART RATE: 54 BPM | SYSTOLIC BLOOD PRESSURE: 179 MMHG

## 2025-07-02 DIAGNOSIS — R42 DIZZINESS AND GIDDINESS: ICD-10-CM

## 2025-07-02 DIAGNOSIS — I10 ESSENTIAL HYPERTENSION, MALIGNANT: ICD-10-CM

## 2025-07-02 DIAGNOSIS — C17.0 DUODENAL CANCER: Primary | ICD-10-CM

## 2025-07-02 DIAGNOSIS — C78.00 MALIGNANT NEOPLASM METASTATIC TO LUNG, UNSPECIFIED LATERALITY: ICD-10-CM

## 2025-07-02 DIAGNOSIS — I65.23 BILATERAL CAROTID ARTERY OCCLUSION: ICD-10-CM

## 2025-07-02 PROCEDURE — 70450 CT HEAD/BRAIN W/O DYE: CPT | Mod: TC

## 2025-07-02 PROCEDURE — A4216 STERILE WATER/SALINE, 10 ML: HCPCS | Performed by: STUDENT IN AN ORGANIZED HEALTH CARE EDUCATION/TRAINING PROGRAM

## 2025-07-02 PROCEDURE — 25000003 PHARM REV CODE 250: Performed by: STUDENT IN AN ORGANIZED HEALTH CARE EDUCATION/TRAINING PROGRAM

## 2025-07-02 PROCEDURE — 63600175 PHARM REV CODE 636 W HCPCS: Performed by: STUDENT IN AN ORGANIZED HEALTH CARE EDUCATION/TRAINING PROGRAM

## 2025-07-02 PROCEDURE — 96360 HYDRATION IV INFUSION INIT: CPT

## 2025-07-02 RX ORDER — SODIUM CHLORIDE 0.9 % (FLUSH) 0.9 %
10 SYRINGE (ML) INJECTION
Status: DISCONTINUED | OUTPATIENT
Start: 2025-07-02 | End: 2025-07-02 | Stop reason: HOSPADM

## 2025-07-02 RX ORDER — PROCHLORPERAZINE EDISYLATE 5 MG/ML
5 INJECTION INTRAMUSCULAR; INTRAVENOUS ONCE AS NEEDED
Status: DISCONTINUED | OUTPATIENT
Start: 2025-07-02 | End: 2025-07-02 | Stop reason: HOSPADM

## 2025-07-02 RX ORDER — HEPARIN 100 UNIT/ML
500 SYRINGE INTRAVENOUS
Status: DISCONTINUED | OUTPATIENT
Start: 2025-07-02 | End: 2025-07-02 | Stop reason: HOSPADM

## 2025-07-02 RX ADMIN — HEPARIN 500 UNITS: 100 SYRINGE at 11:07

## 2025-07-02 RX ADMIN — SODIUM CHLORIDE 1000 ML: 9 INJECTION, SOLUTION INTRAVENOUS at 10:07

## 2025-07-02 RX ADMIN — Medication 10 ML: at 11:07
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
## 2025-07-02 NOTE — PLAN OF CARE
Plan of care reviewed with patient; 1L NS given and tolerated well. D/c home in stable condition; print out of future appointments given.

## 2025-07-03 ENCOUNTER — HOSPITAL ENCOUNTER (OUTPATIENT)
Dept: RADIOLOGY | Facility: HOSPITAL | Age: 85
Discharge: HOME | End: 2025-07-03
Attending: STUDENT IN AN ORGANIZED HEALTH CARE EDUCATION/TRAINING PROGRAM
Payer: MEDICARE

## 2025-07-03 DIAGNOSIS — C17.0 DUODENAL CANCER: Chronic | ICD-10-CM

## 2025-07-03 PROCEDURE — 71260 CT THORAX DX C+: CPT | Mod: TC

## 2025-07-03 PROCEDURE — 25500020 PHARM REV CODE 255: Performed by: STUDENT IN AN ORGANIZED HEALTH CARE EDUCATION/TRAINING PROGRAM

## 2025-07-03 RX ORDER — DIATRIZOATE MEGLUMINE AND DIATRIZOATE SODIUM 660; 100 MG/ML; MG/ML
30 SOLUTION ORAL; RECTAL
Status: COMPLETED | OUTPATIENT
Start: 2025-07-03 | End: 2025-07-03

## 2025-07-03 RX ADMIN — IOHEXOL 100 ML: 350 INJECTION, SOLUTION INTRAVENOUS at 09:07

## 2025-07-03 RX ADMIN — DIATRIZOATE MEGLUMINE AND DIATRIZOATE SODIUM 30 ML: 660; 100 LIQUID ORAL; RECTAL at 09:07

## 2025-07-09 ENCOUNTER — LAB VISIT (OUTPATIENT)
Dept: LAB | Facility: HOSPITAL | Age: 85
End: 2025-07-09
Attending: STUDENT IN AN ORGANIZED HEALTH CARE EDUCATION/TRAINING PROGRAM
Payer: MEDICARE

## 2025-07-09 ENCOUNTER — OFFICE VISIT (OUTPATIENT)
Dept: HEMATOLOGY/ONCOLOGY | Facility: CLINIC | Age: 85
End: 2025-07-09
Payer: MEDICARE

## 2025-07-09 VITALS
TEMPERATURE: 97 F | SYSTOLIC BLOOD PRESSURE: 148 MMHG | HEART RATE: 63 BPM | RESPIRATION RATE: 18 BRPM | HEIGHT: 69 IN | WEIGHT: 130.81 LBS | OXYGEN SATURATION: 98 % | BODY MASS INDEX: 19.37 KG/M2 | DIASTOLIC BLOOD PRESSURE: 59 MMHG

## 2025-07-09 DIAGNOSIS — C17.0 DUODENAL CANCER: Chronic | ICD-10-CM

## 2025-07-09 DIAGNOSIS — C17.0 DUODENAL CANCER: ICD-10-CM

## 2025-07-09 DIAGNOSIS — D84.821 DRUG-INDUCED IMMUNODEFICIENCY: Primary | ICD-10-CM

## 2025-07-09 DIAGNOSIS — C78.00 MALIGNANT NEOPLASM METASTATIC TO LUNG, UNSPECIFIED LATERALITY: ICD-10-CM

## 2025-07-09 DIAGNOSIS — Z79.899 DRUG-INDUCED IMMUNODEFICIENCY: Primary | ICD-10-CM

## 2025-07-09 LAB
ALBUMIN SERPL-MCNC: 2.9 G/DL (ref 3.4–4.8)
ALBUMIN/GLOB SERPL: 0.8 RATIO (ref 1.1–2)
ALP SERPL-CCNC: 294 UNIT/L (ref 40–150)
ALT SERPL-CCNC: 32 UNIT/L (ref 0–55)
ANION GAP SERPL CALC-SCNC: 8 MEQ/L
AST SERPL-CCNC: 39 UNIT/L (ref 11–45)
BASOPHILS # BLD AUTO: 0.05 X10(3)/MCL
BASOPHILS NFR BLD AUTO: 1 %
BILIRUB SERPL-MCNC: 0.4 MG/DL
BUN SERPL-MCNC: 22.3 MG/DL (ref 8.4–25.7)
CALCIUM SERPL-MCNC: 9.1 MG/DL (ref 8.8–10)
CHLORIDE SERPL-SCNC: 103 MMOL/L (ref 98–107)
CO2 SERPL-SCNC: 26 MMOL/L (ref 23–31)
CREAT SERPL-MCNC: 0.97 MG/DL (ref 0.72–1.25)
CREAT/UREA NIT SERPL: 23
EOSINOPHIL # BLD AUTO: 0.27 X10(3)/MCL (ref 0–0.9)
EOSINOPHIL NFR BLD AUTO: 5.2 %
ERYTHROCYTE [DISTWIDTH] IN BLOOD BY AUTOMATED COUNT: 17 % (ref 11.5–17)
GFR SERPLBLD CREATININE-BSD FMLA CKD-EPI: >60 ML/MIN/1.73/M2
GLOBULIN SER-MCNC: 3.5 GM/DL (ref 2.4–3.5)
GLUCOSE SERPL-MCNC: 153 MG/DL (ref 82–115)
HCT VFR BLD AUTO: 33.5 % (ref 42–52)
HGB BLD-MCNC: 10.9 G/DL (ref 14–18)
IMM GRANULOCYTES # BLD AUTO: 0.04 X10(3)/MCL (ref 0–0.04)
IMM GRANULOCYTES NFR BLD AUTO: 0.8 %
LYMPHOCYTES # BLD AUTO: 1.65 X10(3)/MCL (ref 0.6–4.6)
LYMPHOCYTES NFR BLD AUTO: 31.9 %
MCH RBC QN AUTO: 31 PG (ref 27–31)
MCHC RBC AUTO-ENTMCNC: 32.5 G/DL (ref 33–36)
MCV RBC AUTO: 95.2 FL (ref 80–94)
MONOCYTES # BLD AUTO: 0.61 X10(3)/MCL (ref 0.1–1.3)
MONOCYTES NFR BLD AUTO: 11.8 %
NEUTROPHILS # BLD AUTO: 2.55 X10(3)/MCL (ref 2.1–9.2)
NEUTROPHILS NFR BLD AUTO: 49.3 %
PLATELET # BLD AUTO: 180 X10(3)/MCL (ref 130–400)
PMV BLD AUTO: 9.8 FL (ref 7.4–10.4)
POTASSIUM SERPL-SCNC: 5.2 MMOL/L (ref 3.5–5.1)
PROT SERPL-MCNC: 6.4 GM/DL (ref 5.8–7.6)
PROT UR QL STRIP: ABNORMAL
RBC # BLD AUTO: 3.52 X10(6)/MCL (ref 4.7–6.1)
SODIUM SERPL-SCNC: 137 MMOL/L (ref 136–145)
WBC # BLD AUTO: 5.17 X10(3)/MCL (ref 4.5–11.5)

## 2025-07-09 PROCEDURE — 36415 COLL VENOUS BLD VENIPUNCTURE: CPT

## 2025-07-09 PROCEDURE — 1126F AMNT PAIN NOTED NONE PRSNT: CPT | Mod: CPTII,S$GLB,, | Performed by: STUDENT IN AN ORGANIZED HEALTH CARE EDUCATION/TRAINING PROGRAM

## 2025-07-09 PROCEDURE — 85025 COMPLETE CBC W/AUTO DIFF WBC: CPT

## 2025-07-09 PROCEDURE — 1160F RVW MEDS BY RX/DR IN RCRD: CPT | Mod: CPTII,S$GLB,, | Performed by: STUDENT IN AN ORGANIZED HEALTH CARE EDUCATION/TRAINING PROGRAM

## 2025-07-09 PROCEDURE — 99215 OFFICE O/P EST HI 40 MIN: CPT | Mod: S$GLB,,, | Performed by: STUDENT IN AN ORGANIZED HEALTH CARE EDUCATION/TRAINING PROGRAM

## 2025-07-09 PROCEDURE — 80053 COMPREHEN METABOLIC PANEL: CPT

## 2025-07-09 PROCEDURE — 81005 URINALYSIS: CPT

## 2025-07-09 PROCEDURE — 3078F DIAST BP <80 MM HG: CPT | Mod: CPTII,S$GLB,, | Performed by: STUDENT IN AN ORGANIZED HEALTH CARE EDUCATION/TRAINING PROGRAM

## 2025-07-09 PROCEDURE — 3077F SYST BP >= 140 MM HG: CPT | Mod: CPTII,S$GLB,, | Performed by: STUDENT IN AN ORGANIZED HEALTH CARE EDUCATION/TRAINING PROGRAM

## 2025-07-09 PROCEDURE — G2211 COMPLEX E/M VISIT ADD ON: HCPCS | Mod: S$GLB,,, | Performed by: STUDENT IN AN ORGANIZED HEALTH CARE EDUCATION/TRAINING PROGRAM

## 2025-07-09 PROCEDURE — 1159F MED LIST DOCD IN RCRD: CPT | Mod: CPTII,S$GLB,, | Performed by: STUDENT IN AN ORGANIZED HEALTH CARE EDUCATION/TRAINING PROGRAM

## 2025-07-09 PROCEDURE — 99999 PR PBB SHADOW E&M-EST. PATIENT-LVL IV: CPT | Mod: PBBFAC,,, | Performed by: STUDENT IN AN ORGANIZED HEALTH CARE EDUCATION/TRAINING PROGRAM

## 2025-07-09 RX ORDER — SPIRONOLACTONE 25 MG/1
25 TABLET ORAL
COMMUNITY
Start: 2025-07-01

## 2025-07-09 RX ORDER — SODIUM CHLORIDE 0.9 % (FLUSH) 0.9 %
10 SYRINGE (ML) INJECTION
OUTPATIENT
Start: 2025-07-16

## 2025-07-09 RX ORDER — EPINEPHRINE 0.3 MG/.3ML
0.3 INJECTION SUBCUTANEOUS ONCE AS NEEDED
OUTPATIENT
Start: 2025-07-14

## 2025-07-09 RX ORDER — SODIUM CHLORIDE 0.9 % (FLUSH) 0.9 %
10 SYRINGE (ML) INJECTION
OUTPATIENT
Start: 2025-07-14

## 2025-07-09 RX ORDER — PROCHLORPERAZINE EDISYLATE 5 MG/ML
5 INJECTION INTRAMUSCULAR; INTRAVENOUS ONCE AS NEEDED
OUTPATIENT
Start: 2025-07-16

## 2025-07-09 RX ORDER — VALSARTAN 160 MG/1
160 TABLET ORAL
COMMUNITY
Start: 2025-06-24

## 2025-07-09 RX ORDER — DIPHENHYDRAMINE HYDROCHLORIDE 50 MG/ML
50 INJECTION, SOLUTION INTRAMUSCULAR; INTRAVENOUS ONCE AS NEEDED
OUTPATIENT
Start: 2025-07-14

## 2025-07-09 RX ORDER — PROCHLORPERAZINE EDISYLATE 5 MG/ML
5 INJECTION INTRAMUSCULAR; INTRAVENOUS ONCE AS NEEDED
OUTPATIENT
Start: 2025-07-14

## 2025-07-09 RX ORDER — HEPARIN 100 UNIT/ML
500 SYRINGE INTRAVENOUS
OUTPATIENT
Start: 2025-07-14

## 2025-07-09 RX ORDER — HEPARIN 100 UNIT/ML
500 SYRINGE INTRAVENOUS
OUTPATIENT
Start: 2025-07-16

## 2025-07-09 RX ORDER — HYDRALAZINE HYDROCHLORIDE 25 MG/1
25 TABLET, FILM COATED ORAL 2 TIMES DAILY
COMMUNITY
Start: 2025-06-24

## 2025-07-14 ENCOUNTER — INFUSION (OUTPATIENT)
Dept: INFUSION THERAPY | Facility: HOSPITAL | Age: 85
End: 2025-07-14
Attending: STUDENT IN AN ORGANIZED HEALTH CARE EDUCATION/TRAINING PROGRAM
Payer: MEDICARE

## 2025-07-14 VITALS
HEIGHT: 69 IN | HEART RATE: 80 BPM | RESPIRATION RATE: 20 BRPM | TEMPERATURE: 99 F | BODY MASS INDEX: 18.71 KG/M2 | OXYGEN SATURATION: 98 % | SYSTOLIC BLOOD PRESSURE: 100 MMHG | DIASTOLIC BLOOD PRESSURE: 62 MMHG | WEIGHT: 126.31 LBS

## 2025-07-14 DIAGNOSIS — C17.0 DUODENAL CANCER: Primary | ICD-10-CM

## 2025-07-14 DIAGNOSIS — C78.00 MALIGNANT NEOPLASM METASTATIC TO LUNG, UNSPECIFIED LATERALITY: ICD-10-CM

## 2025-07-14 PROCEDURE — 96416 CHEMO PROLONG INFUSE W/PUMP: CPT

## 2025-07-14 PROCEDURE — 96365 THER/PROPH/DIAG IV INF INIT: CPT

## 2025-07-14 PROCEDURE — 25000003 PHARM REV CODE 250: Performed by: STUDENT IN AN ORGANIZED HEALTH CARE EDUCATION/TRAINING PROGRAM

## 2025-07-14 PROCEDURE — 63600175 PHARM REV CODE 636 W HCPCS: Performed by: STUDENT IN AN ORGANIZED HEALTH CARE EDUCATION/TRAINING PROGRAM

## 2025-07-14 RX ORDER — DIPHENHYDRAMINE HYDROCHLORIDE 50 MG/ML
50 INJECTION, SOLUTION INTRAMUSCULAR; INTRAVENOUS ONCE AS NEEDED
Status: DISCONTINUED | OUTPATIENT
Start: 2025-07-14 | End: 2025-07-14 | Stop reason: HOSPADM

## 2025-07-14 RX ORDER — EPINEPHRINE 0.3 MG/.3ML
0.3 INJECTION SUBCUTANEOUS ONCE AS NEEDED
Status: DISCONTINUED | OUTPATIENT
Start: 2025-07-14 | End: 2025-07-14 | Stop reason: HOSPADM

## 2025-07-14 RX ORDER — SODIUM CHLORIDE 0.9 % (FLUSH) 0.9 %
10 SYRINGE (ML) INJECTION
Status: DISCONTINUED | OUTPATIENT
Start: 2025-07-14 | End: 2025-07-14 | Stop reason: HOSPADM

## 2025-07-14 RX ORDER — HEPARIN 100 UNIT/ML
500 SYRINGE INTRAVENOUS
Status: DISCONTINUED | OUTPATIENT
Start: 2025-07-14 | End: 2025-07-14 | Stop reason: HOSPADM

## 2025-07-14 RX ORDER — PROCHLORPERAZINE EDISYLATE 5 MG/ML
5 INJECTION INTRAMUSCULAR; INTRAVENOUS ONCE AS NEEDED
Status: DISCONTINUED | OUTPATIENT
Start: 2025-07-14 | End: 2025-07-14 | Stop reason: HOSPADM

## 2025-07-14 RX ADMIN — FLUOROURACIL 4000 MG: 50 INJECTION, SOLUTION INTRAVENOUS at 10:07

## 2025-07-14 RX ADMIN — SODIUM CHLORIDE: 9 INJECTION, SOLUTION INTRAVENOUS at 10:07

## 2025-07-14 RX ADMIN — PALONOSETRON HYDROCHLORIDE 0.25 MG: 0.25 INJECTION, SOLUTION INTRAVENOUS at 10:07

## 2025-07-14 NOTE — NURSING
Pt tolerated treatment with no complaints. CADD pump  connected and infusing.  All connections secured with tape.   Reviewed next appt.

## 2025-07-15 ENCOUNTER — LAB VISIT (OUTPATIENT)
Dept: LAB | Facility: HOSPITAL | Age: 85
End: 2025-07-15
Attending: NURSE PRACTITIONER
Payer: MEDICARE

## 2025-07-15 ENCOUNTER — PATIENT MESSAGE (OUTPATIENT)
Dept: HEMATOLOGY/ONCOLOGY | Facility: CLINIC | Age: 85
End: 2025-07-15
Payer: MEDICARE

## 2025-07-15 DIAGNOSIS — I65.23 BILATERAL CAROTID ARTERY OCCLUSION: ICD-10-CM

## 2025-07-15 DIAGNOSIS — I10 HYPERTENSION, ESSENTIAL: ICD-10-CM

## 2025-07-15 DIAGNOSIS — E78.5 HYPERLIPIDEMIA, UNSPECIFIED HYPERLIPIDEMIA TYPE: ICD-10-CM

## 2025-07-15 DIAGNOSIS — I20.81 ANGINA PECTORIS WITH CORONARY MICROVASCULAR DYSFUNCTION: Primary | ICD-10-CM

## 2025-07-15 DIAGNOSIS — I25.10 CORONARY ATHEROSCLEROSIS OF NATIVE CORONARY ARTERY: ICD-10-CM

## 2025-07-15 LAB
ANION GAP SERPL CALC-SCNC: 9 MEQ/L
BUN SERPL-MCNC: 25.2 MG/DL (ref 8.4–25.7)
CALCIUM SERPL-MCNC: 9 MG/DL (ref 8.8–10)
CHLORIDE SERPL-SCNC: 101 MMOL/L (ref 98–107)
CO2 SERPL-SCNC: 23 MMOL/L (ref 23–31)
CREAT SERPL-MCNC: 0.87 MG/DL (ref 0.72–1.25)
CREAT/UREA NIT SERPL: 29
ERYTHROCYTE [DISTWIDTH] IN BLOOD BY AUTOMATED COUNT: 17.3 % (ref 11.5–17)
GFR SERPLBLD CREATININE-BSD FMLA CKD-EPI: >60 ML/MIN/1.73/M2
GLUCOSE SERPL-MCNC: 169 MG/DL (ref 82–115)
HCT VFR BLD AUTO: 35.8 % (ref 42–52)
HGB BLD-MCNC: 11.2 G/DL (ref 14–18)
MCH RBC QN AUTO: 30.5 PG (ref 27–31)
MCHC RBC AUTO-ENTMCNC: 31.3 G/DL (ref 33–36)
MCV RBC AUTO: 97.5 FL (ref 80–94)
NRBC BLD AUTO-RTO: 0 %
PLATELET # BLD AUTO: 192 X10(3)/MCL (ref 130–400)
PMV BLD AUTO: 9.8 FL (ref 7.4–10.4)
POTASSIUM SERPL-SCNC: 5.3 MMOL/L (ref 3.5–5.1)
RBC # BLD AUTO: 3.67 X10(6)/MCL (ref 4.7–6.1)
SODIUM SERPL-SCNC: 133 MMOL/L (ref 136–145)
WBC # BLD AUTO: 8.39 X10(3)/MCL (ref 4.5–11.5)

## 2025-07-15 PROCEDURE — 80048 BASIC METABOLIC PNL TOTAL CA: CPT

## 2025-07-15 PROCEDURE — 85027 COMPLETE CBC AUTOMATED: CPT

## 2025-07-15 PROCEDURE — 36415 COLL VENOUS BLD VENIPUNCTURE: CPT

## 2025-07-16 ENCOUNTER — INFUSION (OUTPATIENT)
Dept: INFUSION THERAPY | Facility: HOSPITAL | Age: 85
End: 2025-07-16
Attending: STUDENT IN AN ORGANIZED HEALTH CARE EDUCATION/TRAINING PROGRAM
Payer: MEDICARE

## 2025-07-16 VITALS
RESPIRATION RATE: 16 BRPM | DIASTOLIC BLOOD PRESSURE: 68 MMHG | SYSTOLIC BLOOD PRESSURE: 132 MMHG | OXYGEN SATURATION: 96 % | TEMPERATURE: 98 F | HEART RATE: 83 BPM

## 2025-07-16 DIAGNOSIS — C17.0 DUODENAL CANCER: Primary | ICD-10-CM

## 2025-07-16 DIAGNOSIS — C78.00 MALIGNANT NEOPLASM METASTATIC TO LUNG, UNSPECIFIED LATERALITY: ICD-10-CM

## 2025-07-16 LAB
PROT 24H UR-MCNC: 844.5 MG/24 H (ref 0–165)
PROT UR STRIP-MCNC: 56.3 MG/DL
TOTAL VOLUME  (OHS): 1500 ML

## 2025-07-16 PROCEDURE — 96360 HYDRATION IV INFUSION INIT: CPT

## 2025-07-16 PROCEDURE — 25000003 PHARM REV CODE 250: Performed by: STUDENT IN AN ORGANIZED HEALTH CARE EDUCATION/TRAINING PROGRAM

## 2025-07-16 PROCEDURE — 84156 ASSAY OF PROTEIN URINE: CPT | Performed by: STUDENT IN AN ORGANIZED HEALTH CARE EDUCATION/TRAINING PROGRAM

## 2025-07-16 PROCEDURE — A4216 STERILE WATER/SALINE, 10 ML: HCPCS | Performed by: STUDENT IN AN ORGANIZED HEALTH CARE EDUCATION/TRAINING PROGRAM

## 2025-07-16 PROCEDURE — 63600175 PHARM REV CODE 636 W HCPCS: Performed by: STUDENT IN AN ORGANIZED HEALTH CARE EDUCATION/TRAINING PROGRAM

## 2025-07-16 RX ORDER — PROCHLORPERAZINE EDISYLATE 5 MG/ML
5 INJECTION INTRAMUSCULAR; INTRAVENOUS ONCE AS NEEDED
Status: DISCONTINUED | OUTPATIENT
Start: 2025-07-16 | End: 2025-07-16 | Stop reason: HOSPADM

## 2025-07-16 RX ORDER — SODIUM CHLORIDE 0.9 % (FLUSH) 0.9 %
10 SYRINGE (ML) INJECTION
Status: DISCONTINUED | OUTPATIENT
Start: 2025-07-16 | End: 2025-07-16 | Stop reason: HOSPADM

## 2025-07-16 RX ORDER — HEPARIN 100 UNIT/ML
500 SYRINGE INTRAVENOUS
Status: DISCONTINUED | OUTPATIENT
Start: 2025-07-16 | End: 2025-07-16 | Stop reason: HOSPADM

## 2025-07-16 RX ADMIN — HEPARIN 500 UNITS: 100 SYRINGE at 10:07

## 2025-07-16 RX ADMIN — SODIUM CHLORIDE 1000 ML: 9 INJECTION, SOLUTION INTRAVENOUS at 08:07

## 2025-07-16 RX ADMIN — Medication 10 ML: at 10:07

## 2025-07-23 ENCOUNTER — LAB VISIT (OUTPATIENT)
Dept: LAB | Facility: HOSPITAL | Age: 85
End: 2025-07-23
Attending: STUDENT IN AN ORGANIZED HEALTH CARE EDUCATION/TRAINING PROGRAM
Payer: MEDICARE

## 2025-07-23 ENCOUNTER — OFFICE VISIT (OUTPATIENT)
Dept: HEMATOLOGY/ONCOLOGY | Facility: CLINIC | Age: 85
End: 2025-07-23
Payer: MEDICARE

## 2025-07-23 VITALS
OXYGEN SATURATION: 97 % | WEIGHT: 128.19 LBS | RESPIRATION RATE: 18 BRPM | DIASTOLIC BLOOD PRESSURE: 59 MMHG | BODY MASS INDEX: 18.99 KG/M2 | TEMPERATURE: 97 F | HEART RATE: 74 BPM | SYSTOLIC BLOOD PRESSURE: 102 MMHG | HEIGHT: 69 IN

## 2025-07-23 DIAGNOSIS — D84.821 DRUG-INDUCED IMMUNODEFICIENCY: ICD-10-CM

## 2025-07-23 DIAGNOSIS — C78.00 MALIGNANT NEOPLASM METASTATIC TO LUNG, UNSPECIFIED LATERALITY: ICD-10-CM

## 2025-07-23 DIAGNOSIS — C17.0 DUODENAL CANCER: ICD-10-CM

## 2025-07-23 DIAGNOSIS — Z79.899 DRUG-INDUCED IMMUNODEFICIENCY: ICD-10-CM

## 2025-07-23 DIAGNOSIS — C17.0 DUODENAL CANCER: Primary | ICD-10-CM

## 2025-07-23 LAB
ALBUMIN SERPL-MCNC: 2.9 G/DL (ref 3.4–4.8)
ALBUMIN/GLOB SERPL: 0.8 RATIO (ref 1.1–2)
ALP SERPL-CCNC: 229 UNIT/L (ref 40–150)
ALT SERPL-CCNC: 22 UNIT/L (ref 0–55)
ANION GAP SERPL CALC-SCNC: 7 MEQ/L
AST SERPL-CCNC: 34 UNIT/L (ref 11–45)
BASOPHILS # BLD AUTO: 0.04 X10(3)/MCL
BASOPHILS NFR BLD AUTO: 0.7 %
BILIRUB SERPL-MCNC: 0.4 MG/DL
BUN SERPL-MCNC: 27.7 MG/DL (ref 8.4–25.7)
CALCIUM SERPL-MCNC: 9.3 MG/DL (ref 8.8–10)
CHLORIDE SERPL-SCNC: 104 MMOL/L (ref 98–107)
CO2 SERPL-SCNC: 27 MMOL/L (ref 23–31)
CREAT SERPL-MCNC: 1.02 MG/DL (ref 0.72–1.25)
CREAT/UREA NIT SERPL: 27
EOSINOPHIL # BLD AUTO: 0.43 X10(3)/MCL (ref 0–0.9)
EOSINOPHIL NFR BLD AUTO: 7.9 %
ERYTHROCYTE [DISTWIDTH] IN BLOOD BY AUTOMATED COUNT: 17 % (ref 11.5–17)
GFR SERPLBLD CREATININE-BSD FMLA CKD-EPI: >60 ML/MIN/1.73/M2
GLOBULIN SER-MCNC: 3.8 GM/DL (ref 2.4–3.5)
GLUCOSE SERPL-MCNC: 167 MG/DL (ref 82–115)
HCT VFR BLD AUTO: 35 % (ref 42–52)
HGB BLD-MCNC: 11.2 G/DL (ref 14–18)
IMM GRANULOCYTES # BLD AUTO: 0.03 X10(3)/MCL (ref 0–0.04)
IMM GRANULOCYTES NFR BLD AUTO: 0.6 %
LYMPHOCYTES # BLD AUTO: 1.54 X10(3)/MCL (ref 0.6–4.6)
LYMPHOCYTES NFR BLD AUTO: 28.4 %
MCH RBC QN AUTO: 31.4 PG (ref 27–31)
MCHC RBC AUTO-ENTMCNC: 32 G/DL (ref 33–36)
MCV RBC AUTO: 98 FL (ref 80–94)
MONOCYTES # BLD AUTO: 0.76 X10(3)/MCL (ref 0.1–1.3)
MONOCYTES NFR BLD AUTO: 14 %
NEUTROPHILS # BLD AUTO: 2.63 X10(3)/MCL (ref 2.1–9.2)
NEUTROPHILS NFR BLD AUTO: 48.4 %
PLATELET # BLD AUTO: 173 X10(3)/MCL (ref 130–400)
PMV BLD AUTO: 9.1 FL (ref 7.4–10.4)
POTASSIUM SERPL-SCNC: 5.3 MMOL/L (ref 3.5–5.1)
PROT SERPL-MCNC: 6.7 GM/DL (ref 5.8–7.6)
PROT UR QL STRIP: ABNORMAL
RBC # BLD AUTO: 3.57 X10(6)/MCL (ref 4.7–6.1)
SODIUM SERPL-SCNC: 138 MMOL/L (ref 136–145)
WBC # BLD AUTO: 5.43 X10(3)/MCL (ref 4.5–11.5)

## 2025-07-23 PROCEDURE — 3078F DIAST BP <80 MM HG: CPT | Mod: CPTII,S$GLB,,

## 2025-07-23 PROCEDURE — 99215 OFFICE O/P EST HI 40 MIN: CPT | Mod: S$GLB,,,

## 2025-07-23 PROCEDURE — 1126F AMNT PAIN NOTED NONE PRSNT: CPT | Mod: CPTII,S$GLB,,

## 2025-07-23 PROCEDURE — 36415 COLL VENOUS BLD VENIPUNCTURE: CPT

## 2025-07-23 PROCEDURE — 1160F RVW MEDS BY RX/DR IN RCRD: CPT | Mod: CPTII,S$GLB,,

## 2025-07-23 PROCEDURE — 3074F SYST BP LT 130 MM HG: CPT | Mod: CPTII,S$GLB,,

## 2025-07-23 PROCEDURE — G2211 COMPLEX E/M VISIT ADD ON: HCPCS | Mod: S$GLB,,,

## 2025-07-23 PROCEDURE — 85025 COMPLETE CBC W/AUTO DIFF WBC: CPT

## 2025-07-23 PROCEDURE — 99999 PR PBB SHADOW E&M-EST. PATIENT-LVL IV: CPT | Mod: PBBFAC,,,

## 2025-07-23 PROCEDURE — 80053 COMPREHEN METABOLIC PANEL: CPT

## 2025-07-23 PROCEDURE — 81005 URINALYSIS: CPT

## 2025-07-23 PROCEDURE — 1159F MED LIST DOCD IN RCRD: CPT | Mod: CPTII,S$GLB,,
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
## 2025-07-25 ENCOUNTER — PATIENT MESSAGE (OUTPATIENT)
Dept: HEMATOLOGY/ONCOLOGY | Facility: CLINIC | Age: 85
End: 2025-07-25
Payer: MEDICARE

## 2025-07-25 DIAGNOSIS — R06.00 DYSPNEA, UNSPECIFIED TYPE: Primary | ICD-10-CM

## 2025-07-28 ENCOUNTER — HOSPITAL ENCOUNTER (OUTPATIENT)
Dept: RADIOLOGY | Facility: HOSPITAL | Age: 85
Discharge: HOME | End: 2025-07-28
Attending: STUDENT IN AN ORGANIZED HEALTH CARE EDUCATION/TRAINING PROGRAM
Payer: MEDICARE

## 2025-07-28 ENCOUNTER — INFUSION (OUTPATIENT)
Dept: INFUSION THERAPY | Facility: HOSPITAL | Age: 85
End: 2025-07-28
Attending: STUDENT IN AN ORGANIZED HEALTH CARE EDUCATION/TRAINING PROGRAM
Payer: MEDICARE

## 2025-07-28 VITALS
HEART RATE: 102 BPM | HEIGHT: 69 IN | WEIGHT: 128.75 LBS | SYSTOLIC BLOOD PRESSURE: 104 MMHG | DIASTOLIC BLOOD PRESSURE: 66 MMHG | RESPIRATION RATE: 18 BRPM | OXYGEN SATURATION: 96 % | BODY MASS INDEX: 19.07 KG/M2 | TEMPERATURE: 98 F

## 2025-07-28 DIAGNOSIS — C78.00 MALIGNANT NEOPLASM METASTATIC TO LUNG, UNSPECIFIED LATERALITY: ICD-10-CM

## 2025-07-28 DIAGNOSIS — R06.00 DYSPNEA, UNSPECIFIED TYPE: ICD-10-CM

## 2025-07-28 DIAGNOSIS — C17.0 DUODENAL CANCER: Primary | ICD-10-CM

## 2025-07-28 PROCEDURE — 96367 TX/PROPH/DG ADDL SEQ IV INF: CPT

## 2025-07-28 PROCEDURE — 63600175 PHARM REV CODE 636 W HCPCS: Performed by: STUDENT IN AN ORGANIZED HEALTH CARE EDUCATION/TRAINING PROGRAM

## 2025-07-28 PROCEDURE — 71046 X-RAY EXAM CHEST 2 VIEWS: CPT | Mod: TC

## 2025-07-28 PROCEDURE — 96416 CHEMO PROLONG INFUSE W/PUMP: CPT

## 2025-07-28 PROCEDURE — 25000003 PHARM REV CODE 250: Performed by: STUDENT IN AN ORGANIZED HEALTH CARE EDUCATION/TRAINING PROGRAM

## 2025-07-28 PROCEDURE — 96413 CHEMO IV INFUSION 1 HR: CPT

## 2025-07-28 RX ORDER — PROCHLORPERAZINE EDISYLATE 5 MG/ML
5 INJECTION INTRAMUSCULAR; INTRAVENOUS ONCE AS NEEDED
Status: DISCONTINUED | OUTPATIENT
Start: 2025-07-28 | End: 2025-07-28 | Stop reason: HOSPADM

## 2025-07-28 RX ORDER — SODIUM CHLORIDE 0.9 % (FLUSH) 0.9 %
10 SYRINGE (ML) INJECTION
Status: CANCELLED | OUTPATIENT
Start: 2025-07-28

## 2025-07-28 RX ORDER — PROCHLORPERAZINE EDISYLATE 5 MG/ML
5 INJECTION INTRAMUSCULAR; INTRAVENOUS ONCE AS NEEDED
Status: CANCELLED | OUTPATIENT
Start: 2025-07-30

## 2025-07-28 RX ORDER — SODIUM CHLORIDE 0.9 % (FLUSH) 0.9 %
10 SYRINGE (ML) INJECTION
Status: CANCELLED | OUTPATIENT
Start: 2025-07-30

## 2025-07-28 RX ORDER — SODIUM CHLORIDE 0.9 % (FLUSH) 0.9 %
10 SYRINGE (ML) INJECTION
Status: DISCONTINUED | OUTPATIENT
Start: 2025-07-28 | End: 2025-07-28 | Stop reason: HOSPADM

## 2025-07-28 RX ORDER — HEPARIN 100 UNIT/ML
500 SYRINGE INTRAVENOUS
Status: DISCONTINUED | OUTPATIENT
Start: 2025-07-28 | End: 2025-07-28 | Stop reason: HOSPADM

## 2025-07-28 RX ORDER — EPINEPHRINE 0.3 MG/.3ML
0.3 INJECTION SUBCUTANEOUS ONCE AS NEEDED
Status: DISCONTINUED | OUTPATIENT
Start: 2025-07-28 | End: 2025-07-28 | Stop reason: HOSPADM

## 2025-07-28 RX ORDER — EPINEPHRINE 0.3 MG/.3ML
0.3 INJECTION SUBCUTANEOUS ONCE AS NEEDED
Status: CANCELLED | OUTPATIENT
Start: 2025-07-28

## 2025-07-28 RX ORDER — DIPHENHYDRAMINE HYDROCHLORIDE 50 MG/ML
50 INJECTION, SOLUTION INTRAMUSCULAR; INTRAVENOUS ONCE AS NEEDED
Status: CANCELLED | OUTPATIENT
Start: 2025-07-28

## 2025-07-28 RX ORDER — HEPARIN 100 UNIT/ML
500 SYRINGE INTRAVENOUS
Status: CANCELLED | OUTPATIENT
Start: 2025-07-30

## 2025-07-28 RX ORDER — HEPARIN 100 UNIT/ML
500 SYRINGE INTRAVENOUS
Status: CANCELLED | OUTPATIENT
Start: 2025-07-28

## 2025-07-28 RX ORDER — DIPHENHYDRAMINE HYDROCHLORIDE 50 MG/ML
50 INJECTION, SOLUTION INTRAMUSCULAR; INTRAVENOUS ONCE AS NEEDED
Status: DISCONTINUED | OUTPATIENT
Start: 2025-07-28 | End: 2025-07-28 | Stop reason: HOSPADM

## 2025-07-28 RX ORDER — PROCHLORPERAZINE EDISYLATE 5 MG/ML
5 INJECTION INTRAMUSCULAR; INTRAVENOUS ONCE AS NEEDED
Status: CANCELLED | OUTPATIENT
Start: 2025-07-28

## 2025-07-28 RX ADMIN — BEVACIZUMAB-AWWB 300 MG: 100 INJECTION, SOLUTION INTRAVENOUS at 09:07

## 2025-07-28 RX ADMIN — DEXAMETHASONE SODIUM PHOSPHATE 0.25 MG: 4 INJECTION, SOLUTION INTRA-ARTICULAR; INTRALESIONAL; INTRAMUSCULAR; INTRAVENOUS; SOFT TISSUE at 09:07

## 2025-07-28 RX ADMIN — FLUOROURACIL 4000 MG: 50 INJECTION, SOLUTION INTRAVENOUS at 10:07

## 2025-07-30 ENCOUNTER — INFUSION (OUTPATIENT)
Dept: INFUSION THERAPY | Facility: HOSPITAL | Age: 85
End: 2025-07-30
Attending: STUDENT IN AN ORGANIZED HEALTH CARE EDUCATION/TRAINING PROGRAM
Payer: MEDICARE

## 2025-07-30 VITALS
HEART RATE: 77 BPM | RESPIRATION RATE: 18 BRPM | OXYGEN SATURATION: 98 % | TEMPERATURE: 98 F | SYSTOLIC BLOOD PRESSURE: 123 MMHG | DIASTOLIC BLOOD PRESSURE: 69 MMHG

## 2025-07-30 DIAGNOSIS — C78.00 MALIGNANT NEOPLASM METASTATIC TO LUNG, UNSPECIFIED LATERALITY: ICD-10-CM

## 2025-07-30 DIAGNOSIS — C17.0 DUODENAL CANCER: Primary | ICD-10-CM

## 2025-07-30 PROCEDURE — 96360 HYDRATION IV INFUSION INIT: CPT

## 2025-07-30 PROCEDURE — 25000003 PHARM REV CODE 250: Performed by: STUDENT IN AN ORGANIZED HEALTH CARE EDUCATION/TRAINING PROGRAM

## 2025-07-30 RX ORDER — SODIUM CHLORIDE 0.9 % (FLUSH) 0.9 %
10 SYRINGE (ML) INJECTION
Status: DISCONTINUED | OUTPATIENT
Start: 2025-07-30 | End: 2025-07-30 | Stop reason: HOSPADM

## 2025-07-30 RX ORDER — PROCHLORPERAZINE EDISYLATE 5 MG/ML
5 INJECTION INTRAMUSCULAR; INTRAVENOUS ONCE AS NEEDED
Status: DISCONTINUED | OUTPATIENT
Start: 2025-07-30 | End: 2025-07-30 | Stop reason: HOSPADM

## 2025-07-30 RX ORDER — HEPARIN 100 UNIT/ML
500 SYRINGE INTRAVENOUS
Status: DISCONTINUED | OUTPATIENT
Start: 2025-07-30 | End: 2025-07-30 | Stop reason: HOSPADM

## 2025-07-30 RX ADMIN — SODIUM CHLORIDE 1000 ML: 9 INJECTION, SOLUTION INTRAVENOUS at 08:07

## 2025-08-05 DIAGNOSIS — C78.00 MALIGNANT NEOPLASM METASTATIC TO LUNG, UNSPECIFIED LATERALITY: ICD-10-CM

## 2025-08-05 DIAGNOSIS — C17.0 DUODENAL CANCER: ICD-10-CM

## 2025-08-05 RX ORDER — PROCHLORPERAZINE MALEATE 5 MG
5 TABLET ORAL EVERY 6 HOURS PRN
Qty: 20 TABLET | Refills: 5 | Status: SHIPPED | OUTPATIENT
Start: 2025-08-05

## 2025-08-06 ENCOUNTER — LAB VISIT (OUTPATIENT)
Dept: LAB | Facility: HOSPITAL | Age: 85
End: 2025-08-06
Attending: STUDENT IN AN ORGANIZED HEALTH CARE EDUCATION/TRAINING PROGRAM
Payer: MEDICARE

## 2025-08-06 ENCOUNTER — OFFICE VISIT (OUTPATIENT)
Dept: HEMATOLOGY/ONCOLOGY | Facility: CLINIC | Age: 85
End: 2025-08-06
Payer: MEDICARE

## 2025-08-06 VITALS
OXYGEN SATURATION: 98 % | DIASTOLIC BLOOD PRESSURE: 76 MMHG | WEIGHT: 128.13 LBS | HEART RATE: 74 BPM | HEIGHT: 69 IN | BODY MASS INDEX: 18.98 KG/M2 | RESPIRATION RATE: 18 BRPM | TEMPERATURE: 97 F | SYSTOLIC BLOOD PRESSURE: 155 MMHG

## 2025-08-06 DIAGNOSIS — C78.00 MALIGNANT NEOPLASM METASTATIC TO LUNG, UNSPECIFIED LATERALITY: ICD-10-CM

## 2025-08-06 DIAGNOSIS — C17.0 DUODENAL CANCER: Chronic | ICD-10-CM

## 2025-08-06 DIAGNOSIS — F41.9 ANXIETY: Primary | ICD-10-CM

## 2025-08-06 DIAGNOSIS — C17.0 DUODENAL CANCER: ICD-10-CM

## 2025-08-06 LAB
ALBUMIN SERPL-MCNC: 2.7 G/DL (ref 3.4–4.8)
ALBUMIN/GLOB SERPL: 0.8 RATIO (ref 1.1–2)
ALP SERPL-CCNC: 264 UNIT/L (ref 40–150)
ALT SERPL-CCNC: 24 UNIT/L (ref 0–55)
ANION GAP SERPL CALC-SCNC: 4 MEQ/L
AST SERPL-CCNC: 35 UNIT/L (ref 11–45)
BASOPHILS # BLD AUTO: 0.04 X10(3)/MCL
BASOPHILS NFR BLD AUTO: 0.7 %
BILIRUB SERPL-MCNC: 0.4 MG/DL
BUN SERPL-MCNC: 25.8 MG/DL (ref 8.4–25.7)
CALCIUM SERPL-MCNC: 9 MG/DL (ref 8.8–10)
CHLORIDE SERPL-SCNC: 104 MMOL/L (ref 98–107)
CO2 SERPL-SCNC: 30 MMOL/L (ref 23–31)
CREAT SERPL-MCNC: 0.86 MG/DL (ref 0.72–1.25)
CREAT/UREA NIT SERPL: 30
EOSINOPHIL # BLD AUTO: 0.42 X10(3)/MCL (ref 0–0.9)
EOSINOPHIL NFR BLD AUTO: 7.7 %
ERYTHROCYTE [DISTWIDTH] IN BLOOD BY AUTOMATED COUNT: 16.3 % (ref 11.5–17)
GFR SERPLBLD CREATININE-BSD FMLA CKD-EPI: >60 ML/MIN/1.73/M2
GLOBULIN SER-MCNC: 3.6 GM/DL (ref 2.4–3.5)
GLUCOSE SERPL-MCNC: 153 MG/DL (ref 82–115)
HCT VFR BLD AUTO: 32.7 % (ref 42–52)
HGB BLD-MCNC: 10.5 G/DL (ref 14–18)
IMM GRANULOCYTES # BLD AUTO: 0.04 X10(3)/MCL (ref 0–0.04)
IMM GRANULOCYTES NFR BLD AUTO: 0.7 %
LYMPHOCYTES # BLD AUTO: 1.13 X10(3)/MCL (ref 0.6–4.6)
LYMPHOCYTES NFR BLD AUTO: 20.7 %
MCH RBC QN AUTO: 31.5 PG (ref 27–31)
MCHC RBC AUTO-ENTMCNC: 32.1 G/DL (ref 33–36)
MCV RBC AUTO: 98.2 FL (ref 80–94)
MONOCYTES # BLD AUTO: 0.65 X10(3)/MCL (ref 0.1–1.3)
MONOCYTES NFR BLD AUTO: 11.9 %
NEUTROPHILS # BLD AUTO: 3.17 X10(3)/MCL (ref 2.1–9.2)
NEUTROPHILS NFR BLD AUTO: 58.3 %
PLATELET # BLD AUTO: 161 X10(3)/MCL (ref 130–400)
PMV BLD AUTO: 8.7 FL (ref 7.4–10.4)
POTASSIUM SERPL-SCNC: 5.1 MMOL/L (ref 3.5–5.1)
PROT SERPL-MCNC: 6.3 GM/DL (ref 5.8–7.6)
PROT UR QL STRIP: ABNORMAL
RBC # BLD AUTO: 3.33 X10(6)/MCL (ref 4.7–6.1)
SODIUM SERPL-SCNC: 138 MMOL/L (ref 136–145)
WBC # BLD AUTO: 5.45 X10(3)/MCL (ref 4.5–11.5)

## 2025-08-06 PROCEDURE — G2211 COMPLEX E/M VISIT ADD ON: HCPCS | Mod: S$GLB,,,

## 2025-08-06 PROCEDURE — 1159F MED LIST DOCD IN RCRD: CPT | Mod: CPTII,S$GLB,,

## 2025-08-06 PROCEDURE — 81005 URINALYSIS: CPT

## 2025-08-06 PROCEDURE — 1160F RVW MEDS BY RX/DR IN RCRD: CPT | Mod: CPTII,S$GLB,,

## 2025-08-06 PROCEDURE — 85025 COMPLETE CBC W/AUTO DIFF WBC: CPT

## 2025-08-06 PROCEDURE — 80053 COMPREHEN METABOLIC PANEL: CPT

## 2025-08-06 PROCEDURE — 36415 COLL VENOUS BLD VENIPUNCTURE: CPT

## 2025-08-06 PROCEDURE — 99215 OFFICE O/P EST HI 40 MIN: CPT | Mod: S$GLB,,,

## 2025-08-06 PROCEDURE — 3077F SYST BP >= 140 MM HG: CPT | Mod: CPTII,S$GLB,,

## 2025-08-06 PROCEDURE — 99999 PR PBB SHADOW E&M-EST. PATIENT-LVL IV: CPT | Mod: PBBFAC,,,

## 2025-08-06 PROCEDURE — 1126F AMNT PAIN NOTED NONE PRSNT: CPT | Mod: CPTII,S$GLB,,

## 2025-08-06 PROCEDURE — 3078F DIAST BP <80 MM HG: CPT | Mod: CPTII,S$GLB,,

## 2025-08-06 RX ORDER — HYDROXYZINE PAMOATE 25 MG/1
25 CAPSULE ORAL EVERY 8 HOURS PRN
Qty: 30 CAPSULE | Refills: 1 | Status: SHIPPED | OUTPATIENT
Start: 2025-08-06 | End: 2025-08-11
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
## 2025-08-08 RX ORDER — PROCHLORPERAZINE EDISYLATE 5 MG/ML
5 INJECTION INTRAMUSCULAR; INTRAVENOUS ONCE AS NEEDED
Status: CANCELLED | OUTPATIENT
Start: 2025-08-13

## 2025-08-08 RX ORDER — HEPARIN 100 UNIT/ML
500 SYRINGE INTRAVENOUS
Status: CANCELLED | OUTPATIENT
Start: 2025-08-13

## 2025-08-08 RX ORDER — SODIUM CHLORIDE 0.9 % (FLUSH) 0.9 %
10 SYRINGE (ML) INJECTION
Status: CANCELLED | OUTPATIENT
Start: 2025-08-13

## 2025-08-11 ENCOUNTER — INFUSION (OUTPATIENT)
Dept: INFUSION THERAPY | Facility: HOSPITAL | Age: 85
End: 2025-08-11
Attending: STUDENT IN AN ORGANIZED HEALTH CARE EDUCATION/TRAINING PROGRAM
Payer: MEDICARE

## 2025-08-11 VITALS
SYSTOLIC BLOOD PRESSURE: 118 MMHG | DIASTOLIC BLOOD PRESSURE: 72 MMHG | RESPIRATION RATE: 18 BRPM | TEMPERATURE: 98 F | HEART RATE: 89 BPM | OXYGEN SATURATION: 95 %

## 2025-08-11 DIAGNOSIS — C78.00 MALIGNANT NEOPLASM METASTATIC TO LUNG, UNSPECIFIED LATERALITY: ICD-10-CM

## 2025-08-11 DIAGNOSIS — C17.0 DUODENAL CANCER: Primary | ICD-10-CM

## 2025-08-11 PROCEDURE — 84156 ASSAY OF PROTEIN URINE: CPT

## 2025-08-11 PROCEDURE — 96365 THER/PROPH/DIAG IV INF INIT: CPT

## 2025-08-11 PROCEDURE — 96416 CHEMO PROLONG INFUSE W/PUMP: CPT

## 2025-08-11 PROCEDURE — 63600175 PHARM REV CODE 636 W HCPCS

## 2025-08-11 PROCEDURE — 25000003 PHARM REV CODE 250

## 2025-08-11 RX ORDER — EPINEPHRINE 0.3 MG/.3ML
0.3 INJECTION SUBCUTANEOUS ONCE AS NEEDED
Status: DISCONTINUED | OUTPATIENT
Start: 2025-08-11 | End: 2025-08-11 | Stop reason: HOSPADM

## 2025-08-11 RX ORDER — SODIUM CHLORIDE 0.9 % (FLUSH) 0.9 %
10 SYRINGE (ML) INJECTION
Status: DISCONTINUED | OUTPATIENT
Start: 2025-08-11 | End: 2025-08-11 | Stop reason: HOSPADM

## 2025-08-11 RX ORDER — DIPHENHYDRAMINE HYDROCHLORIDE 50 MG/ML
50 INJECTION, SOLUTION INTRAMUSCULAR; INTRAVENOUS ONCE AS NEEDED
Status: DISCONTINUED | OUTPATIENT
Start: 2025-08-11 | End: 2025-08-11 | Stop reason: HOSPADM

## 2025-08-11 RX ORDER — HEPARIN 100 UNIT/ML
500 SYRINGE INTRAVENOUS
Status: DISCONTINUED | OUTPATIENT
Start: 2025-08-11 | End: 2025-08-11 | Stop reason: HOSPADM

## 2025-08-11 RX ORDER — PROCHLORPERAZINE EDISYLATE 5 MG/ML
5 INJECTION INTRAMUSCULAR; INTRAVENOUS ONCE AS NEEDED
Status: DISCONTINUED | OUTPATIENT
Start: 2025-08-11 | End: 2025-08-11 | Stop reason: HOSPADM

## 2025-08-11 RX ADMIN — DEXAMETHASONE SODIUM PHOSPHATE 0.25 MG: 4 INJECTION, SOLUTION INTRA-ARTICULAR; INTRALESIONAL; INTRAMUSCULAR; INTRAVENOUS; SOFT TISSUE at 10:08

## 2025-08-11 RX ADMIN — FLUOROURACIL 4000 MG: 50 INJECTION, SOLUTION INTRAVENOUS at 10:08

## 2025-08-13 ENCOUNTER — INFUSION (OUTPATIENT)
Dept: INFUSION THERAPY | Facility: HOSPITAL | Age: 85
End: 2025-08-13
Attending: STUDENT IN AN ORGANIZED HEALTH CARE EDUCATION/TRAINING PROGRAM
Payer: MEDICARE

## 2025-08-13 VITALS
TEMPERATURE: 98 F | HEART RATE: 76 BPM | RESPIRATION RATE: 20 BRPM | SYSTOLIC BLOOD PRESSURE: 162 MMHG | OXYGEN SATURATION: 97 % | DIASTOLIC BLOOD PRESSURE: 81 MMHG

## 2025-08-13 DIAGNOSIS — C78.00 MALIGNANT NEOPLASM METASTATIC TO LUNG, UNSPECIFIED LATERALITY: ICD-10-CM

## 2025-08-13 DIAGNOSIS — C17.0 DUODENAL CANCER: Primary | ICD-10-CM

## 2025-08-13 PROCEDURE — 63600175 PHARM REV CODE 636 W HCPCS

## 2025-08-13 RX ORDER — PROCHLORPERAZINE EDISYLATE 5 MG/ML
5 INJECTION INTRAMUSCULAR; INTRAVENOUS ONCE AS NEEDED
Status: DISCONTINUED | OUTPATIENT
Start: 2025-08-13 | End: 2025-08-13 | Stop reason: HOSPADM

## 2025-08-13 RX ORDER — HEPARIN 100 UNIT/ML
500 SYRINGE INTRAVENOUS
Status: DISCONTINUED | OUTPATIENT
Start: 2025-08-13 | End: 2025-08-13 | Stop reason: HOSPADM

## 2025-08-13 RX ORDER — SODIUM CHLORIDE 0.9 % (FLUSH) 0.9 %
10 SYRINGE (ML) INJECTION
Status: DISCONTINUED | OUTPATIENT
Start: 2025-08-13 | End: 2025-08-13 | Stop reason: HOSPADM

## 2025-08-13 RX ADMIN — HEPARIN 500 UNITS: 100 SYRINGE at 08:08

## 2025-08-15 ENCOUNTER — PATIENT MESSAGE (OUTPATIENT)
Dept: HEMATOLOGY/ONCOLOGY | Facility: CLINIC | Age: 85
End: 2025-08-15
Payer: MEDICARE

## 2025-08-20 ENCOUNTER — LAB VISIT (OUTPATIENT)
Dept: LAB | Facility: HOSPITAL | Age: 85
End: 2025-08-20
Attending: STUDENT IN AN ORGANIZED HEALTH CARE EDUCATION/TRAINING PROGRAM
Payer: MEDICARE

## 2025-08-20 ENCOUNTER — OFFICE VISIT (OUTPATIENT)
Dept: HEMATOLOGY/ONCOLOGY | Facility: CLINIC | Age: 85
End: 2025-08-20
Payer: MEDICARE

## 2025-08-20 VITALS
WEIGHT: 128.38 LBS | TEMPERATURE: 97 F | OXYGEN SATURATION: 96 % | BODY MASS INDEX: 19.01 KG/M2 | RESPIRATION RATE: 18 BRPM | HEIGHT: 69 IN | DIASTOLIC BLOOD PRESSURE: 75 MMHG | HEART RATE: 90 BPM | SYSTOLIC BLOOD PRESSURE: 159 MMHG

## 2025-08-20 DIAGNOSIS — Z79.899 DRUG-INDUCED IMMUNODEFICIENCY: Primary | ICD-10-CM

## 2025-08-20 DIAGNOSIS — C17.0 DUODENAL CANCER: Chronic | ICD-10-CM

## 2025-08-20 DIAGNOSIS — C78.00 MALIGNANT NEOPLASM METASTATIC TO LUNG, UNSPECIFIED LATERALITY: ICD-10-CM

## 2025-08-20 DIAGNOSIS — C17.0 DUODENAL CANCER: ICD-10-CM

## 2025-08-20 DIAGNOSIS — D84.821 DRUG-INDUCED IMMUNODEFICIENCY: Primary | ICD-10-CM

## 2025-08-20 LAB
ALBUMIN SERPL-MCNC: 2.7 G/DL (ref 3.4–4.8)
ALBUMIN/GLOB SERPL: 0.8 RATIO (ref 1.1–2)
ALP SERPL-CCNC: 230 UNIT/L (ref 40–150)
ALT SERPL-CCNC: 22 UNIT/L (ref 0–55)
ANION GAP SERPL CALC-SCNC: 7 MEQ/L
AST SERPL-CCNC: 30 UNIT/L (ref 11–45)
BASOPHILS # BLD AUTO: 0.04 X10(3)/MCL
BASOPHILS NFR BLD AUTO: 0.6 %
BILIRUB SERPL-MCNC: 0.4 MG/DL
BUN SERPL-MCNC: 24.6 MG/DL (ref 8.4–25.7)
CALCIUM SERPL-MCNC: 8.9 MG/DL (ref 8.8–10)
CHLORIDE SERPL-SCNC: 102 MMOL/L (ref 98–107)
CO2 SERPL-SCNC: 29 MMOL/L (ref 23–31)
CREAT SERPL-MCNC: 0.81 MG/DL (ref 0.72–1.25)
CREAT/UREA NIT SERPL: 30
EOSINOPHIL # BLD AUTO: 0.28 X10(3)/MCL (ref 0–0.9)
EOSINOPHIL NFR BLD AUTO: 4.4 %
ERYTHROCYTE [DISTWIDTH] IN BLOOD BY AUTOMATED COUNT: 16.3 % (ref 11.5–17)
GFR SERPLBLD CREATININE-BSD FMLA CKD-EPI: >60 ML/MIN/1.73/M2
GLOBULIN SER-MCNC: 3.6 GM/DL (ref 2.4–3.5)
GLUCOSE SERPL-MCNC: 144 MG/DL (ref 82–115)
HCT VFR BLD AUTO: 31.9 % (ref 42–52)
HGB BLD-MCNC: 10.5 G/DL (ref 14–18)
IMM GRANULOCYTES # BLD AUTO: 0.04 X10(3)/MCL (ref 0–0.04)
IMM GRANULOCYTES NFR BLD AUTO: 0.6 %
LYMPHOCYTES # BLD AUTO: 0.93 X10(3)/MCL (ref 0.6–4.6)
LYMPHOCYTES NFR BLD AUTO: 14.8 %
MCH RBC QN AUTO: 32.5 PG (ref 27–31)
MCHC RBC AUTO-ENTMCNC: 32.9 G/DL (ref 33–36)
MCV RBC AUTO: 98.8 FL (ref 80–94)
MONOCYTES # BLD AUTO: 0.85 X10(3)/MCL (ref 0.1–1.3)
MONOCYTES NFR BLD AUTO: 13.5 %
NEUTROPHILS # BLD AUTO: 4.16 X10(3)/MCL (ref 2.1–9.2)
NEUTROPHILS NFR BLD AUTO: 66.1 %
PLATELET # BLD AUTO: 218 X10(3)/MCL (ref 130–400)
PMV BLD AUTO: 9.3 FL (ref 7.4–10.4)
POTASSIUM SERPL-SCNC: 5.4 MMOL/L (ref 3.5–5.1)
PROT SERPL-MCNC: 6.3 GM/DL (ref 5.8–7.6)
PROT UR QL STRIP: ABNORMAL
RBC # BLD AUTO: 3.23 X10(6)/MCL (ref 4.7–6.1)
SODIUM SERPL-SCNC: 138 MMOL/L (ref 136–145)
WBC # BLD AUTO: 6.3 X10(3)/MCL (ref 4.5–11.5)

## 2025-08-20 PROCEDURE — 3078F DIAST BP <80 MM HG: CPT | Mod: CPTII,S$GLB,, | Performed by: STUDENT IN AN ORGANIZED HEALTH CARE EDUCATION/TRAINING PROGRAM

## 2025-08-20 PROCEDURE — 99999 PR PBB SHADOW E&M-EST. PATIENT-LVL IV: CPT | Mod: PBBFAC,,, | Performed by: STUDENT IN AN ORGANIZED HEALTH CARE EDUCATION/TRAINING PROGRAM

## 2025-08-20 PROCEDURE — 1160F RVW MEDS BY RX/DR IN RCRD: CPT | Mod: CPTII,S$GLB,, | Performed by: STUDENT IN AN ORGANIZED HEALTH CARE EDUCATION/TRAINING PROGRAM

## 2025-08-20 PROCEDURE — 85025 COMPLETE CBC W/AUTO DIFF WBC: CPT

## 2025-08-20 PROCEDURE — 1126F AMNT PAIN NOTED NONE PRSNT: CPT | Mod: CPTII,S$GLB,, | Performed by: STUDENT IN AN ORGANIZED HEALTH CARE EDUCATION/TRAINING PROGRAM

## 2025-08-20 PROCEDURE — 80053 COMPREHEN METABOLIC PANEL: CPT

## 2025-08-20 PROCEDURE — 99215 OFFICE O/P EST HI 40 MIN: CPT | Mod: S$GLB,,, | Performed by: STUDENT IN AN ORGANIZED HEALTH CARE EDUCATION/TRAINING PROGRAM

## 2025-08-20 PROCEDURE — 36415 COLL VENOUS BLD VENIPUNCTURE: CPT

## 2025-08-20 PROCEDURE — 81005 URINALYSIS: CPT

## 2025-08-20 PROCEDURE — 3077F SYST BP >= 140 MM HG: CPT | Mod: CPTII,S$GLB,, | Performed by: STUDENT IN AN ORGANIZED HEALTH CARE EDUCATION/TRAINING PROGRAM

## 2025-08-20 PROCEDURE — G2211 COMPLEX E/M VISIT ADD ON: HCPCS | Mod: S$GLB,,, | Performed by: STUDENT IN AN ORGANIZED HEALTH CARE EDUCATION/TRAINING PROGRAM

## 2025-08-20 PROCEDURE — 1159F MED LIST DOCD IN RCRD: CPT | Mod: CPTII,S$GLB,, | Performed by: STUDENT IN AN ORGANIZED HEALTH CARE EDUCATION/TRAINING PROGRAM

## 2025-08-20 RX ORDER — HEPARIN 100 UNIT/ML
500 SYRINGE INTRAVENOUS
OUTPATIENT
Start: 2025-08-20

## 2025-08-20 RX ORDER — PROCHLORPERAZINE EDISYLATE 5 MG/ML
5 INJECTION INTRAMUSCULAR; INTRAVENOUS ONCE AS NEEDED
OUTPATIENT
Start: 2025-08-20

## 2025-08-20 RX ORDER — SODIUM CHLORIDE 0.9 % (FLUSH) 0.9 %
10 SYRINGE (ML) INJECTION
OUTPATIENT
Start: 2025-08-20

## 2025-08-20 RX ORDER — DIPHENHYDRAMINE HYDROCHLORIDE 50 MG/ML
50 INJECTION, SOLUTION INTRAMUSCULAR; INTRAVENOUS ONCE AS NEEDED
OUTPATIENT
Start: 2025-08-20 | End: 2037-01-16

## 2025-08-20 RX ORDER — EPINEPHRINE 0.3 MG/.3ML
0.3 INJECTION SUBCUTANEOUS ONCE AS NEEDED
OUTPATIENT
Start: 2025-08-20 | End: 2037-01-16

## 2025-08-22 ENCOUNTER — PATIENT MESSAGE (OUTPATIENT)
Dept: HEMATOLOGY/ONCOLOGY | Facility: CLINIC | Age: 85
End: 2025-08-22
Payer: MEDICARE

## 2025-08-25 ENCOUNTER — INFUSION (OUTPATIENT)
Dept: INFUSION THERAPY | Facility: HOSPITAL | Age: 85
End: 2025-08-25
Attending: STUDENT IN AN ORGANIZED HEALTH CARE EDUCATION/TRAINING PROGRAM
Payer: MEDICARE

## 2025-08-25 VITALS
BODY MASS INDEX: 19.08 KG/M2 | SYSTOLIC BLOOD PRESSURE: 159 MMHG | WEIGHT: 128.81 LBS | HEART RATE: 87 BPM | HEIGHT: 69 IN | RESPIRATION RATE: 16 BRPM | DIASTOLIC BLOOD PRESSURE: 77 MMHG | TEMPERATURE: 98 F | OXYGEN SATURATION: 96 %

## 2025-08-25 DIAGNOSIS — C17.0 DUODENAL CANCER: Primary | ICD-10-CM

## 2025-08-25 DIAGNOSIS — C78.00 MALIGNANT NEOPLASM METASTATIC TO LUNG, UNSPECIFIED LATERALITY: ICD-10-CM

## 2025-08-25 PROCEDURE — 96416 CHEMO PROLONG INFUSE W/PUMP: CPT

## 2025-08-25 PROCEDURE — 96367 TX/PROPH/DG ADDL SEQ IV INF: CPT

## 2025-08-25 PROCEDURE — 63600175 PHARM REV CODE 636 W HCPCS: Performed by: STUDENT IN AN ORGANIZED HEALTH CARE EDUCATION/TRAINING PROGRAM

## 2025-08-25 PROCEDURE — 96413 CHEMO IV INFUSION 1 HR: CPT

## 2025-08-25 PROCEDURE — 25000003 PHARM REV CODE 250: Performed by: STUDENT IN AN ORGANIZED HEALTH CARE EDUCATION/TRAINING PROGRAM

## 2025-08-25 RX ORDER — PROCHLORPERAZINE EDISYLATE 5 MG/ML
5 INJECTION INTRAMUSCULAR; INTRAVENOUS ONCE AS NEEDED
Status: DISCONTINUED | OUTPATIENT
Start: 2025-08-25 | End: 2025-08-25 | Stop reason: HOSPADM

## 2025-08-25 RX ORDER — DIPHENHYDRAMINE HYDROCHLORIDE 50 MG/ML
50 INJECTION, SOLUTION INTRAMUSCULAR; INTRAVENOUS ONCE AS NEEDED
Status: DISCONTINUED | OUTPATIENT
Start: 2025-08-25 | End: 2025-08-25 | Stop reason: HOSPADM

## 2025-08-25 RX ORDER — HEPARIN 100 UNIT/ML
500 SYRINGE INTRAVENOUS
Status: DISCONTINUED | OUTPATIENT
Start: 2025-08-25 | End: 2025-08-25 | Stop reason: HOSPADM

## 2025-08-25 RX ORDER — SODIUM CHLORIDE 0.9 % (FLUSH) 0.9 %
10 SYRINGE (ML) INJECTION
Status: DISCONTINUED | OUTPATIENT
Start: 2025-08-25 | End: 2025-08-25 | Stop reason: HOSPADM

## 2025-08-25 RX ORDER — EPINEPHRINE 0.3 MG/.3ML
0.3 INJECTION SUBCUTANEOUS ONCE AS NEEDED
Status: DISCONTINUED | OUTPATIENT
Start: 2025-08-25 | End: 2025-08-25 | Stop reason: HOSPADM

## 2025-08-25 RX ADMIN — FLUOROURACIL 4000 MG: 50 INJECTION, SOLUTION INTRAVENOUS at 11:08

## 2025-08-25 RX ADMIN — BEVACIZUMAB-AWWB 300 MG: 100 INJECTION, SOLUTION INTRAVENOUS at 10:08

## 2025-08-25 RX ADMIN — SODIUM CHLORIDE: 9 INJECTION, SOLUTION INTRAVENOUS at 10:08

## 2025-08-25 RX ADMIN — DEXAMETHASONE SODIUM PHOSPHATE 0.25 MG: 4 INJECTION, SOLUTION INTRA-ARTICULAR; INTRALESIONAL; INTRAMUSCULAR; INTRAVENOUS; SOFT TISSUE at 10:08

## 2025-08-26 ENCOUNTER — HOSPITAL ENCOUNTER (OUTPATIENT)
Dept: RADIOLOGY | Facility: HOSPITAL | Age: 85
Discharge: HOME | End: 2025-08-26
Attending: STUDENT IN AN ORGANIZED HEALTH CARE EDUCATION/TRAINING PROGRAM
Payer: MEDICARE

## 2025-08-26 DIAGNOSIS — C78.00 MALIGNANT NEOPLASM METASTATIC TO LUNG, UNSPECIFIED LATERALITY: ICD-10-CM

## 2025-08-26 DIAGNOSIS — C17.0 DUODENAL CANCER: Chronic | ICD-10-CM

## 2025-08-26 PROCEDURE — 71260 CT THORAX DX C+: CPT | Mod: TC

## 2025-08-26 PROCEDURE — 25500020 PHARM REV CODE 255: Performed by: STUDENT IN AN ORGANIZED HEALTH CARE EDUCATION/TRAINING PROGRAM

## 2025-08-26 RX ORDER — DIATRIZOATE MEGLUMINE AND DIATRIZOATE SODIUM 660; 100 MG/ML; MG/ML
30 SOLUTION ORAL; RECTAL
Status: COMPLETED | OUTPATIENT
Start: 2025-08-26 | End: 2025-08-26

## 2025-08-26 RX ADMIN — IOHEXOL 75 ML: 350 INJECTION, SOLUTION INTRAVENOUS at 02:08

## 2025-08-26 RX ADMIN — DIATRIZOATE MEGLUMINE AND DIATRIZOATE SODIUM 30 ML: 660; 100 LIQUID ORAL; RECTAL at 01:08

## 2025-08-27 ENCOUNTER — INFUSION (OUTPATIENT)
Dept: INFUSION THERAPY | Facility: HOSPITAL | Age: 85
End: 2025-08-27
Attending: STUDENT IN AN ORGANIZED HEALTH CARE EDUCATION/TRAINING PROGRAM
Payer: MEDICARE

## 2025-08-27 VITALS
HEART RATE: 81 BPM | TEMPERATURE: 98 F | OXYGEN SATURATION: 96 % | DIASTOLIC BLOOD PRESSURE: 82 MMHG | RESPIRATION RATE: 16 BRPM | SYSTOLIC BLOOD PRESSURE: 160 MMHG

## 2025-08-27 DIAGNOSIS — C17.0 DUODENAL CANCER: Primary | ICD-10-CM

## 2025-08-27 DIAGNOSIS — J18.9 LUNG INFECTION: Primary | ICD-10-CM

## 2025-08-27 DIAGNOSIS — C78.00 MALIGNANT NEOPLASM METASTATIC TO LUNG, UNSPECIFIED LATERALITY: ICD-10-CM

## 2025-08-27 PROCEDURE — A4216 STERILE WATER/SALINE, 10 ML: HCPCS | Performed by: STUDENT IN AN ORGANIZED HEALTH CARE EDUCATION/TRAINING PROGRAM

## 2025-08-27 PROCEDURE — 63600175 PHARM REV CODE 636 W HCPCS: Performed by: STUDENT IN AN ORGANIZED HEALTH CARE EDUCATION/TRAINING PROGRAM

## 2025-08-27 PROCEDURE — 25000003 PHARM REV CODE 250: Performed by: STUDENT IN AN ORGANIZED HEALTH CARE EDUCATION/TRAINING PROGRAM

## 2025-08-27 RX ORDER — PROCHLORPERAZINE EDISYLATE 5 MG/ML
5 INJECTION INTRAMUSCULAR; INTRAVENOUS ONCE AS NEEDED
Status: DISCONTINUED | OUTPATIENT
Start: 2025-08-27 | End: 2025-08-27 | Stop reason: HOSPADM

## 2025-08-27 RX ORDER — SODIUM CHLORIDE 0.9 % (FLUSH) 0.9 %
10 SYRINGE (ML) INJECTION
Status: DISCONTINUED | OUTPATIENT
Start: 2025-08-27 | End: 2025-08-27 | Stop reason: HOSPADM

## 2025-08-27 RX ORDER — HEPARIN 100 UNIT/ML
500 SYRINGE INTRAVENOUS
Status: DISCONTINUED | OUTPATIENT
Start: 2025-08-27 | End: 2025-08-27 | Stop reason: HOSPADM

## 2025-08-27 RX ADMIN — HEPARIN 500 UNITS: 100 SYRINGE at 09:08

## 2025-08-27 RX ADMIN — Medication 10 ML: at 09:08

## 2025-08-28 RX ORDER — LEVOFLOXACIN 500 MG/1
500 TABLET, FILM COATED ORAL DAILY
Qty: 7 TABLET | Refills: 0 | Status: SHIPPED | OUTPATIENT
Start: 2025-08-28 | End: 2025-09-04

## 2025-09-05 DIAGNOSIS — F41.9 ANXIETY: ICD-10-CM

## 2025-09-05 RX ORDER — HYDROXYZINE PAMOATE 25 MG/1
CAPSULE ORAL
Qty: 30 CAPSULE | Refills: 1 | Status: SHIPPED | OUTPATIENT
Start: 2025-09-05

## (undated) DEVICE — SUT SILK SH 2-0 30IN MP BLK

## (undated) DEVICE — SUT VICRYL CTD 2-0 GI 27 SH

## (undated) DEVICE — SYR 10CC LUER LOCK

## (undated) DEVICE — SUT VICRYL 3-0 27 SH

## (undated) DEVICE — NDL 20GX1-1/2IN IB

## (undated) DEVICE — DRESSING TRANS 4X4 TEGADERM

## (undated) DEVICE — SEALER LIGASURE OPEN 5MM 23CM

## (undated) DEVICE — LOOP STERION MAXI YEL 1X406MM

## (undated) DEVICE — BAG BIO SPECIMEN W/POCKET 6X9

## (undated) DEVICE — Device

## (undated) DEVICE — ELECTRODE PATIENT RETURN DISP

## (undated) DEVICE — GLOVE PROTEXIS BLUE LATEX 7

## (undated) DEVICE — SUT PROLENE 4-0 SH BLU 36IN

## (undated) DEVICE — SUT SILK 3-0 SH DETACH 30IN

## (undated) DEVICE — ADHESIVE DERMABOND ADVANCED

## (undated) DEVICE — SOL NACL IRR 1000ML BTL

## (undated) DEVICE — SUT MFIL VIOL PDS II TP-1 30IN

## (undated) DEVICE — SPONGE LAP STRL 18X18IN

## (undated) DEVICE — TIP SUCTION YANKAUER

## (undated) DEVICE — TUBE FEEDING PURPLE 5FRX40CM

## (undated) DEVICE — CUTTER PROXIMATE BLUE 75MM

## (undated) DEVICE — COVER C-ARM STRAP BAND 44X80IN

## (undated) DEVICE — GLOVE PROTEXIS HYDROGEL SZ6.5

## (undated) DEVICE — BLANKET HYPER ADULT 24X60IN

## (undated) DEVICE — HEMOSTAT SURGICEL 2X14IN

## (undated) DEVICE — KIT SURGICAL TURNOVER

## (undated) DEVICE — SOL NORMAL USPCA 0.9%

## (undated) DEVICE — BOWL UTILITY BLUE 32OZ

## (undated) DEVICE — SUT PDS PLUS 1 TP1 96IN

## (undated) DEVICE — VAC WOUND DISPOSABLE PREVENA

## (undated) DEVICE — GLOVE SENSICARE PI SURG 6.5

## (undated) DEVICE — COVER TABLE HVY DTY 60X90IN

## (undated) DEVICE — TUBING O2 FEMALE CONN 13FT

## (undated) DEVICE — COVER PROBE US 5.5X58L NON LTX

## (undated) DEVICE — GAUZE SPONGE 4X4 12PLY

## (undated) DEVICE — SOL IRRI STRL WATER 1000ML

## (undated) DEVICE — KIT CANIST SUCTION 1200CC

## (undated) DEVICE — COLLECTION SPECIMEN NEPTUNE

## (undated) DEVICE — NDL HYPO REG 25G X 1 1/2

## (undated) DEVICE — SUT CTD VICRYL BR CR/SH VIL

## (undated) DEVICE — DEVICE LOCKING RX - OLYMPUS

## (undated) DEVICE — DRESSING ANTIMIC ISLAND 4X10IN

## (undated) DEVICE — SPHINCTEROTOME ENDO 6FR .025

## (undated) DEVICE — SUT PDS II 5-0 RB-1RB-1 VI

## (undated) DEVICE — RESERVOIR JACKSON-PRATT 100CC

## (undated) DEVICE — KIT SURGICAL COLON .25 1.1OZ

## (undated) DEVICE — SHEET XLGE DRAPE

## (undated) DEVICE — POSITIONER HEEL FOAM CONVOLTD

## (undated) DEVICE — ELECTRODE BLD EXT 6.50 ST DISP

## (undated) DEVICE — SUPPORT ULNA NERVE PROTECTOR

## (undated) DEVICE — LOOP STERION MINI RED 8X406MM

## (undated) DEVICE — CLIP LIGATING MEDIUM

## (undated) DEVICE — CONTAINER SPECIMEN 4OZ

## (undated) DEVICE — SUT SILK 2-0 STRANDS 30IN

## (undated) DEVICE — STAPLER INT PROX TX 60X3.5MM

## (undated) DEVICE — GLOVE PROTEXIS HYDROGEL SZ7

## (undated) DEVICE — NDL PERC ENTRY BSDN 18-7.0

## (undated) DEVICE — SYR SLIP TIP 10ML SHIELD

## (undated) DEVICE — SUT MCRYL PLUS 4-0 PS2 27IN

## (undated) DEVICE — SUT SILK 0 SH 30IN BLK BR

## (undated) DEVICE — BOOT SUTURE AID

## (undated) DEVICE — BAG MEDI-PLAST DECANTER C-FLOW

## (undated) DEVICE — DEVICE TRIVERSE HOLSTER 4.5MCO

## (undated) DEVICE — BLOCK BLOX BITE DENT RIM 54FR

## (undated) DEVICE — FORCEP ALLIGATOR 2.8MM W/NDL

## (undated) DEVICE — RELOAD PROXIMATE CUT BLUE 75MM

## (undated) DEVICE — TOWEL OR WHT XRAY 16X26 2/PK

## (undated) DEVICE — DRAIN JACKSON PRATT TRCR 19FR

## (undated) DEVICE — SUT VICRYL PLUS 3-0 SH 27IN

## (undated) DEVICE — LOOP STERION SUPERMAXI 1.3X559

## (undated) DEVICE — SUT VICRYL PLUS 3-0 SH 18IN

## (undated) DEVICE — SUT 1 36IN PDS II VIO MONO

## (undated) DEVICE — SUT 4/0 27IN PDS II VIO MO

## (undated) DEVICE — SUT PROLENE BLUE 5-0 RB-1 24IN

## (undated) DEVICE — CLIP HORIZON LIG TI MED-LG

## (undated) DEVICE — ELECTRODE REM PLYHSV RETURN 9

## (undated) DEVICE — LIGATING CLIP LARGE

## (undated) DEVICE — SYR ONLY LUER LOCK 20CC

## (undated) DEVICE — BLADE SURG STAINLESS STEEL #11

## (undated) DEVICE — NDL SYR 10ML 18X1.5 LL BLUNT

## (undated) DEVICE — CLIP LIGATING HEMOCLP SMALL

## (undated) DEVICE — DRAPE SLUSH WARMER 66X44IN

## (undated) DEVICE — DRESSING TELFA N ADH 3X8

## (undated) DEVICE — SYR 30CC LUER LOCK

## (undated) DEVICE — PACK DRAPE UNIVERSAL CONVERTOR

## (undated) DEVICE — CONTAINER SPECIMEN SCREW 4OZ